# Patient Record
Sex: MALE | Race: WHITE | Employment: OTHER | URBAN - METROPOLITAN AREA
[De-identification: names, ages, dates, MRNs, and addresses within clinical notes are randomized per-mention and may not be internally consistent; named-entity substitution may affect disease eponyms.]

---

## 2017-02-01 ENCOUNTER — HOSPITAL ENCOUNTER (OUTPATIENT)
Dept: ULTRASOUND IMAGING | Age: 82
Discharge: HOME | End: 2017-02-01
Attending: INTERNAL MEDICINE
Payer: MEDICARE

## 2017-02-01 DIAGNOSIS — R10.9 ABDOMINAL PAIN: ICD-10-CM

## 2017-02-01 PROCEDURE — 76700 US EXAM ABDOM COMPLETE: CPT

## 2017-07-26 RX ORDER — LOVASTATIN 40 MG/1
TABLET ORAL
Qty: 180 TAB | Refills: 1 | Status: SHIPPED | OUTPATIENT
Start: 2017-07-26 | End: 2018-02-26 | Stop reason: SDUPTHER

## 2017-07-26 NOTE — TELEPHONE ENCOUNTER
Requested Prescriptions     Pending Prescriptions Disp Refills    lovastatin (MEVACOR) 40 mg tablet [Pharmacy Med Name: LOVASTATIN TABS 40MG] 180 Tab 1     Sig: TAKE 2 TABLETS DAILY       Last Refill: 9-20-16  Last visit:6-1-17

## 2017-08-11 ENCOUNTER — LAB ONLY (OUTPATIENT)
Dept: INTERNAL MEDICINE CLINIC | Age: 82
End: 2017-08-11

## 2017-08-11 DIAGNOSIS — E03.9 HYPOTHYROIDISM, UNSPECIFIED TYPE: ICD-10-CM

## 2017-08-11 DIAGNOSIS — I48.91 ATRIAL FIBRILLATION, UNSPECIFIED TYPE (HCC): Primary | ICD-10-CM

## 2017-08-11 LAB
INR BLD: 1.8
PT POC: 23.7 SECONDS
TSH BLD-ACNC: 5.8 UIU/ML (ref 0.4–4.2)
VALID INTERNAL CONTROL?: YES

## 2017-08-14 ENCOUNTER — TELEPHONE ANTICOAG (OUTPATIENT)
Dept: INTERNAL MEDICINE CLINIC | Age: 82
End: 2017-08-14

## 2017-08-14 RX ORDER — LEVOTHYROXINE SODIUM 137 UG/1
137 TABLET ORAL
Qty: 30 TAB | Refills: 3 | Status: SHIPPED | OUTPATIENT
Start: 2017-08-14 | End: 2018-02-12 | Stop reason: ALTCHOICE

## 2017-08-14 NOTE — PROGRESS NOTES
INR OK - no change in coumadin  Recheck PT 1 month    Thyroid dose needs to be increased to 137 mcg qam - don't have name of local pharmacy  Recheck TSH 1 month

## 2017-08-14 NOTE — PROGRESS NOTES
Mr. Laura Leary is here today for anticoagulation monitoring for the diagnosis of Atrial Fibrillation. His INR goal is 1.8-3.0 and his current Coumadin dose is 5mg qd. Today's findings include an INR of 1.8 . Considering Mr. Mcallister Root past history, todays findings, and per the coumadin policy/protocol, Mr. Michael Mckeon was instructed to take Coumadin as follows,  5 mg qd. He was also instructed to schedule an appointment in 4 weeks prior to leaving for an INR check. A full discussion of the nature of anticoagulants has been carried out. A full discussion of the need for frequent and regular monitoring, precise dosage adjustment and compliance was stressed. Side effects of potential bleeding were discussed and Mr. Michael Mckeon was instructed to call [phone] if there are any signs of abnormal bleeding. Mr. Michael Mckeon was instructed to avoid any OTC items containing aspirin or ibuprofen and prior to starting any new OTC products to consult with his physician or pharmacist to ensure no drug interactions are present. Mr. Michael Mckeon was instructed to avoid any major changes in his general diet and to avoid alcohol consumption. .    Mr. Michael Mckeon was provided a literature booklet, \"Treatment with Warfarin (Coumadin)\", that includes topics on understanding coumadin therapy, drug interaction considerations, vitamin K and coumadin use, interactions with foods and supplements containing vitamin K, and the use of herbal products. Mr. Michael Mckeon verbalized his understanding of all instructions and will call the office with any questions, concerns, or signs of abnormal bleeding or blood clot.

## 2017-08-14 NOTE — TELEPHONE ENCOUNTER
Requested Prescriptions     Pending Prescriptions Disp Refills    levothyroxine (SYNTHROID) 137 mcg tablet 30 Tab 3     Sig: Take 137 mcg by mouth Daily (before breakfast).      Patient was recently advised to increase dose

## 2017-09-11 RX ORDER — FUROSEMIDE 40 MG/1
TABLET ORAL
Qty: 180 TAB | Refills: 3 | Status: SHIPPED | OUTPATIENT
Start: 2017-09-11 | End: 2018-03-13 | Stop reason: SDUPTHER

## 2017-09-11 RX ORDER — ALLOPURINOL 300 MG/1
TABLET ORAL
Qty: 90 TAB | Refills: 3 | Status: SHIPPED | OUTPATIENT
Start: 2017-09-11 | End: 2018-08-16 | Stop reason: SDUPTHER

## 2017-09-11 NOTE — TELEPHONE ENCOUNTER
Requested Prescriptions     Pending Prescriptions Disp Refills    allopurinol (ZYLOPRIM) 300 mg tablet [Pharmacy Med Name: ALLOPURINOL TABS 300MG] 90 Tab 3     Sig: TAKE 1 TABLET DAILY    furosemide (LASIX) 40 mg tablet [Pharmacy Med Name: FUROSEMIDE TABS 40MG] 180 Tab 3     Sig: TAKE 2 TABLETS DAILY       Last Refill: 5-12-17  Last visit:6-1-17

## 2017-09-12 ENCOUNTER — LAB ONLY (OUTPATIENT)
Dept: INTERNAL MEDICINE CLINIC | Age: 82
End: 2017-09-12

## 2017-09-12 DIAGNOSIS — I48.91 ATRIAL FIBRILLATION, UNSPECIFIED TYPE (HCC): Primary | ICD-10-CM

## 2017-09-12 DIAGNOSIS — E03.9 UNSPECIFIED HYPOTHYROIDISM: ICD-10-CM

## 2017-09-12 LAB
INR BLD: 1.5
PT POC: 20.3 SECONDS
TSH BLD-ACNC: 5.27 UIU/ML (ref 0.4–4.2)
VALID INTERNAL CONTROL?: YES

## 2017-09-13 ENCOUNTER — TELEPHONE ANTICOAG (OUTPATIENT)
Dept: INTERNAL MEDICINE CLINIC | Age: 82
End: 2017-09-13

## 2017-09-13 DIAGNOSIS — I48.91 ATRIAL FIBRILLATION, UNSPECIFIED TYPE (HCC): ICD-10-CM

## 2017-09-13 LAB — INR, EXTERNAL: 1.5

## 2017-09-13 RX ORDER — LEVOTHYROXINE SODIUM 150 UG/1
150 TABLET ORAL
Qty: 30 TAB | Refills: 1 | Status: SHIPPED | OUTPATIENT
Start: 2017-09-13 | End: 2017-11-05 | Stop reason: SDUPTHER

## 2017-09-13 NOTE — PROGRESS NOTES
Mr. Manfred Alonzo is here today for anticoagulation monitoring for the diagnosis of Atrial Fibrillation. His INR goal is 2.0-3.0 and his current Coumadin dose is 5mg qd. Today's findings include an INR of 1.5     Considering Mr. Desi Mehta past history, todays findings, and per the coumadin policy/protocol, Mr. Dyana Heimlich was instructed to take Coumadin as follows,  Take extra 5 mg today then resume usual dose. He was also instructed to schedule an appointment in 4 weeks prior to leaving for an INR check. A full discussion of the nature of anticoagulants has been carried out. A full discussion of the need for frequent and regular monitoring, precise dosage adjustment and compliance was stressed. Side effects of potential bleeding were discussed and Mr. Dyana Heimlich was instructed to call [phone] if there are any signs of abnormal bleeding. Mr. Dyana Heimlich was instructed to avoid any OTC items containing aspirin or ibuprofen and prior to starting any new OTC products to consult with his physician or pharmacist to ensure no drug interactions are present. Mr. Dyana Heimlich was instructed to avoid any major changes in his general diet and to avoid alcohol consumption. .    Mr. Dyana Heimlich was provided a literature booklet, \"Treatment with Warfarin (Coumadin)\", that includes topics on understanding coumadin therapy, drug interaction considerations, vitamin K and coumadin use, interactions with foods and supplements containing vitamin K, and the use of herbal products. Mr. Dyana Heimlich verbalized his understanding of all instructions and will call the office with any questions, concerns, or signs of abnormal bleeding or blood clot.

## 2017-09-13 NOTE — PROGRESS NOTES
INR 1.5 - take extra 5 mg coumadin today and then resume usual regimen - recheck PT 1 month  Thyroid level low - increase levothyroxine to 150 mcg qam

## 2017-09-13 NOTE — TELEPHONE ENCOUNTER
----- Message from Steven Alvarado MD sent at 9/13/2017 11:57 AM EDT -----  INR 1.5 - take extra 5 mg coumadin today and then resume usual regimen - recheck PT 1 month  Thyroid level low - increase levothyroxine to 150 mcg qam    patient notified please send in new Rx.

## 2017-10-17 ENCOUNTER — TELEPHONE ANTICOAG (OUTPATIENT)
Dept: INTERNAL MEDICINE CLINIC | Age: 82
End: 2017-10-17

## 2017-10-17 ENCOUNTER — LAB ONLY (OUTPATIENT)
Dept: INTERNAL MEDICINE CLINIC | Age: 82
End: 2017-10-17

## 2017-10-17 DIAGNOSIS — I48.91 ATRIAL FIBRILLATION, UNSPECIFIED TYPE (HCC): Primary | ICD-10-CM

## 2017-10-17 DIAGNOSIS — I48.91 ATRIAL FIBRILLATION, UNSPECIFIED TYPE (HCC): ICD-10-CM

## 2017-10-17 LAB
INR BLD: 2
PT POC: 26.1 SECONDS
VALID INTERNAL CONTROL?: YES

## 2017-10-17 RX ORDER — ISOSORBIDE MONONITRATE 60 MG/1
TABLET, EXTENDED RELEASE ORAL
Qty: 90 TAB | Refills: 3 | Status: SHIPPED | OUTPATIENT
Start: 2017-10-17 | End: 2018-09-17 | Stop reason: SDUPTHER

## 2017-10-17 NOTE — TELEPHONE ENCOUNTER
Requested Prescriptions     Pending Prescriptions Disp Refills    isosorbide mononitrate ER (IMDUR) 60 mg CR tablet [Pharmacy Med Name: ISOSORBIDE MONO ER TABS 60MG] 90 Tab 3     Sig: TAKE 1 TABLET DAILY       Last Refill: 7-26-17  Last visit:6-1-17

## 2017-10-17 NOTE — PROGRESS NOTES
Mr. Cristina Ramos is here today for anticoagulation monitoring for the diagnosis of Atrial Fibrillation. His INR goal is 2.0-3.0 and his current Coumadin dose is 5mg daily. .     Today's findings include an INR of 2.0 (normal INR range 0.8-1.2). Considering Mr. Rae Escalera past history, todays findings, and per the coumadin policy/protocol, Mr. Marybel Nicholas was instructed to take Coumadin as follows,  Continue same coumadin regimen. He was also instructed to schedule an appointment in 4 weeks prior to leaving for an INR check. A full discussion of the nature of anticoagulants has been carried out. A full discussion of the need for frequent and regular monitoring, precise dosage adjustment and compliance was stressed. Side effects of potential bleeding were discussed and Mr. Marybel Nicholas was instructed to call [phone] if there are any signs of abnormal bleeding. Mr. Marybel Nicholas was instructed to avoid any OTC items containing aspirin or ibuprofen and prior to starting any new OTC products to consult with his physician or pharmacist to ensure no drug interactions are present. Mr. Marybel Nicholas was instructed to avoid any major changes in his general diet and to avoid alcohol consumption. .    Mr. Marybel Nicholas was provided a literature booklet, \"Treatment with Warfarin (Coumadin)\", that includes topics on understanding coumadin therapy, drug interaction considerations, vitamin K and coumadin use, interactions with foods and supplements containing vitamin K, and the use of herbal products. Mr. Marybel Nicholas verbalized his understanding of all instructions and will call the office with any questions, concerns, or signs of abnormal bleeding or blood clot.

## 2017-10-24 PROBLEM — Z79.899 LONG-TERM USE OF HIGH-RISK MEDICATION: Status: ACTIVE | Noted: 2017-10-24

## 2017-10-24 PROBLEM — N40.1 BPH WITH OBSTRUCTION/LOWER URINARY TRACT SYMPTOMS: Status: ACTIVE | Noted: 2017-10-24

## 2017-10-24 PROBLEM — Z79.01 LONG TERM CURRENT USE OF ANTICOAGULANT: Status: ACTIVE | Noted: 2017-10-24

## 2017-10-24 PROBLEM — H35.30 MACULAR DEGENERATION: Status: ACTIVE | Noted: 2017-10-24

## 2017-10-24 PROBLEM — I65.21 RIGHT-SIDED EXTRACRANIAL CAROTID ARTERY STENOSIS: Status: ACTIVE | Noted: 2017-10-24

## 2017-10-24 PROBLEM — I49.5 SICK SINUS SYNDROME (HCC): Status: ACTIVE | Noted: 2017-10-24

## 2017-10-24 PROBLEM — M10.9 GOUT: Status: ACTIVE | Noted: 2017-10-24

## 2017-10-24 PROBLEM — Z79.899 LONG TERM CURRENT USE OF AMIODARONE: Status: ACTIVE | Noted: 2017-10-24

## 2017-10-24 PROBLEM — I70.1 RENAL ARTERY STENOSIS (HCC): Status: ACTIVE | Noted: 2017-10-24

## 2017-10-24 PROBLEM — N13.8 BPH WITH OBSTRUCTION/LOWER URINARY TRACT SYMPTOMS: Status: ACTIVE | Noted: 2017-10-24

## 2017-10-24 PROBLEM — E11.9 DIABETES (HCC): Status: ACTIVE | Noted: 2017-10-24

## 2017-10-24 PROBLEM — K57.30 DIVERTICULOSIS OF LARGE INTESTINE WITHOUT HEMORRHAGE: Status: ACTIVE | Noted: 2017-10-24

## 2017-10-24 PROBLEM — I73.9 PERIPHERAL VASCULAR DISEASE (HCC): Status: ACTIVE | Noted: 2017-10-24

## 2017-11-02 ENCOUNTER — HOSPITAL ENCOUNTER (OUTPATIENT)
Dept: GENERAL RADIOLOGY | Age: 82
Discharge: HOME | End: 2017-11-02
Payer: MEDICARE

## 2017-11-02 DIAGNOSIS — I48.91 A-FIB (HCC): ICD-10-CM

## 2017-11-02 PROCEDURE — 71020 XR CHEST PA LAT: CPT

## 2017-11-06 RX ORDER — LEVOTHYROXINE SODIUM 150 MCG
TABLET ORAL
Qty: 30 TAB | Refills: 1 | Status: SHIPPED | OUTPATIENT
Start: 2017-11-06 | End: 2017-12-07 | Stop reason: SDUPTHER

## 2017-11-06 NOTE — TELEPHONE ENCOUNTER
Requested Prescriptions     Pending Prescriptions Disp Refills    SYNTHROID 150 mcg tablet [Pharmacy Med Name: SYNTHROID 150 MCG TABLET] 30 Tab 1     Sig: take 1 tablet by mouth once daily BEFORE BREAKFAST       Last Refill: 10/10/17  Last visit:6/1/17

## 2017-11-28 ENCOUNTER — LAB ONLY (OUTPATIENT)
Dept: INTERNAL MEDICINE CLINIC | Age: 82
End: 2017-11-28

## 2017-11-28 DIAGNOSIS — I48.20 CHRONIC ATRIAL FIBRILLATION (HCC): Primary | ICD-10-CM

## 2017-11-28 LAB
INR BLD: 2.8
INR, EXTERNAL: 2.8
PT POC: 36.3 SECONDS
VALID INTERNAL CONTROL?: YES

## 2017-11-29 ENCOUNTER — TELEPHONE ANTICOAG (OUTPATIENT)
Dept: INTERNAL MEDICINE CLINIC | Age: 82
End: 2017-11-29

## 2017-11-29 DIAGNOSIS — I48.20 CHRONIC ATRIAL FIBRILLATION (HCC): ICD-10-CM

## 2017-11-29 NOTE — PROGRESS NOTES
Anticoagulation Episode Summary     Current INR goal 1.8-3.0   Next INR check 12/29/2017   INR from last check 2.8 (11/28/2017)   Weekly max dose    Target end date    INR check location [facility]   Preferred lab    Send INR reminders to     Indications   Chronic atrial fibrillation [facility]) [I48.2]           Comments          Anticoagulation Care Providers     Provider Role Specialty Phone number    Eddie Coles MD Responsible Internal Medicine [phone]        Patient notified no change in coumadin and re check his level again in 1 month

## 2017-12-06 RX ORDER — METOPROLOL TARTRATE 100 MG/1
TABLET ORAL
Qty: 180 TAB | Refills: 2 | Status: SHIPPED | OUTPATIENT
Start: 2017-12-06 | End: 2018-08-16 | Stop reason: SDUPTHER

## 2017-12-06 RX ORDER — WARFARIN SODIUM 5 MG/1
TABLET ORAL
Qty: 90 TAB | Refills: 2 | Status: SHIPPED | OUTPATIENT
Start: 2017-12-06 | End: 2018-08-16 | Stop reason: SDUPTHER

## 2017-12-07 ENCOUNTER — OFFICE VISIT (OUTPATIENT)
Dept: INTERNAL MEDICINE CLINIC | Age: 82
End: 2017-12-07

## 2017-12-07 VITALS
OXYGEN SATURATION: 94 % | BODY MASS INDEX: 37.08 KG/M2 | HEART RATE: 76 BPM | HEIGHT: 70 IN | SYSTOLIC BLOOD PRESSURE: 110 MMHG | DIASTOLIC BLOOD PRESSURE: 60 MMHG | WEIGHT: 259 LBS

## 2017-12-07 DIAGNOSIS — Z79.4 TYPE 2 DIABETES MELLITUS WITHOUT COMPLICATION, WITH LONG-TERM CURRENT USE OF INSULIN (HCC): Primary | Chronic | ICD-10-CM

## 2017-12-07 DIAGNOSIS — M10.9 GOUT, UNSPECIFIED CAUSE, UNSPECIFIED CHRONICITY, UNSPECIFIED SITE: Chronic | ICD-10-CM

## 2017-12-07 DIAGNOSIS — Z79.899 LONG-TERM USE OF HIGH-RISK MEDICATION: Chronic | ICD-10-CM

## 2017-12-07 DIAGNOSIS — E03.9 ACQUIRED HYPOTHYROIDISM: Chronic | ICD-10-CM

## 2017-12-07 DIAGNOSIS — I48.20 CHRONIC ATRIAL FIBRILLATION (HCC): Chronic | ICD-10-CM

## 2017-12-07 DIAGNOSIS — I10 ESSENTIAL HYPERTENSION: Chronic | ICD-10-CM

## 2017-12-07 DIAGNOSIS — Z79.01 LONG TERM CURRENT USE OF ANTICOAGULANT: Chronic | ICD-10-CM

## 2017-12-07 DIAGNOSIS — E78.2 MIXED HYPERLIPIDEMIA: Chronic | ICD-10-CM

## 2017-12-07 DIAGNOSIS — Z23 ENCOUNTER FOR IMMUNIZATION: ICD-10-CM

## 2017-12-07 DIAGNOSIS — E11.9 TYPE 2 DIABETES MELLITUS WITHOUT COMPLICATION, WITH LONG-TERM CURRENT USE OF INSULIN (HCC): Primary | Chronic | ICD-10-CM

## 2017-12-07 PROBLEM — N13.8 BPH WITH OBSTRUCTION/LOWER URINARY TRACT SYMPTOMS: Chronic | Status: ACTIVE | Noted: 2017-10-24

## 2017-12-07 PROBLEM — N40.1 BPH WITH OBSTRUCTION/LOWER URINARY TRACT SYMPTOMS: Chronic | Status: ACTIVE | Noted: 2017-10-24

## 2017-12-07 LAB
INR BLD: 1.8
PT POC: 24.6 SECONDS
VALID INTERNAL CONTROL?: YES

## 2017-12-07 NOTE — PATIENT INSTRUCTIONS
Learning About Cutting Calories  How do calories affect your weight? Food gives your body energy. Energy from the food you eat is measured in calories. This energy keeps your heart beating, your brain active, and your muscles working. Your body needs a certain number of calories each day. After your body uses the calories it needs, it stores extra calories as fat. To lose weight safely, you have to eat fewer calories while eating in a healthy way. How many calories do you need each day? The more active you are, the more calories you need. When you are less active, you need fewer calories. How many calories you need each day also depends on several things, including your age and whether you are male or female. Here are some general guidelines for adults:  · Less active women and older adults need 1,600 to 2,000 calories each day. · Active women and less active men need 2,000 to 2,400 calories each day. · Active men need 2,400 to 3,000 calories each day. How can you cut calories and eat healthy meals? Whole grains, vegetables and fruits, and dried beans are good lower-calorie foods. They give you lots of nutrients and fiber. And they fill you up. Sweets, energy drinks, and soda pop are high in calories. They give you few nutrients and no fiber. Try to limit soda pop, fruit juice, and energy drinks. Drink water instead. Some fats can be part of a healthy diet. But cutting back on fats from highly processed foods like fast foods and many snack foods is a good way to lower the calories in your diet. Also, use smaller amounts of fats like butter, margarine, salad dressing, and mayonnaise. Add fresh garlic, lemon, or herbs to your meals to add flavor without adding fat. Meats and dairy products can be a big source of hidden fats. Try to choose lean or low-fat versions of these products. Fat-free cookies, candies, chips, and frozen treats can still be high in sugar and calories.  Some fat-free foods have more calories than regular ones. Eat fat-free treats in moderation, as you would other foods. If your favorite foods are high in fat, salt, sugar, or calories, limit how often you eat them. Eat smaller servings, or look for healthy substitutes. Fill up on fruits, vegetables, and whole grains. Eating at home  · Use meat as a side dish instead of as the main part of your meal.  · Try main dishes that use whole wheat pasta, brown rice, dried beans, or vegetables. · Find ways to cook with little or no fat, such as broiling, steaming, or grilling. · Use cooking spray instead of oil. If you use oil, use a monounsaturated oil, such as canola or olive oil. · Trim fat from meats before you cook them. · Drain off fat after you brown the meat or while you roast it. · Chill soups and stews after you cook them. Then skim the fat off the top after it hardens. Eating out  · Order foods that are broiled or poached rather than fried or breaded. · Cut back on the amount of butter or margarine that you use on bread. · Order sauces, gravies, and salad dressings on the side, and use only a little. · When you order pasta, choose tomato sauce rather than cream sauce. · Ask for salsa with your baked potato instead of sour cream, butter, cheese, or burroughs. · Order meals in a small size instead of upgrading to a large. · Share an entree, or take part of your food home to eat as another meal.  · Share appetizers and desserts. Where can you learn more? Go to http://rufina-juni.info/. Enter 99 [id number] in the search box to learn more about \"Learning About Cutting Calories. \"  Current as of: May 12, 2017  Content Version: 11.4  © 3461-6389 [employer], [employer]. Care instructions adapted under license by [employer] (which disclaims liability or warranty for this information).  If you have questions about a medical condition or this instruction, always ask your healthcare professional. [employer] disclaims any warranty or liability for your use of this information. Vaccine Information Statement     Tdap (Tetanus, Diphtheria, Pertussis) Vaccine: What You Need to Know    Many Vaccine Information Statements are available in [place] and other languages. See www.immunize.org/vis. Hojas de Información Sobre Vacunas están disponibles en español y en muchos otros idiomas. Visite [employer].si    1. Why get vaccinated? Tetanus, diphtheria, and pertussis are very serious diseases. Tdap vaccine can protect us from these diseases. And, Tdap vaccine given to pregnant women can protect  babies against pertussis. TETANUS (Lockjaw) is rare in the [facility] today. It causes painful muscle tightening and stiffness, usually all over the body.  It can lead to tightening of muscles in the head and neck so you cant open your mouth, swallow, or sometimes even breathe. Tetanus kills about 1 out of 10 people who are infected even after receiving the best medical care. DIPHTHERIA is also rare in the [facility] today. It can cause a thick coating to form in the back of the throat.  It can lead to breathing problems, heart failure, paralysis, and death. PERTUSSIS (Whooping Cough) causes severe coughing spells, which can cause difficulty breathing, vomiting, and disturbed sleep.  It can also lead to weight loss, incontinence, and rib fractures. Up to 2 in 100 adolescents and 5 in 100 adults with pertussis are hospitalized or have complications, which could include pneumonia or death. These diseases are caused by bacteria. Diphtheria and pertussis are spread from person to person through secretions from coughing or sneezing. Tetanus enters the body through cuts, scratches, or wounds. Before vaccines, as many as 200,000 cases of diphtheria, 200,000 cases of pertussis, and hundreds of cases of tetanus, were reported in the [place] each year.  Since vaccination began, reports of cases for tetanus and diphtheria have dropped by about 99% and for pertussis by about 80%. 2. Tdap vaccine    Tdap vaccine can protect adolescents and adults from tetanus, diphtheria, and pertussis. One dose of Tdap is routinely given at age 6 or 15. People who did not get Tdap at that age should get it as soon as possible. Tdap is especially important for health care professionals and anyone having close contact with a baby younger than 12 months. Pregnant women should get a dose of Tdap during every pregnancy, to protect the  from pertussis. Infants are most at risk for severe, life-threatening complications from pertussis. Another vaccine, called Td, protects against tetanus and diphtheria, but not pertussis. A Td booster should be given every 10 years. Tdap may be given as one of these boosters if you have never gotten Tdap before. Tdap may also be given after a severe cut or burn to prevent tetanus infection. Your doctor or the person giving you the vaccine can give you more information. Tdap may safely be given at the same time as other vaccines. 3. Some people should not get this vaccine     A person who has ever had a life-threatening allergic reaction after a previous dose of any diphtheria, tetanus or pertussis containing vaccine, OR has a severe allergy to any part of this vaccine, should not get Tdap vaccine. Tell the person giving the vaccine about any severe allergies.  Anyone who had coma or long repeated seizures within 7 days after a childhood dose of DTP or DTaP, or a previous dose of Tdap, should not get Tdap, unless a cause other than the vaccine was found. They can still get Td.      Talk to your doctor if you:  - have seizures or another nervous system problem,  - had severe pain or swelling after any vaccine containing diphtheria, tetanus or pertussis,   - ever had a condition called Guillain Barré Syndrome (GBS),  - arent feeling well on the day the shot is scheduled. 4. Risks    With any medicine, including vaccines, there is a chance of side effects. These are usually mild and go away on their own. Serious reactions are also possible but are rare. Most people who get Tdap vaccine do not have any problems with it. Mild Problems following Tdap  (Did not interfere with activities)   Pain where the shot was given (about 3 in 4 adolescents or 2 in 3 adults)   Redness or swelling where the shot was given (about 1 person in 5)   Mild fever of at least 100.4°F (up to about 1 in 25 adolescents or 1 in 100 adults)   Headache (about 3 or 4 people in 10)   Tiredness (about 1 person in 3 or 4)   Nausea, vomiting, diarrhea, stomach ache (up to 1 in 4 adolescents or 1 in 10 adults)   Chills,  sore joints (about 1 person in 10)   Body aches (about 1 person in 3 or 4)    Rash, swollen glands (uncommon)    Moderate Problems following Tdap  (Interfered with activities, but did not require medical attention)   Pain where the shot was given (up to 1 in 5 or 6)    Redness or swelling where the shot was given (up to about 1 in 16 adolescents or 1 in 12 adults)   Fever over 102°F (about 1 in 100 adolescents or 1 in 250 adults)   Headache (about 1 in 7 adolescents or 1 in 10 adults)   Nausea, vomiting, diarrhea, stomach ache (up to 1 or 3 people in 100)   Swelling of the entire arm where the shot was given (up to about 1 in 500). Severe Problems following Tdap  (Unable to perform usual activities; required medical attention)   Swelling, severe pain, bleeding, and redness in the arm where the shot was given (rare). Problems that could happen after any vaccine:     People sometimes faint after a medical procedure, including vaccination. Sitting or lying down for about 15 minutes can help prevent fainting, and injuries caused by a fall.  Tell your doctor if you feel dizzy, or have vision changes or ringing in the ears.     Some people get severe pain in the shoulder and have difficulty moving the arm where a shot was given. This happens very rarely.  Any medication can cause a severe allergic reaction. Such reactions from a vaccine are very rare, estimated at fewer than 1 in a million doses, and would happen within a few minutes to a few hours after the vaccination. As with any medicine, there is a very remote chance of a vaccine causing a serious injury or death. The safety of vaccines is always being monitored. For more information, visit: www.cdc.gov/vaccinesafety/    5. What if there is a serious problem? What should I look for?  Look for anything that concerns you, such as signs of a severe allergic reaction, very high fever, or unusual behavior.  Signs of a severe allergic reaction can include hives, swelling of the face and throat, difficulty breathing, a fast heartbeat, dizziness, and weakness. These would usually start a few minutes to a few hours after the vaccination. What should I do?  If you think it is a severe allergic reaction or other emergency that cant wait, call 9-1-1 or get the person to the nearest hospital. Otherwise, call your doctor.  Afterward, the reaction should be reported to the Vaccine Adverse Event Reporting System (VAERS). Your doctor might file this report, or you can do it yourself through the VAERS web site at www.vaers. hhs.gov, or by calling 4-694.482.5658. VAERS does not give medical advice. 6. The National Vaccine Injury Compensation Program    The [facility] Vaccine Injury Compensation Program (VICP) is a federal program that was created to compensate people who may have been injured by certain vaccines. Persons who believe they may have been injured by a vaccine can learn about the program and about filing a claim by calling 9-597.509.3650 or visiting the [employer] website at www.[facility].gov/vaccinecompensation.  There is a time limit to file a claim for compensation. 7. How can I learn more?  Ask your doctor. He or she can give you the vaccine package insert or suggest other sources of information.  Call your local or state health department.  Contact the Centers for Disease Control and Prevention (CDC):  - Call 6-638.786.9645 (1-800-CDC-INFO) or  - Visit CDCs website at www.cdc.gov/vaccines      Vaccine Information Statement   Tdap Vaccine  (2/24/2015)  42 RAVI [address] [id number]-81    Department of Health and Human Services  Centers for Disease Control and Prevention    Office Use Only

## 2017-12-07 NOTE — MR AVS SNAPSHOT
Visit Information Date & Time Provider Department Dept. Phone Encounter #  
 12/7/2017  8:30 AM Harshil Disla, [address] [facility] [phone] [id number] Follow-up Instructions Return in about 6 months (around 6/7/2018). Upcoming Health Maintenance Date Due  
 FOOT EXAM Q1 5/31/1945 MICROALBUMIN Q1 5/31/1945 DTaP/Tdap/Td series (1 - Tdap) 5/31/1958 ZOSTER VACCINE AGE 60> 3/31/1997 MEDICARE YEARLY EXAM 5/31/2002 HEMOGLOBIN A1C Q6M 8/1/2015 LIPID PANEL Q1 2/1/2016 EYE EXAM RETINAL OR DILATED Q1 7/19/2018 GLAUCOMA SCREENING Q2Y 7/19/2019 Allergies as of 12/7/2017  Review Complete On: 12/7/2017 By: Harshil Disla MD  
  
 Severity Noted Reaction Type Reactions Latex  01/06/2015    Other (comments) Skin raw and severely irritated Aspirin  11/28/2017    Unknown (comments) Hydrocodone  01/02/2016    Other (comments)  
 hallucinations Oxycodone  01/02/2016    Other (comments)  
 hallucinations Sulfa (Sulfonamide Antibiotics)  10/18/2010    Rash Tetracycline  07/14/2011    Rash Current Immunizations  Reviewed on 12/7/2017 Name Date Influenza Vaccine 9/14/2017, 10/13/2016, 10/1/2015 Pneumococcal Conjugate (PCV-13) 10/1/2015 Pneumococcal Polysaccharide (PPSV-23) 10/1/2013, 1/1/2010 Tdap  Incomplete Reviewed by Harshil Disla MD on 12/7/2017 at  8:53 AM  
You Were Diagnosed With   
  
 Codes Comments Type 2 diabetes mellitus without complication, with long-term current use of insulin (HCC)    -  Primary ICD-10-CM: E11.9, Z79.4 ICD-9-CM: 250.00, V58.67 Essential hypertension     ICD-10-CM: I10 
ICD-9-CM: 401.9 Mixed hyperlipidemia     ICD-10-CM: E78.2 ICD-9-CM: 272.2 Long-term use of high-risk medication     ICD-10-CM: Z79.899 ICD-9-CM: V58.69 Acquired hypothyroidism     ICD-10-CM: E03.9 ICD-9-CM: 244.9 Chronic atrial fibrillation (HCC)     ICD-10-CM: R54.0 ICD-9-CM: 427.31 Long term current use of anticoagulant     ICD-10-CM: Z79.01 
ICD-9-CM: V58.61 Gout, unspecified cause, unspecified chronicity, unspecified site     ICD-10-CM: M10.9 ICD-9-CM: 274.9 Encounter for immunization     ICD-10-CM: Z74 ICD-9-CM: V03.89 Vitals BP Pulse Height(growth percentile) Weight(growth percentile) SpO2 BMI  
 110/60 (BP 1 Location: Left arm, BP Patient Position: Sitting) 76 5' 10\" (1.778 m) 259 lb (117.5 kg) 94% 37.16 kg/m2 Smoking Status Former Smoker BMI and BSA Data Body Mass Index Body Surface Area  
 37.16 kg/m 2 2.41 m 2 Preferred Pharmacy Pharmacy Name Phone [address], [facility] [phone] Your Updated Medication List  
  
   
This list is accurate as of: 12/7/17  9:08 AM.  Always use your most recent med list.  
  
  
  
  
 allopurinol 300 mg tablet Commonly known as:  ZYLOPRIM  
TAKE 1 TABLET DAILY  
  
 amiodarone 200 mg tablet Commonly known as:  CORDARONE Take 200 mg by mouth daily. 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  
  
 finasteride 5 mg tablet Commonly known as:  PROSCAR Take 5 mg by mouth every other day. With dinner  Indications: BENIGN PROSTATIC HYPERTROPHY Flaxseed Oil Oil Take 1,000 mg by mouth daily. FOLGARD RX 2.2 PO Take 1 tablet by mouth daily. furosemide 40 mg tablet Commonly known as:  LASIX TAKE 2 TABLETS DAILY  
  
 insulin lispro 100 unit/mL injection Commonly known as:  HUMALOG  
5-10 Units by SubCUTAneous route three (3) times daily as needed. isosorbide mononitrate ER 60 mg CR tablet Commonly known as:  IMDUR  
TAKE 1 TABLET DAILY  
  
 LANTUS SOLOSTAR 100 unit/mL (3 mL) Inpn Generic drug:  insulin glargine 66 Units by SubCUTAneous route Daily (before lunch). levothyroxine 137 mcg tablet Commonly known as:  SYNTHROID Take 137 mcg by mouth Daily (before breakfast). losartan 50 mg tablet Commonly known as:  COZAAR Take 50 mg by mouth daily. Indications: CHRONIC HEART FAILURE, HYPERTENSION  
  
 lovastatin 40 mg tablet Commonly known as:  MEVACOR  
TAKE 2 TABLETS DAILY  
  
 metoprolol tartrate 100 mg IR tablet Commonly known as:  LOPRESSOR  
TAKE 1 TABLET TWICE A DAY  
  
 multivitamin, stress formula tablet Commonly known as:  STRESS TAB Take 1 Tab by mouth daily. OCUVITE PRESERVISION PO Take 1 Tab by mouth two (2) times a day. spironolactone 25 mg tablet Commonly known as:  ALDACTONE Take 25 mg by mouth daily (with lunch). Restart when home BP improves to >120 mmHg. Check BP in a couple of days  Indications: EDEMA, HYPERTENSION  
  
 terazosin 10 mg capsule Commonly known as:  HYTRIN Take 1 capsule by mouth nightly. Indications: BENIGN PROSTATIC HYPERTROPHY, HYPERTENSION  
  
 warfarin 5 mg tablet Commonly known as:  COUMADIN  
TAKE 1 TABLET DAILY We Performed the Following AMB POC CK (CPK) [13421 CPT(R)] AMB POC COMPLETE CBC,AUTOMATED ENTER [id number] CPT(R)] AMB POC COMPREHENSIVE METABOLIC PANEL [13878 CPT(R)] AMB POC HEMOGLOBIN A1C [42997 CPT(R)] AMB POC LIPID PROFILE [75128 CPT(R)] AMB POC PT/INR [31770 CPT(R)] AMB POC T4, FREE [id number] CPT(R)] AMB POC TSH [29156 CPT(R)] AMB POC URIC ACID [00781 CPT(R)] AMB POC URINALYSIS DIP STICK AUTO W/ MICRO  [84948 CPT(R)] AMB POC URINE, MICROALBUMIN, SEMIQUANT (1 RESULT) [33311 CPT(R)] NC COLLECTION VENOUS BLOOD,VENIPUNCTURE [id number] CPT(R)] NC IMMUNIZ ADMIN,1 SINGLE/COMB VAC/TOXOID [id number] CPT(R)] TETANUS, DIPHTHERIA TOXOIDS AND ACELLULAR PERTUSSIS VACCINE (TDAP), IN INDIVIDS. >=7, IM [id number] CPT(R)] Follow-up Instructions Return in about 6 months (around 6/7/2018). Patient Instructions Learning About Cutting Calories How do calories affect your weight? Food gives your body energy. Energy from the food you eat is measured in calories. This energy keeps your heart beating, your brain active, and your muscles working. Your body needs a certain number of calories each day. After your body uses the calories it needs, it stores extra calories as fat. To lose weight safely, you have to eat fewer calories while eating in a healthy way. How many calories do you need each day? The more active you are, the more calories you need. When you are less active, you need fewer calories. How many calories you need each day also depends on several things, including your age and whether you are male or female. Here are some general guidelines for adults: 
· Less active women and older adults need 1,600 to 2,000 calories each day. · Active women and less active men need 2,000 to 2,400 calories each day. · Active men need 2,400 to 3,000 calories each day. How can you cut calories and eat healthy meals? Whole grains, vegetables and fruits, and dried beans are good lower-calorie foods. They give you lots of nutrients and fiber. And they fill you up. Sweets, energy drinks, and soda pop are high in calories. They give you few nutrients and no fiber. Try to limit soda pop, fruit juice, and energy drinks. Drink water instead. Some fats can be part of a healthy diet. But cutting back on fats from highly processed foods like fast foods and many snack foods is a good way to lower the calories in your diet. Also, use smaller amounts of fats like butter, margarine, salad dressing, and mayonnaise. Add fresh garlic, lemon, or herbs to your meals to add flavor without adding fat. Meats and dairy products can be a big source of hidden fats. Try to choose lean or low-fat versions of these products.  
Fat-free cookies, candies, chips, and frozen treats can still be high in sugar and calories. Some fat-free foods have more calories than regular ones. Eat fat-free treats in moderation, as you would other foods. If your favorite foods are high in fat, salt, sugar, or calories, limit how often you eat them. Eat smaller servings, or look for healthy substitutes. Fill up on fruits, vegetables, and whole grains. Eating at home · Use meat as a side dish instead of as the main part of your meal. 
· Try main dishes that use whole wheat pasta, brown rice, dried beans, or vegetables. · Find ways to cook with little or no fat, such as broiling, steaming, or grilling. · Use cooking spray instead of oil. If you use oil, use a monounsaturated oil, such as canola or olive oil. · Trim fat from meats before you cook them. · Drain off fat after you brown the meat or while you roast it. · Chill soups and stews after you cook them. Then skim the fat off the top after it hardens. Eating out · Order foods that are broiled or poached rather than fried or breaded. · Cut back on the amount of butter or margarine that you use on bread. · Order sauces, gravies, and salad dressings on the side, and use only a little. · When you order pasta, choose tomato sauce rather than cream sauce. · Ask for salsa with your baked potato instead of sour cream, butter, cheese, or burroughs. · Order meals in a small size instead of upgrading to a large. · Share an entree, or take part of your food home to eat as another meal. 
· Share appetizers and desserts. Where can you learn more? Go to http://rufina-juni.info/. Enter [id number] in the search box to learn more about \"Learning About Cutting Calories. \" Current as of: May 12, 2017 Content Version: 11.4 © 2495-7518 [employer]wise, [employer]orporated. Care instructions adapted under license by [employer] (which disclaims liability or warranty for this information).  If you have questions about a medical condition or this instruction, always ask your healthcare professional. [address] any warranty or liability for your use of this information. Vaccine Information Statement Tdap (Tetanus, Diphtheria, Pertussis) Vaccine: What You Need to Know Many Vaccine Information Statements are available in [place] and other languages. See www.immunize.org/vis. Hojas de Información Sobre Vacunas están disponibles en español y en muchos otros idiomas. Visite WorthScale.si 1. Why get vaccinated? Tetanus, diphtheria, and pertussis are very serious diseases. Tdap vaccine can protect us from these diseases. And, Tdap vaccine given to pregnant women can protect  babies against pertussis. TETANUS (Lockjaw) is rare in the [facility] today. It causes painful muscle tightening and stiffness, usually all over the body. ? It can lead to tightening of muscles in the head and neck so you cant open your mouth, swallow, or sometimes even breathe. Tetanus kills about 1 out of 10 people who are infected even after receiving the best medical care. DIPHTHERIA is also rare in the [facility] today. It can cause a thick coating to form in the back of the throat. ? It can lead to breathing problems, heart failure, paralysis, and death. PERTUSSIS (Whooping Cough) causes severe coughing spells, which can cause difficulty breathing, vomiting, and disturbed sleep. ? It can also lead to weight loss, incontinence, and rib fractures. Up to 2 in 100 adolescents and 5 in 100 adults with pertussis are hospitalized or have complications, which could include pneumonia or death. These diseases are caused by bacteria. Diphtheria and pertussis are spread from person to person through secretions from coughing or sneezing. Tetanus enters the body through cuts, scratches, or wounds.  
 
Before vaccines, as many as 200,000 cases of diphtheria, 200,000 cases of pertussis, and hundreds of cases of tetanus, were reported in the [place] each year. Since vaccination began, reports of cases for tetanus and diphtheria have dropped by about 99% and for pertussis by about 80%. 2. Tdap vaccine Tdap vaccine can protect adolescents and adults from tetanus, diphtheria, and pertussis. One dose of Tdap is routinely given at age 6 or 15. People who did not get Tdap at that age should get it as soon as possible. Tdap is especially important for health care professionals and anyone having close contact with a baby younger than 12 months. Pregnant women should get a dose of Tdap during every pregnancy, to protect the  from pertussis. Infants are most at risk for severe, life-threatening complications from pertussis. Another vaccine, called Td, protects against tetanus and diphtheria, but not pertussis. A Td booster should be given every 10 years. Tdap may be given as one of these boosters if you have never gotten Tdap before. Tdap may also be given after a severe cut or burn to prevent tetanus infection. Your doctor or the person giving you the vaccine can give you more information. Tdap may safely be given at the same time as other vaccines. 3. Some people should not get this vaccine  A person who has ever had a life-threatening allergic reaction after a previous dose of any diphtheria, tetanus or pertussis containing vaccine, OR has a severe allergy to any part of this vaccine, should not get Tdap vaccine. Tell the person giving the vaccine about any severe allergies.  Anyone who had coma or long repeated seizures within 7 days after a childhood dose of DTP or DTaP, or a previous dose of Tdap, should not get Tdap, unless a cause other than the vaccine was found. They can still get Td.  
 
 Talk to your doctor if you: 
- have seizures or another nervous system problem, 
 - had severe pain or swelling after any vaccine containing diphtheria, tetanus or pertussis,  
- ever had a condition called Guillain Barré Syndrome (GBS), 
- arent feeling well on the day the shot is scheduled. 4. Risks With any medicine, including vaccines, there is a chance of side effects. These are usually mild and go away on their own. Serious reactions are also possible but are rare. Most people who get Tdap vaccine do not have any problems with it. Mild Problems following Tdap 
(Did not interfere with activities)  Pain where the shot was given (about 3 in 4 adolescents or 2 in 3 adults)  Redness or swelling where the shot was given (about 1 person in 5)  Mild fever of at least 100.4°F (up to about 1 in 25 adolescents or 1 in 100 adults)  Headache (about 3 or 4 people in 10)  Tiredness (about 1 person in 3 or 4)  Nausea, vomiting, diarrhea, stomach ache (up to 1 in 4 adolescents or 1 in 10 adults)  Chills,  sore joints (about 1 person in 10)  Body aches (about 1 person in 3 or 4)  Rash, swollen glands (uncommon) Moderate Problems following Tdap (Interfered with activities, but did not require medical attention)  Pain where the shot was given (up to 1 in 5 or 6)  Redness or swelling where the shot was given (up to about 1 in 16 adolescents or 1 in 12 adults)  Fever over 102°F (about 1 in 100 adolescents or 1 in 250 adults)  Headache (about 1 in 7 adolescents or 1 in 10 adults)  Nausea, vomiting, diarrhea, stomach ache (up to 1 or 3 people in 100)  Swelling of the entire arm where the shot was given (up to about 1 in 500). Severe Problems following Tdap 
(Unable to perform usual activities; required medical attention)  Swelling, severe pain, bleeding, and redness in the arm where the shot was given (rare). Problems that could happen after any vaccine:  People sometimes faint after a medical procedure, including vaccination. Sitting or lying down for about 15 minutes can help prevent fainting, and injuries caused by a fall. Tell your doctor if you feel dizzy, or have vision changes or ringing in the ears.  Some people get severe pain in the shoulder and have difficulty moving the arm where a shot was given. This happens very rarely.  Any medication can cause a severe allergic reaction. Such reactions from a vaccine are very rare, estimated at fewer than 1 in a million doses, and would happen within a few minutes to a few hours after the vaccination. As with any medicine, there is a very remote chance of a vaccine causing a serious injury or death. The safety of vaccines is always being monitored. For more information, visit: www.cdc.gov/vaccinesafety/ 
 
 
The [facility] Vaccine Injury Compensation Program (VICP) is a federal program that was created to compensate people who may have been injured by certain vaccines.  
 
Persons who believe they may have been injured by a vaccine can learn about the program and about filing a claim by calling 4-233.725.5276 or visiting the [employer] website at www.[facility].gov/vaccinecompensation. There is a time limit to file a claim for compensation. 7. How can I learn more?  Ask your doctor. He or she can give you the vaccine package insert or suggest other sources of information.  Call your local or state health department.  Contact the Centers for Disease Control and Prevention (CDC): 
- Call 9-126.734.8995 (1-926-YZT-INFO) or 
- Visit CDCs website at www.cdc.gov/vaccines Vaccine Information Statement Tdap Vaccine 
(2/24/2015) 42 RAVI Ortiz [id number] [facility] [place] Centers for Disease Control and Prevention Office Use Only Introducing [facility]! [facility] introduces BiOM patient portal. Now you can access parts of your medical record, email your doctor's office, and request medication refills online. 1. In your internet browser, go to [email or web address] 2. Click on the First Time User? Click Here link in the Sign In box. You will see the New Member Sign Up page. 3. Enter your BiOM Access Code exactly as it appears below. You will not need to use this code after youve completed the sign-up process. If you do not sign up before the expiration date, you must request a new code. · BiOM Access Code: [email or web address] Expires: 2/26/2018  8:21 AM 
 
4. Enter the last four digits of your Social Security Number (xxxx) and Date of Birth (mm/dd/yyyy) as indicated and click Submit. You will be taken to the next sign-up page. 5. Create a Telepot ID. This will be your BiOM login ID and cannot be changed, so think of one that is secure and easy to remember. 6. Create a Telepot password. You can change your password at any time. 7. Enter your Password Reset Question and Answer. This can be used at a later time if you forget your password. 8. Enter your e-mail address. You will receive e-mail notification when new information is available in [address] 9. Click Sign Up. You can now view and download portions of your medical record. 10. Click the Download Summary menu link to download a portable copy of your medical information. If you have questions, please visit the Frequently Asked Questions section of the [employer] website. Remember, [employer] is NOT to be used for urgent needs. For medical emergencies, dial 911. Now available from your iPhone and Android! Please provide this summary of care documentation to your next provider. Your primary care clinician is listed as DEBORA Hamilton 21. If you have any questions after today's visit, please call [phone].

## 2017-12-07 NOTE — PROGRESS NOTES
This note will not be viewable in [address] Justa Islas is a [de-identified] y.o. male and presents with Follow-up (6 mo fu)  . Subjective:  Mr. Shakira Kim returns to our office today in follow-up of multiple medical problems. He is a patient with complex medical issues. The patient has type 2 diabetes mellitus. He currently is on Lantus and supplements his Lantus with Humalog. The patient is checking his blood sugars twice daily. His average fasting blood sugar has been less than 120. The patient has had a diabetic retinal eye examination done within the last year. He denies any paresthesias of his feet. His hypertension is currently managed on Lopressor Lasix and spironolactone and losartan. The patient denies dizziness fatigue or palpitations. He has had no headaches, numbness, tingling or focal neurological problems. Hypercholesterolemia is currently managed on lovastatin therapy. He denies muscle soreness or GI upset. He has had no exertional chest pains or claudication. Patient has hypothyroidism currently on thyroid replacement. He continues to take the medication on an empty stomach first thing in the morning with water. He denies heat or cold intolerance, changes in skin or hair, his weight has been stable. He has a history of gout but is had no problems since he has been on allopurinol with no gouty attacks within the last year. He has chronic atrial fibrillation. He is chronically on amiodarone and a beta-blocker. He is on Coumadin anticoagulation for stroke prevention and is compliant with his monthly protimes. Past Medical History:   Diagnosis Date    Acquired hypothyroidism 9/12/2017    Anemia 2/1/2015    Arrhythmia     a. fib.& a.  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    CAD (coronary artery disease)     Chronic atrial fibrillation (HCC) 2/1/2015    Chronic kidney disease     decreased kidney function    CKD (chronic kidney disease) stage 3, GFR 30-59 ml/min 2/1/2015    Diabetes ([facility]     Diverticulosis of large intestine without hemorrhage 10/24/2017    Gout 10/24/2017    Heart failure ([facility]     Hyperlipidemia 2/1/2015    Hypertension     Ill-defined condition     high cholesterol    Ill-defined condition     gout    Long term current use of amiodarone 10/24/2017    Long term current use of anticoagulant 10/24/2017    Long-term use of high-risk medication 10/24/2017    Macular degeneration 10/24/2017    Peripheral vascular disease ([facility] 10/24/2017    Renal artery stenosis (HCC) 10/24/2017    Right-sided extracranial carotid artery stenosis 10/24/2017    Sick sinus syndrome ([facility] 10/24/2017    Status post pacemaker     Past Surgical History:   Procedure Laterality Date    HX AAA REPAIR      HX APPENDECTOMY      HX CATARACT REMOVAL      / lens implant    HX CHOLECYSTECTOMY      HX ORTHOPAEDIC Right     rt unicondular knee replacement    HX ORTHOPAEDIC  1/26/15    LEFT UNICONDYLAR KNEE ARTHROPLASTY    HX PACEMAKER  7/11    pacemaker    HX TONSILLECTOMY      HX UROLOGICAL      rt renal stentx2     Allergies   Allergen Reactions    Latex Other (comments)     Skin raw and severely irritated    Aspirin Unknown (comments)    Hydrocodone Other (comments)     hallucinations    Oxycodone Other (comments)     hallucinations    Sulfa (Sulfonamide Antibiotics) Rash    Tetracycline Rash     Current Outpatient Prescriptions   Medication Sig Dispense Refill    metoprolol tartrate (LOPRESSOR) 100 mg IR tablet TAKE 1 TABLET TWICE A  Tab 2    warfarin (COUMADIN) 5 mg tablet TAKE 1 TABLET DAILY 90 Tab 2    isosorbide mononitrate ER (IMDUR) 60 mg CR tablet TAKE 1 TABLET DAILY 90 Tab 3    allopurinol (ZYLOPRIM) 300 mg tablet TAKE 1 TABLET DAILY 90 Tab 3    furosemide (LASIX) 40 mg tablet TAKE 2 TABLETS DAILY 180 Tab 3    levothyroxine (SYNTHROID) 137 mcg tablet Take 137 mcg by mouth Daily (before breakfast). 30 Tab 3    lovastatin (MEVACOR) 40 mg tablet TAKE 2 TABLETS DAILY 180 Tab 1    insulin lispro (HUMALOG) 100 unit/mL injection 5-10 Units by SubCUTAneous route three (3) times daily as needed.  Flaxseed Oil oil Take 1,000 mg by mouth daily.  terazosin (HYTRIN) 10 mg capsule Take 1 capsule by mouth nightly. Indications: BENIGN PROSTATIC HYPERTROPHY, HYPERTENSION      spironolactone (ALDACTONE) 25 mg tablet Take 25 mg by mouth daily (with lunch). Restart when home BP improves to >120 mmHg. Check BP in a couple of days  Indications: EDEMA, HYPERTENSION      insulin glargine (LANTUS SOLOSTAR) 100 unit/mL (3 mL) pen 66 Units by SubCUTAneous route Daily (before lunch).  VIT A/VIT C/VIT E/ZINC/COPPER (OCUVITE PRESERVISION PO) Take 1 Tab by mouth two (2) times a day.  losartan (COZAAR) 50 mg tablet Take 50 mg by mouth daily. Indications: CHRONIC HEART FAILURE, HYPERTENSION      amiodarone (CORDARONE) 200 mg tablet Take 200 mg by mouth daily. Indications: VENTRICULAR RATE CONTROL IN ATRIAL FIBRILLATION      multivitamin, stress formula (STRESS TAB) tablet Take 1 Tab by mouth daily.  CYANOCOBALAMIN/FA/PYRIDOXINE (FOLGARD RX 2.2 PO) Take 1 tablet by mouth daily.  finasteride (PROSCAR) 5 mg tablet Take 5 mg by mouth every other day.  With dinner  Indications: BENIGN PROSTATIC HYPERTROPHY       Social History     Social History    Marital status:      Spouse name: N/A    Number of children: N/A    Years of education: N/A     Social History Main Topics    Smoking status: Former Smoker     Quit date: 7/14/1990    Smokeless tobacco: Never Used    Alcohol use 0.0 oz/week      Comment: 3-4 drinks a week    Drug use: No    Sexual activity: Not Asked     Other Topics Concern    None     Social History Narrative     Family History   Problem Relation Age of Onset    Cancer Mother     Diabetes Father        Health Maintenance   Topic Date Due    FOOT EXAM Q1 05/31/1945    MICROALBUMIN Q1  [birth year]/31/1947    DTaP/Tdap/Td series (1 - Tdap) 05/31/1958    ZOSTER VACCINE AGE 60>  03/31/1997    MEDICARE YEARLY EXAM  05/31/2002    HEMOGLOBIN A1C Q6M  08/01/2015    LIPID PANEL Q1  02/01/2016    EYE EXAM RETINAL OR DILATED Q1  07/19/2018    GLAUCOMA SCREENING Q2Y  07/19/2019    Pneumococcal 65+ Low/Medium Risk  Completed    Influenza Age 5 to Adult  Completed        Review of Systems  Constitutional: negative for fevers, chills, anorexia and weight loss  Eyes:   negative for visual disturbance and irritation  ENT:   negative for tinnitus,sore throat,nasal congestion,ear pain,hoarseness  Respiratory:  negative for cough, hemoptysis, dyspnea,wheezing  CV:   negative for chest pain, palpitations, lower extremity edema  GI:   negative for nausea, vomiting, diarrhea, abdominal pain,melena  Endo:               negative for polyuria,polydipsia,polyphagia,heat intolerance  Genitourinary: negative for frequency, dysuria and hematuria  Integumentary: negative for rash and pruritus  Hematologic:  negative for easy bruising and gum/nose bleeding  Musculoskel: negative for myalgias, arthralgias, back pain, muscle weakness, joint pain  Neurological:  negative for headaches, dizziness, vertigo, memory problems and gait   Behavl/Psych: negative for feelings of anxiety, depression, mood changes  ROS otherwise negative      Objective:  Visit Vitals    /60 (BP 1 Location: Left arm, BP Patient Position: Sitting)    Pulse 76    Ht 5' 10\" (1.778 m)    Wt 259 lb (117.5 kg)    SpO2 94%    BMI 37.16 kg/m2     Body mass index is 37.16 kg/(m^2).     Physical Exam:   General appearance - alert, well appearing, and in no distress  Mental status - alert, oriented to person, place, and time  EYE-DIXIE, EOMI,conjunctiva normal bilaterally, lids normal  ENT-ENT exam normal, no neck nodes or sinus tenderness  Nose - normal and patent, no erythema,  Or discharge   Mouth - mucous membranes moist, pharynx normal without lesions  Neck - supple, no significant adenopathy or bruit  Chest - clear to auscultation, no wheezes, rales or rhonchi. Heart -rhythm is irregularly irregular with a controlled ventricular response. Abdomen - soft, nontender, nondistended, no masses or organomegaly  Lymph- no adenopathy palpable  Ext-peripheral pulses normal, no pedal edema, no clubbing or cyanosis  Skin-Warm and dry. no hyperpigmentation, vitiligo, or suspicious lesions  Neuro -alert, oriented, normal speech, no focal findings or movement disorder noted      Assessment/Plan:  Diagnoses and all orders for this visit:    Type 2 diabetes mellitus without complication, with long-term current use of insulin (HCC)  -     AMB POC HEMOGLOBIN A1C  -     RI COLLECTION VENOUS BLOOD,VENIPUNCTURE  -     AMB POC URINE, MICROALBUMIN, SEMIQUANT (1 RESULT)    Essential hypertension  -     AMB POC COMPLETE CBC,AUTOMATED ENTER  -     AMB POC COMPREHENSIVE METABOLIC PANEL  -     AMB POC URINALYSIS DIP STICK AUTO W/ MICRO     Mixed hyperlipidemia  -     AMB POC LIPID PROFILE    Long-term use of high-risk medication  -     AMB POC CK (CPK)    Acquired hypothyroidism  -     AMB POC T4, FREE  -     AMB POC TSH    Chronic atrial fibrillation (HCC)    Long term current use of anticoagulant  -     AMB POC PT/INR    Gout, unspecified cause, unspecified chronicity, unspecified site  -     AMB POC URIC ACID    Encounter for immunization  -     Tetanus, diphtheria toxoids and acellular pertussis (TDAP) vaccine, in individuals >=7 years, IM  -     RI IMMUNIZ ADMIN,1 SINGLE/COMB VAC/TOXOID        Other instructions: The patient's medications are reviewed and reconciled. No change in his current medical regimen is made. Body mass index is 37.16 and dietary counseling and a printed patient education sheet is given. Continue to check blood sugars twice daily. Continue monthly protimes. Tdap shot is given today.     Await results of multiple labs.    Patient is up-to-date on influenza and pneumococcal vaccinations. Follow-up 6 months    Follow-up Disposition:  Return in about 6 months (around 6/7/2018). I have reviewed with the patient details of the assessment and plan and all questions were answered. Relevent patient education was performed. The most recent lab findings were reviewed with the patient. An After Visit Summary was printed and given to the patient.     Rocky Gee MD

## 2017-12-07 NOTE — PROGRESS NOTES
Waldemar Matthew is a [de-identified] y.o. male presenting for Follow-up (6 mo fu)  . 1. Have you been to the ER, urgent care clinic since your last visit? Hospitalized since your last visit? No    2. Have you seen or consulted any other health care providers outside of the [address] [place] [address] since your last visit? Include any pap smears or colon screening. No    Fall Risk Assessment, last 12 mths 12/7/2017   Able to walk? Yes   Fall in past 12 months? No         Abuse Screening Questionnaire 12/7/2017   Do you ever feel afraid of your partner? N   Are you in a relationship with someone who physically or mentally threatens you? N   Is it safe for you to go home?  Y       PHQ over the last two weeks 12/7/2017   Little interest or pleasure in doing things Not at all   Feeling down, depressed or hopeless Not at all   Total Score PHQ 2 0       Medications Discontinued During This Encounter   Medication Reason    SYNTHROID 453 mcg tablet Duplicate Order    LOVASTATIN PO Duplicate Order

## 2017-12-12 ENCOUNTER — TELEPHONE ANTICOAG (OUTPATIENT)
Dept: INTERNAL MEDICINE CLINIC | Age: 82
End: 2017-12-12

## 2017-12-12 DIAGNOSIS — I48.20 CHRONIC ATRIAL FIBRILLATION (HCC): ICD-10-CM

## 2017-12-12 LAB
ALBUMIN SERPL-MCNC: 4 G/DL (ref 3.9–5.4)
ALKALINE PHOS POC: 128 U/L (ref 38–126)
ALT SERPL-CCNC: 28 U/L (ref 9–52)
AST SERPL-CCNC: 26 U/L (ref 14–36)
BACTERIA UA POCT, BACTPOCT: NORMAL
BILIRUB UR QL STRIP: NEGATIVE
BUN BLD-MCNC: 50 MG/DL (ref 9–20)
CALCIUM BLD-MCNC: 9.1 MG/DL (ref 8.4–10.2)
CASTS UA POCT: NORMAL
CHLORIDE BLD-SCNC: 101 MMOL/L (ref 98–107)
CHOLEST SERPL-MCNC: 120 MG/DL (ref 0–200)
CK (CPK) POC: 81 U/L (ref 30–135)
CLUE CELLS, CLUEPOCT: NEGATIVE
CO2 POC: 30 MMOL/L (ref 22–32)
CREAT BLD-MCNC: 1.8 MG/DL (ref 0.8–1.5)
CRYSTALS UA POCT, CRYSPOCT: NEGATIVE
EGFR (POC): 34.8
EPITHELIAL CELLS POCT: NORMAL
GLUCOSE POC: 190 MG/DL (ref 75–110)
GLUCOSE UR-MCNC: NEGATIVE MG/DL
GRAN# POC: 6.7 K/UL (ref 2–7.8)
GRAN% POC: 75.5 % (ref 37–92)
HBA1C MFR BLD HPLC: 7.3 % (ref 4.5–5.7)
HCT VFR BLD CALC: 35.2 % (ref 37–51)
HDLC SERPL-MCNC: 39 MG/DL (ref 35–130)
HGB BLD-MCNC: 11.8 G/DL (ref 12–18)
KETONES P FAST UR STRIP-MCNC: NEGATIVE MG/DL
LDL CHOLESTEROL POC: 61.8 MG/DL (ref 0–130)
LY# POC: 1.4 K/UL (ref 0.6–4.1)
LY% POC: 16.8 % (ref 10–58.5)
MCH RBC QN: 34.4 PG (ref 26–32)
MCHC RBC-ENTMCNC: 33.5 G/DL (ref 30–36)
MCV RBC: 103 FL (ref 80–97)
MICROALBUMIN UR TEST STR-MCNC: 20 MG/L (ref 0–20)
MID #, POC: 0.6 K/UL (ref 0–1.8)
MID% POC: 7.7 % (ref 0.1–24)
MUCUS UA POCT, MUCPOCT: NORMAL
PH UR STRIP: 5 [PH] (ref 5–7)
PLATELET # BLD: 168 K/UL (ref 140–440)
POTASSIUM SERPL-SCNC: 4.8 MMOL/L (ref 3.6–5)
PROT SERPL-MCNC: 7 G/DL (ref 6.3–8.2)
PROT UR QL STRIP: NORMAL
RBC # BLD: 3.43 M/UL (ref 4.2–6.3)
RBC UA POCT, RBCPOCT: NORMAL
SODIUM SERPL-SCNC: 144 MMOL/L (ref 137–145)
SP GR UR STRIP: 1.01 (ref 1.01–1.02)
T4 FREE SERPL-MCNC: 1.06 NG/DL (ref 0.71–1.85)
TCHOL/HDL RATIO (POC): 3.1 (ref 0–4)
TOTAL BILIRUBIN POC: 0.9 MG/DL (ref 0.2–1.3)
TRICH UA POCT, TRICHPOC: NEGATIVE
TRIGL SERPL-MCNC: 96 MG/DL (ref 0–200)
TSH BLD-ACNC: 3.22 UIU/ML (ref 0.4–4.2)
UA UROBILINOGEN AMB POC: NORMAL (ref 0.2–1)
URIC ACID, POC: 5.7 MG/DL (ref 3.5–8.5)
URINALYSIS CLARITY POC: CLEAR
URINALYSIS COLOR POC: NORMAL
URINE BLOOD POC: NEGATIVE
URINE CULT COMMENT, POCT: NORMAL
URINE LEUKOCYTES POC: NEGATIVE
URINE NITRITES POC: NEGATIVE
VLDLC SERPL CALC-MCNC: 19.2 MG/DL
WBC # BLD: 8.7 K/UL (ref 4.1–10.9)
WBC UA POCT, WBCPOCT: 0
YEAST UA POCT, YEASTPOC: NEGATIVE

## 2017-12-12 NOTE — PROGRESS NOTES
Anticoagulation Summary as of 12/12/2017     INR goal 1.8-3.0   Today's INR 1.8 (12/7/2017)   Maintenance plan 5 mg (5 mg x 1) every day   Weekly total 35 mg   No change documented [address] [facility] last modified [facility], LPN (1/48/3467)   Next INR check 1/12/2018   Target end date     Indications   Chronic atrial fibrillation ([facility] [I48.2]         Anticoagulation Episode Summary     INR check location Clinic Lab    Preferred lab     Send INR reminders to     Comments       Anticoagulation Care Providers     Provider Role Specialty Phone number    Johnson Leonard MD [facility] Internal Medicine [phone]

## 2017-12-29 ENCOUNTER — OFFICE VISIT (OUTPATIENT)
Dept: INTERNAL MEDICINE CLINIC | Age: 82
End: 2017-12-29

## 2017-12-29 VITALS
HEIGHT: 70 IN | DIASTOLIC BLOOD PRESSURE: 80 MMHG | WEIGHT: 265.2 LBS | BODY MASS INDEX: 37.97 KG/M2 | TEMPERATURE: 98 F | SYSTOLIC BLOOD PRESSURE: 124 MMHG

## 2017-12-29 DIAGNOSIS — J20.9 ACUTE BRONCHITIS, UNSPECIFIED ORGANISM: Primary | ICD-10-CM

## 2017-12-29 RX ORDER — CEFUROXIME AXETIL 250 MG/1
250 TABLET ORAL 2 TIMES DAILY
Qty: 20 TAB | Refills: 0 | Status: SHIPPED | OUTPATIENT
Start: 2017-12-29 | End: 2018-02-12 | Stop reason: ALTCHOICE

## 2017-12-29 NOTE — PATIENT INSTRUCTIONS
Bronchitis: Care Instructions  Your Care Instructions    Bronchitis is inflammation of the bronchial tubes, which carry air to the lungs. The tubes swell and produce mucus, or phlegm. The mucus and inflamed bronchial tubes make you cough. You may have trouble breathing. Most cases of bronchitis are caused by viruses like those that cause colds. Antibiotics usually do not help and they may be harmful. Bronchitis usually develops rapidly and lasts about 2 to 3 weeks in otherwise healthy people. Follow-up care is a key part of your treatment and safety. Be sure to make and go to all appointments, and call your doctor if you are having problems. It's also a good idea to know your test results and keep a list of the medicines you take. How can you care for yourself at home? · Take all medicines exactly as prescribed. Call your doctor if you think you are having a problem with your medicine. · Get some extra rest.  · Take an over-the-counter pain medicine, such as acetaminophen (Tylenol), ibuprofen (Advil, Motrin), or naproxen (Aleve) to reduce fever and relieve body aches. Read and follow all instructions on the label. · Do not take two or more pain medicines at the same time unless the doctor told you to. Many pain medicines have acetaminophen, which is Tylenol. Too much acetaminophen (Tylenol) can be harmful. · Take an over-the-counter cough medicine that contains dextromethorphan to help quiet a dry, hacking cough so that you can sleep. Avoid cough medicines that have more than one active ingredient. Read and follow all instructions on the label. · Breathe moist air from a humidifier, hot shower, or sink filled with hot water. The heat and moisture will thin mucus so you can cough it out. · Do not smoke. Smoking can make bronchitis worse. If you need help quitting, talk to your doctor about stop-smoking programs and medicines. These can increase your chances of quitting for good.   When should you call for help? Call 911 anytime you think you may need emergency care. For example, call if:  ? · You have severe trouble breathing. ?Call your doctor now or seek immediate medical care if:  ? · You have new or worse trouble breathing. ? · You cough up dark brown or bloody mucus (sputum). ? · You have a new or higher fever. ? · You have a new rash. ? Watch closely for changes in your health, and be sure to contact your doctor if:  ? · You cough more deeply or more often, especially if you notice more mucus or a change in the color of your mucus. ? · You are not getting better as expected. Where can you learn more? Go to http://rufina-juni.info/. Enter H333 in the search box to learn more about \"Bronchitis: Care Instructions. \"  Current as of: May 12, 2017  Content Version: 11.4  © 3199-2903 [employer]. Care instructions adapted under license by [employer] (which disclaims liability or warranty for this information). If you have questions about a medical condition or this instruction, always ask your healthcare professional. [address] any warranty or liability for your use of this information.

## 2017-12-29 NOTE — PROGRESS NOTES
This note will not be viewable in [address] Susana Diaz is a [de-identified] y.o. male and presents with Cough  . Subjective:  Mr. Nida Correa presents to the office today with complaints of an upper respiratory infection ongoing since [place] with the development of sinus congestion drainage and now a deeply congested cough. Cough has been productive of yellowish phlegm. He denies fevers, chills, wheezing, shortness of breath or pleuritic pain. He has had no headache or GI upset. Past Medical History:   Diagnosis Date    Acquired hypothyroidism 9/12/2017    Anemia 2/1/2015    Arrhythmia     a. fib.& a.  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    CAD (coronary artery disease)     Chronic atrial fibrillation (HCC) 2/1/2015    Chronic kidney disease     decreased kidney function    CKD (chronic kidney disease) stage 3, GFR 30-59 ml/min 2/1/2015    Diabetes ([address]     Diverticulosis of large intestine without hemorrhage 10/24/2017    Gout 10/24/2017    Heart failure ([address]     Hyperlipidemia 2/1/2015    Hypertension     Ill-defined condition     high cholesterol    Ill-defined condition     gout    Long term current use of amiodarone 10/24/2017    Long term current use of anticoagulant 10/24/2017    Long-term use of high-risk medication 10/24/2017    Macular degeneration 10/24/2017    Peripheral vascular disease ([address] 10/24/2017    Renal artery stenosis ([address] 10/24/2017    Right-sided extracranial carotid artery stenosis 10/24/2017    Sick sinus syndrome ([address] 10/24/2017    Status post pacemaker     Past Surgical History:   Procedure Laterality Date    HX AAA REPAIR      HX APPENDECTOMY      HX CATARACT REMOVAL      / lens implant    HX CHOLECYSTECTOMY      HX ORTHOPAEDIC Right     rt unicondular knee replacement    HX ORTHOPAEDIC  1/26/15    LEFT UNICONDYLAR KNEE ARTHROPLASTY    HX PACEMAKER  7/11    pacemaker    HX TONSILLECTOMY  HX UROLOGICAL      rt renal stentx2     Allergies   Allergen Reactions    Latex Other (comments)     Skin raw and severely irritated    Aspirin Unknown (comments)    Hydrocodone Other (comments)     hallucinations    Oxycodone Other (comments)     hallucinations    Sulfa (Sulfonamide Antibiotics) Rash    Tetracycline Rash     Current Outpatient Prescriptions   Medication Sig Dispense Refill    cefUROXime (CEFTIN) 250 mg tablet Take 1 Tab by mouth two (2) times a day. 20 Tab 0    metoprolol tartrate (LOPRESSOR) 100 mg IR tablet TAKE 1 TABLET TWICE A  Tab 2    warfarin (COUMADIN) 5 mg tablet TAKE 1 TABLET DAILY 90 Tab 2    isosorbide mononitrate ER (IMDUR) 60 mg CR tablet TAKE 1 TABLET DAILY 90 Tab 3    allopurinol (ZYLOPRIM) 300 mg tablet TAKE 1 TABLET DAILY 90 Tab 3    furosemide (LASIX) 40 mg tablet TAKE 2 TABLETS DAILY 180 Tab 3    levothyroxine (SYNTHROID) 137 mcg tablet Take 137 mcg by mouth Daily (before breakfast). 30 Tab 3    lovastatin (MEVACOR) 40 mg tablet TAKE 2 TABLETS DAILY 180 Tab 1    insulin lispro (HUMALOG) 100 unit/mL injection 5-10 Units by SubCUTAneous route three (3) times daily as needed.  Flaxseed Oil oil Take 1,000 mg by mouth daily.  terazosin (HYTRIN) 10 mg capsule Take 1 capsule by mouth nightly. Indications: BENIGN PROSTATIC HYPERTROPHY, HYPERTENSION      spironolactone (ALDACTONE) 25 mg tablet Take 25 mg by mouth daily (with lunch). Restart when home BP improves to >120 mmHg. Check BP in a couple of days  Indications: EDEMA, HYPERTENSION      insulin glargine (LANTUS SOLOSTAR) 100 unit/mL (3 mL) pen 66 Units by SubCUTAneous route Daily (before lunch).  VIT A/VIT C/VIT E/ZINC/COPPER (OCUVITE PRESERVISION PO) Take 1 Tab by mouth two (2) times a day.  losartan (COZAAR) 50 mg tablet Take 50 mg by mouth daily. Indications: CHRONIC HEART FAILURE, HYPERTENSION      amiodarone (CORDARONE) 200 mg tablet Take 200 mg by mouth daily.  Indications: VENTRICULAR RATE CONTROL IN ATRIAL FIBRILLATION      multivitamin, stress formula (STRESS TAB) tablet Take 1 Tab by mouth daily.  CYANOCOBALAMIN/FA/PYRIDOXINE (FOLGARD RX 2.2 PO) Take 1 tablet by mouth daily.  finasteride (PROSCAR) 5 mg tablet Take 5 mg by mouth every other day. With dinner  Indications: BENIGN PROSTATIC HYPERTROPHY       Social History     Social History    Marital status:      Spouse name: N/A    Number of children: N/A    Years of education: N/A     Social History Main Topics    Smoking status: Former Smoker     Quit date: 7/14/1990    Smokeless tobacco: Never Used    Alcohol use 0.0 oz/week      Comment: 3-4 drinks a week    Drug use: No    Sexual activity: Not Asked     Other Topics Concern    None     Social History Narrative     Family History   Problem Relation Age of Onset    Cancer Mother     Diabetes Father        Review of Systems  Constitutional: negative for fevers, chills, anorexia and weight loss  Eyes:   negative for visual disturbance and irritation  ENT:   Positive for some sinus congestion and post nasal drainage. Respiratory:  Positive for cough and chest congestion without wheezing  CV:   negative for chest pain, palpitations, lower extremity edema  GI:   negative for nausea, vomiting, diarrhea, abdominal pain,melena  Integumentary: negative for rash and pruritus  Neurological:  negative for headaches, dizziness, vertigo, memory problems and gait       Objective:  Visit Vitals    /80 (BP 1 Location: Left arm, BP Patient Position: Sitting)    Temp 98 °F (36.7 °C) (Oral)    Ht 5' 10\" (1.778 m)    Wt 265 lb 3.2 oz (120.3 kg)    BMI 38.05 kg/m2     Body mass index is 38.05 kg/(m^2). Physical Exam:   General appearance - alert, ill appearing, and in no distress  Mental status - alert, oriented to person, place, and time  EYE-DIXIE, EOMI, conjuctiva clear. No lid swelling or purulent drainage  ENT- TM's clear without A/F level.  Pharynx slightly erythematous with drainage noted  Nose - normal and patent, no erythema,  Neck - supple, no significant adenopathy   Chest - Coarse upper airway rhonchi present without wheezing   Heart - normal rate, regular rhythm, normal S1, S2, no murmurs, rubs, clicks or gallops   Skin-No rash appreciated  Neuro -alert, oriented, normal speech, no focal findings. Assessment/Plan:  Diagnoses and all orders for this visit:    Acute bronchitis, unspecified organism  -     cefUROXime (CEFTIN) 250 mg tablet; Take 1 Tab by mouth two (2) times a day., Normal, Disp-20 Tab, R-0        Other Instructions:  Mucinex as directed    Increase po fluids    Follow-up Disposition:  Return if symptoms worsen or fail to improve. I have reviewed with the patient details of the assessment and plan and all questions were answered. Relevent patient education was performed. An After Visit Summary was printed and given to the patient.     Nuvia Lou MD

## 2017-12-29 NOTE — PROGRESS NOTES
Naima Dyson is a [de-identified] y.o. male presenting for Cough  . 1. Have you been to the ER, urgent care clinic since your last visit? Hospitalized since your last visit? No    2. Have you seen or consulted any other health care providers outside of the [address] [place] [address] since your last visit? Include any pap smears or colon screening. No    Fall Risk Assessment, last 12 mths 12/7/2017   Able to walk? Yes   Fall in past 12 months? No         Abuse Screening Questionnaire 12/7/2017   Do you ever feel afraid of your partner? N   Are you in a relationship with someone who physically or mentally threatens you? N   Is it safe for you to go home? Y       PHQ over the last two weeks 12/7/2017   Little interest or pleasure in doing things Not at all   Feeling down, depressed or hopeless Not at all   Total Score PHQ 2 0       There are no discontinued medications.

## 2017-12-29 NOTE — MR AVS SNAPSHOT
Visit Information Date & Time Provider Department Dept. Phone Encounter #  
 12/29/2017  8:40 AM Johnson Leonard, Bam Trinh [id number] Follow-up Instructions Return if symptoms worsen or fail to improve. Your Appointments 6/14/2018  8:30 AM  
FOLLOW UP 10 with MD Paige Richmond 26 ([facility]) Appt Note: 6 mo fu  
 [address] [facility] [address] [address] [facility] [address] Upcoming Health Maintenance Date Due  
 FOOT EXAM Q1 5/31/1945 ZOSTER VACCINE AGE 60> 3/31/1997 MEDICARE YEARLY EXAM 5/31/2002 HEMOGLOBIN A1C Q6M 6/7/2018 EYE EXAM RETINAL OR DILATED Q1 7/19/2018 MICROALBUMIN Q1 12/7/2018 LIPID PANEL Q1 12/7/2018 GLAUCOMA SCREENING Q2Y 7/19/2019 DTaP/Tdap/Td series (2 - Td) 12/7/2027 Allergies as of 12/29/2017  Review Complete On: 12/29/2017 By: Johnson Leonard MD  
  
 Severity Noted Reaction Type Reactions Latex  01/06/2015    Other (comments) Skin raw and severely irritated Aspirin  11/28/2017    Unknown (comments) Hydrocodone  01/02/2016    Other (comments)  
 hallucinations Oxycodone  01/02/2016    Other (comments)  
 hallucinations Sulfa (Sulfonamide Antibiotics)  10/18/2010    Rash Tetracycline  07/14/2011    Rash Current Immunizations  Reviewed on 12/7/2017 Name Date Influenza Vaccine 9/14/2017, 10/13/2016, 10/1/2015 Pneumococcal Conjugate (PCV-13) 10/1/2015 Pneumococcal Polysaccharide (PPSV-23) 10/1/2013, 1/1/2010 Tdap 12/7/2017 Not reviewed this visit You Were Diagnosed With   
  
 Codes Comments Acute bronchitis, unspecified organism    -  Primary ICD-10-CM: J20.9 ICD-9-CM: 466.0 Vitals BP Temp Height(growth percentile) Weight(growth percentile) BMI Smoking Status 124/80 (BP 1 Location: Left arm, BP Patient Position: Sitting) 98 °F (36.7 °C) (Oral) 5' 10\" (1.778 m) 265 lb 3.2 oz (120.3 kg) 38.05 kg/m2 Former Smoker BMI and BSA Data Body Mass Index Body Surface Area 38.05 kg/m 2 2.44 m 2 Preferred Pharmacy Pharmacy Name Phone [facility] [address] [place] [address] [phone] Your Updated Medication List  
  
   
This list is accurate as of: 12/29/17  9:02 AM.  Always use your most recent med list.  
  
  
  
  
 allopurinol 300 mg tablet Commonly known as:  ZYLOPRIM  
TAKE 1 TABLET DAILY  
  
 amiodarone 200 mg tablet Commonly known as:  CORDARONE Take 200 mg by mouth daily. 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  
  
 cefUROXime 250 mg tablet Commonly known as:  CEFTIN Take 1 Tab by mouth two (2) times a day. finasteride 5 mg tablet Commonly known as:  PROSCAR Take 5 mg by mouth every other day. With dinner  Indications: BENIGN PROSTATIC HYPERTROPHY Flaxseed Oil Oil Take 1,000 mg by mouth daily. FOLGARD RX 2.2 PO Take 1 tablet by mouth daily. furosemide 40 mg tablet Commonly known as:  LASIX TAKE 2 TABLETS DAILY  
  
 insulin lispro 100 unit/mL injection Commonly known as:  HUMALOG  
5-10 Units by SubCUTAneous route three (3) times daily as needed. isosorbide mononitrate ER 60 mg CR tablet Commonly known as:  IMDUR  
TAKE 1 TABLET DAILY  
  
 LANTUS SOLOSTAR 100 unit/mL (3 mL) Inpn Generic drug:  insulin glargine 66 Units by SubCUTAneous route Daily (before lunch). levothyroxine 137 mcg tablet Commonly known as:  SYNTHROID Take 137 mcg by mouth Daily (before breakfast). losartan 50 mg tablet Commonly known as:  COZAAR Take 50 mg by mouth daily. Indications: CHRONIC HEART FAILURE, HYPERTENSION  
  
 lovastatin 40 mg tablet Commonly known as:  MEVACOR  
TAKE 2 TABLETS DAILY metoprolol tartrate 100 mg IR tablet Commonly known as:  LOPRESSOR  
TAKE 1 TABLET TWICE A DAY  
  
 multivitamin, stress formula tablet Commonly known as:  STRESS TAB Take 1 Tab by mouth daily. OCUVITE PRESERVISION PO Take 1 Tab by mouth two (2) times a day. spironolactone 25 mg tablet Commonly known as:  ALDACTONE Take 25 mg by mouth daily (with lunch). Restart when home BP improves to >120 mmHg. Check BP in a couple of days  Indications: EDEMA, HYPERTENSION  
  
 terazosin 10 mg capsule Commonly known as:  HYTRIN Take 1 capsule by mouth nightly. Indications: BENIGN PROSTATIC HYPERTROPHY, HYPERTENSION  
  
 warfarin 5 mg tablet Commonly known as:  COUMADIN  
TAKE 1 TABLET DAILY Prescriptions Sent to Pharmacy Refills  
 cefUROXime (CEFTIN) 250 mg tablet 0 Sig: Take 1 Tab by mouth two (2) times a day. Class: Normal  
 Pharmacy: [facility] [place] [address] #: [phone] Route: Oral  
  
Follow-up Instructions Return if symptoms worsen or fail to improve. Introducing [facility] & HEALTH SERVICES! Lauri Williamson introduces [employer] patient portal. Now you can access parts of your medical record, email your doctor's office, and request medication refills online. 1. In your internet browser, go to [email or web address] 2. Click on the First Time User? Click Here link in the Sign In box. You will see the New Member Sign Up page. 3. Enter your [employer] Access Code exactly as it appears below. You will not need to use this code after youve completed the sign-up process. If you do not sign up before the expiration date, you must request a new code. · [employer] Access Code: [email or web address] Expires: 2/26/2018  8:21 AM 
 
4. Enter the last four digits of your Social Security Number (xxxx) and Date of Birth (mm/dd/yyyy) as indicated and click Submit. You will be taken to the next sign-up page. 5. Create a Agency Spotter ID. This will be your Agency Spotter login ID and cannot be changed, so think of one that is secure and easy to remember. 6. Create a Agency Spotter password. You can change your password at any time. 7. Enter your Password Reset Question and Answer. This can be used at a later time if you forget your password. 8. Enter your e-mail address. You will receive e-mail notification when new information is available in [address] 9. Click Sign Up. You can now view and download portions of your medical record. 10. Click the Download Summary menu link to download a portable copy of your medical information. If you have questions, please visit the Frequently Asked Questions section of the Agency Spotter website. Remember, [employer] is NOT to be used for urgent needs. For medical emergencies, dial 911. Now available from your iPhone and Android! Please provide this summary of care documentation to your next provider. Your primary care clinician is listed as DEBORA Lee. If you have any questions after today's visit, please call [phone].

## 2018-01-12 RX ORDER — LEVOTHYROXINE SODIUM 150 MCG
TABLET ORAL
Qty: 30 TAB | Refills: 1 | Status: SHIPPED | OUTPATIENT
Start: 2018-01-12 | End: 2018-02-15 | Stop reason: SDUPTHER

## 2018-02-12 ENCOUNTER — OFFICE VISIT (OUTPATIENT)
Dept: INTERNAL MEDICINE CLINIC | Age: 83
End: 2018-02-12

## 2018-02-12 VITALS
WEIGHT: 266.6 LBS | HEART RATE: 52 BPM | OXYGEN SATURATION: 95 % | HEIGHT: 70 IN | SYSTOLIC BLOOD PRESSURE: 138 MMHG | BODY MASS INDEX: 38.17 KG/M2 | DIASTOLIC BLOOD PRESSURE: 68 MMHG

## 2018-02-12 DIAGNOSIS — I48.20 CHRONIC ATRIAL FIBRILLATION (HCC): Chronic | ICD-10-CM

## 2018-02-12 DIAGNOSIS — E03.9 ACQUIRED HYPOTHYROIDISM: Primary | Chronic | ICD-10-CM

## 2018-02-12 NOTE — MR AVS SNAPSHOT
[facility] 
 
 
 [address] [address] [phone] Patient: Mary Baig MRN: [id number] HSZ:[id number] Visit Information Date & Time Provider Department Dept. Phone Encounter #  
 2/12/2018  3:30 PM Paige Chinchilla [phone] [id number] Follow-up Instructions Return for As previously scheduled. Your Appointments 6/14/2018  8:30 AM  
FOLLOW UP 10 with MD Paige Chinchilla ([facility]) Appt Note: 6 mo fu  
 [address] [address] [address] [facility] [address] Upcoming Health Maintenance Date Due  
 FOOT EXAM Q1 5/31/1945 ZOSTER VACCINE AGE 60> 3/31/1997 MEDICARE YEARLY EXAM 5/31/2002 HEMOGLOBIN A1C Q6M 6/7/2018 EYE EXAM RETINAL OR DILATED Q1 7/19/2018 MICROALBUMIN Q1 12/7/2018 LIPID PANEL Q1 12/7/2018 GLAUCOMA SCREENING Q2Y 7/19/2019 DTaP/Tdap/Td series (2 - Td) 12/7/2027 Allergies as of 2/12/2018  Review Complete On: 2/12/2018 By: Raquel Alex MD  
  
 Severity Noted Reaction Type Reactions Latex  01/06/2015    Other (comments) Skin raw and severely irritated Aspirin  11/28/2017    Unknown (comments) Hydrocodone  01/02/2016    Other (comments)  
 hallucinations Oxycodone  01/02/2016    Other (comments)  
 hallucinations Sulfa (Sulfonamide Antibiotics)  10/18/2010    Rash Tetracycline  07/14/2011    Rash Current Immunizations  Reviewed on 12/7/2017 Name Date Influenza Vaccine 9/14/2017, 10/13/2016, 10/1/2015 Pneumococcal Conjugate (PCV-13) 10/1/2015 Pneumococcal Polysaccharide (PPSV-23) 10/1/2013, 1/1/2010 Tdap 12/7/2017 Not reviewed this visit You Were Diagnosed With   
  
 Codes Comments Acquired hypothyroidism    -  Primary ICD-10-CM: E03.9 ICD-9-CM: 194. 9 Chronic atrial fibrillation (HCC)     ICD-10-CM: Q49.4 ICD-9-CM: 427.31 Vitals BP Pulse Height(growth percentile) Weight(growth percentile) SpO2 BMI  
 138/68 (BP 1 Location: Right arm, BP Patient Position: Sitting) (!) 52 5' 10\" (1.778 m) 266 lb 9.6 oz (120.9 kg) 95% 38.25 kg/m2 Smoking Status Former Smoker BMI and BSA Data Body Mass Index Body Surface Area  
 38.25 kg/m 2 2.44 m 2 Preferred Pharmacy Pharmacy Name Phone [facility] [address] [place] [address] [phone] Your Updated Medication List  
  
   
This list is accurate as of: 2/12/18  4:05 PM.  Always use your most recent med list.  
  
  
  
  
 allopurinol 300 mg tablet Commonly known as:  ZYLOPRIM  
TAKE 1 TABLET DAILY  
  
 finasteride 5 mg tablet Commonly known as:  PROSCAR Take 5 mg by mouth every other day. With dinner  Indications: BENIGN PROSTATIC HYPERTROPHY Flaxseed Oil Oil Take 1,000 mg by mouth daily. FOLGARD RX 2.2 PO Take 1 tablet by mouth daily. furosemide 40 mg tablet Commonly known as:  LASIX TAKE 2 TABLETS DAILY  
  
 insulin lispro 100 unit/mL injection Commonly known as:  HUMALOG  
5-10 Units by SubCUTAneous route three (3) times daily as needed. isosorbide mononitrate ER 60 mg CR tablet Commonly known as:  IMDUR  
TAKE 1 TABLET DAILY  
  
 LANTUS SOLOSTAR 100 unit/mL (3 mL) Inpn Generic drug:  insulin glargine 66 Units by SubCUTAneous route Daily (before lunch). losartan 50 mg tablet Commonly known as:  COZAAR Take 50 mg by mouth daily. Indications: CHRONIC HEART FAILURE, HYPERTENSION  
  
 lovastatin 40 mg tablet Commonly known as:  MEVACOR  
TAKE 2 TABLETS DAILY  
  
 metoprolol tartrate 100 mg IR tablet Commonly known as:  LOPRESSOR  
TAKE 1 TABLET TWICE A DAY  
  
 multivitamin, stress formula tablet Commonly known as:  STRESS TAB  
 Take 1 Tab by mouth daily. OCUVITE PRESERVISION PO Take 1 Tab by mouth two (2) times a day. SYNTHROID 150 mcg tablet Generic drug:  levothyroxine  
take 1 tablet by mouth daily BEFORE BREAKFAST  
  
 terazosin 10 mg capsule Commonly known as:  HYTRIN Take 1 capsule by mouth nightly. Indications: BENIGN PROSTATIC HYPERTROPHY, HYPERTENSION  
  
 warfarin 5 mg tablet Commonly known as:  COUMADIN  
TAKE 1 TABLET DAILY We Performed the Following AMB POC T4, FREE [id number] CPT(R)] AMB POC TSH [26646 CPT(R)] COLLECTION VENOUS BLOOD,VENIPUNCTURE [id number] CPT(R)] Follow-up Instructions Return for As previously scheduled. Patient Instructions Atrial Fibrillation: Care Instructions Your Care Instructions Atrial fibrillation is an irregular and often fast heartbeat. Treating this condition is important for several reasons. It can cause blood clots, which can travel from your heart to your brain and cause a stroke. If you have a fast heartbeat, you may feel lightheaded, dizzy, and weak. An irregular heartbeat can also increase your risk for heart failure. Atrial fibrillation is often the result of another heart condition, such as high blood pressure or coronary artery disease. Making changes to improve your heart condition will help you stay healthy and active. Follow-up care is a key part of your treatment and safety. Be sure to make and go to all appointments, and call your doctor if you are having problems. It's also a good idea to know your test results and keep a list of the medicines you take. How can you care for yourself at home? Medicines ? · Take your medicines exactly as prescribed. Call your doctor if you think you are having a problem with your medicine. You will get more details on the specific medicines your doctor prescribes.   
? · If your doctor has given you a blood thinner to prevent a stroke, be sure you get instructions about how to take your medicine safely. Blood thinners can cause serious bleeding problems. ? · Do not take any vitamins, over-the-counter drugs, or herbal products without talking to your doctor first. ? Lifestyle changes ? · Do not smoke. Smoking can increase your chance of a stroke and heart attack. If you need help quitting, talk to your doctor about stop-smoking programs and medicines. These can increase your chances of quitting for good. ? · Eat a heart-healthy diet. ? · Stay at a healthy weight. Lose weight if you need to.  
? · Limit alcohol to 2 drinks a day for men and 1 drink a day for women. Too much alcohol can cause health problems. ? · Avoid colds and flu. Get a pneumococcal vaccine shot. If you have had one before, ask your doctor whether you need another dose. Get a flu shot every year. If you must be around people with colds or flu, wash your hands often. Activity ? · If your doctor recommends it, get more exercise. Walking is a good choice. Bit by bit, increase the amount you walk every day. Try for at least 30 minutes on most days of the week. You also may want to swim, bike, or do other activities. Your doctor may suggest that you join a cardiac rehabilitation program so that you can have help increasing your physical activity safely. ? · Start light exercise if your doctor says it is okay. Even a small amount will help you get stronger, have more energy, and manage stress. Walking is an easy way to get exercise. Start out by walking a little more than you did in the hospital. Gradually increase the amount you walk. ? · When you exercise, watch for signs that your heart is working too hard. You are pushing too hard if you cannot talk while you are exercising. If you become short of breath or dizzy or have chest pain, sit down and rest immediately. ? · Check your pulse regularly.  Place two fingers on the artery at the palm side of your wrist, in line with your thumb. If your heartbeat seems uneven or fast, talk to your doctor. When should you call for help? Call 911 anytime you think you may need emergency care. For example, call if: 
? · You have symptoms of a heart attack. These may include: ¨ Chest pain or pressure, or a strange feeling in the chest. 
¨ Sweating. ¨ Shortness of breath. ¨ Nausea or vomiting. ¨ Pain, pressure, or a strange feeling in the back, neck, jaw, or upper belly or in one or both shoulders or arms. ¨ Lightheadedness or sudden weakness. ¨ A fast or irregular heartbeat. After you call 911, the  may tell you to chew 1 adult-strength or 2 to 4 low-dose aspirin. Wait for an ambulance. Do not try to drive yourself. ? · You have symptoms of a stroke. These may include: 
¨ Sudden numbness, tingling, weakness, or loss of movement in your face, arm, or leg, especially on only one side of your body. ¨ Sudden vision changes. ¨ Sudden trouble speaking. ¨ Sudden confusion or trouble understanding simple statements. ¨ Sudden problems with walking or balance. ¨ A sudden, severe headache that is different from past headaches. ? · You passed out (lost consciousness). ?Call your doctor now or seek immediate medical care if: 
? · You have new or increased shortness of breath. ? · You feel dizzy or lightheaded, or you feel like you may faint. ? · Your heart rate becomes irregular. ? · You can feel your heart flutter in your chest or skip heartbeats. Tell your doctor if these symptoms are new or worse. ? Watch closely for changes in your health, and be sure to contact your doctor if you have any problems. Where can you learn more? Go to http://rufina-juni.info/. Enter U020 in the search box to learn more about \"Atrial Fibrillation: Care Instructions. \" Current as of: September 21, 2016 Content Version: 11.4 © 6453-7068 [employer]. Care instructions adapted under license by [employer] (which disclaims liability or warranty for this information). If you have questions about a medical condition or this instruction, always ask your healthcare professional. [address] any warranty or liability for your use of this information. Hypothyroidism: Care Instructions Your Care Instructions You have hypothyroidism, which means that your body is not making enough thyroid hormone. This hormone helps your body use energy. If your thyroid level is low, you may feel tired, be constipated, have an increase in your blood pressure, or have dry skin or memory problems. You may also get cold easily, even when it is warm. Women with low thyroid levels may have heavy menstrual periods. A blood test to find your thyroid-stimulating hormone (TSH) level is used to check for hypothyroidism. A high TSH level may mean that you have low thyroid. When your body is not making enough thyroid hormone, TSH levels rise in an effort to make the body produce more. The treatment for hypothyroidism is to take thyroid hormone pills. You should start to feel better in 1 to 2 weeks. But it can take several months to see changes in the TSH level. You will need regular visits with your doctor to make sure you have the right dose of medicine. Most people need treatment for the rest of their lives. You will need to see your doctor regularly to have blood tests and to make sure you are doing well. Follow-up care is a key part of your treatment and safety. Be sure to make and go to all appointments, and call your doctor if you are having problems. It's also a good idea to know your test results and keep a list of the medicines you take. How can you care for yourself at home? · Take your thyroid hormone medicine exactly as prescribed.  Call your doctor if you think you are having a problem with your medicine. Most people do not have side effects if they take the right amount of medicine regularly. ¨ Take the medicine 30 minutes before breakfast, and do not take it with calcium, vitamins, or iron. ¨ Do not take extra doses of your thyroid medicine. It will not help you get better any faster, and it may cause side effects. ¨ If you forget to take a dose, do NOT take a double dose of medicine. Take your usual dose the next day. · Tell your doctor about all prescription, herbal, or over-the-counter products you take. · Take care of yourself. Eat a healthy diet, get enough sleep, and get regular exercise. When should you call for help? Call 911 anytime you think you may need emergency care. For example, call if: 
? · You passed out (lost consciousness). ? · You have severe trouble breathing. ? · You have a very slow heartbeat (less than 60 beats a minute). ? · You have a low body temperature (95°F or below). ?Call your doctor now or seek immediate medical care if: 
? · You feel tired, sluggish, or weak. ? · You have trouble remembering things or concentrating. ? · You do not begin to feel better 2 weeks after starting your medicine. ? Watch closely for changes in your health, and be sure to contact your doctor if you have any problems. Where can you learn more? Go to http://rufina-juni.info/. Enter U477 in the search box to learn more about \"Hypothyroidism: Care Instructions. \" Current as of: May 12, 2017 Content Version: 11.4 © 2191-5459 [employer]. Care instructions adapted under license by [employer] (which disclaims liability or warranty for this information). If you have questions about a medical condition or this instruction, always ask your healthcare professional. [address] any warranty or liability for your use of this information. Introducing [facility] & HEALTH SERVICES! Haylee Moralez introduces [employer] patient portal. Now you can access parts of your medical record, email your doctor's office, and request medication refills online. 1. In your internet browser, go to [email or web address] 2. Click on the First Time User? Click Here link in the Sign In box. You will see the New Member Sign Up page. 3. Enter your [employer] Access Code exactly as it appears below. You will not need to use this code after youve completed the sign-up process. If you do not sign up before the expiration date, you must request a new code. · [employer] Access Code: [email or web address] Expires: 2/26/2018  8:21 AM 
 
4. Enter the last four digits of your Social Security Number (xxxx) and Date of Birth (mm/dd/yyyy) as indicated and click Submit. You will be taken to the next sign-up page. 5. Create a [employer] ID. This will be your [employer] login ID and cannot be changed, so think of one that is secure and easy to remember. 6. Create a [employer] password. You can change your password at any time. 7. Enter your Password Reset Question and Answer. This can be used at a later time if you forget your password. 8. Enter your e-mail address. You will receive e-mail notification when new information is available in [address] 9. Click Sign Up. You can now view and download portions of your medical record. 10. Click the Download Summary menu link to download a portable copy of your medical information. If you have questions, please visit the Frequently Asked Questions section of the [employer] website. Remember, [employer] is NOT to be used for urgent needs. For medical emergencies, dial 911. Now available from your iPhone and Android! Please provide this summary of care documentation to your next provider. Your primary care clinician is listed as DEBORA Lee. If you have any questions after today's visit, please call [phone].

## 2018-02-12 NOTE — PATIENT INSTRUCTIONS
Atrial Fibrillation: Care Instructions  Your Care Instructions    Atrial fibrillation is an irregular and often fast heartbeat. Treating this condition is important for several reasons. It can cause blood clots, which can travel from your heart to your brain and cause a stroke. If you have a fast heartbeat, you may feel lightheaded, dizzy, and weak. An irregular heartbeat can also increase your risk for heart failure. Atrial fibrillation is often the result of another heart condition, such as high blood pressure or coronary artery disease. Making changes to improve your heart condition will help you stay healthy and active. Follow-up care is a key part of your treatment and safety. Be sure to make and go to all appointments, and call your doctor if you are having problems. It's also a good idea to know your test results and keep a list of the medicines you take. How can you care for yourself at home? Medicines  ? · Take your medicines exactly as prescribed. Call your doctor if you think you are having a problem with your medicine. You will get more details on the specific medicines your doctor prescribes. ? · If your doctor has given you a blood thinner to prevent a stroke, be sure you get instructions about how to take your medicine safely. Blood thinners can cause serious bleeding problems. ? · Do not take any vitamins, over-the-counter drugs, or herbal products without talking to your doctor first.   ? Lifestyle changes  ? · Do not smoke. Smoking can increase your chance of a stroke and heart attack. If you need help quitting, talk to your doctor about stop-smoking programs and medicines. These can increase your chances of quitting for good. ? · Eat a heart-healthy diet. ? · Stay at a healthy weight. Lose weight if you need to.   ? · Limit alcohol to 2 drinks a day for men and 1 drink a day for women. Too much alcohol can cause health problems. ? · Avoid colds and flu.  Get a pneumococcal vaccine shot. If you have had one before, ask your doctor whether you need another dose. Get a flu shot every year. If you must be around people with colds or flu, wash your hands often. Activity  ? · If your doctor recommends it, get more exercise. Walking is a good choice. Bit by bit, increase the amount you walk every day. Try for at least 30 minutes on most days of the week. You also may want to swim, bike, or do other activities. Your doctor may suggest that you join a cardiac rehabilitation program so that you can have help increasing your physical activity safely. ? · Start light exercise if your doctor says it is okay. Even a small amount will help you get stronger, have more energy, and manage stress. Walking is an easy way to get exercise. Start out by walking a little more than you did in the hospital. Gradually increase the amount you walk. ? · When you exercise, watch for signs that your heart is working too hard. You are pushing too hard if you cannot talk while you are exercising. If you become short of breath or dizzy or have chest pain, sit down and rest immediately. ? · Check your pulse regularly. Place two fingers on the artery at the palm side of your wrist, in line with your thumb. If your heartbeat seems uneven or fast, talk to your doctor. When should you call for help? Call 911 anytime you think you may need emergency care. For example, call if:  ? · You have symptoms of a heart attack. These may include:  ¨ Chest pain or pressure, or a strange feeling in the chest.  ¨ Sweating. ¨ Shortness of breath. ¨ Nausea or vomiting. ¨ Pain, pressure, or a strange feeling in the back, neck, jaw, or upper belly or in one or both shoulders or arms. ¨ Lightheadedness or sudden weakness. ¨ A fast or irregular heartbeat. After you call 911, the  may tell you to chew 1 adult-strength or 2 to 4 low-dose aspirin. Wait for an ambulance. Do not try to drive yourself.    ? · You have symptoms of a stroke. These may include:  ¨ Sudden numbness, tingling, weakness, or loss of movement in your face, arm, or leg, especially on only one side of your body. ¨ Sudden vision changes. ¨ Sudden trouble speaking. ¨ Sudden confusion or trouble understanding simple statements. ¨ Sudden problems with walking or balance. ¨ A sudden, severe headache that is different from past headaches. ? · You passed out (lost consciousness). ?Call your doctor now or seek immediate medical care if:  ? · You have new or increased shortness of breath. ? · You feel dizzy or lightheaded, or you feel like you may faint. ? · Your heart rate becomes irregular. ? · You can feel your heart flutter in your chest or skip heartbeats. Tell your doctor if these symptoms are new or worse. ? Watch closely for changes in your health, and be sure to contact your doctor if you have any problems. Where can you learn more? Go to http://rufina-juni.info/. Enter U020 in the search box to learn more about \"Atrial Fibrillation: Care Instructions. \"  Current as of: September 21, 2016  Content Version: 11.4  © 8308-9610 "[employer]. Care instructions adapted under license by [employer] (which disclaims liability or warranty for this information). If you have questions about a medical condition or this instruction, always ask your healthcare professional. [address] any warranty or liability for your use of this information. Hypothyroidism: Care Instructions  Your Care Instructions    You have hypothyroidism, which means that your body is not making enough thyroid hormone. This hormone helps your body use energy. If your thyroid level is low, you may feel tired, be constipated, have an increase in your blood pressure, or have dry skin or memory problems. You may also get cold easily, even when it is warm. Women with low thyroid levels may have heavy menstrual periods.   A blood test to find your thyroid-stimulating hormone (TSH) level is used to check for hypothyroidism. A high TSH level may mean that you have low thyroid. When your body is not making enough thyroid hormone, TSH levels rise in an effort to make the body produce more. The treatment for hypothyroidism is to take thyroid hormone pills. You should start to feel better in 1 to 2 weeks. But it can take several months to see changes in the TSH level. You will need regular visits with your doctor to make sure you have the right dose of medicine. Most people need treatment for the rest of their lives. You will need to see your doctor regularly to have blood tests and to make sure you are doing well. Follow-up care is a key part of your treatment and safety. Be sure to make and go to all appointments, and call your doctor if you are having problems. It's also a good idea to know your test results and keep a list of the medicines you take. How can you care for yourself at home? · Take your thyroid hormone medicine exactly as prescribed. Call your doctor if you think you are having a problem with your medicine. Most people do not have side effects if they take the right amount of medicine regularly. ¨ Take the medicine 30 minutes before breakfast, and do not take it with calcium, vitamins, or iron. ¨ Do not take extra doses of your thyroid medicine. It will not help you get better any faster, and it may cause side effects. ¨ If you forget to take a dose, do NOT take a double dose of medicine. Take your usual dose the next day. · Tell your doctor about all prescription, herbal, or over-the-counter products you take. · Take care of yourself. Eat a healthy diet, get enough sleep, and get regular exercise. When should you call for help? Call 911 anytime you think you may need emergency care. For example, call if:  ? · You passed out (lost consciousness). ? · You have severe trouble breathing.    ? · You have a very slow heartbeat (less than 60 beats a minute). ? · You have a low body temperature (95°F or below). ?Call your doctor now or seek immediate medical care if:  ? · You feel tired, sluggish, or weak. ? · You have trouble remembering things or concentrating. ? · You do not begin to feel better 2 weeks after starting your medicine. ? Watch closely for changes in your health, and be sure to contact your doctor if you have any problems. Where can you learn more? Go to http://rufina-juni.info/. Enter E762 in the search box to learn more about \"Hypothyroidism: Care Instructions. \"  Current as of: May 12, 2017  Content Version: 11.4  © 0757-3532 [employer]. Care instructions adapted under license by [employer] (which disclaims liability or warranty for this information). If you have questions about a medical condition or this instruction, always ask your healthcare professional. [address] any warranty or liability for your use of this information.

## 2018-02-12 NOTE — PROGRESS NOTES
Ismael Gill is a [de-identified] y.o. male presenting for Thyroid Problem  . 1. Have you been to the ER, urgent care clinic since your last visit? Hospitalized since your last visit? No    2. Have you seen or consulted any other health care providers outside of the [address] [place] [address] since your last visit? Include any pap smears or colon screening. No    Fall Risk Assessment, last 12 mths 12/7/2017   Able to walk? Yes   Fall in past 12 months? No         Abuse Screening Questionnaire 12/7/2017   Do you ever feel afraid of your partner? N   Are you in a relationship with someone who physically or mentally threatens you? N   Is it safe for you to go home? Y       PHQ over the last two weeks 12/7/2017   Little interest or pleasure in doing things Not at all   Feeling down, depressed or hopeless Not at all   Total Score PHQ 2 0       There are no discontinued medications.

## 2018-02-12 NOTE — PROGRESS NOTES
This note will not be viewable in [address] Subjective:     Mr. Momo Raman presents to the office today in order to discuss his thyroid medication. Patient became hypothyroid after being placed on amiodarone. We have been adjusting his dose but he is been feeling worse recently noting that he has cold intolerance, weight gain and coarser skin. His cardiologist has in the interim also stopped his amiodarone. The patient has not been taking his medication on an empty stomach with water. He currently is on Synthroid 150 mcg daily. Patient does have a history of atrial fibrillation but is had no palpitations or other cardiac symptoms. Past Medical History:   Diagnosis Date    Acquired hypothyroidism 9/12/2017    Anemia 2/1/2015    Arrhythmia     a. fib.& a.  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    CAD (coronary artery disease)     Chronic atrial fibrillation (HCC) 2/1/2015    Chronic kidney disease     decreased kidney function    CKD (chronic kidney disease) stage 3, GFR 30-59 ml/min 2/1/2015    Diabetes ([address]     Diverticulosis of large intestine without hemorrhage 10/24/2017    Gout 10/24/2017    Heart failure ([address]     Hyperlipidemia 2/1/2015    Hypertension     Ill-defined condition     high cholesterol    Ill-defined condition     gout    Long term current use of amiodarone 10/24/2017    Long term current use of anticoagulant 10/24/2017    Long-term use of high-risk medication 10/24/2017    Macular degeneration 10/24/2017    Peripheral vascular disease ([address] 10/24/2017    Renal artery stenosis ([address] 10/24/2017    Right-sided extracranial carotid artery stenosis 10/24/2017    Sick sinus syndrome ([address] 10/24/2017    Status post pacemaker     Past Surgical History:   Procedure Laterality Date    HX AAA REPAIR      HX APPENDECTOMY      HX CATARACT REMOVAL      / lens implant    HX CHOLECYSTECTOMY      HX ORTHOPAEDIC Right     rt unicondular knee replacement    HX ORTHOPAEDIC  1/26/15    LEFT UNICONDYLAR KNEE ARTHROPLASTY    HX PACEMAKER  7/11    pacemaker    HX TONSILLECTOMY      HX UROLOGICAL      rt renal stentx2     Allergies   Allergen Reactions    Latex Other (comments)     Skin raw and severely irritated    Aspirin Unknown (comments)    Hydrocodone Other (comments)     hallucinations    Oxycodone Other (comments)     hallucinations    Sulfa (Sulfonamide Antibiotics) Rash    Tetracycline Rash     Current Outpatient Prescriptions   Medication Sig Dispense Refill    SYNTHROID 150 mcg tablet take 1 tablet by mouth daily BEFORE BREAKFAST 30 Tab 1    metoprolol tartrate (LOPRESSOR) 100 mg IR tablet TAKE 1 TABLET TWICE A  Tab 2    warfarin (COUMADIN) 5 mg tablet TAKE 1 TABLET DAILY 90 Tab 2    isosorbide mononitrate ER (IMDUR) 60 mg CR tablet TAKE 1 TABLET DAILY 90 Tab 3    allopurinol (ZYLOPRIM) 300 mg tablet TAKE 1 TABLET DAILY 90 Tab 3    furosemide (LASIX) 40 mg tablet TAKE 2 TABLETS DAILY 180 Tab 3    lovastatin (MEVACOR) 40 mg tablet TAKE 2 TABLETS DAILY 180 Tab 1    insulin lispro (HUMALOG) 100 unit/mL injection 5-10 Units by SubCUTAneous route three (3) times daily as needed.  Flaxseed Oil oil Take 1,000 mg by mouth daily.  terazosin (HYTRIN) 10 mg capsule Take 1 capsule by mouth nightly. Indications: BENIGN PROSTATIC HYPERTROPHY, HYPERTENSION      insulin glargine (LANTUS SOLOSTAR) 100 unit/mL (3 mL) pen 66 Units by SubCUTAneous route Daily (before lunch).  VIT A/VIT C/VIT E/ZINC/COPPER (OCUVITE PRESERVISION PO) Take 1 Tab by mouth two (2) times a day.  losartan (COZAAR) 50 mg tablet Take 50 mg by mouth daily. Indications: CHRONIC HEART FAILURE, HYPERTENSION      multivitamin, stress formula (STRESS TAB) tablet Take 1 Tab by mouth daily.  CYANOCOBALAMIN/FA/PYRIDOXINE (FOLGARD RX 2.2 PO) Take 1 tablet by mouth daily.       finasteride (PROSCAR) 5 mg tablet Take 5 mg by mouth every other day. With dinner  Indications: BENIGN PROSTATIC HYPERTROPHY       Social History     Social History    Marital status:      Spouse name: N/A    Number of children: N/A    Years of education: N/A     Social History Main Topics    Smoking status: Former Smoker     Quit date: 7/14/1990    Smokeless tobacco: Never Used    Alcohol use 0.0 oz/week      Comment: 3-4 drinks a week    Drug use: No    Sexual activity: Not Asked     Other Topics Concern    None     Social History Narrative     Family History   Problem Relation Age of Onset    Cancer Mother     Diabetes Father        Review of Systems:  GEN: no weight loss,  or night sweats  CV: no PND, orthopnea, or palpitations  Resp: no dyspnea on exertion, no cough  Abd: no nausea, vomiting or diarrhea  EXT: denies edema, claudication  Endocrine: Positive for cold intolerance, weight gain, course or skin  Neurological ROS: no TIA or stroke symptoms  ROS otherwise negative      Objective:     Visit Vitals    /68 (BP 1 Location: Right arm, BP Patient Position: Sitting)    Pulse (!) 52    Ht 5' 10\" (1.778 m)    Wt 266 lb 9.6 oz (120.9 kg)    SpO2 95%    BMI 38.25 kg/m2     Body mass index is 38.25 kg/(m^2). General:   alert, cooperative and no distress   Eyes: conjunctivae/sclerae clear. PERRL, EOM's intact   Mouth:  No oral lesions, no pharyngeal erythema, no exudates   Neck: Trachea midline, no thyromegaly, no bruits   Heart: S1 and S2 normal,no murmurs noted    Lungs: Clear to auscultation bilaterally, no increased work of breathing   Abdomen: Soft, nontender.   Normal bowel sounds   Extremities: No edema or cyanosis   Neuro: ..alert, oriented x3,speech normal in context and clarity, cranial nerves II-XII intact,motor strength: full proximally and distally,gait: normal  reflexes: full and symmetric     Physical exam otherwise negative         Assessment/Plan:     Diagnoses and all orders for this visit:    Acquired hypothyroidism  -     COLLECTION VENOUS BLOOD,VENIPUNCTURE  -     AMB POC TSH  -     AMB POC T4, FREE    Chronic atrial fibrillation (HCC)        Other instructions:   Patient's medications are reviewed and reconciled. We discussed the way the patient should be taken the patient should be taking the thyroid medication first thing in the morning on an empty stomach with water with nothing to eat for an hour afterwards    We will recheck his TSH and free T4 level and readjust his medication. Discussed the importance of continuing thyroid medication if thyroid is low. 15 minute office visit occurred with the majority of it in discussion about thyroid replacement. Follow-up Disposition:  Return for As previously scheduled.     Nel Zarco MD

## 2018-02-14 LAB
T4 FREE SERPL-MCNC: 1.08 NG/DL (ref 0.71–1.85)
TSH BLD-ACNC: 4.17 UIU/ML (ref 0.4–4.2)

## 2018-02-14 NOTE — PROGRESS NOTES
TSH 4.17 and would like to see this number between 0.4 and 2.0  Would increase Synthroid to 175 mcg daily taken on an empty stomach first thing in the morning with water    Recheck TSH in 1 month

## 2018-02-15 ENCOUNTER — TELEPHONE (OUTPATIENT)
Dept: INTERNAL MEDICINE CLINIC | Age: 83
End: 2018-02-15

## 2018-02-15 NOTE — TELEPHONE ENCOUNTER
----- Message from Deisy Rousseau MD sent at 2/14/2018 11:44 AM EST -----  TSH 4.17 and would like to see this number between 0.4 and 2.0  Would increase Synthroid to 175 mcg daily taken on an empty stomach first thing in the morning with water    Recheck TSH in 1 month          Requested Prescriptions     Pending Prescriptions Disp Refills    levothyroxine (SYNTHROID) 175 mcg tablet 30 Tab 3     Sig: Take 1 Tab by mouth Daily (before breakfast).

## 2018-02-16 RX ORDER — LEVOTHYROXINE SODIUM 175 UG/1
175 TABLET ORAL
Qty: 30 TAB | Refills: 3 | Status: SHIPPED | OUTPATIENT
Start: 2018-02-16 | End: 2018-06-11 | Stop reason: SDUPTHER

## 2018-03-13 RX ORDER — FUROSEMIDE 40 MG/1
TABLET ORAL
Qty: 60 TAB | Refills: 0 | Status: SHIPPED | OUTPATIENT
Start: 2018-03-13 | End: 2018-09-17 | Stop reason: SDUPTHER

## 2018-03-15 ENCOUNTER — TELEPHONE ANTICOAG (OUTPATIENT)
Dept: INTERNAL MEDICINE CLINIC | Age: 83
End: 2018-03-15

## 2018-03-15 ENCOUNTER — TELEPHONE (OUTPATIENT)
Dept: INTERNAL MEDICINE CLINIC | Age: 83
End: 2018-03-15

## 2018-03-15 ENCOUNTER — LAB ONLY (OUTPATIENT)
Dept: INTERNAL MEDICINE CLINIC | Age: 83
End: 2018-03-15

## 2018-03-15 DIAGNOSIS — I48.20 CHRONIC ATRIAL FIBRILLATION (HCC): Primary | Chronic | ICD-10-CM

## 2018-03-15 DIAGNOSIS — I48.20 CHRONIC ATRIAL FIBRILLATION (HCC): ICD-10-CM

## 2018-03-15 LAB
INR BLD: 1.8
PT POC: 24.3 SECONDS
VALID INTERNAL CONTROL?: YES

## 2018-03-15 RX ORDER — SPIRONOLACTONE 25 MG/1
TABLET ORAL DAILY
COMMUNITY
End: 2018-04-05 | Stop reason: SDUPTHER

## 2018-03-15 NOTE — PROGRESS NOTES
Anticoagulation Summary as of 3/15/2018     INR goal 1.8-3.0   Today's INR 1.8   Maintenance plan 5 mg (5 mg x 1) every day   Weekly total 35 mg   Plan last modified [facility] RAMON, LPN (2/62/5180)   Next INR check 4/16/2018   Target end date     Indications   Chronic atrial fibrillation ([address] [I48.2]         Anticoagulation Episode Summary     INR check location Clinic Lab    Preferred lab     Send INR reminders to     Comments       Anticoagulation Care Providers     Provider Role Specialty Phone number    Davie Harris MD Responsible Internal Medicine [phone]      Advised patient to take an extra 2.5mg of coumadin today, then resume usual regimen.

## 2018-03-15 NOTE — PROGRESS NOTES
INR is 1.8. Would take an extra 2.5 mg of Coumadin today and then resume usual regimen.   Recheck pro time 1 month

## 2018-03-15 NOTE — TELEPHONE ENCOUNTER
Added Spironolactone back to patient med list- he forget to tell us at last office visit that he was taking this.

## 2018-04-05 RX ORDER — SPIRONOLACTONE 25 MG/1
25 TABLET ORAL DAILY
Qty: 90 TAB | Refills: 3 | Status: SHIPPED | OUTPATIENT
Start: 2018-04-05 | End: 2019-02-26 | Stop reason: SDUPTHER

## 2018-04-05 NOTE — TELEPHONE ENCOUNTER
Last Refill: 3/6/2017  Last visit:2/12/2018    Requested Prescriptions     Pending Prescriptions Disp Refills    spironolactone (ALDACTONE) 25 mg tablet 90 Tab 3     Sig: Take 1 Tab by mouth daily.

## 2018-04-18 ENCOUNTER — LAB ONLY (OUTPATIENT)
Dept: INTERNAL MEDICINE CLINIC | Age: 83
End: 2018-04-18

## 2018-04-18 DIAGNOSIS — I48.20 CHRONIC ATRIAL FIBRILLATION (HCC): Primary | Chronic | ICD-10-CM

## 2018-04-18 DIAGNOSIS — E03.9 ACQUIRED HYPOTHYROIDISM: Chronic | ICD-10-CM

## 2018-04-18 LAB
INR BLD: 1.9
PT POC: 24.8 SECONDS
TSH BLD-ACNC: 0.9 UIU/ML (ref 0.4–4.2)
VALID INTERNAL CONTROL?: YES

## 2018-04-20 ENCOUNTER — TELEPHONE ANTICOAG (OUTPATIENT)
Dept: INTERNAL MEDICINE CLINIC | Age: 83
End: 2018-04-20

## 2018-04-20 DIAGNOSIS — I48.20 CHRONIC ATRIAL FIBRILLATION (HCC): ICD-10-CM

## 2018-04-20 NOTE — PROGRESS NOTES
Anticoagulation Summary as of 4/20/2018     INR goal 1.8-3.0   Today's INR 1.9 (4/18/2018)   Warfarin maintenance plan 5 mg (5 mg x 1) every day   Weekly warfarin total 35 mg   No change documented [facility] last modified [facility], LPN (6/33/1494)   Next INR check 5/21/2018   Target end date     Indications   Chronic atrial fibrillation ([address] [I48.2]         Anticoagulation Episode Summary     INR check location Clinic Lab    Preferred lab     Send INR reminders to     Comments       Anticoagulation Care Providers     Provider Role Specialty Phone number    Yolanda Olivares MD Responsible Internal Medicine [phone]      Patient advised no change in coumadin regimen & to recheck PT in 1 month.

## 2018-05-30 ENCOUNTER — LAB ONLY (OUTPATIENT)
Dept: INTERNAL MEDICINE CLINIC | Age: 83
End: 2018-05-30

## 2018-05-30 ENCOUNTER — TELEPHONE ANTICOAG (OUTPATIENT)
Dept: INTERNAL MEDICINE CLINIC | Age: 83
End: 2018-05-30

## 2018-05-30 DIAGNOSIS — I48.20 CHRONIC ATRIAL FIBRILLATION (HCC): ICD-10-CM

## 2018-05-30 DIAGNOSIS — I48.20 CHRONIC ATRIAL FIBRILLATION (HCC): Primary | Chronic | ICD-10-CM

## 2018-05-30 LAB
INR BLD: 1.6
PT POC: 21.2 SECONDS
VALID INTERNAL CONTROL?: YES

## 2018-05-30 NOTE — PROGRESS NOTES
INR is 1.6. Take extra 5 mg of Coumadin today. Then resume usual regimen.   Recheck pro time 2 weeks

## 2018-05-30 NOTE — PROGRESS NOTES
Anticoagulation Episode Summary     Current INR goal 1.8-3.0   Next INR check 6/13/2018   INR from last check 1. 6!  (5/30/2018)   Weekly max warfarin dose    Target end date    INR check location Clinic Lab   Preferred lab    Send INR reminders to     Indications   Chronic atrial fibrillation [facility]) [I48.2]           Comments          Anticoagulation Care Providers     Provider Role Specialty Phone number    Melanie Moore MD Responsible Internal Medicine [phone]        Patient advised to take an extra 5 mg of coumadin today then resume usual dose and re check protime in 2 weeks

## 2018-06-10 RX ORDER — INSULIN GLARGINE 100 [IU]/ML
INJECTION, SOLUTION SUBCUTANEOUS
Qty: 60 ML | Refills: 3 | Status: SHIPPED | OUTPATIENT
Start: 2018-06-10 | End: 2019-06-09 | Stop reason: SDUPTHER

## 2018-06-11 RX ORDER — LEVOTHYROXINE SODIUM 175 UG/1
TABLET ORAL
Qty: 30 TAB | Refills: 3 | Status: SHIPPED | OUTPATIENT
Start: 2018-06-11 | End: 2018-06-18 | Stop reason: SDUPTHER

## 2018-06-18 ENCOUNTER — OFFICE VISIT (OUTPATIENT)
Dept: INTERNAL MEDICINE CLINIC | Age: 83
End: 2018-06-18

## 2018-06-18 VITALS
TEMPERATURE: 97.9 F | RESPIRATION RATE: 16 BRPM | SYSTOLIC BLOOD PRESSURE: 129 MMHG | WEIGHT: 254 LBS | HEIGHT: 70 IN | DIASTOLIC BLOOD PRESSURE: 60 MMHG | OXYGEN SATURATION: 91 % | BODY MASS INDEX: 36.36 KG/M2 | HEART RATE: 69 BPM

## 2018-06-18 DIAGNOSIS — Z79.01 LONG TERM CURRENT USE OF ANTICOAGULANT: Chronic | ICD-10-CM

## 2018-06-18 DIAGNOSIS — Z79.4 TYPE 2 DIABETES MELLITUS WITHOUT COMPLICATION, WITH LONG-TERM CURRENT USE OF INSULIN (HCC): Primary | Chronic | ICD-10-CM

## 2018-06-18 DIAGNOSIS — I48.20 CHRONIC ATRIAL FIBRILLATION (HCC): Chronic | ICD-10-CM

## 2018-06-18 DIAGNOSIS — E03.9 ACQUIRED HYPOTHYROIDISM: Chronic | ICD-10-CM

## 2018-06-18 DIAGNOSIS — Z79.899 LONG-TERM USE OF HIGH-RISK MEDICATION: Chronic | ICD-10-CM

## 2018-06-18 DIAGNOSIS — I10 ESSENTIAL HYPERTENSION: Chronic | ICD-10-CM

## 2018-06-18 DIAGNOSIS — I44.2 COMPLETE AV BLOCK (HCC): Chronic | ICD-10-CM

## 2018-06-18 DIAGNOSIS — I25.10 CORONARY ARTERY DISEASE INVOLVING NATIVE HEART WITHOUT ANGINA PECTORIS, UNSPECIFIED VESSEL OR LESION TYPE: Chronic | ICD-10-CM

## 2018-06-18 DIAGNOSIS — E78.2 MIXED HYPERLIPIDEMIA: Chronic | ICD-10-CM

## 2018-06-18 DIAGNOSIS — E11.9 TYPE 2 DIABETES MELLITUS WITHOUT COMPLICATION, WITH LONG-TERM CURRENT USE OF INSULIN (HCC): Primary | Chronic | ICD-10-CM

## 2018-06-18 DIAGNOSIS — M10.9 GOUT, UNSPECIFIED CAUSE, UNSPECIFIED CHRONICITY, UNSPECIFIED SITE: Chronic | ICD-10-CM

## 2018-06-18 DIAGNOSIS — N39.0 URINARY TRACT INFECTION WITHOUT HEMATURIA, SITE UNSPECIFIED: ICD-10-CM

## 2018-06-18 LAB
ALBUMIN SERPL-MCNC: 3.9 G/DL (ref 3.9–5.4)
ALKALINE PHOS POC: 118 U/L (ref 38–126)
ALT SERPL-CCNC: 37 U/L (ref 9–52)
AST SERPL-CCNC: 24 U/L (ref 14–36)
BACTERIA UA POCT, BACTPOCT: ABNORMAL
BILIRUB UR QL STRIP: NEGATIVE
BUN BLD-MCNC: 49 MG/DL (ref 9–20)
CALCIUM BLD-MCNC: 9.7 MG/DL (ref 8.4–10.2)
CASTS UA POCT: ABNORMAL
CHLORIDE BLD-SCNC: 98 MMOL/L (ref 98–107)
CHOLEST SERPL-MCNC: 116 MG/DL (ref 0–200)
CK (CPK) POC: 49 U/L (ref 30–135)
CLUE CELLS, CLUEPOCT: NEGATIVE
CO2 POC: 30 MMOL/L (ref 22–32)
CREAT BLD-MCNC: 1.5 MG/DL (ref 0.8–1.5)
CRYSTALS UA POCT, CRYSPOCT: NEGATIVE
EGFR (POC): 43
EPITHELIAL CELLS POCT: ABNORMAL
GLUCOSE POC: 225 MG/DL (ref 75–110)
GLUCOSE UR-MCNC: NEGATIVE MG/DL
GRAN# POC: 5.8 K/UL (ref 2–7.8)
GRAN% POC: 71.1 % (ref 37–92)
HBA1C MFR BLD HPLC: 7.1 % (ref 4.5–5.7)
HCT VFR BLD CALC: 36.8 % (ref 37–51)
HDLC SERPL-MCNC: 35 MG/DL (ref 35–130)
HGB BLD-MCNC: 12.4 G/DL (ref 12–18)
INR BLD: 1.8
KETONES P FAST UR STRIP-MCNC: NEGATIVE MG/DL
LDL CHOLESTEROL POC: 35.4 MG/DL (ref 0–130)
LY# POC: 1.7 K/UL (ref 0.6–4.1)
LY% POC: 22.6 % (ref 10–58.5)
MCH RBC QN: 33.2 PG (ref 26–32)
MCHC RBC-ENTMCNC: 33.7 G/DL (ref 30–36)
MCV RBC: 98 FL (ref 80–97)
MICROALBUMIN UR TEST STR-MCNC: 20 MG/L (ref 0–20)
MID #, POC: 0.4 K/UL (ref 0–1.8)
MID% POC: 6.3 % (ref 0.1–24)
MUCUS UA POCT, MUCPOCT: ABNORMAL
PH UR STRIP: 6 [PH] (ref 5–7)
PLATELET # BLD: 185 K/UL (ref 140–440)
POTASSIUM SERPL-SCNC: 4.6 MMOL/L (ref 3.6–5)
PROT SERPL-MCNC: 7 G/DL (ref 6.3–8.2)
PROT UR QL STRIP: ABNORMAL
PT POC: 24.5 SECONDS
RBC # BLD: 3.74 M/UL (ref 4.2–6.3)
RBC UA POCT, RBCPOCT: ABNORMAL
SODIUM SERPL-SCNC: 140 MMOL/L (ref 137–145)
SP GR UR STRIP: 1.01 (ref 1.01–1.02)
T4 FREE SERPL-MCNC: 1.24 NG/DL (ref 0.71–1.85)
TCHOL/HDL RATIO (POC): 3.3 (ref 0–4)
TOTAL BILIRUBIN POC: 0.8 MG/DL (ref 0.2–1.3)
TRICH UA POCT, TRICHPOC: NEGATIVE
TRIGL SERPL-MCNC: 228 MG/DL (ref 0–200)
TSH BLD-ACNC: 0.4 UIU/ML (ref 0.4–4.2)
UA UROBILINOGEN AMB POC: NORMAL (ref 0.2–1)
URINALYSIS CLARITY POC: ABNORMAL
URINALYSIS COLOR POC: ABNORMAL
URINE BLOOD POC: ABNORMAL
URINE CULT COMMENT, POCT: ABNORMAL
URINE LEUKOCYTES POC: ABNORMAL
URINE NITRITES POC: NEGATIVE
VALID INTERNAL CONTROL?: YES
VLDLC SERPL CALC-MCNC: 45.6 MG/DL
WBC # BLD: 7.9 K/UL (ref 4.1–10.9)
WBC UA POCT, WBCPOCT: ABNORMAL
YEAST UA POCT, YEASTPOC: NEGATIVE

## 2018-06-18 RX ORDER — LEVOTHYROXINE SODIUM 175 UG/1
175 TABLET ORAL
COMMUNITY
Start: 2018-02-16 | End: 2018-12-18 | Stop reason: SDUPTHER

## 2018-06-18 NOTE — PATIENT INSTRUCTIONS
Learning About Cutting Calories  How do calories affect your weight? Food gives your body energy. Energy from the food you eat is measured in calories. This energy keeps your heart beating, your brain active, and your muscles working. Your body needs a certain number of calories each day. After your body uses the calories it needs, it stores extra calories as fat. To lose weight safely, you have to eat fewer calories while eating in a healthy way. How many calories do you need each day? The more active you are, the more calories you need. When you are less active, you need fewer calories. How many calories you need each day also depends on several things, including your age and whether you are male or female. Here are some general guidelines for adults:  · Less active women and older adults need 1,600 to 2,000 calories each day. · Active women and less active men need 2,000 to 2,400 calories each day. · Active men need 2,400 to 3,000 calories each day. How can you cut calories and eat healthy meals? Whole grains, vegetables and fruits, and dried beans are good lower-calorie foods. They give you lots of nutrients and fiber. And they fill you up. Sweets, energy drinks, and soda pop are high in calories. They give you few nutrients and no fiber. Try to limit soda pop, fruit juice, and energy drinks. Drink water instead. Some fats can be part of a healthy diet. But cutting back on fats from highly processed foods like fast foods and many snack foods is a good way to lower the calories in your diet. Also, use smaller amounts of fats like butter, margarine, salad dressing, and mayonnaise. Add fresh garlic, lemon, or herbs to your meals to add flavor without adding fat. Meats and dairy products can be a big source of hidden fats. Try to choose lean or low-fat versions of these products. Fat-free cookies, candies, chips, and frozen treats can still be high in sugar and calories.  Some fat-free foods have more calories than regular ones. Eat fat-free treats in moderation, as you would other foods. If your favorite foods are high in fat, salt, sugar, or calories, limit how often you eat them. Eat smaller servings, or look for healthy substitutes. Fill up on fruits, vegetables, and whole grains. Eating at home  · Use meat as a side dish instead of as the main part of your meal.  · Try main dishes that use whole wheat pasta, brown rice, dried beans, or vegetables. · Find ways to cook with little or no fat, such as broiling, steaming, or grilling. · Use cooking spray instead of oil. If you use oil, use a monounsaturated oil, such as canola or olive oil. · Trim fat from meats before you cook them. · Drain off fat after you brown the meat or while you roast it. · Chill soups and stews after you cook them. Then skim the fat off the top after it hardens. Eating out  · Order foods that are broiled or poached rather than fried or breaded. · Cut back on the amount of butter or margarine that you use on bread. · Order sauces, gravies, and salad dressings on the side, and use only a little. · When you order pasta, choose tomato sauce rather than cream sauce. · Ask for salsa with your baked potato instead of sour cream, butter, cheese, or burroughs. · Order meals in a small size instead of upgrading to a large. · Share an entree, or take part of your food home to eat as another meal.  · Share appetizers and desserts. Where can you learn more? Go to http://rufina-juni.info/. Enter 99 [id number] in the search box to learn more about \"Learning About Cutting Calories. \"  Current as of: May 12, 2017  Content Version: 11.4  © 5774-2154 Healthwise, [employer]. Care instructions adapted under license by [employer] (which disclaims liability or warranty for this information).  If you have questions about a medical condition or this instruction, always ask your healthcare professional. [employer] disclaims any warranty or liability for your use of this information. Advance Directives: Care Instructions  Your Care Instructions  An advance directive is a legal way to state your wishes at the end of your life. It tells your family and your doctor what to do if you can no longer say what you want. There are two main types of advance directives. You can change them any time that your wishes change. · A living will tells your family and your doctor your wishes about life support and other treatment. · A durable power of  for health care lets you name a person to make treatment decisions for you when you can't speak for yourself. This person is called a health care agent. If you do not have an advance directive, decisions about your medical care may be made by a doctor or a  who doesn't know you. It may help to think of an advance directive as a gift to the people who care for you. If you have one, they won't have to make tough decisions by themselves. Follow-up care is a key part of your treatment and safety. Be sure to make and go to all appointments, and call your doctor if you are having problems. It's also a good idea to know your test results and keep a list of the medicines you take. How can you care for yourself at home? · Discuss your wishes with your loved ones and your doctor. This way, there are no surprises. · Many states have a unique form. Or you might use a universal form that has been approved by many states. This kind of form can sometimes be completed and stored online. Your electronic copy will then be available wherever you have a connection to the Internet. In most cases, doctors will respect your wishes even if you have a form from a different state. · You don't need a  to do an advance directive. But you may want to get legal advice.   · Think about these questions when you prepare an advance directive:  ¨ Who do you want to make decisions about your medical care if you are not able to? Many people choose a family member or close friend. ¨ Do you know enough about life support methods that might be used? If not, talk to your doctor so you understand. ¨ What are you most afraid of that might happen? You might be afraid of having pain, losing your independence, or being kept alive by machines. ¨ Where would you prefer to die? Choices include your home, a hospital, or a nursing home. ¨ Would you like to have information about hospice care to support you and your family? ¨ Do you want to donate organs when you die? ¨ Do you want certain Oriental orthodox practices performed before you die? If so, put your wishes in the advance directive. · Read your advance directive every year, and make changes as needed. When should you call for help? Be sure to contact your doctor if you have any questions. Where can you learn more? Go to http://rufina-juni.info/. Enter R264 in the search box to learn more about \"Advance Directives: Care Instructions. \"  Current as of: September 24, 2016  Content Version: 11.4  © 7058-2469 Healthwise, Incorporated. Care instructions adapted under license by [employer] (which disclaims liability or warranty for this information). If you have questions about a medical condition or this instruction, always ask your healthcare professional. [address] any warranty or liability for your use of this information.

## 2018-06-18 NOTE — MR AVS SNAPSHOT
[address] [facility] [address] [id number] [phone] Patient: Rosa Santos MRN: [id number] EEQ:[birth year] Visit Information Date & Time Provider Department Dept. Phone Encounter #  
 6/18/2018  1:30 PM Webster Litten, MD [facility] [phone] [id number] Follow-up Instructions Return in about 6 months (around 12/18/2018). Follow-up and Disposition History Upcoming Health Maintenance Date Due  
 FOOT EXAM Q1 5/31/1945 ZOSTER VACCINE AGE 60> 3/31/1997 MEDICARE YEARLY EXAM 3/14/2018 HEMOGLOBIN A1C Q6M 6/7/2018 EYE EXAM RETINAL OR DILATED Q1 7/19/2018 Influenza Age 5 to Adult 8/1/2018 MICROALBUMIN Q1 12/7/2018 LIPID PANEL Q1 12/7/2018 GLAUCOMA SCREENING Q2Y 7/19/2019 DTaP/Tdap/Td series (2 - Td) 12/7/2027 Allergies as of 6/18/2018  Review Complete On: 6/18/2018 By: Webster Litten, MD  
  
 Severity Noted Reaction Type Reactions Latex  01/06/2015    Other (comments) Skin raw and severely irritated Aspirin  11/28/2017    Unknown (comments) Hydrocodone  01/02/2016    Other (comments)  
 hallucinations Oxycodone  01/02/2016    Other (comments)  
 hallucinations Sulfa (Sulfonamide Antibiotics)  10/18/2010    Rash Tetracycline  07/14/2011    Rash Current Immunizations  Reviewed on 12/7/2017 Name Date Influenza Vaccine 9/14/2017, 10/13/2016, 10/1/2015 Pneumococcal Conjugate (PCV-13) 10/1/2015 Pneumococcal Polysaccharide (PPSV-23) 10/1/2013, 1/1/2010 Tdap 12/7/2017 Not reviewed this visit You Were Diagnosed With   
  
 Codes Comments Type 2 diabetes mellitus without complication, with long-term current use of insulin (HCC)    -  Primary ICD-10-CM: E11.9, Z79.4 ICD-9-CM: 250.00, V58.67 Essential hypertension     ICD-10-CM: I10 
ICD-9-CM: 401.9 Mixed hyperlipidemia     ICD-10-CM: E78.2 ICD-9-CM: 272.2 Long-term use of high-risk medication     ICD-10-CM: Z79.899 ICD-9-CM: V58.69 Complete AV block (HCC)     ICD-10-CM: I44.2 ICD-9-CM: 426.0 Chronic atrial fibrillation (HCC)     ICD-10-CM: X75.5 ICD-9-CM: 427.31 Long term current use of anticoagulant     ICD-10-CM: Z79.01 
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  
 36.45 kg/m 2 2.39 m 2 Preferred Pharmacy Pharmacy Name Phone [phone] [phone] Your Updated Medication List  
  
   
This list is accurate as of 6/18/18  2:13 PM.  Always use your most recent med list.  
  
  
  
  
 allopurinol 300 mg tablet Commonly known as:  ZYLOPRIM  
TAKE 1 TABLET DAILY  
  
 BD ULTRA-FINE SHORT PEN NEEDLE 31 gauge x 5/16\" Ndle Generic drug:  Insulin Needles (Disposable)  
use four times a day or as directed by prescriber  
  
 finasteride 5 mg tablet Commonly known as:  PROSCAR Take 5 mg by mouth every other day. With dinner  Indications: BENIGN PROSTATIC HYPERTROPHY Flaxseed Oil Oil Take 1,000 mg by mouth daily. * FOLGARD RX 2.2 PO Take 1 tablet by mouth daily. * TL SEMAJ RX 2.2-25-1 mg Tab Generic drug:  folic acid-vit E1-RZM Q45  
take 1 tablet by mouth once daily furosemide 40 mg tablet Commonly known as:  LASIX  
take 2 tablets by mouth once daily (NOTE TO MD - PT WAITING ON MAILORDER)  
  
 insulin lispro 100 unit/mL injection Commonly known as:  HUMALOG  
5-10 Units by SubCUTAneous route three (3) times daily as needed. isosorbide mononitrate ER 60 mg CR tablet Commonly known as:  IMDUR  
TAKE 1 TABLET DAILY  
  
 LANTUS SOLOSTAR U-100 INSULIN 100 unit/mL (3 mL) Inpn Generic drug:  insulin glargine INJECT 60 UNITS UNDER THE SKIN DAILY  
  
 levothyroxine 175 mcg tablet Commonly known as:  SYNTHROID Take 175 mcg by mouth.  
  
 losartan 50 mg tablet Commonly known as:  COZAAR Take 50 mg by mouth daily. Indications: CHRONIC HEART FAILURE, HYPERTENSION  
  
 lovastatin 40 mg tablet Commonly known as:  MEVACOR  
TAKE 2 TABLETS DAILY  
  
 metoprolol tartrate 100 mg IR tablet Commonly known as:  LOPRESSOR  
TAKE 1 TABLET TWICE A DAY  
  
 multivitamin, stress formula tablet Commonly known as:  STRESS TAB Take 1 Tab by mouth daily. OCUVITE PRESERVISION PO Take 1 Tab by mouth two (2) times a day. spironolactone 25 mg tablet Commonly known as:  ALDACTONE Take 1 Tab by mouth daily. terazosin 10 mg capsule Commonly known as:  HYTRIN Take 1 capsule by mouth nightly. Indications: BENIGN PROSTATIC HYPERTROPHY, HYPERTENSION  
  
 warfarin 5 mg tablet Commonly known as:  COUMADIN  
TAKE 1 TABLET DAILY * Notice: This list has 2 medication(s) that are the same as other medications prescribed for you. Read the directions carefully, and ask your doctor or other care provider to review them with you. We Performed the Following AMB POC CK (CPK) [56870 CPT(R)] AMB POC COMPLETE CBC,AUTOMATED ENTER S8873859 CPT(R)] AMB POC COMPREHENSIVE METABOLIC PANEL [31446 CPT(R)] AMB POC HEMOGLOBIN A1C [53540 CPT(R)] AMB POC LIPID PROFILE [09995 CPT(R)] AMB POC PT/INR [61796 CPT(R)] AMB POC T4, FREE [id number] CPT(R)] AMB POC TSH [92739 CPT(R)] AMB POC URINALYSIS DIP STICK AUTO W/ MICRO  [26045 CPT(R)] AMB POC URINE, MICROALBUMIN, SEMIQUANT (1 RESULT) [91972 CPT(R)] COLLECTION VENOUS BLOOD,VENIPUNCTURE [id number] CPT(R)] Follow-up Instructions Return in about 6 months (around 12/18/2018). Patient Instructions Learning About Cutting Calories How do calories affect your weight? Food gives your body energy. Energy from the food you eat is measured in calories. This energy keeps your heart beating, your brain active, and your muscles working. Your body needs a certain number of calories each day. After your body uses the calories it needs, it stores extra calories as fat. To lose weight safely, you have to eat fewer calories while eating in a healthy way. How many calories do you need each day? The more active you are, the more calories you need. When you are less active, you need fewer calories. How many calories you need each day also depends on several things, including your age and whether you are male or female. Here are some general guidelines for adults: 
· Less active women and older adults need 1,600 to 2,000 calories each day. · Active women and less active men need 2,000 to 2,400 calories each day. · Active men need 2,400 to 3,000 calories each day. How can you cut calories and eat healthy meals? Whole grains, vegetables and fruits, and dried beans are good lower-calorie foods. They give you lots of nutrients and fiber. And they fill you up. Sweets, energy drinks, and soda pop are high in calories. They give you few nutrients and no fiber. Try to limit soda pop, fruit juice, and energy drinks. Drink water instead. Some fats can be part of a healthy diet. But cutting back on fats from highly processed foods like fast foods and many snack foods is a good way to lower the calories in your diet.  Also, use smaller amounts of fats like butter, margarine, salad dressing, and mayonnaise. Add fresh garlic, lemon, or herbs to your meals to add flavor without adding fat. Meats and dairy products can be a big source of hidden fats. Try to choose lean or low-fat versions of these products. Fat-free cookies, candies, chips, and frozen treats can still be high in sugar and calories. Some fat-free foods have more calories than regular ones. Eat fat-free treats in moderation, as you would other foods. If your favorite foods are high in fat, salt, sugar, or calories, limit how often you eat them. Eat smaller servings, or look for healthy substitutes. Fill up on fruits, vegetables, and whole grains. Eating at home · Use meat as a side dish instead of as the main part of your meal. 
· Try main dishes that use whole wheat pasta, brown rice, dried beans, or vegetables. · Find ways to cook with little or no fat, such as broiling, steaming, or grilling. · Use cooking spray instead of oil. If you use oil, use a monounsaturated oil, such as canola or olive oil. · Trim fat from meats before you cook them. · Drain off fat after you brown the meat or while you roast it. · Chill soups and stews after you cook them. Then skim the fat off the top after it hardens. Eating out · Order foods that are broiled or poached rather than fried or breaded. · Cut back on the amount of butter or margarine that you use on bread. · Order sauces, gravies, and salad dressings on the side, and use only a little. · When you order pasta, choose tomato sauce rather than cream sauce. · Ask for salsa with your baked potato instead of sour cream, butter, cheese, or burroughs. · Order meals in a small size instead of upgrading to a large. · Share an entree, or take part of your food home to eat as another meal. 
· Share appetizers and desserts. Where can you learn more? Go to http://carmenza.info/. Enter 99 [id number] in the search box to learn more about \"Learning About Cutting Calories. \" Current as of: May 12, 2017 Content Version: 11.4 © 1099-4612 [employer]. Care instructions adapted under license by [employer] (which disclaims liability or warranty for this information). If you have questions about a medical condition or this instruction, always ask your healthcare professional. [place] [address] any warranty or liability for your use of this information. Advance Directives: Care Instructions Your Care Instructions An advance directive is a legal way to state your wishes at the end of your life. It tells your family and your doctor what to do if you can no longer say what you want. There are two main types of advance directives. You can change them any time that your wishes change. · A living will tells your family and your doctor your wishes about life support and other treatment. · A durable power of  for health care lets you name a person to make treatment decisions for you when you can't speak for yourself. This person is called a health care agent. If you do not have an advance directive, decisions about your medical care may be made by a doctor or a  who doesn't know you. It may help to think of an advance directive as a gift to the people who care for you. If you have one, they won't have to make tough decisions by themselves. Follow-up care is a key part of your treatment and safety. Be sure to make and go to all appointments, and call your doctor if you are having problems. It's also a good idea to know your test results and keep a list of the medicines you take. How can you care for yourself at home? · Discuss your wishes with your loved ones and your doctor. This way, there are no surprises. · Many states have a unique form.  Or you might use a universal form that has been approved by many states. This kind of form can sometimes be completed and stored online. Your electronic copy will then be available wherever you have a connection to the Internet. In most cases, doctors will respect your wishes even if you have a form from a different state. · You don't need a  to do an advance directive. But you may want to get legal advice. · Think about these questions when you prepare an advance directive: ¨ Who do you want to make decisions about your medical care if you are not able to? Many people choose a family member or close friend. ¨ Do you know enough about life support methods that might be used? If not, talk to your doctor so you understand. ¨ What are you most afraid of that might happen? You might be afraid of having pain, losing your independence, or being kept alive by machines. ¨ Where would you prefer to die? Choices include your home, a hospital, or a nursing home. ¨ Would you like to have information about hospice care to support you and your family? ¨ Do you want to donate organs when you die? ¨ Do you want certain Rastafari practices performed before you die? If so, put your wishes in the advance directive. · Read your advance directive every year, and make changes as needed. When should you call for help? Be sure to contact your doctor if you have any questions. Where can you learn more? Go to http://rufina-juni.info/. Enter R264 in the search box to learn more about \"Advance Directives: Care Instructions. \" Current as of: September 24, 2016 Content Version: 11.4 © 6224-3233 Healthwise, Incorporated. Care instructions adapted under license by [employer] (which disclaims liability or warranty for this information).  If you have questions about a medical condition or this instruction, always ask your healthcare professional. [address] any warranty or liability for your use of this information. Patient Instructions History Introducing [facility]! [facility] introduces PiPsportst patient portal. Now you can access parts of your medical record, email your doctor's office, and request medication refills online. 1. In your internet browser, go to [email or web address] [employer] 2. Click on the First Time User? Click Here link in the Sign In box. You will see the New Member Sign Up page. 3. Enter your cVidya Access Code exactly as it appears below. You will not need to use this code after youve completed the sign-up process. If you do not sign up before the expiration date, you must request a new code. · cVidya Access Code: [email or web address] Expires: 9/16/2018  1:15 PM 
 
4. Enter the last four digits of your Social Security Number (xxxx) and Date of Birth (mm/dd/yyyy) as indicated and click Submit. You will be taken to the next sign-up page. 5. Create a cVidya ID. This will be your cVidya login ID and cannot be changed, so think of one that is secure and easy to remember. 6. Create a cVidya password. You can change your password at any time. 7. Enter your Password Reset Question and Answer. This can be used at a later time if you forget your password. 8. Enter your e-mail address. You will receive e-mail notification when new information is available in [address] 9. Click Sign Up. You can now view and download portions of your medical record. 10. Click the Download Summary menu link to download a portable copy of your medical information. If you have questions, please visit the Frequently Asked Questions section of the cVidya website. Remember, cVidya is NOT to be used for urgent needs. For medical emergencies, dial 911. Now available from your iPhone and Android! Please provide this summary of care documentation to your next provider. Your primary care clinician is listed as DEBORA Hamilton 21.  If you have any questions after today's visit, please call [phone].

## 2018-06-18 NOTE — PROGRESS NOTES
This note will not be viewable in [address] Yolanda Kaye is a 82 y.o. male and presents with Follow-up (6 month f/u)  . Subjective:  Mr. Jaye Sheppard returns to the office today in follow-up of multiple medical problems. The patient has type 2 diabetes mellitus and remains on Lantus and Humalog. He checks his blood sugars once daily with average fasting blood sugars running between 140 and 150. He states that he had a diabetic retinal eye examination done within the last month. He denies any numbness tingling or burning in his feet and is had no hypoglycemia. He is on levothyroxine for low thyroid. He takes this in the morning on an empty stomach with water. He denies heat or cold intolerance, changes in skin or hair, his weight has been stable. He is on a number of medications for his blood pressure including Lasix, metoprolol and losartan. He denies any dizziness, muscle cramping or lower extremity edema. He has had no headaches, numbness, tingling or focal neurological problems. He has hyperlipidemia currently soreness managed on lovastatin. He denies muscle soreness or GI upset. He does have known coronary artery disease and denies exertional chest pains or claudication. He is taking Megan Torito for this as well as well as Spironolactone for her chronic diastolic congestive heart failure. He denies PND, orthopnea or lower extremity edema. He has chronic atrial fibrillation for which she is on Coumadin anticoagulation for stroke prevention. He continues to have his pro times done on a monthly basis and denies any bleeding problems or strokelike symptoms. He has a history of gout and is on allopurinol and has had no gouty attacks within the last 6 months. Past Medical History:   Diagnosis Date    Acquired hypothyroidism 9/12/2017    Anemia 2/1/2015    Arrhythmia     a. fib.& a.  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    CAD (coronary artery disease)     Chronic atrial fibrillation (HCC) 2/1/2015    Chronic kidney disease     decreased kidney function    CKD (chronic kidney disease) stage 3, GFR 30-59 ml/min 2/1/2015    Diabetes ([facility]     Diverticulosis of large intestine without hemorrhage 10/24/2017    Gout 10/24/2017    Heart failure ([facility]     Hyperlipidemia 2/1/2015    Hypertension     Ill-defined condition     high cholesterol    Ill-defined condition     gout    Long term current use of amiodarone 10/24/2017    Long term current use of anticoagulant 10/24/2017    Long-term use of high-risk medication 10/24/2017    Macular degeneration 10/24/2017    Peripheral vascular disease ([facility] 10/24/2017    Renal artery stenosis ([facility] 10/24/2017    Right-sided extracranial carotid artery stenosis 10/24/2017    Sick sinus syndrome ([facility] 10/24/2017    Status post pacemaker     Past Surgical History:   Procedure Laterality Date    HX AAA REPAIR      HX APPENDECTOMY      HX CATARACT REMOVAL      / lens implant    HX CHOLECYSTECTOMY      HX ORTHOPAEDIC Right     rt unicondular knee replacement    HX ORTHOPAEDIC  1/26/15    LEFT UNICONDYLAR KNEE ARTHROPLASTY    HX PACEMAKER  7/11    pacemaker    HX TONSILLECTOMY      HX UROLOGICAL      rt renal stentx2     Allergies   Allergen Reactions    Latex Other (comments)     Skin raw and severely irritated    Aspirin Unknown (comments)    Hydrocodone Other (comments)     hallucinations    Oxycodone Other (comments)     hallucinations    Sulfa (Sulfonamide Antibiotics) Rash    Tetracycline Rash     Current Outpatient Prescriptions   Medication Sig Dispense Refill    levothyroxine (SYNTHROID) 175 mcg tablet Take 175 mcg by mouth.       LANTUS SOLOSTAR U-100 INSULIN 100 unit/mL (3 mL) inpn INJECT 60 UNITS UNDER THE SKIN DAILY 60 mL 3    BD INSULIN PEN NEEDLE UF SHORT 31 gauge x 5/16\" ndle use four times a day or as directed by prescriber 120 Pen Needle 4  spironolactone (ALDACTONE) 25 mg tablet Take 1 Tab by mouth daily. 90 Tab 3    TL SEMAJ RX 2.2-25-1 mg tab take 1 tablet by mouth once daily 90 Tab 2    furosemide (LASIX) 40 mg tablet take 2 tablets by mouth once daily (NOTE TO MD - PT WAITING ON MAILORDER) 60 Tab 0    lovastatin (MEVACOR) 40 mg tablet TAKE 2 TABLETS DAILY 180 Tab 3    metoprolol tartrate (LOPRESSOR) 100 mg IR tablet TAKE 1 TABLET TWICE A  Tab 2    warfarin (COUMADIN) 5 mg tablet TAKE 1 TABLET DAILY 90 Tab 2    isosorbide mononitrate ER (IMDUR) 60 mg CR tablet TAKE 1 TABLET DAILY 90 Tab 3    allopurinol (ZYLOPRIM) 300 mg tablet TAKE 1 TABLET DAILY 90 Tab 3    insulin lispro (HUMALOG) 100 unit/mL injection 5-10 Units by SubCUTAneous route three (3) times daily as needed.  Flaxseed Oil oil Take 1,000 mg by mouth daily.  terazosin (HYTRIN) 10 mg capsule Take 1 capsule by mouth nightly. Indications: BENIGN PROSTATIC HYPERTROPHY, HYPERTENSION      VIT A/VIT C/VIT E/ZINC/COPPER (OCUVITE PRESERVISION PO) Take 1 Tab by mouth two (2) times a day.  losartan (COZAAR) 50 mg tablet Take 50 mg by mouth daily. Indications: CHRONIC HEART FAILURE, HYPERTENSION      multivitamin, stress formula (STRESS TAB) tablet Take 1 Tab by mouth daily.  CYANOCOBALAMIN/FA/PYRIDOXINE (FOLGARD RX 2.2 PO) Take 1 tablet by mouth daily.  finasteride (PROSCAR) 5 mg tablet Take 5 mg by mouth every other day.  With dinner  Indications: BENIGN PROSTATIC HYPERTROPHY       Social History     Social History    Marital status:      Spouse name: N/A    Number of children: N/A    Years of education: N/A     Social History Main Topics    Smoking status: Former Smoker     Quit date: 7/14/1990    Smokeless tobacco: Never Used    Alcohol use 0.0 oz/week      Comment: 3-4 drinks a week    Drug use: No    Sexual activity: Not Asked     Other Topics Concern    None     Social History Narrative     Family History   Problem Relation Age of Onset    Cancer Mother     Diabetes Father        Health Maintenance   Topic Date Due    FOOT EXAM Q1  05/31/1945    ZOSTER VACCINE AGE 60>  03/31/1997    MEDICARE YEARLY EXAM  03/14/2018    HEMOGLOBIN A1C Q6M  06/07/2018    EYE EXAM RETINAL OR DILATED Q1  07/19/2018    Influenza Age 5 to Adult  08/01/2018    MICROALBUMIN Q1  12/07/2018    LIPID PANEL Q1  12/07/2018    GLAUCOMA SCREENING Q2Y  07/19/2019    DTaP/Tdap/Td series (2 - Td) 12/07/2027    Pneumococcal 65+ Low/Medium Risk  Completed        Review of Systems  Constitutional: negative for fevers, chills, anorexia and weight loss  Eyes:   negative for visual disturbance and irritation  ENT:   negative for tinnitus,sore throat,nasal congestion,ear pain,hoarseness  Respiratory:  negative for cough, hemoptysis, dyspnea,wheezing  CV:   negative for chest pain, palpitations, lower extremity edema  GI:   negative for nausea, vomiting, diarrhea, abdominal pain,melena  Endo:               negative for polyuria,polydipsia,polyphagia,heat intolerance  Genitourinary: negative for frequency, dysuria and hematuria  Integumentary: negative for rash and pruritus  Hematologic:  negative for easy bruising and gum/nose bleeding  Musculoskel: negative for myalgias, arthralgias, back pain, muscle weakness, joint pain  Neurological:  negative for headaches, dizziness, vertigo, memory problems and gait   Behavl/Psych: negative for feelings of anxiety, depression, mood changes  ROS otherwise negative      Objective:  Visit Vitals    /60 (BP 1 Location: Right arm, BP Patient Position: Sitting)    Pulse 69    Temp 97.9 °F (36.6 °C) (Oral)    Resp 16    Ht 5' 10\" (1.778 m)    Wt 254 lb (115.2 kg)    SpO2 91%    BMI 36.45 kg/m2     Body mass index is 36.45 kg/(m^2).     Physical Exam:   General appearance - alert, well appearing, and in no distress  Mental status - alert, oriented to person, place, and time  EYE-DIXIE, EOMI,conjunctiva normal bilaterally, lids normal  ENT-ENT exam normal, no neck nodes or sinus tenderness  Nose - normal and patent, no erythema,  Or discharge   Mouth - mucous membranes moist, pharynx normal without lesions  Neck - supple, no significant adenopathy or bruit  Chest - clear to auscultation, no wheezes, rales or rhonchi. Heart - normal rate, regular rhythm, normal S1, S2, no murmurs, rubs, clicks or gallops   Abdomen - soft, nontender, nondistended, no masses or organomegaly  Lymph- no adenopathy palpable  Ext-peripheral pulses normal, no pedal edema, no clubbing or cyanosis  Skin-Warm and dry.  no hyperpigmentation, vitiligo, or suspicious lesions  Neuro -alert, oriented, normal speech, no focal findings or movement disorder noted    Diabetic foot exam:     Left Foot:   Visual Exam: normal    Pulse DP: 1+ (weak)   Filament test: reduced sensation    Vibratory sensation: diminished      Right Foot:   Visual Exam: normal    Pulse DP: 1+ (weak)   Filament test: reduced sensation    Vibratory sensation: diminished      Assessment/Plan:  Diagnoses and all orders for this visit:    Type 2 diabetes mellitus without complication, with long-term current use of insulin (HCC)  -     AMB POC HEMOGLOBIN A1C  -     COLLECTION VENOUS BLOOD,VENIPUNCTURE  -     AMB POC URINE, MICROALBUMIN, SEMIQUANT (1 RESULT)    Essential hypertension  -     AMB POC COMPLETE CBC,AUTOMATED ENTER  -     AMB POC COMPREHENSIVE METABOLIC PANEL  -     AMB POC URINALYSIS DIP STICK AUTO W/ MICRO     Mixed hyperlipidemia  -     AMB POC LIPID PROFILE    Long-term use of high-risk medication  -     AMB POC CK (CPK)    Complete AV block (HCC)    Chronic atrial fibrillation (HCC)  -     AMB POC PT/INR    Long term current use of anticoagulant  -     AMB POC PT/INR    Coronary artery disease involving native heart without angina pectoris, unspecified vessel or lesion type    Acquired hypothyroidism  -     AMB POC T4, FREE  -     AMB POC TSH    Gout, unspecified cause, unspecified chronicity, unspecified site        Other instructions: The patient's medications are reviewed and reconciled. No change in his current medical regimen is made. Body mass index is 36.5 and dietary counseling along with printed patient education is given    Continue to check blood sugars once daily    Advanced care planning discussion occurred today    Await results of multiple labs    Follow-up in 6 months    Follow-up Disposition:  Return in about 6 months (around 12/18/2018). I have reviewed with the patient details of the assessment and plan and all questions were answered. Relevent patient education was performed. The most recent lab findings were reviewed with the patient. An After Visit Summary was printed and given to the patient.     Emelyn Greene MD

## 2018-06-18 NOTE — PROGRESS NOTES
Chief Complaint   Patient presents with    Follow-up     6 month f/u       1. Have you been to the ER, urgent care clinic since your last visit? Hospitalized since your last visit? no    2. Have you seen or consulted any other health care providers outside of the [facility] since your last visit? Yes Dr. Pal Alamo ENT new hearing aids. Dr. Ralph Chowdhury kidney doctor . Podiatrist feet ok. Include any pap smears or colon screening.  no

## 2018-06-20 LAB — BACTERIA UR CULT: ABNORMAL

## 2018-06-22 ENCOUNTER — TELEPHONE ANTICOAG (OUTPATIENT)
Dept: INTERNAL MEDICINE CLINIC | Age: 83
End: 2018-06-22

## 2018-06-22 DIAGNOSIS — I48.20 CHRONIC ATRIAL FIBRILLATION (HCC): ICD-10-CM

## 2018-06-22 NOTE — PROGRESS NOTES
Patient notified of lab results and Rx for Cipro phoned in to [facility] on [address]

## 2018-06-22 NOTE — PROGRESS NOTES
Fasting blood sugar was 225 however A1c acceptable at 7.1  INR was 1.8. Take extra 2.5 mg of Coumadin today and then resume usual regimen with pro time to be obtained in 1 month  Urine was infected. Rx Cipro 500 mg twice daily #20  No change in other medications otherwise.

## 2018-06-22 NOTE — PROGRESS NOTES
Anticoagulation Episode Summary     Current INR goal 1.8-3.0   Next INR check 7/23/2018   INR from last check 1.8 (6/18/2018)   Weekly max warfarin dose    Target end date    INR check location Clinic [facility]   Preferred lab    Send INR reminders to     Indications   Chronic atrial fibrillation [facility]) [I48.2]           Comments          Anticoagulation Care Providers     Provider Role Specialty Phone number    Anastasia Ariza MD Responsible Internal Medicine [phone]        Patient advised to take an extra 2.5 mg of coumadin today then resume usual dose and re check protime in 1 month

## 2018-07-06 ENCOUNTER — TELEPHONE (OUTPATIENT)
Dept: INTERNAL MEDICINE CLINIC | Age: 83
End: 2018-07-06

## 2018-08-08 ENCOUNTER — TELEPHONE ANTICOAG (OUTPATIENT)
Dept: INTERNAL MEDICINE CLINIC | Age: 83
End: 2018-08-08

## 2018-08-08 ENCOUNTER — LAB ONLY (OUTPATIENT)
Dept: INTERNAL MEDICINE CLINIC | Age: 83
End: 2018-08-08

## 2018-08-08 DIAGNOSIS — I48.20 CHRONIC ATRIAL FIBRILLATION (HCC): Primary | Chronic | ICD-10-CM

## 2018-08-08 DIAGNOSIS — I48.20 CHRONIC ATRIAL FIBRILLATION (HCC): ICD-10-CM

## 2018-08-08 LAB
INR BLD: 2.1
PT POC: 27.6 SECONDS
VALID INTERNAL CONTROL?: YES

## 2018-08-08 NOTE — PROGRESS NOTES
Anticoagulation Episode Summary     Current INR goal 1.8-3.0   Next INR check 9/7/2018   INR from last check 2.1 (8/8/2018)   Weekly max warfarin dose    Target end date    INR check location Clinic [facility]   Preferred lab    Send INR reminders to     Indications   Chronic atrial fibrillation [facility]) [I48.2]           Comments          Anticoagulation Care Providers     Provider Role Specialty Phone number    José Miguel Chacon MD Responsible Internal Medicine [phone]        Patient advised no change in coumadin and will need protime re checked in 1 month

## 2018-08-10 ENCOUNTER — HOSPITAL ENCOUNTER (OUTPATIENT)
Dept: CT IMAGING | Age: 83
Discharge: HOME | End: 2018-08-10
Attending: OTOLARYNGOLOGY
Payer: MEDICARE

## 2018-08-10 PROCEDURE — 70480 CT ORBIT/EAR/FOSSA W/O DYE: CPT

## 2018-08-13 ENCOUNTER — TELEPHONE (OUTPATIENT)
Dept: INTERNAL MEDICINE CLINIC | Age: 83
End: 2018-08-13

## 2018-08-13 NOTE — TELEPHONE ENCOUNTER
Patient is calling back, he states that he prefers the warmer [facility] the [facility] and himself and his wife go for the water aerobics classes for their back pain-it really helps relieve the pain when it comes along.      Best call back # for patient: [phone]  *patient is requesting to have the order printed and put up front for him to pickup

## 2018-08-13 NOTE — TELEPHONE ENCOUNTER
Patient is calling, he is requesting to have a prescription for him to go to a therapeutic pool. He states that the place he is currently going to is closing and he would like to join the pool at [facility].     Best call back # for patient: [phone]

## 2018-08-16 RX ORDER — ALLOPURINOL 300 MG/1
TABLET ORAL
Qty: 90 TAB | Refills: 3 | Status: SHIPPED | OUTPATIENT
Start: 2018-08-16 | End: 2019-09-09 | Stop reason: SDUPTHER

## 2018-08-16 RX ORDER — WARFARIN SODIUM 5 MG/1
TABLET ORAL
Qty: 90 TAB | Refills: 2 | Status: SHIPPED | OUTPATIENT
Start: 2018-08-16 | End: 2019-11-20 | Stop reason: SDUPTHER

## 2018-08-16 RX ORDER — METOPROLOL TARTRATE 100 MG/1
TABLET ORAL
Qty: 180 TAB | Refills: 2 | Status: SHIPPED | OUTPATIENT
Start: 2018-08-16 | End: 2019-05-20 | Stop reason: SDUPTHER

## 2018-09-17 RX ORDER — FUROSEMIDE 40 MG/1
TABLET ORAL
Qty: 180 TAB | Refills: 3 | Status: SHIPPED | OUTPATIENT
Start: 2018-09-17 | End: 2019-09-09 | Stop reason: SDUPTHER

## 2018-09-17 RX ORDER — ISOSORBIDE MONONITRATE 60 MG/1
TABLET, EXTENDED RELEASE ORAL
Qty: 90 TAB | Refills: 3 | Status: SHIPPED | OUTPATIENT
Start: 2018-09-17 | End: 2019-12-29

## 2018-10-04 ENCOUNTER — TELEPHONE ANTICOAG (OUTPATIENT)
Dept: INTERNAL MEDICINE CLINIC | Age: 83
End: 2018-10-04

## 2018-10-04 ENCOUNTER — LAB ONLY (OUTPATIENT)
Dept: INTERNAL MEDICINE CLINIC | Age: 83
End: 2018-10-04

## 2018-10-04 DIAGNOSIS — I48.20 CHRONIC ATRIAL FIBRILLATION (HCC): Primary | Chronic | ICD-10-CM

## 2018-10-04 DIAGNOSIS — I48.20 CHRONIC ATRIAL FIBRILLATION (HCC): ICD-10-CM

## 2018-10-04 LAB
INR BLD: 1.5
PT POC: 20 SECONDS
VALID INTERNAL CONTROL?: YES

## 2018-10-04 NOTE — PROGRESS NOTES
Anticoagulation Summary as of 10/4/2018     INR goal 1.8-3.0   Today's INR 1.5!    Warfarin maintenance plan 5 mg (5 mg x 1) every day   Weekly warfarin total 35 mg   Plan last modified [facility], LPN (1/29/1282)   Next INR check 11/5/2018   Target end date     Indications   Chronic atrial fibrillation ([address] [I48.2]         Anticoagulation Episode Summary     INR check location Clinic Lab    Preferred lab     Send INR reminders to     Comments       Anticoagulation Care Providers     Provider Role Specialty Phone number    Derian Oleary MD Responsible Internal Medicine [phone]        INR is 1.5.  Take extra 2.5 mg of Coumadin today and then resume usual regimen.  Recheck pro time 1 month

## 2018-12-03 ENCOUNTER — OFFICE VISIT (OUTPATIENT)
Dept: INTERNAL MEDICINE CLINIC | Age: 83
End: 2018-12-03

## 2018-12-03 VITALS
HEIGHT: 70 IN | SYSTOLIC BLOOD PRESSURE: 122 MMHG | DIASTOLIC BLOOD PRESSURE: 82 MMHG | BODY MASS INDEX: 36.54 KG/M2 | WEIGHT: 255.2 LBS | TEMPERATURE: 98.2 F

## 2018-12-03 DIAGNOSIS — J01.00 ACUTE MAXILLARY SINUSITIS, RECURRENCE NOT SPECIFIED: Primary | ICD-10-CM

## 2018-12-03 DIAGNOSIS — Z79.01 LONG TERM CURRENT USE OF ANTICOAGULANT: Chronic | ICD-10-CM

## 2018-12-03 RX ORDER — CEFUROXIME AXETIL 250 MG/1
250 TABLET ORAL 2 TIMES DAILY
Qty: 20 TAB | Refills: 0 | Status: SHIPPED | OUTPATIENT
Start: 2018-12-03 | End: 2018-12-18 | Stop reason: ALTCHOICE

## 2018-12-03 NOTE — PROGRESS NOTES
This note will not be viewable in [address] Tonny Waldron is a 82 y.o. male and presents with Cough [facility] Subjective: 
Mr. Corinne Giovanni presents the office today with over a week's worth of an upper respiratory infection with the development of sinus congestion maxillary discomfort purulent sinus drainage which is causing a slightly congested cough. He is been taking an over-the-counter cough expectorant with only minimal relief. He had a low-grade fever but no shaking chills and denies any neck stiffness or rash. There is been no wheezing or shortness of breath. Patient Active Problem List  
Diagnosis Code  Pacemaker battery depletion Z45.010  
 Complete AV block (HCC) I44.2  DJD (degenerative joint disease) of knee M17.10  Knee arthropathy M17.10  Degenerative joint disease M19.90  
 Acute on chronic systolic and diastolic heart failure, NYHA class 3 ([facility]) I50.43  Chronic atrial fibrillation ([facility]) I48.2  CAD (coronary artery disease) I25.10  Hypertension I10  
 Hyperlipidemia E78.5  Anemia D64.9  
 S/P placement of cardiac pacemaker Z95.0  CKD (chronic kidney disease) stage 3, GFR 30-59 ml/min ([facility]) N18.3  
 S/P knee replacement Z96.659  Acute systolic congestive heart failure, NYHA class 4 ([facility]) I50.21  Chronic diastolic congestive heart failure (HCC) I50.32  
 Acquired hypothyroidism E03.9  BPH with obstruction/lower urinary tract symptoms N40.1, N13.8  Right-sided extracranial carotid artery stenosis I65.21  
 Diabetes ([facility] E11.9  Diverticulosis of large intestine without hemorrhage K57.30  Gout M10.9  Long term current use of anticoagulant Z79.01  
 Long term current use of amiodarone Z79.899  Long-term use of high-risk medication Z79.899  Macular degeneration H35.30  Peripheral vascular disease (HCC) I73.9  Renal artery stenosis (HCC) I70.1  Sick sinus syndrome (HCC) I49.5 Past Surgical History:  
Procedure Laterality Date  HX AAA REPAIR    
 HX APPENDECTOMY  HX CATARACT REMOVAL    
 / lens implant  HX CHOLECYSTECTOMY  HX ORTHOPAEDIC Right   
 rt unicondular knee replacement  HX ORTHOPAEDIC  1/26/15 LEFT UNICONDYLAR KNEE ARTHROPLASTY  HX PACEMAKER  7/11  
 pacemaker  HX TONSILLECTOMY  HX UROLOGICAL    
 rt renal stentx2 Allergies Allergen Reactions  Latex Other (comments) Skin raw and severely irritated  Aspirin Unknown (comments)  Hydrocodone Other (comments)  
  hallucinations  Oxycodone Other (comments)  
  hallucinations  Sulfa (Sulfonamide Antibiotics) Rash  Tetracycline Rash Current Outpatient Medications Medication Sig Dispense Refill  cefUROXime (CEFTIN) 250 mg tablet Take 1 Tab by mouth two (2) times a day. 20 Tab 0  
 levothyroxine (SYNTHROID) 175 mcg tablet take 1 tablet by mouth once daily BEFORE BREAKFAST 30 Tab 12  
 isosorbide mononitrate ER (IMDUR) 60 mg CR tablet TAKE 1 TABLET DAILY 90 Tab 3  furosemide (LASIX) 40 mg tablet TAKE 2 TABLETS DAILY 180 Tab 3  warfarin (COUMADIN) 5 mg tablet TAKE 1 TABLET DAILY 90 Tab 2  
 allopurinol (ZYLOPRIM) 300 mg tablet TAKE 1 TABLET DAILY 90 Tab 3  
 metoprolol tartrate (LOPRESSOR) 100 mg IR tablet TAKE 1 TABLET TWICE A  Tab 2  
 levothyroxine (SYNTHROID) 175 mcg tablet Take 175 mcg by mouth.  LANTUS SOLOSTAR U-100 INSULIN 100 unit/mL (3 mL) inpn INJECT 60 UNITS UNDER THE SKIN DAILY 60 mL 3  
 BD INSULIN PEN NEEDLE UF SHORT 31 gauge x 5/16\" ndle use four times a day or as directed by prescriber 120 Pen Needle 4  
 spironolactone (ALDACTONE) 25 mg tablet Take 1 Tab by mouth daily. 90 Tab 3  TL SEMAJ RX 2.2-25-1 mg tab take 1 tablet by mouth once daily 90 Tab 2  
 lovastatin (MEVACOR) 40 mg tablet TAKE 2 TABLETS DAILY 180 Tab 3  
 insulin lispro (HUMALOG) 100 unit/mL injection 5-10 Units by SubCUTAneous route three (3) times daily as needed.  Flaxseed Oil oil Take 1,000 mg by mouth daily.  terazosin (HYTRIN) 10 mg capsule Take 1 capsule by mouth nightly. Indications: BENIGN PROSTATIC HYPERTROPHY, HYPERTENSION    
 VIT A/VIT C/VIT E/ZINC/COPPER (OCUVITE PRESERVISION PO) Take 1 Tab by mouth two (2) times a day.  losartan (COZAAR) 50 mg tablet Take 50 mg by mouth daily. Indications: CHRONIC HEART FAILURE, HYPERTENSION  multivitamin, stress formula (STRESS TAB) tablet Take 1 Tab by mouth daily.  CYANOCOBALAMIN/FA/PYRIDOXINE (FOLGARD RX 2.2 PO) Take 1 tablet by mouth daily.  finasteride (PROSCAR) 5 mg tablet Take 5 mg by mouth every other day. With dinner  Indications: BENIGN PROSTATIC HYPERTROPHY Social History Socioeconomic History  Marital status:  Spouse name: Not on file  Number of children: Not on file  Years of education: Not on file  Highest education level: Not on file Tobacco Use  Smoking status: Former Smoker Last attempt to quit: 1990 Years since quittin.4  Smokeless tobacco: Never Used Substance and Sexual Activity  Alcohol use: Yes Alcohol/week: 0.0 oz  
  Comment: 3-4 drinks a week  Drug use: No  
 
Family History Problem Relation Age of Onset  Cancer Mother  Diabetes Father Review of Systems Constitutional: negative for fevers, chills, anorexia and weight loss Eyes:   negative for visual disturbance and irritation ENT:   Positive for sinus congestion, maxillary discomfort, purulent psotnasal draingage and throat irritation Respiratory:  negative for cough, hemoptysis, dyspnea,wheezing CV:   negative for chest pain, palpitations, lower extremity edema GI:   negative for nausea, vomiting, diarrhea, abdominal pain,melena Integumentary: negative for rash and pruritus Neurological:  negative for headaches, dizziness, vertigo, memory problems and gait Objective: 
Visit Vitals /82 (BP 1 Location: Right arm, BP Patient Position: Sitting) Temp 98.2 °F (36.8 °C) (Oral) Ht 5' 10\" (1.778 m) Wt 255 lb 3.2 oz (115.8 kg) BMI 36.62 kg/m² Body mass index is 36.62 kg/m². Physical Exam:  
General appearance - alert, well appearing, and in no distress Mental status - alert, oriented to person, place, and time EYE-DIXIE, EOMI, sclera clear. No lid swelling or purulent drainage ENT- TM's clear without A/F level. Pharynx slightly erythematous with drainage noted Nose - normal and patent, no erythema, Neck - supple, no significant adenopathy Chest - clear to auscultation, no wheezes, rales or rhonchi, symmetric air entry Heart - normal rate, regular rhythm, normal S1, S2, no murmurs, rubs, clicks or gallops Skin-No rash appreciated Neuro -alert, oriented, normal speech, no focal findings. Assessment/Plan: 
Diagnoses and all orders for this visit: 
 
Acute maxillary sinusitis, recurrence not specified 
-     cefUROXime (CEFTIN) 250 mg tablet; Take 1 Tab by mouth two (2) times a day., Normal, Disp-20 Tab, R-0 Long term current use of anticoagulant 
-     PROTHROMBIN TIME + INR 
-     COLLECTION VENOUS BLOOD,VENIPUNCTURE Other Instructions: Mucinex as directed Increase po fluids Follow-up Disposition: 
Return if symptoms worsen or fail to improve. I have reviewed with the patient details of the assessment and plan and all questions were answered. Relevent patient education was performed. An After Visit Summary was printed and given to the patient.  
 
Cait Elias MD

## 2018-12-03 NOTE — PROGRESS NOTES
Sisi Ashby is a 82 y.o. male presenting for Cough [place] 1. Have you been to the ER, urgent care clinic since your last visit? Hospitalized since your last visit? No 
 
2. Have you seen or consulted any other health care providers outside of the [address] [place] [address] since your last visit? Include any pap smears or colon screening. No 
 
Fall Risk Assessment, last 12 mths 6/18/2018 Able to walk? Yes Fall in past 12 months? No  
 
 
 
Abuse Screening Questionnaire 12/7/2017 Do you ever feel afraid of your partner? Eran Benito Are you in a relationship with someone who physically or mentally threatens you? Eran Benito Is it safe for you to go home? Y  
 
 
PHQ over the last two weeks 6/18/2018 Little interest or pleasure in doing things Not at all Feeling down, depressed, irritable, or hopeless Not at all Total Score PHQ 2 0 There are no discontinued medications.

## 2018-12-03 NOTE — PATIENT INSTRUCTIONS
Sinusitis: Care Instructions Your Care Instructions Sinusitis is an infection of the lining of the sinus cavities in your head. Sinusitis often follows a cold. It causes pain and pressure in your head and face. In most cases, sinusitis gets better on its own in 1 to 2 weeks. But some mild symptoms may last for several weeks. Sometimes antibiotics are needed. Follow-up care is a key part of your treatment and safety. Be sure to make and go to all appointments, and call your doctor if you are having problems. It's also a good idea to know your test results and keep a list of the medicines you take. How can you care for yourself at home? · Take an over-the-counter pain medicine, such as acetaminophen (Tylenol), ibuprofen (Advil, Motrin), or naproxen (Aleve). Read and follow all instructions on the label. · If the doctor prescribed antibiotics, take them as directed. Do not stop taking them just because you feel better. You need to take the full course of antibiotics. · Be careful when taking over-the-counter cold or flu medicines and Tylenol at the same time. Many of these medicines have acetaminophen, which is Tylenol. Read the labels to make sure that you are not taking more than the recommended dose. Too much acetaminophen (Tylenol) can be harmful. · Breathe warm, moist air from a steamy shower, a hot bath, or a sink filled with hot water. Avoid cold, dry air. Using a humidifier in your home may help. Follow the directions for cleaning the machine. · Use saline (saltwater) nasal washes to help keep your nasal passages open and wash out mucus and bacteria. You can buy saline nose drops at a grocery store or drugstore. Or you can make your own at home by adding 1 teaspoon of salt and 1 teaspoon of baking soda to 2 cups of distilled water. If you make your own, fill a bulb syringe with the solution, insert the tip into your nostril, and squeeze gently. Jatinder montero. · Put a hot, wet towel or a warm gel pack on your face 3 or 4 times a day for 5 to 10 minutes each time. · Try a decongestant nasal spray like oxymetazoline (Afrin). Do not use it for more than 3 days in a row. Using it for more than 3 days can make your congestion worse. When should you call for help? Call your doctor now or seek immediate medical care if: 
  · You have new or worse swelling or redness in your face or around your eyes.  
  · You have a new or higher fever.  
 Watch closely for changes in your health, and be sure to contact your doctor if: 
  · You have new or worse facial pain.  
  · The mucus from your nose becomes thicker (like pus) or has new blood in it.  
  · You are not getting better as expected. Where can you learn more? Go to http://rufina-juni.info/. Enter S372 in the search box to learn more about \"Sinusitis: Care Instructions. \" Current as of: March 28, 2018 Content Version: 11.8 © 7049-5309 [employer]. Care instructions adapted under license by [employer] (which disclaims liability or warranty for this information). If you have questions about a medical condition or this instruction, always ask your healthcare professional. [address] any warranty or liability for your use of this information.

## 2018-12-04 ENCOUNTER — TELEPHONE ANTICOAG (OUTPATIENT)
Dept: INTERNAL MEDICINE CLINIC | Age: 83
End: 2018-12-04

## 2018-12-04 DIAGNOSIS — I48.20 CHRONIC ATRIAL FIBRILLATION (HCC): ICD-10-CM

## 2018-12-04 LAB
INR PPP: 2.4 (ref 0.8–1.2)
PROTHROMBIN TIME: 24 SEC (ref 9.1–12)

## 2018-12-04 NOTE — PROGRESS NOTES
Anticoagulation Summary  As of 2018    INR goal:   1.8-3.0   TTR:   70.8 % (1.3 y)   INR used for dosin.4 (12/3/2018)   Warfarin maintenance plan:   5 mg (5 mg x 1) every day   Weekly warfarin total:   35 mg   No change documented:   Dori Alanis   Plan last modified:   Mohsen Pascual LPN ()   Next INR check:   1/3/2019   Target end date:       Indications    Chronic atrial fibrillation ([facility] [address] [I48.2]             Anticoagulation Episode Summary     INR check location:   Clinic Lab    Preferred lab:       Send INR reminders to:       Comments:         Anticoagulation Care Providers     Provider Role Specialty Phone number    Rolly Carter MD Responsible Internal Medicine [phone]      INR is 2.4.  No change in Coumadin.  Recheck pro time 1 month

## 2018-12-18 ENCOUNTER — OFFICE VISIT (OUTPATIENT)
Dept: INTERNAL MEDICINE CLINIC | Age: 83
End: 2018-12-18

## 2018-12-18 VITALS
HEART RATE: 60 BPM | DIASTOLIC BLOOD PRESSURE: 70 MMHG | HEIGHT: 70 IN | SYSTOLIC BLOOD PRESSURE: 120 MMHG | BODY MASS INDEX: 36.05 KG/M2 | WEIGHT: 251.8 LBS

## 2018-12-18 DIAGNOSIS — Z00.00 INITIAL MEDICARE ANNUAL WELLNESS VISIT: Primary | ICD-10-CM

## 2018-12-18 DIAGNOSIS — M10.9 GOUT, UNSPECIFIED CAUSE, UNSPECIFIED CHRONICITY, UNSPECIFIED SITE: Chronic | ICD-10-CM

## 2018-12-18 DIAGNOSIS — I10 ESSENTIAL HYPERTENSION: Chronic | ICD-10-CM

## 2018-12-18 DIAGNOSIS — E03.9 ACQUIRED HYPOTHYROIDISM: Chronic | ICD-10-CM

## 2018-12-18 DIAGNOSIS — I48.20 CHRONIC ATRIAL FIBRILLATION (HCC): Chronic | ICD-10-CM

## 2018-12-18 DIAGNOSIS — N40.1 BPH WITH OBSTRUCTION/LOWER URINARY TRACT SYMPTOMS: Chronic | ICD-10-CM

## 2018-12-18 DIAGNOSIS — Z79.899 LONG-TERM USE OF HIGH-RISK MEDICATION: Chronic | ICD-10-CM

## 2018-12-18 DIAGNOSIS — Z79.01 LONG TERM CURRENT USE OF ANTICOAGULANT: Chronic | ICD-10-CM

## 2018-12-18 DIAGNOSIS — E78.2 MIXED HYPERLIPIDEMIA: Chronic | ICD-10-CM

## 2018-12-18 DIAGNOSIS — I25.10 CORONARY ARTERY DISEASE INVOLVING NATIVE HEART WITHOUT ANGINA PECTORIS, UNSPECIFIED VESSEL OR LESION TYPE: Chronic | ICD-10-CM

## 2018-12-18 DIAGNOSIS — E11.9 TYPE 2 DIABETES MELLITUS WITHOUT COMPLICATION, WITH LONG-TERM CURRENT USE OF INSULIN (HCC): Chronic | ICD-10-CM

## 2018-12-18 DIAGNOSIS — I50.32 CHRONIC DIASTOLIC CONGESTIVE HEART FAILURE (HCC): ICD-10-CM

## 2018-12-18 DIAGNOSIS — N13.8 BPH WITH OBSTRUCTION/LOWER URINARY TRACT SYMPTOMS: Chronic | ICD-10-CM

## 2018-12-18 DIAGNOSIS — Z79.4 TYPE 2 DIABETES MELLITUS WITHOUT COMPLICATION, WITH LONG-TERM CURRENT USE OF INSULIN (HCC): Chronic | ICD-10-CM

## 2018-12-18 LAB
BACTERIA UA POCT, BACTPOCT: NORMAL
BILIRUB UR QL STRIP: NEGATIVE
CASTS UA POCT: NORMAL
CLUE CELLS, CLUEPOCT: NEGATIVE
CRYSTALS UA POCT, CRYSPOCT: NEGATIVE
EPITHELIAL CELLS POCT: NORMAL
GLUCOSE UR-MCNC: NEGATIVE MG/DL
GRAN# POC: 5.9 K/UL (ref 2–7.8)
GRAN% POC: 73.1 % (ref 37–92)
HCT VFR BLD CALC: 39.1 % (ref 37–51)
HGB BLD-MCNC: 12.8 G/DL (ref 12–18)
KETONES P FAST UR STRIP-MCNC: NEGATIVE MG/DL
LY# POC: 1.4 K/UL (ref 0.6–4.1)
LY% POC: 19.2 % (ref 10–58.5)
MCH RBC QN: 32.1 PG (ref 26–32)
MCHC RBC-ENTMCNC: 32.7 G/DL (ref 30–36)
MCV RBC: 98 FL (ref 80–97)
MICROALBUMIN UR TEST STR-MCNC: NEGATIVE MG/L (ref 0–20)
MID #, POC: 0.5 K/UL (ref 0–1.8)
MID% POC: 7.7 % (ref 0.1–24)
MUCUS UA POCT, MUCPOCT: NORMAL
PH UR STRIP: 5 [PH] (ref 5–7)
PLATELET # BLD: 165 K/UL (ref 140–440)
PROT UR QL STRIP: NEGATIVE
RBC # BLD: 3.98 M/UL (ref 4.2–6.3)
RBC UA POCT, RBCPOCT: NORMAL
SP GR UR STRIP: 1.02 (ref 1.01–1.02)
TRICH UA POCT, TRICHPOC: NEGATIVE
UA UROBILINOGEN AMB POC: NORMAL (ref 0.2–1)
URINALYSIS CLARITY POC: CLEAR
URINALYSIS COLOR POC: YELLOW
URINE BLOOD POC: NEGATIVE
URINE CULT COMMENT, POCT: NORMAL
URINE LEUKOCYTES POC: NEGATIVE
URINE NITRITES POC: NEGATIVE
WBC # BLD: 7.8 K/UL (ref 4.1–10.9)
WBC UA POCT, WBCPOCT: 0
YEAST UA POCT, YEASTPOC: NEGATIVE

## 2018-12-18 NOTE — PROGRESS NOTES
This note will not be viewable in [address] Payton Jesus is a 82 y.o. male who presents for an Initial Medicare Annual Wellness Exam (AWV) and follow up of chronic medical conditions. The patient has not had a Medicare wellness exam done within the last year. I have reviewed the patient's medical history in detail and updated the computerized patient record. History     Subjective:  Mr. Be Low presents to the office today for Medicare wellness check and follow-up of multiple medical problems. The patient has type 2 diabetes mellitus for which he currently is on Lantus 60-70 units daily. In addition he does have Humalog available but has not had to take any of this in some time. The patient checks his blood sugars twice daily. Average fasting blood sugar is been 140. The patient has frequent eye examinations with Dr. Clive Montero. He denies burning paresthesias of his feet. There is been no hypoglycemia. His blood pressure is currently managed on metoprolol, Lasix, spironolactone. He tolerates this without dizziness, muscle cramping, lower extremity edema, fatigue or palpitations. He has had no headaches, numbness, tingling or focal neurological problems. He has hypercholesterolemia and is currently on statin therapy along with flaxseed oil. He denies muscle soreness or GI upset. He does have a history of coronary artery disease but denies exertional chest pain or claudication. He does remain on M Sahil for his coronary artery disease. The patient also has other vascular problems including peripheral vascular disease, renal artery stenosis for which she is followed by specialist.  The patient has chronic diastolic congestive heart failure, chronic atrial fibrillation for which she is on Coumadin for stroke prevention is also had a pacemaker placed. He has had no strokelike symptoms. Patient has hypothyroidism currently on thyroid replacement.   He continues to take his medication on an empty stomach first thing in the morning with water. He denies heat or cold intolerance, changes in skin or hair, his weight is been stable. He has BPH and is followed by Dr. Judge Moran and remains on Proscar and Hytrin. He denies urinary flow problems. He notes that his PSA levels have been running low. There is a history of gout but he has been on allopurinol for a number of years and has had no gout attacks on this medication and no side effects related to the allopurinol use.     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    DJD (degenerative joint disease) of knee M17.10    Knee arthropathy M17.10    Degenerative joint disease M19.90    Acute on chronic systolic and diastolic heart failure, NYHA class 3 ([facility]) I50.43    Chronic atrial fibrillation ([facility]) I48.2    CAD (coronary artery disease) I25.10    Hypertension I10    Hyperlipidemia E78.5    Anemia D64.9    S/P placement of cardiac pacemaker Z95.0    CKD (chronic kidney disease) stage 3, GFR 30-59 ml/min ([facility]) N18.3    S/P knee replacement A85.830    Acute systolic congestive heart failure, NYHA class 4 ([facility]) I50.21    Chronic diastolic congestive heart failure ([facility]) I50.32    Acquired hypothyroidism E03.9    BPH with obstruction/lower urinary tract symptoms N40.1, N13.8    Right-sided extracranial carotid artery stenosis I65.21    Diabetes ([facility]) E11.9    Diverticulosis of large intestine without hemorrhage K57.30    Gout M10.9    Long term current use of anticoagulant Z79.01    Long term current use of amiodarone Z79.899    Long-term use of high-risk medication Z79.899    Macular degeneration H35.30    Peripheral vascular disease ([facility]) I73.9    Renal artery stenosis ([facility]) I70.1    Sick sinus syndrome ([facility]) I49.5       Patient Care Team:  Ludwig Del Cid MD as PCP - General (Internal Medicine)  Danni Ballesteros MD (Otolaryngology)  Artur Angeli Mensah MD (Optometry)  Rosana Rodriguez MD (Cardiology)  Michelle Marquez MD (Urology)  Kali Lara DPM (Podiatry)  Suzy Garcia MD (Nephrology)    Past Medical History:   Diagnosis Date    Acquired hypothyroidism 9/12/2017    Anemia 2/1/2015    Arrhythmia     a. fib.& a.  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    CAD (coronary artery disease)     Chronic atrial fibrillation (HCC) 2/1/2015    Chronic kidney disease     decreased kidney function    CKD (chronic kidney disease) stage 3, GFR 30-59 ml/min ([address] 2/1/2015    Diabetes ([address]     Diverticulosis of large intestine without hemorrhage 10/24/2017    Gout 10/24/2017    Heart failure ([address]     Hyperlipidemia 2/1/2015    Hypertension     Ill-defined condition     high cholesterol    Ill-defined condition     gout    Long term current use of amiodarone 10/24/2017    Long term current use of anticoagulant 10/24/2017    Long-term use of high-risk medication 10/24/2017    Macular degeneration 10/24/2017    Peripheral vascular disease ([address] 10/24/2017    Renal artery stenosis ([address] 10/24/2017    Right-sided extracranial carotid artery stenosis 10/24/2017    Sick sinus syndrome ([address] 10/24/2017    Status post pacemaker     Past Surgical History:   Procedure Laterality Date    HX AAA REPAIR      HX APPENDECTOMY      HX CATARACT REMOVAL      / lens implant    HX CHOLECYSTECTOMY      HX ORTHOPAEDIC Right     rt unicondular knee replacement    HX ORTHOPAEDIC  1/26/15    LEFT UNICONDYLAR KNEE ARTHROPLASTY    HX PACEMAKER  7/11    pacemaker    HX TONSILLECTOMY      HX UROLOGICAL      rt renal stentx2     Allergies   Allergen Reactions    Latex Other (comments)     Skin raw and severely irritated    Aspirin Unknown (comments)    Hydrocodone Other (comments)     hallucinations    Oxycodone Other (comments)     hallucinations    Sulfa (Sulfonamide Antibiotics) Rash    Tetracycline Rash     Current Outpatient Medications   Medication Sig Dispense Refill    levothyroxine (SYNTHROID) 175 mcg tablet take 1 tablet by mouth once daily BEFORE BREAKFAST 30 Tab 12    isosorbide mononitrate ER (IMDUR) 60 mg CR tablet TAKE 1 TABLET DAILY 90 Tab 3    furosemide (LASIX) 40 mg tablet TAKE 2 TABLETS DAILY 180 Tab 3    warfarin (COUMADIN) 5 mg tablet TAKE 1 TABLET DAILY 90 Tab 2    allopurinol (ZYLOPRIM) 300 mg tablet TAKE 1 TABLET DAILY 90 Tab 3    metoprolol tartrate (LOPRESSOR) 100 mg IR tablet TAKE 1 TABLET TWICE A  Tab 2    LANTUS SOLOSTAR U-100 INSULIN 100 unit/mL (3 mL) inpn INJECT 60 UNITS UNDER THE SKIN DAILY 60 mL 3    BD INSULIN PEN NEEDLE UF SHORT 31 gauge x 5/16\" ndle use four times a day or as directed by prescriber 120 Pen Needle 4    spironolactone (ALDACTONE) 25 mg tablet Take 1 Tab by mouth daily. 90 Tab 3    TL SEMAJ RX 2.2-25-1 mg tab take 1 tablet by mouth once daily 90 Tab 2    lovastatin (MEVACOR) 40 mg tablet TAKE 2 TABLETS DAILY 180 Tab 3    insulin lispro (HUMALOG) 100 unit/mL injection 5-10 Units by SubCUTAneous route three (3) times daily as needed.  Flaxseed Oil oil Take 1,000 mg by mouth daily.  terazosin (HYTRIN) 10 mg capsule Take 1 capsule by mouth nightly. Indications: BENIGN PROSTATIC HYPERTROPHY, HYPERTENSION      VIT A/VIT C/VIT E/ZINC/COPPER (OCUVITE PRESERVISION PO) Take 1 Tab by mouth two (2) times a day.  losartan (COZAAR) 50 mg tablet Take 50 mg by mouth daily. Indications: CHRONIC HEART FAILURE, HYPERTENSION      multivitamin, stress formula (STRESS TAB) tablet Take 1 Tab by mouth daily.  CYANOCOBALAMIN/FA/PYRIDOXINE (FOLGARD RX 2.2 PO) Take 1 tablet by mouth daily.  finasteride (PROSCAR) 5 mg tablet Take 5 mg by mouth every other day.  With dinner  Indications: BENIGN PROSTATIC HYPERTROPHY       Social History     Socioeconomic History    Marital status:      Spouse name: Not on file    Number of children: Not on file    Years of education: Not on file    Highest education level: Not on file   Tobacco Use    Smoking status: Former Smoker     Last attempt to quit: 1990     Years since quittin.4    Smokeless tobacco: Never Used   Substance and Sexual Activity    Alcohol use:  Yes     Alcohol/week: 0.0 oz     Comment: 3-4 drinks a week    Drug use: No     Family History   Problem Relation Age of Onset    Cancer Mother     Diabetes Father      Health Maintenance   Topic Date Due    MEDICARE YEARLY EXAM  2018    Shingrix Vaccine Age 50> (2 of 2) 10/11/2018    HEMOGLOBIN A1C Q6M  2018    FOOT EXAM Q1  2019    MICROALBUMIN Q1  2019    LIPID PANEL Q1  2019    GLAUCOMA SCREENING Q2Y  2020    EYE EXAM RETINAL OR DILATED  2020    DTaP/Tdap/Td series (2 - Td) 2027    Pneumococcal 65+ Low/Medium Risk  Completed    Influenza Age 5 to Adult  Completed          Review of Systems  Constitutional: negative for fevers, chills, anorexia and weight loss  Eyes:   negative for visual disturbance and irritation  ENT:   negative for tinnitus,sore throat,nasal congestion,ear pain,hoarseness  Respiratory:  negative for cough, hemoptysis, dyspnea,wheezing  CV:   negative for chest pain, palpitations, lower extremity edema  GI:   negative for nausea, vomiting, diarrhea, abdominal pain,melena  Endo:               negative for polyuria,polydipsia,polyphagia,heat intolerance  Genitourinary: negative for frequency, dysuria and hematuria  Integumentary: negative for rash and pruritus  Hematologic:  negative for easy bruising and gum/nose bleeding  Musculoskel: negative for myalgias, arthralgias, back pain, muscle weakness, joint pain  Neurological:  negative for headaches, dizziness, vertigo, memory problems and gait   Behavl/Psych: negative for feelings of anxiety, depression, mood changes  ROS otherwise negative      Depression Risk Factor Screening:     PHQ over the last two weeks 6/18/2018   Little interest or pleasure in doing things Not at all   Feeling down, depressed, irritable, or hopeless Not at all   Total Score PHQ 2 0       Alcohol Risk Factor Screening: You average less than 14 drinks a week. Functional Ability and Level of Safety:   Hearing Loss  The patient wears hearing aids. Activities of Daily Living  ADL Assessment 12/18/2018   Feeding yourself No Help Needed   Getting from bed to chair No Help Needed   Getting dressed No Help Needed   Bathing or showering No Help Needed   Walk across the room (includes cane/walker) No Help Needed   Using the telphone No Help Needed   Taking your medications No Help Needed   Preparing meals No Help Needed   Managing money (expenses/bills) No Help Needed   Moderately strenuous housework (laundry) No Help Needed   Shopping for personal items (toiletries/medicines) No Help Needed   Shopping for groceries No Help Needed   Driving No Help Needed   Climbing a flight of stairs No Help Needed   Getting to places beyond walking distances No Help Needed       Fall Risk  Fall Risk Assessment, last 12 mths 6/18/2018   Able to walk? Yes   Fall in past 12 months? No       Abuse Screen  Abuse Screening Questionnaire 12/18/2018   Do you ever feel afraid of your partner? N   Are you in a relationship with someone who physically or mentally threatens you? N   Is it safe for you to go home? Y       Cognitive Screening   Evaluation of Cognitive Function:  Has your family/caregiver stated any concerns about your memory: no    Physical Exam     Visit Vitals  /70   Pulse 60   Ht 5' 10\" (1.778 m)   Wt 251 lb 12.8 oz (114.2 kg)   BMI 36.13 kg/m²     Body mass index is 36.13 kg/m².      General appearance - alert, well appearing, and in no distress  Mental status - alert, oriented to person, place, and time  EYE-DIXIE, EOMI,conjunctiva normal bilaterally, lids normal  ENT-ENT exam normal, no neck nodes or sinus tenderness  Nose - normal and patent, no erythema,  Or discharge   Mouth - mucous membranes moist, pharynx normal without lesions  Neck - supple, no significant adenopathy or bruit  Chest - clear to auscultation, no wheezes, rales or rhonchi. Heart - normal rate, regular rhythm, normal S1, S2, no murmurs, rubs, clicks or gallops   Abdomen - soft, nontender, nondistended, no masses or organomegaly  Lymph- no adenopathy palpable  Ext-peripheral pulses normal, no pedal edema, no clubbing or cyanosis  Skin-Warm and dry. no hyperpigmentation, vitiligo, or suspicious lesions  Neuro -alert, oriented, normal speech, no focal findings or movement disorder noted    Assessment/Plan   [place] education and counseling provided:  Age appropriate evidence-based preventive care recommendations based on today's review and evaluation; including relevant cancer screening guidelines, and vaccination recommendations. An After Visit Summary was printed and given to the patient which information about these guidelines, and a personalized schedule for health maintenance items. Whe appropriate and with patient agreement, orders noted below were placed to complete missing health maintenance items.       Additional Plan for follow up chronic medical conditions includes:  Diagnoses and all orders for this visit:    Initial Medicare annual wellness visit    Type 2 diabetes mellitus without complication, with long-term current use of insulin (HCC)  -     HEMOGLOBIN A1C W/O EAG  -     MICROALBUMIN, UR, RAND W/ MICROALB/CREAT RATIO    Essential hypertension  -     CBC WITH AUTOMATED DIFF  -     METABOLIC PANEL, COMPREHENSIVE  -     URINALYSIS W/ RFLX MICROSCOPIC    Mixed hyperlipidemia  -     LIPID PANEL    Long-term use of high-risk medication  -     CK    Acquired hypothyroidism  -     T4, FREE  -     TSH 3RD GENERATION    Coronary artery disease involving native heart without angina pectoris, unspecified vessel or lesion type    Chronic atrial fibrillation ([address]    Long term current use of anticoagulant    Gout, unspecified cause, unspecified chronicity, unspecified site  -     URIC ACID    BPH with obstruction/lower urinary tract symptoms    Chronic diastolic congestive heart failure ([address]          Other instructions: The patient's medications are reviewed and reconciled. No change in his current medical regimen is made. Body mass index is 36.13 and dietary counseling along with printed patient education is given    Continue to check blood sugars twice daily    Health maintenance issues were reviewed and the patient has had a diabetic retinal eye exam done within the last year and will have a copy of this forwarded to us. But the patient is also in the midst of getting his shingles vaccination and is due for his second shot at his pharmacy as soon as I get a supply N. Await results of multiple labs    Follow-up 6 months    Follow-up Disposition:  Return in about 6 months (around 6/18/2019). I have reviewed with the patient details of the assessment and plan and all questions were answered. Relevent patient education was performed. The most recent lab findings were reviewed with the patient.     Jenaro Hall MD

## 2018-12-18 NOTE — PATIENT INSTRUCTIONS
The best way to stay healthy is to live a healthy lifestyle. A healthy lifestyle includes regular exercise, eating a well-balanced diet, keeping a healthy weight and not smoking. Regular physical exams and screening tests are another important way to take care of yourself. Preventive exams provided by health care providers can find health problems early when treatment works best and can keep you from getting certain diseases or illnesses. Preventive services include exams, lab tests, screenings, shots, monitoring and information to help you take care of your own health. All people over 65 should have a pneumonia shot. Pneumonia shots are usually only needed once in a lifetime unless your doctor decides differently. In addition to your physical exam, some screening tests are recommended:    All people over 65 should have a yearly flu shot. People over 65 are at medium to high risk for Hepatitis B. Three shots are needed for complete protection. Bone mass measurement (dexa scan) is recommended every two years. Diabetes Mellitus screening is recommended every year. Glaucoma is an eye disease caused by high pressure in the eye. An eye exam is recommended every year. Cardiovascular screening tests that check your cholesterol and other blood fat (lipid) levels are recommended every five years. Colorectal Cancer screening tests help to find pre-cancerous polyps (growths in the colon) so they can be removed before they turn into cancer. Tests ordered for screening depend on your personal and family history risk factors. Prostate Cancer Screening (annually up to age 76)    Screening for breast cancer is recommended yearly with a Mammogram.    Screening for cervical and vaginal cancer is recommended with a pelvic and Pap test every two years.  However if you have had an abnormal pap in the past  three years or at high risk for cervical or vaginal cancer Medicare will cover a pap test and a pelvic exam every year. Here is a list of your current Health Maintenance items with a due date:  Health Maintenance Due   Topic Date Due    Annual Well Visit  03/14/2018    Shingles Vaccine (2 of 2) 10/11/2018    Hemoglobin A1C    12/18/2018          Learning About Cutting Calories  How do calories affect your weight? Food gives your body energy. Energy from the food you eat is measured in calories. This energy keeps your heart beating, your brain active, and your muscles working. Your body needs a certain number of calories each day. After your body uses the calories it needs, it stores extra calories as fat. To lose weight safely, you have to eat fewer calories while eating in a healthy way. How many calories do you need each day? The more active you are, the more calories you need. When you are less active, you need fewer calories. How many calories you need each day also depends on several things, including your age and whether you are male or female. Here are some general guidelines for adults:  · Less active women and older adults need 1,600 to 2,000 calories each day. · Active women and less active men need 2,000 to 2,400 calories each day. · Active men need 2,400 to 3,000 calories each day. How can you cut calories and eat healthy meals? Whole grains, vegetables and fruits, and dried beans are good lower-calorie foods. They give you lots of nutrients and fiber. And they fill you up. Sweets, energy drinks, and soda pop are high in calories. They give you few nutrients and no fiber. Try to limit soda pop, fruit juice, and energy drinks. Drink water instead. Some fats can be part of a healthy diet. But cutting back on fats from highly processed foods like fast foods and many snack foods is a good way to lower the calories in your diet. Also, use smaller amounts of fats like butter, margarine, salad dressing, and mayonnaise.  Add fresh garlic, lemon, or herbs to your meals to add flavor without adding fat. Meats and dairy products can be a big source of hidden fats. Try to choose lean or low-fat versions of these products. Fat-free cookies, candies, chips, and frozen treats can still be high in sugar and calories. Some fat-free foods have more calories than regular ones. Eat fat-free treats in moderation, as you would other foods. If your favorite foods are high in fat, salt, sugar, or calories, limit how often you eat them. Eat smaller servings, or look for healthy substitutes. Fill up on fruits, vegetables, and whole grains. Eating at home  · Use meat as a side dish instead of as the main part of your meal.  · Try main dishes that use whole wheat pasta, brown rice, dried beans, or vegetables. · Find ways to cook with little or no fat, such as broiling, steaming, or grilling. · Use cooking spray instead of oil. If you use oil, use a monounsaturated oil, such as canola or olive oil. · Trim fat from meats before you cook them. · Drain off fat after you brown the meat or while you roast it. · Chill soups and stews after you cook them. Then skim the fat off the top after it hardens. Eating out  · Order foods that are broiled or poached rather than fried or breaded. · Cut back on the amount of butter or margarine that you use on bread. · Order sauces, gravies, and salad dressings on the side, and use only a little. · When you order pasta, choose tomato sauce rather than cream sauce. · Ask for salsa with your baked potato instead of sour cream, butter, cheese, or burroughs. · Order meals in a small size instead of upgrading to a large. · Share an entree, or take part of your food home to eat as another meal.  · Share appetizers and desserts. Where can you learn more? Go to http://rufina-juni.info/. Enter 99 [id number] in the search box to learn more about \"Learning About Cutting Calories. \"  Current as of: March 29, 2018  Content Version: 11.8  © 9833-6486 [employer]. Care instructions adapted under license by [employer] (which disclaims liability or warranty for this information). If you have questions about a medical condition or this instruction, always ask your healthcare professional. [address] any warranty or liability for your use of this information.

## 2018-12-18 NOTE — PROGRESS NOTES
Chief Complaint   Patient presents with    Diabetes     6 mo fu    Annual Wellness Visit       Depression Risk Factor Screening:     PHQ over the last two weeks 6/18/2018   Little interest or pleasure in doing things Not at all   Feeling down, depressed, irritable, or hopeless Not at all   Total Score PHQ 2 0       Functional Ability and Level of Safety:     Activities of Daily Living  ADL Assessment 12/18/2018   Feeding yourself No Help Needed   Getting from bed to chair No Help Needed   Getting dressed No Help Needed   Bathing or showering No Help Needed   Walk across the room (includes cane/walker) No Help Needed   Using the telphone No Help Needed   Taking your medications No Help Needed   Preparing meals No Help Needed   Managing money (expenses/bills) No Help Needed   Moderately strenuous housework (laundry) No Help Needed   Shopping for personal items (toiletries/medicines) No Help Needed   Shopping for groceries No Help Needed   Driving No Help Needed   Climbing a flight of stairs No Help Needed   Getting to places beyond walking distances No Help Needed       Fall Risk  Fall Risk Assessment, last 12 mths 6/18/2018   Able to walk? Yes   Fall in past 12 months? No       Abuse Screen  Abuse Screening Questionnaire 12/18/2018   Do you ever feel afraid of your partner? N   Are you in a relationship with someone who physically or mentally threatens you? N   Is it safe for you to go home?  Y         Patient Care Team   Patient Care Team:  Desmond Billingsley MD as PCP - General (Internal Medicine)  Paul Dey MD (Otolaryngology)  Abigail Wiley MD (Optometry)  Magali Solitario MD (Cardiology)  Pia Abdullahi MD (Urology)  Mihaela Shay DPM (Podiatry)  Nel Barreto MD (Nephrology)

## 2018-12-19 ENCOUNTER — TELEPHONE (OUTPATIENT)
Dept: INTERNAL MEDICINE CLINIC | Age: 83
End: 2018-12-19

## 2018-12-19 LAB
ALBUMIN SERPL-MCNC: 3.7 G/DL (ref 3.5–4.7)
ALBUMIN/GLOB SERPL: 1.4 {RATIO} (ref 1.2–2.2)
ALP SERPL-CCNC: 120 IU/L (ref 39–117)
ALT SERPL-CCNC: 25 IU/L (ref 0–44)
AST SERPL-CCNC: 26 IU/L (ref 0–40)
BILIRUB SERPL-MCNC: 0.6 MG/DL (ref 0–1.2)
BUN SERPL-MCNC: 41 MG/DL (ref 8–27)
BUN/CREAT SERPL: 25 (ref 10–24)
CALCIUM SERPL-MCNC: 8.9 MG/DL (ref 8.6–10.2)
CHLORIDE SERPL-SCNC: 99 MMOL/L (ref 96–106)
CHOLEST SERPL-MCNC: 103 MG/DL (ref 100–199)
CK SERPL-CCNC: 84 U/L (ref 24–204)
CO2 SERPL-SCNC: 25 MMOL/L (ref 20–29)
CREAT SERPL-MCNC: 1.61 MG/DL (ref 0.76–1.27)
GLOBULIN SER CALC-MCNC: 2.7 G/DL (ref 1.5–4.5)
GLUCOSE SERPL-MCNC: 160 MG/DL (ref 65–99)
HBA1C MFR BLD: 8.1 % (ref 4.8–5.6)
HDLC SERPL-MCNC: 36 MG/DL
LDLC SERPL CALC-MCNC: 47 MG/DL (ref 0–99)
POTASSIUM SERPL-SCNC: 4.4 MMOL/L (ref 3.5–5.2)
PROT SERPL-MCNC: 6.4 G/DL (ref 6–8.5)
SODIUM SERPL-SCNC: 137 MMOL/L (ref 134–144)
T4 FREE SERPL-MCNC: 1.59 NG/DL (ref 0.82–1.77)
TRIGL SERPL-MCNC: 98 MG/DL (ref 0–149)
TSH SERPL DL<=0.005 MIU/L-ACNC: 0.92 UIU/ML (ref 0.45–4.5)
URATE SERPL-MCNC: 5.6 MG/DL (ref 3.7–8.6)
VLDLC SERPL CALC-MCNC: 20 MG/DL (ref 5–40)

## 2018-12-19 NOTE — PROGRESS NOTES
Labs were stable except blood sugar 180 and A1c has jumped up to 8.1.   May want to increase Lantus to 66 units on a daily basis and recheck fasting blood sugar and A1c in 3 months

## 2019-01-22 ENCOUNTER — LAB ONLY (OUTPATIENT)
Dept: INTERNAL MEDICINE CLINIC | Age: 84
End: 2019-01-22

## 2019-01-22 DIAGNOSIS — I48.20 CHRONIC ATRIAL FIBRILLATION (HCC): Primary | Chronic | ICD-10-CM

## 2019-01-22 RX ORDER — CYANOCOBALAMIN/FOLIC AC/VIT B6 1-2.2-25MG
TABLET ORAL
Qty: 90 TAB | Refills: 0 | Status: SHIPPED | OUTPATIENT
Start: 2019-01-22 | End: 2019-05-08 | Stop reason: SDUPTHER

## 2019-01-23 ENCOUNTER — TELEPHONE ANTICOAG (OUTPATIENT)
Dept: INTERNAL MEDICINE CLINIC | Age: 84
End: 2019-01-23

## 2019-01-23 DIAGNOSIS — I48.20 CHRONIC ATRIAL FIBRILLATION (HCC): ICD-10-CM

## 2019-01-23 LAB
INR PPP: 2 (ref 0.8–1.2)
PROTHROMBIN TIME: 20 SEC (ref 9.1–12)

## 2019-01-23 NOTE — PROGRESS NOTES
Anticoagulation Episode Summary     Current INR goal:   1.8-3.0   TTR:   73.7 % (1.4 y)   Next INR check:   2/22/2019   INR from last check:   2.0 (1/22/2019)   Weekly max warfarin dose:      Target end date:      INR check location:   Clinic Lab   Preferred lab:      Send INR reminders to:        Indications    Chronic [facility] fibrillation ([facility] [I48.2]           Comments:            Anticoagulation Care Providers     Provider Role Specialty Phone number    Homa Mckay MD [facility] Internal Medicine [phone]        Patient notified no change in coumadin and advised to re check his protime in 1 month

## 2019-02-26 RX ORDER — SPIRONOLACTONE 25 MG/1
TABLET ORAL
Qty: 90 TAB | Refills: 3 | Status: SHIPPED | OUTPATIENT
Start: 2019-02-26 | End: 2020-03-10

## 2019-02-26 RX ORDER — LOVASTATIN 40 MG/1
TABLET ORAL
Qty: 180 TAB | Refills: 3 | Status: SHIPPED | OUTPATIENT
Start: 2019-02-26 | End: 2020-04-20

## 2019-03-08 ENCOUNTER — LAB ONLY (OUTPATIENT)
Dept: INTERNAL MEDICINE CLINIC | Age: 84
End: 2019-03-08

## 2019-03-08 ENCOUNTER — TELEPHONE ANTICOAG (OUTPATIENT)
Dept: INTERNAL MEDICINE CLINIC | Age: 84
End: 2019-03-08

## 2019-03-08 DIAGNOSIS — I48.20 CHRONIC ATRIAL FIBRILLATION (HCC): ICD-10-CM

## 2019-03-08 DIAGNOSIS — I48.20 CHRONIC ATRIAL FIBRILLATION (HCC): Primary | Chronic | ICD-10-CM

## 2019-03-08 LAB
INR PPP: 2.8 (ref 0.8–1.2)
PROTHROMBIN TIME: 27.9 SEC (ref 9.1–12)

## 2019-03-08 NOTE — PROGRESS NOTES
Anticoagulation Episode Summary     Current INR goal:   1.8-3.0   TTR:   73.7 % (1.4 y)   Next INR check:   4/8/2019   INR from last check:   No new INR was available at last check   Weekly max warfarin dose:      Target end date:      INR check location:   Clinic Lab   Preferred lab:      Send INR reminders to:        Indications    Ch[facility] ([facility] [I48.2]           Comments:            Anticoagulation Care Providers     Provider Role Specialty Phone number    Berry Mancera MD Responsible Internal Medicine [phone]        Patients INR today ws 2.8 called from lab branden advised patient no change in his coumadin and will need to recheck his protime in 1 month

## 2019-03-20 ENCOUNTER — LAB ONLY (OUTPATIENT)
Dept: INTERNAL MEDICINE CLINIC | Age: 84
End: 2019-03-20

## 2019-03-20 DIAGNOSIS — E11.9 TYPE 2 DIABETES MELLITUS WITHOUT COMPLICATION, WITHOUT LONG-TERM CURRENT USE OF INSULIN (HCC): Primary | Chronic | ICD-10-CM

## 2019-03-20 LAB — GLUCOSE SERPL-MCNC: 244 MG/DL (ref 75–110)

## 2019-03-21 LAB
EST. AVERAGE GLUCOSE BLD GHB EST-MCNC: 174 MG/DL
HBA1C MFR BLD: 7.7 % (ref 4.8–5.6)

## 2019-03-21 NOTE — PROGRESS NOTES
Blood sugar was 244 however A1c has gone from 8.1 to 7.7. Continue current meds.   Follow-up as scheduled

## 2019-03-22 RX ORDER — INSULIN LISPRO 100 [IU]/ML
5-10 INJECTION, SOLUTION INTRAVENOUS; SUBCUTANEOUS
Qty: 1 VIAL | Refills: 6
Start: 2019-03-22 | End: 2021-06-23 | Stop reason: SDUPTHER

## 2019-03-27 RX ORDER — BLOOD SUGAR DIAGNOSTIC
STRIP MISCELLANEOUS
Qty: 300 STRIP | Refills: 0 | Status: SHIPPED | OUTPATIENT
Start: 2019-03-27 | End: 2019-06-24 | Stop reason: SDUPTHER

## 2019-05-07 ENCOUNTER — LAB ONLY (OUTPATIENT)
Dept: INTERNAL MEDICINE CLINIC | Age: 84
End: 2019-05-07

## 2019-05-07 DIAGNOSIS — I48.20 CHRONIC ATRIAL FIBRILLATION (HCC): Primary | Chronic | ICD-10-CM

## 2019-05-08 ENCOUNTER — TELEPHONE ANTICOAG (OUTPATIENT)
Dept: INTERNAL MEDICINE CLINIC | Age: 84
End: 2019-05-08

## 2019-05-08 DIAGNOSIS — I48.20 CHRONIC ATRIAL FIBRILLATION (HCC): ICD-10-CM

## 2019-05-08 LAB
INR PPP: 2.1 (ref 0.8–1.2)
PROTHROMBIN TIME: 20.7 SEC (ref 9.1–12)

## 2019-05-08 RX ORDER — CYANOCOBALAMIN/FOLIC AC/VIT B6 1-2.2-25MG
TABLET ORAL
Qty: 90 TAB | Refills: 0 | Status: SHIPPED | OUTPATIENT
Start: 2019-05-08 | End: 2019-06-25 | Stop reason: ALTCHOICE

## 2019-05-08 NOTE — PROGRESS NOTES
Anticoagulation Episode Summary     Current INR goal:   1.8-3.0   TTR:   78.1 % (1.7 y)   Next INR check:   6/7/2019   INR from last check:   2.1 (5/7/2019)   Weekly max warfarin dose:      Target end date:      INR check location:   Clinic Lab   Preferred lab:      Send INR reminders to:        Indications    Chronic [facility] fibrillation ([facility] [I48.2]           Comments:            Anticoagulation Care Providers     Provider Role Specialty Phone number    Marquez Toussaint MD [facility] Internal Medicine [phone]        patient advised no change in coumadin and advised to re check protime in 1 month

## 2019-05-20 RX ORDER — METOPROLOL TARTRATE 100 MG/1
TABLET ORAL
Qty: 180 TAB | Refills: 2 | Status: SHIPPED | OUTPATIENT
Start: 2019-05-20 | End: 2020-03-10

## 2019-05-30 ENCOUNTER — LAB ONLY (OUTPATIENT)
Dept: INTERNAL MEDICINE CLINIC | Age: 84
End: 2019-05-30

## 2019-05-30 DIAGNOSIS — I48.20 CHRONIC ATRIAL FIBRILLATION (HCC): Primary | Chronic | ICD-10-CM

## 2019-05-31 LAB
INR PPP: 1.8 (ref 0.8–1.2)
PROTHROMBIN TIME: 18.1 SEC (ref 9.1–12)

## 2019-05-31 NOTE — PROGRESS NOTES
INR is 1.8. Take extra 2.5 mg of Coumadin today and then resume usual regimen.   Recheck pro time 1 month

## 2019-06-10 RX ORDER — INSULIN GLARGINE 100 [IU]/ML
INJECTION, SOLUTION SUBCUTANEOUS
Qty: 60 ML | Refills: 3 | Status: SHIPPED | OUTPATIENT
Start: 2019-06-10 | End: 2020-07-18

## 2019-06-25 ENCOUNTER — OFFICE VISIT (OUTPATIENT)
Dept: INTERNAL MEDICINE CLINIC | Age: 84
End: 2019-06-25

## 2019-06-25 VITALS
SYSTOLIC BLOOD PRESSURE: 120 MMHG | RESPIRATION RATE: 18 BRPM | OXYGEN SATURATION: 93 % | HEIGHT: 70 IN | BODY MASS INDEX: 35.82 KG/M2 | WEIGHT: 250.2 LBS | DIASTOLIC BLOOD PRESSURE: 72 MMHG | HEART RATE: 75 BPM | TEMPERATURE: 97.7 F

## 2019-06-25 DIAGNOSIS — Z79.4 TYPE 2 DIABETES MELLITUS WITHOUT COMPLICATION, WITH LONG-TERM CURRENT USE OF INSULIN (HCC): Primary | Chronic | ICD-10-CM

## 2019-06-25 DIAGNOSIS — Z79.01 LONG TERM CURRENT USE OF ANTICOAGULANT: Chronic | ICD-10-CM

## 2019-06-25 DIAGNOSIS — E78.2 MIXED HYPERLIPIDEMIA: Chronic | ICD-10-CM

## 2019-06-25 DIAGNOSIS — I50.32 CHRONIC DIASTOLIC CONGESTIVE HEART FAILURE (HCC): ICD-10-CM

## 2019-06-25 DIAGNOSIS — Z79.899 LONG-TERM USE OF HIGH-RISK MEDICATION: Chronic | ICD-10-CM

## 2019-06-25 DIAGNOSIS — I25.10 CORONARY ARTERY DISEASE INVOLVING NATIVE HEART WITHOUT ANGINA PECTORIS, UNSPECIFIED VESSEL OR LESION TYPE: Chronic | ICD-10-CM

## 2019-06-25 DIAGNOSIS — E11.9 TYPE 2 DIABETES MELLITUS WITHOUT COMPLICATION, WITH LONG-TERM CURRENT USE OF INSULIN (HCC): Primary | Chronic | ICD-10-CM

## 2019-06-25 DIAGNOSIS — E66.01 SEVERE OBESITY (HCC): ICD-10-CM

## 2019-06-25 DIAGNOSIS — I48.20 CHRONIC ATRIAL FIBRILLATION (HCC): Chronic | ICD-10-CM

## 2019-06-25 DIAGNOSIS — I10 ESSENTIAL HYPERTENSION: Chronic | ICD-10-CM

## 2019-06-25 DIAGNOSIS — M10.9 GOUT, UNSPECIFIED CAUSE, UNSPECIFIED CHRONICITY, UNSPECIFIED SITE: Chronic | ICD-10-CM

## 2019-06-25 DIAGNOSIS — E03.9 ACQUIRED HYPOTHYROIDISM: Chronic | ICD-10-CM

## 2019-06-25 LAB
A-G RATIO,AGRAT: 1.3 RATIO
ALBUMIN SERPL-MCNC: 3.9 G/DL (ref 3.9–5.4)
ALP SERPL-CCNC: 141 U/L (ref 38–126)
ALT SERPL-CCNC: 26 U/L (ref 9–52)
ANION GAP SERPL CALC-SCNC: 10 MMOL/L
AST SERPL W P-5'-P-CCNC: 25 U/L (ref 14–36)
BILIRUB SERPL-MCNC: 0.8 MG/DL (ref 0.2–1.3)
BILIRUB UR QL: NEGATIVE
BUN SERPL-MCNC: 41 MG/DL (ref 9–20)
BUN/CREATININE RATIO,BUCR: 29 RATIO
CALCIUM SERPL-MCNC: 8.8 MG/DL (ref 8.4–10.2)
CHLORIDE SERPL-SCNC: 101 MMOL/L (ref 98–107)
CHOL/HDL RATIO,CHHD: 3 RATIO (ref 0–4)
CHOLEST SERPL-MCNC: 116 MG/DL (ref 0–200)
CK SERPL-CCNC: 73 U/L (ref 30–135)
CLARITY: CLEAR
CO2 SERPL-SCNC: 30 MMOL/L (ref 22–32)
COLOR UR: ABNORMAL
CREAT SERPL-MCNC: 1.4 MG/DL (ref 0.8–1.5)
ERYTHROCYTE [DISTWIDTH] IN BLOOD BY AUTOMATED COUNT: 12.9 %
GLOBULIN,GLOB: 2.9
GLUCOSE 24H UR-MRATE: NEGATIVE G/(24.H)
GLUCOSE SERPL-MCNC: 181 MG/DL (ref 75–110)
HCT VFR BLD AUTO: 41.1 % (ref 37–51)
HDLC SERPL-MCNC: 34 MG/DL (ref 35–130)
HGB BLD-MCNC: 13.2 G/DL (ref 12–18)
HGB UR QL STRIP: NEGATIVE
KETONES UR QL STRIP.AUTO: NEGATIVE
LDL/HDL RATIO,LDHD: 2 RATIO
LDLC SERPL CALC-MCNC: 55 MG/DL (ref 0–130)
LEUKOCYTE ESTERASE: NEGATIVE
LYMPHOCYTES ABSOLUTE: 2.2 K/UL (ref 0.6–4.1)
LYMPHOCYTES NFR BLD: 24.1 % (ref 10–58.5)
MCH RBC QN AUTO: 32.7 PG (ref 26–32)
MCHC RBC AUTO-ENTMCNC: 32.1 G/DL (ref 30–36)
MCV RBC AUTO: 101.7 FL (ref 80–97)
MICROALBUMIN, URINE: 50 MG/L (ref 0–20)
MONOCYTES ABS-DIF,2141: 0.9 K/UL (ref 0–1.8)
MONOCYTES NFR BLD: 10.4 % (ref 0.1–24)
NEUTROPHILS # BLD: 65.5 % (ref 37–92)
NEUTROPHILS ABS,2156: 6 K/UL (ref 2–7.8)
NITRITE UR QL STRIP.AUTO: NEGATIVE
PH UR STRIP: 5 [PH] (ref 5–7)
PLATELET # BLD AUTO: 201 K/UL (ref 140–440)
PMV BLD AUTO: 7.7 FL
POTASSIUM SERPL-SCNC: 4.4 MMOL/L (ref 3.6–5)
PROT SERPL-MCNC: 6.8 G/DL (ref 6.3–8.2)
PROT UR STRIP-MCNC: ABNORMAL MG/DL
RBC # BLD AUTO: 4.04 M/UL (ref 4.2–6.3)
RBC #/AREA URNS HPF: 0 #/HPF
SODIUM SERPL-SCNC: 141 MMOL/L (ref 137–145)
SP GR UR REFRACTOMETRY: 1.02 (ref 1–1.03)
T4 FREE SERPL-MCNC: 1.05 NG/DL (ref 0.58–2.3)
TRIGL SERPL-MCNC: 134 MG/DL (ref 0–200)
TSH SERPL DL<=0.05 MIU/L-ACNC: 1.06 UIU/ML (ref 0.34–5.6)
URATE SERPL-MCNC: 5.4 MG/DL (ref 3.5–8.5)
UROBILINOGEN UR QL STRIP.AUTO: NEGATIVE
VLDLC SERPL CALC-MCNC: 27 MG/DL
WBC # BLD AUTO: 9.1 K/UL (ref 4.1–10.9)
WBC URNS QL MICRO: 0 #/HPF

## 2019-06-25 RX ORDER — BLOOD SUGAR DIAGNOSTIC
STRIP MISCELLANEOUS
Qty: 300 STRIP | Refills: 0 | Status: SHIPPED | OUTPATIENT
Start: 2019-06-25 | End: 2019-08-21 | Stop reason: SDUPTHER

## 2019-06-25 NOTE — PROGRESS NOTES
Connor Almonte is a 82 y.o. male presenting for Diabetes (6 mo fu)  . 1. Have you been to the ER, urgent care clinic since your last visit? Hospitalized since your last visit? No    2. Have you seen or consulted any other health care providers outside of the [address] [place] [address] since your last visit? Include any pap smears or colon screening. Dr Sheila Negron May 2019 for Kidney follow up, Dr Rahat Leger 2 weeks ago for Cardiology follow up. Fall Risk Assessment, last 12 mths 6/25/2019   Able to walk? Yes   Fall in past 12 months? No         Abuse Screening Questionnaire 6/25/2019   Do you ever feel afraid of your partner? N   Are you in a relationship with someone who physically or mentally threatens you? N   Is it safe for you to go home? Y       3 most recent PHQ Screens 6/25/2019   Little interest or pleasure in doing things Not at all   Feeling down, depressed, irritable, or hopeless Not at all   Total Score PHQ 2 0       There are no discontinued medications.

## 2019-06-25 NOTE — PROGRESS NOTES
This note will not be viewable in [address] Parisa Gaston is a 82 y.o. male and presents with Diabetes (6 mo fu)  . Subjective:  Mr. Riana Duggan presents to the office today in follow-up of multiple medical problems. The patient has type 2 diabetes mellitus for which she is on Lantus and Humalog insulin. He is tolerating this without any hypoglycemia. He checks his blood sugars up to 3 times daily. Fasting blood sugars have been running around 120-130. He has had a diabetic retinal eye exam done within the last 2 years. He denies any paresthesias of his feet. Blood pressure is currently managed on Lopressor, Spironolactone, Lasix and losartan. He is tolerating this without orthostatic dizziness, fatigue or palpitations, muscle cramping or lower extremity edema. He denies headaches, numbness, tingling or focal neurological problems. He is on lovastatin for hypercholesterolemia. He tolerates this without muscle soreness or GI upset. He does have a history of coronary artery disease and does remain indoor. He has had no exertional chest pains. He has hypothyroidism currently on thyroid replacement. He continues to take his medication on an empty stomach with water first thing in the morning. He denies heat or cold intolerance, changes in skin or hair, his weight is been stable. He has a history of chronic atrial fibrillation and chronic diastolic congestive heart failure. He is followed by Dr. Meridith Koyanagi. He remains on Coumadin anticoagulation. He has had no bleeding problems and denies any strokelike symptoms. He has had no PND, orthopnea or lower extremity edema. He has a history of gout and is on allopurinol for prevention. He has had no attacks within the last 6 months. Past Medical History:   Diagnosis Date    Acquired hypothyroidism 9/12/2017    Anemia 2/1/2015    Arrhythmia     a. fib.& a.  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) 2/1/2015    Diabetes ([address]     Diverticulosis of large intestine without hemorrhage 10/24/2017    Gout 10/24/2017    Heart failure ([address]     Hyperlipidemia 2/1/2015    Hypertension     Ill-defined condition     high cholesterol    Ill-defined condition     gout    Long term current use of amiodarone 10/24/2017    Long term current use of anticoagulant 10/24/2017    Long-term use of high-risk medication 10/24/2017    Macular degeneration 10/24/2017    Peripheral vascular disease ([address] 10/24/2017    Renal artery stenosis ([address] 10/24/2017    Right-sided extracranial carotid artery stenosis 10/24/2017    Sick sinus syndrome ([address] 10/24/2017    Status post pacemaker     Past Surgical History:   Procedure Laterality Date    HX AAA REPAIR      HX APPENDECTOMY      HX CATARACT REMOVAL      / lens implant    HX CHOLECYSTECTOMY      HX ORTHOPAEDIC Right     rt unicondular knee replacement    HX ORTHOPAEDIC  1/26/15    LEFT UNICONDYLAR KNEE ARTHROPLASTY    HX PACEMAKER  7/11    pacemaker    HX TONSILLECTOMY      HX UROLOGICAL      rt renal stentx2     Allergies   Allergen Reactions    Latex Other (comments)     Skin raw and severely irritated    Aspirin Unknown (comments)    Hydrocodone Other (comments)     hallucinations    Oxycodone Other (comments)     hallucinations    Sulfa (Sulfonamide Antibiotics) Rash    Tetracycline Rash     Current Outpatient Medications   Medication Sig Dispense Refill    FREESTYLE TEST strip USE TO TEST THREE TIMES DAILY 300 Strip 0    insulin glargine (LANTUS SOLOSTAR U-100 INSULIN) 100 unit/mL (3 mL) inpn INJECT 66 UNITS UNDER THE SKIN DAILY (Patient taking differently: INJECT 72 UNITS UNDER THE SKIN DAILY) 60 mL 3    metoprolol tartrate (LOPRESSOR) 100 mg IR tablet TAKE 1 TABLET TWICE A  Tab 2    insulin lispro (HUMALOG) 100 unit/mL injection 5-10 Units by SubCUTAneous route three (3) times daily as needed (per sliding scale). 1 Vial 6    lovastatin (MEVACOR) 40 mg tablet TAKE 2 TABLETS DAILY 180 Tab 3    spironolactone (ALDACTONE) 25 mg tablet TAKE 1 TABLET DAILY 90 Tab 3    levothyroxine (SYNTHROID) 175 mcg tablet take 1 tablet by mouth once daily BEFORE BREAKFAST 30 Tab 12    isosorbide mononitrate ER (IMDUR) 60 mg CR tablet TAKE 1 TABLET DAILY 90 Tab 3    furosemide (LASIX) 40 mg tablet TAKE 2 TABLETS DAILY 180 Tab 3    warfarin (COUMADIN) 5 mg tablet TAKE 1 TABLET DAILY 90 Tab 2    allopurinol (ZYLOPRIM) 300 mg tablet TAKE 1 TABLET DAILY 90 Tab 3    BD INSULIN PEN NEEDLE UF SHORT 31 gauge x 16\" ndle use four times a day or as directed by prescriber 120 Pen Needle 4    Flaxseed Oil oil Take 1,000 mg by mouth daily.  terazosin (HYTRIN) 10 mg capsule Take 1 capsule by mouth nightly. Indications: BENIGN PROSTATIC HYPERTROPHY, HYPERTENSION      VIT A/VIT C/VIT E/ZINC/COPPER (OCUVITE PRESERVISION PO) Take 1 Tab by mouth two (2) times a day.  losartan (COZAAR) 50 mg tablet Take 50 mg by mouth daily. Indications: CHRONIC HEART FAILURE, HYPERTENSION      multivitamin, stress formula (STRESS TAB) tablet Take 1 Tab by mouth daily.  CYANOCOBALAMIN/FA/PYRIDOXINE (FOLGARD RX 2.2 PO) Take 1 tablet by mouth daily.  finasteride (PROSCAR) 5 mg tablet Take 5 mg by mouth every other day.  With dinner  Indications: BENIGN PROSTATIC HYPERTROPHY       Social History     Socioeconomic History    Marital status:      Spouse name: Not on file    Number of children: Not on file    Years of education: Not on file    Highest education level: Not on file   Tobacco Use    Smoking status: Former Smoker     Last attempt to quit: 1990     Years since quittin.9    Smokeless tobacco: Never Used   Substance and Sexual Activity    Alcohol use: Yes     Alcohol/week: 0.0 oz     Comment: 3-4 drinks a week    Drug use: No     Types: Prescription, OTC     Family History   Problem Relation Age of Onset    Cancer Mother     Diabetes Father        Health Maintenance   Topic Date Due    FOOT EXAM Q1  06/18/2019    Influenza Age 5 to Adult  08/01/2019    HEMOGLOBIN A1C Q6M  09/20/2019    MICROALBUMIN Q1  12/18/2019    LIPID PANEL Q1  12/18/2019    MEDICARE YEARLY EXAM  12/19/2019    GLAUCOMA SCREENING Q2Y  08/07/2020    EYE EXAM RETINAL OR DILATED  08/07/2020    DTaP/Tdap/Td series (2 - Td) 12/07/2027    Shingrix Vaccine Age 50>  Completed    Pneumococcal 65+ years  Completed        Review of Systems  Constitutional: negative for fevers, chills, anorexia and weight loss  Eyes:   negative for visual disturbance and irritation  ENT:   negative for tinnitus,sore throat,nasal congestion,ear pain,hoarseness  Respiratory:  negative for cough, hemoptysis, dyspnea,wheezing  CV:   negative for chest pain, palpitations, lower extremity edema  GI:   negative for nausea, vomiting, diarrhea, abdominal pain,melena  Endo:               negative for polyuria,polydipsia,polyphagia,heat intolerance  Genitourinary: negative for frequency, dysuria and hematuria  Integumentary: negative for rash and pruritus  Hematologic:  negative for easy bruising and gum/nose bleeding  Musculoskel: negative for myalgias, arthralgias, back pain, muscle weakness, joint pain  Neurological:  negative for headaches, dizziness, vertigo, memory problems and gait   Behavl/Psych: negative for feelings of anxiety, depression, mood changes  ROS otherwise negative      Objective:  Visit Vitals  /72 (BP 1 Location: Right arm, BP Patient Position: Sitting)   Pulse 75   Temp 97.7 °F (36.5 °C) (Oral)   Resp 18   Ht 5' 10\" (1.778 m)   Wt 250 lb 3.2 oz (113.5 kg)   SpO2 93%   BMI 35.90 kg/m²     Body mass index is 35.9 kg/m².     Physical Exam:   General appearance - alert, well appearing, and in no distress  Mental status - alert, oriented to person, place, and time  EYE-DIXIE, EOMI,conjunctiva normal bilaterally, lids normal  ENT-ENT exam normal, no neck nodes or sinus tenderness  Nose - normal and patent, no erythema,  Or discharge   Mouth - mucous membranes moist, pharynx normal without lesions  Neck - supple, no significant adenopathy or bruit  Chest - clear to auscultation, no wheezes, rales or rhonchi. Heart - normal rate, regular rhythm, normal S1, S2, no murmurs, rubs, clicks or gallops   Abdomen - soft, nontender, nondistended, no masses or organomegaly  Lymph- no adenopathy palpable  Ext-peripheral pulses normal, no pedal edema, no clubbing or cyanosis  Skin-Warm and dry.  no hyperpigmentation, vitiligo, or suspicious lesions  Neuro -alert, oriented, normal speech, no focal findings or movement disorder noted  Diabetic foot exam:     Left Foot:   Visual Exam: normal    Pulse DP: 2+ (normal)   Filament test: normal sensation    Vibratory sensation: absent      Right Foot:   Visual Exam: normal    Pulse DP: 2+ (normal)   Filament test: normal sensation    Vibratory sensation: absent        Assessment/Plan:  Diagnoses and all orders for this visit:    Type 2 diabetes mellitus without complication, with long-term current use of insulin ([facility])  -     HEMOGLOBIN A1C W/O EAG  -     URINE, MICROALBUMIN, SEMIQUANTITATIVE    Severe obesity (HCC)    Essential hypertension  -     COLLECTION VENOUS BLOOD,VENIPUNCTURE  -     CBC WITH AUTOMATED DIFF  -     METABOLIC PANEL, COMPREHENSIVE  -     URINALYSIS W/MICROSCOPIC    Mixed hyperlipidemia  -     LIPID PANEL    Long-term use of high-risk medication  -     CK    Acquired hypothyroidism  -     TSH 3RD GENERATION  -     T4, FREE    Coronary artery disease involving native heart without angina pectoris, unspecified vessel or lesion type    Chronic atrial fibrillation (HCC)  -     PROTHROMBIN TIME + INR    Long term current use of anticoagulant  -     PROTHROMBIN TIME + INR    Gout, unspecified cause, unspecified chronicity, unspecified site  -     URIC ACID    Chronic diastolic congestive heart failure ([facility]        Other instructions: The patient's medications were reviewed and reconciled. No change in his current medical regimen is made. Body mass index is 36 and dietary counseling along with printed patient education is given    Continue to check blood sugars 3 times daily. Await results of multiple labs. Follow-up in 6 months    Follow-up and Dispositions    · Return in about 6 months (around 12/25/2019). I have reviewed with the patient details of the assessment and plan and all questions were answered. Relevent patient education was performed. The most recent lab findings were reviewed with the patient. An After Visit Summary was printed and given to the patient.     Feliz Guerra MD

## 2019-06-25 NOTE — PATIENT INSTRUCTIONS

## 2019-06-26 ENCOUNTER — TELEPHONE ANTICOAG (OUTPATIENT)
Dept: INTERNAL MEDICINE CLINIC | Age: 84
End: 2019-06-26

## 2019-06-26 DIAGNOSIS — I48.20 CHRONIC ATRIAL FIBRILLATION (HCC): ICD-10-CM

## 2019-06-26 LAB
HBA1C MFR BLD: 7.7 % (ref 4.8–5.6)
INR PPP: 1.8 (ref 0.8–1.2)
PROTHROMBIN TIME: 18.5 SEC (ref 9.1–12)

## 2019-06-26 NOTE — PROGRESS NOTES
Anticoagulation Episode Summary     Current INR goal:   1.8-3.0   TTR:   79.7 % (1.8 y)   Next INR check:   7/25/2019   INR from last check:   1.8 (6/25/2019)   Weekly max warfarin dose:      Target end date:      INR check location:   Clinic Lab   Preferred lab:      Send INR reminders to:        Indications    [facility] [I48.2]           Comments:            Anticoagulation Care Providers     Provider Role Specialty Phone number    Tommy Hendricks MD [facility] Internal Medicine [phone]        Patient advised no change in coumadin and advised to re check protime in 1 month (appoinement scheduled)

## 2019-06-26 NOTE — PROGRESS NOTES
Labs stable with A1c still slightly elevated at 7.7. Pro time is 1.8. No change in Coumadin.   Recheck pro time 1 month

## 2019-07-25 ENCOUNTER — LAB ONLY (OUTPATIENT)
Dept: INTERNAL MEDICINE CLINIC | Age: 84
End: 2019-07-25

## 2019-07-25 DIAGNOSIS — I48.20 CHRONIC ATRIAL FIBRILLATION (HCC): Primary | Chronic | ICD-10-CM

## 2019-07-26 ENCOUNTER — TELEPHONE ANTICOAG (OUTPATIENT)
Dept: INTERNAL MEDICINE CLINIC | Age: 84
End: 2019-07-26

## 2019-07-26 DIAGNOSIS — I48.20 CHRONIC ATRIAL FIBRILLATION (HCC): ICD-10-CM

## 2019-07-26 LAB
INR PPP: 2.2 (ref 0.8–1.2)
PROTHROMBIN TIME: 21.4 SEC (ref 9.1–12)

## 2019-07-26 NOTE — PROGRESS NOTES
Anticoagulation Episode Summary     Current INR goal:   1.8-3.0   TTR:   80.6 % (1.9 y)   Next INR check:   8/26/2019   INR from last check:   2.2 (7/25/2019)   Weekly max warfarin dose:      Target end date:      INR check location:   Clinic Lab   Preferred lab:      Send INR reminders to:        Indications    Ch[facility] ([facility] [I48.2]           Comments:            Anticoagulation Care Providers     Provider Role Specialty Phone number    Jing Da Silva MD [facility] Internal Medicine [phone]        INR 2.2.  No change in Coumadin.  Recheck pro time 1 month

## 2019-08-02 RX ORDER — CYANOCOBALAMIN/FOLIC AC/VIT B6 1-2.2-25MG
TABLET ORAL
Qty: 90 TAB | Refills: 0 | Status: SHIPPED | OUTPATIENT
Start: 2019-08-02 | End: 2019-10-29 | Stop reason: SDUPTHER

## 2019-08-21 RX ORDER — BLOOD SUGAR DIAGNOSTIC
STRIP MISCELLANEOUS
Qty: 300 STRIP | Refills: 0 | Status: SHIPPED | OUTPATIENT
Start: 2019-08-21 | End: 2020-04-27 | Stop reason: SDUPTHER

## 2019-09-09 RX ORDER — LEVOTHYROXINE SODIUM 175 UG/1
TABLET ORAL
Qty: 30 TAB | Refills: 12 | Status: SHIPPED | OUTPATIENT
Start: 2019-09-09 | End: 2019-09-16 | Stop reason: SDUPTHER

## 2019-09-09 NOTE — TELEPHONE ENCOUNTER
Last Refill: 10/19/18  Last visit:6/25/19    Requested Prescriptions     Pending Prescriptions Disp Refills    levothyroxine (SYNTHROID) 175 mcg tablet 30 Tab 12     Sig: take 1 tablet by mouth once daily BEFORE BREAKFAST

## 2019-09-09 NOTE — TELEPHONE ENCOUNTER
Easton García   Requested Prescriptions     Pending Prescriptions Disp Refills    levothyroxine (SYNTHROID) 175 mcg tablet 30 Tab 12

## 2019-09-16 RX ORDER — LEVOTHYROXINE SODIUM 175 UG/1
TABLET ORAL
Qty: 90 TAB | Refills: 3 | Status: SHIPPED | OUTPATIENT
Start: 2019-09-16 | End: 2020-09-23 | Stop reason: SDUPTHER

## 2019-09-16 NOTE — TELEPHONE ENCOUNTER
Requesting a 90 day supply     Requested Prescriptions     Pending Prescriptions Disp Refills    levothyroxine (SYNTHROID) 175 mcg tablet 90 Tab 3     Sig: take 1 tablet by mouth once daily BEFORE BREAKFAST

## 2019-09-17 ENCOUNTER — LAB ONLY (OUTPATIENT)
Dept: INTERNAL MEDICINE CLINIC | Age: 84
End: 2019-09-17

## 2019-09-17 DIAGNOSIS — I48.20 CHRONIC ATRIAL FIBRILLATION (HCC): Primary | Chronic | ICD-10-CM

## 2019-09-18 ENCOUNTER — TELEPHONE ANTICOAG (OUTPATIENT)
Dept: INTERNAL MEDICINE CLINIC | Age: 84
End: 2019-09-18

## 2019-09-18 DIAGNOSIS — I48.20 CHRONIC ATRIAL FIBRILLATION (HCC): ICD-10-CM

## 2019-09-18 LAB
INR PPP: 2.5 (ref 0.8–1.2)
PROTHROMBIN TIME: 23.8 SEC (ref 9.1–12)

## 2019-09-18 NOTE — PROGRESS NOTES
Anticoagulation Episode Summary     Current INR goal:   1.8-3.0   TTR:   82.0 % (2.1 y)   Next INR check:   10/18/2019   INR from last check:   2.5 (9/17/2019)   Weekly max warfarin dose:      Target end date:      INR check location:   Clinic Lab   Preferred lab:      Send INR reminders to:        Indications    Chronic atrial fibrillation ([facility] [address] [I48.2]           Comments:            Anticoagulation Care Providers     Provider Role Specialty Phone number    Bess Puente MD [facility] Internal Medicine [phone]        INR is 2.5.  No change in Coumadin.  Recheck pro time 1 month

## 2019-10-29 RX ORDER — CYANOCOBALAMIN/FOLIC AC/VIT B6 1-2.2-25MG
TABLET ORAL
Qty: 90 TAB | Refills: 0 | Status: SHIPPED | OUTPATIENT
Start: 2019-10-29 | End: 2020-01-27

## 2019-11-05 ENCOUNTER — OFFICE VISIT (OUTPATIENT)
Dept: INTERNAL MEDICINE CLINIC | Age: 84
End: 2019-11-05

## 2019-11-05 VITALS
OXYGEN SATURATION: 93 % | BODY MASS INDEX: 33.44 KG/M2 | TEMPERATURE: 98.7 F | SYSTOLIC BLOOD PRESSURE: 136 MMHG | HEIGHT: 70 IN | HEART RATE: 70 BPM | RESPIRATION RATE: 18 BRPM | WEIGHT: 233.6 LBS | DIASTOLIC BLOOD PRESSURE: 78 MMHG

## 2019-11-05 DIAGNOSIS — Z79.01 LONG TERM CURRENT USE OF ANTICOAGULANT: Chronic | ICD-10-CM

## 2019-11-05 DIAGNOSIS — J01.00 ACUTE MAXILLARY SINUSITIS, RECURRENCE NOT SPECIFIED: Primary | ICD-10-CM

## 2019-11-05 RX ORDER — CEFUROXIME AXETIL 500 MG/1
500 TABLET ORAL 2 TIMES DAILY
Qty: 20 TAB | Refills: 0 | Status: SHIPPED | OUTPATIENT
Start: 2019-11-05 | End: 2019-11-15

## 2019-11-05 NOTE — PROGRESS NOTES
Clark Gallardo is a 82 y.o. male presenting for Cold Symptoms  . 1. Have you been to the ER, urgent care clinic since your last visit? Hospitalized since your last visit? Clark Gallardo is a 82 y.o. male presenting for Cold Symptoms  . 1. Have you been to the ER, urgent care clinic since your last visit? Hospitalized since your last visit? No    2. Have you seen or consulted any other health care providers outside of the [address] [place] [address] since your last visit? Include any pap smears or colon screening. No    Fall Risk Assessment, last 12 mths 6/25/2019   Able to walk? Yes   Fall in past 12 months? No         Abuse Screening Questionnaire 6/25/2019   Do you ever feel afraid of your partner? N   Are you in a relationship with someone who physically or mentally threatens you? N   Is it safe for you to go home? Y       3 most recent PHQ Screens 6/25/2019   Little interest or pleasure in doing things Not at all   Feeling down, depressed, irritable, or hopeless Not at all   Total Score PHQ 2 0       There are no discontinued medications. 2. Have you seen or consulted any other health care providers outside of the [address] [place] [address] since your last visit? Include any pap smears or colon screening. No    Fall Risk Assessment, last 12 mths 6/25/2019   Able to walk? Yes   Fall in past 12 months? No         Abuse Screening Questionnaire 6/25/2019   Do you ever feel afraid of your partner? N   Are you in a relationship with someone who physically or mentally threatens you? N   Is it safe for you to go home? Y       3 most recent PHQ Screens 6/25/2019   Little interest or pleasure in doing things Not at all   Feeling down, depressed, irritable, or hopeless Not at all   Total Score PHQ 2 0       There are no discontinued medications.

## 2019-11-05 NOTE — PROGRESS NOTES
This note will not be viewable in [address] Julius Hopkins is a 82 y.o. male and presents with Cold Symptoms  . Subjective:  Mr. Donna Osullivan presents to the office today with complaints of an upper respiratory infection with the development of sinus congestion purulent postnasal drainage and cough. He denies any fevers or chills. He has been taking Mucinex DM at home without relief. He denies neck stiffness or rash. The patient is also on Coumadin for stroke prevention due to his chronic atrial fibrillation. He is due for follow-up pro time today.     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    DJD (degenerative joint disease) of knee M17.10    Knee arthropathy M17.10    Degenerative joint disease M19.90    Acute on chronic systolic and diastolic heart failure, NYHA class 3 ([facility]) I50.43    Chronic atrial fibrillation I48.20    CAD (coronary artery disease) I25.10    Hypertension I10    Hyperlipidemia E78.5    Anemia D64.9    S/P placement of cardiac pacemaker Z95.0    CKD (chronic kidney disease) stage 3, GFR 30-59 ml/min ([facility]) N18.3    S/P knee replacement Q80.812    Acute systolic congestive heart failure, NYHA class 4 ([facility]) I50.21    Chronic diastolic congestive heart failure ([facility]) I50.32    Acquired hypothyroidism E03.9    BPH with obstruction/lower urinary tract symptoms N40.1, N13.8    Right-sided extracranial carotid artery stenosis I65.21    Diabetes ([facility]) E11.9    Diverticulosis of large intestine without hemorrhage K57.30    Gout M10.9    Long term current use of anticoagulant Z79.01    Long term current use of amiodarone Z79.899    Long-term use of high-risk medication Z79.899    Macular degeneration H35.30    Peripheral vascular disease (HCC) I73.9    Renal artery stenosis ([facility]) I70.1    Sick sinus syndrome ([facility]) I49.5    Severe obesity ([facility]) E66.01     Past Surgical History:   Procedure Laterality Date    HX AAA REPAIR      HX APPENDECTOMY      HX CATARACT REMOVAL      / lens implant    HX CHOLECYSTECTOMY      HX ORTHOPAEDIC Right     rt unicondular knee replacement    HX ORTHOPAEDIC  1/26/15    LEFT UNICONDYLAR KNEE ARTHROPLASTY    HX PACEMAKER  7/11    pacemaker    HX TONSILLECTOMY      HX UROLOGICAL      rt renal stentx2     Allergies   Allergen Reactions    Latex Other (comments)     Skin raw and severely irritated    Aspirin Unknown (comments)    Hydrocodone Other (comments)     hallucinations    Oxycodone Other (comments)     hallucinations    Sulfa (Sulfonamide Antibiotics) Rash    Tetracycline Rash     Current Outpatient Medications   Medication Sig Dispense Refill    cefUROXime (CEFTIN) 500 mg tablet Take 1 Tab by mouth two (2) times a day for 10 days. 20 Tab 0    VIRT-SEMAJ 2.2-25-1 mg tab TAKE 1 TABLET BY MOUTH ONCE DAILY 90 Tab 0    levothyroxine (SYNTHROID) 175 mcg tablet take 1 tablet by mouth once daily BEFORE BREAKFAST 90 Tab 3    allopurinol (ZYLOPRIM) 300 mg tablet TAKE 1 TABLET DAILY 90 Tab 3    FREESTYLE TEST strip USE TO TEST THREE TIMES DAILY 300 Strip 0    insulin glargine (LANTUS SOLOSTAR U-100 INSULIN) 100 unit/mL (3 mL) inpn INJECT 66 UNITS UNDER THE SKIN DAILY (Patient taking differently: INJECT 60 UNITS UNDER THE SKIN DAILY) 60 mL 3    metoprolol tartrate (LOPRESSOR) 100 mg IR tablet TAKE 1 TABLET TWICE A  Tab 2    insulin lispro (HUMALOG) 100 unit/mL injection 5-10 Units by SubCUTAneous route three (3) times daily as needed (per sliding scale).  1 Vial 6    lovastatin (MEVACOR) 40 mg tablet TAKE 2 TABLETS DAILY 180 Tab 3    spironolactone (ALDACTONE) 25 mg tablet TAKE 1 TABLET DAILY 90 Tab 3    isosorbide mononitrate ER (IMDUR) 60 mg CR tablet TAKE 1 TABLET DAILY 90 Tab 3    warfarin (COUMADIN) 5 mg tablet TAKE 1 TABLET DAILY 90 Tab 2    BD INSULIN PEN NEEDLE UF SHORT 31 gauge x 5/16\" ndle use four times a day or as directed by prescriber 120 Pen Needle 4    Flaxseed Oil oil Take 1,000 mg by mouth daily.  terazosin (HYTRIN) 10 mg capsule Take 1 capsule by mouth nightly. Indications: BENIGN PROSTATIC HYPERTROPHY, HYPERTENSION      VIT A/VIT C/VIT E/ZINC/COPPER (OCUVITE PRESERVISION PO) Take 1 Tab by mouth two (2) times a day.  losartan (COZAAR) 50 mg tablet Take 50 mg by mouth daily. Indications: CHRONIC HEART FAILURE, HYPERTENSION      multivitamin, stress formula (STRESS TAB) tablet Take 1 Tab by mouth daily.  CYANOCOBALAMIN/FA/PYRIDOXINE (FOLGARD RX 2.2 PO) Take 1 tablet by mouth daily.  finasteride (PROSCAR) 5 mg tablet Take 5 mg by mouth every other day. With dinner  Indications: BENIGN PROSTATIC HYPERTROPHY       Social History     Socioeconomic History    Marital status:      Spouse name: Not on file    Number of children: Not on file    Years of education: Not on file    Highest education level: Not on file   Tobacco Use    Smoking status: Former Smoker     Last attempt to quit: 1990     Years since quittin.3    Smokeless tobacco: Never Used   Substance and Sexual Activity    Alcohol use:  Yes     Alcohol/week: 0.0 standard drinks     Comment: 3-4 drinks a week    Drug use: No     Types: Prescription, OTC     Family History   Problem Relation Age of Onset    Cancer Mother     Diabetes Father        Review of Systems  Constitutional: negative for fevers, chills, anorexia and weight loss  Eyes:   negative for visual disturbance and irritation  ENT:   Positive for sinus congestion, maxillary discomfort, purulent psotnasal draingage and throat irritation  Respiratory:  negative for cough, hemoptysis, dyspnea,wheezing  CV:   negative for chest pain, palpitations, lower extremity edema  GI:   negative for nausea, vomiting, diarrhea, abdominal pain,melena  Integumentary: negative for rash and pruritus  Neurological:  negative for headaches, dizziness, vertigo, memory problems and gait Objective:  Visit Vitals  /78 (BP 1 Location: Left arm, BP Patient Position: Sitting)   Pulse 70   Temp 98.7 °F (37.1 °C) (Oral)   Resp 18   Ht 5' 10\" (1.778 m)   Wt 233 lb 9.6 oz (106 kg)   SpO2 93%   BMI 33.52 kg/m²     Body mass index is 33.52 kg/m². Physical Exam:   General appearance - alert, well appearing, and in no distress  Mental status - alert, oriented to person, place, and time  EYE-DIXIE, EOMI, sclera clear. No lid swelling or purulent drainage  ENT- TM's clear without A/F level. Pharynx slightly erythematous with drainage noted  Nose - normal and patent, no erythema,  Neck - supple, no significant adenopathy   Chest - clear to auscultation, no wheezes, rales or rhonchi, symmetric air entry   Heart - normal rate, regular rhythm, normal S1, S2, no murmurs, rubs, clicks or gallops   Skin-No rash appreciated  Neuro -alert, oriented, normal speech, no focal findings. Assessment/Plan:  Diagnoses and all orders for this visit:    Acute maxillary sinusitis, recurrence not specified  -     cefUROXime (CEFTIN) 500 mg tablet; Take 1 Tab by mouth two (2) times a day for 10 days. , Normal, Disp-20 Tab, R-0    Long term current use of anticoagulant  -     COLLECTION VENOUS BLOOD,VENIPUNCTURE  -     PROTHROMBIN TIME + INR        Other Instructions:  Mucinex-DM as directed    Increase po fluids    Follow-up and Dispositions    · Return if symptoms worsen or fail to improve. I have reviewed with the patient details of the assessment and plan and all questions were answered. Relevent patient education was performed. An After Visit Summary was printed and given to the patient.     Jasen Chi MD

## 2019-11-05 NOTE — PATIENT INSTRUCTIONS

## 2019-11-06 ENCOUNTER — TELEPHONE ANTICOAG (OUTPATIENT)
Dept: INTERNAL MEDICINE CLINIC | Age: 84
End: 2019-11-06

## 2019-11-06 DIAGNOSIS — I48.20 CHRONIC ATRIAL FIBRILLATION (HCC): ICD-10-CM

## 2019-11-06 LAB
INR PPP: 2.9 (ref 0.8–1.2)
PROTHROMBIN TIME: 27.6 SEC (ref 9.1–12)

## 2019-11-06 NOTE — PROGRESS NOTES
Anticoagulation Episode Summary     Current INR goal:   1.8-3.0   TTR:   83.1 % (2.2 y)   Next INR check:   12/6/2019   INR from last check:   2.9 (11/5/2019)   Weekly max warfarin dose:      Target end date:      INR check location:   Clinic Lab   Preferred lab:      Send INR reminders to: Indications    Chronic atrial fibrillation [I48.20]           Comments:            Anticoagulation Care Providers     Provider Role Specialty Phone number    Pavel Darling MD [facility] Medicine [phone]      INR 2.9. No change in coumadin.  Recheck PT 1 month

## 2019-12-10 ENCOUNTER — OFFICE VISIT (OUTPATIENT)
Dept: INTERNAL MEDICINE CLINIC | Age: 84
End: 2019-12-10

## 2019-12-10 VITALS
OXYGEN SATURATION: 95 % | WEIGHT: 231 LBS | HEIGHT: 70 IN | DIASTOLIC BLOOD PRESSURE: 74 MMHG | BODY MASS INDEX: 33.07 KG/M2 | HEART RATE: 68 BPM | SYSTOLIC BLOOD PRESSURE: 136 MMHG | RESPIRATION RATE: 18 BRPM | TEMPERATURE: 97.5 F

## 2019-12-10 DIAGNOSIS — I25.10 CORONARY ARTERY DISEASE INVOLVING NATIVE HEART WITHOUT ANGINA PECTORIS, UNSPECIFIED VESSEL OR LESION TYPE: Chronic | ICD-10-CM

## 2019-12-10 DIAGNOSIS — Z79.01 LONG TERM CURRENT USE OF ANTICOAGULANT: Chronic | ICD-10-CM

## 2019-12-10 DIAGNOSIS — Z79.899 LONG-TERM USE OF HIGH-RISK MEDICATION: Chronic | ICD-10-CM

## 2019-12-10 DIAGNOSIS — I48.20 CHRONIC ATRIAL FIBRILLATION (HCC): Chronic | ICD-10-CM

## 2019-12-10 DIAGNOSIS — N40.1 BPH WITH OBSTRUCTION/LOWER URINARY TRACT SYMPTOMS: Chronic | ICD-10-CM

## 2019-12-10 DIAGNOSIS — E78.2 MIXED HYPERLIPIDEMIA: Chronic | ICD-10-CM

## 2019-12-10 DIAGNOSIS — Z79.4 TYPE 2 DIABETES MELLITUS WITHOUT COMPLICATION, WITH LONG-TERM CURRENT USE OF INSULIN (HCC): Chronic | ICD-10-CM

## 2019-12-10 DIAGNOSIS — M10.9 GOUT, UNSPECIFIED CAUSE, UNSPECIFIED CHRONICITY, UNSPECIFIED SITE: Chronic | ICD-10-CM

## 2019-12-10 DIAGNOSIS — I10 ESSENTIAL HYPERTENSION: Chronic | ICD-10-CM

## 2019-12-10 DIAGNOSIS — Z00.00 MEDICARE ANNUAL WELLNESS VISIT, SUBSEQUENT: Primary | ICD-10-CM

## 2019-12-10 DIAGNOSIS — E11.9 TYPE 2 DIABETES MELLITUS WITHOUT COMPLICATION, WITH LONG-TERM CURRENT USE OF INSULIN (HCC): Chronic | ICD-10-CM

## 2019-12-10 DIAGNOSIS — N13.8 BPH WITH OBSTRUCTION/LOWER URINARY TRACT SYMPTOMS: Chronic | ICD-10-CM

## 2019-12-10 DIAGNOSIS — E03.9 ACQUIRED HYPOTHYROIDISM: Chronic | ICD-10-CM

## 2019-12-10 LAB
A-G RATIO,AGRAT: 1.2 RATIO
ALBUMIN SERPL-MCNC: 3.9 G/DL (ref 3.9–5.4)
ALP SERPL-CCNC: 148 U/L (ref 38–126)
ALT SERPL-CCNC: 36 U/L (ref 0–50)
ANION GAP SERPL CALC-SCNC: 15 MMOL/L
AST SERPL W P-5'-P-CCNC: 33 U/L (ref 14–36)
BILIRUB SERPL-MCNC: 1 MG/DL (ref 0.2–1.3)
BILIRUB UR QL: NEGATIVE
BUN SERPL-MCNC: 42 MG/DL (ref 9–20)
BUN/CREATININE RATIO,BUCR: 28 RATIO
CALCIUM SERPL-MCNC: 9.6 MG/DL (ref 8.4–10.2)
CHLORIDE SERPL-SCNC: 99 MMOL/L (ref 98–107)
CHOL/HDL RATIO,CHHD: 4 RATIO (ref 0–4)
CHOLEST SERPL-MCNC: 125 MG/DL (ref 0–200)
CK SERPL-CCNC: 50 U/L (ref 30–135)
CLARITY: CLEAR
CO2 SERPL-SCNC: 28 MMOL/L (ref 22–32)
COLOR UR: ABNORMAL
CREAT SERPL-MCNC: 1.5 MG/DL (ref 0.8–1.5)
GLOBULIN,GLOB: 3.2
GLUCOSE 24H UR-MRATE: NEGATIVE G/(24.H)
GLUCOSE SERPL-MCNC: 172 MG/DL (ref 75–110)
HDLC SERPL-MCNC: 35 MG/DL (ref 35–130)
HGB UR QL STRIP: NEGATIVE
KETONES UR QL STRIP.AUTO: NEGATIVE
LDL/HDL RATIO,LDHD: 2 RATIO
LDLC SERPL CALC-MCNC: 62 MG/DL (ref 0–130)
LEUKOCYTE ESTERASE: NEGATIVE
MICROALBUMIN, URINE: 50 MG/L (ref 0–20)
NITRITE UR QL STRIP.AUTO: NEGATIVE
PH UR STRIP: 5 [PH] (ref 5–7)
POTASSIUM SERPL-SCNC: 4.1 MMOL/L (ref 3.6–5)
PROT SERPL-MCNC: 7.1 G/DL (ref 6.3–8.2)
PROT UR STRIP-MCNC: ABNORMAL MG/DL
RBC #/AREA URNS HPF: 0 #/HPF
SODIUM SERPL-SCNC: 142 MMOL/L (ref 137–145)
SP GR UR REFRACTOMETRY: 1.02 (ref 1–1.03)
T4 FREE SERPL-MCNC: 0.99 NG/DL (ref 0.58–2.3)
TRIGL SERPL-MCNC: 139 MG/DL (ref 0–200)
TSH SERPL DL<=0.05 MIU/L-ACNC: 0.28 UIU/ML (ref 0.34–5.6)
URATE SERPL-MCNC: 6 MG/DL (ref 3.5–8.5)
UROBILINOGEN UR QL STRIP.AUTO: NEGATIVE
VLDLC SERPL CALC-MCNC: 28 MG/DL
WBC URNS QL MICRO: 0 #/HPF

## 2019-12-10 NOTE — PROGRESS NOTES
This note will not be viewable in [address] Jerome Anderson is a 82 y.o. male and presents with Follow Up Chronic Condition (6 mo fu) and Annual Wellness Visit  . Subjective:  Mr. Russell Orlando presents to the office today for Medicare wellness check and follow-up of multiple medical problems. The patient has type 2 diabetes mellitus for which he takes Lantus insulin and occasionally takes Humalog. He checks his blood sugars up to 3 times a day. Fasting blood sugars have averaged around 140. He denies any hypoglycemia. He has had no burning paresthesias of his feet. He has hypertension and currently remains on losartan, spironolactone, metoprolol. He tolerates this regimen without cough, lower extremity edema, muscle cramping, fatigue or palpitations. He has had no headaches, numbness, tingling or focal neurological problems. He takes lovastatin along with flaxseed oil for his hypercholesterolemia. He denies any GI upset and has had no muscle soreness. He does have a history of coronary artery disease for which he takes Imdur. He denies any exertional chest pains and has had no claudication. The patient has hypothyroidism currently on thyroid replacement. He continues to take his medication on an empty stomach first thing in the morning with water. He denies any heat or cold intolerance, changes in skin or hair, his weight is been stable. He is on allopurinol for gout prevention. This continues to work effectively for him and he has had no exacerbation of symptoms over the last 6 months. He has chronic atrial fibrillation and is followed by Dr. Jasen Amos. He remains on Coumadin anticoagulation and has had no strokelike symptoms, palpitations or syncope. BPH is managed with terra Zosyn and Proscar. He denies any urinary flow problems at present. Past Medical History:   Diagnosis Date    Acquired hypothyroidism 9/12/2017    Anemia 2/1/2015    Arrhythmia     a. fib.& a.  flutter  Arthritis     knees and back    BPH with obstruction/lower urinary tract symptoms 10/24/2017    CAD (coronary artery disease)     Chronic atrial fibrillation 2/1/2015    Chronic kidney disease     decreased kidney function    CKD (chronic kidney disease) stage 3, GFR 30-59 ml/min ([facility]) 2/1/2015    Diabetes ([facility]     Diverticulosis of large intestine without hemorrhage 10/24/2017    Gout 10/24/2017    Heart failure ([facility] [address]     Hyperlipidemia 2/1/2015    Hypertension     Ill-defined condition     high cholesterol    Ill-defined condition     gout    Long term current use of amiodarone 10/24/2017    Long term current use of anticoagulant 10/24/2017    Long-term use of high-risk medication 10/24/2017    Macular degeneration 10/24/2017    Peripheral vascular disease ([facility] [address] 10/24/2017    Renal artery stenosis (HCC) 10/24/2017    Right-sided extracranial carotid artery stenosis 10/24/2017    Sick sinus syndrome ([facility] [address] 10/24/2017    Status post pacemaker     Past Surgical History:   Procedure Laterality Date    HX AAA REPAIR      HX APPENDECTOMY      HX CATARACT REMOVAL      / lens implant    HX CHOLECYSTECTOMY      HX ORTHOPAEDIC Right     rt unicondular knee replacement    HX ORTHOPAEDIC  1/26/15    LEFT UNICONDYLAR KNEE ARTHROPLASTY    HX PACEMAKER  7/11    pacemaker    HX TONSILLECTOMY      HX UROLOGICAL      rt renal stentx2     Allergies   Allergen Reactions    Latex Other (comments)     Skin raw and severely irritated    Aspirin Unknown (comments)    Hydrocodone Other (comments)     hallucinations    Oxycodone Other (comments)     hallucinations    Sulfa (Sulfonamide Antibiotics) Rash    Tetracycline Rash     Current Outpatient Medications   Medication Sig Dispense Refill    warfarin (COUMADIN) 5 mg tablet TAKE 1 TABLET DAILY 90 Tab 3    VIRT-SEMAJ 2.2-25-1 mg tab TAKE 1 TABLET BY MOUTH ONCE DAILY 90 Tab 0    levothyroxine (SYNTHROID) 175 mcg tablet take 1 tablet by mouth once daily BEFORE BREAKFAST 90 Tab 3    allopurinol (ZYLOPRIM) 300 mg tablet TAKE 1 TABLET DAILY 90 Tab 3    FREESTYLE TEST strip USE TO TEST THREE TIMES DAILY 300 Strip 0    insulin glargine (LANTUS SOLOSTAR U-100 INSULIN) 100 unit/mL (3 mL) inpn INJECT 66 UNITS UNDER THE SKIN DAILY (Patient taking differently: INJECT 60 UNITS UNDER THE SKIN DAILY) 60 mL 3    metoprolol tartrate (LOPRESSOR) 100 mg IR tablet TAKE 1 TABLET TWICE A  Tab 2    insulin lispro (HUMALOG) 100 unit/mL injection 5-10 Units by SubCUTAneous route three (3) times daily as needed (per sliding scale). 1 Vial 6    lovastatin (MEVACOR) 40 mg tablet TAKE 2 TABLETS DAILY 180 Tab 3    spironolactone (ALDACTONE) 25 mg tablet TAKE 1 TABLET DAILY 90 Tab 3    isosorbide mononitrate ER (IMDUR) 60 mg CR tablet TAKE 1 TABLET DAILY 90 Tab 3    BD INSULIN PEN NEEDLE UF SHORT 31 gauge x 5/16\" ndle use four times a day or as directed by prescriber 120 Pen Needle 4    Flaxseed Oil oil Take 1,000 mg by mouth daily.  terazosin (HYTRIN) 10 mg capsule Take 1 capsule by mouth nightly. Indications: BENIGN PROSTATIC HYPERTROPHY, HYPERTENSION      VIT A/VIT C/VIT E/ZINC/COPPER (OCUVITE PRESERVISION PO) Take 1 Tab by mouth two (2) times a day.  losartan (COZAAR) 50 mg tablet Take 50 mg by mouth daily. Indications: CHRONIC HEART FAILURE, HYPERTENSION      multivitamin, stress formula (STRESS TAB) tablet Take 1 Tab by mouth daily.  CYANOCOBALAMIN/FA/PYRIDOXINE (FOLGARD RX 2.2 PO) Take 1 tablet by mouth daily.  finasteride (PROSCAR) 5 mg tablet Take 5 mg by mouth every other day.  With dinner  Indications: BENIGN PROSTATIC HYPERTROPHY       Social History     Socioeconomic History    Marital status:      Spouse name: Not on file    Number of children: Not on file    Years of education: Not on file    Highest education level: Not on file   Tobacco Use    Smoking status: Former Smoker     Last attempt to quit: 7/14/1990     Years since quittin.4    Smokeless tobacco: Never Used   Substance and Sexual Activity    Alcohol use:  Yes     Alcohol/week: 0.0 standard drinks     Comment: 3-4 drinks a week    Drug use: No     Types: Prescription, OTC     Family History   Problem Relation Age of Onset    Cancer Mother     Diabetes Father        Health Maintenance   Topic Date Due    MEDICARE YEARLY EXAM  2019    HEMOGLOBIN A1C Q6M  2019    FOOT EXAM Q1  2020    MICROALBUMIN Q1  2020    LIPID PANEL Q1  2020    GLAUCOMA SCREENING Q2Y  2021    EYE EXAM RETINAL OR DILATED  2021    DTaP/Tdap/Td series (2 - Td) 2027    Shingrix Vaccine Age 50>  Completed    Influenza Age 5 to Adult  Completed    Pneumococcal 65+ years  Completed        Review of Systems  Constitutional: negative for fevers, chills, anorexia and weight loss  Eyes:   negative for visual disturbance and irritation  ENT:   negative for tinnitus,sore throat,nasal congestion,ear pain,hoarseness  Respiratory:  negative for cough, hemoptysis, dyspnea,wheezing  CV:   negative for chest pain, palpitations, lower extremity edema  GI:   negative for nausea, vomiting, diarrhea, abdominal pain,melena  Endo:               negative for polyuria,polydipsia,polyphagia,heat intolerance  Genitourinary: negative for frequency, dysuria and hematuria  Integumentary: negative for rash and pruritus  Hematologic:  negative for easy bruising and gum/nose bleeding  Musculoskel: negative for myalgias, arthralgias, back pain, muscle weakness, joint pain  Neurological:  negative for headaches, dizziness, vertigo, memory problems and gait   Behavl/Psych: negative for feelings of anxiety, depression, mood changes  ROS otherwise negative      Objective:  Visit Vitals  /74 (BP 1 Location: Left arm, BP Patient Position: Sitting)   Pulse 68   Temp 97.5 °F (36.4 °C) (Oral)   Resp 18   Ht 5' 10\" (1.778 m)   Wt 231 lb (104.8 kg)   SpO2 95%   BMI 33.15 kg/m² Body mass index is 33.15 kg/m². Physical Exam:   General appearance - alert, well appearing, and in no distress  Mental status - alert, oriented to person, place, and time  EYE-DIXIE, EOMI,conjunctiva normal bilaterally, lids normal  ENT-ENT exam normal, no neck nodes or sinus tenderness  Nose - normal and patent, no erythema,  Or discharge   Mouth - mucous membranes moist, pharynx normal without lesions  Neck - supple, no significant adenopathy or bruit  Chest - clear to auscultation, no wheezes, rales or rhonchi. Heart - normal rate, regular rhythm, normal S1, S2, no murmurs, rubs, clicks or gallops   Abdomen - soft, nontender, nondistended, no masses or organomegaly  Lymph- no adenopathy palpable  Ext-peripheral pulses normal, no pedal edema, no clubbing or cyanosis  Skin-Warm and dry. no hyperpigmentation, vitiligo, or suspicious lesions  Neuro -alert, oriented, normal speech, no focal findings or movement disorder noted    In addition this patient is seen for AWV  as detailed below: This is a Subsequent Medicare Annual Wellness Exam (AWV) (Performed 12 months after IPPE or effective date of Medicare Part B enrollment)    I have reviewed the patient's medical history in detail and updated the computerized patient record. Problem list reviewed with patient and risk factors discussed. PSH, SH, FH, Medications and HM issues also reviewed and discussed. Depression screen, fall risk assessment, functional abilities and ACP also reviewed and discussed as above and below. Depression Risk Factor Screening:     3 most recent PHQ Screens 6/25/2019   Little interest or pleasure in doing things Not at all   Feeling down, depressed, irritable, or hopeless Not at all   Total Score PHQ 2 0     Alcohol Risk Factor Screening: You do not drink alcohol or very rarely. Functional Ability and Level of Safety:   Hearing Loss  The patient wears hearing aids.     Activities of Daily Living  The home contains: handrails and grab bars  Patient does total self care    Fall Risk  Fall Risk Assessment, last 12 mths 6/25/2019   Able to walk? Yes   Fall in past 12 months? No       Abuse Screen  Patient is not abused    Cognitive Screening   Evaluation of Cognitive Function:  Has your family/caregiver stated any concerns about your memory: no  Normal    Patient Care Team   Patient Care Team:  Jean Pierre Parra MD as PCP - General (Internal Medicine)  Jean Pierre Parra [facility]CP [facility]  Manju Cross MD (Otolaryngology)  Ruth Go MD (Optometry)  Inder Byrnes MD (Cardiology)  Adelina Todd MD (Urology)  Mike Perez DPM (Podiatry)  Vasiliy Schultz MD (Nephrology)    Assessment/Plan   Education and counseling provided:  Are appropriate based on today's review and evaluation    Assessment/Plan:   Impressions:      ICD-10-CM ICD-9-CM    1. Medicare annual wellness visit, subsequent Z00.00 V70.0    2. Type 2 diabetes mellitus without complication, with long-term current use of insulin (HCC) E11.9 250.00 HEMOGLOBIN A1C W/O EAG    Z79.4 V58.67 URINE, MICROALBUMIN, SEMIQUANTITATIVE   3. Essential hypertension I10 401.9 CBC WITH AUTOMATED DIFF      COLLECTION VENOUS BLOOD,VENIPUNCTURE      METABOLIC PANEL, COMPREHENSIVE      URINALYSIS W/MICROSCOPIC   4. Mixed hyperlipidemia E78.2 272.2 LIPID PANEL   5. Long-term use of high-risk medication Z79.899 V58.69 CK   6. Coronary artery disease involving native heart without angina pectoris, unspecified vessel or lesion type I25.10 414.01    7. Chronic atrial fibrillation I48.20 427.31    8. Long term current use of anticoagulant Z79.01 V58.61 PROTHROMBIN TIME + INR   9. Gout, unspecified cause, unspecified chronicity, unspecified site M10.9 274.9 URIC ACID   10. Acquired hypothyroidism E03.9 244.9 TSH 3RD GENERATION      T4, FREE   11.  BPH with obstruction/lower urinary tract symptoms N40.1 600.01     N13.8 599.69 Plan:  1. Continue present meds  2. Lifestyle modifications including Na restriction, low carb/fat diet, weight reduction and exercise (at least a walking program). Follow-up and Dispositions    · Return in about 6 months (around 6/10/2020).            Orders Placed This Encounter    CBC WITH AUTOMATED DIFF    HEMOGLOBIN A1C W/O EAG ([facility]    LIPID PANEL ([facility])    METABOLIC PANEL, COMPREHENSIVE ([facility])    CK ([facility])    TSH 3RD GENERATION ([facility])    T4, FREE ([facility])    URINALYSIS W/MICROSCOPIC ([facility])    URINE, MICROALBUMIN, SEMIQUANTITATIVE ([facility])    PROTHROMBIN TIME + INR ([facility])    URIC ACID ([facility])    COLLECTION VENOUS BLOOD,VENIPUNCTURE       Santy Lewis MD   Assessment/Plan:  Diagnoses and all orders for this visit:    Medicare annual wellness visit, subsequent    Type 2 diabetes mellitus without complication, with long-term current use of insulin (HCC)  -     HEMOGLOBIN A1C W/O EAG  -     URINE, MICROALBUMIN, SEMIQUANTITATIVE    Essential hypertension  -     CBC WITH AUTOMATED DIFF  -     COLLECTION VENOUS BLOOD,VENIPUNCTURE  -     METABOLIC PANEL, COMPREHENSIVE  -     URINALYSIS W/MICROSCOPIC    Mixed hyperlipidemia  -     LIPID PANEL    Long-term use of high-risk medication  -     CK    Coronary artery disease involving native heart without angina pectoris, unspecified vessel or lesion type    Chronic atrial fibrillation    Long term current use of anticoagulant  -     PROTHROMBIN TIME + INR    Gout, unspecified cause, unspecified chronicity, unspecified site  -     URIC ACID    Acquired hypothyroidism  -     TSH 3RD GENERATION  -     T4, FREE    BPH with obstruction/lower urinary tract symptoms        Health Maintenance Due   Topic Date Due    MEDICARE YEARLY EXAM  12/19/2019    HEMOGLOBIN A1C Q6M  12/25/2019       Other instructions:   Patient's medications were reviewed and reconciled. No change in his current medical regimen is made. Body mass index is 33.15 and dietary counseling along with printed patient education is given    Continue to check blood sugars 3 times daily. Continue monthly protimes    Health maintenance issues were reviewed and he is up-to-date on all items. Await results of multiple labs    Follow-up 6 months    Follow-up and Dispositions    · Return in about 6 months (around 6/10/2020). I have reviewed with the patient details of the assessment and plan and all questions were answered. Relevent patient education was performed. The most recent lab findings were reviewed with the patient. An After Visit Summary was printed and given to the patient.     Charisse Lynch MD

## 2019-12-10 NOTE — PATIENT INSTRUCTIONS
The best way to stay healthy is to live a healthy lifestyle. A healthy lifestyle includes regular exercise, eating a well-balanced diet, keeping a healthy weight and not smoking. Regular physical exams and screening tests are another important way to take care of yourself. Preventive exams provided by health care providers can find health problems early when treatment works best and can keep you from getting certain diseases or illnesses. Preventive services include exams, lab tests, screenings, shots, monitoring and information to help you take care of your own health. All people over 65 should have a pneumonia shot. Pneumonia shots are usually only needed once in a lifetime unless your doctor decides differently. In addition to your physical exam, some screening tests are recommended: 
 
All people over 65 should have a yearly flu shot. People over 65 are at medium to high risk for Hepatitis B. Three shots are needed for complete protection. Bone mass measurement (dexa scan) is recommended every two years. Diabetes Mellitus screening is recommended every year. Glaucoma is an eye disease caused by high pressure in the eye. An eye exam is recommended every year. Cardiovascular screening tests that check your cholesterol and other blood fat (lipid) levels are recommended every five years. Colorectal Cancer screening tests help to find pre-cancerous polyps (growths in the colon) so they can be removed before they turn into cancer. Tests ordered for screening depend on your personal and family history risk factors. Prostate Cancer Screening (annually up to age 76) Screening for breast cancer is recommended yearly with a Mammogram. 
 
Screening for cervical and vaginal cancer is recommended with a pelvic and Pap test every two years.  However if you have had an abnormal pap in the past  three years or at high risk for cervical or vaginal cancer Medicare will cover a pap test and a pelvic exam every year. Here is a list of your current Health Maintenance items with a due date: 
Health Maintenance Due Topic Date Due  
 Annual Well Visit  12/19/2019  Hemoglobin A1C    12/25/2019 Learning About Cutting Calories How do calories affect your weight? Food gives your body energy. Energy from the food you eat is measured in calories. This energy keeps your heart beating, your brain active, and your muscles working. Your body needs a certain number of calories each day. After your body uses the calories it needs, it stores extra calories as fat. To lose weight safely, you have to eat fewer calories while eating in a healthy way. How many calories do you need each day? The more active you are, the more calories you need. When you are less active, you need fewer calories. How many calories you need each day also depends on several things, including your age and whether you are male or female. Here are some general guidelines for adults: 
· Less active women and older adults need 1,600 to 2,000 calories each day. · Active women and less active men need 2,000 to 2,400 calories each day. · Active men need 2,400 to 3,000 calories each day. How can you cut calories and eat healthy meals? Whole grains, vegetables and fruits, and dried beans are good lower-calorie foods. They give you lots of nutrients and fiber. And they fill you up. Sweets, energy drinks, and soda pop are high in calories. They give you few nutrients and no fiber. Try to limit soda pop, fruit juice, and energy drinks. Drink water instead. Some fats can be part of a healthy diet. But cutting back on fats from highly processed foods like fast foods and many snack foods is a good way to lower the calories in your diet. Also, use smaller amounts of fats like butter, margarine, salad dressing, and mayonnaise.  Add fresh garlic, lemon, or herbs to your meals to add flavor without adding fat. Meats and dairy products can be a big source of hidden fats. Try to choose lean or low-fat versions of these products. Fat-free cookies, candies, chips, and frozen treats can still be high in sugar and calories. Some fat-free foods have more calories than regular ones. Eat fat-free treats in moderation, as you would other foods. If your favorite foods are high in fat, salt, sugar, or calories, limit how often you eat them. Eat smaller servings, or look for healthy substitutes. Fill up on fruits, vegetables, and whole grains. Eating at home · Use meat as a side dish instead of as the main part of your meal. 
· Try main dishes that use whole wheat pasta, brown rice, dried beans, or vegetables. · Find ways to cook with little or no fat, such as broiling, steaming, or grilling. · Use cooking spray instead of oil. If you use oil, use a monounsaturated oil, such as canola or olive oil. · Trim fat from meats before you cook them. · Drain off fat after you brown the meat or while you roast it. · Chill soups and stews after you cook them. Then skim the fat off the top after it hardens. Eating out · Order foods that are broiled or poached rather than fried or breaded. · Cut back on the amount of butter or margarine that you use on bread. · Order sauces, gravies, and salad dressings on the side, and use only a little. · When you order pasta, choose tomato sauce rather than cream sauce. · Ask for salsa with your baked potato instead of sour cream, butter, cheese, or burroughs. · Order meals in a small size instead of upgrading to a large. · Share an entree, or take part of your food home to eat as another meal. 
· Share appetizers and desserts. Where can you learn more? Go to http://carmenza.info/. Enter 99 [id number] in the search box to learn more about \"Learning About Cutting Calories. \" Current as of: November 7, 2018 Content Version: 12.2 © 6894-8503 [employer]. Care instructions adapted under license by [employer] (which disclaims liability or warranty for this information). If you have questions about a medical condition or this instruction, always ask your healthcare professional. [address] any warranty or liability for your use of this information.

## 2019-12-10 NOTE — PROGRESS NOTES
Chief Complaint   Patient presents with    Follow Up Chronic Condition     6 mo fu    Annual Wellness Visit       Depression Risk Factor Screening:     3 most recent PHQ Screens 6/25/2019   Little interest or pleasure in doing things Not at all   Feeling down, depressed, irritable, or hopeless Not at all   Total Score PHQ 2 0       Functional Ability and Level of Safety:     Activities of Daily Living  ADL Assessment 6/25/2019   Feeding yourself No Help Needed   Getting from bed to chair No Help Needed   Getting dressed No Help Needed   Bathing or showering No Help Needed   Walk across the room (includes cane/walker) No Help Needed   Using the telphone No Help Needed   Taking your medications No Help Needed   Preparing meals No Help Needed   Managing money (expenses/bills) No Help Needed   Moderately strenuous housework (laundry) No Help Needed   Shopping for personal items (toiletries/medicines) No Help Needed   Shopping for groceries No Help Needed   Driving No Help Needed   Climbing a flight of stairs No Help Needed   Getting to places beyond walking distances No Help Needed       Fall Risk  Fall Risk Assessment, last 12 mths 6/25/2019   Able to walk? Yes   Fall in past 12 months? No       Abuse Screen  Abuse Screening Questionnaire 6/25/2019   Do you ever feel afraid of your partner? N   Are you in a relationship with someone who physically or mentally threatens you? N   Is it safe for you to go home?  Y         Patient Care Team   Patient Care Team:  David Christianson MD as PCP - General (Internal Medicine)  David Christianson MD as PCP - [facility]  Sorin Peterson MD (Otolaryngology)  Meryle Curd, MD (Optometry)  North Wilder MD (Cardiology)  Mariana Parson MD (Urology)  Marsha Falk DPM (Podiatry)  Dolphus Alpers, MD (Nephrology)

## 2019-12-11 LAB
BASOPHILS # BLD AUTO: 0.1 X10E3/UL (ref 0–0.2)
BASOPHILS NFR BLD AUTO: 1 %
EOSINOPHIL # BLD AUTO: 0.3 X10E3/UL (ref 0–0.4)
EOSINOPHIL NFR BLD AUTO: 3 %
ERYTHROCYTE [DISTWIDTH] IN BLOOD BY AUTOMATED COUNT: 13.3 % (ref 12.3–15.4)
HBA1C MFR BLD HPLC: 7.1 % (ref 4–5.7)
HCT VFR BLD AUTO: 43.5 % (ref 37.5–51)
HGB BLD-MCNC: 14.5 G/DL (ref 13–17.7)
IMM GRANULOCYTES # BLD AUTO: 0.1 X10E3/UL (ref 0–0.1)
IMM GRANULOCYTES NFR BLD AUTO: 1 %
INR PPP: 2.7 (ref 0.8–1.2)
LYMPHOCYTES # BLD AUTO: 2.2 X10E3/UL (ref 0.7–3.1)
LYMPHOCYTES NFR BLD AUTO: 21 %
MCH RBC QN AUTO: 31.5 PG (ref 26.6–33)
MCHC RBC AUTO-ENTMCNC: 33.3 G/DL (ref 31.5–35.7)
MCV RBC AUTO: 95 FL (ref 79–97)
MONOCYTES # BLD AUTO: 1.1 X10E3/UL (ref 0.1–0.9)
MONOCYTES NFR BLD AUTO: 11 %
NEUTROPHILS # BLD AUTO: 6.5 X10E3/UL (ref 1.4–7)
NEUTROPHILS NFR BLD AUTO: 63 %
PLATELET # BLD AUTO: 185 X10E3/UL (ref 150–450)
PROTHROMBIN TIME: 26.1 SEC (ref 9.1–12)
RBC # BLD AUTO: 4.6 X10E6/UL (ref 4.14–5.8)
WBC # BLD AUTO: 10.2 X10E3/UL (ref 3.4–10.8)

## 2019-12-11 NOTE — PROGRESS NOTES
Labs stable. A1c 7.1. No change in current medicines  INR 2.7. No change in Coumadin.   Recheck pro time 1 month

## 2019-12-12 ENCOUNTER — TELEPHONE ANTICOAG (OUTPATIENT)
Dept: INTERNAL MEDICINE CLINIC | Age: 84
End: 2019-12-12

## 2019-12-12 DIAGNOSIS — I48.20 CHRONIC ATRIAL FIBRILLATION (HCC): ICD-10-CM

## 2019-12-12 NOTE — PROGRESS NOTES
Anticoagulation Episode Summary     Current INR goal:   1.8-3.0   TTR:   83.8 % (2.3 y)   Next INR check:   1/8/2020   INR from last check:   2.7 (12/10/2019)   Weekly max warfarin dose:      Target end date:      INR check location:   Clinic Lab   Preferred lab:      Send INR reminders to:        Indications    Chronic atrial fibrillation [I48.20]           Comments:            Anticoagulation Care Providers     Provider Role Specialty Phone number    Efrain Morrison MD [facility] [phone]        Labs stable.  A1c 7.1.  No change in current medicines   INR 2.7.  No change in Coumadin.  Recheck pro time 1 month

## 2020-01-27 RX ORDER — CYANOCOBALAMIN/FOLIC AC/VIT B6 1-2.2-25MG
TABLET ORAL
Qty: 90 TAB | Refills: 0 | Status: SHIPPED | OUTPATIENT
Start: 2020-01-27 | End: 2020-04-27

## 2020-03-10 RX ORDER — SPIRONOLACTONE 25 MG/1
TABLET ORAL
Qty: 90 TAB | Refills: 3 | Status: SHIPPED | OUTPATIENT
Start: 2020-03-10 | End: 2021-04-05

## 2020-03-10 RX ORDER — METOPROLOL TARTRATE 100 MG/1
TABLET ORAL
Qty: 180 TAB | Refills: 3 | Status: SHIPPED | OUTPATIENT
Start: 2020-03-10 | End: 2021-05-19

## 2020-03-18 ENCOUNTER — OFFICE VISIT (OUTPATIENT)
Dept: INTERNAL MEDICINE CLINIC | Age: 85
End: 2020-03-18

## 2020-03-18 VITALS
BODY MASS INDEX: 33.61 KG/M2 | OXYGEN SATURATION: 94 % | HEIGHT: 70 IN | WEIGHT: 234.8 LBS | SYSTOLIC BLOOD PRESSURE: 118 MMHG | TEMPERATURE: 97.7 F | RESPIRATION RATE: 18 BRPM | HEART RATE: 62 BPM | DIASTOLIC BLOOD PRESSURE: 66 MMHG

## 2020-03-18 DIAGNOSIS — Z79.01 LONG TERM CURRENT USE OF ANTICOAGULANT: Chronic | ICD-10-CM

## 2020-03-18 DIAGNOSIS — H61.21 IMPACTED CERUMEN OF RIGHT EAR: ICD-10-CM

## 2020-03-18 DIAGNOSIS — I48.20 CHRONIC ATRIAL FIBRILLATION (HCC): Primary | Chronic | ICD-10-CM

## 2020-03-18 NOTE — PROGRESS NOTES
Joseph Dumont is a 82 y.o. male presenting for Ear Fullness (R)  . 1. Have you been to the ER, urgent care clinic since your last visit? Hospitalized since your last visit? No    2. Have you seen or consulted any other health care providers outside of the [address] [place] [address] since your last visit? Include any pap smears or colon screening. No    Fall Risk Assessment, last 12 mths 6/25/2019   Able to walk? Yes   Fall in past 12 months? No         Abuse Screening Questionnaire 6/25/2019   Do you ever feel afraid of your partner? N   Are you in a relationship with someone who physically or mentally threatens you? N   Is it safe for you to go home? Y       3 most recent PHQ Screens 6/25/2019   Little interest or pleasure in doing things Not at all   Feeling down, depressed, irritable, or hopeless Not at all   Total Score PHQ 2 0       There are no discontinued medications.

## 2020-03-18 NOTE — PROGRESS NOTES
Subjective:     Susana Diaz is a 82 y.o. male who presents for evaluation of a plugged ear. He has noticed right symptoms 2 days ago. His hearing aid kept getting plugged up with cerumen and he was seen at an audiologist office and was told that he had an impaction. There is a prior history of cerumen impaction. Patient denies ear pain. Patient has hearing loss. Patient also request prothrombin time today due to chronic Coumadin anticoagulation. Objective:     Visit Vitals  /66 (BP 1 Location: Left arm, BP Patient Position: Sitting)   Pulse 62   Temp 97.7 °F (36.5 °C) (Oral)   Resp 18   Ht 5' 10\" (1.778 m)   Wt 234 lb 12.8 oz (106.5 kg)   SpO2 94%   BMI 33.69 kg/m²       General: alert, cooperative, no distress   Right Ear: ceruminosis noted. Left Ear:  left ear normal.    After removal: bilateral TM's and external ear canals normal, cerumen removed. Oropharynx:   normal.   Neck:  supple, symmetrical, trachea midline and no adenopathy. Assessment/Plan:     Cerumen Impaction, without otitis externa. 1. Cerumen removed by flushing. 2. Care instructions given. 3. Home treatment: none  4. Followup as needed. ICD-10-CM ICD-9-CM    1. Chronic atrial fibrillation I48.20 427.31 PROTHROMBIN TIME + INR   .

## 2020-03-19 ENCOUNTER — TELEPHONE ANTICOAG (OUTPATIENT)
Dept: INTERNAL MEDICINE CLINIC | Age: 85
End: 2020-03-19

## 2020-03-19 DIAGNOSIS — I48.20 CHRONIC ATRIAL FIBRILLATION (HCC): ICD-10-CM

## 2020-03-19 LAB
INR PPP: 1.7 (ref 0.8–1.2)
PROTHROMBIN TIME: 17.1 SEC (ref 9.1–12)

## 2020-03-19 NOTE — PROGRESS NOTES
INR is 1.7. Take extra 2.5 mg of Coumadin today and then resume usual regimen.   Recheck pro time 1 month

## 2020-04-20 RX ORDER — LOVASTATIN 40 MG/1
TABLET ORAL
Qty: 180 TAB | Refills: 3 | Status: SHIPPED | OUTPATIENT
Start: 2020-04-20 | End: 2021-07-12

## 2020-04-27 DIAGNOSIS — Z79.4 TYPE 2 DIABETES MELLITUS WITHOUT COMPLICATION, WITH LONG-TERM CURRENT USE OF INSULIN (HCC): Primary | Chronic | ICD-10-CM

## 2020-04-27 DIAGNOSIS — E11.9 TYPE 2 DIABETES MELLITUS WITHOUT COMPLICATION, WITH LONG-TERM CURRENT USE OF INSULIN (HCC): Primary | Chronic | ICD-10-CM

## 2020-04-27 RX ORDER — CYANOCOBALAMIN/FOLIC AC/VIT B6 1-2.2-25MG
TABLET ORAL
Qty: 90 TAB | Refills: 0 | Status: SHIPPED | OUTPATIENT
Start: 2020-04-27 | End: 2020-06-11 | Stop reason: ALTCHOICE

## 2020-04-27 NOTE — TELEPHONE ENCOUNTER
Requested Prescriptions     Pending Prescriptions Disp Refills    glucose blood VI test strips (FreeStyle Test) strip 300 Strip 3     Sig: USE TO TEST THREE TIMES DAILY       Last Refill: 8-21-19  Last visit:3-18-20

## 2020-06-11 ENCOUNTER — OFFICE VISIT (OUTPATIENT)
Dept: INTERNAL MEDICINE CLINIC | Age: 85
End: 2020-06-11

## 2020-06-11 VITALS
HEIGHT: 70 IN | HEART RATE: 68 BPM | TEMPERATURE: 98.1 F | BODY MASS INDEX: 33.58 KG/M2 | DIASTOLIC BLOOD PRESSURE: 72 MMHG | RESPIRATION RATE: 16 BRPM | OXYGEN SATURATION: 99 % | WEIGHT: 234.6 LBS | SYSTOLIC BLOOD PRESSURE: 130 MMHG

## 2020-06-11 DIAGNOSIS — I48.20 CHRONIC ATRIAL FIBRILLATION (HCC): Chronic | ICD-10-CM

## 2020-06-11 DIAGNOSIS — I25.10 CORONARY ARTERY DISEASE INVOLVING NATIVE HEART WITHOUT ANGINA PECTORIS, UNSPECIFIED VESSEL OR LESION TYPE: Chronic | ICD-10-CM

## 2020-06-11 DIAGNOSIS — Z79.01 LONG TERM CURRENT USE OF ANTICOAGULANT: Chronic | ICD-10-CM

## 2020-06-11 DIAGNOSIS — I10 ESSENTIAL HYPERTENSION: Chronic | ICD-10-CM

## 2020-06-11 DIAGNOSIS — N39.0 URINARY TRACT INFECTION WITHOUT HEMATURIA, SITE UNSPECIFIED: ICD-10-CM

## 2020-06-11 DIAGNOSIS — Z79.899 LONG-TERM USE OF HIGH-RISK MEDICATION: Chronic | ICD-10-CM

## 2020-06-11 DIAGNOSIS — Z95.0 S/P PLACEMENT OF CARDIAC PACEMAKER: ICD-10-CM

## 2020-06-11 DIAGNOSIS — N18.30 CKD (CHRONIC KIDNEY DISEASE) STAGE 3, GFR 30-59 ML/MIN (HCC): ICD-10-CM

## 2020-06-11 DIAGNOSIS — E03.9 ACQUIRED HYPOTHYROIDISM: Chronic | ICD-10-CM

## 2020-06-11 DIAGNOSIS — E78.2 MIXED HYPERLIPIDEMIA: Chronic | ICD-10-CM

## 2020-06-11 DIAGNOSIS — I49.5 SICK SINUS SYNDROME (HCC): ICD-10-CM

## 2020-06-11 DIAGNOSIS — E11.9 TYPE 2 DIABETES MELLITUS WITHOUT COMPLICATION, WITH LONG-TERM CURRENT USE OF INSULIN (HCC): Primary | Chronic | ICD-10-CM

## 2020-06-11 DIAGNOSIS — Z79.4 TYPE 2 DIABETES MELLITUS WITHOUT COMPLICATION, WITH LONG-TERM CURRENT USE OF INSULIN (HCC): Primary | Chronic | ICD-10-CM

## 2020-06-11 LAB
A-G RATIO,AGRAT: 1.2 RATIO
ALBUMIN SERPL-MCNC: 4.1 G/DL (ref 3.9–5.4)
ALP SERPL-CCNC: 197 U/L (ref 38–126)
ALT SERPL-CCNC: 37 U/L (ref 0–50)
ANION GAP SERPL CALC-SCNC: 11 MMOL/L
AST SERPL W P-5'-P-CCNC: 35 U/L (ref 14–36)
BILIRUB SERPL-MCNC: 1 MG/DL (ref 0.2–1.3)
BILIRUB UR QL: NEGATIVE
BUN SERPL-MCNC: 39 MG/DL (ref 9–20)
BUN/CREATININE RATIO,BUCR: 28 RATIO
CALCIUM SERPL-MCNC: 9.4 MG/DL (ref 8.4–10.2)
CHLORIDE SERPL-SCNC: 100 MMOL/L (ref 98–107)
CHOL/HDL RATIO,CHHD: 3 RATIO (ref 0–4)
CHOLEST SERPL-MCNC: 122 MG/DL (ref 0–200)
CK SERPL-CCNC: 69 U/L (ref 30–135)
CLARITY: CLEAR
CO2 SERPL-SCNC: 28 MMOL/L (ref 22–32)
COLOR UR: ABNORMAL
CREAT SERPL-MCNC: 1.4 MG/DL (ref 0.8–1.5)
ERYTHROCYTE [DISTWIDTH] IN BLOOD BY AUTOMATED COUNT: 13.2 %
GLOBULIN,GLOB: 3.5
GLUCOSE 24H UR-MRATE: ABNORMAL G/(24.H)
GLUCOSE SERPL-MCNC: 230 MG/DL (ref 75–110)
HBA1C MFR BLD HPLC: 8.1 % (ref 4–5.7)
HCT VFR BLD AUTO: 47.9 % (ref 37–51)
HDLC SERPL-MCNC: 37 MG/DL (ref 35–130)
HGB BLD-MCNC: 15.6 G/DL (ref 12–18)
HGB UR QL STRIP: ABNORMAL
KETONES UR QL STRIP.AUTO: NEGATIVE
LDL/HDL RATIO,LDHD: 1 RATIO
LDLC SERPL CALC-MCNC: 49 MG/DL (ref 0–130)
LEUKOCYTE ESTERASE: NEGATIVE
LYMPHOCYTES ABSOLUTE: 2.6 K/UL (ref 0.6–4.1)
LYMPHOCYTES NFR BLD: 28.7 % (ref 10–58.5)
MCH RBC QN AUTO: 33 PG (ref 26–32)
MCHC RBC AUTO-ENTMCNC: 32.6 G/DL (ref 30–36)
MCV RBC AUTO: 101.2 FL (ref 80–97)
MICROALBUMIN, URINE: 100 MG/L (ref 0–20)
MONOCYTES ABS-DIF,2141: 1 K/UL (ref 0–1.8)
MONOCYTES NFR BLD: 10.7 % (ref 0.1–24)
NEUTROPHILS # BLD: 60.6 % (ref 37–92)
NEUTROPHILS ABS,2156: 5.6 K/UL (ref 2–7.8)
NITRITE UR QL STRIP.AUTO: NEGATIVE
PH UR STRIP: 5 [PH] (ref 5–7)
PLATELET # BLD AUTO: 176 K/UL (ref 140–440)
POTASSIUM SERPL-SCNC: 4.6 MMOL/L (ref 3.6–5)
PROT SERPL-MCNC: 7.6 G/DL (ref 6.3–8.2)
PROT UR STRIP-MCNC: ABNORMAL MG/DL
RBC # BLD AUTO: 4.73 M/UL (ref 4.2–6.3)
RBC #/AREA URNS HPF: ABNORMAL #/HPF
SODIUM SERPL-SCNC: 139 MMOL/L (ref 137–145)
SP GR UR REFRACTOMETRY: 1.01 (ref 1–1.03)
TRIGL SERPL-MCNC: 182 MG/DL (ref 0–200)
UROBILINOGEN UR QL STRIP.AUTO: NEGATIVE
VLDLC SERPL CALC-MCNC: 36 MG/DL
WBC # BLD AUTO: 9.2 K/UL (ref 4.1–10.9)
WBC URNS QL MICRO: ABNORMAL #/HPF

## 2020-06-11 RX ORDER — FUROSEMIDE 40 MG/1
40 TABLET ORAL 2 TIMES DAILY
COMMUNITY
End: 2020-09-01

## 2020-06-11 NOTE — PROGRESS NOTES
Brianne Enciso is a 82 y.o. male presenting for Follow Up Chronic Condition (6 mo fu)  . 1. Have you been to the ER, urgent care clinic since your last visit? Hospitalized since your last visit? No    2. Have you seen or consulted any other health care providers outside of the [address] [place] [address] since your last visit? Include any pap smears or colon screening. Dr Julius Rodriguez last week for follow up    Fall Risk Assessment, last 12 mths 6/11/2020   Able to walk? Yes   Fall in past 12 months? No         Abuse Screening Questionnaire 6/11/2020   Do you ever feel afraid of your partner? N   Are you in a relationship with someone who physically or mentally threatens you? N   Is it safe for you to go home? Y       3 most recent PHQ Screens 6/11/2020   Little interest or pleasure in doing things Not at all   Feeling down, depressed, irritable, or hopeless Not at all   Total Score PHQ 2 0       There are no discontinued medications.

## 2020-06-11 NOTE — PROGRESS NOTES
This note will not be viewable in [address] Erika Lynne is a 82 y.o. male and presents with Follow Up Chronic Condition (6 mo fu)  . Subjective:  Mr. Katerine Camacho returns to the office today in follow-up of multiple medical problems. The patient's type 2 diabetes mellitus is currently being managed on Lantus insulin along with as needed Humalog insulin. He checks his blood sugars up to 3 times daily with average fasting blood sugars below 140. Patient is up-to-date on diabetic retinal eye exam and he denies hypoglycemia or burning paresthesias of his feet. Patient has hypertension currently on losartan, metoprolol and Lasix. He tolerates this regimen without cough, rash, lower extremity edema, fatigue, palpitations, orthostatic dizziness. He has had no headaches, numbness, tingling logical problems. He is on lovastatin and flaxseed oil for his hyperlipidemia. He tolerates this without muscle soreness or GI upset. The patient does have a history of vascular disease with coronary artery disease carotid artery stenosis peripheral vascular disease and renal artery stenosis. He denies any exertional chest pains or claudication. There is a history of chronic atrial fibrillation and he is currently been well controlled although did have sick sinus syndrome and the pacemaker was placed. He is on Coumadin for chronic anticoagulation for stroke prevention. The patient denies any bleeding problems or strokelike symptoms. There is been no palpitations or syncope. He does have his pacemaker checked on a regular basis. Patient has hypothyroidism currently on thyroid replacement. He continues to take his medication on an empty stomach with water and waits 30 to 60 minutes prior to eating a meal.  He denies heat or cold intolerance, changes in skin or hair, weight is been stable. He has chronic kidney disease stage III which has been stable. He is due to have this checked today.   There is been no lower extremity edema. Past Medical History:   Diagnosis Date    Acquired hypothyroidism 9/12/2017    Anemia 2/1/2015    Arrhythmia     a. fib.& a.  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    CAD (coronary artery disease)     Chronic atrial fibrillation (HCC) 2/1/2015    Chronic kidney disease     decreased kidney function    CKD (chronic kidney disease) stage 3, GFR 30-59 ml/min ([facility]) 2/1/2015    Diabetes ([address]     Diverticulosis of large intestine without hemorrhage 10/24/2017    Gout 10/24/2017    Heart failure ([address]     Hyperlipidemia 2/1/2015    Hypertension     Ill-defined condition     high cholesterol    Ill-defined condition     gout    Long term current use of amiodarone 10/24/2017    Long term current use of anticoagulant 10/24/2017    Long-term use of high-risk medication 10/24/2017    Macular degeneration 10/24/2017    Peripheral vascular disease ([address] 10/24/2017    Renal artery stenosis ([facility] 10/24/2017    Right-sided extracranial carotid artery stenosis 10/24/2017    Sick sinus syndrome ([address] 10/24/2017    Status post pacemaker     Past Surgical History:   Procedure Laterality Date    HX AAA REPAIR      HX APPENDECTOMY      HX CATARACT REMOVAL      / lens implant    HX CHOLECYSTECTOMY      HX ORTHOPAEDIC Right     rt unicondular knee replacement    HX ORTHOPAEDIC  1/26/15    LEFT UNICONDYLAR KNEE ARTHROPLASTY    HX PACEMAKER  7/11    pacemaker    HX TONSILLECTOMY      HX UROLOGICAL      rt renal stentx2     Allergies   Allergen Reactions    Latex Other (comments)     Skin raw and severely irritated    Aspirin Unknown (comments)    Hydrocodone Other (comments)     hallucinations    Oxycodone Other (comments)     hallucinations    Sulfa (Sulfonamide Antibiotics) Rash    Tetracycline Rash     Current Outpatient Medications   Medication Sig Dispense Refill    furosemide (LASIX) 40 mg tablet Take 40 mg by mouth two (2) times a day.  glucose blood VI test strips (FreeStyle Test) strip USE TO TEST THREE TIMES DAILY 300 Strip 3    lovastatin (MEVACOR) 40 mg tablet TAKE 2 TABLETS DAILY 180 Tab 3    metoprolol tartrate (LOPRESSOR) 100 mg IR tablet TAKE 1 TABLET TWICE A  Tab 3    spironolactone (ALDACTONE) 25 mg tablet TAKE 1 TABLET DAILY 90 Tab 3    isosorbide mononitrate ER (IMDUR) 60 mg CR tablet TAKE 1 TABLET DAILY 90 Tab 3    warfarin (COUMADIN) 5 mg tablet TAKE 1 TABLET DAILY 90 Tab 3    levothyroxine (SYNTHROID) 175 mcg tablet take 1 tablet by mouth once daily BEFORE BREAKFAST 90 Tab 3    allopurinol (ZYLOPRIM) 300 mg tablet TAKE 1 TABLET DAILY 90 Tab 3    insulin glargine (LANTUS SOLOSTAR U-100 INSULIN) 100 unit/mL (3 mL) inpn INJECT 66 UNITS UNDER THE SKIN DAILY (Patient taking differently: INJECT 60 UNITS UNDER THE SKIN DAILY) 60 mL 3    insulin lispro (HUMALOG) 100 unit/mL injection 5-10 Units by SubCUTAneous route three (3) times daily as needed (per sliding scale). 1 Vial 6    BD INSULIN PEN NEEDLE UF SHORT 31 gauge x 5/16\" ndle use four times a day or as directed by prescriber 120 Pen Needle 4    Flaxseed Oil oil Take 1,000 mg by mouth daily.  terazosin (HYTRIN) 10 mg capsule Take 1 capsule by mouth nightly. Indications: BENIGN PROSTATIC HYPERTROPHY, HYPERTENSION      VIT A/VIT C/VIT E/ZINC/COPPER (OCUVITE PRESERVISION PO) Take 1 Tab by mouth two (2) times a day.  losartan (COZAAR) 50 mg tablet Take 50 mg by mouth daily. Indications: CHRONIC HEART FAILURE, HYPERTENSION      multivitamin, stress formula (STRESS TAB) tablet Take 1 Tab by mouth daily.  CYANOCOBALAMIN/FA/PYRIDOXINE (FOLGARD RX 2.2 PO) Take 1 tablet by mouth daily.  finasteride (PROSCAR) 5 mg tablet Take 5 mg by mouth every other day.  With dinner  Indications: BENIGN PROSTATIC HYPERTROPHY       Social History     Socioeconomic History    Marital status:      Spouse name: Not on file    Number of children: Not on file    Years of education: Not on file    Highest education level: Not on file   Tobacco Use    Smoking status: Former Smoker     Last attempt to quit: 1990     Years since quittin.9    Smokeless tobacco: Never Used   Substance and Sexual Activity    Alcohol use:  Yes     Alcohol/week: 0.0 standard drinks     Comment: 3-4 drinks a week    Drug use: No     Types: Prescription, OTC     Family History   Problem Relation Age of Onset    Cancer Mother     Diabetes Father        Health Maintenance   Topic Date Due    Foot Exam Q1  2020    Influenza Age 5 to Adult  2020    Medicare Yearly Exam  12/10/2020    MICROALBUMIN Q1  12/10/2020    Lipid Screen  12/10/2020    GLAUCOMA SCREENING Q2Y  2021    Eye Exam Retinal or Dilated  2021    DTaP/Tdap/Td series (2 - Td) 2027    Shingrix Vaccine Age 50>  Completed    Pneumococcal 65+ years  Completed        Review of Systems  Constitutional: negative for fevers, chills, anorexia and weight loss  Eyes:   negative for visual disturbance and irritation  ENT:   negative for tinnitus,sore throat,nasal congestion,ear pain,hoarseness  Respiratory:  negative for cough, hemoptysis, dyspnea,wheezing  CV:   negative for chest pain, palpitations, lower extremity edema  GI:   negative for nausea, vomiting, diarrhea, abdominal pain,melena  Endo:               negative for polyuria,polydipsia,polyphagia,heat intolerance  Genitourinary: negative for frequency, dysuria and hematuria  Integumentary: negative for rash and pruritus  Hematologic:  negative for easy bruising and gum/nose bleeding  Musculoskel: negative for myalgias, arthralgias, back pain, muscle weakness, joint pain  Neurological:  negative for headaches, dizziness, vertigo, memory problems and gait   Behavl/Psych: negative for feelings of anxiety, depression, mood changes  ROS otherwise negative      Objective:  Visit Vitals  /72 (BP 1 Location: Left arm, BP Patient Position: Sitting)   Pulse 68   Temp 98.1 °F (36.7 °C) (Oral)   Resp 16   Ht 5' 10\" (1.778 m)   Wt 234 lb 9.6 oz (106.4 kg)   SpO2 99%   BMI 33.66 kg/m²     Body mass index is 33.66 kg/m². Physical Exam:   General appearance - alert, well appearing, and in no distress  Mental status - alert, oriented to person, place, and time  EYE-DIXIE, EOMI,conjunctiva normal bilaterally, lids normal  ENT-ENT exam normal, no neck nodes or sinus tenderness  Nose - normal and patent, no erythema,  Or discharge   Mouth - mucous membranes moist, pharynx normal without lesions  Neck - supple, no significant adenopathy or bruit  Chest - clear to auscultation, no wheezes, rales or rhonchi. Heart - normal rate, regular rhythm, normal S1, S2, no murmurs, rubs, clicks or gallops   Abdomen - soft, nontender, nondistended, no masses or organomegaly  Lymph- no adenopathy palpable  Ext-peripheral pulses normal, no pedal edema, no clubbing or cyanosis  Skin-Warm and dry.  no hyperpigmentation, vitiligo, or suspicious lesions  Neuro -alert, oriented, normal speech, no focal findings or movement disorder noted      Assessment/Plan:  Diagnoses and all orders for this visit:    Type 2 diabetes mellitus without complication, with long-term current use of insulin ([facility])  -     COLLECTION VENOUS BLOOD,VENIPUNCTURE  -     HEMOGLOBIN A1C W/O EAG  -     URINE, MICROALBUMIN, SEMIQUANTITATIVE    Essential hypertension  -     CBC WITH AUTOMATED DIFF  -     METABOLIC PANEL, COMPREHENSIVE  -     URINALYSIS W/MICROSCOPIC    Mixed hyperlipidemia  -     LIPID PANEL    Long-term use of high-risk medication  -     CK    Coronary artery disease involving native heart without angina pectoris, unspecified vessel or lesion type    Chronic atrial fibrillation (HCC)    Long term current use of anticoagulant  -     PROTHROMBIN TIME + INR    Acquired hypothyroidism  -     TSH 3RD GENERATION  -     T4, FREE    Sick sinus syndrome (HCC)    S/P placement of cardiac pacemaker    CKD (chronic kidney disease) stage 3, GFR 30-59 ml/min ([facility])        Other instructions: The patient's medications were reviewed and reconciled. No change in his current medical regimen will be made. A no added salt, prudent diet is encouraged    Continue to check blood sugars up to 3 times daily. Monthly protimes encouraged    Await results of multiple labs    Follow-up in 6 months    Follow-up and Dispositions    · Return in about 6 months (around 12/11/2020). I have reviewed with the patient details of the assessment and plan and all questions were answered. Relevent patient education was performed. The most recent lab findings were reviewed with the patient. An After Visit Summary was printed and given to the patient. Marquis Suzy MD    Please note that this dictation was completed with [employer]cdexperts, the computer voice recognition software. Quite often unanticipated grammatical, syntax, homophones, and other interpretive errors are inadvertently transcribed by the computer software. Please disregard these errors. Please excuse any errors that have escaped final proofreading.

## 2020-06-11 NOTE — PATIENT INSTRUCTIONS
Learning About Coronary Artery Disease (CAD) What is coronary artery disease? Coronary artery disease (CAD) occurs when plaque builds up in the arteries that bring oxygen-rich blood to your heart. Plaque is a fatty substance made of cholesterol, calcium, and other substances in the blood. This process is called hardening of the arteries, or atherosclerosis. What happens when you have coronary artery disease? · Plaque may narrow the coronary arteries. Narrowed arteries cause poor blood flow. This can lead to angina symptoms such as chest pain or discomfort. If blood flow is completely blocked, you could have a heart attack. · You can slow CAD and reduce the risk of future problems by making changes in your lifestyle. These include quitting smoking and eating heart-healthy foods. · Treatments for CAD, along with changes in your lifestyle, can help you live a longer and healthier life. How can you prevent coronary artery disease? · Do not smoke. It may be the best thing you can do to prevent heart disease. If you need help quitting, talk to your doctor about stop-smoking programs and medicines. These can increase your chances of quitting for good. · Be active. Get at least 30 minutes of exercise on most days of the week. Walking is a good choice. You also may want to do other activities, such as running, swimming, cycling, or playing tennis or team sports. · Eat heart-healthy foods. Eat more fruits and vegetables and less foods that contain saturated and trans fats. Limit alcohol, sodium, and sweets. · Stay at a healthy weight. Lose weight if you need to. · Manage other health problems such as diabetes, high blood pressure, and high cholesterol. How is coronary artery disease treated? · Your doctor will suggest that you make lifestyle changes. For example, your doctor may ask you to eat healthy foods, quit smoking, lose extra weight, and be more active. · You will have to take medicines. · Your doctor may suggest a procedure to open narrowed or blocked arteries. This is called angioplasty. Or your doctor may suggest using healthy blood vessels to create detours around narrowed or blocked arteries. This is called bypass surgery. Follow-up care is a key part of your treatment and safety. Be sure to make and go to all appointments, and call your doctor if you are having problems. It's also a good idea to know your test results and keep a list of the medicines you take. Where can you learn more? Go to http://rufina-juni.info/ Enter ([phone] [phone] in the search box to learn more about \"Learning About Coronary Artery Disease (CAD). \" Current as of: December 16, 2019               Content Version: 12.5 © 8220-3562 [employer]. Care instructions adapted under license by [employer] (which disclaims liability or warranty for this information). If you have questions about a medical condition or this instruction, always ask your healthcare professional. [place] [address] any warranty or liability for your use of this information.

## 2020-06-12 LAB
INR PPP: 2.4 (ref 0.8–1.2)
PROTHROMBIN TIME: 24.5 SEC (ref 9.1–12)
T4 FREE SERPL-MCNC: 0.73 NG/DL (ref 0.58–2.3)
TSH SERPL DL<=0.05 MIU/L-ACNC: 2.28 UIU/ML (ref 0.34–5.6)

## 2020-06-13 LAB — BACTERIA UR CULT: NORMAL

## 2020-06-13 NOTE — PROGRESS NOTES
with A1c up to 8.1. Recommend start of weekly trulicity or ozempic - have him check with pharmacist to see if either is covered.

## 2020-06-15 ENCOUNTER — TELEPHONE ANTICOAG (OUTPATIENT)
Dept: INTERNAL MEDICINE CLINIC | Age: 85
End: 2020-06-15

## 2020-06-15 DIAGNOSIS — I48.20 CHRONIC ATRIAL FIBRILLATION (HCC): ICD-10-CM

## 2020-06-15 NOTE — PROGRESS NOTES
Anticoagulation Summary  As of 6/15/2020    INR goal:   1.8-3.0   TTR:   84.5 % (2.8 y)   INR used for dosin.4 (2020)   Warfarin maintenance plan:   5 mg (5 mg x 1) every day   Weekly warfarin total:   35 mg   No change documented:   Vipul Garcia   Plan last modified:   hSaq Castillo LPN (3/52/3761)   Next INR check:   7/15/2020   Target end date:       Indications    Chronic [facility] [I48.20]             Anticoagulation Episode Summary     INR check location:   Clinic Lab    Preferred lab:       Send INR reminders to:       Comments:         Anticoagulation Care Providers     Provider Role Specialty Phone number    Tariq Ayala MD [facility] Internal Medicine [phone]

## 2020-06-16 ENCOUNTER — TELEPHONE (OUTPATIENT)
Dept: INTERNAL MEDICINE CLINIC | Age: 85
End: 2020-06-16

## 2020-06-16 NOTE — TELEPHONE ENCOUNTER
Patient would like to try stricter dieting for 1 month and recheck his FBS and A1C before considering Ozempic or Trulicity.

## 2020-06-27 RX ORDER — PEN NEEDLE, DIABETIC 31 GX5/16"
NEEDLE, DISPOSABLE MISCELLANEOUS
Qty: 100 PEN NEEDLE | Refills: 4 | Status: SHIPPED | OUTPATIENT
Start: 2020-06-27 | End: 2022-06-06 | Stop reason: SDUPTHER

## 2020-07-14 ENCOUNTER — TELEPHONE (OUTPATIENT)
Dept: INTERNAL MEDICINE CLINIC | Age: 85
End: 2020-07-14

## 2020-07-16 ENCOUNTER — LAB ONLY (OUTPATIENT)
Dept: INTERNAL MEDICINE CLINIC | Age: 85
End: 2020-07-16

## 2020-07-16 DIAGNOSIS — Z79.4 TYPE 2 DIABETES MELLITUS WITHOUT COMPLICATION, WITH LONG-TERM CURRENT USE OF INSULIN (HCC): Primary | Chronic | ICD-10-CM

## 2020-07-16 DIAGNOSIS — E11.9 TYPE 2 DIABETES MELLITUS WITHOUT COMPLICATION, WITH LONG-TERM CURRENT USE OF INSULIN (HCC): Primary | Chronic | ICD-10-CM

## 2020-07-16 LAB
GLUCOSE SERPL-MCNC: 100 MG/DL (ref 75–110)
HBA1C MFR BLD HPLC: 7.8 % (ref 4–5.7)

## 2020-07-16 NOTE — PROGRESS NOTES
Fasting blood sugar was down to 100. A1c slightly better at 7.8.   Will need to continue current diet is he is doing and recheck fasting blood sugar and A1c in 3 months

## 2020-07-17 RX ORDER — CYANOCOBALAMIN/FOLIC AC/VIT B6 1-2.2-25MG
TABLET ORAL
Qty: 90 TAB | Refills: 3 | Status: SHIPPED | OUTPATIENT
Start: 2020-07-17 | End: 2021-07-14

## 2020-07-18 RX ORDER — INSULIN GLARGINE 100 [IU]/ML
INJECTION, SOLUTION SUBCUTANEOUS
Qty: 60 ML | Refills: 3 | Status: SHIPPED | OUTPATIENT
Start: 2020-07-18 | End: 2021-08-30

## 2020-09-01 RX ORDER — FUROSEMIDE 40 MG/1
TABLET ORAL
Qty: 180 TAB | Refills: 3 | Status: SHIPPED | OUTPATIENT
Start: 2020-09-01 | End: 2021-03-22 | Stop reason: SDUPTHER

## 2020-09-08 ENCOUNTER — LAB ONLY (OUTPATIENT)
Dept: INTERNAL MEDICINE CLINIC | Age: 85
End: 2020-09-08

## 2020-09-08 DIAGNOSIS — I48.20 CHRONIC ATRIAL FIBRILLATION (HCC): Primary | ICD-10-CM

## 2020-09-09 ENCOUNTER — TELEPHONE ANTICOAG (OUTPATIENT)
Dept: INTERNAL MEDICINE CLINIC | Age: 85
End: 2020-09-09

## 2020-09-09 DIAGNOSIS — I48.20 CHRONIC ATRIAL FIBRILLATION (HCC): ICD-10-CM

## 2020-09-09 LAB
INR PPP: 1.8 (ref 0.8–1.2)
PROTHROMBIN TIME: 18.8 SEC (ref 9.1–12)

## 2020-09-09 NOTE — PROGRESS NOTES
Anticoagulation Summary  As of 2020    INR goal:   1.8-3.0   TTR:   85.8 % (3 y)   INR used for dosin.8 (2020)   Warfarin maintenance plan:   5 mg (5 mg x 1) every day   Weekly warfarin total:   35 mg   No change documented:   Kylee High   Plan last modified:   Michael Murillo LPN ()   Next INR check:   10/9/2020   Target end date:       Indications    Chronic [facility] [I48.20]             Anticoagulation Episode Summary     INR check location:   Clinic Lab    Preferred lab:       Send INR reminders to:       Comments:         Anticoagulation Care Providers     Provider Role Specialty Phone number    Amanda Amaya MD Responsible Internal Medicine [phone]        INR is 1.8.  No change in Coumadin.  Recheck PT/INR in 1 month

## 2020-09-23 RX ORDER — LEVOTHYROXINE SODIUM 175 UG/1
TABLET ORAL
Qty: 90 TAB | Refills: 3 | Status: SHIPPED | OUTPATIENT
Start: 2020-09-23 | End: 2021-09-08

## 2020-09-23 NOTE — TELEPHONE ENCOUNTER
Requested Prescriptions     Pending Prescriptions Disp Refills    levothyroxine (SYNTHROID) 175 mcg tablet 90 Tab 3     Sig: take 1 tablet by mouth once daily BEFORE BREAKFAST       Last Refill: 9/16/19  Next Appointment:12/16/20

## 2020-10-13 RX ORDER — ALLOPURINOL 300 MG/1
TABLET ORAL
Qty: 90 TAB | Refills: 3 | Status: SHIPPED | OUTPATIENT
Start: 2020-10-13 | End: 2021-09-20

## 2020-11-24 ENCOUNTER — LAB ONLY (OUTPATIENT)
Dept: INTERNAL MEDICINE CLINIC | Age: 85
End: 2020-11-24

## 2020-11-24 DIAGNOSIS — I48.20 CHRONIC ATRIAL FIBRILLATION (HCC): Primary | ICD-10-CM

## 2020-11-25 LAB
INR PPP: 3.2 (ref 0.9–1.2)
PROTHROMBIN TIME: 32.9 SEC (ref 9.1–12)

## 2020-11-25 NOTE — PROGRESS NOTES
Spoke to patient's wife gave her the results and instruction she has scheduled a lab only appointment for Dec 28 at 1:20 pm

## 2020-12-28 ENCOUNTER — LAB ONLY (OUTPATIENT)
Dept: INTERNAL MEDICINE CLINIC | Age: 85
End: 2020-12-28

## 2020-12-28 DIAGNOSIS — I48.20 CHRONIC ATRIAL FIBRILLATION (HCC): Primary | ICD-10-CM

## 2020-12-29 ENCOUNTER — TELEPHONE ANTICOAG (OUTPATIENT)
Dept: INTERNAL MEDICINE CLINIC | Age: 85
End: 2020-12-29

## 2020-12-29 DIAGNOSIS — I48.20 CHRONIC ATRIAL FIBRILLATION (HCC): ICD-10-CM

## 2020-12-29 LAB
INR PPP: 1.9 (ref 0.9–1.2)
PROTHROMBIN TIME: 20.3 SEC (ref 9.1–12)

## 2020-12-29 NOTE — PROGRESS NOTES
Anticoagulation Summary  As of 2020    INR goal:  1.8-3.0   TTR:  85.7 % (3.3 y)   INR used for dosin.9 (2020)   Warfarin maintenance plan:  5 mg (5 mg x 1) every day   Weekly warfarin total:  35 mg   No change documented:  Venkata Wing   Plan last modified:  Adeola Monroy LPN ()   Next INR check:  2021   Target end date:      Indications    Chronic [facility] [I48.20]             Anticoagulation Episode Summary     INR check location:  Clinic Lab    Preferred lab:      Send INR reminders to:      Comments:        Anticoagulation Care Providers     Provider Role Specialty Phone number    Max Hodgkin, MD [facility] Internal Medicine [phone]        INR 1.9. No change in coumadin.  Recheck PT/INR 1 month

## 2021-03-22 RX ORDER — FUROSEMIDE 40 MG/1
TABLET ORAL
Qty: 180 TAB | Refills: 2 | Status: SHIPPED | OUTPATIENT
Start: 2021-03-22 | End: 2021-09-14

## 2021-03-22 NOTE — TELEPHONE ENCOUNTER
Last Refill: 9-1-20  Last Visit: Visit date not found   Next Visit: Visit date not found     Requested Prescriptions     Pending Prescriptions Disp Refills    furosemide (LASIX) 40 mg tablet 180 Tab 2     Sig: TAKE 2 TABLETS DAILY

## 2021-04-16 ENCOUNTER — LAB ONLY (OUTPATIENT)
Dept: INTERNAL MEDICINE CLINIC | Age: 86
End: 2021-04-16

## 2021-04-16 DIAGNOSIS — I48.20 CHRONIC ATRIAL FIBRILLATION (HCC): Primary | ICD-10-CM

## 2021-04-16 LAB
INR PPP: 1.6 (ref 0.9–1.1)
PROTHROMBIN TIME: 16.7 SEC (ref 9–11.1)

## 2021-04-19 ENCOUNTER — TELEPHONE ANTICOAG (OUTPATIENT)
Dept: INTERNAL MEDICINE CLINIC | Age: 86
End: 2021-04-19

## 2021-04-19 ENCOUNTER — TELEPHONE (OUTPATIENT)
Dept: INTERNAL MEDICINE CLINIC | Age: 86
End: 2021-04-19

## 2021-04-19 DIAGNOSIS — I48.20 CHRONIC ATRIAL FIBRILLATION (HCC): ICD-10-CM

## 2021-04-19 NOTE — PROGRESS NOTES
Anticoagulation Summary  As of 2021    INR goal:  1.8-3.0   TTR:  81.5 % (3.6 y)   INR used for dosin.6 (2021)   Warfarin maintenance plan:  5 mg (5 mg x 1) every day   Weekly warfarin total:  35 mg   Plan last modified:  Lory Degroot LPN ()   Next INR check:  2021   Target end date:      Indications    Ch[facility] [I48.20]             Anticoagulation Episode Summary     INR check location:  Clinic Lab    Preferred lab:      Send INR reminders to:      Comments:        Anticoagulation Care Providers     Provider Role Specialty Phone number    Hunter Wright MD Responsible Internal Medicine [phone]      Patient is having 2 teeth pulled Wednesday. Advised to hold coumadin until then, then dentist will advise when to restart. Check PT in 1 month.

## 2021-04-19 NOTE — TELEPHONE ENCOUNTER
----- Message from Tramaine Quinones MD sent at 4/17/2021  8:34 AM EDT -----  INR 1.6 - take extra 2.5 mg daily for 2 days then resume usual regimen  Recheck PT/INR in 1 month

## 2021-05-11 ENCOUNTER — OFFICE VISIT (OUTPATIENT)
Dept: INTERNAL MEDICINE CLINIC | Age: 86
End: 2021-05-11
Payer: MEDICARE

## 2021-05-11 VITALS
OXYGEN SATURATION: 99 % | WEIGHT: 238.6 LBS | HEIGHT: 70 IN | BODY MASS INDEX: 34.16 KG/M2 | TEMPERATURE: 97.2 F | DIASTOLIC BLOOD PRESSURE: 82 MMHG | HEART RATE: 71 BPM | SYSTOLIC BLOOD PRESSURE: 120 MMHG | RESPIRATION RATE: 14 BRPM

## 2021-05-11 DIAGNOSIS — I48.20 CHRONIC ATRIAL FIBRILLATION (HCC): Chronic | ICD-10-CM

## 2021-05-11 DIAGNOSIS — E03.9 ACQUIRED HYPOTHYROIDISM: Chronic | ICD-10-CM

## 2021-05-11 DIAGNOSIS — E11.9 TYPE 2 DIABETES MELLITUS WITHOUT COMPLICATION, WITH LONG-TERM CURRENT USE OF INSULIN (HCC): Chronic | ICD-10-CM

## 2021-05-11 DIAGNOSIS — Z00.00 MEDICARE ANNUAL WELLNESS VISIT, SUBSEQUENT: Primary | ICD-10-CM

## 2021-05-11 DIAGNOSIS — Z79.4 TYPE 2 DIABETES MELLITUS WITHOUT COMPLICATION, WITH LONG-TERM CURRENT USE OF INSULIN (HCC): Chronic | ICD-10-CM

## 2021-05-11 DIAGNOSIS — I10 ESSENTIAL HYPERTENSION: Chronic | ICD-10-CM

## 2021-05-11 DIAGNOSIS — E78.2 MIXED HYPERLIPIDEMIA: Chronic | ICD-10-CM

## 2021-05-11 DIAGNOSIS — E66.01 SEVERE OBESITY (HCC): ICD-10-CM

## 2021-05-11 DIAGNOSIS — I50.32 CHRONIC DIASTOLIC CONGESTIVE HEART FAILURE (HCC): ICD-10-CM

## 2021-05-11 DIAGNOSIS — I25.10 CORONARY ARTERY DISEASE INVOLVING NATIVE HEART WITHOUT ANGINA PECTORIS, UNSPECIFIED VESSEL OR LESION TYPE: Chronic | ICD-10-CM

## 2021-05-11 DIAGNOSIS — Z79.899 LONG-TERM USE OF HIGH-RISK MEDICATION: Chronic | ICD-10-CM

## 2021-05-11 DIAGNOSIS — Z79.01 LONG TERM CURRENT USE OF ANTICOAGULANT: Chronic | ICD-10-CM

## 2021-05-11 PROBLEM — E11.51 TYPE 2 DIABETES MELLITUS WITH PERIPHERAL VASCULAR DISEASE (HCC): Status: ACTIVE | Noted: 2021-05-11

## 2021-05-11 LAB
COMMENT, HOLDF: NORMAL
SAMPLES BEING HELD,HOLD: NORMAL

## 2021-05-11 PROCEDURE — 1101F PT FALLS ASSESS-DOCD LE1/YR: CPT | Performed by: INTERNAL MEDICINE

## 2021-05-11 PROCEDURE — G8417 CALC BMI ABV UP PARAM F/U: HCPCS | Performed by: INTERNAL MEDICINE

## 2021-05-11 PROCEDURE — G8536 NO DOC ELDER MAL SCRN: HCPCS | Performed by: INTERNAL MEDICINE

## 2021-05-11 PROCEDURE — G8427 DOCREV CUR MEDS BY ELIG CLIN: HCPCS | Performed by: INTERNAL MEDICINE

## 2021-05-11 PROCEDURE — G8510 SCR DEP NEG, NO PLAN REQD: HCPCS | Performed by: INTERNAL MEDICINE

## 2021-05-11 PROCEDURE — G0439 PPPS, SUBSEQ VISIT: HCPCS | Performed by: INTERNAL MEDICINE

## 2021-05-11 PROCEDURE — G8754 DIAS BP LESS 90: HCPCS | Performed by: INTERNAL MEDICINE

## 2021-05-11 PROCEDURE — 99214 OFFICE O/P EST MOD 30 MIN: CPT | Performed by: INTERNAL MEDICINE

## 2021-05-11 PROCEDURE — G8752 SYS BP LESS 140: HCPCS | Performed by: INTERNAL MEDICINE

## 2021-05-11 NOTE — PATIENT INSTRUCTIONS
Learning About Carbohydrate (Carb) Counting and Eating Out When You Have Diabetes Why plan your meals? Meal planning can be a key part of managing diabetes. Planning meals and snacks with the right balance of carbohydrate, protein, and fat can help you keep your blood sugar at the target level you set with your doctor. You don't have to eat special foods. You can eat what your family eats, including sweets once in a while. But you do have to pay attention to how often you eat and how much you eat of certain foods. You may want to work with a dietitian or a certified diabetes educator. He or she can give you tips and meal ideas and can answer your questions about meal planning. This health professional can also help you reach a healthy weight if that is one of your goals. What should you know about eating carbs? Managing the amount of carbohydrate (carbs) you eat is an important part of healthy meals when you have diabetes. Carbohydrate is found in many foods. · Learn which foods have carbs. And learn the amounts of carbs in different foods. ? Bread, cereal, pasta, and rice have about 15 grams of carbs in a serving. A serving is 1 slice of bread (1 ounce), ½ cup of cooked cereal, or 1/3 cup of cooked pasta or rice. ? Fruits have 15 grams of carbs in a serving. A serving is 1 small fresh fruit, such as an apple or orange; ½ of a banana; ½ cup of cooked or canned fruit; ½ cup of fruit juice; 1 cup of melon or raspberries; or 2 tablespoons of dried fruit. ? Milk and no-sugar-added yogurt have 15 grams of carbs in a serving. A serving is 1 cup of milk or 2/3 cup of no-sugar-added yogurt. ? Starchy vegetables have 15 grams of carbs in a serving. A serving is ½ cup of mashed potatoes or sweet potato; 1 cup winter squash; ½ of a small baked potato; ½ cup of cooked beans; or ½ cup cooked corn or green peas.  
· Learn how much carbs to eat each day and at each meal. A dietitian or CDE can teach you how to keep track of the amount of carbs you eat. This is called carbohydrate counting. · If you are not sure how to count carbohydrate grams, use the Plate Method to plan meals. It is a good, quick way to make sure that you have a balanced meal. It also helps you spread carbs throughout the day. ? Divide your plate by types of foods. Put non-starchy vegetables on half the plate, meat or other protein food on one-quarter of the plate, and a grain or starchy vegetable in the final quarter of the plate. To this you can add a small piece of fruit and 1 cup of milk or yogurt, depending on how many carbs you are supposed to eat at a meal. 
· Try to eat about the same amount of carbs at each meal. Do not \"save up\" your daily allowance of carbs to eat at one meal. 
· Proteins have very little or no carbs per serving. Examples of proteins are beef, chicken, turkey, fish, eggs, tofu, cheese, cottage cheese, and peanut butter. A serving size of meat is 3 ounces, which is about the size of a deck of cards. Examples of meat substitute serving sizes (equal to 1 ounce of meat) are 1/4 cup of cottage cheese, 1 egg, 1 tablespoon of peanut butter, and ½ cup of tofu. How can you eat out and still eat healthy? · Learn to estimate the serving sizes of foods that have carbohydrate. If you measure food at home, it will be easier to estimate the amount in a serving of restaurant food. · If the meal you order has too much carbohydrate (such as potatoes, corn, or baked beans), ask to have a low-carbohydrate food instead. Ask for a salad or green vegetables. · If you use insulin, check your blood sugar before and after eating out to help you plan how much to eat in the future. · If you eat more carbohydrate at a meal than you had planned, take a walk or do other exercise. This will help lower your blood sugar. What are some tips for eating healthy? · Limit saturated fat, such as the fat from meat and dairy products.  This is a healthy choice because people who have diabetes are at higher risk of heart disease. So choose lean cuts of meat and nonfat or low-fat dairy products. Use olive or canola oil instead of butter or shortening when cooking. · Don't skip meals. Your blood sugar may drop too low if you skip meals and take insulin or certain medicines for diabetes. · Check with your doctor before you drink alcohol. Alcohol can cause your blood sugar to drop too low. Alcohol can also cause a bad reaction if you take certain diabetes medicines. Follow-up care is a key part of your treatment and safety. Be sure to make and go to all appointments, and call your doctor if you are having problems. It's also a good idea to know your test results and keep a list of the medicines you take. Where can you learn more? Go to http://www.gray.com/ Enter X864 in the search box to learn more about \"Learning About Carbohydrate (Carb) Counting and Eating Out When You Have Diabetes. \" Current as of: August 31, 2020               Content Version: 12.8 © 2006-2021 [employer]. Care instructions adapted under license by [employer] (which disclaims liability or warranty for this information). If you have questions about a medical condition or this instruction, always ask your healthcare professional. [address] any warranty or liability for your use of this information. The best way to stay healthy is to live a healthy lifestyle. A healthy lifestyle includes regular exercise, eating a well-balanced diet, keeping a healthy weight and not smoking. Regular physical exams and screening tests are another important way to take care of yourself. Preventive exams provided by health care providers can find health problems early when treatment works best and can keep you from getting certain diseases or illnesses.  Preventive services include exams, lab tests, screenings, shots, monitoring and information to help you take care of your own health. All people over 65 should have a pneumonia shot. Pneumonia shots are usually only needed once in a lifetime unless your doctor decides differently. In addition to your physical exam, some screening tests are recommended: 
 
All people over 65 should have a yearly flu shot. People over 65 are at medium to high risk for Hepatitis B. Three shots are needed for complete protection. Bone mass measurement (dexa scan) is recommended every two years. Diabetes Mellitus screening is recommended every year. Glaucoma is an eye disease caused by high pressure in the eye. An eye exam is recommended every year. Cardiovascular screening tests that check your cholesterol and other blood fat (lipid) levels are recommended every five years. Colorectal Cancer screening tests help to find pre-cancerous polyps (growths in the colon) so they can be removed before they turn into cancer. Tests ordered for screening depend on your personal and family history risk factors. Prostate Cancer Screening (annually up to age 76) Screening for breast cancer is recommended yearly with a Mammogram. 
 
Screening for cervical and vaginal cancer is recommended with a pelvic and Pap test every two years. However if you have had an abnormal pap in the past  three years or at high risk for cervical or vaginal cancer Medicare will cover a pap test and a pelvic exam every year. Here is a list of your current Health Maintenance items with a due date: 
Health Maintenance Due Topic Date Due  
 COVID-19 Vaccine (1) Never done [facility]  06/25/2020  Albumin Urine Test  06/11/2021

## 2021-05-11 NOTE — PROGRESS NOTES
Chief Complaint   Patient presents with    Follow Up Chronic Condition     6 mo fu    Annual Wellness Visit       Depression Risk Factor Screening:     3 most recent PHQ Screens 2021   Little interest or pleasure in doing things Not at all   Feeling down, depressed, irritable, or hopeless Not at all   Total Score PHQ 2 0       Functional Ability and Level of Safety:     Activities of Daily Living  ADL Assessment 2021   Feeding yourself No Help Needed   Getting from bed to chair No Help Needed   Getting dressed No Help Needed   Bathing or showering No Help Needed   Walk across the room (includes cane/walker) No Help Needed   Using the telphone No Help Needed   Taking your medications No Help Needed   Preparing meals No Help Needed   Managing money (expenses/bills) No Help Needed   Moderately strenuous housework (laundry) No Help Needed   Shopping for personal items (toiletries/medicines) No Help Needed   Shopping for groceries No Help Needed   Driving No Help Needed   Climbing a flight of stairs No Help Needed   Getting to places beyond walking distances No Help Needed       Fall Risk  Fall Risk Assessment, last 12 mths 2021   Able to walk? Yes   Fall in past 12 months? 0   Do you feel unsteady? 0   Are you worried about falling 0       Abuse Screen  Abuse Screening Questionnaire 2021   Do you ever feel afraid of your partner? N   Are you in a relationship with someone who physically or mentally threatens you? N   Is it safe for you to go home?  Y         Patient Care Team   Patient Care Team:  Brenda Velázquez MD as PCP - General (Internal Medicine)  Brenda Velázquez MD as PCP - [facility] Empaneled Provider  Mahnaz Gonzalez MD (Otolaryngology)  Aaron Carrillo MD (Optometry)  Colt Ayala MD (Cardiology)  Ashley Hwang MD (Urology)  Roland Magallanes DPM (Podiatry)  Yvetta Apgar, MD (Nephrology)

## 2021-05-11 NOTE — PROGRESS NOTES
Aakash Thomas is a 82 y.o. male and presents with Follow Up Chronic Condition (6 mo fu) and Annual Wellness Visit  . Subjective:  Mr. Beth Tapia presents to the office today for Medicare wellness check and follow-up of multiple medical problems. The patient has type 2 diabetes mellitus currently on long-term insulin therapy with Lantus insulin and Humalog. The patient checks his blood sugars up to 3 times daily daily with average fasting blood sugars around 130. He is up-to-date on diabetic retinal eye exam.  He sees his podiatrist every 6 months and has had no burning paresthesias of his feet. He denies hypoglycemia. Blood pressure is currently managed on Lasix, lovastatin, Lopressor and losartan. He denies any orthostatic dizziness or lower extremity edema. There is been no muscle cramping. He denies headaches, numbness, tingling or focal neurological problems. He has hypercholesterolemia currently on statin therapy. Denies muscle soreness or GI upset. Has diffuse vascular disease with coronary artery disease, peripheral vascular disease, renal artery stenosis. Denies any exertional chest pains, claudication or strokelike symptoms. Has chronic atrial fibrillation which is been controlled. He remains on Coumadin for stroke prevention and has monthly protimes done. Patient has acquired hypothyroidism currently on thyroid replacement. Continues to take his medication on an empty stomach first thing in the morning with water. Denies heat or cold intolerance, changes in skin or hair, weight has been stable. He has a history of chronic diastolic congestive heart failure. He denies lower extremity edema, PND or orthopnea. Past Medical History:   Diagnosis Date    Acquired hypothyroidism 9/12/2017    Anemia 2/1/2015    Arrhythmia     a. fib.& a.  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    CAD (coronary artery disease)  Chronic atrial fibrillation (HCC) 2/1/2015    Chronic kidney disease     decreased kidney function    CKD (chronic kidney disease) stage 3, GFR 30-59 ml/min ([facility]) 2/1/2015    Diabetes ([facility]     Diverticulosis of large intestine without hemorrhage 10/24/2017    Gout 10/24/2017    Heart failure ([facility]     Hyperlipidemia 2/1/2015    Hypertension     Ill-defined condition     high cholesterol    Ill-defined condition     gout    Long term current use of amiodarone 10/24/2017    Long term current use of anticoagulant 10/24/2017    Long-term use of high-risk medication 10/24/2017    Macular degeneration 10/24/2017    Peripheral vascular disease ([facility] 10/24/2017    Renal artery stenosis (HCC) 10/24/2017    Right-sided extracranial carotid artery stenosis 10/24/2017    Sick sinus syndrome ([facility] 10/24/2017    Status post pacemaker     Past Surgical History:   Procedure Laterality Date    HX AAA REPAIR      HX APPENDECTOMY      HX CATARACT REMOVAL      / lens implant    HX CHOLECYSTECTOMY      HX ORTHOPAEDIC Right     rt unicondular knee replacement    HX ORTHOPAEDIC  1/26/15    LEFT UNICONDYLAR KNEE ARTHROPLASTY    HX PACEMAKER  7/11    pacemaker    HX TONSILLECTOMY      HX UROLOGICAL      rt renal stentx2     Allergies   Allergen Reactions    Latex Other (comments)     Skin raw and severely irritated    Aspirin Unknown (comments)    Hydrocodone Other (comments)     hallucinations    Oxycodone Other (comments)     hallucinations    Sulfa (Sulfonamide Antibiotics) Rash    Tetracycline Rash     Current Outpatient Medications   Medication Sig Dispense Refill    spironolactone (ALDACTONE) 25 mg tablet TAKE 1 TABLET DAILY 90 Tab 2    furosemide (LASIX) 40 mg tablet TAKE 2 TABLETS DAILY 180 Tab 2    warfarin (COUMADIN) 5 mg tablet TAKE 1 TABLET DAILY 90 Tab 2    isosorbide mononitrate ER (IMDUR) 60 mg CR tablet TAKE 1 TABLET DAILY 90 Tab 2    allopurinoL (ZYLOPRIM) 300 mg tablet TAKE 1 TABLET DAILY 90 Tab 3    levothyroxine (SYNTHROID) 175 mcg tablet take 1 tablet by mouth once daily BEFORE BREAKFAST 90 Tab 3    insulin glargine (Lantus Solostar U-100 Insulin) 100 unit/mL (3 mL) inpn INJECT 60 UNITS UNDER THE SKIN DAILY 60 mL 3    folic acid-vit V9-PDH H05 (Virt-Sun) 2.2-25-1 mg tab TAKE ONE TABLET BY MOUTH DAILY 90 Tab 3    BD Ultra-Fine Short Pen Needle 31 gauge x 5/16\" ndle USE TO INJECT INSULIN SUBCUTANEOUSLY FOUR TIMES DAILY AS DIRECTED 100 Pen Needle 4    glucose blood VI test strips (FreeStyle Test) strip USE TO TEST THREE TIMES DAILY 300 Strip 3    lovastatin (MEVACOR) 40 mg tablet TAKE 2 TABLETS DAILY 180 Tab 3    metoprolol tartrate (LOPRESSOR) 100 mg IR tablet TAKE 1 TABLET TWICE A  Tab 3    insulin lispro (HUMALOG) 100 unit/mL injection 5-10 Units by SubCUTAneous route three (3) times daily as needed (per sliding scale). 1 Vial 6    Flaxseed Oil oil Take 1,000 mg by mouth daily.  terazosin (HYTRIN) 10 mg capsule Take 1 capsule by mouth nightly. Indications: BENIGN PROSTATIC HYPERTROPHY, HYPERTENSION      VIT A/VIT C/VIT E/ZINC/COPPER (OCUVITE PRESERVISION PO) Take 1 Tab by mouth two (2) times a day.  losartan (COZAAR) 50 mg tablet Take 50 mg by mouth daily. Indications: CHRONIC HEART FAILURE, HYPERTENSION      multivitamin, stress formula (STRESS TAB) tablet Take 1 Tab by mouth daily.  CYANOCOBALAMIN/FA/PYRIDOXINE (FOLGARD RX 2.2 PO) Take 1 tablet by mouth daily.  finasteride (PROSCAR) 5 mg tablet Take 5 mg by mouth every other day.  With dinner  Indications: BENIGN PROSTATIC HYPERTROPHY       Social History     Socioeconomic History    Marital status:      Spouse name: Not on file    Number of children: Not on file    Years of education: Not on file    Highest education level: Not on file   Tobacco Use    Smoking status: Former Smoker     Quit date: 1990     Years since quittin.8    Smokeless tobacco: Never Used   Substance and Sexual Activity    Alcohol use: Yes     Alcohol/week: 0.0 standard drinks     Comment: 3-4 drinks a week    Drug use: No     Types: Prescription, OTC     Family History   Problem Relation Age of Onset    Cancer Mother     Diabetes Father        Health Maintenance   Topic Date Due    COVID-19 Vaccine (1) Never done    Foot Exam Q1  06/25/2020    MICROALBUMIN Q1  06/11/2021    Lipid Screen  06/11/2021    Flu Vaccine (Season Ended) 09/01/2021    Medicare Yearly Exam  05/12/2022    Eye Exam Retinal or Dilated  07/03/2022    DTaP/Tdap/Td series (2 - Td) 12/07/2027    Shingrix Vaccine Age 50>  Completed    Pneumococcal 65+ years  Completed        Review of Systems  Constitutional: negative for fevers, chills, anorexia and weight loss  Eyes:   negative for visual disturbance and irritation  ENT:   negative for tinnitus,sore throat,nasal congestion,ear pain,hoarseness  Respiratory:  negative for cough, hemoptysis, dyspnea,wheezing  CV:   negative for chest pain, palpitations, lower extremity edema  GI:   negative for nausea, vomiting, diarrhea, abdominal pain,melena  Endo:               negative for polyuria,polydipsia,polyphagia,heat intolerance  Genitourinary: negative for frequency, dysuria and hematuria  Integumentary: negative for rash and pruritus  Hematologic:  negative for easy bruising and gum/nose bleeding  Musculoskel: negative for myalgias, arthralgias, back pain, muscle weakness, joint pain  Neurological:  negative for headaches, dizziness, vertigo, memory problems and gait   Behavl/Psych: negative for feelings of anxiety, depression, mood changes  ROS otherwise negative      Objective:  Visit Vitals  /82 (BP 1 Location: Right upper arm, BP Patient Position: Sitting, BP Cuff Size: Adult)   Pulse 71   Temp 97.2 °F (36.2 °C) (Temporal)   Resp 14   Ht 5' 10\" (1.778 m)   Wt 238 lb 9.6 oz (108.2 kg)   SpO2 99%   BMI 34.24 kg/m²     Body mass index is 34.24 kg/m².     Physical Exam:   General appearance - alert, well appearing, and in no distress  Mental status - alert, oriented to person, place, and time  EYE-DIXIE, EOMI,conjunctiva normal bilaterally, lids normal  ENT-ENT exam normal, no neck nodes or sinus tenderness  Nose - normal and patent, no erythema,  Or discharge   Mouth - mucous membranes moist, pharynx normal without lesions  Neck - supple, no significant adenopathy or bruit  Chest - clear to auscultation, no wheezes, rales or rhonchi. Heart - normal rate, regular rhythm, normal S1, S2, no murmurs, rubs, clicks or gallops   Abdomen - soft, nontender, nondistended, no masses or organomegaly  Lymph- no adenopathy palpable  Ext-peripheral pulses normal, no pedal edema, no clubbing or cyanosis  Skin-Warm and dry. no hyperpigmentation, vitiligo, or suspicious lesions  Neuro -alert, oriented, normal speech, no focal findings or movement disorder noted    In addition this patient is seen for AWV  as detailed below: This is a Subsequent Medicare Annual Wellness Exam (AWV) (Performed 12 months after IPPE or effective date of Medicare Part B enrollment)    I have reviewed the patient's medical history in detail and updated the computerized patient record. Problem list reviewed with patient and risk factors discussed. PSH, SH, FH, Medications and HM issues also reviewed and discussed. Depression screen, fall risk assessment, functional abilities and ACP also reviewed and discussed as above and below. Depression Risk Factor Screening:     3 most recent PHQ Screens 5/11/2021   Little interest or pleasure in doing things Not at all   Feeling down, depressed, irritable, or hopeless Not at all   Total Score PHQ 2 0     Alcohol Risk Factor Screening: You do not drink alcohol or very rarely. Functional Ability and Level of Safety:   Hearing Loss  The patient wears hearing aids.     Activities of Daily Living  The home contains: handrails and grab bars  Patient does total self care    Fall Risk  Fall Risk Assessment, last 12 mths 5/11/2021   Able to walk? Yes   Fall in past 12 months? 0   Do you feel unsteady? 0   Are you worried about falling 0       Abuse Screen  Patient is not abused    Cognitive Screening   Evaluation of Cognitive Function:  Has your family/caregiver stated any concerns about your memory: no  Normal    Patient Care Team   Patient Care Team:  López Webber MD as PCP - General (Internal Medicine)  López Webber MD as PCP - [facility]  Ho Francois MD (Otolaryngology)  Augustina Closs, MD (Optometry)  Zenobia Rojo MD (Cardiology)  Dwayne Su MD (Urology)  Mariel Gardiner DPM (Podiatry)  Eli Rodriguez MD (Nephrology)    Assessment/Plan   Education and counseling provided:  Are appropriate based on today's review and evaluation    Assessment/Plan:   Impressions:      ICD-10-CM ICD-9-CM    1. Medicare annual wellness visit, subsequent  Z00.00 V70.0    2. Type 2 diabetes mellitus without complication, with long-term current use of insulin (HCC)  E11.9 250.00 HEMOGLOBIN A1C WITH EAG    Z79.4 V58.67 MICROALBUMIN, UR, RAND W/ MICROALB/CREAT RATIO   3. Essential hypertension  I10 401.9 COLLECTION VENOUS BLOOD,VENIPUNCTURE      CBC WITH AUTOMATED DIFF      METABOLIC PANEL, COMPREHENSIVE      URINALYSIS W/ REFLEX CULTURE      URIC ACID   4. Mixed hyperlipidemia  E78.2 272.2 LIPID PANEL   5. Long-term use of high-risk medication  Z79.899 V58.69 CK   6. Coronary artery disease involving native heart without angina pectoris, unspecified vessel or lesion type  I25.10 414.01    7. Chronic atrial fibrillation (HCC)  I48.20 427.31    8. Long term current use of anticoagulant  Z79.01 V58.61 PROTHROMBIN TIME + INR   9. Acquired hypothyroidism  E03.9 244.9 TSH 3RD GENERATION      T4, FREE   10. Chronic diastolic congestive heart failure (HCC)  I50.32 428.32      428.0    11.  Severe obesity ([facility])  E66.01 278.01 Plan:  1. Continue present meds  2. Lifestyle modifications including Na restriction, low carb/fat diet, weight reduction and exercise (at least a walking program). Follow-up and Dispositions    · Return in about 6 months (around 11/11/2021). Orders Placed This Encounter    CK     Standing Status:   Future     Standing Expiration Date:   5/11/2022    CBC WITH AUTOMATED DIFF     Standing Status:   Future     Standing Expiration Date:   5/11/2022    HEMOGLOBIN A1C WITH EAG     Standing Status:   Future     Standing Expiration Date:   5/11/2022    LIPID PANEL     Standing Status:   Future     Standing Expiration Date:   6/19/8341    METABOLIC PANEL, COMPREHENSIVE     Standing Status:   Future     Standing Expiration Date:   5/11/2022    MICROALBUMIN, UR, RAND W/ MICROALB/CREAT RATIO     Standing Status:   Future     Standing Expiration Date:   5/11/2022    PROTHROMBIN TIME + INR     Standing Status:   Future     Standing Expiration Date:   5/11/2022    TSH 3RD GENERATION     Standing Status:   Future     Standing Expiration Date:   5/11/2022    URINALYSIS W/ REFLEX CULTURE     Standing Status:   Future     Standing Expiration Date:   5/11/2022    URIC ACID     Standing Status:   Future     Standing Expiration Date:   5/11/2022    T4, FREE     Standing Status:   Future     Standing Expiration Date:   5/11/2022    COLLECTION VENOUS BLOOD,VENIPUNCTURE       Marisa Sultana MD   Assessment/Plan:  Diagnoses and all orders for this visit:    Medicare annual wellness visit, subsequent    Type 2 diabetes mellitus without complication, with long-term current use of insulin ([facility]  -     HEMOGLOBIN A1C WITH EAG; Future  -     MICROALBUMIN, UR, RAND W/ MICROALB/CREAT RATIO; Future    Essential hypertension  -     COLLECTION VENOUS BLOOD,VENIPUNCTURE  -     CBC WITH AUTOMATED DIFF; Future  -     METABOLIC PANEL, COMPREHENSIVE;  Future  -     URINALYSIS W/ REFLEX CULTURE; Future  -     URIC ACID; Future    Mixed hyperlipidemia  -     LIPID PANEL; Future    Long-term use of high-risk medication  -     CK; Future    Coronary artery disease involving native heart without angina pectoris, unspecified vessel or lesion type    Chronic atrial fibrillation ([address]    Long term current use of anticoagulant  -     PROTHROMBIN TIME + INR; Future    Acquired hypothyroidism  -     TSH 3RD GENERATION; Future  -     T4, FREE; Future    Chronic diastolic congestive heart failure ([address]    Severe obesity ([address]        Health Maintenance Due   Topic Date Due    COVID-19 Vaccine (1) Never done    Foot Exam Q1  06/25/2020    MICROALBUMIN Q1  06/11/2021       Other instructions: The patient's medications were reviewed and reconciled. No change in his current medical regimen will be made. A no added salt, prudent diet is encouraged    Weight loss recommended with body mass index of 33.7    Continue to check blood sugars up to 3 times daily    Health maintenance issues were reviewed and are up-to-date    Age-appropriate vaccinations were reviewed and Covid19 vaccination is up-to-date. Await results of multiple labs    Monthly protimes as he is doing    Follow-up 6 months    Follow-up and Dispositions    · Return in about 6 months (around 11/11/2021). I have reviewed with the patient details of the assessment and plan and all questions were answered. Relevent patient education was performed. The most recent lab findings were reviewed with the patient. An After Visit Summary was printed and given to the patient. Jean Nunes MD    Please note that this dictation was completed with [employer], the computer voice recognition software. Quite often unanticipated grammatical, syntax, homophones, and other interpretive errors are inadvertently transcribed by the computer software. Please disregard these errors. Please excuse any errors that have escaped final proofreading.

## 2021-05-12 ENCOUNTER — TELEPHONE ANTICOAG (OUTPATIENT)
Dept: INTERNAL MEDICINE CLINIC | Age: 86
End: 2021-05-12

## 2021-05-12 DIAGNOSIS — I48.20 CHRONIC ATRIAL FIBRILLATION (HCC): ICD-10-CM

## 2021-05-12 LAB
ALBUMIN SERPL-MCNC: 3.8 G/DL (ref 3.5–5)
ALBUMIN/GLOB SERPL: 1.1 {RATIO} (ref 1.1–2.2)
ALP SERPL-CCNC: 164 U/L (ref 45–117)
ALT SERPL-CCNC: 38 U/L (ref 12–78)
ANION GAP SERPL CALC-SCNC: 6 MMOL/L (ref 5–15)
APPEARANCE UR: CLEAR
AST SERPL-CCNC: 29 U/L (ref 15–37)
BACTERIA URNS QL MICRO: NEGATIVE /HPF
BASOPHILS # BLD: 0 K/UL (ref 0–0.1)
BASOPHILS NFR BLD: 0 % (ref 0–1)
BILIRUB SERPL-MCNC: 0.7 MG/DL (ref 0.2–1)
BILIRUB UR QL: NEGATIVE
BUN SERPL-MCNC: 44 MG/DL (ref 6–20)
BUN/CREAT SERPL: 29 (ref 12–20)
CALCIUM SERPL-MCNC: 9 MG/DL (ref 8.5–10.1)
CHLORIDE SERPL-SCNC: 103 MMOL/L (ref 97–108)
CHOLEST SERPL-MCNC: 123 MG/DL
CK SERPL-CCNC: 72 U/L (ref 39–308)
CO2 SERPL-SCNC: 30 MMOL/L (ref 21–32)
COLOR UR: ABNORMAL
CREAT SERPL-MCNC: 1.51 MG/DL (ref 0.7–1.3)
CREAT UR-MCNC: 107 MG/DL
DIFFERENTIAL METHOD BLD: ABNORMAL
EOSINOPHIL # BLD: 0.3 K/UL (ref 0–0.4)
EOSINOPHIL NFR BLD: 3 % (ref 0–7)
EPITH CASTS URNS QL MICRO: ABNORMAL /LPF
ERYTHROCYTE [DISTWIDTH] IN BLOOD BY AUTOMATED COUNT: 13.6 % (ref 11.5–14.5)
EST. AVERAGE GLUCOSE BLD GHB EST-MCNC: 157 MG/DL
GLOBULIN SER CALC-MCNC: 3.4 G/DL (ref 2–4)
GLUCOSE SERPL-MCNC: 235 MG/DL (ref 65–100)
GLUCOSE UR STRIP.AUTO-MCNC: NEGATIVE MG/DL
HBA1C MFR BLD: 7.1 % (ref 4–5.6)
HCT VFR BLD AUTO: 46.1 % (ref 36.6–50.3)
HDLC SERPL-MCNC: 43 MG/DL
HDLC SERPL: 2.9 {RATIO} (ref 0–5)
HGB BLD-MCNC: 14.7 G/DL (ref 12.1–17)
HGB UR QL STRIP: NEGATIVE
HYALINE CASTS URNS QL MICRO: ABNORMAL /LPF (ref 0–5)
IMM GRANULOCYTES # BLD AUTO: 0.1 K/UL (ref 0–0.04)
IMM GRANULOCYTES NFR BLD AUTO: 1 % (ref 0–0.5)
INR PPP: 2.6 (ref 0.9–1.1)
KETONES UR QL STRIP.AUTO: NEGATIVE MG/DL
LDLC SERPL CALC-MCNC: 51 MG/DL (ref 0–100)
LEUKOCYTE ESTERASE UR QL STRIP.AUTO: NEGATIVE
LIPID PROFILE,FLP: NORMAL
LYMPHOCYTES # BLD: 2.3 K/UL (ref 0.8–3.5)
LYMPHOCYTES NFR BLD: 24 % (ref 12–49)
MCH RBC QN AUTO: 32.7 PG (ref 26–34)
MCHC RBC AUTO-ENTMCNC: 31.9 G/DL (ref 30–36.5)
MCV RBC AUTO: 102.7 FL (ref 80–99)
MICROALBUMIN UR-MCNC: 11.9 MG/DL
MICROALBUMIN/CREAT UR-RTO: 111 MG/G (ref 0–30)
MONOCYTES # BLD: 1 K/UL (ref 0–1)
MONOCYTES NFR BLD: 11 % (ref 5–13)
NEUTS SEG # BLD: 6 K/UL (ref 1.8–8)
NEUTS SEG NFR BLD: 61 % (ref 32–75)
NITRITE UR QL STRIP.AUTO: NEGATIVE
NRBC # BLD: 0 K/UL (ref 0–0.01)
NRBC BLD-RTO: 0 PER 100 WBC
PH UR STRIP: 5 [PH] (ref 5–8)
PLATELET # BLD AUTO: 151 K/UL (ref 150–400)
PMV BLD AUTO: 11.5 FL (ref 8.9–12.9)
POTASSIUM SERPL-SCNC: 4.7 MMOL/L (ref 3.5–5.1)
PROT SERPL-MCNC: 7.2 G/DL (ref 6.4–8.2)
PROT UR STRIP-MCNC: ABNORMAL MG/DL
PROTHROMBIN TIME: 25.7 SEC (ref 9–11.1)
RBC # BLD AUTO: 4.49 M/UL (ref 4.1–5.7)
RBC #/AREA URNS HPF: ABNORMAL /HPF (ref 0–5)
SODIUM SERPL-SCNC: 139 MMOL/L (ref 136–145)
SP GR UR REFRACTOMETRY: 1.02 (ref 1–1.03)
T4 FREE SERPL-MCNC: 1 NG/DL (ref 0.8–1.5)
TRIGL SERPL-MCNC: 145 MG/DL (ref ?–150)
TSH SERPL DL<=0.05 MIU/L-ACNC: 0.64 UIU/ML (ref 0.36–3.74)
UA: UC IF INDICATED,UAUC: ABNORMAL
URATE SERPL-MCNC: 5 MG/DL (ref 3.5–7.2)
UROBILINOGEN UR QL STRIP.AUTO: 0.2 EU/DL (ref 0.2–1)
VLDLC SERPL CALC-MCNC: 29 MG/DL
WBC # BLD AUTO: 9.6 K/UL (ref 4.1–11.1)
WBC URNS QL MICRO: ABNORMAL /HPF (ref 0–4)

## 2021-05-12 NOTE — PROGRESS NOTES
Anticoagulation Summary  As of 2021    INR goal:  1.8-3.0   TTR:  81.4 % (3.7 y)   INR used for dosin.6 (2021)   Warfarin maintenance plan:  5 mg (5 mg x 1) every day   Weekly warfarin total:  35 mg   No change documented:  Marlana Castleman   Plan last modified:  Dennis Miguel LPN (3/07/4061)   Next INR check:  2021   Target end date:      Indications    Chronic atrial fibrillation ([facility] [I48.20]             Anticoagulation Episode Summary     INR check location:  Clinic Lab    Preferred lab:      Send INR reminders to:      Comments:        Anticoagulation Care Providers     Provider Role Specialty Phone number    Jose Luis Benitez MD [facility] Internal Medicine [phone]        INR 2.6 - no change in coumadin.  Recheck PT/INR 1 month

## 2021-05-12 NOTE — PROGRESS NOTES
Please notify patient. Labs stable. A1c better at 7.1. INR 2.6 - no change in coumadin.  Recheck PT/INR 1 month  No change in current management/meds

## 2021-06-10 DIAGNOSIS — Z79.4 TYPE 2 DIABETES MELLITUS WITHOUT COMPLICATION, WITH LONG-TERM CURRENT USE OF INSULIN (HCC): Chronic | ICD-10-CM

## 2021-06-10 DIAGNOSIS — E11.9 TYPE 2 DIABETES MELLITUS WITHOUT COMPLICATION, WITH LONG-TERM CURRENT USE OF INSULIN (HCC): Chronic | ICD-10-CM

## 2021-06-10 RX ORDER — BLOOD SUGAR DIAGNOSTIC
STRIP MISCELLANEOUS
Qty: 300 STRIP | Refills: 3 | Status: SHIPPED | OUTPATIENT
Start: 2021-06-10 | End: 2022-10-24 | Stop reason: SDUPTHER

## 2021-06-14 ENCOUNTER — LAB ONLY (OUTPATIENT)
Dept: INTERNAL MEDICINE CLINIC | Age: 86
End: 2021-06-14

## 2021-06-14 ENCOUNTER — TELEPHONE ANTICOAG (OUTPATIENT)
Dept: INTERNAL MEDICINE CLINIC | Age: 86
End: 2021-06-14

## 2021-06-14 DIAGNOSIS — I48.20 CHRONIC ATRIAL FIBRILLATION (HCC): Primary | ICD-10-CM

## 2021-06-14 LAB
INR PPP: 1.4 (ref 0.9–1.1)
PROTHROMBIN TIME: 14.8 SEC (ref 9–11.1)

## 2021-06-14 NOTE — PROGRESS NOTES
Anticoagulation Summary  As of 2021    INR goal:  1.8-3.0   TTR:  81.1 % (3.8 y)   INR used for dosin.4 (2021)   Warfarin maintenance plan:  5 mg (5 mg x 1) every day   Weekly warfarin total:  35 mg   Plan last modified:  Jc Villarreal LPN ()   Next INR check:  2021   Target end date:      Indications    Chronic atrial fibrillation ([address] [I48.20]             Anticoagulation Episode Summary     INR check location:  Clinic Lab    Preferred lab:      Send INR reminders to:      Comments:        Anticoagulation Care Providers     Provider Role Specialty Phone number    Ed Posadas MD Responsible Internal Medicine [phone]      INR down to 1.4.  Take extra 2.5 mg of Coumadin today and then resume usual regimen.  Recheck pro time 2 weeks

## 2021-06-14 NOTE — PROGRESS NOTES
INR down to 1.4. Take extra 2.5 mg of Coumadin today and then resume usual regimen.   Recheck pro time 2 weeks

## 2021-06-18 ENCOUNTER — HOSPITAL ENCOUNTER (EMERGENCY)
Age: 86
Discharge: HOME | End: 2021-06-18
Attending: EMERGENCY MEDICINE
Payer: MEDICARE

## 2021-06-18 ENCOUNTER — APPOINTMENT (OUTPATIENT)
Dept: GENERAL RADIOLOGY | Age: 86
End: 2021-06-18
Attending: EMERGENCY MEDICINE
Payer: MEDICARE

## 2021-06-18 VITALS
RESPIRATION RATE: 16 BRPM | TEMPERATURE: 97.4 F | HEIGHT: 70 IN | BODY MASS INDEX: 34.24 KG/M2 | DIASTOLIC BLOOD PRESSURE: 90 MMHG | WEIGHT: 239.2 LBS | SYSTOLIC BLOOD PRESSURE: 150 MMHG | HEART RATE: 70 BPM | OXYGEN SATURATION: 98 %

## 2021-06-18 DIAGNOSIS — R42 EPISODE OF DIZZINESS: Primary | ICD-10-CM

## 2021-06-18 DIAGNOSIS — R73.9 HYPERGLYCEMIA: ICD-10-CM

## 2021-06-18 DIAGNOSIS — R42 EPISODIC LIGHTHEADEDNESS: ICD-10-CM

## 2021-06-18 LAB
ALBUMIN SERPL-MCNC: 3.7 G/DL (ref 3.5–5)
ALBUMIN/GLOB SERPL: 0.9 {RATIO} (ref 1.1–2.2)
ALP SERPL-CCNC: 145 U/L (ref 45–117)
ALT SERPL-CCNC: 42 U/L (ref 12–78)
ANION GAP SERPL CALC-SCNC: 3 MMOL/L (ref 5–15)
APPEARANCE UR: CLEAR
AST SERPL-CCNC: 35 U/L (ref 15–37)
ATRIAL RATE: 65 BPM
BACTERIA URNS QL MICRO: NEGATIVE /HPF
BASOPHILS # BLD: 0.1 K/UL (ref 0–0.1)
BASOPHILS NFR BLD: 1 % (ref 0–1)
BILIRUB SERPL-MCNC: 0.7 MG/DL (ref 0.2–1)
BILIRUB UR QL: NEGATIVE
BUN SERPL-MCNC: 53 MG/DL (ref 6–20)
BUN/CREAT SERPL: 34 (ref 12–20)
CALCIUM SERPL-MCNC: 9 MG/DL (ref 8.5–10.1)
CALCULATED R AXIS, ECG10: -111 DEGREES
CALCULATED T AXIS, ECG11: 109 DEGREES
CHLORIDE SERPL-SCNC: 102 MMOL/L (ref 97–108)
CO2 SERPL-SCNC: 32 MMOL/L (ref 21–32)
COLOR UR: NORMAL
CREAT SERPL-MCNC: 1.54 MG/DL (ref 0.7–1.3)
DIAGNOSIS, 93000: NORMAL
DIFFERENTIAL METHOD BLD: ABNORMAL
EOSINOPHIL # BLD: 0.2 K/UL (ref 0–0.4)
EOSINOPHIL NFR BLD: 2 % (ref 0–7)
EPITH CASTS URNS QL MICRO: NORMAL /LPF
ERYTHROCYTE [DISTWIDTH] IN BLOOD BY AUTOMATED COUNT: 14 % (ref 11.5–14.5)
GLOBULIN SER CALC-MCNC: 4.2 G/DL (ref 2–4)
GLUCOSE SERPL-MCNC: 263 MG/DL (ref 65–100)
GLUCOSE UR STRIP.AUTO-MCNC: NEGATIVE MG/DL
HCT VFR BLD AUTO: 48 % (ref 36.6–50.3)
HGB BLD-MCNC: 15.7 G/DL (ref 12.1–17)
HGB UR QL STRIP: NEGATIVE
HYALINE CASTS URNS QL MICRO: NORMAL /LPF (ref 0–5)
IMM GRANULOCYTES # BLD AUTO: 0 K/UL (ref 0–0.04)
IMM GRANULOCYTES NFR BLD AUTO: 0 % (ref 0–0.5)
INR BLD: 1.2 (ref 0.9–1.2)
KETONES UR QL STRIP.AUTO: NEGATIVE MG/DL
LEUKOCYTE ESTERASE UR QL STRIP.AUTO: NEGATIVE
LYMPHOCYTES # BLD: 1.4 K/UL (ref 0.8–3.5)
LYMPHOCYTES NFR BLD: 13 % (ref 12–49)
MCH RBC QN AUTO: 32.8 PG (ref 26–34)
MCHC RBC AUTO-ENTMCNC: 32.7 G/DL (ref 30–36.5)
MCV RBC AUTO: 100.4 FL (ref 80–99)
MONOCYTES # BLD: 1 K/UL (ref 0–1)
MONOCYTES NFR BLD: 9 % (ref 5–13)
NEUTS SEG # BLD: 7.9 K/UL (ref 1.8–8)
NEUTS SEG NFR BLD: 75 % (ref 32–75)
NITRITE UR QL STRIP.AUTO: NEGATIVE
NRBC # BLD: 0 K/UL (ref 0–0.01)
NRBC BLD-RTO: 0 PER 100 WBC
PH UR STRIP: 5.5 [PH] (ref 5–8)
PLATELET # BLD AUTO: 183 K/UL (ref 150–400)
PMV BLD AUTO: 10.5 FL (ref 8.9–12.9)
POTASSIUM SERPL-SCNC: 4.7 MMOL/L (ref 3.5–5.1)
PROT SERPL-MCNC: 7.9 G/DL (ref 6.4–8.2)
PROT UR STRIP-MCNC: NEGATIVE MG/DL
Q-T INTERVAL, ECG07: 466 MS
QRS DURATION, ECG06: 140 MS
QTC CALCULATION (BEZET), ECG08: 503 MS
RBC # BLD AUTO: 4.78 M/UL (ref 4.1–5.7)
RBC #/AREA URNS HPF: NORMAL /HPF (ref 0–5)
SODIUM SERPL-SCNC: 137 MMOL/L (ref 136–145)
SP GR UR REFRACTOMETRY: 1.01 (ref 1–1.03)
TROPONIN I SERPL-MCNC: <0.05 NG/ML
UA: UC IF INDICATED,UAUC: NORMAL
UROBILINOGEN UR QL STRIP.AUTO: 0.2 EU/DL (ref 0.2–1)
VENTRICULAR RATE, ECG03: 70 BPM
WBC # BLD AUTO: 10.6 K/UL (ref 4.1–11.1)
WBC URNS QL MICRO: NORMAL /HPF (ref 0–4)

## 2021-06-18 PROCEDURE — 84484 ASSAY OF TROPONIN QUANT: CPT

## 2021-06-18 PROCEDURE — 85610 PROTHROMBIN TIME: CPT

## 2021-06-18 PROCEDURE — 80053 COMPREHEN METABOLIC PANEL: CPT

## 2021-06-18 PROCEDURE — 93005 ELECTROCARDIOGRAM TRACING: CPT

## 2021-06-18 PROCEDURE — 71045 X-RAY EXAM CHEST 1 VIEW: CPT

## 2021-06-18 PROCEDURE — 85025 COMPLETE CBC W/AUTO DIFF WBC: CPT

## 2021-06-18 PROCEDURE — 81001 URINALYSIS AUTO W/SCOPE: CPT

## 2021-06-18 PROCEDURE — 99285 EMERGENCY DEPT VISIT HI MDM: CPT

## 2021-06-18 PROCEDURE — 36415 COLL VENOUS BLD VENIPUNCTURE: CPT

## 2021-06-18 NOTE — ED PROVIDER NOTES
EMERGENCY DEPARTMENT HISTORY AND PHYSICAL EXAM      Date: 6/18/2021  Patient Name: Meggan Hanna    History of Presenting Illness     Chief Complaint   Patient presents with    Dizziness     pt reports that his BP was 190 this morning at rehab. Pt reports also feeling dizzy. denies CP or SOB       History Provided By: Patient    HPI: Meggan Hanna, 82 y.o. male with history of coronary artery disease, hypertension, [place] pacemaker defibrillator, CHF with decreased EF presents to the ED with cc of dizziness, lightheadedness, and concern about blood pressure. Patient was at [facility] doing rehabilitation for cervical spine arthritis, he was moving his head back and forth doing exercises when he developed dizziness described as disequilibrium. He also endorses associated lightheadedness. Denies any chest pain, shortness of breath, palpitations. He did not feel his AICD fire. he states that his symptoms felt much better after a few minutes. He became concerned that his blood pressure was elevated during this time 758 systolic, he went home and was feeling much better but when he rechecked his blood pressure and it was still elevated he wanted to get checked out. He no longer has any dizziness, lightheadedness, chest pain, shortness of breath. Denies any other associated symptoms and feels well and at his baseline currently in the ED. Denies fevers, chills, recent illness. States he does have a history of vertigo in the past.    There are no other complaints, changes, or physical findings at this time. PCP: Kapil Trevino MD    No current facility-administered medications on file prior to encounter.      Current Outpatient Medications on File Prior to Encounter   Medication Sig Dispense Refill    glucose blood VI test strips (FreeStyle Test) strip TEST THREE TIMES DAILY 300 Strip 3    metoprolol tartrate (LOPRESSOR) 100 mg IR tablet TAKE 1 TABLET TWICE A  Tablet 2    spironolactone (ALDACTONE) 25 mg tablet TAKE 1 TABLET DAILY 90 Tab 2    furosemide (LASIX) 40 mg tablet TAKE 2 TABLETS DAILY 180 Tab 2    warfarin (COUMADIN) 5 mg tablet TAKE 1 TABLET DAILY 90 Tab 2    isosorbide mononitrate ER (IMDUR) 60 mg CR tablet TAKE 1 TABLET DAILY 90 Tab 2    allopurinoL (ZYLOPRIM) 300 mg tablet TAKE 1 TABLET DAILY 90 Tab 3    levothyroxine (SYNTHROID) 175 mcg tablet take 1 tablet by mouth once daily BEFORE BREAKFAST 90 Tab 3    insulin glargine (Lantus Solostar U-100 Insulin) 100 unit/mL (3 mL) inpn INJECT 60 UNITS UNDER THE SKIN DAILY 60 mL 3    folic acid-vit X7-JFA F64 (Virt-Sun) 2.2-25-1 mg tab TAKE ONE TABLET BY MOUTH DAILY 90 Tab 3    BD Ultra-Fine Short Pen Needle 31 gauge x 5/16\" ndle USE TO INJECT INSULIN SUBCUTANEOUSLY FOUR TIMES DAILY AS DIRECTED 100 Pen Needle 4    lovastatin (MEVACOR) 40 mg tablet TAKE 2 TABLETS DAILY 180 Tab 3    insulin lispro (HUMALOG) 100 unit/mL injection 5-10 Units by SubCUTAneous route three (3) times daily as needed (per sliding scale). 1 Vial 6    Flaxseed Oil oil Take 1,000 mg by mouth daily.  terazosin (HYTRIN) 10 mg capsule Take 1 capsule by mouth nightly. Indications: BENIGN PROSTATIC HYPERTROPHY, HYPERTENSION      VIT A/VIT C/VIT E/ZINC/COPPER (OCUVITE PRESERVISION PO) Take 1 Tab by mouth two (2) times a day.  losartan (COZAAR) 50 mg tablet Take 50 mg by mouth daily. Indications: CHRONIC HEART FAILURE, HYPERTENSION      multivitamin, stress formula (STRESS TAB) tablet Take 1 Tab by mouth daily.  CYANOCOBALAMIN/FA/PYRIDOXINE (FOLGARD RX 2.2 PO) Take 1 tablet by mouth daily.  finasteride (PROSCAR) 5 mg tablet Take 5 mg by mouth every other day. With dinner  Indications: BENIGN PROSTATIC HYPERTROPHY         Past History     Past Medical History:  Past Medical History:   Diagnosis Date    Acquired hypothyroidism 9/12/2017    Anemia 2/1/2015    Arrhythmia     a. fib.& a.  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    CAD (coronary artery disease)     Chronic atrial fibrillation ([address] 2015    Chronic kidney disease     decreased kidney function    CKD (chronic kidney disease) stage 3, GFR 30-59 ml/min ([address] 2015    Diabetes [facility]     Diverticulosis of large intestine without hemorrhage 10/24/2017    Gout 10/24/2017    Heart failure ([facility]     Hyperlipidemia 2015    Hypertension     Ill-defined condition     high cholesterol    Ill-defined condition     gout    Long term current use of amiodarone 10/24/2017    Long term current use of anticoagulant 10/24/2017    Long-term use of high-risk medication 10/24/2017    Macular degeneration 10/24/2017    Peripheral vascular disease ([address] 10/24/2017    Renal artery stenosis ([facility] 10/24/2017    Right-sided extracranial carotid artery stenosis 10/24/2017    Sick sinus syndrome ([facility] 10/24/2017    Status post pacemaker       Past Surgical History:  Past Surgical History:   Procedure Laterality Date    HX AAA REPAIR      HX APPENDECTOMY      HX CATARACT REMOVAL      / lens implant    HX CHOLECYSTECTOMY      HX ORTHOPAEDIC Right     rt unicondular knee replacement    HX ORTHOPAEDIC  1/26/15    LEFT UNICONDYLAR KNEE ARTHROPLASTY    HX PACEMAKER      pacemaker    HX TONSILLECTOMY      HX UROLOGICAL      rt renal stentx2       Family History:  Family History   Problem Relation Age of Onset    Cancer Mother     Diabetes Father        Social History:  Social History     Tobacco Use    Smoking status: Former Smoker     Quit date: 1990     Years since quittin.9    Smokeless tobacco: Never Used   Vaping Use    Vaping Use: Never used   Substance Use Topics    Alcohol use: Yes     Alcohol/week: 0.0 standard drinks     Comment: 3-4 drinks a week    Drug use: No     Types: Prescription, OTC       Allergies:   Allergies   Allergen Reactions    Latex Other (comments)     Skin raw and severely irritated    Aspirin Unknown (comments)    Hydrocodone Other (comments)     hallucinations    Oxycodone Other (comments)     hallucinations    Sulfa (Sulfonamide Antibiotics) Rash    Tetracycline Rash         Review of Systems   Review of Systems   Constitutional: Negative for chills and fever. Eyes: Negative for visual disturbance. Respiratory: Negative for cough and shortness of breath. Cardiovascular: Negative for chest pain and leg swelling. Gastrointestinal: Negative for abdominal pain, nausea and vomiting. Genitourinary: Negative. Musculoskeletal: Negative for back pain and gait problem. Skin: Negative for color change and rash. Neurological: Positive for dizziness and light-headedness. Negative for weakness, numbness and headaches. All other systems reviewed and are negative. Physical Exam   Physical Exam  Vitals and nursing note reviewed. Constitutional:       General: He is not in acute distress. Appearance: Normal appearance. He is not ill-appearing or toxic-appearing. HENT:      Head: Normocephalic and atraumatic. Nose: Nose normal.      Mouth/Throat:      Mouth: Mucous membranes are moist.   Eyes:      Extraocular Movements: Extraocular movements intact. Pupils: Pupils are equal, round, and reactive to light. Cardiovascular:      Rate and Rhythm: Normal rate and regular rhythm. Heart sounds: No murmur heard. Pulmonary:      Effort: Pulmonary effort is normal. No respiratory distress. Breath sounds: Normal breath sounds. No wheezing. Comments: Pacemaker noted left upper chest wall  Abdominal:      General: There is no distension. Palpations: Abdomen is soft. Tenderness: There is no abdominal tenderness. There is no guarding or rebound. Musculoskeletal:         General: No swelling or tenderness. Normal range of motion. Cervical back: Normal range of motion and neck supple. Right lower leg: No edema.       Left lower leg: No edema. Skin:     General: Skin is warm and dry. Coloration: Skin is not pale. Findings: No erythema. Neurological:      General: No focal deficit present. Mental Status: He is alert and oriented to person, place, and time. Comments: Cranial nerves intact, motor 5/5 throughout, sensation intact. No cerebellar deficits. Diagnostic Study Results     Labs -     Recent Results (from the past 12 hour(s))   CBC WITH AUTOMATED DIFF    Collection Time: 06/18/21 11:45 AM   Result Value Ref Range    WBC 10.6 4.1 - 11.1 K/uL    RBC 4.78 4.10 - 5.70 M/uL    HGB 15.7 12.1 - 17.0 g/dL    HCT 48.0 36.6 - 50.3 %    .4 (H) 80.0 - 99.0 FL    MCH 32.8 26.0 - 34.0 PG    MCHC 32.7 30.0 - 36.5 g/dL    RDW 14.0 11.5 - 14.5 %    PLATELET 353 450 - 400 K/uL    MPV 10.5 8.9 - 12.9 FL    NRBC 0.0 0  WBC    ABSOLUTE NRBC 0.00 0.00 - 0.01 K/uL    NEUTROPHILS 75 32 - 75 %    LYMPHOCYTES 13 12 - 49 %    MONOCYTES 9 5 - 13 %    EOSINOPHILS 2 0 - 7 %    BASOPHILS 1 0 - 1 %    IMMATURE GRANULOCYTES 0 0.0 - 0.5 %    ABS. NEUTROPHILS 7.9 1.8 - 8.0 K/UL    ABS. LYMPHOCYTES 1.4 0.8 - 3.5 K/UL    ABS. MONOCYTES 1.0 0.0 - 1.0 K/UL    ABS. EOSINOPHILS 0.2 0.0 - 0.4 K/UL    ABS. BASOPHILS 0.1 0.0 - 0.1 K/UL    ABS. IMM. GRANS. 0.0 0.00 - 0.04 K/UL    DF AUTOMATED     METABOLIC PANEL, COMPREHENSIVE    Collection Time: 06/18/21 11:45 AM   Result Value Ref Range    Sodium 137 136 - 145 mmol/L    Potassium 4.7 3.5 - 5.1 mmol/L    Chloride 102 97 - 108 mmol/L    CO2 32 21 - 32 mmol/L    Anion gap 3 (L) 5 - 15 mmol/L    Glucose 263 (H) 65 - 100 mg/dL    BUN 53 (H) 6 - 20 MG/DL    Creatinine 1.54 (H) 0.70 - 1.30 MG/DL    BUN/Creatinine ratio 34 (H) 12 - 20      GFR est AA 52 (L) >60 ml/min/1.73m2    GFR est non-AA 43 (L) >60 ml/min/1.73m2    Calcium 9.0 8.5 - 10.1 MG/DL    Bilirubin, total 0.7 0.2 - 1.0 MG/DL    ALT (SGPT) 42 12 - 78 U/L    AST (SGOT) 35 15 - 37 U/L    Alk.  phosphatase 145 (H) 45 - 117 U/L Protein, total 7.9 6.4 - 8.2 g/dL    Albumin 3.7 3.5 - 5.0 g/dL    Globulin 4.2 (H) 2.0 - 4.0 g/dL    A-G Ratio 0.9 (L) 1.1 - 2.2     TROPONIN I    Collection Time: 06/18/21 11:45 AM   Result Value Ref Range    Troponin-I, Qt. <0.05 <0.05 ng/mL   EKG, 12 LEAD, INITIAL    Collection Time: 06/18/21 11:48 AM   Result Value Ref Range    Ventricular Rate 70 BPM    Atrial Rate 65 BPM    QRS Duration 140 ms    Q-T Interval 466 ms    QTC Calculation (Bezet) 503 ms    Calculated R Axis -111 degrees    Calculated T Axis 109 degrees    Diagnosis       Ventricular-paced rhythm  Biventricular pacemaker detected  When compared with ECG of 03-FEB-2016 10:50,  Vent. rate has increased BY  10 BPM  Confirmed by Mitzi Screen ([id number]) on 6/18/2021 2:04:40 PM     URINALYSIS W/ REFLEX CULTURE    Collection Time: 06/18/21  1:26 PM    Specimen: Urine   Result Value Ref Range    Color YELLOW/STRAW      Appearance CLEAR CLEAR      Specific gravity 1.010 1.003 - 1.030      pH (UA) 5.5 5.0 - 8.0      Protein Negative NEG mg/dL    Glucose Negative NEG mg/dL    Ketone Negative NEG mg/dL    Bilirubin Negative NEG      Blood Negative NEG      Urobilinogen 0.2 0.2 - 1.0 EU/dL    Nitrites Negative NEG      Leukocyte Esterase Negative NEG      WBC 0-4 0 - 4 /hpf    RBC 0-5 0 - 5 /hpf    Epithelial cells FEW FEW /lpf    Bacteria Negative NEG /hpf    UA:UC IF INDICATED CULTURE NOT INDICATED BY UA RESULT CNI      Hyaline cast 0-2 0 - 5 /lpf   POC INR    Collection Time: 06/18/21  1:57 PM   Result Value Ref Range    INR (POC) 1.2 (H) <1.2         Radiologic Studies -   XR CHEST PORT   Final Result   No Acute Disease. CT Results  (Last 48 hours)    None        CXR Results  (Last 48 hours)               06/18/21 1236  XR CHEST PORT Final result    Impression:  No Acute Disease. Narrative:  EXAM: Portable CXR. 1218 hours. INDICATION: dizziness       FINDINGS:   The lungs appear clear. Heart is normal in size.  There is no pulmonary edema. There is no evident pneumothorax or pleural effusion. Pacemaker and ICD are   present. No significant change since 2017. Medical Decision Making   I am the first provider for this patient. I reviewed the vital signs, available nursing notes, past medical history, past surgical history, family history and social history. Vital Signs-Reviewed the patient's vital signs. Patient Vitals for the past 12 hrs:   Temp Pulse Resp BP SpO2   06/18/21 1600  70 16 (!) 150/90 98 %   06/18/21 1545  70 12 102/65 96 %   06/18/21 1430  70 14 (!) 160/84 95 %   06/18/21 1400  70 18 (!) 165/81 97 %   06/18/21 1207     98 %   06/18/21 1138 97.4 °F (36.3 °C) 70 14 (!) 153/74 100 %       EKG interpretation:   EKG per my interpretation ventricular paced rhythm, rate 70 bpm, leftward axis, no acute ischemic changes, no interval changes. Records Reviewed: Nursing Notes and Old Medical Records      Provider Notes (Medical Decision Making):   24-year-old male here with episodic dizziness, lightheadedness while at [facility] while he was doing cervical exercises by rotating his head left to right. He is concerned that his blood pressure is elevated. He is afebrile and vital signs are stable. He has no focal neurologic deficits. Differential diagnosis includes BPPV, CVA appears less likely, electrolyte abnormality, ACS appears less likely, arrhythmia with pacemaker firing also appears less likely. Will evaluate with cardiac monitoring, chest x-ray, EKG, blood work, urinalysis, and interrogate pacemaker, and reassess. Troponin negative, chest x-ray unremarkable, blood work and urinalysis largely unremarkable. EKG nonischemic. Interrogated patient's pacemaker and there is no new arrhythmia, has been in atrial fibrillation for the past 400+ days. No AICD fired. Patient remains asymptomatic throughout ED stay without any focal deficits. No further dizziness or lightheadedness.   I have very low suspicion for posterior CVA or ACS. Patient feels comfortable plan for discharge home and close PCP follow-up, given strict return ED precautions. All questions answered he agrees with plan as above. ED Course:   Initial assessment performed. The patients presenting problems have been discussed, and they are in agreement with the care plan formulated and outlined with them. I have encouraged them to ask questions as they arise throughout their visit. Cardiac Monitoring: The cardiac monitor revealed the following rhythm as interpreted by me: Paced rate 70 bpm  The cardiac monitor was ordered secondary to the patient's reported complaint of dizziness lightheadedness and to monitor the patient for dysrhythmia. Yaquelin Lozano MD      Discharge Note:  The patient has been re-evaluated and is ready for discharge. Reviewed available results with patient. Counseled patient on diagnosis and care plan. Patient has expressed understanding, and all questions have been answered. Patient agrees with plan and agrees to follow up as recommended, or to return to the ED if their symptoms worsen. Discharge instructions have been provided and explained to the patient, along with reasons to return to the ED. Disposition:  Discharge      DISCHARGE PLAN:  1. Discharge Medication List as of 6/18/2021  4:28 PM        2. Follow-up Information     Follow up With Specialties Details Why Contact Info    Krista Hodge MD Internal Medicine Schedule an appointment as soon as possible for a visit   [facility]  [phone]      [facility] EMERGENCY DEPT Emergency Medicine Go to  As needed, If symptoms worsen [address]  [address]  [phone]        3. Return to ED if worse     Diagnosis     Clinical Impression:   1. Episode of dizziness    2. Episodic lightheadedness    3.  Hyperglycemia        Attestations:  I am the first and primary provider of record for this patient's ED encounter. I personally performed the services described above in this documentation. Jakob Montaño MD    Please note that this dictation was completed with [employer], the computer voice recognition software. Quite often unanticipated grammatical, syntax, homophones, and other interpretive errors are inadvertently transcribed by the computer software. Please disregard these errors. Please excuse any errors that have escaped final proofreading. Thank you.

## 2021-06-18 NOTE — ED NOTES
1250:  at bedside    1635: I have reviewed discharge instructions with the patient. The patient verbalized understanding. Pt is aware that he needs to make follow up visit with

## 2021-06-18 NOTE — DISCHARGE INSTRUCTIONS
You were evaluated in the emergency department for dizziness and lightheadedness and concern about your blood pressure. Your examination was reassuring as was your work-up including blood work, EKG, chest xray, and urinalysis. It will be important for you to follow-up with your primary care physician in 2-3 days. If you develop worsening symptoms such as recurrent symptoms, chest pain, or shortness of breath, please return to the emergency department immediately. It was a pleasure taking care of you in our Emergency Department today. We know that when you come to [facility], you are entrusting us with your health, comfort, and safety. Our physicians and nurses honor that trust, and truly appreciate the opportunity to care for you and your loved ones. We also value your feedback. If you receive a survey about your Emergency Department experience today, please fill it out. We care about our patients' feedback, and we listen to what you have to say. Thank you!

## 2021-06-21 ENCOUNTER — TELEPHONE (OUTPATIENT)
Dept: INTERNAL MEDICINE CLINIC | Age: 86
End: 2021-06-21

## 2021-06-21 NOTE — TELEPHONE ENCOUNTER
Patient states he was seen in the ER on Friday for vertigo. He needs a fu appt within the couple of days.      [phone] Please leave a message

## 2021-06-23 ENCOUNTER — OFFICE VISIT (OUTPATIENT)
Dept: INTERNAL MEDICINE CLINIC | Age: 86
End: 2021-06-23
Payer: MEDICARE

## 2021-06-23 VITALS
HEIGHT: 70 IN | HEART RATE: 77 BPM | DIASTOLIC BLOOD PRESSURE: 76 MMHG | OXYGEN SATURATION: 99 % | TEMPERATURE: 97.5 F | WEIGHT: 238.2 LBS | RESPIRATION RATE: 20 BRPM | BODY MASS INDEX: 34.1 KG/M2 | SYSTOLIC BLOOD PRESSURE: 132 MMHG

## 2021-06-23 DIAGNOSIS — I10 ESSENTIAL HYPERTENSION: Chronic | ICD-10-CM

## 2021-06-23 DIAGNOSIS — H81.13 BENIGN PAROXYSMAL POSITIONAL VERTIGO DUE TO BILATERAL VESTIBULAR DISORDER: Primary | ICD-10-CM

## 2021-06-23 PROCEDURE — G8536 NO DOC ELDER MAL SCRN: HCPCS | Performed by: INTERNAL MEDICINE

## 2021-06-23 PROCEDURE — 1101F PT FALLS ASSESS-DOCD LE1/YR: CPT | Performed by: INTERNAL MEDICINE

## 2021-06-23 PROCEDURE — G8427 DOCREV CUR MEDS BY ELIG CLIN: HCPCS | Performed by: INTERNAL MEDICINE

## 2021-06-23 PROCEDURE — G8417 CALC BMI ABV UP PARAM F/U: HCPCS | Performed by: INTERNAL MEDICINE

## 2021-06-23 PROCEDURE — 99213 OFFICE O/P EST LOW 20 MIN: CPT | Performed by: INTERNAL MEDICINE

## 2021-06-23 PROCEDURE — G8752 SYS BP LESS 140: HCPCS | Performed by: INTERNAL MEDICINE

## 2021-06-23 PROCEDURE — G8432 DEP SCR NOT DOC, RNG: HCPCS | Performed by: INTERNAL MEDICINE

## 2021-06-23 PROCEDURE — G8754 DIAS BP LESS 90: HCPCS | Performed by: INTERNAL MEDICINE

## 2021-06-23 RX ORDER — INSULIN LISPRO 100 [IU]/ML
5-10 INJECTION, SOLUTION INTRAVENOUS; SUBCUTANEOUS
Qty: 1 VIAL | Refills: 6
Start: 2021-06-23 | End: 2021-06-25 | Stop reason: SDUPTHER

## 2021-06-23 NOTE — PATIENT INSTRUCTIONS
Benign Paroxysmal Positional Vertigo (BPPV): Care Instructions  Your Care Instructions     Benign paroxysmal positional vertigo, also called BPPV, is an inner ear problem. It causes a spinning or whirling sensation when you move your head. This sensation is called vertigo. The vertigo usually lasts for less than a minute. People often have vertigo spells for a few days or weeks. Then the vertigo goes away. But it may come back again. The vertigo may be mild, or it may be bad enough to cause unsteadiness, nausea, and vomiting. When you move, your inner ear sends messages to the brain. This helps you keep your balance. Vertigo can happen when debris builds up in the inner ear. The buildup can cause the inner ear to send the wrong message to the brain. Your doctor may move you in different positions to help your vertigo get better faster. This is called the Epley maneuver. Your doctor may also prescribe medicines or exercises to help with your symptoms. Follow-up care is a key part of your treatment and safety. Be sure to make and go to all appointments, and call your doctor if you are having problems. It's also a good idea to know your test results and keep a list of the medicines you take. How can you care for yourself at home? · If your doctor suggests that you do De Anda-Daroff exercises:  ? Sit on the edge of a bed or sofa. Quickly lie down on the side that causes the worst vertigo. Lie on your side with your ear down. ? Stay in this position for at least 30 seconds or until the vertigo goes away. ? Sit up. If this causes vertigo, wait for it to stop. ? Repeat the procedure on the other side. ? Repeat this 10 times. Do these exercises 2 times a day until the vertigo is gone. When should you call for help? Call 911 anytime you think you may need emergency care. For example, call if:    · You have symptoms of a stroke.  These may include:  ? Sudden numbness, tingling, weakness, or loss of movement in your face, arm, or leg, especially on only one side of your body. ? Sudden vision changes. ? Sudden trouble speaking. ? Sudden confusion or trouble understanding simple statements. ? Sudden problems with walking or balance. ? A sudden, severe headache that is different from past headaches. Call your doctor now or seek immediate medical care if:    · You have new or worse nausea and vomiting.     · You have new symptoms such as hearing loss or roaring in your ears. Watch closely for changes in your health, and be sure to contact your doctor if:    · You are not getting better as expected.     · Your vertigo gets worse. Where can you learn more? Go to http://www.gray.com/  Enter P372 in the search box to learn more about \"Benign Paroxysmal Positional Vertigo (BPPV): Care Instructions. \"  Current as of: December 2, 2020               Content Version: 12.8  © 6954-0876 [employer]. Care instructions adapted under license by [employer] (which disclaims liability or warranty for this information). If you have questions about a medical condition or this instruction, always ask your healthcare professional. [address] any warranty or liability for your use of this information.

## 2021-06-23 NOTE — PROGRESS NOTES
Marcy Jacobs is a 82 y.o. male presenting for Dizziness  . 1. Have you been to the ER, urgent care clinic since your last visit? Hospitalized since your last visit? 6-18-21 ER for vertigo    2. Have you seen or consulted any other health care providers outside of the [address] [place] [address] since your last visit? Include any pap smears or colon screening. No    Fall Risk Assessment, last 12 mths 5/11/2021   Able to walk? Yes   Fall in past 12 months? 0   Do you feel unsteady? 0   Are you worried about falling 0         Abuse Screening Questionnaire 5/11/2021   Do you ever feel afraid of your partner? N   Are you in a relationship with someone who physically or mentally threatens you? N   Is it safe for you to go home? Y       3 most recent PHQ Screens 5/11/2021   Little interest or pleasure in doing things Not at all   Feeling down, depressed, irritable, or hopeless Not at all   Total Score PHQ 2 0       There are no discontinued medications.

## 2021-06-23 NOTE — PROGRESS NOTES
Subjective:     Mr. Anni Akins returns to the office today and transition of care subsequent to an emergency room visit of 6/18 at [facility]. The patient was at physical therapy that day and was receiving physical therapy for neck issues. He developed severe vertigo while he was there and his blood pressure was high. He subsequently went to the emergency room and was evaluated. Laboratory studies, EKG, cardiac enzymes and chest x-ray were all negative. Patient's vertigo was subsequently decreased over time but during the course of his ER visit his blood pressure was elevated. The patient does have known hypertension and has been compliant with his Lasix and losartan. He had no focal neurological problems but did feel slightly nauseated. Blood pressure slowly came down during his ER visit and no medication adjustments were made. He notes only minimal amounts of vertigo since that time most of which occurred while he was in bed and would occur if he rolled over to 1 side versus the other. He denies any chest pains or palpitations and otherwise feels back to normal.    Past Medical History:   Diagnosis Date    Acquired hypothyroidism 9/12/2017    Anemia 2/1/2015    Arrhythmia     a. fib.& a.  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    CAD (coronary artery disease)     Chronic atrial fibrillation (HCC) 2/1/2015    Chronic kidney disease     decreased kidney function    CKD (chronic kidney disease) stage 3, GFR 30-59 ml/min ([address] 2/1/2015    Diabetes ([facility]     Diverticulosis of large intestine without hemorrhage 10/24/2017    Gout 10/24/2017    Heart failure ([address]     Hyperlipidemia 2/1/2015    Hypertension     Ill-defined condition     high cholesterol    Ill-defined condition     gout    Long term current use of amiodarone 10/24/2017    Long term current use of anticoagulant 10/24/2017    Long-term use of high-risk medication 10/24/2017    Macular degeneration 10/24/2017    Peripheral vascular disease ([facility] 10/24/2017    Renal artery stenosis ([facility] 10/24/2017    Right-sided extracranial carotid artery stenosis 10/24/2017    Sick sinus syndrome ([facility] 10/24/2017    Status post pacemaker     Past Surgical History:   Procedure Laterality Date    HX AAA REPAIR      HX APPENDECTOMY      HX CATARACT REMOVAL      / lens implant    HX CHOLECYSTECTOMY      HX ORTHOPAEDIC Right     rt unicondular knee replacement    HX ORTHOPAEDIC  1/26/15    LEFT UNICONDYLAR KNEE ARTHROPLASTY    HX PACEMAKER  7/11    pacemaker    HX TONSILLECTOMY      HX UROLOGICAL      rt renal stentx2     Allergies   Allergen Reactions    Latex Other (comments)     Skin raw and severely irritated    Aspirin Unknown (comments)    Hydrocodone Other (comments)     hallucinations    Oxycodone Other (comments)     hallucinations    Sulfa (Sulfonamide Antibiotics) Rash    Tetracycline Rash     Current Outpatient Medications   Medication Sig Dispense Refill    insulin lispro (HUMALOG) 100 unit/mL injection 5-10 Units by SubCUTAneous route three (3) times daily as needed (per sliding scale).  1 Vial 6    glucose blood VI test strips (FreeStyle Test) strip TEST THREE TIMES DAILY 300 Strip 3    metoprolol tartrate (LOPRESSOR) 100 mg IR tablet TAKE 1 TABLET TWICE A  Tablet 2    spironolactone (ALDACTONE) 25 mg tablet TAKE 1 TABLET DAILY 90 Tab 2    furosemide (LASIX) 40 mg tablet TAKE 2 TABLETS DAILY 180 Tab 2    warfarin (COUMADIN) 5 mg tablet TAKE 1 TABLET DAILY 90 Tab 2    isosorbide mononitrate ER (IMDUR) 60 mg CR tablet TAKE 1 TABLET DAILY 90 Tab 2    allopurinoL (ZYLOPRIM) 300 mg tablet TAKE 1 TABLET DAILY 90 Tab 3    levothyroxine (SYNTHROID) 175 mcg tablet take 1 tablet by mouth once daily BEFORE BREAKFAST 90 Tab 3    insulin glargine (Lantus Solostar U-100 Insulin) 100 unit/mL (3 mL) inpn INJECT 60 UNITS UNDER THE SKIN DAILY 60 mL 3    folic acid-vit O8-LZW M02 (Virt-Sun) 2.2-25-1 mg tab TAKE ONE TABLET BY MOUTH DAILY 90 Tab 3    BD Ultra-Fine Short Pen Needle 31 gauge x \" ndle USE TO INJECT INSULIN SUBCUTANEOUSLY FOUR TIMES DAILY AS DIRECTED 100 Pen Needle 4    lovastatin (MEVACOR) 40 mg tablet TAKE 2 TABLETS DAILY 180 Tab 3    Flaxseed Oil oil Take 1,000 mg by mouth daily.  terazosin (HYTRIN) 10 mg capsule Take 1 capsule by mouth nightly. Indications: BENIGN PROSTATIC HYPERTROPHY, HYPERTENSION      VIT A/VIT C/VIT E/ZINC/COPPER (OCUVITE PRESERVISION PO) Take 1 Tab by mouth two (2) times a day.  losartan (COZAAR) 50 mg tablet Take 50 mg by mouth daily. Indications: CHRONIC HEART FAILURE, HYPERTENSION      multivitamin, stress formula (STRESS TAB) tablet Take 1 Tab by mouth daily.  CYANOCOBALAMIN/FA/PYRIDOXINE (FOLGARD RX 2.2 PO) Take 1 tablet by mouth daily.  finasteride (PROSCAR) 5 mg tablet Take 5 mg by mouth every other day. With dinner  Indications: BENIGN PROSTATIC HYPERTROPHY       Social History     Socioeconomic History    Marital status:      Spouse name: Not on file    Number of children: Not on file    Years of education: Not on file    Highest education level: Not on file   Tobacco Use    Smoking status: Former Smoker     Quit date: 1990     Years since quittin.9    Smokeless tobacco: Never Used   Vaping Use    Vaping Use: Never used   Substance and Sexual Activity    Alcohol use: Yes     Alcohol/week: 0.0 standard drinks     Comment: 3-4 drinks a week    Drug use: No     Types: Prescription, OTC     Social Determinants of Health     Financial Resource Strain:     Difficulty of Paying Living Expenses:    Food Insecurity:     Worried About Running Out of Food in the Last Year:     [address] in the Last Year:    Transportation Needs:     Lack of Transportation (Medical):      Lack of Transportation (Non-Medical):    Physical Activity:     Days of Exercise per Week:     Minutes of Exercise per Session:    Stress:     Feeling of Stress :    Social Connections:     Frequency of Communication with Friends and Family:     Frequency of Social Gatherings with Friends and Family:     Attends Jain Services:     Active Member of Clubs or Organizations:     Attends Club or Organization Meetings:     Marital Status:      Family History   Problem Relation Age of Onset    Cancer Mother     Diabetes Father        Review of Systems:  GEN: no weight loss, weight gain, fatigue or night sweats  CV: no PND, orthopnea, or palpitations  Resp: no dyspnea on exertion, no cough  Abd: no nausea, vomiting or diarrhea  EXT: denies edema, claudication  Endocrine: no hair loss, excessive thirst or polyuria  Neurological ROS: no TIA or stroke symptoms  ROS otherwise negative      Objective:     Visit Vitals  /76   Pulse 77   Temp 97.5 °F (36.4 °C) (Oral)   Resp 20   Ht 5' 10\" (1.778 m)   Wt 238 lb 3.2 oz (108 kg)   SpO2 99%   BMI 34.18 kg/m²     Body mass index is 34.18 kg/m². General:   alert, cooperative and no distress   Eyes: conjunctivae/sclerae clear. PERRL, EOM's intact   Mouth:  No oral lesions, no pharyngeal erythema, no exudates   Neck: Trachea midline, no thyromegaly, no bruits   Heart: S1 and S2 normal,no murmurs noted    Lungs: Clear to auscultation bilaterally, no increased work of breathing   Abdomen: Soft, nontender.   Normal bowel sounds   Extremities: No edema or cyanosis   Neuro: ..alert, oriented x3,speech normal in context and clarity, cranial nerves II-XII intact,motor strength: full proximally and distally,gait: normal  reflexes: full and symmetric     Physical exam otherwise negative         Assessment/Plan:     Diagnoses and all orders for this visit:    Benign paroxysmal positional vertigo due to bilateral vestibular disorder    Essential hypertension    Other orders  -     insulin lispro (HUMALOG) 100 unit/mL injection; 5-10 Units by SubCUTAneous route three (3) times daily as needed (per sliding scale). , No Print, Disp-1 Vial, R-6        Other instructions: The patient's medications were reviewed and reconciled. No change in his current medical regimen will be made. Vertigo is slowly abating on its own. Blood pressure is adequately controlled on his current regimen. ER visit results of 6/18 were reviewed during the course of this office visit. Follow-up as previously appointed    Follow-up and Dispositions    · Return for As previously scheduled. Philippe Ruiz MD    Please note that this dictation was completed with Upstream, the [employer] voice recognition software. Quite often unanticipated grammatical, syntax, homophones, and other interpretive errors are inadvertently transcribed by the computer software. Please disregard these errors. Please excuse any errors that have escaped final proofreading.

## 2021-06-25 RX ORDER — INSULIN LISPRO 100 [IU]/ML
5-10 INJECTION, SOLUTION INTRAVENOUS; SUBCUTANEOUS
Qty: 1 VIAL | Refills: 6 | Status: SHIPPED | OUTPATIENT
Start: 2021-06-25 | End: 2022-01-12 | Stop reason: ALTCHOICE

## 2021-06-25 NOTE — TELEPHONE ENCOUNTER
Last Refill: 2021 (Last order did not go through to the pharmacy)  Last Visit: 2021   Next Visit: 2021    Requested Prescriptions     Pending Prescriptions Disp Refills    insulin lispro (HUMALOG) 100 unit/mL injection 1 Vial 6     Si-10 Units by SubCUTAneous route three (3) times daily as needed (per sliding scale).

## 2021-06-28 ENCOUNTER — TELEPHONE (OUTPATIENT)
Dept: INTERNAL MEDICINE CLINIC | Age: 86
End: 2021-06-28

## 2021-06-28 ENCOUNTER — LAB ONLY (OUTPATIENT)
Dept: INTERNAL MEDICINE CLINIC | Age: 86
End: 2021-06-28

## 2021-06-28 DIAGNOSIS — I48.20 CHRONIC ATRIAL FIBRILLATION (HCC): Primary | ICD-10-CM

## 2021-06-28 LAB
INR PPP: 2 (ref 0.9–1.1)
PROTHROMBIN TIME: 20.7 SEC (ref 9–11.1)

## 2021-06-28 RX ORDER — INSULIN LISPRO 100 [IU]/ML
INJECTION, SOLUTION INTRAVENOUS; SUBCUTANEOUS
Qty: 1 PEN | Refills: 6 | Status: SHIPPED | OUTPATIENT
Start: 2021-06-28

## 2021-06-28 NOTE — TELEPHONE ENCOUNTER
Patient prefers insulin Pen.  Please send pended Rx to pharmacy:  Requested Prescriptions     Pending Prescriptions Disp Refills    insulin lispro (HUMALOG) 100 unit/mL kwikpen 1 Pen 6     Si-10 Units by SubCUTAneous route three (3) times daily as needed (per sliding scale)

## 2021-06-29 ENCOUNTER — TELEPHONE ANTICOAG (OUTPATIENT)
Dept: INTERNAL MEDICINE CLINIC | Age: 86
End: 2021-06-29

## 2021-06-29 DIAGNOSIS — I48.20 CHRONIC ATRIAL FIBRILLATION (HCC): Primary | ICD-10-CM

## 2021-06-29 NOTE — PROGRESS NOTES
Anticoagulation Summary  As of 2021    INR goal:  1.8-3.0   TTR:  80.4 % (3.8 y)   INR used for dosin.0 (2021)   Warfarin maintenance plan:  5 mg (5 mg x 1) every day   Weekly warfarin total:  35 mg   No change documented:  Rodger Ordaz   Plan last modified:  Rahat Vilchis LPN ()   Next INR check:  2021   Target end date:      Indications    Chronic [facility] fibrillation ([facility] [I48.20]             Anticoagulation Episode Summary     INR check location:  Clinic Lab    Preferred lab:      Send INR reminders to:      Comments:        Anticoagulation Care Providers     Provider Role Specialty Phone number    Aj Flores MD [facility] Internal Medicine [phone]        INR 2.0 - no change in coumadin.  Recheck PT/INR 1 month

## 2021-07-29 ENCOUNTER — LAB ONLY (OUTPATIENT)
Dept: INTERNAL MEDICINE CLINIC | Age: 86
End: 2021-07-29

## 2021-07-29 DIAGNOSIS — I48.20 CHRONIC ATRIAL FIBRILLATION (HCC): Primary | ICD-10-CM

## 2021-07-29 LAB
INR PPP: 2.2 (ref 0.9–1.1)
PROTHROMBIN TIME: 22.4 SEC (ref 9–11.1)

## 2021-07-30 ENCOUNTER — TELEPHONE ANTICOAG (OUTPATIENT)
Dept: INTERNAL MEDICINE CLINIC | Age: 86
End: 2021-07-30

## 2021-07-30 DIAGNOSIS — I48.20 CHRONIC ATRIAL FIBRILLATION (HCC): Primary | ICD-10-CM

## 2021-08-30 ENCOUNTER — TELEPHONE (OUTPATIENT)
Dept: INTERNAL MEDICINE CLINIC | Age: 86
End: 2021-08-30

## 2021-08-30 ENCOUNTER — LAB ONLY (OUTPATIENT)
Dept: INTERNAL MEDICINE CLINIC | Age: 86
End: 2021-08-30

## 2021-08-30 DIAGNOSIS — I48.20 CHRONIC ATRIAL FIBRILLATION (HCC): Primary | ICD-10-CM

## 2021-08-30 LAB
INR PPP: 2.4 (ref 0.9–1.1)
PROTHROMBIN TIME: 24.4 SEC (ref 9–11.1)

## 2021-08-30 RX ORDER — INSULIN GLARGINE 100 [IU]/ML
INJECTION, SOLUTION SUBCUTANEOUS
Qty: 60 ML | Refills: 2 | Status: SHIPPED | OUTPATIENT
Start: 2021-08-30 | End: 2022-02-22 | Stop reason: SDUPTHER

## 2021-08-30 NOTE — TELEPHONE ENCOUNTER
Patient would like your opinion. He is planning to drive to [place] alone and stay for 2 weeks then drive back. Do you think it will be safe due to covid, his age and health conditions?

## 2021-08-31 ENCOUNTER — TELEPHONE ANTICOAG (OUTPATIENT)
Dept: INTERNAL MEDICINE CLINIC | Age: 86
End: 2021-08-31

## 2021-08-31 DIAGNOSIS — I48.20 CHRONIC ATRIAL FIBRILLATION (HCC): Primary | ICD-10-CM

## 2021-08-31 NOTE — PROGRESS NOTES
Anticoagulation Summary  As of 2021    INR goal:  1.8-3.0   TTR:  81.3 % (4 y)   INR used for dosin.4 (2021)   Warfarin maintenance plan:  5 mg (5 mg x 1) every day   Weekly warfarin total:  35 mg   No change documented:  Vika Dill   Plan last modified:  Mariana Riggs LPN ()   Next INR check:  2021   Target end date:      Indications    Chronic [facility] fibrillation ([facility] [I48.20]             Anticoagulation Episode Summary     INR check location:  Clinic Lab    Preferred lab:      Send INR reminders to:      Comments:        Anticoagulation Care Providers     Provider Role Specialty Phone number    Vance Robertson MD [facility] Internal Medicine [phone]      INR 2.4 - no change in coumadin.  Recheck PT/INR 1 month

## 2021-09-30 ENCOUNTER — LAB ONLY (OUTPATIENT)
Dept: INTERNAL MEDICINE CLINIC | Age: 86
End: 2021-09-30

## 2021-09-30 DIAGNOSIS — I48.20 CHRONIC ATRIAL FIBRILLATION (HCC): Primary | ICD-10-CM

## 2021-09-30 LAB
INR PPP: 3.4 (ref 0.9–1.1)
PROTHROMBIN TIME: 33.9 SEC (ref 9–11.1)

## 2021-10-01 ENCOUNTER — TELEPHONE ANTICOAG (OUTPATIENT)
Dept: INTERNAL MEDICINE CLINIC | Age: 86
End: 2021-10-01

## 2021-10-01 DIAGNOSIS — I48.20 CHRONIC ATRIAL FIBRILLATION (HCC): Primary | ICD-10-CM

## 2021-10-01 NOTE — PROGRESS NOTES
Anticoagulation Summary  As of 10/1/2021    INR goal:  1.8-3.0   TTR:  80.8 % (4.1 y)   INR used for dosing:  3.4 (9/30/2021)   Warfarin maintenance plan:  5 mg (5 mg x 1) every day   Weekly warfarin total:  35 mg   Plan last modified:  Jonny Hu LPN (6/52/0163)   Next INR check:  11/1/2021   Target end date:      Indications    Chronic [facility] fibrillation ([facility] [I48.20]             Anticoagulation Episode Summary     INR check location:  Clinic Lab    Preferred lab:      Send INR reminders to:      Comments:        Anticoagulation Care Providers     Provider Role Specialty Phone number    Cornell Anguiano MD Responsible Internal Medicine [phone]      INR slightly high at 3.4   Hold coumadin for 2 days and then restart usual regimen   Recheck PT/INR 1 month

## 2021-10-01 NOTE — PROGRESS NOTES
INR slightly high at 3.4  Hold coumadin for 2 days and then restart usual regimen  Recheck PT/INR 1 month

## 2021-11-01 ENCOUNTER — LAB ONLY (OUTPATIENT)
Dept: INTERNAL MEDICINE CLINIC | Age: 86
End: 2021-11-01

## 2021-11-01 DIAGNOSIS — I48.20 CHRONIC ATRIAL FIBRILLATION (HCC): Primary | ICD-10-CM

## 2021-11-01 LAB
INR PPP: 1.6 (ref 0.9–1.1)
PROTHROMBIN TIME: 15.9 SEC (ref 9–11.1)

## 2021-11-03 ENCOUNTER — TELEPHONE ANTICOAG (OUTPATIENT)
Dept: INTERNAL MEDICINE CLINIC | Age: 86
End: 2021-11-03

## 2021-11-03 DIAGNOSIS — I48.20 CHRONIC ATRIAL FIBRILLATION (HCC): Primary | ICD-10-CM

## 2021-11-03 NOTE — PROGRESS NOTES
Anticoagulation Summary  As of 11/3/2021    INR goal:  1.8-3.0   TTR:  80.5 % (4.2 y)   INR used for dosin.6 (2021)   Warfarin maintenance plan:  5 mg (5 mg x 1) every day   Weekly warfarin total:  35 mg   Plan last modified:  Adela Monsivais LPN ()   Next INR check:  2021   Target end date:      Indications    Ch[facility] ([facility] [I48.20]             Anticoagulation Episode Summary     INR check location:  Clinic Lab    Preferred lab:      Send INR reminders to:      Comments:        Anticoagulation Care Providers     Provider Role Specialty Phone number    Margarito Burrell MD Responsible Internal Medicine [phone]      INR 1.6 - take extra 5 mg coumadin today and then resume usual regimen   Recheck PT/INR 1 month

## 2021-12-07 RX ORDER — CYANOCOBALAMIN/FOLIC AC/VIT B6 1-2.2-25MG
TABLET ORAL
Qty: 90 TABLET | Refills: 1 | Status: SHIPPED | OUTPATIENT
Start: 2021-12-07 | End: 2022-02-15 | Stop reason: SDUPTHER

## 2021-12-07 RX ORDER — FUROSEMIDE 40 MG/1
TABLET ORAL
Qty: 180 TABLET | Refills: 1 | Status: SHIPPED | OUTPATIENT
Start: 2021-12-07 | End: 2022-05-16 | Stop reason: SDUPTHER

## 2022-01-12 ENCOUNTER — OFFICE VISIT (OUTPATIENT)
Dept: INTERNAL MEDICINE CLINIC | Age: 87
End: 2022-01-12
Payer: MEDICARE

## 2022-01-12 VITALS
RESPIRATION RATE: 14 BRPM | BODY MASS INDEX: 35.28 KG/M2 | TEMPERATURE: 97.6 F | SYSTOLIC BLOOD PRESSURE: 126 MMHG | HEIGHT: 70 IN | DIASTOLIC BLOOD PRESSURE: 70 MMHG | WEIGHT: 246.4 LBS

## 2022-01-12 DIAGNOSIS — I48.20 CHRONIC ATRIAL FIBRILLATION (HCC): Chronic | ICD-10-CM

## 2022-01-12 DIAGNOSIS — E03.9 ACQUIRED HYPOTHYROIDISM: Chronic | ICD-10-CM

## 2022-01-12 DIAGNOSIS — I10 PRIMARY HYPERTENSION: Chronic | ICD-10-CM

## 2022-01-12 DIAGNOSIS — Z79.4 TYPE 2 DIABETES MELLITUS WITHOUT COMPLICATION, WITH LONG-TERM CURRENT USE OF INSULIN (HCC): Primary | Chronic | ICD-10-CM

## 2022-01-12 DIAGNOSIS — Z79.01 LONG TERM CURRENT USE OF ANTICOAGULANT: Chronic | ICD-10-CM

## 2022-01-12 DIAGNOSIS — I49.5 SICK SINUS SYNDROME (HCC): Chronic | ICD-10-CM

## 2022-01-12 DIAGNOSIS — E78.2 MIXED HYPERLIPIDEMIA: Chronic | ICD-10-CM

## 2022-01-12 DIAGNOSIS — I25.10 CORONARY ARTERY DISEASE INVOLVING NATIVE HEART WITHOUT ANGINA PECTORIS, UNSPECIFIED VESSEL OR LESION TYPE: Chronic | ICD-10-CM

## 2022-01-12 DIAGNOSIS — Z79.899 LONG-TERM USE OF HIGH-RISK MEDICATION: Chronic | ICD-10-CM

## 2022-01-12 DIAGNOSIS — E11.9 TYPE 2 DIABETES MELLITUS WITHOUT COMPLICATION, WITH LONG-TERM CURRENT USE OF INSULIN (HCC): Primary | Chronic | ICD-10-CM

## 2022-01-12 LAB
ALBUMIN SERPL-MCNC: 3.6 G/DL (ref 3.5–5)
ALBUMIN/GLOB SERPL: 1.1 {RATIO} (ref 1.1–2.2)
ALP SERPL-CCNC: 133 U/L (ref 45–117)
ALT SERPL-CCNC: 38 U/L (ref 12–78)
ANION GAP SERPL CALC-SCNC: 4 MMOL/L (ref 5–15)
APPEARANCE UR: CLEAR
AST SERPL-CCNC: 27 U/L (ref 15–37)
BACTERIA URNS QL MICRO: NEGATIVE /HPF
BASOPHILS # BLD: 0.1 K/UL (ref 0–0.1)
BASOPHILS NFR BLD: 1 % (ref 0–1)
BILIRUB SERPL-MCNC: 0.7 MG/DL (ref 0.2–1)
BILIRUB UR QL: NEGATIVE
BUN SERPL-MCNC: 50 MG/DL (ref 6–20)
BUN/CREAT SERPL: 27 (ref 12–20)
CALCIUM SERPL-MCNC: 8.9 MG/DL (ref 8.5–10.1)
CHLORIDE SERPL-SCNC: 101 MMOL/L (ref 97–108)
CHOLEST SERPL-MCNC: 127 MG/DL
CK SERPL-CCNC: 69 U/L (ref 39–308)
CO2 SERPL-SCNC: 32 MMOL/L (ref 21–32)
COLOR UR: ABNORMAL
CREAT SERPL-MCNC: 1.82 MG/DL (ref 0.7–1.3)
CREAT UR-MCNC: 110 MG/DL
DIFFERENTIAL METHOD BLD: ABNORMAL
EOSINOPHIL # BLD: 0.2 K/UL (ref 0–0.4)
EOSINOPHIL NFR BLD: 3 % (ref 0–7)
EPITH CASTS URNS QL MICRO: ABNORMAL /LPF
ERYTHROCYTE [DISTWIDTH] IN BLOOD BY AUTOMATED COUNT: 13.8 % (ref 11.5–14.5)
EST. AVERAGE GLUCOSE BLD GHB EST-MCNC: 157 MG/DL
GLOBULIN SER CALC-MCNC: 3.4 G/DL (ref 2–4)
GLUCOSE SERPL-MCNC: 220 MG/DL (ref 65–100)
GLUCOSE UR STRIP.AUTO-MCNC: NEGATIVE MG/DL
HBA1C MFR BLD: 7.1 % (ref 4–5.6)
HCT VFR BLD AUTO: 47.3 % (ref 36.6–50.3)
HDLC SERPL-MCNC: 42 MG/DL
HDLC SERPL: 3 {RATIO} (ref 0–5)
HGB BLD-MCNC: 14.9 G/DL (ref 12.1–17)
HGB UR QL STRIP: NEGATIVE
HYALINE CASTS URNS QL MICRO: ABNORMAL /LPF (ref 0–5)
IMM GRANULOCYTES # BLD AUTO: 0.1 K/UL (ref 0–0.04)
IMM GRANULOCYTES NFR BLD AUTO: 1 % (ref 0–0.5)
INR PPP: 2.8 (ref 0.9–1.1)
KETONES UR QL STRIP.AUTO: NEGATIVE MG/DL
LDLC SERPL CALC-MCNC: 59.8 MG/DL (ref 0–100)
LEUKOCYTE ESTERASE UR QL STRIP.AUTO: ABNORMAL
LYMPHOCYTES # BLD: 1.9 K/UL (ref 0.8–3.5)
LYMPHOCYTES NFR BLD: 20 % (ref 12–49)
MCH RBC QN AUTO: 32.6 PG (ref 26–34)
MCHC RBC AUTO-ENTMCNC: 31.5 G/DL (ref 30–36.5)
MCV RBC AUTO: 103.5 FL (ref 80–99)
MICROALBUMIN UR-MCNC: 11 MG/DL
MICROALBUMIN/CREAT UR-RTO: 100 MG/G (ref 0–30)
MONOCYTES # BLD: 1.1 K/UL (ref 0–1)
MONOCYTES NFR BLD: 12 % (ref 5–13)
NEUTS SEG # BLD: 6.1 K/UL (ref 1.8–8)
NEUTS SEG NFR BLD: 63 % (ref 32–75)
NITRITE UR QL STRIP.AUTO: NEGATIVE
NRBC # BLD: 0 K/UL (ref 0–0.01)
NRBC BLD-RTO: 0 PER 100 WBC
PH UR STRIP: 5.5 [PH] (ref 5–8)
PLATELET # BLD AUTO: 164 K/UL (ref 150–400)
PMV BLD AUTO: 11.1 FL (ref 8.9–12.9)
POTASSIUM SERPL-SCNC: 4.6 MMOL/L (ref 3.5–5.1)
PROT SERPL-MCNC: 7 G/DL (ref 6.4–8.2)
PROT UR STRIP-MCNC: ABNORMAL MG/DL
PROTHROMBIN TIME: 28 SEC (ref 9–11.1)
RBC # BLD AUTO: 4.57 M/UL (ref 4.1–5.7)
RBC #/AREA URNS HPF: ABNORMAL /HPF (ref 0–5)
SODIUM SERPL-SCNC: 137 MMOL/L (ref 136–145)
SP GR UR REFRACTOMETRY: 1.02 (ref 1–1.03)
T4 FREE SERPL-MCNC: 1 NG/DL (ref 0.8–1.5)
TRIGL SERPL-MCNC: 126 MG/DL (ref ?–150)
TSH SERPL DL<=0.05 MIU/L-ACNC: 3.24 UIU/ML (ref 0.36–3.74)
UA: UC IF INDICATED,UAUC: ABNORMAL
UROBILINOGEN UR QL STRIP.AUTO: 0.2 EU/DL (ref 0.2–1)
VLDLC SERPL CALC-MCNC: 25.2 MG/DL
WBC # BLD AUTO: 9.5 K/UL (ref 4.1–11.1)
WBC URNS QL MICRO: ABNORMAL /HPF (ref 0–4)

## 2022-01-12 PROCEDURE — 1101F PT FALLS ASSESS-DOCD LE1/YR: CPT | Performed by: INTERNAL MEDICINE

## 2022-01-12 PROCEDURE — G8752 SYS BP LESS 140: HCPCS | Performed by: INTERNAL MEDICINE

## 2022-01-12 PROCEDURE — G8427 DOCREV CUR MEDS BY ELIG CLIN: HCPCS | Performed by: INTERNAL MEDICINE

## 2022-01-12 PROCEDURE — G8754 DIAS BP LESS 90: HCPCS | Performed by: INTERNAL MEDICINE

## 2022-01-12 PROCEDURE — G8417 CALC BMI ABV UP PARAM F/U: HCPCS | Performed by: INTERNAL MEDICINE

## 2022-01-12 PROCEDURE — G8536 NO DOC ELDER MAL SCRN: HCPCS | Performed by: INTERNAL MEDICINE

## 2022-01-12 PROCEDURE — 99214 OFFICE O/P EST MOD 30 MIN: CPT | Performed by: INTERNAL MEDICINE

## 2022-01-12 PROCEDURE — G8510 SCR DEP NEG, NO PLAN REQD: HCPCS | Performed by: INTERNAL MEDICINE

## 2022-01-12 RX ORDER — BISMUTH SUBSALICYLATE 262 MG
1 TABLET,CHEWABLE ORAL DAILY
COMMUNITY
End: 2022-01-12 | Stop reason: ALTCHOICE

## 2022-01-12 NOTE — PATIENT INSTRUCTIONS
Learning About Carbohydrate (Carb) Counting and Eating Out When You Have Diabetes  Why plan your meals? Meal planning can be a key part of managing diabetes. Planning meals and snacks with the right balance of carbohydrate, protein, and fat can help you keep your blood sugar at the target level you set with your doctor. You don't have to eat special foods. You can eat what your family eats, including sweets once in a while. But you do have to pay attention to how often you eat and how much you eat of certain foods. You may want to work with a dietitian or a certified diabetes educator. He or she can give you tips and meal ideas and can answer your questions about meal planning. This health professional can also help you reach a healthy weight if that is one of your goals. What should you know about eating carbs? Managing the amount of carbohydrate (carbs) you eat is an important part of healthy meals when you have diabetes. Carbohydrate is found in many foods. · Learn which foods have carbs. And learn the amounts of carbs in different foods. ? Bread, cereal, pasta, and rice have about 15 grams of carbs in a serving. A serving is 1 slice of bread (1 ounce), ½ cup of cooked cereal, or 1/3 cup of cooked pasta or rice. ? Fruits have 15 grams of carbs in a serving. A serving is 1 small fresh fruit, such as an apple or orange; ½ of a banana; ½ cup of cooked or canned fruit; ½ cup of fruit juice; 1 cup of melon or raspberries; or 2 tablespoons of dried fruit. ? Milk and no-sugar-added yogurt have 15 grams of carbs in a serving. A serving is 1 cup of milk or 2/3 cup of no-sugar-added yogurt. ? Starchy vegetables have 15 grams of carbs in a serving. A serving is ½ cup of mashed potatoes or sweet potato; 1 cup winter squash; ½ of a small baked potato; ½ cup of cooked beans; or ½ cup cooked corn or green peas.   · Learn how much carbs to eat each day and at each meal. A dietitian or CDE can teach you how to keep track of the amount of carbs you eat. This is called carbohydrate counting. · If you are not sure how to count carbohydrate grams, use the Plate Method to plan meals. It is a good, quick way to make sure that you have a balanced meal. It also helps you spread carbs throughout the day. ? Divide your plate by types of foods. Put non-starchy vegetables on half the plate, meat or other protein food on one-quarter of the plate, and a grain or starchy vegetable in the final quarter of the plate. To this you can add a small piece of fruit and 1 cup of milk or yogurt, depending on how many carbs you are supposed to eat at a meal.  · Try to eat about the same amount of carbs at each meal. Do not \"save up\" your daily allowance of carbs to eat at one meal.  · Proteins have very little or no carbs per serving. Examples of proteins are beef, chicken, turkey, fish, eggs, tofu, cheese, cottage cheese, and peanut butter. A serving size of meat is 3 ounces, which is about the size of a deck of cards. Examples of meat substitute serving sizes (equal to 1 ounce of meat) are 1/4 cup of cottage cheese, 1 egg, 1 tablespoon of peanut butter, and ½ cup of tofu. How can you eat out and still eat healthy? · Learn to estimate the serving sizes of foods that have carbohydrate. If you measure food at home, it will be easier to estimate the amount in a serving of restaurant food. · If the meal you order has too much carbohydrate (such as potatoes, corn, or baked beans), ask to have a low-carbohydrate food instead. Ask for a salad or green vegetables. · If you use insulin, check your blood sugar before and after eating out to help you plan how much to eat in the future. · If you eat more carbohydrate at a meal than you had planned, take a walk or do other exercise. This will help lower your blood sugar. What are some tips for eating healthy? · Limit saturated fat, such as the fat from meat and dairy products.  This is a healthy choice because people who have diabetes are at higher risk of heart disease. So choose lean cuts of meat and nonfat or low-fat dairy products. Use olive or canola oil instead of butter or shortening when cooking. · Don't skip meals. Your blood sugar may drop too low if you skip meals and take insulin or certain medicines for diabetes. · Check with your doctor before you drink alcohol. Alcohol can cause your blood sugar to drop too low. Alcohol can also cause a bad reaction if you take certain diabetes medicines. Follow-up care is a key part of your treatment and safety. Be sure to make and go to all appointments, and call your doctor if you are having problems. It's also a good idea to know your test results and keep a list of the medicines you take. Where can you learn more? Go to http://www.parham.com/  Enter I147 in the search box to learn more about \"Learning About Carbohydrate (Carb) Counting and Eating Out When You Have Diabetes. \"  Current as of: December 17, 2020               Content Version: 13.0  © 2006-2021 Healthwise, Incorporated. Care instructions adapted under license by [employer] (which disclaims liability or warranty for this information). If you have questions about a medical condition or this instruction, always ask your healthcare professional. [address] any warranty or liability for your use of this information.

## 2022-01-12 NOTE — PROGRESS NOTES
Marzette Rubinstein is a 82 y.o. male presenting for Follow Up Chronic Condition (6 mo fu)  . 1. Have you been to the ER, urgent care clinic since your last visit? Hospitalized since your last visit? No    2. Have you seen or consulted any other health care providers outside of the [address] [place] [address] since your last visit? Include any pap smears or colon screening. No    Fall Risk Assessment, last 12 mths 1/12/2022   Able to walk? Yes   Fall in past 12 months? 0   Do you feel unsteady? 0   Are you worried about falling 0         Abuse Screening Questionnaire 5/11/2021   Do you ever feel afraid of your partner? N   Are you in a relationship with someone who physically or mentally threatens you? N   Is it safe for you to go home? Y       3 most recent PHQ Screens 1/12/2022   Little interest or pleasure in doing things Not at all   Feeling down, depressed, irritable, or hopeless Not at all   Total Score PHQ 2 0       There are no discontinued medications.

## 2022-01-12 NOTE — PROGRESS NOTES
Subjective:     Mr. Philippa Litten returns to the office today in follow-up of multiple medical problems. The patient has type 2 diabetes mellitus for which he is on Lantus insulin and supplements this with Humalog as needed. He checks his blood sugars up to 4 times daily with average fasting blood sugars around 115. He denies hypoglycemia. He is up-to-date on diabetic retinal eye exam.  He denies hypoglycemia. He has had no burning paresthesias of his feet. Blood pressure currently managed on Lasix, Lopressor and losartan. He denies orthostatic dizziness, muscle cramping, fatigue or palpitations. He has had no headaches, numbness, tingling or focal neurological problems. Hypercholesterolemia currently on lovastatin therapy. He denies muscle soreness or GI upset. He does have a history of coronary artery disease but denies any exertional chest pains or claudication. In addition he has a history of chronic atrial fibrillation with sick sinus syndrome for which she has had a defibrillator/pacemaker placed. He remains on Coumadin anticoagulation and denies any bleeding problems or strokelike symptoms. He has hypothyroidism currently on thyroid replacement. He continues to take his medication on an empty stomach first thing in the morning with water. He denies heat or cold intolerance, changes in skin or hair, weight has been stable. Past Medical History:   Diagnosis Date    Acquired hypothyroidism 9/12/2017    Anemia 2/1/2015    Arrhythmia     a. fib.& a.  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    CAD (coronary artery disease)     Chronic atrial fibrillation (HCC) 2/1/2015    Chronic kidney disease     decreased kidney function    CKD (chronic kidney disease) stage 3, GFR 30-59 ml/min ([address] 2/1/2015    Diabetes ([facility]     Diverticulosis of large intestine without hemorrhage 10/24/2017    Gout 10/24/2017    [facility] failure [facility]     Hyperlipidemia 2/1/2015    Hypertension     Ill-defined condition     high cholesterol    Ill-defined condition     gout    Long term current use of amiodarone 10/24/2017    Long term current use of anticoagulant 10/24/2017    Long-term use of high-risk medication 10/24/2017    Macular degeneration 10/24/2017    Peripheral vascular disease ([facility] 10/24/2017    Renal artery stenosis (HCC) 10/24/2017    Right-sided extracranial carotid artery stenosis 10/24/2017    Sick sinus syndrome ([facility] 10/24/2017    Status post pacemaker     Past Surgical History:   Procedure Laterality Date    HX AAA REPAIR      HX APPENDECTOMY      HX CATARACT REMOVAL      / lens implant    HX CHOLECYSTECTOMY      HX ORTHOPAEDIC Right     rt unicondular knee replacement    HX ORTHOPAEDIC  1/26/15    LEFT UNICONDYLAR KNEE ARTHROPLASTY    HX PACEMAKER  7/11    pacemaker    HX TONSILLECTOMY      HX UROLOGICAL      rt renal stentx2     Allergies   Allergen Reactions    Latex Other (comments)     Skin raw and severely irritated    Aspirin Unknown (comments)    Hydrocodone Other (comments)     hallucinations    Oxycodone Other (comments)     hallucinations    Sulfa (Sulfonamide Antibiotics) Rash    Tetracycline Rash     Current Outpatient Medications   Medication Sig Dispense Refill    lovastatin (MEVACOR) 40 mg tablet TAKE 2 TABLETS DAILY 180 Tablet 1    furosemide (LASIX) 40 mg tablet TAKE 2 TABLETS BY MOUTH DAILY 478 Tablet 1    folic acid-vit N3-HWP W33 (FaBB) 2.2-25-1 mg tab TAKE 1 TABLET BY MOUTH DAILY 90 Tablet 1    spironolactone (ALDACTONE) 25 mg tablet TAKE 1 TABLET DAILY 90 Tablet 1    isosorbide mononitrate ER (IMDUR) 60 mg CR tablet TAKE 1 TABLET DAILY 90 Tablet 1    warfarin (COUMADIN) 5 mg tablet TAKE 1 TABLET DAILY 90 Tablet 1    allopurinoL (ZYLOPRIM) 300 mg tablet TAKE 1 TABLET DAILY 90 Tablet 1    levothyroxine (SYNTHROID) 175 mcg tablet TAKE 1 TABLET BY MOUTH EVERY DAY BEFORE BREAKFAST 90 Tablet 1    Lantus Solostar U-100 Insulin 100 unit/mL (3 mL) inpn INJECT 60 UNITS UNDER THE SKIN DAILY 60 mL 2    insulin lispro (HUMALOG) 100 unit/mL kwikpen 5-10 Units by SubCUTAneous route three (3) times daily as needed (per sliding scale) 1 Pen 6    glucose blood VI test strips (FreeStyle Test) strip TEST THREE TIMES DAILY 300 Strip 3    metoprolol tartrate (LOPRESSOR) 100 mg IR tablet TAKE 1 TABLET TWICE A  Tablet 2    BD Ultra-Fine Short Pen Needle 31 gauge x 5/16\" ndle USE TO INJECT INSULIN SUBCUTANEOUSLY FOUR TIMES DAILY AS DIRECTED 100 Pen Needle 4    Flaxseed Oil oil Take 1,000 mg by mouth daily.  terazosin (HYTRIN) 10 mg capsule Take 1 capsule by mouth nightly. Indications: BENIGN PROSTATIC HYPERTROPHY, HYPERTENSION      VIT A/VIT C/VIT E/ZINC/COPPER (OCUVITE PRESERVISION PO) Take 1 Tab by mouth two (2) times a day.  losartan (COZAAR) 50 mg tablet Take 50 mg by mouth daily. Indications: CHRONIC HEART FAILURE, HYPERTENSION      multivitamin, stress formula (STRESS TAB) tablet Take 1 Tab by mouth daily.  CYANOCOBALAMIN/FA/PYRIDOXINE (FOLGARD RX 2.2 PO) Take 1 tablet by mouth daily.  finasteride (PROSCAR) 5 mg tablet Take 5 mg by mouth every other day. With dinner  Indications: BENIGN PROSTATIC HYPERTROPHY       Social History     Socioeconomic History    Marital status:    Tobacco Use    Smoking status: Former Smoker     Quit date: 1990     Years since quittin.5    Smokeless tobacco: Never Used   Vaping Use    Vaping Use: Never used   Substance and Sexual Activity    Alcohol use:  Yes     Alcohol/week: 0.0 standard drinks     Comment: 3-4 drinks a week    Drug use: No     Types: Prescription, OTC     Family History   Problem Relation Age of Onset    Cancer Mother     Diabetes Father        Review of Systems:  GEN: no weight loss, weight gain, fatigue or night sweats  CV: no PND, orthopnea, or palpitations  Resp: no dyspnea on exertion, no cough  Abd: no nausea, vomiting or diarrhea  EXT: denies edema, claudication  Endocrine: no hair loss, excessive thirst or polyuria  Neurological ROS: no TIA or stroke symptoms  ROS otherwise negative      Objective:     Visit Vitals  /70 (BP 1 Location: Left upper arm, BP Patient Position: Sitting, BP Cuff Size: Adult)   Temp 97.6 °F (36.4 °C) (Oral)   Resp 14   Ht 5' 10\" (1.778 m)   Wt 246 lb 6.4 oz (111.8 kg)   BMI 35.35 kg/m²     Body mass index is 35.35 kg/m². General:   alert, cooperative and no distress   Eyes: conjunctivae/sclerae clear. PERRL, EOM's intact   Mouth:  No oral lesions, no pharyngeal erythema, no exudates   Neck: Trachea midline, no thyromegaly, no bruits   Heart: S1 and S2 normal,no murmurs noted    Lungs: Clear to auscultation bilaterally, no increased work of breathing   Abdomen: Soft, nontender. Normal bowel sounds. Has abdominal wall mass in the right lower quadrant due to previous hematoma that was evaluated by ultrasound in 2017. No change in its size is known. Extremities: No edema or cyanosis   Neuro: ..alert, oriented x3,speech normal in context and clarity, cranial nerves II-XII intact,motor strength: full proximally and distally,gait: normal  reflexes: full and symmetric     Physical exam otherwise negative         Assessment/Plan:     Diagnoses and all orders for this visit:    Type 2 diabetes mellitus without complication, with long-term current use of insulin ([facility])  -     COLLECTION VENOUS BLOOD,VENIPUNCTURE  -     HEMOGLOBIN A1C WITH EAG; Future  -     MICROALBUMIN, UR, RAND W/ MICROALB/CREAT RATIO; Future    Primary hypertension  -     CBC WITH AUTOMATED DIFF; Future  -     METABOLIC PANEL, COMPREHENSIVE; Future  -     URINALYSIS W/ REFLEX CULTURE; Future    Mixed hyperlipidemia  -     LIPID PANEL; Future    Long-term use of high-risk medication  -     CK; Future    Acquired hypothyroidism  -     T4, FREE; Future  -     TSH 3RD GENERATION;  Future    Coronary artery disease involving native heart without angina pectoris, unspecified vessel or lesion type    Chronic atrial fibrillation ([address]    Long term current use of anticoagulant  -     PROTHROMBIN TIME + INR; Future    Sick sinus syndrome ([address]        Other instructions: The patient's medications were reviewed and reconciled. No change in his current medical regimen will be made. A no added salt, prudent diet is encouraged    Weight loss recommended with body mass index of 35.4    Continue to check versus 4 times daily    Continue monthly protimes    Await results of multiple labs    Follow-up 6 months    Follow-up and Dispositions    · Return in about 6 months (around 7/12/2022). Mala Escalona MD    Please note that this dictation was completed with [email or web address], the computer voice recognition software. Quite often unanticipated grammatical, syntax, homophones, and other interpretive errors are inadvertently transcribed by the computer software. Please disregard these errors. Please excuse any errors that have escaped final proofreading.

## 2022-01-13 NOTE — PROGRESS NOTES
A1c stable at 7.1  Check to see if he still sees Dr Douglas Dontrell - fax labs to him if he is seeing him.  BUN and creatinine slightly higher than previous labs

## 2022-02-15 RX ORDER — CYANOCOBALAMIN/FOLIC AC/VIT B6 1-2.2-25MG
TABLET ORAL
Qty: 90 TABLET | Refills: 0 | Status: SHIPPED | OUTPATIENT
Start: 2022-02-15

## 2022-02-15 NOTE — TELEPHONE ENCOUNTER
Requested Prescriptions     Pending Prescriptions Disp Refills    folic acid-vit W3-LILY N44 (FaBB) 2.2-25-1 mg tab 90 Tablet 1     Sig: TAKE 1 TABLET BY MOUTH DAILY       Last Refill: 12/7/21  Next Appointment:8/2/22

## 2022-02-22 NOTE — TELEPHONE ENCOUNTER
Requested Prescriptions     Pending Prescriptions Disp Refills    insulin glargine (Lantus Solostar U-100 Insulin) 100 unit/mL (3 mL) inpn 60 mL 2       Last Refill: 8/30/21  Next Appointment:8/2/22

## 2022-02-23 RX ORDER — INSULIN GLARGINE 100 [IU]/ML
INJECTION, SOLUTION SUBCUTANEOUS
Qty: 60 ML | Refills: 1 | Status: SHIPPED | OUTPATIENT
Start: 2022-02-23 | End: 2022-09-09 | Stop reason: SDUPTHER

## 2022-03-09 ENCOUNTER — LAB ONLY (OUTPATIENT)
Dept: INTERNAL MEDICINE CLINIC | Age: 87
End: 2022-03-09

## 2022-03-09 DIAGNOSIS — I48.20 CHRONIC ATRIAL FIBRILLATION (HCC): Primary | ICD-10-CM

## 2022-03-09 LAB
INR PPP: 2.7 (ref 0.9–1.1)
PROTHROMBIN TIME: 27.3 SEC (ref 9–11.1)

## 2022-03-18 PROBLEM — E11.9 DIABETES (HCC): Status: ACTIVE | Noted: 2017-10-24

## 2022-03-18 PROBLEM — H81.13 BENIGN PAROXYSMAL POSITIONAL VERTIGO DUE TO BILATERAL VESTIBULAR DISORDER: Status: ACTIVE | Noted: 2021-06-23

## 2022-03-19 PROBLEM — M10.9 GOUT: Status: ACTIVE | Noted: 2017-10-24

## 2022-03-19 PROBLEM — I73.9 PERIPHERAL VASCULAR DISEASE (HCC): Status: ACTIVE | Noted: 2017-10-24

## 2022-03-19 PROBLEM — Z79.01 LONG TERM CURRENT USE OF ANTICOAGULANT: Status: ACTIVE | Noted: 2017-10-24

## 2022-03-19 PROBLEM — Z79.899 LONG-TERM USE OF HIGH-RISK MEDICATION: Status: ACTIVE | Noted: 2017-10-24

## 2022-03-19 PROBLEM — E03.9 ACQUIRED HYPOTHYROIDISM: Status: ACTIVE | Noted: 2017-09-12

## 2022-03-19 PROBLEM — E66.01 SEVERE OBESITY (HCC): Status: ACTIVE | Noted: 2019-06-25

## 2022-03-19 PROBLEM — I70.1 RENAL ARTERY STENOSIS (HCC): Status: ACTIVE | Noted: 2017-10-24

## 2022-03-19 PROBLEM — I49.5 SICK SINUS SYNDROME (HCC): Status: ACTIVE | Noted: 2017-10-24

## 2022-03-19 PROBLEM — I65.21 RIGHT-SIDED EXTRACRANIAL CAROTID ARTERY STENOSIS: Status: ACTIVE | Noted: 2017-10-24

## 2022-03-19 PROBLEM — Z79.899 LONG TERM CURRENT USE OF AMIODARONE: Status: ACTIVE | Noted: 2017-10-24

## 2022-03-19 PROBLEM — H35.30 MACULAR DEGENERATION: Status: ACTIVE | Noted: 2017-10-24

## 2022-03-19 PROBLEM — E11.51 TYPE 2 DIABETES MELLITUS WITH PERIPHERAL VASCULAR DISEASE (HCC): Status: ACTIVE | Noted: 2021-05-11

## 2022-03-19 PROBLEM — K57.30 DIVERTICULOSIS OF LARGE INTESTINE WITHOUT HEMORRHAGE: Status: ACTIVE | Noted: 2017-10-24

## 2022-03-20 PROBLEM — N40.1 BPH WITH OBSTRUCTION/LOWER URINARY TRACT SYMPTOMS: Status: ACTIVE | Noted: 2017-10-24

## 2022-03-20 PROBLEM — N13.8 BPH WITH OBSTRUCTION/LOWER URINARY TRACT SYMPTOMS: Status: ACTIVE | Noted: 2017-10-24

## 2022-03-28 ENCOUNTER — TELEPHONE (OUTPATIENT)
Dept: INTERNAL MEDICINE CLINIC | Age: 87
End: 2022-03-28

## 2022-03-28 ENCOUNTER — TELEPHONE ANTICOAG (OUTPATIENT)
Dept: INTERNAL MEDICINE CLINIC | Age: 87
End: 2022-03-28

## 2022-03-28 NOTE — PROGRESS NOTES
Anticoagulation Summary  As of 3/28/2022    INR goal:  1.8-3.0   TTR:  81.3 % (4.5 y)   INR used for dosin.7 (3/9/2022)   Warfarin maintenance plan:  5 mg (5 mg x 1) every day   Weekly warfarin total:  35 mg   No change documented:  Amy Hatfield   Plan last modified:  Pablo Arguello LPN ()   Next INR check:  2022   Target end date:      Indications    Chronic atrial fibrillation ([address] [I48.20]             Anticoagulation Episode Summary     INR check location:  Clinic Lab    Preferred lab:      Send INR reminders to:      Comments:        Anticoagulation Care Providers     Provider Role Specialty Phone number    Jenny Castro MD Responsible Internal Medicine [phone]      INR 2.7. No change in Coumadin.   Recheck pro time 1 month

## 2022-04-11 ENCOUNTER — LAB ONLY (OUTPATIENT)
Dept: INTERNAL MEDICINE CLINIC | Age: 87
End: 2022-04-11

## 2022-04-11 DIAGNOSIS — I48.20 CHRONIC ATRIAL FIBRILLATION (HCC): Primary | ICD-10-CM

## 2022-04-12 LAB
INR PPP: 2 (ref 0.9–1.1)
PROTHROMBIN TIME: 19.8 SEC (ref 9–11.1)

## 2022-04-12 NOTE — PROGRESS NOTES
Former patient of Dr. Kelle Gupta. Please let him know that his INR is currently at 2.0 which is in acceptable range. Continue current dose of warfarin. Recheck in 1 month.

## 2022-04-20 ENCOUNTER — TELEPHONE (OUTPATIENT)
Dept: INTERNAL MEDICINE CLINIC | Age: 87
End: 2022-04-20

## 2022-04-20 NOTE — TELEPHONE ENCOUNTER
I attempted to contact this patient myself by phone but was unable to reach him. I left a message for him regarding his call advising an ASAP evaluation. If possible will try to walk in patient in tomorrow. Please assist by scheduling appt for ASAP appt tomorrow around 1200. He should go to the ER if his symptoms worsen or if other symptoms develop including chest pain, shortness of breath, or dizziness.

## 2022-04-20 NOTE — TELEPHONE ENCOUNTER
----- Message from Lynne Caruso sent at 4/20/2022  9:01 AM EDT -----  Subject: Appointment Request    Reason for Call: Routine Existing Condition Follow Up    QUESTIONS  Type of Appointment? Established Patient  Reason for appointment request? No appointments available during search  Additional Information for Provider? Pt asked to speak with Aminata Kumar. SE   tried to get through to practice to have patient scheduled with Dr. Tad Chapa   (former pt of Dr. Susan Pearce). Pt experiencing black stool. Was trying to   get an appt for next week.  ---------------------------------------------------------------------------  --------------  CALL BACK INFO  What is the best way for the office to contact you? Do not leave any   message, patient will call back for answer  Preferred Call Back Phone Number? [phone]  ---------------------------------------------------------------------------  --------------  SCRIPT ANSWERS  Relationship to Patient? Self  Is this follow up request related to routine Diabetes Management? No  Have you been diagnosed with, awaiting test results for, or told that you   are suspected of having COVID-19 (Coronavirus)? (If patient has tested   negative or was tested as a requirement for work, school, or travel and   not based on symptoms, answer no)? No  Within the past 10 days have you developed any of the following symptoms   (answer no if symptoms have been present longer than 10 days or began   more than 10 days ago)? Fever or Chills, Cough, Shortness of breath or   difficulty breathing, Loss of taste or smell, Sore throat, Nasal   congestion, Sneezing or runny nose, Fatigue or generalized body aches   (answer no if pain is specific to a body part e.g. back pain), Diarrhea,   Headache? No  Have you had close contact with someone with COVID-19 in the last 7 days? No  (Service Expert  click yes below to proceed with [facility] As Usual   Scheduling)?  Yes

## 2022-04-21 RX ORDER — METOPROLOL TARTRATE 100 MG/1
100 TABLET ORAL 2 TIMES DAILY
Qty: 180 TABLET | Refills: 0 | Status: SHIPPED | OUTPATIENT
Start: 2022-04-21 | End: 2022-07-20

## 2022-04-21 NOTE — TELEPHONE ENCOUNTER
Patient was contacted by Dr. Shekhar Eaton nurse, patient given the instructions per Dr. Tommy Jesus stated he does not believe that this is urgent and wanted to make an appointment for next week. Spoke to patient and reiterated that Dr. Tommy Jesus would like the patient to be evaluated. Patient stated that he did not believe it was urgent and wanted to come in next week. Highly advised patient if anything were to change to go to the emergency room. Patient understood.

## 2022-04-21 NOTE — TELEPHONE ENCOUNTER
PCP: Theodore Roblero MD    Last appt: 2022  Future Appointments   Date Time Provider Gail De La Fuente   2022 11:00 AM Pal Gonzales MD [facility] BS AMB       Requested Prescriptions     Pending Prescriptions Disp Refills    metoprolol tartrate (LOPRESSOR) 100 mg IR tablet 180 Tablet 0     Si Tablet two (2) times a day.        Prior labs and Blood pressures:  BP Readings from Last 3 Encounters:   22 126/70   21 132/76   21 (!) 150/90     Lab Results   Component Value Date/Time    Sodium 137 2022 11:33 AM    Potassium 4.6 2022 11:33 AM    Chloride 101 2022 11:33 AM    CO2 32 2022 11:33 AM    Anion gap 4 (L) 2022 11:33 AM    Glucose 220 (H) 2022 11:33 AM    BUN 50 (H) 2022 11:33 AM    Creatinine 1.82 (H) 2022 11:33 AM    BUN/Creatinine ratio 27 (H) 2022 11:33 AM    GFR est AA 43 (L) 2022 11:33 AM    GFR est non-AA 36 (L) 2022 11:33 AM    Calcium 8.9 2022 11:33 AM     Lab Results   Component Value Date/Time    Hemoglobin A1c 7.1 (H) 2022 11:33 AM    Hemoglobin A1c (POC) 7.1 (A) 2018 01:56 PM     Lab Results   Component Value Date/Time    Cholesterol, total 127 2022 11:33 AM    Cholesterol (POC) 116 2018 01:56 PM    HDL Cholesterol 42 2022 11:33 AM    HDL Cholesterol (POC) 35 2018 01:56 PM    LDL Cholesterol (POC) 35.4 2018 01:56 PM    LDL, calculated 59.8 2022 11:33 AM    VLDL, calculated 25.2 2022 11:33 AM    Triglyceride 126 2022 11:33 AM    Triglycerides (POC) 228 (A) 2018 01:56 PM    CHOL/HDL Ratio 3.0 2022 11:33 AM     No results found for: THONY Ahn    Lab Results   Component Value Date/Time    TSH 3.24 2022 11:33 AM    TSH, 3rd generation 2.28 2020 08:39 AM

## 2022-04-26 ENCOUNTER — OFFICE VISIT (OUTPATIENT)
Dept: INTERNAL MEDICINE CLINIC | Age: 87
End: 2022-04-26
Payer: MEDICARE

## 2022-04-26 VITALS
BODY MASS INDEX: 35.3 KG/M2 | OXYGEN SATURATION: 95 % | HEIGHT: 70 IN | SYSTOLIC BLOOD PRESSURE: 136 MMHG | DIASTOLIC BLOOD PRESSURE: 86 MMHG | HEART RATE: 71 BPM | WEIGHT: 246.6 LBS | TEMPERATURE: 98.3 F

## 2022-04-26 DIAGNOSIS — N18.32 STAGE 3B CHRONIC KIDNEY DISEASE (HCC): ICD-10-CM

## 2022-04-26 DIAGNOSIS — E11.51 TYPE 2 DIABETES MELLITUS WITH PERIPHERAL VASCULAR DISEASE (HCC): ICD-10-CM

## 2022-04-26 DIAGNOSIS — E66.01 SEVERE OBESITY (BMI 35.0-39.9) WITH COMORBIDITY (HCC): ICD-10-CM

## 2022-04-26 DIAGNOSIS — R19.5 DARK STOOLS: Primary | ICD-10-CM

## 2022-04-26 DIAGNOSIS — I50.32 CHRONIC DIASTOLIC CONGESTIVE HEART FAILURE (HCC): ICD-10-CM

## 2022-04-26 PROCEDURE — 3051F HG A1C>EQUAL 7.0%<8.0%: CPT | Performed by: INTERNAL MEDICINE

## 2022-04-26 PROCEDURE — 99214 OFFICE O/P EST MOD 30 MIN: CPT | Performed by: INTERNAL MEDICINE

## 2022-04-26 NOTE — PROGRESS NOTES
Chief Complaint   Patient presents with    Melena     STOOL DISCOLORATION       Visit Vitals  /86 (BP 1 Location: Left upper arm, BP Patient Position: Sitting, BP Cuff Size: Large adult)   Pulse 71   Temp 98.3 °F (36.8 °C) (Oral)   Ht 5' 10\" (1.778 m)   Wt 246 lb 9.6 oz (111.9 kg)   SpO2 95%   BMI 35.38 kg/m²         1. \"Have you been to the ER, urgent care clinic since your last visit? Hospitalized since your last visit? \" No    2. \"Have you seen or consulted any other health care providers outside of the [address] [place] [address] since your last visit? \" No     3. For patients aged 39-70: Has the patient had a colonoscopy / FIT/ Cologuard? No      If the patient is female:    4. For patients aged 41-77: Has the patient had a mammogram within the past 2 years? No      5. For patients aged 21-65: Has the patient had a pap smear?  No

## 2022-04-26 NOTE — PROGRESS NOTES
Subjective:     Chief Complaint   Patient presents with   Delores JUAN Marlon Beckman is a 82 y.o. M. His previous primary care provider was Dr. Kelle Gupta, who he last saw in January of this year for routine follow-up. The patient was noted to have a history of type 2 diabetes mellitus for which she was using Lantus insulin and Humalog. His blood sugar readings at the time were noted to be well controlled at 10 mg/dL. His blood pressure was being managed with Lasix, Lopressor, and losartan, without significant problems. His hypercholesterolemia was being managed with lovastatin without any muscle soreness or GI upset, and he was noted to be euthyroid while euthyroid replacement. His physical examination at the time was notable for obesity with a BMI of 35.35 kg/m², but otherwise was unremarkable. He was advised to obtain a routine laboratory studies and continue with his usual medication regimen. Today, the patient comes in for an acute complaint of dark stool. He says that he generally feels fine, and has not noted any somatic symptoms since his last visit. However, about 1 week ago, he started having dark, blackish stool. He says that he was concerned for the possibility of a gastrointestinal bleed, so he had scheduled this appointment, but he says today that upon further reflection he thinks that this may have been because he had also been eating a large quantity of blueberries for the entire week during this time. He also relates that he typically has heartburn whenever he eats acidic foods or drinks things like orange juice, which is often, and so we will often use Pepto-Bismol in order to control this. He denies any worsening heartburn, abdominal pain, or other changes. He also denies any dysphagia or early satiety. He does not use other medications currently for heartburn, and said that this is typically not a problem for him with the use of the Pepto-Bismol.   He denies any personal history of upper GI bleeding or other GI bleeds in the past.  He says that his INR is frequently checked for his history of atrial fibrillation and that it was most recently normal a couple weeks ago. He notes that over the past couple days, this problem has completely resolved, after he discontinued the ingestion of blueberries, has not been using Pepto-Bismol. Other than this, he feels well. He reports that his blood pressure today in clinic is actually higher than where it is typically, pressure readings at home typically in the 120/80 mmHg range. He also notes that his blood sugar typically in the 110 to 120 mg/dL range on his current dosing of Lantus he denies chest pain, shortness of breath, or any further cardiopulmonary symptoms. His heart rate typically here in the 70 to 90 bpm range on his current dosing of metoprolol. His review of systems is otherwise negative. Past Medical History:  Past Medical History:   Diagnosis Date    Acquired hypothyroidism 9/12/2017    Anemia 2/1/2015    Arrhythmia     a. fib.& a.  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    CAD (coronary artery disease)     Chronic atrial fibrillation (HCC) 2/1/2015    Chronic kidney disease     decreased kidney function    CKD (chronic kidney disease) stage 3, GFR 30-59 ml/min ([address] 2/1/2015    Diabetes ([address]     Diverticulosis of large intestine without hemorrhage 10/24/2017    Gout 10/24/2017    Heart failure ([address]     Hyperlipidemia 2/1/2015    Hypertension     Ill-defined condition     high cholesterol    Ill-defined condition     gout    Long term current use of amiodarone 10/24/2017    Long term current use of anticoagulant 10/24/2017    Long-term use of high-risk medication 10/24/2017    Macular degeneration 10/24/2017    Peripheral vascular disease ([address] 10/24/2017    Renal artery stenosis ([address] 10/24/2017    Right-sided extracranial carotid artery stenosis 10/24/2017    Sick sinus syndrome ([address] 10/24/2017    Status post pacemaker       Past Surgical Histor:  Past Surgical History:   Procedure Laterality Date    HX AAA REPAIR      HX APPENDECTOMY      HX CATARACT REMOVAL      / lens implant    HX CHOLECYSTECTOMY      HX ORTHOPAEDIC Right     rt unicondular knee replacement    HX ORTHOPAEDIC  1/26/15    LEFT UNICONDYLAR KNEE ARTHROPLASTY    HX PACEMAKER  7/11    pacemaker    HX TONSILLECTOMY      HX UROLOGICAL      rt renal stentx2       Allergies: Allergies   Allergen Reactions    Latex Other (comments)     Skin raw and severely irritated    Aspirin Unknown (comments)    Hydrocodone Other (comments)     hallucinations    Oxycodone Other (comments)     hallucinations    Sulfa (Sulfonamide Antibiotics) Rash    Tetracycline Rash       Medications:  Current Outpatient Medications   Medication Sig Dispense Refill    metoprolol tartrate (LOPRESSOR) 100 mg IR tablet Take 1 Tablet by mouth two (2) times a day.  180 Tablet 0    warfarin (COUMADIN) 5 mg tablet TAKE 1 TABLET DAILY 90 Tablet 1    isosorbide mononitrate ER (IMDUR) 60 mg CR tablet TAKE 1 TABLET DAILY 90 Tablet 1    allopurinoL (ZYLOPRIM) 300 mg tablet TAKE 1 TABLET DAILY 90 Tablet 1    insulin glargine (Lantus Solostar U-100 Insulin) 100 unit/mL (3 mL) inpn INJECT 60 UNITS UNDER THE SKIN DAILY 60 mL 1    folic acid-vit E5-NSO W89 (FaBB) 2.2-25-1 mg tab TAKE 1 TABLET BY MOUTH DAILY 90 Tablet 0    lovastatin (MEVACOR) 40 mg tablet TAKE 2 TABLETS DAILY 180 Tablet 1    furosemide (LASIX) 40 mg tablet TAKE 2 TABLETS BY MOUTH DAILY 180 Tablet 1    spironolactone (ALDACTONE) 25 mg tablet TAKE 1 TABLET DAILY 90 Tablet 1    levothyroxine (SYNTHROID) 175 mcg tablet TAKE 1 TABLET BY MOUTH EVERY DAY BEFORE BREAKFAST 90 Tablet 1    insulin lispro (HUMALOG) 100 unit/mL kwikpen 5-10 Units by SubCUTAneous route three (3) times daily as needed (per sliding scale) 1 Pen 6    glucose blood VI test strips (FreeStyle Test) strip TEST THREE TIMES DAILY 300 Strip 3    BD Ultra-Fine Short Pen Needle 31 gauge x 5/16\" ndle USE TO INJECT INSULIN SUBCUTANEOUSLY FOUR TIMES DAILY AS DIRECTED 100 Pen Needle 4    Flaxseed Oil oil Take 1,000 mg by mouth daily.  terazosin (HYTRIN) 10 mg capsule Take 1 capsule by mouth nightly. Indications: BENIGN PROSTATIC HYPERTROPHY, HYPERTENSION      VIT A/VIT C/VIT E/ZINC/COPPER (OCUVITE PRESERVISION PO) Take 1 Tab by mouth two (2) times a day.  losartan (COZAAR) 50 mg tablet Take 50 mg by mouth daily. Indications: CHRONIC HEART FAILURE, HYPERTENSION      multivitamin, stress formula (STRESS TAB) tablet Take 1 Tab by mouth daily.  CYANOCOBALAMIN/FA/PYRIDOXINE (FOLGARD RX 2.2 PO) Take 1 tablet by mouth daily.  finasteride (PROSCAR) 5 mg tablet Take 5 mg by mouth every other day. With dinner  Indications: BENIGN PROSTATIC HYPERTROPHY         Social History:  Social History     Socioeconomic History    Marital status:    Tobacco Use    Smoking status: Former Smoker     Quit date: 1990     Years since quittin.8    Smokeless tobacco: Never Used   Vaping Use    Vaping Use: Never used   Substance and Sexual Activity    Alcohol use:  Yes     Alcohol/week: 0.0 standard drinks     Comment: 3-4 drinks a week    Drug use: No     Types: Prescription, OTC       Family History:  Family History   Problem Relation Age of Onset    Cancer Mother     Diabetes Father        Immunizations:  Immunization History   Administered Date(s) Administered    COVID-19, Pfizer The Screen, DO NOT Dilute, Eugene-Sucrose, 12+ yrs, PF, 30mcg/0.3 mL dose 2022    COVID-19, Pfizer Purple top, DILUTE for use, 12+ yrs, 30mcg/0.3mL dose 2021, 2021, 10/15/2021    Influenza High Dose Vaccine PF 10/01/2014, 10/10/2017, 2018, 10/29/2018, 10/21/2019, 2021    Influenza Vaccine 10/01/2015, 10/13/2016, 2017    Pneumococcal Conjugate (PCV-13) 10/01/2015    Pneumococcal Polysaccharide (PPSV-23) 01/01/2010, 10/01/2013    Tdap 12/07/2017    Zoster Recombinant 08/16/2018, 01/28/2019        Healthcare Maintenance:  Health Maintenance   Topic Date Due    Foot Exam Q1  06/25/2020    Medicare Yearly Exam  05/12/2022    Eye Exam Retinal or Dilated  07/03/2022    Depression Screen  01/12/2023    MICROALBUMIN Q1  01/12/2023    Lipid Screen  01/12/2023    DTaP/Tdap/Td series (2 - Td or Tdap) 12/07/2027    Shingrix Vaccine Age 50>  Completed    Flu Vaccine  Completed    COVID-19 Vaccine  Completed    Pneumococcal 65+ years  Completed        Review of Systems:  ROS:  Review of Systems   Constitutional: Negative. HENT: Negative. Eyes: Negative. Respiratory: Negative. Negative for cough, hemoptysis, sputum production, shortness of breath and wheezing. Cardiovascular: Negative. Negative for chest pain, palpitations, orthopnea, claudication, leg swelling and PND. Gastrointestinal: Positive for melena. Negative for abdominal pain, blood in stool, constipation, diarrhea, heartburn, nausea and vomiting. Genitourinary: Negative. Musculoskeletal: Negative. Skin: Negative. Neurological: Negative. Endo/Heme/Allergies: Negative. Psychiatric/Behavioral: Negative. ROS otherwise negative      Objective:     Vital Signs:  Visit Vitals  /86 (BP 1 Location: Left upper arm, BP Patient Position: Sitting, BP Cuff Size: Large adult)   Pulse 71   Temp 98.3 °F (36.8 °C) (Oral)   Ht 5' 10\" (1.778 m)   Wt 246 lb 9.6 oz (111.9 kg)   SpO2 95%   BMI 35.38 kg/m²       BMI:  Body mass index is 35.38 kg/m². Physical Examination:  Physical Exam  Vitals reviewed. Constitutional:       Appearance: Normal appearance. He is obese. HENT:      Head: Normocephalic and atraumatic.       Nose: Nose normal.      Mouth/Throat:      Mouth: Mucous membranes are moist.   Eyes:      Extraocular Movements: Extraocular movements intact. Conjunctiva/sclera: Conjunctivae normal.      Pupils: Pupils are equal, round, and reactive to light. Cardiovascular:      Rate and Rhythm: Normal rate and regular rhythm. Pulses: Normal pulses. Heart sounds: Normal heart sounds. No murmur heard. No friction rub. No gallop. Pulmonary:      Effort: Pulmonary effort is normal. No respiratory distress. Breath sounds: Normal breath sounds. No wheezing, rhonchi or rales. Abdominal:      General: Bowel sounds are normal. There is no distension. Palpations: Abdomen is soft. There is no mass. Tenderness: There is no abdominal tenderness. There is no guarding or rebound. Musculoskeletal:         General: No tenderness, deformity or signs of injury. Normal range of motion. Cervical back: Normal range of motion and neck supple. Right lower leg: No edema. Left lower leg: No edema. Skin:     General: Skin is warm and dry. Findings: No bruising, lesion or rash. Neurological:      General: No focal deficit present. Mental Status: He is alert and oriented to person, place, and time. Mental status is at baseline. Cranial Nerves: No cranial nerve deficit. Sensory: No sensory deficit. Motor: No weakness. Coordination: Coordination normal.      Gait: Gait normal.      Deep Tendon Reflexes: Reflexes normal.   Psychiatric:         Mood and Affect: Mood normal.         Behavior: Behavior normal.          Physical exam otherwise negative    Diagnostic Testing:    Laboratory Studies:  Lab Only on 04/11/2022   Component Date Value Ref Range Status    INR 04/11/2022 2.0* 0.9 - 1.1   Final    Comment: A single therapeutic range for Vit K antagonists may not be optimal for all  indications - see June, 2008 issue of Chest, American College of Chest  Physicians Evidence-Based Clinical Practice Guidelines, 8th Edition.       Prothrombin time 04/11/2022 19.8* 9.0 - 11.1 sec Final   Lab Only on 03/09/2022   Component Date Value Ref Range Status    INR 03/09/2022 2.7* 0.9 - 1.1   Final    Comment: A single therapeutic range for Vit K antagonists may not be optimal for all  indications - see June, 2008 issue of Chest, American College of Chest  Physicians Evidence-Based Clinical Practice Guidelines, 8th Edition.  Prothrombin time 03/09/2022 27.3* 9.0 - 11.1 sec Final   Office Visit on 01/12/2022   Component Date Value Ref Range Status    Color 01/12/2022 YELLOW/STRAW    Final    Color Reference Range: Straw, Yellow or Dark Yellow    Appearance 01/12/2022 CLEAR  CLEAR   Final    Specific gravity 01/12/2022 1.016  1.003 - 1.030   Final    pH (UA) 01/12/2022 5.5  5.0 - 8.0   Final    Protein 01/12/2022 TRACE* Negative mg/dL Final    Glucose 01/12/2022 Negative  Negative mg/dL Final    Ketone 01/12/2022 Negative  Negative mg/dL Final    Bilirubin 01/12/2022 Negative  Negative   Final    Blood 01/12/2022 Negative  Negative   Final    Urobilinogen 01/12/2022 0.2  0.2 - 1.0 EU/dL Final    Nitrites 01/12/2022 Negative  Negative   Final    Leukocyte Esterase 01/12/2022 TRACE* Negative   Final    UA:UC IF INDICATED 01/12/2022 CULTURE NOT INDICATED BY UA RESULT  CULTURE NOT INDICATED BY UA RESULT   Final    WBC 01/12/2022 0-4  0 - 4 /hpf Final    RBC 01/12/2022 0-5  0 - 5 /hpf Final    Epithelial cells 01/12/2022 FEW  FEW /lpf Final    Comment: Epithelial cell category consists of squamous cells and /or transitional  urothelial cells. Renal tubular cells, if present, are separately identified as  such.       Bacteria 01/12/2022 Negative  Negative /hpf Final    Hyaline cast 01/12/2022 0-2  0 - 5 /lpf Final    WBC 01/12/2022 9.5  4.1 - 11.1 K/uL Final    RBC 01/12/2022 4.57  4.10 - 5.70 M/uL Final    HGB 01/12/2022 14.9  12.1 - 17.0 g/dL Final    HCT 01/12/2022 47.3  36.6 - 50.3 % Final    MCV 01/12/2022 103.5* 80.0 - 99.0 FL Final    MCH 01/12/2022 32.6  26.0 - 34.0 PG Final    MCHC 01/12/2022 31.5  30.0 - 36.5 g/dL Final    RDW 01/12/2022 13.8  11.5 - 14.5 % Final    PLATELET 66/52/4831 973  150 - 400 K/uL Final    MPV 01/12/2022 11.1  8.9 - 12.9 FL Final    NRBC 01/12/2022 0.0  0  WBC Final    ABSOLUTE NRBC 01/12/2022 0.00  0.00 - 0.01 K/uL Final    NEUTROPHILS 01/12/2022 63  32 - 75 % Final    LYMPHOCYTES 01/12/2022 20  12 - 49 % Final    MONOCYTES 01/12/2022 12  5 - 13 % Final    EOSINOPHILS 01/12/2022 3  0 - 7 % Final    BASOPHILS 01/12/2022 1  0 - 1 % Final    IMMATURE GRANULOCYTES 01/12/2022 1* 0.0 - 0.5 % Final    ABS. NEUTROPHILS 01/12/2022 6.1  1.8 - 8.0 K/UL Final    ABS. LYMPHOCYTES 01/12/2022 1.9  0.8 - 3.5 K/UL Final    ABS. MONOCYTES 01/12/2022 1.1* 0.0 - 1.0 K/UL Final    ABS. EOSINOPHILS 01/12/2022 0.2  0.0 - 0.4 K/UL Final    ABS. BASOPHILS 01/12/2022 0.1  0.0 - 0.1 K/UL Final    ABS. IMM. GRANS. 01/12/2022 0.1* 0.00 - 0.04 K/UL Final    DF 01/12/2022 AUTOMATED    Final    CK 01/12/2022 69  39 - 308 U/L Final    Hemoglobin A1c 01/12/2022 7.1* 4.0 - 5.6 % Final    Comment: NEW METHOD PLEASE NOTE NEW REFERENCE RANGE  (NOTE)  HbA1C Interpretive Ranges  <5.7              Normal  5.7 - 6.4         Consider Prediabetes  >6.5              Consider Diabetes      Est. average glucose 01/12/2022 157  mg/dL Final    Cholesterol, total 01/12/2022 127  <200 MG/DL Final    Triglyceride 01/12/2022 126  <150 MG/DL Final    Comment: Based on NCEP-ATP III:  Triglycerides <150 mg/dL  is considered normal, 150-199  mg/dL  borderline high,  200-499 mg/dL high and  greater than or equal to 500  mg/dL very high.  HDL Cholesterol 01/12/2022 42  MG/DL Final    Comment: Based on NCEP ATP III, HDL Cholesterol <40 mg/dL is considered low and >60  mg/dL is elevated.       LDL, calculated 01/12/2022 59.8  0 - 100 MG/DL Final    Comment: Based on the NCEP-ATP: LDL-C concentrations are considered  optimal <100 mg/dL,  near optimal/above Normal 100-129 mg/dL Borderline High: 130-159, High: 160-189  mg/dL Very High: Greater than or equal to 190 mg/dL      VLDL, calculated 01/12/2022 25.2  MG/DL Final    CHOL/HDL Ratio 01/12/2022 3.0  0.0 - 5.0   Final    Sodium 01/12/2022 137  136 - 145 mmol/L Final    Potassium 01/12/2022 4.6  3.5 - 5.1 mmol/L Final    Chloride 01/12/2022 101  97 - 108 mmol/L Final    CO2 01/12/2022 32  21 - 32 mmol/L Final    Anion gap 01/12/2022 4* 5 - 15 mmol/L Final    Glucose 01/12/2022 220* 65 - 100 mg/dL Final    BUN 01/12/2022 50* 6 - 20 MG/DL Final    Creatinine 01/12/2022 1.82* 0.70 - 1.30 MG/DL Final    BUN/Creatinine ratio 01/12/2022 27* 12 - 20   Final    GFR est AA 01/12/2022 43* >60 ml/min/1.73m2 Final    GFR est non-AA 01/12/2022 36* >60 ml/min/1.73m2 Final    Comment: Estimated GFR is calculated using the IDMS-traceable Modification of Diet in  Renal Disease (MDRD) Study equation, reported for both  Americans  (GFRAA) and non- Americans (GFRNA), and normalized to 1.73m2 body  surface area. The physician must decide which value applies to the patient.  Calcium 01/12/2022 8.9  8.5 - 10.1 MG/DL Final    Bilirubin, total 01/12/2022 0.7  0.2 - 1.0 MG/DL Final    ALT (SGPT) 01/12/2022 38  12 - 78 U/L Final    AST (SGOT) 01/12/2022 27  15 - 37 U/L Final    Alk. phosphatase 01/12/2022 133* 45 - 117 U/L Final    Protein, total 01/12/2022 7.0  6.4 - 8.2 g/dL Final    Albumin 01/12/2022 3.6  3.5 - 5.0 g/dL Final    Globulin 01/12/2022 3.4  2.0 - 4.0 g/dL Final    A-G Ratio 01/12/2022 1.1  1.1 - 2.2   Final    Microalbumin,urine random 01/12/2022 11.00  MG/DL Final    No reference range has been established.  Creatinine, urine 01/12/2022 110.00  mg/dL Final    No reference range has been established.     Microalbumin/Creat ratio (mg/g cre* 01/12/2022 100* 0 - 30 mg/g Final    INR 01/12/2022 2.8* 0.9 - 1.1   Final    Comment: A single therapeutic range for Vit K antagonists may not be optimal for all  indications - see June, 2008 issue of Chest, American College of Chest  Physicians Evidence-Based Clinical Practice Guidelines, 8th Edition.  Prothrombin time 01/12/2022 28.0* 9.0 - 11.1 sec Final    TSH 01/12/2022 3.24  0.36 - 3.74 uIU/mL Final    Comment:   Due to TSH heterogeneity, both structurally and degree of glycosylation,  monoclonal antibodies used in the TSH assay may not accurately quantitate TSH. Therefore, this result should be correlated with clinical findings as well as  with other assessments of thyroid function, e.g., free T4, free T3.      T4, Free 01/12/2022 1.0  0.8 - 1.5 NG/DL Final   Lab Only on 11/01/2021   Component Date Value Ref Range Status    INR 11/01/2021 1.6* 0.9 - 1.1   Final    Comment: A single therapeutic range for Vit K antagonists may not be optimal for all  indications - see June, 2008 issue of Chest, American College of Chest  Physicians Evidence-Based Clinical Practice Guidelines, 8th Edition.  Prothrombin time 11/01/2021 15.9* 9.0 - 11.1 sec Final   Lab Only on 09/30/2021   Component Date Value Ref Range Status    INR 09/30/2021 3.4* 0.9 - 1.1   Final    Comment: A single therapeutic range for Vit K antagonists may not be optimal for all  indications - see June, 2008 issue of Chest, American College of Chest  Physicians Evidence-Based Clinical Practice Guidelines, 8th Edition.  Prothrombin time 09/30/2021 33.9* 9.0 - 11.1 sec Final   Lab Only on 08/30/2021   Component Date Value Ref Range Status    INR 08/30/2021 2.4* 0.9 - 1.1   Final    Comment: A single therapeutic range for Vit K antagonists may not be optimal for all  indications - see June, 2008 issue of Chest, American College of Chest  Physicians Evidence-Based Clinical Practice Guidelines, 8th Edition.       Prothrombin time 08/30/2021 24.4* 9.0 - 11.1 sec Final   Lab Only on 07/29/2021   Component Date Value Ref Range Status    INR 07/29/2021 2.2* 0.9 - 1.1   Final    Comment: A single therapeutic range for Vit K antagonists may not be optimal for all  indications - see June, 2008 issue of Chest, American College of Chest  Physicians Evidence-Based Clinical Practice Guidelines, 8th Edition.  Prothrombin time 07/29/2021 22.4* 9.0 - 11.1 sec Final   Lab Only on 06/28/2021   Component Date Value Ref Range Status    INR 06/28/2021 2.0* 0.9 - 1.1   Final    Comment: A single therapeutic range for Vit K antagonists may not be optimal for all  indications - see June, 2008 issue of Chest, American College of Chest  Physicians Evidence-Based Clinical Practice Guidelines, 8th Edition.  Prothrombin time 06/28/2021 20.7* 9.0 - 11.1 sec Final   Admission on 06/18/2021, Discharged on 06/18/2021   Component Date Value Ref Range Status    WBC 06/18/2021 10.6  4.1 - 11.1 K/uL Final    RBC 06/18/2021 4.78  4.10 - 5.70 M/uL Final    HGB 06/18/2021 15.7  12.1 - 17.0 g/dL Final    HCT 06/18/2021 48.0  36.6 - 50.3 % Final    MCV 06/18/2021 100.4* 80.0 - 99.0 FL Final    MCH 06/18/2021 32.8  26.0 - 34.0 PG Final    MCHC 06/18/2021 32.7  30.0 - 36.5 g/dL Final    RDW 06/18/2021 14.0  11.5 - 14.5 % Final    PLATELET 96/33/3195 713  150 - 400 K/uL Final    MPV 06/18/2021 10.5  8.9 - 12.9 FL Final    NRBC 06/18/2021 0.0  0  WBC Final    ABSOLUTE NRBC 06/18/2021 0.00  0.00 - 0.01 K/uL Final    NEUTROPHILS 06/18/2021 75  32 - 75 % Final    LYMPHOCYTES 06/18/2021 13  12 - 49 % Final    MONOCYTES 06/18/2021 9  5 - 13 % Final    EOSINOPHILS 06/18/2021 2  0 - 7 % Final    BASOPHILS 06/18/2021 1  0 - 1 % Final    IMMATURE GRANULOCYTES 06/18/2021 0  0.0 - 0.5 % Final    ABS. NEUTROPHILS 06/18/2021 7.9  1.8 - 8.0 K/UL Final    ABS. LYMPHOCYTES 06/18/2021 1.4  0.8 - 3.5 K/UL Final    ABS. MONOCYTES 06/18/2021 1.0  0.0 - 1.0 K/UL Final    ABS. EOSINOPHILS 06/18/2021 0.2  0.0 - 0.4 K/UL Final    ABS. BASOPHILS 06/18/2021 0.1  0.0 - 0.1 K/UL Final    ABS. IMM.  GRANS. 06/18/2021 0.0  0.00 - 0.04 K/UL Final    DF 06/18/2021 AUTOMATED    Final    Sodium 06/18/2021 137  136 - 145 mmol/L Final    Potassium 06/18/2021 4.7  3.5 - 5.1 mmol/L Final    Chloride 06/18/2021 102  97 - 108 mmol/L Final    CO2 06/18/2021 32  21 - 32 mmol/L Final    Anion gap 06/18/2021 3* 5 - 15 mmol/L Final    Glucose 06/18/2021 263* 65 - 100 mg/dL Final    BUN 06/18/2021 53* 6 - 20 MG/DL Final    Creatinine 06/18/2021 1.54* 0.70 - 1.30 MG/DL Final    BUN/Creatinine ratio 06/18/2021 34* 12 - 20   Final    GFR est AA 06/18/2021 52* >60 ml/min/1.73m2 Final    GFR est non-AA 06/18/2021 43* >60 ml/min/1.73m2 Final    Estimated GFR is calculated using the IDMS-traceable Modification of Diet in Renal Disease (MDRD) Study equation, reported for both  Americans (GFRAA) and non- Americans (GFRNA), and normalized to 1.73m2 body surface area. The physician must decide which value applies to the patient.  Calcium 06/18/2021 9.0  8.5 - 10.1 MG/DL Final    Bilirubin, total 06/18/2021 0.7  0.2 - 1.0 MG/DL Final    ALT (SGPT) 06/18/2021 42  12 - 78 U/L Final    AST (SGOT) 06/18/2021 35  15 - 37 U/L Final    Alk. phosphatase 06/18/2021 145* 45 - 117 U/L Final    Protein, total 06/18/2021 7.9  6.4 - 8.2 g/dL Final    Albumin 06/18/2021 3.7  3.5 - 5.0 g/dL Final    Globulin 06/18/2021 4.2* 2.0 - 4.0 g/dL Final    A-G Ratio 06/18/2021 0.9* 1.1 - 2.2   Final    Troponin-I, Qt. 06/18/2021 <0.05  <0.05 ng/mL Final    Comment: The presence of detectable troponin above the reference range indicates myocardial injury which may be due to ischemia, myocarditis, trauma, etc.  Clinical correlation is necessary to establish the significance of this finding. Sequential testing is recommended to determine if the typical rise and fall of cTnI is demonstrated. Note:  Cardiac troponin I has a relatively long half life and may be present well after the CK MB has returned to baseline.   The reference range is based on the 99th percentile of the referent population.  Ventricular Rate 06/18/2021 70  BPM Final    Atrial Rate 06/18/2021 65  BPM Final    QRS Duration 06/18/2021 140  ms Final    Q-T Interval 06/18/2021 466  ms Final    QTC Calculation (Bezet) 06/18/2021 503  ms Final    Calculated R Axis 06/18/2021 -111  degrees Final    Calculated T Axis 06/18/2021 109  degrees Final    Diagnosis 06/18/2021    Final                    Value:Ventricular-paced rhythm  Biventricular pacemaker detected  When compared with ECG of 03-FEB-2016 10:50,  Vent. rate has increased BY  10 BPM  Confirmed by Jeffrey Badillo ([id number]) on 6/18/2021 2:04:40 PM      Color 06/18/2021 YELLOW/STRAW    Final    Color Reference Range: Straw, Yellow or Dark Yellow    Appearance 06/18/2021 CLEAR  CLEAR   Final    Specific gravity 06/18/2021 1.010  1.003 - 1.030   Final    pH (UA) 06/18/2021 5.5  5.0 - 8.0   Final    Protein 06/18/2021 Negative  NEG mg/dL Final    Glucose 06/18/2021 Negative  NEG mg/dL Final    Ketone 06/18/2021 Negative  NEG mg/dL Final    Bilirubin 06/18/2021 Negative  NEG   Final    Blood 06/18/2021 Negative  NEG   Final    Urobilinogen 06/18/2021 0.2  0.2 - 1.0 EU/dL Final    Nitrites 06/18/2021 Negative  NEG   Final    Leukocyte Esterase 06/18/2021 Negative  NEG   Final    WBC 06/18/2021 0-4  0 - 4 /hpf Final    RBC 06/18/2021 0-5  0 - 5 /hpf Final    Epithelial cells 06/18/2021 FEW  FEW /lpf Final    Epithelial cell category consists of squamous cells and /or transitional urothelial cells. Renal tubular cells, if present, are separately identified as such.  Bacteria 06/18/2021 Negative  NEG /hpf Final    UA:UC IF INDICATED 06/18/2021 CULTURE NOT INDICATED BY UA RESULT  CNI   Final    Hyaline cast 06/18/2021 0-2  0 - 5 /lpf Final    INR (POC) 06/18/2021 1.2* <1.2   Final    Comment: Notified RN or MD immediately by   (NOTE)  The i-STAT PT/INR test is for monitoring Coumadin/warfarin therapy   only.  This test method has not been validated for establishing   prognosis or treatment decisions for any other conditions and cannot   be used in these circumstances. Lab Only on 06/14/2021   Component Date Value Ref Range Status    INR 06/14/2021 1.4* 0.9 - 1.1   Final    Comment: A single therapeutic range for Vit K antagonists may not be optimal for all  indications - see June, 2008 issue of Chest, [employer]  [employer] Evidence-Based Clinical Practice Guidelines, 8th Edition.  Prothrombin time 06/14/2021 14.8* 9.0 - 11.1 sec Final   There may be more visits with results that are not included. Radiographic Studies:  XR Results (most recent):  Results from Hospital Encounter encounter on 06/18/21    XR CHEST PORT    Narrative  EXAM: Portable CXR. 1218 hours. INDICATION: dizziness    FINDINGS:  The lungs appear clear. Heart is normal in size. There is no pulmonary edema. There is no evident pneumothorax or pleural effusion. Pacemaker and ICD are  present. No significant change since 2017. Impression  No Acute Disease. MAE Results (most recent):  No results found for this or any previous visit. CT Results (most recent):  Results from Hospital Encounter encounter on 08/10/18    CT TEMP BONES WO CONT    Narrative  EXAM:  CT TEMP BONES WO CONT    CLINICAL INFORMATION:  Left asymmetrical SNHL    COMPARISON: None. TECHNIQUE:  Multislice helical CT of the temporal bone was performed in the axial plane  without intravenous contrast administration. Coronal and axial reformatted  images were generated. Oblique images were also generated along the plane of the  superior semicircular canals. CT dose reduction was achieved through the use of  a standardized protocol tailored for this examination and automatic exposure  control for dose modulation. Sagittal reformatted images were also obtained the  plane of the superior semicircular canals    CONTRAST INJECTED: None    FINDINGS:    RIGHT TEMPORAL BONE  Mastoid air cells: Clear. External auditory canal: Normal.  Middle ear:  Tympanic cavity clear. Ossicles intact. Cochlea and vestibule:  Normal.  Vestibular aqueduct:  Normal.  Facial nerve canal:  Normal.  Semicircular canals:  Normal. No evidence of superior semicircular canal  dehiscence. Internal auditory canal:  Normal.  Carotid canal/Jugular foramen:  Normal.  Temporomandibular joint:  Normal.    LEFT TEMPORAL BONE  Mastoid air cells: Clear. External auditory canal:  Normal.  Middle ear:  Tympanic cavity clear. Ossicles intact. Cochlea and vestibule:  Normal.  Vestibular and aqueduct:  Normal.  Facial nerve canal:  Normal.  Semicircular canals:  Normal. No evidence of superior semicircular canal  dehiscence. Internal auditory canal:  Normal.  Carotid canal/Jugular foramen:  Normal.  Temporomandibular joint:  Normal.    VISUALIZED PARANASAL SINUSES: Mild mucosal thickening in the maxillary sinuses  bilaterally, incompletely visualized. Remainder of visualized paranasal sinuses  clear. VISUALIZED BRAIN: Normal  NASOPHARYNX: Normal    Impression  IMPRESSION:  Negative temporal bone CT. DEXA Results (most recent):  No results found for this or any previous visit. MRI Results (most recent):  No results found for this or any previous visit. Assessment/Plan:       ICD-10-CM ICD-9-CM    1. Dark stools  X11.7 320.2 METABOLIC PANEL, COMPREHENSIVE      OCCULT BLOOD IMMUNOASSAY,DIAGNOSTIC      CBC WITH AUTOMATED DIFF   2. Severe obesity (BMI 35.0-39. 9) with comorbidity ([facility] [place] [address]  E66.01 278.01    3. Chronic diastolic congestive heart failure (HCC)  I50.32 428.32      428.0    4. Type 2 diabetes mellitus with peripheral vascular disease (HCC)  E11.51 250.70      443.81    5. Stage 3b chronic kidney disease ([facility]  N18.32 585. 3           This pleasant 82-year-old male with a complex medical history to include atrial fibrillation currently on anticoagulation for the same comes in today with an acute complaint of dark stools. At this point, melena actually seems less likely, as the patient notes the concomitant use of Pepto-Bismol during this time, in addition to the ingestion of large quantities of blueberries. It is possible that this explains the dark coloration of his stool, particularly in light of the interval resolution in association with discontinuation of the potential culprit substances. However, need to further evaluate given his use of regular anticoagulation. Dark stools:   Checking CBC to evaluate for new anemia, and checking CMP to evaluate for elevated BUN/creatinine or other electrolyte abnormalities. In addition, checking fecal occult blood to evaluate for potential blood in stool and confirmation of melena. If this is positive, or if there are discrepancies on his laboratory studies, anticipate potential referral to gastroenterology for consideration of upper endoscopy but holding off for now. Obesity:   As observed on physical examination, patient aware of need to engage in therapeutic lifestyle changes and lose weight. Heart failure:   Clinically euvolemic today, without any recent heart failure symptoms including orthopnea, or changes in exercise tolerance. Followed by cardiology for same. Continuing with current care. Diabetes:   He reports good control of his blood sugars with his current insulin regimen. Continuing with current care. Kidney disease:   Stage IIIb by GFR. Reassessing laboratory studies as noted above. Target blood pressure for this patient less than 130-140/80-90 mmHg. Mobilize secondary to age, noted history of peripheral vascular disease. Continuing with current care for now. Follow-up with me in 3 months. Roxana Mott MD    Please note that this dictation was completed with [employer], the computer voice recognition software.   Quite often unanticipated grammatical, syntax, homophones, and other interpretive errors are inadvertently transcribed by the computer software. Please disregard these errors. Please excuse any errors that have escaped final proofreading.

## 2022-04-26 NOTE — PATIENT INSTRUCTIONS
Please continue monitoring your blood pressure and blood glucose at home using your home blood pressure and blood glucose monitor. Please record your readings and bring these with you to your next visit. Bismuth Subsalicylate (By mouth)   Bismuth Subsalicylate (BIZ-trish sub-sa-LIS-i-late)  Treats diarrhea, heartburn, nausea, and upset stomach. This medicine contains a salicylate and is related to aspirin. Brand Name(s): Anti-Diarrheal, Bismatrol, Diotame, Diotame InstyDose, Diotame Stomach Relief, [facility], [facility] Bismuth, [facility] Stomach Relief, Good Sense Stomach Relief, [facility] Stomach Relief, Javier-Tin, Kaopectate, Peptic Relief, Pepto Bismol Max, Pepto-Bismol   There may be other brand names for this medicine. When This Medicine Should Not Be Used: This medicine is not right for everyone. Do not use it if you had an allergic reaction to bismuth or to salicylates, or if you have a stomach ulcer or any kind of bleeding problem. How to Use This Medicine:   Tablet, Chewable Tablet, Liquid  · Follow the instructions on the medicine label if you are using this medicine without a prescription. · This medicine is not for long-term use. · Chewable tablet: Chew the tablet thoroughly before you swallow it. You may also let the tablet melt slowly in your mouth. · Tablet: Swallow the tablet whole with a full glass of water. Do not chew, crush, or break it. · Liquid: Shake well before use. Measure the oral liquid medicine with a marked measuring spoon, oral syringe, or medicine cup. · Store the medicine in a closed container at room temperature, away from heat, moisture, and direct light. · The liquid medicine may be kept in the refrigerator. Do not freeze. Drugs and Foods to Avoid:   Ask your doctor or pharmacist before using any other medicine, including over-the-counter medicines, vitamins, and herbal products.   · Do not use this medicine if you are also using another salicylate medicine, such as aspirin. · Some medicines can affect how bismuth works. Tell your doctor if you are taking any of the following:   ¨ Arthritis medicine  ¨ Blood thinner medicine, such as warfarin  ¨ Diabetes medicine  ¨ Gout medicine  Warnings While Using This Medicine:   · Tell your doctor if you are pregnant or breastfeeding, or if you have diabetes, gout, arthritis, or bloody or black stools. · Do not give this medicine to a child or teenager who has chicken pox or symptoms of a virus or the flu, unless your doctor has told you to. This medicine may cause a rare but serious illness called Reye syndrome. · This medicine can cause diarrhea. Call your doctor if the diarrhea becomes severe, does not stop, or is bloody. Do not take any medicine to stop diarrhea until you have talked to your doctor. Diarrhea can occur 2 months or more after you stop taking this medicine. · Call your doctor if your symptoms do not improve or if they get worse. If you still have diarrhea after you have been using this medicine for 2 days, stop using it and call your doctor. · Your tongue or stools may become dark when you use this medicine. This is only temporary and will not hurt you. Ask your doctor about this if you have any concerns. · Keep all medicine out of the reach of children. Never share your medicine with anyone. Possible Side Effects While Using This Medicine:   Call your doctor right away if you notice any of these side effects:  · Allergic reaction: Itching or hives, swelling in your face or hands, swelling or tingling in your mouth or throat, chest tightness, trouble breathing  · Behavior changes along with nausea and vomiting (in children or teenagers)  · Rectal bleeding, blood or mucus in your stools  · Ringing in your ears, or changes in your hearing  If you notice other side effects that you think are caused by this medicine, tell your doctor.    Call your doctor for medical advice about side effects. You may report side effects to FDA at 5-558-ALU-4376  © 2017 [facility] Information is for End User's use only and may not be sold, redistributed or otherwise used for commercial purposes. The above information is an  only. It is not intended as medical advice for individual conditions or treatments. Talk to your doctor, nurse or pharmacist before following any medical regimen to see if it is safe and effective for you.

## 2022-04-27 LAB
ALBUMIN SERPL-MCNC: 3.5 G/DL (ref 3.5–5)
ALBUMIN/GLOB SERPL: 0.9 {RATIO} (ref 1.1–2.2)
ALP SERPL-CCNC: 125 U/L (ref 45–117)
ALT SERPL-CCNC: 38 U/L (ref 12–78)
ANION GAP SERPL CALC-SCNC: 7 MMOL/L (ref 5–15)
AST SERPL-CCNC: 27 U/L (ref 15–37)
BASOPHILS # BLD: 0.1 K/UL (ref 0–0.1)
BASOPHILS NFR BLD: 1 % (ref 0–1)
BILIRUB SERPL-MCNC: 0.6 MG/DL (ref 0.2–1)
BUN SERPL-MCNC: 62 MG/DL (ref 6–20)
BUN/CREAT SERPL: 33 (ref 12–20)
CALCIUM SERPL-MCNC: 9.2 MG/DL (ref 8.5–10.1)
CHLORIDE SERPL-SCNC: 101 MMOL/L (ref 97–108)
CO2 SERPL-SCNC: 28 MMOL/L (ref 21–32)
CREAT SERPL-MCNC: 1.87 MG/DL (ref 0.7–1.3)
DIFFERENTIAL METHOD BLD: ABNORMAL
EOSINOPHIL # BLD: 0.3 K/UL (ref 0–0.4)
EOSINOPHIL NFR BLD: 3 % (ref 0–7)
ERYTHROCYTE [DISTWIDTH] IN BLOOD BY AUTOMATED COUNT: 13.8 % (ref 11.5–14.5)
GLOBULIN SER CALC-MCNC: 3.9 G/DL (ref 2–4)
GLUCOSE SERPL-MCNC: 207 MG/DL (ref 65–100)
HCT VFR BLD AUTO: 43.7 % (ref 36.6–50.3)
HGB BLD-MCNC: 14 G/DL (ref 12.1–17)
IMM GRANULOCYTES # BLD AUTO: 0.1 K/UL (ref 0–0.04)
IMM GRANULOCYTES NFR BLD AUTO: 1 % (ref 0–0.5)
LYMPHOCYTES # BLD: 1.8 K/UL (ref 0.8–3.5)
LYMPHOCYTES NFR BLD: 21 % (ref 12–49)
MCH RBC QN AUTO: 33 PG (ref 26–34)
MCHC RBC AUTO-ENTMCNC: 32 G/DL (ref 30–36.5)
MCV RBC AUTO: 103.1 FL (ref 80–99)
MONOCYTES # BLD: 1 K/UL (ref 0–1)
MONOCYTES NFR BLD: 12 % (ref 5–13)
NEUTS SEG # BLD: 5.6 K/UL (ref 1.8–8)
NEUTS SEG NFR BLD: 62 % (ref 32–75)
NRBC # BLD: 0 K/UL (ref 0–0.01)
NRBC BLD-RTO: 0 PER 100 WBC
PLATELET # BLD AUTO: 155 K/UL (ref 150–400)
PMV BLD AUTO: 12.1 FL (ref 8.9–12.9)
POTASSIUM SERPL-SCNC: 4.7 MMOL/L (ref 3.5–5.1)
PROT SERPL-MCNC: 7.4 G/DL (ref 6.4–8.2)
RBC # BLD AUTO: 4.24 M/UL (ref 4.1–5.7)
SODIUM SERPL-SCNC: 136 MMOL/L (ref 136–145)
WBC # BLD AUTO: 8.8 K/UL (ref 4.1–11.1)

## 2022-04-27 NOTE — PROGRESS NOTES
Please call the patient regarding his diagnostic evaluation. His labs showed no evidence of worsening anemia that might be suggestive of GI blood loss. His BUN/Creatinine ratio was generally stable. He has severe hyperglycemia and we need to work on his diabetes control. At this point, given his advanced age and comorbidities, I do not think a referral to gastroenterology is absolutely necessary though could be considered should his symptoms return even after he is no longer using Pepto Bismol regularly or eating large amounts of blueberries as he had been doing.

## 2022-04-28 LAB — HEMOCCULT STL QL IA: NEGATIVE

## 2022-05-03 NOTE — PROGRESS NOTES
INR 1.6 - take extra 5 mg coumadin today and then resume usual regimen  Recheck PT/INR 1 month Other

## 2022-05-16 RX ORDER — FUROSEMIDE 40 MG/1
TABLET ORAL
Qty: 180 TABLET | Refills: 1 | Status: SHIPPED | OUTPATIENT
Start: 2022-05-16

## 2022-06-01 RX ORDER — SPIRONOLACTONE 25 MG/1
TABLET ORAL
Qty: 90 TABLET | Refills: 3 | Status: SHIPPED | OUTPATIENT
Start: 2022-06-01

## 2022-06-01 NOTE — TELEPHONE ENCOUNTER
PCP: Noy Miranda MD    Last appt: 2022  Future Appointments   Date Time Provider Gail De La Fuente   2022  9:30 AM Noy Miranda MD [facility] AMB       Requested Prescriptions     Pending Prescriptions Disp Refills    spironolactone (ALDACTONE) 25 mg tablet 90 Tablet 1     Si Tablet daily.        Prior labs and Blood pressures:  BP Readings from Last 3 Encounters:   22 136/86   22 126/70   21 132/76     Lab Results   Component Value Date/Time    Sodium 136 2022 04:48 PM    Potassium 4.7 2022 04:48 PM    Chloride 101 2022 04:48 PM    CO2 28 2022 04:48 PM    Anion gap 7 2022 04:48 PM    Glucose 207 (H) 2022 04:48 PM    BUN 62 (H) 2022 04:48 PM    Creatinine 1.87 (H) 2022 04:48 PM    BUN/Creatinine ratio 33 (H) 2022 04:48 PM    GFR est AA 42 (L) 2022 04:48 PM    GFR est non-AA 35 (L) 2022 04:48 PM    Calcium 9.2 2022 04:48 PM     Lab Results   Component Value Date/Time    Hemoglobin A1c 7.1 (H) 2022 11:33 AM    Hemoglobin A1c (POC) 7.1 (A) 2018 01:56 PM     Lab Results   Component Value Date/Time    Cholesterol, total 127 2022 11:33 AM    Cholesterol (POC) 116 2018 01:56 PM    HDL Cholesterol 42 2022 11:33 AM    HDL Cholesterol (POC) 35 2018 01:56 PM    LDL Cholesterol (POC) 35.4 2018 01:56 PM    LDL, calculated 59.8 2022 11:33 AM    VLDL, calculated 25.2 2022 11:33 AM    Triglyceride 126 2022 11:33 AM    Triglycerides (POC) 228 (A) 2018 01:56 PM    CHOL/HDL Ratio 3.0 2022 11:33 AM     No results found for: THONY Higginbotham    Lab Results   Component Value Date/Time    TSH 3.24 2022 11:33 AM    TSH, 3rd generation 2.28 2020 08:39 AM

## 2022-06-06 RX ORDER — PEN NEEDLE, DIABETIC 30 GX3/16"
NEEDLE, DISPOSABLE MISCELLANEOUS
Qty: 100 PEN NEEDLE | Refills: 4 | Status: SHIPPED | OUTPATIENT
Start: 2022-06-06

## 2022-06-06 NOTE — TELEPHONE ENCOUNTER
PCP: Yanet Davison MD    Last appt: 4/26/2022  Future Appointments   Date Time Provider Gail De La Fuente   7/29/2022  9:30 AM Yanet Davison MD [facility] AMB       Requested Prescriptions     Pending Prescriptions Disp Refills    Insulin Needles, Disposable, (BD Ultra-Fine Short Pen Needle) 31 gauge x 5/16\" ndle 100 Pen Needle 4       Prior labs and Blood pressures:  BP Readings from Last 3 Encounters:   04/26/22 136/86   01/12/22 126/70   06/23/21 132/76     Lab Results   Component Value Date/Time    Sodium 136 04/26/2022 04:48 PM    Potassium 4.7 04/26/2022 04:48 PM    Chloride 101 04/26/2022 04:48 PM    CO2 28 04/26/2022 04:48 PM    Anion gap 7 04/26/2022 04:48 PM    Glucose 207 (H) 04/26/2022 04:48 PM    BUN 62 (H) 04/26/2022 04:48 PM    Creatinine 1.87 (H) 04/26/2022 04:48 PM    BUN/Creatinine ratio 33 (H) 04/26/2022 04:48 PM    GFR est AA 42 (L) 04/26/2022 04:48 PM    GFR est non-AA 35 (L) 04/26/2022 04:48 PM    Calcium 9.2 04/26/2022 04:48 PM     Lab Results   Component Value Date/Time    Hemoglobin A1c 7.1 (H) 01/12/2022 11:33 AM    Hemoglobin A1c (POC) 7.1 (A) 06/18/2018 01:56 PM     Lab Results   Component Value Date/Time    Cholesterol, total 127 01/12/2022 11:33 AM    Cholesterol (POC) 116 06/18/2018 01:56 PM    HDL Cholesterol 42 01/12/2022 11:33 AM    HDL Cholesterol (POC) 35 06/18/2018 01:56 PM    LDL Cholesterol (POC) 35.4 06/18/2018 01:56 PM    LDL, calculated 59.8 01/12/2022 11:33 AM    VLDL, calculated 25.2 01/12/2022 11:33 AM    Triglyceride 126 01/12/2022 11:33 AM    Triglycerides (POC) 228 (A) 06/18/2018 01:56 PM    CHOL/HDL Ratio 3.0 01/12/2022 11:33 AM     No results found for: THONY Edmond    Lab Results   Component Value Date/Time    TSH 3.24 01/12/2022 11:33 AM    TSH, 3rd generation 2.28 06/11/2020 08:39 AM

## 2022-06-14 RX ORDER — LOVASTATIN 40 MG/1
40 TABLET ORAL DAILY
Qty: 90 TABLET | Refills: 3 | Status: SHIPPED | OUTPATIENT
Start: 2022-06-14 | End: 2022-06-27 | Stop reason: SDUPTHER

## 2022-06-14 NOTE — TELEPHONE ENCOUNTER
PCP: Francisco Burnham MD    Last appt: 4/26/2022  Future Appointments   Date Time Provider Gail De La Fuente   7/29/2022  9:30 AM Francisco Burnham MD [facility] AMB       Requested Prescriptions     Pending Prescriptions Disp Refills    lovastatin (MEVACOR) 40 mg tablet 180 Tablet 3     Sig: Take 1 Tablet by mouth daily for 90 days.          Other Comments:

## 2022-06-27 RX ORDER — LOVASTATIN 40 MG/1
40 TABLET ORAL DAILY
Qty: 90 TABLET | Refills: 3 | Status: SHIPPED | OUTPATIENT
Start: 2022-06-27 | End: 2023-06-22

## 2022-06-27 NOTE — TELEPHONE ENCOUNTER
PCP: Tori Carlisle MD    Last appt: 4/26/2022  Future Appointments   Date Time Provider Gail De La Fuente   7/29/2022  9:30 AM Tori Carlisle MD [facility] AMB       Requested Prescriptions     Pending Prescriptions Disp Refills    lovastatin (MEVACOR) 40 mg tablet 90 Tablet 3     Sig: Take 1 Tablet by mouth daily for 360 days.        Prior labs and Blood pressures:  BP Readings from Last 3 Encounters:   04/26/22 136/86   01/12/22 126/70   06/23/21 132/76     Lab Results   Component Value Date/Time    Sodium 136 04/26/2022 04:48 PM    Potassium 4.7 04/26/2022 04:48 PM    Chloride 101 04/26/2022 04:48 PM    CO2 28 04/26/2022 04:48 PM    Anion gap 7 04/26/2022 04:48 PM    Glucose 207 (H) 04/26/2022 04:48 PM    BUN 62 (H) 04/26/2022 04:48 PM    Creatinine 1.87 (H) 04/26/2022 04:48 PM    BUN/Creatinine ratio 33 (H) 04/26/2022 04:48 PM    GFR est AA 42 (L) 04/26/2022 04:48 PM    GFR est non-AA 35 (L) 04/26/2022 04:48 PM    Calcium 9.2 04/26/2022 04:48 PM     Lab Results   Component Value Date/Time    Hemoglobin A1c 7.1 (H) 01/12/2022 11:33 AM    Hemoglobin A1c (POC) 7.1 (A) 06/18/2018 01:56 PM     Lab Results   Component Value Date/Time    Cholesterol, total 127 01/12/2022 11:33 AM    Cholesterol (POC) 116 06/18/2018 01:56 PM    HDL Cholesterol 42 01/12/2022 11:33 AM    HDL Cholesterol (POC) 35 06/18/2018 01:56 PM    LDL Cholesterol (POC) 35.4 06/18/2018 01:56 PM    LDL, calculated 59.8 01/12/2022 11:33 AM    VLDL, calculated 25.2 01/12/2022 11:33 AM    Triglyceride 126 01/12/2022 11:33 AM    Triglycerides (POC) 228 (A) 06/18/2018 01:56 PM    CHOL/HDL Ratio 3.0 01/12/2022 11:33 AM     No results found for: THONY Kulkarni    Lab Results   Component Value Date/Time    TSH 3.24 01/12/2022 11:33 AM    TSH, 3rd generation 2.28 06/11/2020 08:39 AM

## 2022-07-20 RX ORDER — METOPROLOL TARTRATE 100 MG/1
TABLET ORAL
Qty: 180 TABLET | Refills: 3 | Status: SHIPPED | OUTPATIENT
Start: 2022-07-20

## 2022-07-27 PROBLEM — I49.5 SICK SINUS SYNDROME (HCC): Status: RESOLVED | Noted: 2017-10-24 | Resolved: 2022-07-27

## 2022-07-27 PROBLEM — I49.5 SICK SINUS SYNDROME (HCC): Chronic | Status: RESOLVED | Noted: 2017-10-24 | Resolved: 2022-07-27

## 2022-07-27 NOTE — PROGRESS NOTES
Subjective:     Chief Complaint   Patient presents with    Grey [address] is a 82 y.o. M. He has a past medical history of of hyperlipidemia, diabetes mellitus, and atrial fibrillation currently on Coumadin therapy. I reviewed the medical record. I last saw this patient in late April. He had complained of having dark blackish stool but was also noted to be using Pepto-Bismol at the time. He was otherwise feeling generally well. Physical examination was notable for obesity with a regular heart rate and rhythm. He was felt likely to have dark stools from his ongoing Pepto-Bismol use, and he was referred for additional laboratory studies. these showed no evidence of worsening anemia and a stable BUN/creatinine ratio. Today, the patient comes in for routine preventive care, a Medicare wellness visit, and follow-up on his chronic medical concerns. He reports feeling generally well overall today and has no significant acute somatic complaints. He continues on warfarin, which he currently takes 5 mg daily for a total weekly dose of 35 mg. He has not had his PT/INR checked in some time ever since his PCP had retired. He denies any excess bleeding or bruising. He takes this for a history of chronic atrial fibrillation status post previous pacemaker placement for sick sinus syndrome. He is not sure if he was ever advised to use other medications including Eliquis or Pradaxa. He denies any chest pain, shortness of breath, or palpitations. His exercise tolerance is reportedly unremarkable. He has a history of congestive heart failure. He continues to be followed by his cardiologist.  He had undergone echocardiogram a few years ago, which had demonstrated an LVEF of 40 to 45%. His exercise tolerance as noted above has been relatively stable. He denies any orthopnea or paroxysmal nocturnal dyspnea. His weight has been stable over the past several months.   He tries to avoid sodium whenever possible. He continues to be followed by his nephrologist for his history of chronic kidney disease. I reviewed his recent laboratory studies with him. His serum creatinine has been stable over the past several months, with a GFR in the low 30 range. He avoids NSAIDs. He continues on insulin for his history of diabetes. He does not take other agents. He uses 60 units of insulin but also uses NovoLog from time to time as well. When he does, he will often take 5 units of NovoLog when he is about to eat a meal with a high glycemic index. He reports average blood glucose readings typically in the 130 mg/dL range in the mornings. He has had occasional low blood sugar readings to the 70 mg/dL range at night on occasion. He otherwise denies any polyuria or polydipsia. He is due for eye examination and foot examination screening examinations. He continues to take levothyroxine 175 mcg daily for his history of hypothyroidism. He denies any temperature intolerance or any weight changes. His energy level overall has been stable. He reports tolerating lovastatin 40 mg daily without any myalgias or GI issues. His blood pressure readings at home are similar to where they are in clinic today with the use of Lopressor, spironolactone, Imdur, and losartan. He denies any lightheadedness or dizziness. His review of systems is otherwise negative. Routine Healthcare Maintenance issues are reviewed and discussed with the patient as noted below. Orders to update gaps in healthcare maintenance were placed as noted below in the Assessment and Plan, where applicable. Medicare Wellness Questions: Patient lives at home and is completely independent with regard to activities and instrumental activities of daily living. Patient does not drink alcohol to excess. Patient denies recent problems with memory, vision, hearing, balance or gait. Ambulates without difficulty. No abuse concerns.   Patient denies recent depression or anxiety concerns. Patient is not using safety equipment in the home. 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) 02/01/2015    Diabetes ([address]     Diverticulosis of large intestine without hemorrhage 10/24/2017    Former smoker     Gout 10/24/2017    History of echocardiogram 02/2018    LVEF 40-45%, global HK, mild to mod LAE, no AS, mild MR, RVSP 26 mmHg    Hyperlipidemia 02/01/2015    Hypertension     Long term current use of anticoagulant 10/24/2017    Long-term use of high-risk medication 10/24/2017    Macular degeneration 10/24/2017    Microalbuminuria     Peripheral vascular disease ([address] 10/24/2017    Primary osteoarthritis involving multiple joints     knees and back    Renal artery stenosis ([address] 10/24/2017    Right-sided extracranial carotid artery stenosis 10/24/2017    Sick sinus syndrome ([address] 10/24/2017    Status post pacemaker       Past Surgical Histor:  Past Surgical History:   Procedure Laterality Date    HX AAA REPAIR      HX APPENDECTOMY      HX CATARACT REMOVAL      / lens implant    HX CHOLECYSTECTOMY      HX ORTHOPAEDIC Right     rt unicondular knee replacement    HX ORTHOPAEDIC  1/26/15    LEFT UNICONDYLAR KNEE ARTHROPLASTY    HX PACEMAKER  7/11    pacemaker    HX TONSILLECTOMY      HX UROLOGICAL      rt renal stentx2       Allergies:   Allergies   Allergen Reactions    Latex Other (comments)     Skin raw and severely irritated    Aspirin Unknown (comments)    Hydrocodone Other (comments)     hallucinations    Oxycodone Other (comments)     hallucinations    Sulfa (Sulfonamide Antibiotics) Rash    Tetracycline Rash       Medications:  Current Outpatient Medications   Medication Sig Dispense Refill    metoprolol tartrate (LOPRESSOR) 100 mg IR tablet TAKE 1 TABLET TWICE A  Tablet 3    lovastatin (MEVACOR) 40 mg tablet Take 1 Tablet by mouth daily for 360 days. 90 Tablet 3    Insulin Needles, Disposable, (BD Ultra-Fine Short Pen Needle) 31 gauge x 5/16\" ndle USE TO INJECT INSULIN SUBCUTANEOUSLY FOUR TIMES DAILY AS DIRECTED 100 Pen Needle 4    spironolactone (ALDACTONE) 25 mg tablet TAKE 1 TABLET DAILY 90 Tablet 3    furosemide (LASIX) 40 mg tablet TAKE 2 TABLETS BY MOUTH DAILY 180 Tablet 1    warfarin (COUMADIN) 5 mg tablet TAKE 1 TABLET DAILY 90 Tablet 1    isosorbide mononitrate ER (IMDUR) 60 mg CR tablet TAKE 1 TABLET DAILY 90 Tablet 1    allopurinoL (ZYLOPRIM) 300 mg tablet TAKE 1 TABLET DAILY 90 Tablet 1    insulin glargine (Lantus Solostar U-100 Insulin) 100 unit/mL (3 mL) inpn INJECT 60 UNITS UNDER THE SKIN DAILY 60 mL 1    folic acid-vit H9-MTH B91 (FaBB) 2.2-25-1 mg tab TAKE 1 TABLET BY MOUTH DAILY 90 Tablet 0    levothyroxine (SYNTHROID) 175 mcg tablet TAKE 1 TABLET BY MOUTH EVERY DAY BEFORE BREAKFAST 90 Tablet 1    insulin lispro (HUMALOG) 100 unit/mL kwikpen 5-10 Units by SubCUTAneous route three (3) times daily as needed (per sliding scale) 1 Pen 6    glucose blood VI test strips (FreeStyle Test) strip TEST THREE TIMES DAILY 300 Strip 3    Flaxseed Oil oil Take 1,000 mg by mouth daily. terazosin (HYTRIN) 10 mg capsule Take 1 capsule by mouth nightly. Indications: BENIGN PROSTATIC HYPERTROPHY, HYPERTENSION      VIT A/VIT C/VIT E/ZINC/COPPER (OCUVITE PRESERVISION PO) Take 1 Tab by mouth two (2) times a day. losartan (COZAAR) 50 mg tablet Take 50 mg by mouth daily. Indications: CHRONIC HEART FAILURE, HYPERTENSION      multivitamin, stress formula (STRESS TAB) tablet Take 1 Tab by mouth daily. CYANOCOBALAMIN/FA/PYRIDOXINE (FOLGARD RX 2.2 PO) Take 1 tablet by mouth daily. finasteride (PROSCAR) 5 mg tablet Take 5 mg by mouth every other day. With dinner  Indications: BENIGN PROSTATIC HYPERTROPHY         Social History:  Social History     Socioeconomic History    Marital status:    Tobacco Use    Smoking status: Former     Types: Cigarettes     Quit date: 1990     Years since quittin.0    Smokeless tobacco: Never   Vaping Use    Vaping Use: Never used   Substance and Sexual Activity    Alcohol use: Yes     Alcohol/week: 0.0 standard drinks     Comment: 3-4 drinks a week    Drug use: No     Types: Prescription, OTC       Family History:  Family History   Problem Relation Age of Onset    Cancer Mother     Diabetes Father        Immunizations:  Immunization History   Administered Date(s) Administered    COVID-19, PFIZER GRAY top, DO NOT Dilute, (age 15 y+), IM, 30 mcg/0.3 mL 2022    COVID-19, PFIZER PURPLE top, DILUTE for use, (age 15 y+), IM, 30mcg/0.3mL 2021, 2021, 10/15/2021    Influenza High Dose Vaccine PF 10/01/2014, 10/10/2017, 2018, 10/29/2018, 10/21/2019, 2021    Influenza Vaccine 10/01/2015, 10/13/2016, 2017    Pneumococcal Conjugate (PCV-13) 10/01/2015    Pneumococcal Polysaccharide (PPSV-23) 2010, 10/01/2013    Tdap 2017    Zoster Recombinant 2018, 2019        Healthcare Maintenance:  Health Maintenance   Topic Date Due    Foot Exam Q1  2020    Eye Exam Retinal or Dilated  2022    Flu Vaccine (1) 2022    Depression Screen  2023    MICROALBUMIN Q1  2023    Lipid Screen  2023    Medicare Yearly Exam  2023    DTaP/Tdap/Td series (2 - Td or Tdap) 2027    Shingrix Vaccine Age 50>  Completed    COVID-19 Vaccine  Completed    Pneumococcal 65+ years  Completed        Review of Systems:  ROS:  Review of Systems   Constitutional: Negative. HENT: Negative. Eyes: Negative. Respiratory: Negative. Cardiovascular: Negative.     Gastrointestinal: Negative. Genitourinary: Negative. Musculoskeletal: Negative. Skin: Negative. Neurological: Negative. Endo/Heme/Allergies: Negative. Psychiatric/Behavioral: Negative. ROS otherwise negative      Objective:     Vital Signs:  Visit Vitals  /74 (BP 1 Location: Right arm, BP Patient Position: Sitting, BP Cuff Size: Large adult)   Pulse 74   Temp 97.9 °F (36.6 °C) (Oral)   Ht 5' 10\" (1.778 m)   Wt 245 lb 1.6 oz (111.2 kg)   SpO2 95%   BMI 35.17 kg/m²       BMI:  Body mass index is 35.17 kg/m². Physical Examination:  Physical Exam  Vitals reviewed. Constitutional:       Appearance: Normal appearance. He is obese. HENT:      Head: Normocephalic and atraumatic. Nose: Nose normal.      Mouth/Throat:      Mouth: Mucous membranes are moist.   Eyes:      Extraocular Movements: Extraocular movements intact. Conjunctiva/sclera: Conjunctivae normal.      Pupils: Pupils are equal, round, and reactive to light. Cardiovascular:      Rate and Rhythm: Normal rate. Rhythm irregular. Pulses: Normal pulses. Heart sounds: Normal heart sounds. No murmur heard. No friction rub. No gallop. Pulmonary:      Effort: Pulmonary effort is normal. No respiratory distress. Breath sounds: Normal breath sounds. No wheezing, rhonchi or rales. Abdominal:      General: Bowel sounds are normal. There is no distension. Palpations: Abdomen is soft. There is no mass. Tenderness: There is no abdominal tenderness. There is no guarding or rebound. Musculoskeletal:         General: No tenderness, deformity or signs of injury. Normal range of motion. Cervical back: Normal range of motion and neck supple. Right lower leg: No edema. Left lower leg: No edema. Skin:     General: Skin is warm and dry. Findings: No bruising, lesion or rash. Neurological:      General: No focal deficit present. Mental Status: He is alert and oriented to person, place, and time. Mental status is at baseline. Cranial Nerves: No cranial nerve deficit. Sensory: No sensory deficit. Motor: No weakness. Coordination: Coordination normal.      Gait: Gait normal.      Deep Tendon Reflexes: Reflexes normal.   Psychiatric:         Mood and Affect: Mood normal.         Behavior: Behavior normal.        Physical exam otherwise negative    Diagnostic Testing:    Laboratory Studies:  Office Visit on 04/26/2022   Component Date Value Ref Range Status    WBC 04/26/2022 8.8  4.1 - 11.1 K/uL Final    RBC 04/26/2022 4.24  4.10 - 5.70 M/uL Final    HGB 04/26/2022 14.0  12.1 - 17.0 g/dL Final    HCT 04/26/2022 43.7  36.6 - 50.3 % Final    MCV 04/26/2022 103.1 (A) 80.0 - 99.0 FL Final    MCH 04/26/2022 33.0  26.0 - 34.0 PG Final    MCHC 04/26/2022 32.0  30.0 - 36.5 g/dL Final    RDW 04/26/2022 13.8  11.5 - 14.5 % Final    PLATELET 75/43/3404 655  150 - 400 K/uL Final    MPV 04/26/2022 12.1  8.9 - 12.9 FL Final    NRBC 04/26/2022 0.0  0  WBC Final    ABSOLUTE NRBC 04/26/2022 0.00  0.00 - 0.01 K/uL Final    NEUTROPHILS 04/26/2022 62  32 - 75 % Final    LYMPHOCYTES 04/26/2022 21  12 - 49 % Final    MONOCYTES 04/26/2022 12  5 - 13 % Final    EOSINOPHILS 04/26/2022 3  0 - 7 % Final    BASOPHILS 04/26/2022 1  0 - 1 % Final    IMMATURE GRANULOCYTES 04/26/2022 1 (A) 0.0 - 0.5 % Final    ABS. NEUTROPHILS 04/26/2022 5.6  1.8 - 8.0 K/UL Final    ABS. LYMPHOCYTES 04/26/2022 1.8  0.8 - 3.5 K/UL Final    ABS. MONOCYTES 04/26/2022 1.0  0.0 - 1.0 K/UL Final    ABS. EOSINOPHILS 04/26/2022 0.3  0.0 - 0.4 K/UL Final    ABS. BASOPHILS 04/26/2022 0.1  0.0 - 0.1 K/UL Final    ABS. IMM.  GRANS. 04/26/2022 0.1 (A) 0.00 - 0.04 K/UL Final    DF 04/26/2022 AUTOMATED    Final    Sodium 04/26/2022 136  136 - 145 mmol/L Final    Potassium 04/26/2022 4.7  3.5 - 5.1 mmol/L Final    Chloride 04/26/2022 101  97 - 108 mmol/L Final    CO2 04/26/2022 28  21 - 32 mmol/L Final    Anion gap 04/26/2022 7  5 - 15 mmol/L Final Glucose 04/26/2022 207 (A) 65 - 100 mg/dL Final    BUN 04/26/2022 62 (A) 6 - 20 MG/DL Final    Creatinine 04/26/2022 1.87 (A) 0.70 - 1.30 MG/DL Final    BUN/Creatinine ratio 04/26/2022 33 (A) 12 - 20   Final    GFR est AA 04/26/2022 42 (A) >60 ml/min/1.73m2 Final    GFR est non-AA 04/26/2022 35 (A) >60 ml/min/1.73m2 Final    Comment: Estimated GFR is calculated using the IDMS-traceable Modification of Diet in  Renal Disease (MDRD) Study equation, reported for both  Americans  (GFRAA) and non- Americans (GFRNA), and normalized to 1.73m2 body  surface area. The physician must decide which value applies to the patient. Calcium 04/26/2022 9.2  8.5 - 10.1 MG/DL Final    Bilirubin, total 04/26/2022 0.6  0.2 - 1.0 MG/DL Final    ALT (SGPT) 04/26/2022 38  12 - 78 U/L Final    AST (SGOT) 04/26/2022 27  15 - 37 U/L Final    Alk. phosphatase 04/26/2022 125 (A) 45 - 117 U/L Final    Protein, total 04/26/2022 7.4  6.4 - 8.2 g/dL Final    Albumin 04/26/2022 3.5  3.5 - 5.0 g/dL Final    Globulin 04/26/2022 3.9  2.0 - 4.0 g/dL Final    A-G Ratio 04/26/2022 0.9 (A) 1.1 - 2.2   Final    Occult blood fecal, by IA 04/27/2022 Negative  Negative Final   Lab Only on 04/11/2022   Component Date Value Ref Range Status    INR 04/11/2022 2.0 (A) 0.9 - 1.1   Final    Comment: A single therapeutic range for Vit K antagonists may not be optimal for all  indications - see June, 2008 issue of Chest, American College of Chest  Physicians Evidence-Based Clinical Practice Guidelines, 8th Edition. Prothrombin time 04/11/2022 19.8 (A) 9.0 - 11.1 sec Final   Lab Only on 03/09/2022   Component Date Value Ref Range Status    INR 03/09/2022 2.7 (A) 0.9 - 1.1   Final    Comment: A single therapeutic range for Vit K antagonists may not be optimal for all  indications - see June, 2008 issue of Chest, American College of Chest  Physicians Evidence-Based Clinical Practice Guidelines, 8th Edition.       Prothrombin time 03/09/2022 27.3 (A) 9.0 - 11.1 sec Final   Office Visit on 01/12/2022   Component Date Value Ref Range Status    Color 01/12/2022 YELLOW/STRAW    Final    Color Reference Range: Straw, Yellow or Dark Yellow    Appearance 01/12/2022 CLEAR  CLEAR   Final    Specific gravity 01/12/2022 1.016  1.003 - 1.030   Final    pH (UA) 01/12/2022 5.5  5.0 - 8.0   Final    Protein 01/12/2022 TRACE (A) Negative mg/dL Final    Glucose 01/12/2022 Negative  Negative mg/dL Final    Ketone 01/12/2022 Negative  Negative mg/dL Final    Bilirubin 01/12/2022 Negative  Negative   Final    Blood 01/12/2022 Negative  Negative   Final    Urobilinogen 01/12/2022 0.2  0.2 - 1.0 EU/dL Final    Nitrites 01/12/2022 Negative  Negative   Final    Leukocyte Esterase 01/12/2022 TRACE (A) Negative   Final    UA:UC IF INDICATED 01/12/2022 CULTURE NOT INDICATED BY UA RESULT  CULTURE NOT INDICATED BY UA RESULT   Final    WBC 01/12/2022 0-4  0 - 4 /hpf Final    RBC 01/12/2022 0-5  0 - 5 /hpf Final    Epithelial cells 01/12/2022 FEW  FEW /lpf Final    Comment: Epithelial cell category consists of squamous cells and /or transitional  urothelial cells. Renal tubular cells, if present, are separately identified as  such.       Bacteria 01/12/2022 Negative  Negative /hpf Final    Hyaline cast 01/12/2022 0-2  0 - 5 /lpf Final    WBC 01/12/2022 9.5  4.1 - 11.1 K/uL Final    RBC 01/12/2022 4.57  4.10 - 5.70 M/uL Final    HGB 01/12/2022 14.9  12.1 - 17.0 g/dL Final    HCT 01/12/2022 47.3  36.6 - 50.3 % Final    MCV 01/12/2022 103.5 (A) 80.0 - 99.0 FL Final    MCH 01/12/2022 32.6  26.0 - 34.0 PG Final    MCHC 01/12/2022 31.5  30.0 - 36.5 g/dL Final    RDW 01/12/2022 13.8  11.5 - 14.5 % Final    PLATELET 10/67/2726 919  150 - 400 K/uL Final    MPV 01/12/2022 11.1  8.9 - 12.9 FL Final    NRBC 01/12/2022 0.0  0  WBC Final    ABSOLUTE NRBC 01/12/2022 0.00  0.00 - 0.01 K/uL Final    NEUTROPHILS 01/12/2022 63  32 - 75 % Final    LYMPHOCYTES 01/12/2022 20  12 - 49 % Final    MONOCYTES 01/12/2022 12  5 - 13 % Final    EOSINOPHILS 01/12/2022 3  0 - 7 % Final    BASOPHILS 01/12/2022 1  0 - 1 % Final    IMMATURE GRANULOCYTES 01/12/2022 1 (A) 0.0 - 0.5 % Final    ABS. NEUTROPHILS 01/12/2022 6.1  1.8 - 8.0 K/UL Final    ABS. LYMPHOCYTES 01/12/2022 1.9  0.8 - 3.5 K/UL Final    ABS. MONOCYTES 01/12/2022 1.1 (A) 0.0 - 1.0 K/UL Final    ABS. EOSINOPHILS 01/12/2022 0.2  0.0 - 0.4 K/UL Final    ABS. BASOPHILS 01/12/2022 0.1  0.0 - 0.1 K/UL Final    ABS. IMM. GRANS. 01/12/2022 0.1 (A) 0.00 - 0.04 K/UL Final    DF 01/12/2022 AUTOMATED    Final    CK 01/12/2022 69  39 - 308 U/L Final    Hemoglobin A1c 01/12/2022 7.1 (A) 4.0 - 5.6 % Final    Comment: NEW METHOD PLEASE NOTE NEW REFERENCE RANGE  (NOTE)  HbA1C Interpretive Ranges  <5.7              Normal  5.7 - 6.4         Consider Prediabetes  >6.5              Consider Diabetes      Est. average glucose 01/12/2022 157  mg/dL Final    Cholesterol, total 01/12/2022 127  <200 MG/DL Final    Triglyceride 01/12/2022 126  <150 MG/DL Final    Comment: Based on NCEP-ATP III:  Triglycerides <150 mg/dL  is considered normal, 150-199  mg/dL  borderline high,  200-499 mg/dL high and  greater than or equal to 500  mg/dL very high. HDL Cholesterol 01/12/2022 42  MG/DL Final    Comment: Based on NCEP ATP III, HDL Cholesterol <40 mg/dL is considered low and >60  mg/dL is elevated.       LDL, calculated 01/12/2022 59.8  0 - 100 MG/DL Final    Comment: Based on the NCEP-ATP: LDL-C concentrations are considered  optimal <100 mg/dL,  near optimal/above Normal 100-129 mg/dL Borderline High: 130-159, High: 160-189  mg/dL Very High: Greater than or equal to 190 mg/dL      VLDL, calculated 01/12/2022 25.2  MG/DL Final    CHOL/HDL Ratio 01/12/2022 3.0  0.0 - 5.0   Final    Sodium 01/12/2022 137  136 - 145 mmol/L Final    Potassium 01/12/2022 4.6  3.5 - 5.1 mmol/L Final    Chloride 01/12/2022 101  97 - 108 mmol/L Final    CO2 01/12/2022 32  21 - 32 mmol/L Final    Anion gap 01/12/2022 4 (A) 5 - 15 mmol/L Final    Glucose 01/12/2022 220 (A) 65 - 100 mg/dL Final    BUN 01/12/2022 50 (A) 6 - 20 MG/DL Final    Creatinine 01/12/2022 1.82 (A) 0.70 - 1.30 MG/DL Final    BUN/Creatinine ratio 01/12/2022 27 (A) 12 - 20   Final    GFR est AA 01/12/2022 43 (A) >60 ml/min/1.73m2 Final    GFR est non-AA 01/12/2022 36 (A) >60 ml/min/1.73m2 Final    Comment: Estimated GFR is calculated using the IDMS-traceable Modification of Diet in  Renal Disease (MDRD) Study equation, reported for both  Americans  (GFRAA) and non- Americans (GFRNA), and normalized to 1.73m2 body  surface area. The physician must decide which value applies to the patient. Calcium 01/12/2022 8.9  8.5 - 10.1 MG/DL Final    Bilirubin, total 01/12/2022 0.7  0.2 - 1.0 MG/DL Final    ALT (SGPT) 01/12/2022 38  12 - 78 U/L Final    AST (SGOT) 01/12/2022 27  15 - 37 U/L Final    Alk. phosphatase 01/12/2022 133 (A) 45 - 117 U/L Final    Protein, total 01/12/2022 7.0  6.4 - 8.2 g/dL Final    Albumin 01/12/2022 3.6  3.5 - 5.0 g/dL Final    Globulin 01/12/2022 3.4  2.0 - 4.0 g/dL Final    A-G Ratio 01/12/2022 1.1  1.1 - 2.2   Final    Microalbumin,urine random 01/12/2022 11.00  MG/DL Final    No reference range has been established. Creatinine, urine 01/12/2022 110.00  mg/dL Final    No reference range has been established. Microalbumin/Creat ratio (mg/g cre* 01/12/2022 100 (A) 0 - 30 mg/g Final    INR 01/12/2022 2.8 (A) 0.9 - 1.1   Final    Comment: A single therapeutic range for Vit K antagonists may not be optimal for all  indications - see June, 2008 issue of Chest, American College of Chest  Physicians Evidence-Based Clinical Practice Guidelines, 8th Edition.       Prothrombin time 01/12/2022 28.0 (A) 9.0 - 11.1 sec Final    TSH 01/12/2022 3.24  0.36 - 3.74 uIU/mL Final    Comment:   Due to TSH heterogeneity, both structurally and degree of glycosylation,  monoclonal antibodies used in the TSH assay may not accurately quantitate TSH. Therefore, this result should be correlated with clinical findings as well as  with other assessments of thyroid function, e.g., free T4, free T3. T4, Free 01/12/2022 1.0  0.8 - 1.5 NG/DL Final   Lab Only on 11/01/2021   Component Date Value Ref Range Status    INR 11/01/2021 1.6 (A) 0.9 - 1.1   Final    Comment: A single therapeutic range for Vit K antagonists may not be optimal for all  indications - see June, 2008 issue of Chest, American College of Chest  Physicians Evidence-Based Clinical Practice Guidelines, 8th Edition. Prothrombin time 11/01/2021 15.9 (A) 9.0 - 11.1 sec Final   Lab Only on 09/30/2021   Component Date Value Ref Range Status    INR 09/30/2021 3.4 (A) 0.9 - 1.1   Final    Comment: A single therapeutic range for Vit K antagonists may not be optimal for all  indications - see June, 2008 issue of Chest, American College of Chest  Physicians Evidence-Based Clinical Practice Guidelines, 8th Edition. Prothrombin time 09/30/2021 33.9 (A) 9.0 - 11.1 sec Final   Lab Only on 08/30/2021   Component Date Value Ref Range Status    INR 08/30/2021 2.4 (A) 0.9 - 1.1   Final    Comment: A single therapeutic range for Vit K antagonists may not be optimal for all  indications - see June, 2008 issue of Chest, American College of Chest  Physicians Evidence-Based Clinical Practice Guidelines, 8th Edition. Prothrombin time 08/30/2021 24.4 (A) 9.0 - 11.1 sec Final         Radiographic Studies:  XR Results (most recent):  Results from Hospital Encounter encounter on 06/18/21    XR CHEST PORT    Narrative  EXAM: Portable CXR. 1218 hours. INDICATION: dizziness    FINDINGS:  The lungs appear clear. Heart is normal in size. There is no pulmonary edema. There is no evident pneumothorax or pleural effusion. Pacemaker and ICD are  present. No significant change since 2017. Impression  No Acute Disease.      MAE Results (most recent):  No results found for this or any previous visit. CT Results (most recent):  Results from Hospital Encounter encounter on 08/10/18    CT TEMP BONES WO CONT    Narrative  EXAM:  CT TEMP BONES WO CONT    CLINICAL INFORMATION:  Left asymmetrical SNHL    COMPARISON: None. TECHNIQUE:  Multislice helical CT of the temporal bone was performed in the axial plane  without intravenous contrast administration. Coronal and axial reformatted  images were generated. Oblique images were also generated along the plane of the  superior semicircular canals. CT dose reduction was achieved through the use of  a standardized protocol tailored for this examination and automatic exposure  control for dose modulation. Sagittal reformatted images were also obtained the  plane of the superior semicircular canals    CONTRAST INJECTED: None    FINDINGS:    RIGHT TEMPORAL BONE  Mastoid air cells: Clear. External auditory canal: Normal.  Middle ear:  Tympanic cavity clear. Ossicles intact. Cochlea and vestibule:  Normal.  Vestibular aqueduct:  Normal.  Facial nerve canal:  Normal.  Semicircular canals:  Normal. No evidence of superior semicircular canal  dehiscence. Internal auditory canal:  Normal.  Carotid canal/Jugular foramen:  Normal.  Temporomandibular joint:  Normal.    LEFT TEMPORAL BONE  Mastoid air cells: Clear. External auditory canal:  Normal.  Middle ear:  Tympanic cavity clear. Ossicles intact. Cochlea and vestibule:  Normal.  Vestibular and aqueduct:  Normal.  Facial nerve canal:  Normal.  Semicircular canals:  Normal. No evidence of superior semicircular canal  dehiscence. Internal auditory canal:  Normal.  Carotid canal/Jugular foramen:  Normal.  Temporomandibular joint:  Normal.    VISUALIZED PARANASAL SINUSES: Mild mucosal thickening in the maxillary sinuses  bilaterally, incompletely visualized. Remainder of visualized paranasal sinuses  clear. VISUALIZED BRAIN: Normal  NASOPHARYNX: Normal    Impression  IMPRESSION:  Negative temporal bone CT. DEXA Results (most recent):  No results found for this or any previous visit. MRI Results (most recent):  No results found for this or any previous visit. Assessment/Plan:       ICD-10-CM ICD-9-CM    1. Routine adult health maintenance  Z00.00 V70.0       2. Type 2 diabetes mellitus with peripheral vascular disease (HCC)  N21.96 144.12 METABOLIC PANEL, COMPREHENSIVE     443.81 HEMOGLOBIN A1C WITH EAG      REFERRAL TO PODIATRY      REFERRAL TO OPHTHALMOLOGY      3. Stage 3b chronic kidney disease (HCC)  N18.32 585.3       4. Chronic atrial fibrillation (HCC)  I48.20 427.31 PROTHROMBIN TIME + INR      5. Acquired hypothyroidism  E03.9 244.9 TSH 3RD GENERATION      6. Coronary artery disease involving native heart without angina pectoris, unspecified vessel or lesion type  I25.10 414.01       7. Essential hypertension  I10 401.9       8. Welcome to Medicare preventive visit  Z00.00 V70.0                  Healthcare Maintenance:  - Preventive measures are reviewed as per above  - Up to date on routine interventions except as noted above  - Orders placed to update gaps as noted  - Notes: Referral to podiatry and ophthalmology as per above for routine screening    Atrial fibrillation   - Heart Rate Target: 60-90 bpm at rest   - Current Heart Rate Control: at target   - Current Symptoms: none   - Current heart rate control: B-blocker   - Current anticoagulation:   Key Anti-Platelet Anticoagulant Meds               warfarin (COUMADIN) 5 mg tablet (Taking) TAKE 1 TABLET DAILY          . Tolerating well without bleeding/bruising sequelae.     - Medication plan: continue    Coronary Artery Disease:   - Current Symptoms: No Symptoms, no angina   - History and Contributing Factors: elevated cholesterol, hypertension, and known history of coronary artery disease  Key CAD CHF Meds               metoprolol tartrate (LOPRESSOR) 100 mg IR tablet (Taking) TAKE 1 TABLET TWICE A DAY    lovastatin (MEVACOR) 40 mg tablet (Taking) Take 1 Tablet by mouth daily for 360 days. spironolactone (ALDACTONE) 25 mg tablet (Taking) TAKE 1 TABLET DAILY    furosemide (LASIX) 40 mg tablet (Taking) TAKE 2 TABLETS BY MOUTH DAILY    warfarin (COUMADIN) 5 mg tablet (Taking) TAKE 1 TABLET DAILY    isosorbide mononitrate ER (IMDUR) 60 mg CR tablet (Taking) TAKE 1 TABLET DAILY    terazosin (HYTRIN) 10 mg capsule (Taking) Take 1 capsule by mouth nightly. Indications: BENIGN PROSTATIC HYPERTROPHY, HYPERTENSION    losartan (COZAAR) 50 mg tablet (Taking) Take 50 mg by mouth daily. Indications: CHRONIC HEART FAILURE, HYPERTENSION            - Target BP: < 130/80 mmHg; see Blood Pressure section   - Statin? YES   - Antiplatelet and/or Anticoagulant? YES   - ACEI/ARB? Spironolactone   - Beta Blocker? YES   - Notes: [facility]    Diabetes Mellitus:   - Type: Type 2 Diabetes   - Current A1C:   Lab Results   Component Value Date/Time    Hemoglobin A1c 7.1 (H) 01/12/2022 11:33 AM    Hemoglobin A1c (POC) 7.1 (A) 06/18/2018 01:56 PM       - Target N9P: <5%   - Complications: none   - Relevant Diabetes Medications:  Key Antihyperglycemic Medications               insulin glargine (Lantus Solostar U-100 Insulin) 100 unit/mL (3 mL) inpn (Taking) INJECT 60 UNITS UNDER THE SKIN DAILY    insulin lispro (HUMALOG) 100 unit/mL kwikpen (Taking) 5-10 Units by SubCUTAneous route three (3) times daily as needed (per sliding scale)              - General Recommendations discussed today: diabetic diet discussed in detail, written exchange diet given, low cholesterol diet, weight control and daily exercise discussed, all medications, side effects and compliance discussed carefully, foot care discussed and Podiatry visits discussed, annual eye examinations at Ophthalmology discussed, glycohemoglobin and other lab monitoring discussed, and long term diabetic complications discussed   - Notes: Self-titration with Lantus scale as per below; discussed with patient; handout provided    1. Your new dose of Lantus is 60 units subcutaneous injection at night. A. Measure your blood sugar prior to each meal (breakfast, lunch, dinner, and again at bedtime). B. Titrate your Lantus dose according to your fasting, pre-breakfast blood sugar reading.      i. Your goal fasting blood sugar reading is between 100 to 140 mg/dL. ii. If your fasting blood sugar is GREATER than 140 mg/dL for 3 CONSECUTIVE DAYS, increase your evening dose of lantus by 2 units per dose.            iii. For example, if your current dose is 60 units subcutaneous injection at night, increase your dosing to 62 units at night. iv. Continue increasing your dose of Lantus in this fashion every 3 days until you have a morning blood sugar reading of less than 140 mg/dL. v. If your fasting blood sugar reading is less than 100 mg/dL for 3 consecutive days, decrease your evening dose by 2 units per dose. vi. If you have a low blood sugar reading of < 80 mg/dL at any point, do not increase your Lantus dosing any further, but decrease the dose as per above, and notify your treatment team.                            If for 3 consecutive days your blood sugar is:    < 100 mg/dL between 100-140 mg/dL > 140 mg/dL          Decrease the dose by 2 units Keep the same Lantus dose Increase the dose by 2 units                            Essential Hypertension/Blood Pressure Management:   - Home BP Readings: not doing   - Current Control: optimal   - Target BP: 130/80 mmHg (CAD)   - Relevant BP Meds:  Key CAD CHF Meds               metoprolol tartrate (LOPRESSOR) 100 mg IR tablet (Taking) TAKE 1 TABLET TWICE A DAY    lovastatin (MEVACOR) 40 mg tablet (Taking) Take 1 Tablet by mouth daily for 360 days.     spironolactone (ALDACTONE) 25 mg tablet (Taking) TAKE 1 TABLET DAILY    furosemide (LASIX) 40 mg tablet (Taking) TAKE 2 TABLETS BY MOUTH DAILY    warfarin (COUMADIN) 5 mg tablet (Taking) TAKE 1 TABLET DAILY    isosorbide mononitrate ER (IMDUR) 60 mg CR tablet (Taking) TAKE 1 TABLET DAILY    terazosin (HYTRIN) 10 mg capsule (Taking) Take 1 capsule by mouth nightly. Indications: BENIGN PROSTATIC HYPERTROPHY, HYPERTENSION    losartan (COZAAR) 50 mg tablet (Taking) Take 50 mg by mouth daily. Indications: CHRONIC HEART FAILURE, HYPERTENSION               - Plan: continue current treatment regimen, continue current meds, dietary sodium restriction, regular aerobic exercise, weight loss   - Notes: Checking labs as noted above    Hyperlipidemia/Dyslipidemia:   - Summary of Cardiovascular Risks and Goals:     LDL goal is under 100  diabetic  existing CAD  hypertension  hyperlipidemia   -   Lab Results   Component Value Date/Time    LDL, calculated 59.8 01/12/2022 11:33 AM    HDL Cholesterol 42 01/12/2022 11:33 AM       - Relevant Cholesterol Meds:  Key Antihyperlipidemia Meds               lovastatin (MEVACOR) 40 mg tablet (Taking) Take 1 Tablet by mouth daily for 360 days. - Cholesterol at target: yes   - Does patient meet USPSTF and ACC/AHA indications for pharmacotherapy (e.g., statin): yes   - GI symptoms with meds: NO   - Muscle aches with meds: NO   - Other Adverse effects with meds: NO   - Medication Plan: continue   - Notes: CCM; labs pending    Acquired Hypothyroidism:   - Current Symptoms: denies fatigue, weight changes, heat/cold intolerance, bowel/skin changes or CVS symptoms   - TSH Target: Biochemically euthyroid.    Lab Results   Component Value Date/Time    TSH 3.24 01/12/2022 11:33 AM    TSH 0.64 05/11/2021 04:21 PM    TSH 0.916 12/18/2018 09:43 AM    TSH 2.63 02/01/2015 02:30 AM    TSH, 3rd generation 2.28 06/11/2020 08:39 AM    TSH, 3rd generation 0.28 (L) 12/10/2019 10:10 AM    TSH, 3rd generation 1.06 06/25/2019 09:23 AM        - Last TSH: reviewed as above in Labs   - Plan: current treatment plan effective, no change in therapy   - Notes: checking TSH        I have reviewed the patient's medical history in detail and updated the computerized patient record. We had a prolonged discussion about these complex clinical issues and went over the various important aspects to consider. All questions were answered. Advised the patient to call back or return to office if symptoms do not improve, change in nature, or persist.     The patient was given an after visit summary or informed of [employer] which includes patient instructions, diagnoses, current medications, & vitals. he expressed understanding with the diagnosis and plan. Nikia Llamas MD    Please note that this dictation was completed with [employer], the computer voice recognition software. Quite often unanticipated grammatical, syntax, homophones, and other interpretive errors are inadvertently transcribed by the computer software. Please disregard these errors. Please excuse any errors that have escaped final proofreading. This is the Subsequent Medicare Annual Wellness Exam, performed 12 months or more after the Initial AWV or the last Subsequent AWV    I have reviewed the patient's medical history in detail and updated the computerized patient record. Assessment/Plan   Education and counseling provided:  Are appropriate based on today's review and evaluation    1. Routine adult health maintenance  2. Type 2 diabetes mellitus with peripheral vascular disease (HCC)  -     METABOLIC PANEL, COMPREHENSIVE; Future  -     HEMOGLOBIN A1C WITH EAG; Future  -     REFERRAL TO PODIATRY  -     REFERRAL TO OPHTHALMOLOGY  3. Stage 3b chronic kidney disease ([address]  4. Chronic atrial fibrillation (HCC)  -     PROTHROMBIN TIME + INR; Standing  5. Acquired hypothyroidism  -     TSH 3RD GENERATION; Future  6. Coronary artery disease involving native heart without angina pectoris, unspecified vessel or lesion type  7. Essential hypertension  8.  Welcome to Medicare preventive visit       Depression Risk Factor Screening     3 most recent PHQ Screens 7/29/2022   Little interest or pleasure in doing things Not at all   Feeling down, depressed, irritable, or hopeless Not at all   Total Score PHQ 2 0       Alcohol & Drug Abuse Risk Screen    Do you average more than 1 drink per night or more than 7 drinks a week: No    In the past three months have you have had more than 4 drinks containing alcohol on one occasion: No          Functional Ability and Level of Safety    Hearing: Hearing is good. Activities of Daily Living: The home contains: no safety equipment. Patient does total self care      Ambulation: with no difficulty     Fall Risk:  Fall Risk Assessment, last 12 mths 1/12/2022   Able to walk? Yes   Fall in past 12 months? 0   Do you feel unsteady?  0   Are you worried about falling 0      Abuse Screen:  Patient is not abused       Cognitive Screening    Has your family/caregiver stated any concerns about your memory: no     Cognitive Screening: Normal - Verbal Fluency Test    Health Maintenance Due     Health Maintenance Due   Topic Date Due    Foot Exam Q1  06/25/2020    Eye Exam Retinal or Dilated  07/03/2022       Patient Care Team   Patient Care Team:  Saman Landa MD as PCP - General (Internal Medicine Physician)  Saman Landa MD as PCP - [facility] Empaneled Provider  Eleuterio Gilliam MD (Inactive) (Otolaryngology)  Chele Pena MD (Optometry)  Sunitha Tong MD (Cardiovascular Disease Physician)  Aurora Ko MD (Urology)  [facility], [facility] (Podiatry)  Samy Denson MD (Nephrology)    History     Patient Active Problem List   Diagnosis Code    Pacemaker battery depletion Z45.010    DJD (degenerative joint disease) of knee M17.10    Knee arthropathy M17.10    Degenerative joint disease M19.90    CAD (coronary artery disease) I25.10    Hypertension I10    Hyperlipidemia E78.5    Anemia D64.9    S/P placement of cardiac pacemaker Z95.0    CKD (chronic kidney disease) stage 3, GFR 30-59 ml/min ([address] N18.30    S/P knee replacement Z96.659    Chronic diastolic congestive heart failure (HCC) I50.32    Acquired hypothyroidism E03.9    BPH with obstruction/lower urinary tract symptoms N40.1, N13.8    Right-sided extracranial carotid artery stenosis I65.21    Diabetes ([facility]) E11.9    Diverticulosis of large intestine without hemorrhage K57.30    Gout M10.9    Long term current use of anticoagulant Z79.01    Long term current use of amiodarone Z79.899    Long-term use of high-risk medication Z79.899    Macular degeneration H35.30    Peripheral vascular disease ([facility]) I73.9    Renal artery stenosis ([facility]) I70.1    Severe obesity ([facility]) E66.01    Type 2 diabetes mellitus with peripheral vascular disease ([facility]) E11.51    Benign paroxysmal positional vertigo due to bilateral vestibular disorder H81.13     Past Medical History:   Diagnosis Date    Acquired hypothyroidism 09/12/2017    Anemia 02/01/2015    BPH with obstruction/lower urinary tract symptoms 10/24/2017    CAD (coronary artery disease)     Chronic atrial fibrillation ([address] 02/01/2015    Chronic kidney disease     decreased kidney function    Chronic systolic heart failure ([facility])     CKD (chronic kidney disease) stage 3, GFR 30-59 ml/min ([facility]) 02/01/2015    Diabetes ([address]     Diverticulosis of large intestine without hemorrhage 10/24/2017    Former smoker     Gout 10/24/2017    History of echocardiogram 02/2018    LVEF 40-45%, global HK, mild to mod LAE, no AS, mild MR, RVSP 26 mmHg    Hyperlipidemia 02/01/2015    Hypertension     Long term current use of anticoagulant 10/24/2017    Long-term use of high-risk medication 10/24/2017    Macular degeneration 10/24/2017    Microalbuminuria     Peripheral vascular disease ([address] 10/24/2017    Primary osteoarthritis involving multiple joints     knees and back    Renal artery stenosis ([address] 10/24/2017    Right-sided extracranial carotid artery stenosis 10/24/2017    Sick sinus syndrome ([address] 10/24/2017    Status post pacemaker      Past Surgical History:   Procedure Laterality Date    HX AAA REPAIR      HX APPENDECTOMY      HX CATARACT REMOVAL      / lens implant    HX CHOLECYSTECTOMY      HX ORTHOPAEDIC Right     rt unicondular knee replacement    HX ORTHOPAEDIC  1/26/15    LEFT UNICONDYLAR KNEE ARTHROPLASTY    HX PACEMAKER  7/11    pacemaker    HX TONSILLECTOMY      HX UROLOGICAL      rt renal stentx2     Current Outpatient Medications   Medication Sig Dispense Refill    metoprolol tartrate (LOPRESSOR) 100 mg IR tablet TAKE 1 TABLET TWICE A  Tablet 3    lovastatin (MEVACOR) 40 mg tablet Take 1 Tablet by mouth daily for 360 days. 90 Tablet 3    Insulin Needles, Disposable, (BD Ultra-Fine Short Pen Needle) 31 gauge x 5/16\" ndle USE TO INJECT INSULIN SUBCUTANEOUSLY FOUR TIMES DAILY AS DIRECTED 100 Pen Needle 4    spironolactone (ALDACTONE) 25 mg tablet TAKE 1 TABLET DAILY 90 Tablet 3    furosemide (LASIX) 40 mg tablet TAKE 2 TABLETS BY MOUTH DAILY 180 Tablet 1    warfarin (COUMADIN) 5 mg tablet TAKE 1 TABLET DAILY 90 Tablet 1    isosorbide mononitrate ER (IMDUR) 60 mg CR tablet TAKE 1 TABLET DAILY 90 Tablet 1    allopurinoL (ZYLOPRIM) 300 mg tablet TAKE 1 TABLET DAILY 90 Tablet 1    insulin glargine (Lantus Solostar U-100 Insulin) 100 unit/mL (3 mL) inpn INJECT 60 UNITS UNDER THE SKIN DAILY 60 mL 1    folic acid-vit L9-TSP V68 (FaBB) 2.2-25-1 mg tab TAKE 1 TABLET BY MOUTH DAILY 90 Tablet 0    levothyroxine (SYNTHROID) 175 mcg tablet TAKE 1 TABLET BY MOUTH EVERY DAY BEFORE BREAKFAST 90 Tablet 1    insulin lispro (HUMALOG) 100 unit/mL kwikpen 5-10 Units by SubCUTAneous route three (3) times daily as needed (per sliding scale) 1 Pen 6    glucose blood VI test strips (FreeStyle Test) strip TEST THREE TIMES DAILY 300 Strip 3    Flaxseed Oil oil Take 1,000 mg by mouth daily. terazosin (HYTRIN) 10 mg capsule Take 1 capsule by mouth nightly.  Indications: BENIGN PROSTATIC HYPERTROPHY, HYPERTENSION      VIT A/VIT C/VIT E/ZINC/COPPER (OCUVITE PRESERVISION PO) Take 1 Tab by mouth two (2) times a day. losartan (COZAAR) 50 mg tablet Take 50 mg by mouth daily. Indications: CHRONIC HEART FAILURE, HYPERTENSION      multivitamin, stress formula (STRESS TAB) tablet Take 1 Tab by mouth daily. CYANOCOBALAMIN/FA/PYRIDOXINE (FOLGARD RX 2.2 PO) Take 1 tablet by mouth daily. finasteride (PROSCAR) 5 mg tablet Take 5 mg by mouth every other day. With dinner  Indications: BENIGN PROSTATIC HYPERTROPHY       Allergies   Allergen Reactions    Latex Other (comments)     Skin raw and severely irritated    Aspirin Unknown (comments)    Hydrocodone Other (comments)     hallucinations    Oxycodone Other (comments)     hallucinations    Sulfa (Sulfonamide Antibiotics) Rash    Tetracycline Rash       Family History   Problem Relation Age of Onset    Cancer Mother     Diabetes Father      Social History     Tobacco Use    Smoking status: Former     Types: Cigarettes     Quit date: 1990     Years since quittin.0    Smokeless tobacco: Never   Substance Use Topics    Alcohol use: Yes     Alcohol/week: 0.0 standard drinks     Comment: 3-4 drinks a week         Sebas Peraza MD     This was a complex patient and total appointment time was at least 40 minutes, to include:  - review of medical record  - history gathering, physical examination, and review of systems  - medication reconciliation  - medical decision-making  - counseling on the plan of care      Follow-up and Dispositions    Return in about 6 months (around 2023), or if symptoms worsen or fail to improve.

## 2022-07-29 ENCOUNTER — OFFICE VISIT (OUTPATIENT)
Dept: INTERNAL MEDICINE CLINIC | Age: 87
End: 2022-07-29
Payer: MEDICARE

## 2022-07-29 VITALS
HEART RATE: 74 BPM | DIASTOLIC BLOOD PRESSURE: 74 MMHG | WEIGHT: 245.1 LBS | OXYGEN SATURATION: 95 % | BODY MASS INDEX: 35.09 KG/M2 | SYSTOLIC BLOOD PRESSURE: 116 MMHG | HEIGHT: 70 IN | TEMPERATURE: 97.9 F

## 2022-07-29 DIAGNOSIS — I10 ESSENTIAL HYPERTENSION: ICD-10-CM

## 2022-07-29 DIAGNOSIS — E11.51 TYPE 2 DIABETES MELLITUS WITH PERIPHERAL VASCULAR DISEASE (HCC): ICD-10-CM

## 2022-07-29 DIAGNOSIS — Z00.00 ROUTINE ADULT HEALTH MAINTENANCE: Primary | ICD-10-CM

## 2022-07-29 DIAGNOSIS — I25.10 CORONARY ARTERY DISEASE INVOLVING NATIVE HEART WITHOUT ANGINA PECTORIS, UNSPECIFIED VESSEL OR LESION TYPE: ICD-10-CM

## 2022-07-29 DIAGNOSIS — E03.9 ACQUIRED HYPOTHYROIDISM: ICD-10-CM

## 2022-07-29 DIAGNOSIS — I48.20 CHRONIC ATRIAL FIBRILLATION (HCC): ICD-10-CM

## 2022-07-29 DIAGNOSIS — N18.32 STAGE 3B CHRONIC KIDNEY DISEASE (HCC): ICD-10-CM

## 2022-07-29 DIAGNOSIS — Z00.00 ENCOUNTER FOR ANNUAL WELLNESS EXAM IN MEDICARE PATIENT: ICD-10-CM

## 2022-07-29 LAB
ALBUMIN SERPL-MCNC: 3.5 G/DL (ref 3.5–5)
ALBUMIN/GLOB SERPL: 1.1 {RATIO} (ref 1.1–2.2)
ALP SERPL-CCNC: 128 U/L (ref 45–117)
ALT SERPL-CCNC: 43 U/L (ref 12–78)
ANION GAP SERPL CALC-SCNC: 5 MMOL/L (ref 5–15)
AST SERPL-CCNC: 33 U/L (ref 15–37)
BILIRUB SERPL-MCNC: 0.7 MG/DL (ref 0.2–1)
BUN SERPL-MCNC: 54 MG/DL (ref 6–20)
BUN/CREAT SERPL: 30 (ref 12–20)
CALCIUM SERPL-MCNC: 9.4 MG/DL (ref 8.5–10.1)
CHLORIDE SERPL-SCNC: 102 MMOL/L (ref 97–108)
CO2 SERPL-SCNC: 31 MMOL/L (ref 21–32)
CREAT SERPL-MCNC: 1.79 MG/DL (ref 0.7–1.3)
EST. AVERAGE GLUCOSE BLD GHB EST-MCNC: 151 MG/DL
GLOBULIN SER CALC-MCNC: 3.2 G/DL (ref 2–4)
GLUCOSE SERPL-MCNC: 120 MG/DL (ref 65–100)
HBA1C MFR BLD: 6.9 % (ref 4–5.6)
INR PPP: 1.7 (ref 0.9–1.1)
POTASSIUM SERPL-SCNC: 5.2 MMOL/L (ref 3.5–5.1)
PROT SERPL-MCNC: 6.7 G/DL (ref 6.4–8.2)
PROTHROMBIN TIME: 17.3 SEC (ref 9–11.1)
SODIUM SERPL-SCNC: 138 MMOL/L (ref 136–145)
TSH SERPL DL<=0.05 MIU/L-ACNC: 1.25 UIU/ML (ref 0.36–3.74)

## 2022-07-29 PROCEDURE — 99215 OFFICE O/P EST HI 40 MIN: CPT | Performed by: INTERNAL MEDICINE

## 2022-07-29 PROCEDURE — 3051F HG A1C>EQUAL 7.0%<8.0%: CPT | Performed by: INTERNAL MEDICINE

## 2022-07-29 PROCEDURE — G0439 PPPS, SUBSEQ VISIT: HCPCS | Performed by: INTERNAL MEDICINE

## 2022-07-29 PROCEDURE — 1123F ACP DISCUSS/DSCN MKR DOCD: CPT | Performed by: INTERNAL MEDICINE

## 2022-07-29 NOTE — PROGRESS NOTES
Chief Complaint   Patient presents with    Establish Care       1. \"Have you been to the ER, urgent care clinic since your last visit? Hospitalized since your last visit? \" No    2. \"Have you seen or consulted any other health care providers outside of the [address] [place] [address] since your last visit? \" No     3. For patients aged 39-70: Has the patient had a colonoscopy / FIT/ Cologuard? No      If the patient is female:    4. For patients aged 41-77: Has the patient had a mammogram within the past 2 years? No      5. For patients aged 21-65: Has the patient had a pap smear?  No

## 2022-07-29 NOTE — PATIENT INSTRUCTIONS
1. Your new dose of Lantus is 60 units subcutaneous injection at night. A. Measure your blood sugar prior to each meal (breakfast, lunch, dinner, and again at bedtime). B. Titrate your Lantus dose according to your fasting, pre-breakfast blood sugar reading.      i. Your goal fasting blood sugar reading is between 100 to 140 mg/dL. ii. If your fasting blood sugar is GREATER than 140 mg/dL for 3 CONSECUTIVE DAYS, increase your evening dose of lantus by 2 units per dose.            iii. For example, if your current dose is 60 units subcutaneous injection at night, increase your dosing to 62 units at night. iv. Continue increasing your dose of Lantus in this fashion every 3 days until you have a morning blood sugar reading of less than 140 mg/dL. v. If your fasting blood sugar reading is less than 100 mg/dL for 3 consecutive days, decrease your evening dose by 2 units per dose. vi. If you have a low blood sugar reading of < 80 mg/dL at any point, do not increase your Lantus dosing any further, but decrease the dose as per above, and notify your treatment team.                            If for 3 consecutive days your blood sugar is:    < 100 mg/dL between 100-140 mg/dL > 140 mg/dL          Decrease the dose by 2 units Keep the same Lantus dose Increase the dose by 2 units                                 Diabetes Foot Health: Care Instructions  Your Care Instructions     When you have diabetes, your feet need extra care and attention. Diabetes can damage the nerve endings and blood vessels in your feet, making you less likely to notice when your feet are injured. Diabetes also limits your body's ability to fight infection and get blood to areas that need it. If you get a minor foot injury, it could become an ulcer or a serious infection. With good foot care, you can prevent most of these problems. Caring for your feet can be quick and easy.  Most of the care can be done when you are bathing or getting ready for bed. Follow-up care is a key part of your treatment and safety. Be sure to make and go to all appointments, and call your doctor if you are having problems. It's also a good idea to know your test results and keep a list of the medicines you take. How can you care for yourself at home? Keep your blood sugar close to normal by watching what and how much you eat, monitoring blood sugar, taking medicines if prescribed, and getting regular exercise. Do not smoke. Smoking affects blood flow and can make foot problems worse. If you need help quitting, talk to your doctor about stop-smoking programs and medicines. These can increase your chances of quitting for good. Eat a diet that is low in fats. High fat intake can cause fat to build up in your blood vessels and decrease blood flow. Inspect your feet daily for blisters, cuts, cracks, or sores. If you cannot see well, use a mirror or have someone help you. Take care of your feet:  Wash your feet every day. Use warm (not hot) water. Check the water temperature with your wrists or other part of your body, not your feet. Dry your feet well. Pat them dry. Do not rub the skin on your feet too hard. Dry well between your toes. If the skin on your feet stays moist, bacteria or a fungus can grow, which can lead to infection. Keep your skin soft. Use moisturizing skin cream to keep the skin on your feet soft and prevent calluses and cracks. But do not put the cream between your toes, and stop using any cream that causes a rash. Clean underneath your toenails carefully. Do not use a sharp object to clean underneath your toenails. Use the blunt end of a nail file or other rounded tool. Trim and file your toenails straight across to prevent ingrown toenails. Use a nail clipper, not scissors. Use an emery board to smooth the edges. Change socks daily. Socks without seams are best, because seams often rub the feet.  You can find socks for people with diabetes from specialty catalogs. Look inside your shoes every day for things like gravel or torn linings, which could cause blisters or sores. Buy shoes that fit well:  Look for shoes that have plenty of space around the toes. This helps prevent bunions and blisters. Try on shoes while wearing the kind of socks you will usually wear with the shoes. Avoid plastic shoes. They may rub your feet and cause blisters. Good shoes should be made of materials that are flexible and breathable, such as leather or cloth. Break in new shoes slowly by wearing them for no more than an hour a day for several days. Take extra time to check your feet for red areas, blisters, or other problems after you wear new shoes. Do not go barefoot. Do not wear sandals, and do not wear shoes with very thin soles. Thin soles are easy to puncture. They also do not protect your feet from hot pavement or cold weather. Have your doctor check your feet during each visit. If you have a foot problem, see your doctor. Do not try to treat an early foot problem at home. Home remedies or treatments that you can buy without a prescription (such as corn removers) can be harmful. Always get early treatment for foot problems. A minor irritation can lead to a major problem if not properly cared for early. When should you call for help? Call your doctor now or seek immediate medical care if:    You have a foot sore, an ulcer or break in the skin that is not healing after 4 days, bleeding corns or calluses, or an ingrown toenail. You have blue or black areas, which can mean bruising or blood flow problems. You have peeling skin or tiny blisters between your toes or cracking or oozing of the skin. You have a fever for more than 24 hours and a foot sore. You have new numbness or tingling in your feet that does not go away after you move your feet or change positions.      You have unexplained or unusual swelling of the foot or ankle. Watch closely for changes in your health, and be sure to contact your doctor if:    You cannot do proper foot care. Where can you learn more? Go to http://www.gray.com/  Enter A739 in the search box to learn more about \"Diabetes Foot Health: Care Instructions. \"  Current as of: July 28, 2021               Content Version: 13.2  © 2006-2022 [employer]. Care instructions adapted under license by [employer] (which disclaims liability or warranty for this information). If you have questions about a medical condition or this instruction, always ask your healthcare professional. [place] [address] any warranty or liability for your use of this information. Diabetic Retinopathy: Care Instructions  Overview     Diabetes can damage the small blood vessels in part of your eye. This part of the eye is called the retina. It detects light that enters the eye. Then it sends signals to your brain about what the eye sees. When this type of eye damage happens, it's called diabetic retinopathy. It can lead to poor vision and even blindness. But if you keep your blood sugar and blood pressure levels in your target range, you can help avoid or slow the damage. Follow-up care is a key part of your treatment and safety. Be sure to make and go to all appointments, and call your doctor if you are having problems. It's also a good idea to know your test results and keep a list of the medicines you take. How can you care for yourself at home? Have regular eye exams. Tell your doctor about any changes in your vision. Keep blood sugar in your target range. Eat a variety of healthy foods, and follow your meal plan so you know how much carbohydrate you need for meals and snacks. It's important to stay as active as you can. Walking is a good choice. Bit by bit, increase the amount you walk every day.  Try for at least 30 minutes on most days of the week. Be safe with medicines. Take your medicine exactly as prescribed. Call your doctor if you think you are having a problem with your medicine. Check your blood sugar as often as your doctor recommends. Eat a low-salt diet. If you have high blood pressure, this may help lower it. You may also need to take medicines to reach your goals. Do not smoke. If you need help quitting, talk to your doctor about stop-smoking programs and medicines. These can increase your chances of quitting for good. Avoid risky activities. These include things like weight lifting and some contact sports. They may trigger bleeding in the eye through impact or increased pressure. Talk to your doctor if you are pregnant or planning to get pregnant. Retinopathy can get much worse during pregnancy. Planning ahead with your doctor and following the doctor's instructions can decrease this risk. When should you call for help? Call your doctor now or seek immediate medical care if:    You have sudden vision changes. Watch closely for changes in your health, and be sure to contact your doctor if:    You have increasing trouble doing everyday tasks like driving or reading because of your eyesight. Where can you learn more? Go to http://www.gray.com/  Enter R396 in the search box to learn more about \"Diabetic Retinopathy: Care Instructions. \"  Current as of: July 28, 2021               Content Version: 13.2  © 2006-2022 [employer]. Care instructions adapted under license by [email or web address] (which disclaims liability or warranty for this information). If you have questions about a medical condition or this instruction, always ask your healthcare professional. [place] [address] any warranty or liability for your use of this information.       Medicare Wellness Visit, Male    The best way to live healthy is to have a lifestyle where you eat a well-balanced diet, exercise regularly, limit alcohol use, and quit all forms of tobacco/nicotine, if applicable. Regular preventive services are another way to keep healthy. Preventive services (vaccines, screening tests, monitoring & exams) can help personalize your care plan, which helps you manage your own care. Screening tests can find health problems at the earliest stages, when they are easiest to treat. [facility] follows the current, evidence-based guidelines published by the [facility] Shannon ([facility]) when recommending preventive services for our patients. Because we follow these guidelines, sometimes recommendations change over time as research supports it. (For example, a prostate screening blood test is no longer routinely recommended for men with no symptoms). Of course, you and your doctor may decide to screen more often for some diseases, based on your risk and co-morbidities (chronic disease you are already diagnosed with). Preventive services for you include:  - Medicare offers their members a free annual wellness visit, which is time for you and your primary care provider to discuss and plan for your preventive service needs. Take advantage of this benefit every year!  -All adults over age 72 should receive the recommended pneumonia vaccines. Current USPSTF guidelines recommend a series of two vaccines for the best pneumonia protection.   -All adults should have a flu vaccine yearly and tetanus vaccine every 10 years.  -All adults age 48 and older should receive the shingles vaccines (series of two vaccines).        -All adults age 38-68 who are overweight should have a diabetes screening test once every three years.   -Other screening tests & preventive services for persons with diabetes include: an eye exam to screen for diabetic retinopathy, a kidney function test, a foot exam, and stricter control over your cholesterol.   -Cardiovascular screening for adults with routine risk involves an electrocardiogram (ECG) at intervals determined by the provider.   -Colorectal cancer screening should be done for adults age 54-65 with no increased risk factors for colorectal cancer. There are a number of acceptable methods of screening for this type of cancer. Each test has its own benefits and drawbacks. Discuss with your provider what is most appropriate for you during your annual wellness visit. The different tests include: colonoscopy (considered the best screening method), a fecal occult blood test, a fecal DNA test, and sigmoidoscopy.  -All adults born between [facility] should be screened once for Hepatitis C.  -An Abdominal Aortic Aneurysm (AAA) Screening is recommended for men age 73-68 who has ever smoked in their lifetime.      Here is a list of your current Health Maintenance items (your personalized list of preventive services) with a due date:  Health Maintenance Due   Topic Date Due    Diabetic Foot Care  06/25/2020    Eye Exam  07/03/2022

## 2022-08-01 NOTE — PROGRESS NOTES
Notify patient. INR slightly below target between 2 to 3. He is currently listed as taking warfarin 5 mg daily. Please confirm dosing with patient. Once confirmed, a 10% increase in his weekly total dosing would be warranted, approximately 38 to 39 mg weekly.

## 2022-08-01 NOTE — PROGRESS NOTES
Other findings include stable chronic kidney disease stage three, well-controlled diabetes mellitus at target, as well as faint high potassium, likely laboratory error.

## 2022-09-09 RX ORDER — INSULIN GLARGINE 100 [IU]/ML
INJECTION, SOLUTION SUBCUTANEOUS
Qty: 60 ML | Refills: 1 | Status: SHIPPED | OUTPATIENT
Start: 2022-09-09

## 2022-09-09 NOTE — TELEPHONE ENCOUNTER
PCP: Nancy Farfan MD    Last appt: 7/29/2022  Future Appointments   Date Time Provider Gail De La Fuente   1/30/2023  8:45 AM Nancy Farfan MD [facility] AM[facility]       Requested Prescriptions     Pending Prescriptions Disp Refills    insulin glargine (Lantus Solostar U-100 Insulin) 100 unit/mL (3 mL) inpn 60 mL 1     Sig: INJECT 60 UNITS UNDER THE SKIN DAILY       Prior labs and Blood pressures:  BP Readings from Last 3 Encounters:   07/29/22 116/74   04/26/22 136/86   01/12/22 126/70     Lab Results   Component Value Date/Time    Sodium 138 07/29/2022 10:24 AM    Potassium 5.2 (H) 07/29/2022 10:24 AM    Chloride 102 07/29/2022 10:24 AM    CO2 31 07/29/2022 10:24 AM    Anion gap 5 07/29/2022 10:24 AM    Glucose 120 (H) 07/29/2022 10:24 AM    BUN 54 (H) 07/29/2022 10:24 AM    Creatinine 1.79 (H) 07/29/2022 10:24 AM    BUN/Creatinine ratio 30 (H) 07/29/2022 10:24 AM    GFR est AA 44 (L) 07/29/2022 10:24 AM    GFR est non-AA 36 (L) 07/29/2022 10:24 AM    Calcium 9.4 07/29/2022 10:24 AM     Lab Results   Component Value Date/Time    Hemoglobin A1c 6.9 (H) 07/29/2022 10:24 AM    Hemoglobin A1c (POC) 7.1 (A) 06/18/2018 01:56 PM     Lab Results   Component Value Date/Time    Cholesterol, total 127 01/12/2022 11:33 AM    Cholesterol (POC) 116 06/18/2018 01:56 PM    HDL Cholesterol 42 01/12/2022 11:33 AM    HDL Cholesterol (POC) 35 06/18/2018 01:56 PM    LDL Cholesterol (POC) 35.4 06/18/2018 01:56 PM    LDL, calculated 59.8 01/12/2022 11:33 AM    VLDL, calculated 25.2 01/12/2022 11:33 AM    Triglyceride 126 01/12/2022 11:33 AM    Triglycerides (POC) 228 (A) 06/18/2018 01:56 PM    CHOL/HDL Ratio 3.0 01/12/2022 11:33 AM     No results found for: THONY Catherine    Lab Results   Component Value Date/Time    TSH 1.25 07/29/2022 10:24 AM    TSH, 3rd generation 2.28 06/11/2020 08:39 AM

## 2022-09-16 RX ORDER — ALLOPURINOL 300 MG/1
300 TABLET ORAL DAILY
Qty: 90 TABLET | Refills: 1 | Status: SHIPPED | OUTPATIENT
Start: 2022-09-16

## 2022-09-16 RX ORDER — WARFARIN SODIUM 5 MG/1
5 TABLET ORAL DAILY
Qty: 90 TABLET | Refills: 1 | Status: SHIPPED | OUTPATIENT
Start: 2022-09-16

## 2022-09-16 RX ORDER — ISOSORBIDE MONONITRATE 60 MG/1
60 TABLET, EXTENDED RELEASE ORAL DAILY
Qty: 90 TABLET | Refills: 1 | Status: SHIPPED | OUTPATIENT
Start: 2022-09-16

## 2022-09-16 NOTE — TELEPHONE ENCOUNTER
PCP: Michelle White MD    Last appt: 2022  Future Appointments   Date Time Provider Gail De La Fuente   2023  8:45 AM Michelle White MD [facility]       Requested Prescriptions     Pending Prescriptions Disp Refills    allopurinoL (ZYLOPRIM) 300 mg tablet 90 Tablet 1     Si Tablet daily. isosorbide mononitrate ER (IMDUR) 60 mg CR tablet 90 Tablet 1     Si Tablet daily. warfarin (COUMADIN) 5 mg tablet 90 Tablet 1     Si Tablet daily.          Other Comments:

## 2022-09-16 NOTE — TELEPHONE ENCOUNTER
PCP: Saman Landa MD    Last appt: 2022  Future Appointments   Date Time Provider Gail De La Fuente   2023  8:45 AM Saman Landa MD [facility]       Requested Prescriptions     Pending Prescriptions Disp Refills    allopurinoL (ZYLOPRIM) 300 mg tablet 90 Tablet 1     Si Tablet daily. isosorbide mononitrate ER (IMDUR) 60 mg CR tablet 90 Tablet 1     Si Tablet daily. warfarin (COUMADIN) 5 mg tablet 90 Tablet 1     Si Tablet daily.        Prior labs and Blood pressures:  BP Readings from Last 3 Encounters:   22 116/74   22 136/86   22 126/70     Lab Results   Component Value Date/Time    Sodium 138 2022 10:24 AM    Potassium 5.2 (H) 2022 10:24 AM    Chloride 102 2022 10:24 AM    CO2 31 2022 10:24 AM    Anion gap 5 2022 10:24 AM    Glucose 120 (H) 2022 10:24 AM    BUN 54 (H) 2022 10:24 AM    Creatinine 1.79 (H) 2022 10:24 AM    BUN/Creatinine ratio 30 (H) 2022 10:24 AM    GFR est AA 44 (L) 2022 10:24 AM    GFR est non-AA 36 (L) 2022 10:24 AM    Calcium 9.4 2022 10:24 AM     Lab Results   Component Value Date/Time    Hemoglobin A1c 6.9 (H) 2022 10:24 AM    Hemoglobin A1c (POC) 7.1 (A) 2018 01:56 PM     Lab Results   Component Value Date/Time    Cholesterol, total 127 2022 11:33 AM    Cholesterol (POC) 116 2018 01:56 PM    HDL Cholesterol 42 2022 11:33 AM    HDL Cholesterol (POC) 35 2018 01:56 PM    LDL Cholesterol (POC) 35.4 2018 01:56 PM    LDL, calculated 59.8 2022 11:33 AM    VLDL, calculated 25.2 2022 11:33 AM    Triglyceride 126 2022 11:33 AM    Triglycerides (POC) 228 (A) 2018 01:56 PM    CHOL/HDL Ratio 3.0 2022 11:33 AM     No results found for: THONY Bedoya    Lab Results   Component Value Date/Time    TSH 1.25 2022 10:24 AM    TSH, 3rd generation 2.28 2020 08:39 AM

## 2022-10-10 DIAGNOSIS — I48.20 CHRONIC ATRIAL FIBRILLATION (HCC): Primary | ICD-10-CM

## 2022-10-10 DIAGNOSIS — E11.51 TYPE 2 DIABETES MELLITUS WITH PERIPHERAL VASCULAR DISEASE (HCC): ICD-10-CM

## 2022-10-14 ENCOUNTER — APPOINTMENT (OUTPATIENT)
Dept: INTERNAL MEDICINE CLINIC | Age: 87
End: 2022-10-14

## 2022-10-14 DIAGNOSIS — E11.51 TYPE 2 DIABETES MELLITUS WITH PERIPHERAL VASCULAR DISEASE (HCC): ICD-10-CM

## 2022-10-15 LAB
EST. AVERAGE GLUCOSE BLD GHB EST-MCNC: 166 MG/DL
HBA1C MFR BLD: 7.4 % (ref 4–5.6)

## 2022-10-16 NOTE — PROGRESS NOTES
Please call the patient regarding his diagnostic evaluation. Mild progression in diabetes. Continue with dietary modifications, current meds. A1C target < 8%.

## 2022-10-24 ENCOUNTER — APPOINTMENT (OUTPATIENT)
Dept: INTERNAL MEDICINE CLINIC | Age: 87
End: 2022-10-24

## 2022-10-24 DIAGNOSIS — Z79.4 TYPE 2 DIABETES MELLITUS WITHOUT COMPLICATION, WITH LONG-TERM CURRENT USE OF INSULIN (HCC): Chronic | ICD-10-CM

## 2022-10-24 DIAGNOSIS — E11.9 TYPE 2 DIABETES MELLITUS WITHOUT COMPLICATION, WITH LONG-TERM CURRENT USE OF INSULIN (HCC): Chronic | ICD-10-CM

## 2022-10-24 DIAGNOSIS — I48.20 CHRONIC ATRIAL FIBRILLATION (HCC): ICD-10-CM

## 2022-10-24 RX ORDER — BLOOD SUGAR DIAGNOSTIC
STRIP MISCELLANEOUS
Qty: 300 STRIP | Refills: 3 | Status: SHIPPED | OUTPATIENT
Start: 2022-10-24 | End: 2022-11-21 | Stop reason: SDUPTHER

## 2022-10-24 NOTE — TELEPHONE ENCOUNTER
PCP: Morelia Maza MD    Last appt: 10/24/2022  Future Appointments   Date Time Provider Gail De La Fuente   1/30/2023  8:45 AM Morelia Maza MD [facility]       Requested Prescriptions     Pending Prescriptions Disp Refills    glucose blood VI test strips (FreeStyle Test) strip 300 Strip 3     Sig: TEST THREE TIMES DAILY       Prior labs and Blood pressures:  BP Readings from Last 3 Encounters:   07/29/22 116/74   04/26/22 136/86   01/12/22 126/70     Lab Results   Component Value Date/Time    Sodium 138 07/29/2022 10:24 AM    Potassium 5.2 (H) 07/29/2022 10:24 AM    Chloride 102 07/29/2022 10:24 AM    CO2 31 07/29/2022 10:24 AM    Anion gap 5 07/29/2022 10:24 AM    Glucose 120 (H) 07/29/2022 10:24 AM    BUN 54 (H) 07/29/2022 10:24 AM    Creatinine 1.79 (H) 07/29/2022 10:24 AM    BUN/Creatinine ratio 30 (H) 07/29/2022 10:24 AM    GFR est AA 44 (L) 07/29/2022 10:24 AM    GFR est non-AA 36 (L) 07/29/2022 10:24 AM    Calcium 9.4 07/29/2022 10:24 AM     Lab Results   Component Value Date/Time    Hemoglobin A1c 7.4 (H) 10/14/2022 10:21 AM    Hemoglobin A1c (POC) 7.1 (A) 06/18/2018 01:56 PM     Lab Results   Component Value Date/Time    Cholesterol, total 127 01/12/2022 11:33 AM    Cholesterol (POC) 116 06/18/2018 01:56 PM    HDL Cholesterol 42 01/12/2022 11:33 AM    HDL Cholesterol (POC) 35 06/18/2018 01:56 PM    LDL Cholesterol (POC) 35.4 06/18/2018 01:56 PM    LDL, calculated 59.8 01/12/2022 11:33 AM    VLDL, calculated 25.2 01/12/2022 11:33 AM    Triglyceride 126 01/12/2022 11:33 AM    Triglycerides (POC) 228 (A) 06/18/2018 01:56 PM    CHOL/HDL Ratio 3.0 01/12/2022 11:33 AM     No results found for: Blaire Bores, [id number]    Lab Results   Component Value Date/Time    TSH 1.25 07/29/2022 10:24 AM    TSH, 3rd generation 2.28 06/11/2020 08:39 AM

## 2022-10-25 LAB
INR PPP: 2 (ref 0.9–1.1)
PROTHROMBIN TIME: 19.9 SEC (ref 9–11.1)

## 2022-11-14 RX ORDER — FUROSEMIDE 40 MG/1
TABLET ORAL
Qty: 180 TABLET | Refills: 3 | Status: SHIPPED | OUTPATIENT
Start: 2022-11-14

## 2022-11-21 DIAGNOSIS — Z79.4 TYPE 2 DIABETES MELLITUS WITHOUT COMPLICATION, WITH LONG-TERM CURRENT USE OF INSULIN (HCC): Chronic | ICD-10-CM

## 2022-11-21 DIAGNOSIS — E11.9 TYPE 2 DIABETES MELLITUS WITHOUT COMPLICATION, WITH LONG-TERM CURRENT USE OF INSULIN (HCC): Chronic | ICD-10-CM

## 2022-11-21 RX ORDER — BLOOD SUGAR DIAGNOSTIC
STRIP MISCELLANEOUS
Qty: 300 STRIP | Refills: 3 | Status: SHIPPED | OUTPATIENT
Start: 2022-11-21

## 2022-11-21 NOTE — TELEPHONE ENCOUNTER
PCP: Bree Rosado MD    Last appt: 10/24/2022  Future Appointments   Date Time Provider Gail De La Fuente   1/30/2023  8:45 AM Bree Rosado MD [facility]       Requested Prescriptions     Pending Prescriptions Disp Refills    glucose blood VI test strips (FreeStyle Test) strip 300 Strip 3     Sig: TEST THREE TIMES DAILY       Prior labs and Blood pressures:  BP Readings from Last 3 Encounters:   07/29/22 116/74   04/26/22 136/86   01/12/22 126/70     Lab Results   Component Value Date/Time    Sodium 138 07/29/2022 10:24 AM    Potassium 5.2 (H) 07/29/2022 10:24 AM    Chloride 102 07/29/2022 10:24 AM    CO2 31 07/29/2022 10:24 AM    Anion gap 5 07/29/2022 10:24 AM    Glucose 120 (H) 07/29/2022 10:24 AM    BUN 54 (H) 07/29/2022 10:24 AM    Creatinine 1.79 (H) 07/29/2022 10:24 AM    BUN/Creatinine ratio 30 (H) 07/29/2022 10:24 AM    GFR est AA 44 (L) 07/29/2022 10:24 AM    GFR est non-AA 36 (L) 07/29/2022 10:24 AM    Calcium 9.4 07/29/2022 10:24 AM     Lab Results   Component Value Date/Time    Hemoglobin A1c 7.4 (H) 10/14/2022 10:21 AM    Hemoglobin A1c (POC) 7.1 (A) 06/18/2018 01:56 PM     Lab Results   Component Value Date/Time    Cholesterol, total 127 01/12/2022 11:33 AM    Cholesterol (POC) 116 06/18/2018 01:56 PM    HDL Cholesterol 42 01/12/2022 11:33 AM    HDL Cholesterol (POC) 35 06/18/2018 01:56 PM    LDL Cholesterol (POC) 35.4 06/18/2018 01:56 PM    LDL, calculated 59.8 01/12/2022 11:33 AM    VLDL, calculated 25.2 01/12/2022 11:33 AM    Triglyceride 126 01/12/2022 11:33 AM    Triglycerides (POC) 228 (A) 06/18/2018 01:56 PM    CHOL/HDL Ratio 3.0 01/12/2022 11:33 AM     No results found for: THONY Bella    Lab Results   Component Value Date/Time    TSH 1.25 07/29/2022 10:24 AM    TSH, 3rd generation 2.28 06/11/2020 08:39 AM

## 2022-11-21 NOTE — TELEPHONE ENCOUNTER
PCP: Lenny Stephens MD    Last appt: 10/24/2022  Future Appointments   Date Time Provider Gail De La Fuente   1/30/2023  8:45 AM Lenny Stephens MD [facility] AM[facility]       Requested Prescriptions     Pending Prescriptions Disp Refills    glucose blood VI test strips (FreeStyle Test) strip 300 Strip 3     Sig: TEST THREE TIMES DAILY         Other Comments:

## 2022-12-06 RX ORDER — CYANOCOBALAMIN/FOLIC AC/VIT B6 1-2.2-25MG
TABLET ORAL
Qty: 90 TABLET | Refills: 0 | Status: SHIPPED | OUTPATIENT
Start: 2022-12-06

## 2022-12-06 NOTE — TELEPHONE ENCOUNTER
PCP: Skyla Hernández MD    Last appt: 10/24/2022  Future Appointments   Date Time Provider Gail De La Fuente   1/30/2023  8:45 AM Skyla Hernández MD [facility]       Requested Prescriptions     Pending Prescriptions Disp Refills    folic acid-vit B9-AJU F59 (FaBB) 2.2-25-1 mg tab 90 Tablet 0     Sig: TAKE 1 TABLET BY MOUTH DAILY         Other Comments:

## 2022-12-06 NOTE — TELEPHONE ENCOUNTER
PCP: Anthony Eric MD    Last appt: 10/24/2022  Future Appointments   Date Time Provider Gail De La Fuente   1/30/2023  8:45 AM Anthony Eric MD [facility] AMB       Requested Prescriptions     Pending Prescriptions Disp Refills    folic acid-vit I1-GWL V60 (FaBB) 2.2-25-1 mg tab 90 Tablet 0     Sig: TAKE 1 TABLET BY MOUTH DAILY       Prior labs and Blood pressures:  BP Readings from Last 3 Encounters:   07/29/22 116/74   04/26/22 136/86   01/12/22 126/70     Lab Results   Component Value Date/Time    Sodium 138 07/29/2022 10:24 AM    Potassium 5.2 (H) 07/29/2022 10:24 AM    Chloride 102 07/29/2022 10:24 AM    CO2 31 07/29/2022 10:24 AM    Anion gap 5 07/29/2022 10:24 AM    Glucose 120 (H) 07/29/2022 10:24 AM    BUN 54 (H) 07/29/2022 10:24 AM    Creatinine 1.79 (H) 07/29/2022 10:24 AM    BUN/Creatinine ratio 30 (H) 07/29/2022 10:24 AM    GFR est AA 44 (L) 07/29/2022 10:24 AM    GFR est non-AA 36 (L) 07/29/2022 10:24 AM    Calcium 9.4 07/29/2022 10:24 AM     Lab Results   Component Value Date/Time    Hemoglobin A1c 7.4 (H) 10/14/2022 10:21 AM    Hemoglobin A1c (POC) 7.1 (A) 06/18/2018 01:56 PM     Lab Results   Component Value Date/Time    Cholesterol, total 127 01/12/2022 11:33 AM    Cholesterol (POC) 116 06/18/2018 01:56 PM    HDL Cholesterol 42 01/12/2022 11:33 AM    HDL Cholesterol (POC) 35 06/18/2018 01:56 PM    LDL Cholesterol (POC) 35.4 06/18/2018 01:56 PM    LDL, calculated 59.8 01/12/2022 11:33 AM    VLDL, calculated 25.2 01/12/2022 11:33 AM    Triglyceride 126 01/12/2022 11:33 AM    Triglycerides (POC) 228 (A) 06/18/2018 01:56 PM    CHOL/HDL Ratio 3.0 01/12/2022 11:33 AM     No results found for: Geraldine Hodge [id number]    Lab Results   Component Value Date/Time    TSH 1.25 07/29/2022 10:24 AM    TSH, 3rd generation 2.28 06/11/2020 08:39 AM

## 2023-01-25 NOTE — PROGRESS NOTES
ICD-10-CM ICD-9-CM    1. Routine adult health maintenance  Z00.00 V70.0       2. Chronic diastolic congestive heart failure (HCC)  I50.32 428.32      428.0       3. Type 2 diabetes mellitus with peripheral vascular disease (HCC)  E11.51 250.70 HEMOGLOBIN A1C WITH EAG     443.81       4. Stage 3b chronic kidney disease (HCC)  N18.32 585.3 CBC WITH AUTOMATED DIFF      METABOLIC PANEL, COMPREHENSIVE      MICROALBUMIN, UR, RAND W/ MICROALB/CREAT RATIO      5. Chronic atrial fibrillation (HCC)  I48.20 427.31 PROTHROMBIN TIME + INR      6. Acquired hypothyroidism  E03.9 244.9 TSH 3RD GENERATION      7. Coronary artery disease involving native heart without angina pectoris, unspecified vessel or lesion type  I25.10 414.01       8. Essential hypertension  I10 401.9       9. Hypercholesterolemia  E78.00 272.0 LIPID PANEL               Subjective:     Chief Complaint   Patient presents with    Follow-up       Janee Reno is a 82 y.o. M.  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/2017), CAD (coronary artery disease), Chronic atrial fibrillation ([facility] [address] (99/[id number]), Chronic systolic heart failure ([address] CKD (chronic kidney disease) stage 3, GFR 30-59 ml/min ([address] (02/01/2015), Diabetes ([address] Diverticulosis of large intestine without hemorrhage (10/24/2017), Former smoker, Gout (10/24/2017), History of echocardiogram (02/2018), Hyperlipidemia (02/01/2015), Hypertension, Long term current use of anticoagulant (10/24/2017), Long-term use of high-risk medication (10/24/2017), Macular degeneration (10/24/2017), Microalbuminuria, Peripheral vascular disease ([address] (10/24/2017), Primary osteoarthritis involving multiple joints, Renal artery stenosis ([address] (10/24/2017), Right-sided extracranial carotid artery stenosis (10/24/2017), and Sick sinus syndrome ([address] (10/24/2017).      his last appointment in this clinic was 10/24/2022 . I reviewed and updated the medical record. This patient was seen most recently in July for routine follow-up on his chronic medical concerns. He was noted to have history of heart failure and was following regularly with his cardiologist.  He was taking Coumadin at the time for his history of chronic atrial fibrillation, and was following with a nephrologist for history of chronic kidney disease. I had referred him for routine laboratory studies, and had made adjustments to his Coumadin dosing as his INR was slightly below target. He was otherwise continued on his usual medication regimen and advised to follow-up closely with his numerous subspecialists. Today, the patient comes in for routine follow-up on his chronic medical concerns he reports feeling fine overall today and has no significant new somatic complaints. He has a history of chronic kidney disease, stage III, continues to be followed by his nephrologist.  He denies having had any recent lower extremity edema, foamy urine, or hematuria. No recent changes to his medication regimen otherwise. He continues to take Lantus 60 units nightly for his diabetes. He reports blood glucose readings typically less than 120 mg/dL overall. No recent hypoglycemic episodes, although he has noted occasional readings as low as 80 mg/dL or so. No polyuria or polydipsia. He continues on warfarin for his history of chronic atrial fibrillation. His last INR was several weeks ago, and he admits that he probably should get this checked more often. He denies any recent bleeding or bruising sequelae. No recent palpitations. Does have a pacemaker in place, and continues to follow with his cardiologist.  He thinks that the pacemaker is due for a generator change. He intends to discuss this further with his cardiologist in the coming weeks.     He continues on lovastatin for his history of hypercholesterolemia and reports tolerating this well without myalgias or GI side effects. No recent chest pain, shortness breath, or any further cardiopulmonary symptoms. His review of systems is otherwise negative. Routine Healthcare Maintenance issues are reviewed and discussed with the patient as noted below. Orders to update gaps in healthcare maintenance were placed as noted below in the Assessment and Plan, where applicable. Past Medical History:  Past Medical History:   Diagnosis Date    Acquired hypothyroidism 09/12/2017    Anemia 02/01/2015    BPH with obstruction/lower urinary tract symptoms 10/24/2017    CAD (coronary artery disease)     Chronic atrial fibrillation (HCC) 02/01/2015    Chronic systolic heart failure (HCC)     CKD (chronic kidney disease) stage 3, GFR 30-59 ml/min ([facility]) 02/01/2015    Diabetes ([address]     Diverticulosis of large intestine without hemorrhage 10/24/2017    Former smoker     Gout 10/24/2017    History of echocardiogram 02/2018    LVEF 40-45%, global HK, mild to mod LAE, no AS, mild MR, RVSP 26 mmHg    Hyperlipidemia 02/01/2015    Hypertension     Long term current use of anticoagulant 10/24/2017    Long-term use of high-risk medication 10/24/2017    Macular degeneration 10/24/2017    Microalbuminuria     Peripheral vascular disease ([address] 10/24/2017    Primary osteoarthritis involving multiple joints     knees and back    Renal artery stenosis ([address] 10/24/2017    Right-sided extracranial carotid artery stenosis 10/24/2017    Sick sinus syndrome ([address] 10/24/2017    Status post pacemaker       Past Surgical Histor:  Past Surgical History:   Procedure Laterality Date    HX AAA REPAIR      HX APPENDECTOMY      HX CATARACT REMOVAL      / lens implant    HX CHOLECYSTECTOMY      HX ORTHOPAEDIC Right     rt unicondular knee replacement    HX ORTHOPAEDIC  1/26/15    LEFT UNICONDYLAR KNEE ARTHROPLASTY    HX PACEMAKER  7/11    pacemaker    HX TONSILLECTOMY      HX UROLOGICAL      rt renal stentx2       Allergies:   Allergies Allergen Reactions    Latex Other (comments)     Skin raw and severely irritated    Aspirin Unknown (comments)    Hydrocodone Other (comments)     hallucinations    Oxycodone Other (comments)     hallucinations    Sulfa (Sulfonamide Antibiotics) Rash    Tetracycline Rash       Medications:  Current Outpatient Medications   Medication Sig Dispense Refill    folic acid-vit X2-ZRW K28 (FaBB) 2.2-25-1 mg tab TAKE 1 TABLET BY MOUTH DAILY 90 Tablet 0    glucose blood VI test strips (FreeStyle Test) strip TEST THREE TIMES DAILY 300 Strip 3    furosemide (LASIX) 40 mg tablet TAKE 2 TABLETS DAILY 180 Tablet 3    allopurinoL (ZYLOPRIM) 300 mg tablet Take 1 Tablet by mouth daily. 90 Tablet 1    isosorbide mononitrate ER (IMDUR) 60 mg CR tablet Take 1 Tablet by mouth daily. 90 Tablet 1    warfarin (COUMADIN) 5 mg tablet Take 1 Tablet by mouth daily. 90 Tablet 1    insulin glargine (Lantus Solostar U-100 Insulin) 100 unit/mL (3 mL) inpn INJECT 60 UNITS UNDER THE SKIN DAILY 60 mL 1    metoprolol tartrate (LOPRESSOR) 100 mg IR tablet TAKE 1 TABLET TWICE A  Tablet 3    lovastatin (MEVACOR) 40 mg tablet Take 1 Tablet by mouth daily for 360 days. 90 Tablet 3    Insulin Needles, Disposable, (BD Ultra-Fine Short Pen Needle) 31 gauge x 5/16\" ndle USE TO INJECT INSULIN SUBCUTANEOUSLY FOUR TIMES DAILY AS DIRECTED 100 Pen Needle 4    spironolactone (ALDACTONE) 25 mg tablet TAKE 1 TABLET DAILY 90 Tablet 3    levothyroxine (SYNTHROID) 175 mcg tablet TAKE 1 TABLET BY MOUTH EVERY DAY BEFORE BREAKFAST 90 Tablet 1    insulin lispro (HUMALOG) 100 unit/mL kwikpen 5-10 Units by SubCUTAneous route three (3) times daily as needed (per sliding scale) 1 Pen 6    Flaxseed Oil oil Take 1,000 mg by mouth daily. terazosin (HYTRIN) 10 mg capsule Take 1 capsule by mouth nightly. Indications: BENIGN PROSTATIC HYPERTROPHY, HYPERTENSION      VIT A/VIT C/VIT E/ZINC/COPPER (OCUVITE PRESERVISION PO) Take 1 Tab by mouth two (2) times a day. losartan (COZAAR) 50 mg tablet Take 50 mg by mouth daily. Indications: CHRONIC HEART FAILURE, HYPERTENSION      multivitamin, stress formula (STRESS TAB) tablet Take 1 Tab by mouth daily. CYANOCOBALAMIN/FA/PYRIDOXINE (FOLGARD RX 2.2 PO) Take 1 tablet by mouth daily. finasteride (PROSCAR) 5 mg tablet Take 5 mg by mouth every other day. With dinner  Indications: BENIGN PROSTATIC HYPERTROPHY         Social History:  Social History     Socioeconomic History    Marital status:    Tobacco Use    Smoking status: Former     Types: Cigarettes     Quit date: 1990     Years since quittin.5    Smokeless tobacco: Never   Vaping Use    Vaping Use: Never used   Substance and Sexual Activity    Alcohol use:  Yes     Alcohol/week: 0.0 standard drinks     Comment: 3-4 drinks a week    Drug use: No     Types: Prescription, OTC       Family History:  Family History   Problem Relation Age of Onset    Cancer Mother     Diabetes Father        Immunizations:  Immunization History   Administered Date(s) Administered    COVID-19, PFIZER GRAY top, DO NOT Dilute, (age 15 y+), IM, 30 mcg/0.3 mL 2022    COVID-19, PFIZER PURPLE top, DILUTE for use, (age 15 y+), IM, 30mcg/0.3mL 2021, 2021, 10/15/2021    Influenza High Dose Vaccine PF 10/01/2014, 10/10/2017, 2018, 10/29/2018, 10/21/2019, 2021, 10/15/2022    Influenza Vaccine 10/01/2015, 10/13/2016, 2017    Pneumococcal Conjugate (PCV-13) 10/01/2015    Pneumococcal Polysaccharide (PPSV-23) 2010, 10/01/2013    Tdap 2017    Zoster Recombinant 2018, 2019        Healthcare Maintenance:  Health Maintenance   Topic Date Due    COVID-19 Vaccine (5 - Booster for Crooks Peter series) 2022    Lipid Screen  2023    Depression Screen  2023    Medicare Yearly Exam  2023    DTaP/Tdap/Td series (2 - Td or Tdap) 2027    Shingles Vaccine  Completed    Flu Vaccine  Completed    Pneumococcal 65+ years Completed        Review of Systems:  ROS:  Review of Systems   Constitutional: Negative. HENT: Negative. Eyes: Negative. Respiratory: Negative. Cardiovascular: Negative. Gastrointestinal: Negative. Genitourinary: Negative. Musculoskeletal: Negative. Skin: Negative. Neurological: Negative. Endo/Heme/Allergies: Negative. Psychiatric/Behavioral: Negative. ROS otherwise negative      Objective:     Vital Signs:  Visit Vitals  /70 (BP 1 Location: Right arm, BP Patient Position: Sitting, BP Cuff Size: Large adult)   Pulse 89   Temp 97.9 °F (36.6 °C) (Oral)   Resp 18   Ht 5' 10\" (1.778 m)   Wt 233 lb 12.8 oz (106.1 kg)   SpO2 100%   BMI 33.55 kg/m²       BMI:  Body mass index is 33.55 kg/m². Physical Examination:  Physical Exam  Vitals reviewed. Constitutional:       Appearance: Normal appearance. He is obese. HENT:      Head: Normocephalic and atraumatic. Nose: Nose normal.      Mouth/Throat:      Mouth: Mucous membranes are moist.   Eyes:      Extraocular Movements: Extraocular movements intact. Conjunctiva/sclera: Conjunctivae normal.      Pupils: Pupils are equal, round, and reactive to light. Cardiovascular:      Rate and Rhythm: Normal rate and regular rhythm. Pulses: Normal pulses. Heart sounds: Normal heart sounds. No murmur heard. No friction rub. No gallop. Pulmonary:      Effort: Pulmonary effort is normal. No respiratory distress. Breath sounds: Normal breath sounds. No wheezing, rhonchi or rales. Abdominal:      General: Bowel sounds are normal. There is no distension. Palpations: Abdomen is soft. There is no mass. Tenderness: There is no abdominal tenderness. There is no guarding or rebound. Musculoskeletal:         General: No tenderness, deformity or signs of injury. Normal range of motion. Cervical back: Normal range of motion and neck supple. Right lower leg: No edema.       Left lower leg: No edema.   Skin:     General: Skin is warm and dry. Findings: No bruising, lesion or rash. Neurological:      General: No focal deficit present. Mental Status: He is alert and oriented to person, place, and time. Mental status is at baseline. Cranial Nerves: No cranial nerve deficit. Sensory: No sensory deficit. Motor: No weakness. Coordination: Coordination normal.      Gait: Gait abnormal (walks with 4 point cane). Deep Tendon Reflexes: Reflexes normal.   Psychiatric:         Mood and Affect: Mood normal.         Behavior: Behavior normal.        Physical exam otherwise negative    Diagnostic Testing:    Laboratory Studies:  Appointment on 10/24/2022   Component Date Value Ref Range Status    INR 10/24/2022 2.0 (A)  0.9 - 1.1   Final    A single therapeutic range for Vit K antagonists may not be optimal for all indications - see June, 2008 issue of Chest, American College of Chest Physicians Evidence-Based Clinical Practice Guidelines, 8th Edition. Prothrombin time 10/24/2022 19.9 (A)  9.0 - 11.1 sec Final   Appointment on 10/14/2022   Component Date Value Ref Range Status    Hemoglobin A1c 10/14/2022 7.4 (A)  4.0 - 5.6 % Final    Comment: NEW METHOD  PLEASE NOTE NEW REFERENCE RANGE  (NOTE)  HbA1C Interpretive Ranges  <5.7              Normal  5.7 - 6.4         Consider Prediabetes  >6.5              Consider Diabetes      Est. average glucose 10/14/2022 166  mg/dL Final         Radiographic Studies:  XR Results (most recent):  Results from Hospital Encounter encounter on 06/18/21    XR CHEST PORT    Narrative  EXAM: Portable CXR. 1218 hours. INDICATION: dizziness    FINDINGS:  The lungs appear clear. Heart is normal in size. There is no pulmonary edema. There is no evident pneumothorax or pleural effusion. Pacemaker and ICD are  present. No significant change since 2017. Impression  No Acute Disease.      MAE Results (most recent):  No results found for this or any previous visit. CT Results (most recent):  Results from Hospital Encounter encounter on 08/10/18    CT TEMP BONES WO CONT    Narrative  EXAM:  CT TEMP BONES WO CONT    CLINICAL INFORMATION:  Left asymmetrical SNHL    COMPARISON: None. TECHNIQUE:  Multislice helical CT of the temporal bone was performed in the axial plane  without intravenous contrast administration. Coronal and axial reformatted  images were generated. Oblique images were also generated along the plane of the  superior semicircular canals. CT dose reduction was achieved through the use of  a standardized protocol tailored for this examination and automatic exposure  control for dose modulation. Sagittal reformatted images were also obtained the  plane of the superior semicircular canals    CONTRAST INJECTED: None    FINDINGS:    RIGHT TEMPORAL BONE  Mastoid air cells: Clear. External auditory canal: Normal.  Middle ear:  Tympanic cavity clear. Ossicles intact. Cochlea and vestibule:  Normal.  Vestibular aqueduct:  Normal.  Facial nerve canal:  Normal.  Semicircular canals:  Normal. No evidence of superior semicircular canal  dehiscence. Internal auditory canal:  Normal.  Carotid canal/Jugular foramen:  Normal.  Temporomandibular joint:  Normal.    LEFT TEMPORAL BONE  Mastoid air cells: Clear. External auditory canal:  Normal.  Middle ear:  Tympanic cavity clear. Ossicles intact. Cochlea and vestibule:  Normal.  Vestibular and aqueduct:  Normal.  Facial nerve canal:  Normal.  Semicircular canals:  Normal. No evidence of superior semicircular canal  dehiscence. Internal auditory canal:  Normal.  Carotid canal/Jugular foramen:  Normal.  Temporomandibular joint:  Normal.    VISUALIZED PARANASAL SINUSES: Mild mucosal thickening in the maxillary sinuses  bilaterally, incompletely visualized. Remainder of visualized paranasal sinuses  clear. VISUALIZED BRAIN: Normal  NASOPHARYNX: Normal    Impression  IMPRESSION:  Negative temporal bone CT. DEXA Results (most recent):  No results found for this or any previous visit. MRI Results (most recent):  No results found for this or any previous visit. Assessment/Plan:       ICD-10-CM ICD-9-CM    1. Routine adult health maintenance  Z00.00 V70.0       2. Chronic diastolic congestive heart failure (HCC)  I50.32 428.32      428.0       3. Type 2 diabetes mellitus with peripheral vascular disease (HCC)  E11.51 250.70 HEMOGLOBIN A1C WITH EAG     443.81       4. Stage 3b chronic kidney disease (HCC)  N18.32 585.3 CBC WITH AUTOMATED DIFF      METABOLIC PANEL, COMPREHENSIVE      MICROALBUMIN, UR, RAND W/ MICROALB/CREAT RATIO      5. Chronic atrial fibrillation (HCC)  I48.20 427.31 PROTHROMBIN TIME + INR      6. Acquired hypothyroidism  E03.9 244.9 TSH 3RD GENERATION      7. Coronary artery disease involving native heart without angina pectoris, unspecified vessel or lesion type  I25.10 414.01       8. Essential hypertension  I10 401.9       9. Hypercholesterolemia  E78.00 272.0 LIPID PANEL                 Follow-up and Dispositions    Return in about 6 months (around 7/30/2023).      Healthcare Maintenance:  - Preventive measures are reviewed as per above  - Up to date on routine interventions except as noted above  - Orders placed to update gaps as noted  - Notes: Fasting laboratory studies ordered    Diabetes Mellitus:   - Type: Type 2 Diabetes   - Current A1C:   Lab Results   Component Value Date/Time    Hemoglobin A1c 7.4 (H) 10/14/2022 10:21 AM    Hemoglobin A1c (POC) 7.1 (A) 06/18/2018 01:56 PM       - Target E4I: <8%   - Complications: nephropathy   - Relevant Diabetes Medications:  Key Antihyperglycemic Medications               insulin glargine (Lantus Solostar U-100 Insulin) 100 unit/mL (3 mL) inpn (Taking) INJECT 60 UNITS UNDER THE SKIN DAILY    insulin lispro (HUMALOG) 100 unit/mL kwikpen (Taking) 5-10 Units by SubCUTAneous route three (3) times daily as needed (per sliding scale)              - General Recommendations discussed today: ---   - Notes: using Lantus 60 units HS. CCM, repeat A1C. Essential Hypertension/Blood Pressure Management:   - Home BP Readings: not doing   - Current Control: optimal   - Target BP: <130/80 mmHg   - Relevant BP Meds:  Key CAD CHF Meds               furosemide (LASIX) 40 mg tablet (Taking) TAKE 2 TABLETS DAILY    isosorbide mononitrate ER (IMDUR) 60 mg CR tablet (Taking) Take 1 Tablet by mouth daily. warfarin (COUMADIN) 5 mg tablet (Taking) Take 1 Tablet by mouth daily. metoprolol tartrate (LOPRESSOR) 100 mg IR tablet (Taking) TAKE 1 TABLET TWICE A DAY    lovastatin (MEVACOR) 40 mg tablet (Taking) Take 1 Tablet by mouth daily for 360 days. spironolactone (ALDACTONE) 25 mg tablet (Taking) TAKE 1 TABLET DAILY    terazosin (HYTRIN) 10 mg capsule (Taking) Take 1 capsule by mouth nightly. Indications: BENIGN PROSTATIC HYPERTROPHY, HYPERTENSION    losartan (COZAAR) 50 mg tablet (Taking) Take 50 mg by mouth daily. Indications: CHRONIC HEART FAILURE, HYPERTENSION               - Plan: continue current treatment regimen, continue current meds, dietary sodium restriction, regular aerobic exercise, weight loss   - Notes: [facility]      Hyperlipidemia/Dyslipidemia:   - Summary of Cardiovascular Risks and Goals:     LDL goal is under 80  diabetic  existing CAD  hypertension  hyperlipidemia  obese   -   Lab Results   Component Value Date/Time    LDL, calculated 59.8 01/12/2022 11:33 AM    HDL Cholesterol 42 01/12/2022 11:33 AM       - Relevant Cholesterol Meds:  Key Antihyperlipidemia Meds               lovastatin (MEVACOR) 40 mg tablet (Taking) Take 1 Tablet by mouth daily for 360 days.               - Cholesterol at target: yes   - Does patient meet USPSTF and ACC/AHA indications for pharmacotherapy (e.g., statin): yes   - GI symptoms with meds: NO   - Muscle aches with meds: NO   - Other Adverse effects with meds: NO   - Medication Plan: continue   - Notes: [facility]      Paroxysmal atrial fibrillation   - Heart Rate Target: 60-90 bpm at rest   - Current Heart Rate Control: at target   - Current Symptoms: none   - Current heart rate control: none   - Current anticoagulation:   Key Anti-Platelet Anticoagulant Meds               warfarin (COUMADIN) 5 mg tablet (Taking) Take 1 Tablet by mouth daily. Katy Singletonley well without bleeding/bruising sequelae. - Medication plan: continue; repeat INR pending    Chronic Kidney Disease:   - Last Serum Creatinine:   Lab Results   Component Value Date/Time    Creatinine (POC) 1.5 06/18/2018 01:56 PM    Creatinine (POC) 1.7 (H) 01/26/2015 06:54 AM    Creatinine 1.79 (H) 07/29/2022 10:24 AM       - Last GFR:   Lab Results   Component Value Date/Time    GFR est AA 44 (L) 07/29/2022 10:24 AM    GFR est non-AA 36 (L) 07/29/2022 10:24 AM      - Current Stage by eGFR: G3B   - Proteinuria: A1, checking MALB today   -   Key CKD Meds               furosemide (LASIX) 40 mg tablet (Taking) TAKE 2 TABLETS DAILY    spironolactone (ALDACTONE) 25 mg tablet (Taking) TAKE 1 TABLET DAILY    losartan (COZAAR) 50 mg tablet (Taking) Take 50 mg by mouth daily. Indications: CHRONIC HEART FAILURE, HYPERTENSION    finasteride (PROSCAR) 5 mg tablet (Taking) Take 5 mg by mouth every other day. With dinner  Indications: BENIGN PROSTATIC HYPERTROPHY           - Blood Pressure Target: See Hypertension/Blood Pressure Management Section   - Advised avoidance of NSAIDs and other nephrotoxins wherever possible   - Advised renal dosing of medications where necessary   - Acid/Base Management: ---   - Phosphorus Management: ---   - Nephrology Consultation: ongoing   - Notes: repeat labs pending        I have reviewed the patient's medical history in detail and updated the computerized patient record. We had a prolonged discussion about these complex clinical issues and went over the various important aspects to consider. All questions were answered.       Advised the patient to call back or return to office if symptoms do not improve, change in nature, or persist.     The patient was given an after visit summary or informed of [employer] Access which includes patient instructions, diagnoses, current medications, & vitals. he expressed understanding with the diagnosis and plan. Lauri Gallagher MD    Please note that this dictation was completed with Team[employer], the computer voice recognition software. Quite often unanticipated grammatical, syntax, homophones, and other interpretive errors are inadvertently transcribed by the computer software. Please disregard these errors. Please excuse any errors that have escaped final proofreading.

## 2023-01-30 ENCOUNTER — OFFICE VISIT (OUTPATIENT)
Dept: INTERNAL MEDICINE CLINIC | Age: 88
End: 2023-01-30
Payer: MEDICARE

## 2023-01-30 VITALS
HEART RATE: 89 BPM | BODY MASS INDEX: 33.47 KG/M2 | HEIGHT: 70 IN | RESPIRATION RATE: 18 BRPM | TEMPERATURE: 97.9 F | OXYGEN SATURATION: 100 % | WEIGHT: 233.8 LBS | DIASTOLIC BLOOD PRESSURE: 70 MMHG | SYSTOLIC BLOOD PRESSURE: 130 MMHG

## 2023-01-30 DIAGNOSIS — Z00.00 ROUTINE ADULT HEALTH MAINTENANCE: Primary | ICD-10-CM

## 2023-01-30 DIAGNOSIS — I50.32 CHRONIC DIASTOLIC CONGESTIVE HEART FAILURE (HCC): ICD-10-CM

## 2023-01-30 DIAGNOSIS — E11.51 TYPE 2 DIABETES MELLITUS WITH PERIPHERAL VASCULAR DISEASE (HCC): ICD-10-CM

## 2023-01-30 DIAGNOSIS — E78.00 HYPERCHOLESTEROLEMIA: ICD-10-CM

## 2023-01-30 DIAGNOSIS — E03.9 ACQUIRED HYPOTHYROIDISM: ICD-10-CM

## 2023-01-30 DIAGNOSIS — N18.32 STAGE 3B CHRONIC KIDNEY DISEASE (HCC): ICD-10-CM

## 2023-01-30 DIAGNOSIS — I25.10 CORONARY ARTERY DISEASE INVOLVING NATIVE HEART WITHOUT ANGINA PECTORIS, UNSPECIFIED VESSEL OR LESION TYPE: ICD-10-CM

## 2023-01-30 DIAGNOSIS — I10 ESSENTIAL HYPERTENSION: ICD-10-CM

## 2023-01-30 DIAGNOSIS — I48.20 CHRONIC ATRIAL FIBRILLATION (HCC): ICD-10-CM

## 2023-01-30 PROCEDURE — G8427 DOCREV CUR MEDS BY ELIG CLIN: HCPCS | Performed by: INTERNAL MEDICINE

## 2023-01-30 PROCEDURE — G8417 CALC BMI ABV UP PARAM F/U: HCPCS | Performed by: INTERNAL MEDICINE

## 2023-01-30 PROCEDURE — 1123F ACP DISCUSS/DSCN MKR DOCD: CPT | Performed by: INTERNAL MEDICINE

## 2023-01-30 PROCEDURE — 3075F SYST BP GE 130 - 139MM HG: CPT | Performed by: INTERNAL MEDICINE

## 2023-01-30 PROCEDURE — 99214 OFFICE O/P EST MOD 30 MIN: CPT | Performed by: INTERNAL MEDICINE

## 2023-01-30 PROCEDURE — 1101F PT FALLS ASSESS-DOCD LE1/YR: CPT | Performed by: INTERNAL MEDICINE

## 2023-01-30 PROCEDURE — G8536 NO DOC ELDER MAL SCRN: HCPCS | Performed by: INTERNAL MEDICINE

## 2023-01-30 PROCEDURE — 3078F DIAST BP <80 MM HG: CPT | Performed by: INTERNAL MEDICINE

## 2023-01-30 PROCEDURE — G8510 SCR DEP NEG, NO PLAN REQD: HCPCS | Performed by: INTERNAL MEDICINE

## 2023-01-30 NOTE — PROGRESS NOTES
Chief Complaint   Patient presents with    Follow-up       1. \"Have you been to the ER, urgent care clinic since your last visit? Hospitalized since your last visit? \" No    2. \"Have you seen or consulted any other health care providers outside of the [address] [place] [address] since your last visit? \" [facility] for a fall on 12/26/22    3. For patients aged 39-70: Has the patient had a colonoscopy / FIT/ Cologuard? No      If the patient is female:    4. For patients aged 41-77: Has the patient had a mammogram within the past 2 years? No      5. For patients aged 21-65: Has the patient had a pap smear?  No

## 2023-02-06 ENCOUNTER — APPOINTMENT (OUTPATIENT)
Dept: INTERNAL MEDICINE CLINIC | Age: 88
End: 2023-02-06

## 2023-02-06 DIAGNOSIS — I48.20 CHRONIC ATRIAL FIBRILLATION (HCC): ICD-10-CM

## 2023-02-06 DIAGNOSIS — E03.9 ACQUIRED HYPOTHYROIDISM: ICD-10-CM

## 2023-02-06 DIAGNOSIS — E78.00 HYPERCHOLESTEROLEMIA: ICD-10-CM

## 2023-02-06 DIAGNOSIS — E11.51 TYPE 2 DIABETES MELLITUS WITH PERIPHERAL VASCULAR DISEASE (HCC): ICD-10-CM

## 2023-02-06 DIAGNOSIS — N18.32 STAGE 3B CHRONIC KIDNEY DISEASE (HCC): ICD-10-CM

## 2023-02-06 LAB
INR PPP: 4.2 (ref 0.9–1.1)
PROTHROMBIN TIME: 39.9 SEC (ref 9–11.1)

## 2023-02-07 LAB
ALBUMIN SERPL-MCNC: 3.8 G/DL (ref 3.5–5)
ALBUMIN/GLOB SERPL: 1.2 (ref 1.1–2.2)
ALP SERPL-CCNC: 138 U/L (ref 45–117)
ALT SERPL-CCNC: 45 U/L (ref 12–78)
ANION GAP SERPL CALC-SCNC: 5 MMOL/L (ref 5–15)
AST SERPL-CCNC: 40 U/L (ref 15–37)
BASOPHILS # BLD: 0 K/UL (ref 0–0.1)
BASOPHILS NFR BLD: 0 % (ref 0–1)
BILIRUB SERPL-MCNC: 0.8 MG/DL (ref 0.2–1)
BUN SERPL-MCNC: 60 MG/DL (ref 6–20)
BUN/CREAT SERPL: 37 (ref 12–20)
CALCIUM SERPL-MCNC: 9.6 MG/DL (ref 8.5–10.1)
CHLORIDE SERPL-SCNC: 100 MMOL/L (ref 97–108)
CHOLEST SERPL-MCNC: 111 MG/DL
CO2 SERPL-SCNC: 33 MMOL/L (ref 21–32)
CREAT SERPL-MCNC: 1.64 MG/DL (ref 0.7–1.3)
CREAT UR-MCNC: 46 MG/DL
DIFFERENTIAL METHOD BLD: ABNORMAL
EOSINOPHIL # BLD: 0.2 K/UL (ref 0–0.4)
EOSINOPHIL NFR BLD: 1 % (ref 0–7)
ERYTHROCYTE [DISTWIDTH] IN BLOOD BY AUTOMATED COUNT: 14.5 % (ref 11.5–14.5)
EST. AVERAGE GLUCOSE BLD GHB EST-MCNC: 143 MG/DL
GLOBULIN SER CALC-MCNC: 3.1 G/DL (ref 2–4)
GLUCOSE SERPL-MCNC: 54 MG/DL (ref 65–100)
HBA1C MFR BLD: 6.6 % (ref 4–5.6)
HCT VFR BLD AUTO: 46.1 % (ref 36.6–50.3)
HDLC SERPL-MCNC: 38 MG/DL
HDLC SERPL: 2.9 (ref 0–5)
HGB BLD-MCNC: 15 G/DL (ref 12.1–17)
IMM GRANULOCYTES # BLD AUTO: 0.1 K/UL (ref 0–0.04)
IMM GRANULOCYTES NFR BLD AUTO: 1 % (ref 0–0.5)
LDLC SERPL CALC-MCNC: 48.8 MG/DL (ref 0–100)
LYMPHOCYTES # BLD: 3 K/UL (ref 0.8–3.5)
LYMPHOCYTES NFR BLD: 21 % (ref 12–49)
MCH RBC QN AUTO: 33.6 PG (ref 26–34)
MCHC RBC AUTO-ENTMCNC: 32.5 G/DL (ref 30–36.5)
MCV RBC AUTO: 103.4 FL (ref 80–99)
MICROALBUMIN UR-MCNC: 10.2 MG/DL
MICROALBUMIN/CREAT UR-RTO: 222 MG/G (ref 0–30)
MONOCYTES # BLD: 1.5 K/UL (ref 0–1)
MONOCYTES NFR BLD: 11 % (ref 5–13)
NEUTS SEG # BLD: 9.4 K/UL (ref 1.8–8)
NEUTS SEG NFR BLD: 66 % (ref 32–75)
NRBC # BLD: 0 K/UL (ref 0–0.01)
NRBC BLD-RTO: 0 PER 100 WBC
PLATELET # BLD AUTO: 185 K/UL (ref 150–400)
PMV BLD AUTO: 11.3 FL (ref 8.9–12.9)
POTASSIUM SERPL-SCNC: 4 MMOL/L (ref 3.5–5.1)
PROT SERPL-MCNC: 6.9 G/DL (ref 6.4–8.2)
RBC # BLD AUTO: 4.46 M/UL (ref 4.1–5.7)
SODIUM SERPL-SCNC: 138 MMOL/L (ref 136–145)
TRIGL SERPL-MCNC: 121 MG/DL (ref ?–150)
TSH SERPL DL<=0.05 MIU/L-ACNC: 0.85 UIU/ML (ref 0.36–3.74)
VLDLC SERPL CALC-MCNC: 24.2 MG/DL
WBC # BLD AUTO: 14.2 K/UL (ref 4.1–11.1)

## 2023-02-09 NOTE — PROGRESS NOTES
Please call the patient regarding his diagnostic evaluation. - stable microalbuminuria  - stable renal function, EGFR 30's, CKD G3B/A2  - hypoglycemia present  - A1C very well controlled, may be excessively controlled given hypoglycemia  - INR high, well above target 2-3 (AFIB)  - currently using Lantus 60 units nightly. Would be reasonable to decrease this to 55 units nightly to reduce incidence of hypoglycemia. Additional titration may be needed. - Please confirm his current dosing of warfarin. A 10% reduction in his weekly dose is warranted. Repeat INR in 2 weeks after this change is made.

## 2023-02-14 ENCOUNTER — TELEPHONE ANTICOAG (OUTPATIENT)
Dept: INTERNAL MEDICINE CLINIC | Age: 88
End: 2023-02-14

## 2023-02-14 DIAGNOSIS — I48.20 CHRONIC ATRIAL FIBRILLATION (HCC): Primary | ICD-10-CM

## 2023-02-14 NOTE — PROGRESS NOTES
Anticoagulation Summary  As of 2/14/2023      INR goal:  1.8-3.0   TTR:  78.3 % (5.5 y)   INR used for dosing:     Warfarin maintenance plan:  2.5 mg (5 mg x 0.5) every Sun, Wed; 5 mg (5 mg x 1) all other days; Starting 2/14/2023   Weekly warfarin total:  30 mg   Plan last modified:  Charisma Cheatham (2/14/2023)   Next INR check:  2/24/2023   Target end date:      Indications    Chronic atrial fibrillation ([facility] [address] [I48.20]                 Anticoagulation Episode Summary       INR check location:  Clinic Lab    Preferred lab:      Send INR reminders to:      Comments:            Anticoagulation Care Providers       Provider Role Specialty Phone number    Sunita Barboza MD Responsible Internal Medicine Physician [phone]

## 2023-02-21 ENCOUNTER — HOSPITAL ENCOUNTER (EMERGENCY)
Age: 88
Discharge: HOME | End: 2023-02-21
Attending: EMERGENCY MEDICINE
Payer: MEDICARE

## 2023-02-21 VITALS
WEIGHT: 230.82 LBS | RESPIRATION RATE: 16 BRPM | DIASTOLIC BLOOD PRESSURE: 71 MMHG | HEIGHT: 70 IN | BODY MASS INDEX: 33.05 KG/M2 | TEMPERATURE: 97.3 F | SYSTOLIC BLOOD PRESSURE: 145 MMHG | HEART RATE: 77 BPM | OXYGEN SATURATION: 98 %

## 2023-02-21 DIAGNOSIS — R04.0 RIGHT-SIDED EPISTAXIS: Primary | ICD-10-CM

## 2023-02-21 DIAGNOSIS — S00.83XA FACIAL CONTUSION, INITIAL ENCOUNTER: ICD-10-CM

## 2023-02-21 LAB
INR PPP: 1.9 (ref 0.9–1.1)
PROTHROMBIN TIME: 19 SEC (ref 9–11.1)

## 2023-02-21 PROCEDURE — 85610 PROTHROMBIN TIME: CPT

## 2023-02-21 PROCEDURE — 74011250637 HC RX REV CODE- 250/637: Performed by: EMERGENCY MEDICINE

## 2023-02-21 PROCEDURE — 99283 EMERGENCY DEPT VISIT LOW MDM: CPT

## 2023-02-21 PROCEDURE — 36415 COLL VENOUS BLD VENIPUNCTURE: CPT

## 2023-02-21 RX ORDER — OXYMETAZOLINE HCL 0.05 %
2 SPRAY, NON-AEROSOL (ML) NASAL ONCE
Status: COMPLETED | OUTPATIENT
Start: 2023-02-21 | End: 2023-02-21

## 2023-02-21 RX ADMIN — OXYMETAZOLINE HCL 2 SPRAY: 0.05 SPRAY NASAL at 11:35

## 2023-02-21 NOTE — ED PROVIDER NOTES
EMERGENCY DEPARTMENT HISTORY AND PHYSICAL EXAM    Date: 2023  Patient Name: Rosalinda Martinez  Patient Age and Sex: 82 y.o. male  MRN:  [id number]  CSN:  [id number]    History of Present Illness     Chief Complaint   Patient presents with    Epistaxis     Patient reports 2-3 nose bleeds a day that last about 5 minutes - ongoing for the last week. He is on coumadin, states his last INR was elevated 2 weeks ago and meds were cut back. He is note actively bleeding at time of triage       History Provided By: Patient    Ability to gather history was limited by:  HPI Limitations : None    HPI: Rosalinda Martinez, 82 y.o. male who is on Coumadin for atrial fibrillation, complains of multiple daily nosebleeds which has been going on for about a week or so. No pain. Bleeding is mild and typically lasts a few minutes before stopping. He reports having a trip and fall and striking his face against pavement a little over a week ago. He is not having any significant headaches or facial pain. Bleeding is only coming from the right naris. He saw an ENT physician yesterday and a small amount of cautery was performed in the office which did not improve his symptoms. At the time of my H&P he is not having any bleeding. Tobacco Use      Smoking status: Former        Types: Cigarettes        Quit date: 1990        Years since quittin.6      Smokeless tobacco: Never     Past History   The patient's medical, surgical, and social history were reviewed by me today. Current Medications:  No current facility-administered medications on file prior to encounter.      Current Outpatient Medications on File Prior to Encounter   Medication Sig Dispense Refill    folic acid-vit W1-WFB W44 (FaBB) 2.2-25-1 mg tab TAKE 1 TABLET BY MOUTH DAILY 90 Tablet 0    glucose blood VI test strips (FreeStyle Test) strip TEST THREE TIMES DAILY 300 Strip 3    furosemide (LASIX) 40 mg tablet TAKE 2 TABLETS DAILY 180 Tablet 3    allopurinoL (ZYLOPRIM) 300 mg tablet Take 1 Tablet by mouth daily. 90 Tablet 1    isosorbide mononitrate ER (IMDUR) 60 mg CR tablet Take 1 Tablet by mouth daily. 90 Tablet 1    warfarin (COUMADIN) 5 mg tablet Take 1 Tablet by mouth daily. 90 Tablet 1    insulin glargine (Lantus Solostar U-100 Insulin) 100 unit/mL (3 mL) inpn INJECT 60 UNITS UNDER THE SKIN DAILY 60 mL 1    metoprolol tartrate (LOPRESSOR) 100 mg IR tablet TAKE 1 TABLET TWICE A  Tablet 3    lovastatin (MEVACOR) 40 mg tablet Take 1 Tablet by mouth daily for 360 days. 90 Tablet 3    Insulin Needles, Disposable, (BD Ultra-Fine Short Pen Needle) 31 gauge x 5/16\" ndle USE TO INJECT INSULIN SUBCUTANEOUSLY FOUR TIMES DAILY AS DIRECTED 100 Pen Needle 4    spironolactone (ALDACTONE) 25 mg tablet TAKE 1 TABLET DAILY 90 Tablet 3    levothyroxine (SYNTHROID) 175 mcg tablet TAKE 1 TABLET BY MOUTH EVERY DAY BEFORE BREAKFAST 90 Tablet 1    insulin lispro (HUMALOG) 100 unit/mL kwikpen 5-10 Units by SubCUTAneous route three (3) times daily as needed (per sliding scale) 1 Pen 6    Flaxseed Oil oil Take 1,000 mg by mouth daily. terazosin (HYTRIN) 10 mg capsule Take 1 capsule by mouth nightly. Indications: BENIGN PROSTATIC HYPERTROPHY, HYPERTENSION      VIT A/VIT C/VIT E/ZINC/COPPER (OCUVITE PRESERVISION PO) Take 1 Tab by mouth two (2) times a day. losartan (COZAAR) 50 mg tablet Take 50 mg by mouth daily. Indications: CHRONIC HEART FAILURE, HYPERTENSION      multivitamin, stress formula (STRESS TAB) tablet Take 1 Tab by mouth daily. CYANOCOBALAMIN/FA/PYRIDOXINE (FOLGARD RX 2.2 PO) Take 1 tablet by mouth daily. finasteride (PROSCAR) 5 mg tablet Take 5 mg by mouth every other day.  With dinner  Indications: BENIGN PROSTATIC HYPERTROPHY         Past Medical History:   Diagnosis Date    Acquired hypothyroidism 09/12/2017    Anemia 02/01/2015    BPH with obstruction/lower urinary tract symptoms 10/24/2017    CAD (coronary artery disease)     Chronic atrial fibrillation ([facility] [address] 2015    Chronic systolic heart failure (HCC)     CKD (chronic kidney disease) stage 3, GFR 30-59 ml/min ([facility]) 2015    Diabetes ([facility] [address]     Diverticulosis of large intestine without hemorrhage 10/24/2017    Former smoker     Gout 10/24/2017    History of echocardiogram 2018    LVEF 40-45%, global HK, mild to mod LAE, no AS, mild MR, RVSP 26 mmHg    Hyperlipidemia 2015    Hypertension     Long term current use of anticoagulant 10/24/2017    Long-term use of high-risk medication 10/24/2017    Macular degeneration 10/24/2017    Microalbuminuria     Peripheral vascular disease ([facility] [address] 10/24/2017    Primary osteoarthritis involving multiple joints     knees and back    Renal artery stenosis ([facility] [address] 10/24/2017    Right-sided extracranial carotid artery stenosis 10/24/2017    Sick sinus syndrome ([facility] [address] 10/24/2017    Status post pacemaker       Past Surgical History:   Procedure Laterality Date    HX AAA REPAIR      HX APPENDECTOMY      HX CATARACT REMOVAL      / lens implant    HX CHOLECYSTECTOMY      HX ORTHOPAEDIC Right     rt unicondular knee replacement    HX ORTHOPAEDIC  1/26/15    LEFT UNICONDYLAR KNEE ARTHROPLASTY    HX PACEMAKER      pacemaker    HX TONSILLECTOMY      HX UROLOGICAL      rt renal stentx2       Social History     Tobacco Use    Smoking status: Former     Types: Cigarettes     Quit date: 1990     Years since quittin.6    Smokeless tobacco: Never   Vaping Use    Vaping Use: Never used   Substance Use Topics    Alcohol use: Yes     Alcohol/week: 0.0 standard drinks     Comment: 3-4 drinks a week    Drug use: No     Types: Prescription, OTC       Allergies:   Allergies   Allergen Reactions    Latex Other (comments)     Skin raw and severely irritated    Aspirin Unknown (comments)    Hydrocodone Other (comments)     hallucinations    Oxycodone Other (comments)     hallucinations    Sulfa (Sulfonamide Antibiotics) Rash    Tetracycline Rash     Review of Systems A Review of Systems was reviewed by me today during this encounter. Pertinent positive and negative elements are noted in the HPI and MDM sections. Review of Systems   HENT:  Positive for nosebleeds. Cardiovascular:  Negative for chest pain. Neurological:  Negative for syncope and headaches. All other systems reviewed and are negative. Physical Exam   Vital Signs  Patient Vitals for the past 8 hrs:   Temp Pulse Resp BP SpO2   02/21/23 0925 97.3 °F (36.3 °C) 77 16 (!) 145/71 98 %          Physical Exam  Vitals reviewed. Constitutional:       General: He is not in acute distress. Appearance: He is not ill-appearing. HENT:      Head: Normocephalic and atraumatic. Comments: Moderate bruising over the bilateral orbits, cheeks, and nose from recent fall     Nose: Nose normal. No nasal deformity, signs of injury or nasal tenderness. Right Nostril: No epistaxis. Comments: Scant dried blood in the right naris, no active bleeding  Skin:     Findings: Bruising present. Comments: Moderate bruising over the bilateral orbits, cheeks, and nose from recent fall       Diagnostic Study Results   Labs  Recent Results (from the past 24 hour(s))   PROTHROMBIN TIME + INR    Collection Time: 02/21/23 10:31 AM   Result Value Ref Range    INR 1.9 (H) 0.9 - 1.1      Prothrombin time 19.0 (H) 9.0 - 11.1 sec       ==============================================================    Radiologic Studies  CT Results  (Last 48 hours)      None          CXR Results  (Last 48 hours)      None            Critical Care and Billable Procedures   EKG reviewed by ED Physician Shweta aWng in the absence of a cardiologist: Yes  EKG below was independently interpreted by me Dedra Hahn MD)    Procedures    Medical Decision Making   I reviewed the patient's most recent Emergency Dept notes and diagnostic tests in formulating my MDM on today's visit.     Medications administered during ED course:  Medications   oxymetazoline (AFRIN) 0.05 % nasal spray 2 Spray (2 Sprays Right Nostril Given 2/21/23 9855)     Medical Decision Making // ED Course // Reassessment:  Claudetta Largo, 82 y.o. male who is on Coumadin for atrial fibrillation, complains of multiple daily nosebleeds which has been going on for about a week or so. No pain. Bleeding is mild and typically lasts a few minutes before stopping. He reports having a trip and fall and striking his face against pavement a little over a week ago. He is not having any significant headaches or facial pain. Bleeding is only coming from the right naris. He saw an ENT physician yesterday and a small amount of cautery was performed in the office which did not improve his symptoms. At the time of my H&P he is not having any bleeding. On examination he has a tiny amount of scant dried blood in the right naris, no active bleeding. Afrin spray was placed in the patient's nose and he is advised to use Aquaphor twice a day to keep moist mucous membranes. He will hold his Coumadin for the next 48 hours. INR is 1.9. I considered head CT and facial bone CT. However patient is not having any pain or bleeding and I do not think that further imaging is warranted despite the fact that he is on Coumadin. Clinical concern for ICH or significant facial bone injury is very low. Radiology results independently interpreted by me:     External Records reviewed:     Consults:  None    Tests considered but not performed: CT head, CT maxillofacial bones    Differential Diagnoses ruled out: Intracranial hemorrhage    Final Diagnosis:   1. Right-sided epistaxis    2.  Facial contusion, initial encounter        Additional documentation if relevant for this encounter       Shared decision making: Discussed CT imaging with the patient, as documented above, and the patient and I agree that imaging would not be necessary    Diagnosis and Disposition     Disposition: Discharged    Final Diagnosis:   1. Right-sided epistaxis    2. Facial contusion, initial encounter        Discharge Medication List as of 2/21/2023 11:15 AM           Follow up: Follow-up Information       Follow up With Specialties Details Why Contact Info    Janice Lopez MD Internal Medicine Physician   [address]  [address]  [phone]      [facility] EMERGENCY DEPT Emergency Medicine   [address] [facility]   [address] [address]  [phone]          Disclaimers   I was the first provider for this patient on this visit. To the best of my ability I reviewed relevant prior medical records, electrocardiograms, laboratories, and radiologic studies. The patient's presenting problems were discussed, and the patient was in agreement with the care plan formulated and outlined with them. Please note that this dictation was completed with Dragon voice recognition software. Quite often unanticipated grammatical, syntax, homophones, and other interpretive errors are inadvertently transcribed by the computer software. Please disregard these errors and excuse any errors that have escaped final proofreading. Rebecca Ricardo MD    I personally performed the services described in this documentation on this date 2/21/2023 for patient Angela Cohen.       Rebecca Ricardo MD  3:55 PM

## 2023-02-21 NOTE — ED NOTES
I have reviewed discharge instructions with the patient. The patient verbalized understanding.  Ambulated to waiting room

## 2023-02-24 ENCOUNTER — APPOINTMENT (OUTPATIENT)
Dept: INTERNAL MEDICINE CLINIC | Age: 88
End: 2023-02-24

## 2023-02-24 DIAGNOSIS — I48.20 CHRONIC ATRIAL FIBRILLATION (HCC): ICD-10-CM

## 2023-02-25 LAB
INR PPP: 1.4 (ref 0.9–1.1)
PROTHROMBIN TIME: 14.3 SEC (ref 9–11.1)

## 2023-02-25 NOTE — PROGRESS NOTES
INR not at target. Downtrend from before. Please confirm current dosing with patient and we will increase current weekly total by 10%.

## 2023-03-02 ENCOUNTER — TELEPHONE ANTICOAG (OUTPATIENT)
Dept: INTERNAL MEDICINE CLINIC | Age: 88
End: 2023-03-02

## 2023-03-02 DIAGNOSIS — I48.20 CHRONIC ATRIAL FIBRILLATION (HCC): Primary | ICD-10-CM

## 2023-03-02 NOTE — PROGRESS NOTES
Anticoagulation Summary  As of 3/2/2023      INR goal:  1.8-3.0   TTR:  78.0 % (5.5 y)   INR used for dosing:     Warfarin maintenance plan:  2.5 mg (2.5 mg x 1) every Sun; 5 mg (5 mg x 1) all other days; Starting 3/2/2023   Weekly warfarin total:  32.5 mg   Plan last modified:  Pranay Mccarthy (3/2/2023)   Next INR check:  3/16/2023   Target end date:      Indications    Chronic atrial fibrillation ([facility] [address] [I48.20]                 Anticoagulation Episode Summary       INR check location:  Clinic Lab    Preferred lab:      Send INR reminders to:      Comments:            Anticoagulation Care Providers       Provider Role Specialty Phone number    Jolene Deshpande MD Responsible Internal Medicine Physician [phone]

## 2023-03-15 RX ORDER — WARFARIN SODIUM 5 MG/1
TABLET ORAL
Qty: 90 TABLET | Refills: 3 | Status: SHIPPED | OUTPATIENT
Start: 2023-03-15

## 2023-03-15 RX ORDER — ISOSORBIDE MONONITRATE 60 MG/1
TABLET, EXTENDED RELEASE ORAL
Qty: 90 TABLET | Refills: 3 | Status: SHIPPED | OUTPATIENT
Start: 2023-03-15

## 2023-03-15 RX ORDER — ALLOPURINOL 300 MG/1
TABLET ORAL
Qty: 90 TABLET | Refills: 3 | Status: SHIPPED | OUTPATIENT
Start: 2023-03-15

## 2023-03-16 ENCOUNTER — APPOINTMENT (OUTPATIENT)
Dept: INTERNAL MEDICINE CLINIC | Age: 88
End: 2023-03-16

## 2023-03-16 DIAGNOSIS — I48.20 CHRONIC ATRIAL FIBRILLATION (HCC): ICD-10-CM

## 2023-03-16 LAB
INR PPP: 1.4 (ref 0.9–1.1)
PROTHROMBIN TIME: 14.8 SEC (ref 9–11.1)

## 2023-03-24 ENCOUNTER — TELEPHONE ANTICOAG (OUTPATIENT)
Dept: INTERNAL MEDICINE CLINIC | Age: 88
End: 2023-03-24

## 2023-03-24 DIAGNOSIS — I48.20 CHRONIC ATRIAL FIBRILLATION (HCC): Primary | ICD-10-CM

## 2023-03-24 RX ORDER — CYANOCOBALAMIN/FOLIC AC/VIT B6 1-2.2-25MG
TABLET ORAL
Qty: 90 TABLET | Refills: 0 | Status: SHIPPED | OUTPATIENT
Start: 2023-03-24

## 2023-03-24 NOTE — PROGRESS NOTES
Anticoagulation Summary  As of 3/24/2023      INR goal:  1.8-3.0   TTR:  77.2 % (5.6 y)   INR used for dosing:     Warfarin maintenance plan:  5 mg (5 mg x 1) every day; Starting 3/24/2023   Weekly warfarin total:  35 mg   Plan last modified:  Payton Jack (3/24/2023)   Next INR check:  4/6/2023   Target end date:      Indications    Chronic atrial fibrillation ([facility] [address] [I48.20]                 Anticoagulation Episode Summary       INR check location:  Clinic Lab    Preferred lab:      Send INR reminders to:      Comments:            Anticoagulation Care Providers       Provider Role Specialty Phone number    Kassidy Rubi MD Responsible Internal Medicine Physician [phone]

## 2023-03-28 RX ORDER — INSULIN GLARGINE 100 [IU]/ML
INJECTION, SOLUTION SUBCUTANEOUS
Qty: 60 ML | Refills: 2 | Status: SHIPPED | OUTPATIENT
Start: 2023-03-28

## 2023-04-06 ENCOUNTER — APPOINTMENT (OUTPATIENT)
Dept: INTERNAL MEDICINE CLINIC | Age: 88
End: 2023-04-06

## 2023-04-28 ENCOUNTER — TELEPHONE ANTICOAG (OUTPATIENT)
Dept: INTERNAL MEDICINE CLINIC | Age: 88
End: 2023-04-28

## 2023-04-28 DIAGNOSIS — I48.20 CHRONIC ATRIAL FIBRILLATION (HCC): Primary | Chronic | ICD-10-CM

## 2023-04-28 NOTE — PROGRESS NOTES
Anticoagulation Summary  As of 4/28/2023      INR goal:  1.8-3.0   TTR:  77.2 % (5.6 y)   INR used for dosing:     Warfarin maintenance plan:  5 mg (5 mg x 1) every day   Weekly warfarin total:  35 mg   Plan last modified:  Priscilla Hernández (4/28/2023)   Next INR check:  5/10/2023   Target end date:      Indications    [facility] [I48.20]                 Anticoagulation Episode Summary       INR check location:  Clinic Lab    Preferred lab:      Send INR reminders to:      Comments:            Anticoagulation Care Providers       Provider Role Specialty Phone number    Taylor Palma MD Responsible Internal Medicine Physician [phone]

## 2023-05-10 ENCOUNTER — NURSE ONLY (OUTPATIENT)
Facility: CLINIC | Age: 88
End: 2023-05-10

## 2023-05-10 DIAGNOSIS — Z79.01 LONG TERM CURRENT USE OF ANTICOAGULANT: Primary | ICD-10-CM

## 2023-05-10 LAB
INR PPP: 3.5 (ref 0.9–1.1)
PROTHROMBIN TIME: 33.6 SEC (ref 9–11.1)

## 2023-05-11 NOTE — RESULT ENCOUNTER NOTE
I reviewed these results. Please notify the patient. INR has increased to 3.5, above target 2-3. A reduction in his dosing of anticoagulation is indicated. Please confirm current total weekly dosing of 35 mg weekly (5 mg daily). If this is the case, then I will reduce his dosing to 32.5 mg weekly and recheck INR in 2 weeks.

## 2023-05-18 ENCOUNTER — ANTI-COAG VISIT (OUTPATIENT)
Facility: CLINIC | Age: 88
End: 2023-05-18

## 2023-05-18 NOTE — PROGRESS NOTES
Anticoagulation Summary  As of 5/18/2023      INR goal:     TTR:  --   INR used for dosing:     Warfarin maintenance plan:  2.5 mg (2.5 mg x 1) every Sun; 5 mg (5 mg x 1) all other days   Weekly warfarin total:  32.5 mg   Plan last modified:  Luis F Billy MA (5/18/2023)   Next INR check:  6/15/2023   Target end date:               Anticoagulation Episode Summary       INR check location:      Preferred lab:      Send INR reminders to:      Comments:

## 2023-05-31 RX ORDER — SPIRONOLACTONE 25 MG/1
TABLET ORAL
Qty: 90 TABLET | Refills: 3 | Status: SHIPPED | OUTPATIENT
Start: 2023-05-31

## 2023-05-31 NOTE — TELEPHONE ENCOUNTER
PCP: Kelly Newman MD    Last appt: 1/30/23  Future Appointments   Date Time Provider Garry Guevara   6/15/2023  9:00 AM LAB ONLY [facility] AM[facility]   8/14/2023 11:00 AM ANIVAL Estrella - NP [facility]       Last refilled:6/1/22    Requested Prescriptions     Pending Prescriptions Disp Refills    spironolactone (ALDACTONE) 25 MG tablet [Pharmacy Med Name: SPIRONOLACTONE TABS 25MG] 90 tablet 3     Sig: TAKE 1 TABLET DAILY

## 2023-06-28 RX ORDER — CYANOCOBALAMIN/FOLIC AC/VIT B6 1-2.2-25MG
TABLET ORAL
Qty: 90 TABLET | Refills: 0 | Status: SHIPPED | OUTPATIENT
Start: 2023-06-28

## 2023-06-28 RX ORDER — LEVOTHYROXINE SODIUM 175 UG/1
TABLET ORAL
Qty: 90 TABLET | Refills: 0 | Status: SHIPPED | OUTPATIENT
Start: 2023-06-28

## 2023-07-05 ENCOUNTER — NURSE ONLY (OUTPATIENT)
Facility: CLINIC | Age: 88
End: 2023-07-05

## 2023-07-05 DIAGNOSIS — I48.20 CHRONIC ATRIAL FIBRILLATION (HCC): ICD-10-CM

## 2023-07-05 DIAGNOSIS — I48.20 CHRONIC ATRIAL FIBRILLATION (HCC): Primary | ICD-10-CM

## 2023-07-05 LAB
INR PPP: 1.8 (ref 0.9–1.1)
PROTHROMBIN TIME: 18.1 SEC (ref 9–11.1)

## 2023-07-17 NOTE — TELEPHONE ENCOUNTER
Last Refill: 7-20-22  Last Visit: 1/30/2023 Billy Jernigan  Next Visit: 8/14/2023 Cj Rodgers    Requested Prescriptions     Pending Prescriptions Disp Refills    metoprolol (LOPRESSOR) 100 MG tablet 180 tablet 0     Sig: Take 1 tablet by mouth 2 times daily

## 2023-07-18 RX ORDER — METOPROLOL TARTRATE 100 MG/1
100 TABLET ORAL 2 TIMES DAILY
Qty: 180 TABLET | Refills: 0 | Status: SHIPPED | OUTPATIENT
Start: 2023-07-18

## 2023-08-14 ENCOUNTER — OFFICE VISIT (OUTPATIENT)
Facility: CLINIC | Age: 88
End: 2023-08-14
Payer: MEDICARE

## 2023-08-14 VITALS
HEIGHT: 70 IN | WEIGHT: 226.2 LBS | BODY MASS INDEX: 32.38 KG/M2 | DIASTOLIC BLOOD PRESSURE: 80 MMHG | TEMPERATURE: 98.4 F | OXYGEN SATURATION: 97 % | SYSTOLIC BLOOD PRESSURE: 128 MMHG | HEART RATE: 56 BPM | RESPIRATION RATE: 16 BRPM

## 2023-08-14 DIAGNOSIS — E03.9 ACQUIRED HYPOTHYROIDISM: ICD-10-CM

## 2023-08-14 DIAGNOSIS — I70.1 RENAL ARTERY STENOSIS (HCC): ICD-10-CM

## 2023-08-14 DIAGNOSIS — Z79.01 LONG TERM CURRENT USE OF ANTICOAGULANT: ICD-10-CM

## 2023-08-14 DIAGNOSIS — H81.13 BENIGN PAROXYSMAL POSITIONAL VERTIGO DUE TO BILATERAL VESTIBULAR DISORDER: ICD-10-CM

## 2023-08-14 DIAGNOSIS — E78.2 MIXED HYPERLIPIDEMIA: ICD-10-CM

## 2023-08-14 DIAGNOSIS — L72.9 CYST OF SKIN: ICD-10-CM

## 2023-08-14 DIAGNOSIS — Z00.00 MEDICARE ANNUAL WELLNESS VISIT, SUBSEQUENT: Primary | ICD-10-CM

## 2023-08-14 DIAGNOSIS — E11.51 TYPE 2 DIABETES MELLITUS WITH PERIPHERAL VASCULAR DISEASE (HCC): ICD-10-CM

## 2023-08-14 DIAGNOSIS — I73.9 PERIPHERAL VASCULAR DISEASE (HCC): ICD-10-CM

## 2023-08-14 DIAGNOSIS — D64.9 ANEMIA, UNSPECIFIED TYPE: ICD-10-CM

## 2023-08-14 DIAGNOSIS — I10 PRIMARY HYPERTENSION: ICD-10-CM

## 2023-08-14 DIAGNOSIS — M10.019 ACUTE IDIOPATHIC GOUT OF SHOULDER, UNSPECIFIED LATERALITY: ICD-10-CM

## 2023-08-14 DIAGNOSIS — I48.20 CHRONIC ATRIAL FIBRILLATION (HCC): ICD-10-CM

## 2023-08-14 DIAGNOSIS — I25.10 CORONARY ARTERY DISEASE INVOLVING NATIVE CORONARY ARTERY OF NATIVE HEART WITHOUT ANGINA PECTORIS: ICD-10-CM

## 2023-08-14 PROBLEM — N40.1 BPH WITH OBSTRUCTION/LOWER URINARY TRACT SYMPTOMS: Status: RESOLVED | Noted: 2017-10-24 | Resolved: 2023-08-14

## 2023-08-14 PROBLEM — N13.8 BPH WITH OBSTRUCTION/LOWER URINARY TRACT SYMPTOMS: Status: RESOLVED | Noted: 2017-10-24 | Resolved: 2023-08-14

## 2023-08-14 PROCEDURE — 3044F HG A1C LEVEL LT 7.0%: CPT | Performed by: NURSE PRACTITIONER

## 2023-08-14 PROCEDURE — 1123F ACP DISCUSS/DSCN MKR DOCD: CPT | Performed by: NURSE PRACTITIONER

## 2023-08-14 PROCEDURE — G0439 PPPS, SUBSEQ VISIT: HCPCS | Performed by: NURSE PRACTITIONER

## 2023-08-14 ASSESSMENT — ENCOUNTER SYMPTOMS
COUGH: 0
FACIAL SWELLING: 0
RECTAL PAIN: 0
BLOOD IN STOOL: 0
SINUS PRESSURE: 0
WHEEZING: 0
COLOR CHANGE: 0
EYE PAIN: 0
ABDOMINAL PAIN: 0
SINUS PAIN: 0
EYE DISCHARGE: 0
VOMITING: 0
PHOTOPHOBIA: 0
ANAL BLEEDING: 0
BACK PAIN: 0
SORE THROAT: 0
TROUBLE SWALLOWING: 0
APNEA: 0
STRIDOR: 0
NAUSEA: 0
EYE ITCHING: 0
DIARRHEA: 0
CONSTIPATION: 0
CHOKING: 0

## 2023-08-14 ASSESSMENT — PATIENT HEALTH QUESTIONNAIRE - PHQ9
SUM OF ALL RESPONSES TO PHQ9 QUESTIONS 1 & 2: 0
SUM OF ALL RESPONSES TO PHQ QUESTIONS 1-9: 0
SUM OF ALL RESPONSES TO PHQ QUESTIONS 1-9: 0
2. FEELING DOWN, DEPRESSED OR HOPELESS: 0
SUM OF ALL RESPONSES TO PHQ QUESTIONS 1-9: 0
1. LITTLE INTEREST OR PLEASURE IN DOING THINGS: 0
SUM OF ALL RESPONSES TO PHQ QUESTIONS 1-9: 0

## 2023-08-14 ASSESSMENT — LIFESTYLE VARIABLES
HOW MANY STANDARD DRINKS CONTAINING ALCOHOL DO YOU HAVE ON A TYPICAL DAY: 1 OR 2
HOW OFTEN DO YOU HAVE A DRINK CONTAINING ALCOHOL: 2-3 TIMES A WEEK

## 2023-08-15 LAB
ALBUMIN SERPL-MCNC: 3.4 G/DL (ref 3.5–5)
ALBUMIN/GLOB SERPL: 1.1 (ref 1.1–2.2)
ALP SERPL-CCNC: 169 U/L (ref 45–117)
ALT SERPL-CCNC: 73 U/L (ref 12–78)
ANION GAP SERPL CALC-SCNC: 6 MMOL/L (ref 5–15)
APPEARANCE UR: CLEAR
AST SERPL-CCNC: 48 U/L (ref 15–37)
BACTERIA URNS QL MICRO: NEGATIVE /HPF
BASOPHILS # BLD: 0.1 K/UL (ref 0–0.1)
BASOPHILS NFR BLD: 1 % (ref 0–1)
BILIRUB SERPL-MCNC: 0.8 MG/DL (ref 0.2–1)
BILIRUB UR QL: NEGATIVE
BUN SERPL-MCNC: 42 MG/DL (ref 6–20)
BUN/CREAT SERPL: 26 (ref 12–20)
CALCIUM SERPL-MCNC: 9.4 MG/DL (ref 8.5–10.1)
CHLORIDE SERPL-SCNC: 101 MMOL/L (ref 97–108)
CHOLEST SERPL-MCNC: 164 MG/DL
CO2 SERPL-SCNC: 32 MMOL/L (ref 21–32)
COLOR UR: ABNORMAL
CREAT SERPL-MCNC: 1.61 MG/DL (ref 0.7–1.3)
CREAT UR-MCNC: 92.9 MG/DL
DIFFERENTIAL METHOD BLD: ABNORMAL
EOSINOPHIL # BLD: 0.4 K/UL (ref 0–0.4)
EOSINOPHIL NFR BLD: 4 % (ref 0–7)
EPITH CASTS URNS QL MICRO: ABNORMAL /LPF
ERYTHROCYTE [DISTWIDTH] IN BLOOD BY AUTOMATED COUNT: 14.3 % (ref 11.5–14.5)
EST. AVERAGE GLUCOSE BLD GHB EST-MCNC: 134 MG/DL
GLOBULIN SER CALC-MCNC: 3.2 G/DL (ref 2–4)
GLUCOSE SERPL-MCNC: 157 MG/DL (ref 65–100)
GLUCOSE UR STRIP.AUTO-MCNC: NEGATIVE MG/DL
HBA1C MFR BLD: 6.3 % (ref 4–5.6)
HCT VFR BLD AUTO: 50.7 % (ref 36.6–50.3)
HDLC SERPL-MCNC: 38 MG/DL
HDLC SERPL: 4.3 (ref 0–5)
HGB BLD-MCNC: 15.7 G/DL (ref 12.1–17)
HGB UR QL STRIP: NEGATIVE
HYALINE CASTS URNS QL MICRO: ABNORMAL /LPF (ref 0–5)
IMM GRANULOCYTES # BLD AUTO: 0.1 K/UL (ref 0–0.04)
IMM GRANULOCYTES NFR BLD AUTO: 1 % (ref 0–0.5)
INR PPP: 3 (ref 0.9–1.1)
KETONES UR QL STRIP.AUTO: NEGATIVE MG/DL
LDLC SERPL CALC-MCNC: 93.4 MG/DL (ref 0–100)
LEUKOCYTE ESTERASE UR QL STRIP.AUTO: NEGATIVE
LYMPHOCYTES # BLD: 2.3 K/UL (ref 0.8–3.5)
LYMPHOCYTES NFR BLD: 22 % (ref 12–49)
MCH RBC QN AUTO: 32.4 PG (ref 26–34)
MCHC RBC AUTO-ENTMCNC: 31 G/DL (ref 30–36.5)
MCV RBC AUTO: 104.8 FL (ref 80–99)
MICROALBUMIN UR-MCNC: 27.1 MG/DL
MICROALBUMIN/CREAT UR-RTO: 292 MG/G (ref 0–30)
MONOCYTES # BLD: 1.4 K/UL (ref 0–1)
MONOCYTES NFR BLD: 14 % (ref 5–13)
NEUTS SEG # BLD: 6.1 K/UL (ref 1.8–8)
NEUTS SEG NFR BLD: 58 % (ref 32–75)
NITRITE UR QL STRIP.AUTO: NEGATIVE
NRBC # BLD: 0 K/UL (ref 0–0.01)
NRBC BLD-RTO: 0 PER 100 WBC
PH UR STRIP: 6 (ref 5–8)
PLATELET # BLD AUTO: 172 K/UL (ref 150–400)
PMV BLD AUTO: 11.2 FL (ref 8.9–12.9)
POTASSIUM SERPL-SCNC: 5.5 MMOL/L (ref 3.5–5.1)
PROT SERPL-MCNC: 6.6 G/DL (ref 6.4–8.2)
PROT UR STRIP-MCNC: 30 MG/DL
PROTHROMBIN TIME: 29.3 SEC (ref 9–11.1)
RBC # BLD AUTO: 4.84 M/UL (ref 4.1–5.7)
RBC #/AREA URNS HPF: ABNORMAL /HPF (ref 0–5)
SODIUM SERPL-SCNC: 139 MMOL/L (ref 136–145)
SP GR UR REFRACTOMETRY: 1.01 (ref 1–1.03)
TRIGL SERPL-MCNC: 163 MG/DL
TSH SERPL DL<=0.05 MIU/L-ACNC: 0.1 UIU/ML (ref 0.36–3.74)
URATE SERPL-MCNC: 4.4 MG/DL (ref 3.5–7.2)
URINE CULTURE IF INDICATED: ABNORMAL
UROBILINOGEN UR QL STRIP.AUTO: 0.2 EU/DL (ref 0.2–1)
VLDLC SERPL CALC-MCNC: 32.6 MG/DL
WBC # BLD AUTO: 10.4 K/UL (ref 4.1–11.1)
WBC URNS QL MICRO: ABNORMAL /HPF (ref 0–4)

## 2023-08-21 ENCOUNTER — HOSPITAL ENCOUNTER (OUTPATIENT)
Facility: HOSPITAL | Age: 88
Discharge: HOME | End: 2023-08-24
Payer: MEDICARE

## 2023-08-21 DIAGNOSIS — L72.9 CYST OF SKIN: ICD-10-CM

## 2023-08-21 PROCEDURE — 76705 ECHO EXAM OF ABDOMEN: CPT

## 2023-08-22 NOTE — TELEPHONE ENCOUNTER
PCP: ANIVAL Barkley NP    Last appt: 8/14/23  Future Appointments   Date Time Provider [address]   9/14/2023  9:00 AM LAB ONLY [facility]   2/14/2024  8:40 AM LAB ONLY [facility] AM[facility]   2/19/2024 10:00 AM ANIVAL Barkley NP [facility]       Last refilled:11/21/22    Requested Prescriptions     Pending Prescriptions Disp Refills    blood glucose test strips (FREESTYLE TEST STRIPS) strip 300 strip 3     Sig: Use to test blood sugar three times daily DX:E11.51

## 2023-08-23 RX ORDER — BLOOD SUGAR DIAGNOSTIC
STRIP MISCELLANEOUS
Qty: 300 STRIP | Refills: 3 | Status: SHIPPED | OUTPATIENT
Start: 2023-08-23

## 2023-08-23 NOTE — TELEPHONE ENCOUNTER
lovastatin (MEVACOR) 40 mg tablet 90 Tablet 3 6/27/2022 6/22/2023    Sig - Route:  Take 1 Tablet by mouth daily for 360 days. - Oral      Last visit: 8/14/23   Future visit: no future appt    Pharmacy:  [facility]

## 2023-08-23 NOTE — TELEPHONE ENCOUNTER
PCP: ANIVAL Lundberg NP    Last appt: 8/14/23  Future Appointments   Date Time Provider [address]   9/14/2023  9:00 AM LAB ONLY [facility]   2/14/2024  8:40 AM LAB ONLY [facility]   2/19/2024 10:00 AM ANIVAL Lundberg NP [facility]       Last refilled:6/27/22    Requested Prescriptions     Pending Prescriptions Disp Refills    lovastatin (MEVACOR) 40 MG tablet 90 tablet 1     Sig: Take 1 tablet by mouth daily

## 2023-08-25 RX ORDER — LOVASTATIN 40 MG/1
40 TABLET ORAL DAILY
Qty: 90 TABLET | Refills: 1 | Status: SHIPPED | OUTPATIENT
Start: 2023-08-25

## 2023-09-04 DIAGNOSIS — E03.9 ACQUIRED HYPOTHYROIDISM: Primary | ICD-10-CM

## 2023-09-13 PROBLEM — Z00.00 MEDICARE ANNUAL WELLNESS VISIT, SUBSEQUENT: Status: RESOLVED | Noted: 2023-08-14 | Resolved: 2023-09-13

## 2023-09-14 ENCOUNTER — NURSE ONLY (OUTPATIENT)
Facility: CLINIC | Age: 88
End: 2023-09-14

## 2023-09-14 ENCOUNTER — TELEPHONE (OUTPATIENT)
Facility: CLINIC | Age: 88
End: 2023-09-14

## 2023-09-14 DIAGNOSIS — Z79.01 LONG TERM CURRENT USE OF ANTICOAGULANT: ICD-10-CM

## 2023-09-14 DIAGNOSIS — I48.20 CHRONIC ATRIAL FIBRILLATION (HCC): ICD-10-CM

## 2023-09-14 LAB
INR PPP: 1.7 (ref 0.9–1.1)
PROTHROMBIN TIME: 17.2 SEC (ref 9–11.1)

## 2023-09-14 NOTE — TELEPHONE ENCOUNTER
Patient would like to discuss Echo done 8/21/23 with Sadiq Good NP and go over lab results as well.      [phone]

## 2023-09-25 RX ORDER — LEVOTHYROXINE SODIUM 175 UG/1
TABLET ORAL
Qty: 90 TABLET | Refills: 0 | Status: SHIPPED | OUTPATIENT
Start: 2023-09-25

## 2023-09-25 RX ORDER — CYANOCOBALAMIN/FOLIC AC/VIT B6 1-2.2-25MG
TABLET ORAL
Qty: 90 TABLET | Refills: 0 | Status: SHIPPED | OUTPATIENT
Start: 2023-09-25

## 2023-09-25 NOTE — TELEPHONE ENCOUNTER
PCP: ANIVAL Ruiz NP    Last appt: 8/14/2023  Future Appointments   Date Time Provider [address]   9/29/2023  9:00 AM LAB ONLY [facility]   11/1/2023  8:40 AM LAB ONLY [facility] AM[facility]   2/14/2024  8:40 AM LAB ONLY [facility] AM[facility]   2/19/2024 10:00 AM ANIVAL Ruiz NP [facility]       Requested Prescriptions     Pending Prescriptions Disp Refills    FABB 2.2-25-1 MG TABS tablet [Pharmacy Med Name: Kalin Person 90 tablet 0     Sig: TAKE 1 TABLET BY MOUTH DAILY    levothyroxine (SYNTHROID) 175 MCG tablet [Pharmacy Med Name: LEVOTHYROXINE 0.175MG (175MCG) TABS] 90 tablet 0     Sig: TAKE 1 TABLET BY MOUTH EVERY DAY       Prior labs and Blood pressures:  BP Readings from Last 3 Encounters:   08/14/23 128/80   01/30/23 130/70   07/29/22 116/74     Lab Results   Component Value Date/Time     08/14/2023 11:42 AM    K 5.5 08/14/2023 11:42 AM     08/14/2023 11:42 AM    CO2 32 08/14/2023 11:42 AM    BUN 42 08/14/2023 11:42 AM    GFRAA 44 07/29/2022 10:24 AM     No results found for: \"HBA1C\", \"[id number]\"  Lab Results   Component Value Date/Time    CHOL 164 08/14/2023 11:42 AM    HDL 38 08/14/2023 11:42 AM    VLDL 36 06/11/2020 08:40 AM     No results found for: \"VITD3\", \"VD3RIA\"        Lab Results   Component Value Date/Time    TSH 0.85 02/06/2023 08:11 AM

## 2023-09-29 ENCOUNTER — NURSE ONLY (OUTPATIENT)
Facility: CLINIC | Age: 88
End: 2023-09-29

## 2023-09-29 DIAGNOSIS — Z79.01 LONG TERM CURRENT USE OF ANTICOAGULANT: ICD-10-CM

## 2023-09-29 DIAGNOSIS — I48.20 CHRONIC ATRIAL FIBRILLATION (HCC): ICD-10-CM

## 2023-09-29 LAB
INR PPP: 5.3 (ref 0.9–1.1)
PROTHROMBIN TIME: 49.9 SEC (ref 9–11.1)

## 2023-10-03 ENCOUNTER — NURSE ONLY (OUTPATIENT)
Facility: CLINIC | Age: 88
End: 2023-10-03

## 2023-10-03 DIAGNOSIS — I48.20 CHRONIC ATRIAL FIBRILLATION (HCC): ICD-10-CM

## 2023-10-03 DIAGNOSIS — Z79.01 LONG TERM CURRENT USE OF ANTICOAGULANT: ICD-10-CM

## 2023-10-03 LAB
INR PPP: 2.4 (ref 0.9–1.1)
PROTHROMBIN TIME: 24 SEC (ref 9–11.1)

## 2023-10-04 ENCOUNTER — ANTI-COAG VISIT (OUTPATIENT)
Facility: CLINIC | Age: 88
End: 2023-10-04

## 2023-10-04 ENCOUNTER — CLINICAL DOCUMENTATION (OUTPATIENT)
Facility: CLINIC | Age: 88
End: 2023-10-04

## 2023-10-04 DIAGNOSIS — Z79.01 LONG TERM CURRENT USE OF ANTICOAGULANT: ICD-10-CM

## 2023-10-04 DIAGNOSIS — I48.20 CHRONIC ATRIAL FIBRILLATION (HCC): Primary | ICD-10-CM

## 2023-10-04 RX ORDER — PEN NEEDLE, DIABETIC 31 GX5/16"
NEEDLE, DISPOSABLE MISCELLANEOUS
Qty: 100 EACH | Refills: 1 | Status: SHIPPED | OUTPATIENT
Start: 2023-10-04

## 2023-10-04 NOTE — PROGRESS NOTES
I was contacted on-call on 1 October by the lab with a critical lab value. Mr. Cynthia Montana INR was elevated at 5.3. After contacting him by phone he was instructed to hold his Coumadin over the weekend and call the office Monday for a follow-up PT/INR. He has experienced no bleeding diatheses or excessive bruising.

## 2023-10-04 NOTE — TELEPHONE ENCOUNTER
PCP: ANIVAL Rosen NP    Last appt: 8/14/2023    Future Appointments   Date Time Provider [address]   11/1/2023  8:40 AM LAB ONLY [facility]   2/14/2024  8:40 AM LAB ONLY [facility]   2/19/2024 10:00 AM ANIVAL Rosen NP [facility]       Requested Prescriptions     Pending Prescriptions Disp Refills    B-D ULTRAFINE III SHORT PEN 31G X 8 MM MISC [Pharmacy Med Name: B-D PEN NDL SHRT 27XT9RP(5/16) SALIMA] 100 each 1     Sig: USE TO INJECT INSULIN UNDER THE SKIN FOUR TIMES DAILY AS DIRECTED

## 2023-10-04 NOTE — PROGRESS NOTES
Anticoagulation Episode Summary       Current INR goal:     TTR:  --   Next INR check:  10/18/2023   INR from last check:  2.4 (10/3/2023)   Weekly max warfarin dose:     Target end date:     INR check location:     Preferred lab:     Send INR reminders to:         Comments:              INR 2.4. Resume Coumadin 5 mg daily.   Repeat INR in 2 weeks

## 2023-10-13 RX ORDER — METOPROLOL TARTRATE 100 MG/1
100 TABLET ORAL 2 TIMES DAILY
Qty: 180 TABLET | Refills: 0 | Status: SHIPPED | OUTPATIENT
Start: 2023-10-13

## 2023-10-13 NOTE — TELEPHONE ENCOUNTER
Last Refill: 7-18-23  Last Visit: 8-14-23 Victoria Shown  Next Visit: 10/18/2023     Requested Prescriptions     Pending Prescriptions Disp Refills    metoprolol (LOPRESSOR) 100 MG tablet [Pharmacy Med Name: METOPROLOL TARTRATE TABS 100MG] 180 tablet 3     Sig: TAKE 1 TABLET TWICE A DAY

## 2023-10-16 DIAGNOSIS — L72.9 CYST OF SKIN: Primary | ICD-10-CM

## 2023-10-16 DIAGNOSIS — R94.8 ABNORMAL RESULTS OF FUNCTION STUDIES OF OTHER ORGANS AND SYSTEMS: ICD-10-CM

## 2023-10-18 ENCOUNTER — NURSE ONLY (OUTPATIENT)
Facility: CLINIC | Age: 88
End: 2023-10-18

## 2023-10-18 DIAGNOSIS — I48.20 CHRONIC ATRIAL FIBRILLATION (HCC): ICD-10-CM

## 2023-10-18 DIAGNOSIS — Z79.01 LONG TERM CURRENT USE OF ANTICOAGULANT: ICD-10-CM

## 2023-10-18 LAB
INR PPP: 3.1 (ref 0.9–1.1)
PROTHROMBIN TIME: 30.6 SEC (ref 9–11.1)

## 2023-10-20 ENCOUNTER — ANTI-COAG VISIT (OUTPATIENT)
Facility: CLINIC | Age: 88
End: 2023-10-20

## 2023-10-20 NOTE — PROGRESS NOTES
Anticoagulation Episode Summary       Current INR goal:     TTR:  --   Next INR check:  11/1/2023   INR from last check:  3.1 (10/18/2023)   Weekly max warfarin dose:     Target end date:     INR check location:     Preferred lab:     Send INR reminders to:         Comments:              Confirmed the patient is taking 5 mg of Coumadin daily. His follow-up INR is just above target range at 3.1. Recommend to hold Coumadin on Saturday only and continue 5 mg of Coumadin all other days.   Follow-up PT/INR in 2 weeks

## 2023-10-25 ENCOUNTER — HOSPITAL ENCOUNTER (OUTPATIENT)
Facility: HOSPITAL | Age: 88
Discharge: HOME | End: 2023-10-28
Payer: MEDICARE

## 2023-10-25 DIAGNOSIS — L72.9 CYST OF SKIN: ICD-10-CM

## 2023-10-25 DIAGNOSIS — R94.8 ABNORMAL RESULTS OF FUNCTION STUDIES OF OTHER ORGANS AND SYSTEMS: ICD-10-CM

## 2023-10-25 PROCEDURE — 74177 CT ABD & PELVIS W/CONTRAST: CPT

## 2023-10-25 PROCEDURE — 6360000004 HC RX CONTRAST MEDICATION: Performed by: NURSE PRACTITIONER

## 2023-10-25 RX ADMIN — IOPAMIDOL 100 ML: 755 INJECTION, SOLUTION INTRAVENOUS at 06:52

## 2023-10-30 DIAGNOSIS — L72.9 CYST OF SKIN: Primary | ICD-10-CM

## 2023-11-01 ENCOUNTER — NURSE ONLY (OUTPATIENT)
Facility: CLINIC | Age: 88
End: 2023-11-01

## 2023-11-01 DIAGNOSIS — Z79.01 LONG TERM CURRENT USE OF ANTICOAGULANT: ICD-10-CM

## 2023-11-01 DIAGNOSIS — I48.20 CHRONIC ATRIAL FIBRILLATION (HCC): ICD-10-CM

## 2023-11-01 DIAGNOSIS — E03.9 ACQUIRED HYPOTHYROIDISM: ICD-10-CM

## 2023-11-01 LAB
INR PPP: 1.4 (ref 0.9–1.1)
PROTHROMBIN TIME: 13.9 SEC (ref 9–11.1)

## 2023-11-02 DIAGNOSIS — I48.20 CHRONIC ATRIAL FIBRILLATION (HCC): Primary | ICD-10-CM

## 2023-11-02 LAB
ALBUMIN SERPL-MCNC: 3.9 G/DL (ref 3.5–5)
ALBUMIN/GLOB SERPL: 1.1 (ref 1.1–2.2)
ALP SERPL-CCNC: 155 U/L (ref 45–117)
ALT SERPL-CCNC: 33 U/L (ref 12–78)
ANION GAP SERPL CALC-SCNC: 5 MMOL/L (ref 5–15)
AST SERPL-CCNC: 33 U/L (ref 15–37)
BILIRUB SERPL-MCNC: 0.7 MG/DL (ref 0.2–1)
BUN SERPL-MCNC: 53 MG/DL (ref 6–20)
BUN/CREAT SERPL: 27 (ref 12–20)
CALCIUM SERPL-MCNC: 9 MG/DL (ref 8.5–10.1)
CHLORIDE SERPL-SCNC: 101 MMOL/L (ref 97–108)
CO2 SERPL-SCNC: 32 MMOL/L (ref 21–32)
CREAT SERPL-MCNC: 1.99 MG/DL (ref 0.7–1.3)
GLOBULIN SER CALC-MCNC: 3.4 G/DL (ref 2–4)
GLUCOSE SERPL-MCNC: 194 MG/DL (ref 65–100)
POTASSIUM SERPL-SCNC: 4.8 MMOL/L (ref 3.5–5.1)
PROT SERPL-MCNC: 7.3 G/DL (ref 6.4–8.2)
SODIUM SERPL-SCNC: 138 MMOL/L (ref 136–145)
T3FREE SERPL-MCNC: 1.9 PG/ML (ref 2.2–4)
T4 FREE SERPL-MCNC: 0.9 NG/DL (ref 0.8–1.5)
TSH SERPL DL<=0.05 MIU/L-ACNC: 5.18 UIU/ML (ref 0.36–3.74)

## 2023-11-07 NOTE — TELEPHONE ENCOUNTER
PCP: ANIVAL Diggs NP    Last appt: 8/14/2023  Future Appointments   Date Time Provider [address]   11/8/2023  9:00 AM LAB ONLY [facility]   11/9/2023 10:00 AM Michell Zapata  [address] BS AMB   2/14/2024  8:40 AM LAB ONLY [facility]   2/19/2024 10:00 AM ANIVAL Diggs NP [facility] AM[facility]       Requested Prescriptions     Pending Prescriptions Disp Refills    furosemide (LASIX) 40 MG tablet 180 tablet 1     Sig: Take 2 tablets by mouth daily       Prior labs and Blood pressures:  BP Readings from Last 3 Encounters:   08/14/23 128/80   01/30/23 130/70   07/29/22 116/74     Lab Results   Component Value Date/Time     11/01/2023 08:38 AM    K 4.8 11/01/2023 08:38 AM     11/01/2023 08:38 AM    CO2 32 11/01/2023 08:38 AM    BUN 53 11/01/2023 08:38 AM    GFRAA 44 07/29/2022 10:24 AM     No results found for: \"HBA1C\", \"WVD3LOTD\"  Lab Results   Component Value Date/Time    CHOL 164 08/14/2023 11:42 AM    HDL 38 08/14/2023 11:42 AM    VLDL 36 06/11/2020 08:40 AM     No results found for: \"VITD3\", \"VD3RIA\"        Lab Results   Component Value Date/Time    TSH 0.85 02/06/2023 08:11 AM

## 2023-11-07 NOTE — TELEPHONE ENCOUNTER
Pharmacy: [facility]      Disp Refills Start End   furosemide (LASIX) 40 mg tablet 180 Tablet 3 11/14/2022    Sig: TAKE 2 TABLETS DAILY   Sent to pharmacy as: furosemide 40 mg tablet (LASIX)

## 2023-11-08 ENCOUNTER — NURSE ONLY (OUTPATIENT)
Facility: CLINIC | Age: 88
End: 2023-11-08

## 2023-11-08 DIAGNOSIS — I48.20 CHRONIC ATRIAL FIBRILLATION (HCC): ICD-10-CM

## 2023-11-08 LAB
INR PPP: 2.1 (ref 0.9–1.1)
PROTHROMBIN TIME: 21.1 SEC (ref 9–11.1)

## 2023-11-08 RX ORDER — FUROSEMIDE 40 MG/1
80 TABLET ORAL DAILY
Qty: 180 TABLET | Refills: 1 | Status: SHIPPED | OUTPATIENT
Start: 2023-11-08

## 2023-11-09 ENCOUNTER — TELEPHONE (OUTPATIENT)
Age: 88
End: 2023-11-09

## 2023-11-09 ENCOUNTER — OFFICE VISIT (OUTPATIENT)
Age: 88
End: 2023-11-09

## 2023-11-09 VITALS
HEART RATE: 70 BPM | TEMPERATURE: 97.5 F | BODY MASS INDEX: 33.77 KG/M2 | OXYGEN SATURATION: 94 % | DIASTOLIC BLOOD PRESSURE: 79 MMHG | SYSTOLIC BLOOD PRESSURE: 135 MMHG | WEIGHT: 222.8 LBS | HEIGHT: 68 IN | RESPIRATION RATE: 17 BRPM

## 2023-11-09 DIAGNOSIS — K43.2 RECURRENT VENTRAL INCISIONAL HERNIA: ICD-10-CM

## 2023-11-09 DIAGNOSIS — S30.1XXA ABDOMINAL WALL SEROMA, INITIAL ENCOUNTER: ICD-10-CM

## 2023-11-09 ASSESSMENT — ENCOUNTER SYMPTOMS
ABDOMINAL PAIN: 0
NAUSEA: 0
VOMITING: 0
BLOOD IN STOOL: 0
DIARRHEA: 0
ABDOMINAL DISTENTION: 0
CONSTIPATION: 0

## 2023-11-09 ASSESSMENT — PATIENT HEALTH QUESTIONNAIRE - PHQ9
1. LITTLE INTEREST OR PLEASURE IN DOING THINGS: 0
SUM OF ALL RESPONSES TO PHQ QUESTIONS 1-9: 0
SUM OF ALL RESPONSES TO PHQ QUESTIONS 1-9: 0
SUM OF ALL RESPONSES TO PHQ9 QUESTIONS 1 & 2: 0
2. FEELING DOWN, DEPRESSED OR HOPELESS: 0
SUM OF ALL RESPONSES TO PHQ QUESTIONS 1-9: 0
SUM OF ALL RESPONSES TO PHQ QUESTIONS 1-9: 0

## 2023-11-09 NOTE — ASSESSMENT & PLAN NOTE
Complex cystic seroma. Appears to be associated with prior abdominal mesh from a hernia repair. Will get IR to sample the fluid and place a drain as necessary.

## 2023-11-09 NOTE — PROGRESS NOTES
[facility]    Subjective    Patient ID: Michele Wilson is a 82 y.o. male. Chief Complaint   Patient presents with    New Patient     Cyst on abdomen      HPI Comments: Michele Wilson presents today for mass in right lower abdomen. This has been present for several years. It has gotten larger over the past few years. He does have a history of right lower abdominal ventral hernia repair. This cystic structure is over that area. No pain or tenderness, is just getting larger and larger as time goes by. No erythema or induration overlying.       Allergies   Allergen Reactions    Latex Other (See Comments)     Skin raw and severely irritated    Aspirin      Other reaction(s): Unknown (comments)    Hydrocodone Other (See Comments)     hallucinations    Oxycodone Other (See Comments)     hallucinations    Sulfa Antibiotics Rash    Tetracycline Rash       Current Outpatient Medications:     furosemide (LASIX) 40 MG tablet, Take 2 tablets by mouth daily, Disp: 180 tablet, Rfl: 1    metoprolol (LOPRESSOR) 100 MG tablet, TAKE 1 TABLET TWICE A DAY, Disp: 180 tablet, Rfl: 0    B-D ULTRAFINE III SHORT PEN 31G X 8 MM MISC, USE TO INJECT INSULIN UNDER THE SKIN FOUR TIMES DAILY AS DIRECTED, Disp: 100 each, Rfl: 1    FABB 2.2-25-1 MG TABS tablet, TAKE 1 TABLET BY MOUTH DAILY, Disp: 90 tablet, Rfl: 0    levothyroxine (SYNTHROID) 175 MCG tablet, TAKE 1 TABLET BY MOUTH EVERY DAY, Disp: 90 tablet, Rfl: 0    lovastatin (MEVACOR) 40 MG tablet, Take 1 tablet by mouth daily, Disp: 90 tablet, Rfl: 1    blood glucose test strips (FREESTYLE TEST STRIPS) strip, Use to test blood sugar three times daily DX:E11.51, Disp: 300 strip, Rfl: 3    spironolactone (ALDACTONE) 25 MG tablet, TAKE 1 TABLET DAILY, Disp: 90 tablet, Rfl: 3    Flaxseed Oil OIL, Take 1,000 mg by mouth daily, Disp: , Rfl:     allopurinol (ZYLOPRIM) 300 MG tablet, TAKE 1 TABLET DAILY, Disp: , Rfl:     finasteride (PROSCAR) 5 MG tablet, Take

## 2023-11-09 NOTE — ASSESSMENT & PLAN NOTE
Looking at ct there does appear to be a recurrent hernia. However this is not really symptomatic and given age and cardiac co-morbidities would not recommend repair at this point.

## 2023-11-09 NOTE — TELEPHONE ENCOUNTER
Called central scheduling and spoke with Gera Lima,  two patient identifiers, called to confirm to see if they scheduled abscess drainage, Gera Lima stated yes, I asked if they can call the patient to get it scheduled Gera Lima verbalized understanding and stated yes.

## 2023-11-16 ENCOUNTER — HOSPITAL ENCOUNTER (OUTPATIENT)
Facility: HOSPITAL | Age: 88
Discharge: HOME | End: 2023-11-16
Attending: SURGERY
Payer: MEDICARE

## 2023-11-16 VITALS
SYSTOLIC BLOOD PRESSURE: 150 MMHG | WEIGHT: 222 LBS | HEART RATE: 70 BPM | BODY MASS INDEX: 33.65 KG/M2 | OXYGEN SATURATION: 94 % | RESPIRATION RATE: 16 BRPM | DIASTOLIC BLOOD PRESSURE: 82 MMHG | HEIGHT: 68 IN

## 2023-11-16 DIAGNOSIS — S30.1XXA ABDOMINAL WALL SEROMA, INITIAL ENCOUNTER: ICD-10-CM

## 2023-11-16 LAB
COMMENT:: NORMAL
SPECIMEN HOLD: NORMAL
SPECIMEN SOURCE FLD: NORMAL
TRIGL FLD-MCNC: 173 MG/DL

## 2023-11-16 PROCEDURE — 2500000003 HC RX 250 WO HCPCS: Performed by: STUDENT IN AN ORGANIZED HEALTH CARE EDUCATION/TRAINING PROGRAM

## 2023-11-16 PROCEDURE — 84478 ASSAY OF TRIGLYCERIDES: CPT

## 2023-11-16 PROCEDURE — 87205 SMEAR GRAM STAIN: CPT

## 2023-11-16 PROCEDURE — 87070 CULTURE OTHR SPECIMN AEROBIC: CPT

## 2023-11-16 PROCEDURE — C1769 GUIDE WIRE: HCPCS

## 2023-11-16 PROCEDURE — 87075 CULTR BACTERIA EXCEPT BLOOD: CPT

## 2023-11-16 RX ORDER — HEPARIN SODIUM 200 [USP'U]/100ML
200 INJECTION, SOLUTION INTRAVENOUS ONCE
Status: DISCONTINUED | OUTPATIENT
Start: 2023-11-16 | End: 2023-11-20 | Stop reason: HOSPADM

## 2023-11-16 RX ORDER — LIDOCAINE HYDROCHLORIDE 20 MG/ML
20 INJECTION, SOLUTION INFILTRATION; PERINEURAL ONCE
Status: COMPLETED | OUTPATIENT
Start: 2023-11-16 | End: 2023-11-16

## 2023-11-16 RX ADMIN — LIDOCAINE HYDROCHLORIDE 8 ML: 20 INJECTION, SOLUTION INFILTRATION; PERINEURAL at 10:12

## 2023-11-16 NOTE — PROCEDURES
Brief Postoperative Note    Cami James  YOB: 1935  [id number]    Pre-operative Diagnosis: Seroma    Post-operative Diagnosis: Hematoma    Procedure: Right lower quadrant drain placement     Anesthesia: Local    Surgeons/Assistants: Ratna Kerr MD    Estimated Blood Loss: less than 50     Complications: None    Specimens: Was Obtained: Culture, triglycerides     Findings:   Ultrasound demonstrated a complex cystic and solid area in the right lower quadrant abdominal wall. No vascularity was seen on color doppler. A 14 Latvian biliary-type drain was placed across two of the largest loculations, with removal of old appearing blood products. Plan:  - The patient will follow up in 1 week.    - Flush the drain with 10 mL sterile saline daily     Electronically signed by Princess Sheryl MD on 11/16/2023 at 10:07 AM

## 2023-11-16 NOTE — PROGRESS NOTES
Patient arrived to  ambulatory AxO4. VSS and in no apparent distress at this time. Informed consent obtained by Amaya Virgen. Patient given the opportunity to ask questions and all concerns were addressed at this time. Patient states there is no history of MRSA,C. Diff,VRE, and TB     Patient would like to do procedure with local only. 1000- Name of Procedure: Drain placement     Procedure and sedation times are the same.       Sedation Start: 0940  Sedation End: 1000     Vital Signs:  Stable     Fluids Removed: Yes and sent to the lab     Samples sent to lab: Yes     Any complications related to procedure: none identified at this time

## 2023-11-16 NOTE — H&P
Radiology History and Physical    Patient: Raisa Gomez 82 y.o. male       Chief Complaint: RLQ fluid collection       History of Present Illness: Mr. Scott Connors is an 82 y.o. man with remote history of right hernia repair and subsequent right lower extremity fluid collection formation. The collection has been present for years, but it has recently increased in size and is now bothering him. He presents for definitive management.      History:    Past Medical History:   Diagnosis Date    Acquired hypothyroidism 09/12/2017    Anemia 02/01/2015    BPH with obstruction/lower urinary tract symptoms 10/24/2017    CAD (coronary artery disease)     Chronic atrial fibrillation (HCC) 02/01/2015    Chronic systolic heart failure (HCC)     CKD (chronic kidney disease) stage 3, GFR 30-59 ml/min (formerly [facility]) 02/01/2015    Diabetes ([address])     Diverticulosis of large intestine without hemorrhage 10/24/2017    Former smoker     Gout 10/24/2017    History of echocardiogram 02/2018    LVEF 40-45%, global HK, mild to mod LAE, no AS, mild MR, RVSP 26 mmHg    Hyperlipidemia 02/01/2015    Hypertension     Long term current use of anticoagulant 10/24/2017    Long-term use of high-risk medication 10/24/2017    Macular degeneration 10/24/2017    Microalbuminuria     Peripheral vascular disease ([address]) 10/24/2017    Primary osteoarthritis involving multiple joints     knees and back    Renal artery stenosis ([address]) 10/24/2017    Right-sided extracranial carotid artery stenosis 10/24/2017    Sick sinus syndrome ([address]) 10/24/2017    Status post pacemaker     Family History   Problem Relation Age of Onset    Diabetes Father     Cancer Mother      Social History     Socioeconomic History    Marital status:      Spouse name: Not on file    Number of children: Not on file    Years of education: Not on file    Highest education level: Not on file   Occupational History    Not on file   Tobacco Use    Smoking status: Former     Types: Cigarettes     Quit date: 1990     Years since quittin.3    Smokeless tobacco: Never   Substance and Sexual Activity    Alcohol use: Yes     Alcohol/week: 0.0 standard drinks of alcohol    Drug use: No     Types: OTC, Prescription    Sexual activity: Not on file   Other Topics Concern    Not on file   Social History Narrative    Not on file     Social Determinants of Health     Financial Resource Strain: Not on file   Food Insecurity: Not on file   Transportation Needs: Not on file   Physical Activity: Insufficiently Active (2023)    Exercise Vital Sign     Days of Exercise per Week: 7 days     Minutes of Exercise per Session: 20 min   Stress: Not on file   Social Connections: Not on file   Intimate Partner Violence: Not on file   Housing Stability: Not on file       Allergies:    Allergies   Allergen Reactions    Latex Other (See Comments)     Skin raw and severely irritated    Aspirin      Other reaction(s): Unknown (comments)    Hydrocodone Other (See Comments)     hallucinations    Oxycodone Other (See Comments)     hallucinations    Sulfa Antibiotics Rash    Tetracycline Rash       Current Medications:  Current Outpatient Medications   Medication Sig    furosemide (LASIX) 40 MG tablet Take 2 tablets by mouth daily    metoprolol (LOPRESSOR) 100 MG tablet TAKE 1 TABLET TWICE A DAY    B-D ULTRAFINE III SHORT PEN 31G X 8 MM MISC USE TO INJECT INSULIN UNDER THE SKIN FOUR TIMES DAILY AS DIRECTED    FABB 2.2-25-1 MG TABS tablet TAKE 1 TABLET BY MOUTH DAILY    levothyroxine (SYNTHROID) 175 MCG tablet TAKE 1 TABLET BY MOUTH EVERY DAY    lovastatin (MEVACOR) 40 MG tablet Take 1 tablet by mouth daily    blood glucose test strips (FREESTYLE TEST STRIPS) strip Use to test blood sugar three times daily DX:E11.51    spironolactone (ALDACTONE) 25 MG tablet TAKE 1 TABLET DAILY    Flaxseed Oil OIL Take 1,000 mg by mouth daily    allopurinol (ZYLOPRIM) 300 MG tablet TAKE 1 TABLET DAILY    finasteride (PROSCAR) 5 MG tablet Take 1 tablet by

## 2023-11-16 NOTE — DISCHARGE INSTRUCTIONS
[facility]  Special Procedures/Radiology Department      RADIOLOGIST: James Rojas       DATE:  November 16, 2023      Drainage Discharge Instructions      Keep the dressing clean and dry. Do not remove the dressing for 72 hours. Watch for signs of infection:   Redness   Fever/Chills   Increased pain or tenderness at the site   Drainage    If this occurs, call your physician:   Rest today    Resume previous diet and follow medication reconciliation form. You may have some discomfort at the insertion site, you can take Tylenol, avoid aspirin products, as they promote bleeding. Any questions, please call [phone] and ask to speak to the nurse on call.     Come back to follow up on November 24, 2023

## 2023-11-17 LAB
BACTERIA SPEC CULT: NORMAL
SERVICE CMNT-IMP: NORMAL

## 2023-11-19 LAB
BACTERIA SPEC CULT: ABNORMAL
BACTERIA SPEC CULT: ABNORMAL
GRAM STN SPEC: ABNORMAL
GRAM STN SPEC: ABNORMAL
SERVICE CMNT-IMP: ABNORMAL

## 2023-11-20 LAB
BACTERIA SPEC CULT: NORMAL
SERVICE CMNT-IMP: NORMAL

## 2023-11-24 ENCOUNTER — HOSPITAL ENCOUNTER (OUTPATIENT)
Facility: HOSPITAL | Age: 88
Discharge: HOME | End: 2023-11-24
Payer: COMMERCIAL

## 2023-11-24 VITALS
RESPIRATION RATE: 16 BRPM | TEMPERATURE: 97.5 F | SYSTOLIC BLOOD PRESSURE: 120 MMHG | OXYGEN SATURATION: 97 % | DIASTOLIC BLOOD PRESSURE: 65 MMHG | HEART RATE: 77 BPM

## 2023-11-24 DIAGNOSIS — L02.211 ABDOMINAL WALL ABSCESS: ICD-10-CM

## 2023-11-24 PROCEDURE — 99212 OFFICE O/P EST SF 10 MIN: CPT

## 2023-11-24 NOTE — PROGRESS NOTES
Patient reports about 1500 ml of drainage this week. MD Alexx Toure removed patient's drain. Patient tolerated the procedure well.

## 2023-11-27 ENCOUNTER — TELEPHONE (OUTPATIENT)
Age: 88
End: 2023-11-27

## 2023-11-27 RX ORDER — BLOOD SUGAR DIAGNOSTIC
STRIP MISCELLANEOUS
Qty: 300 STRIP | Refills: 3 | Status: SHIPPED | OUTPATIENT
Start: 2023-11-27

## 2023-11-27 NOTE — TELEPHONE ENCOUNTER
PCP: ANIVAL Cross NP    Last appt: 8/14/2023  Future Appointments   Date Time Provider [address]   12/12/2023  9:20 AM LAB ONLY [facility] BS AMB   2/14/2024  8:40 AM LAB ONLY [facility] AMB   2/19/2024 10:00 AM ANIVAL Cross NP [facility] AMB       Requested Prescriptions     Pending Prescriptions Disp Refills    blood glucose test strips (FREESTYLE TEST STRIPS) strip 300 strip 3     Sig: Use to test blood sugar three times daily DX:E11.51       Prior labs and Blood pressures:  BP Readings from Last 3 Encounters:   11/24/23 120/65   11/16/23 (!) 150/82   11/09/23 135/79     Lab Results   Component Value Date/Time     11/01/2023 08:38 AM    K 4.8 11/01/2023 08:38 AM     11/01/2023 08:38 AM    CO2 32 11/01/2023 08:38 AM    BUN 53 11/01/2023 08:38 AM    GFRAA 44 07/29/2022 10:24 AM     No results found for: \"HBA1C\", \"[id number]\"  Lab Results   Component Value Date/Time    CHOL 164 08/14/2023 11:42 AM    HDL 38 08/14/2023 11:42 AM    VLDL 36 06/11/2020 08:40 AM     No results found for: \"VITD3\", \"VD3RIA\"        Lab Results   Component Value Date/Time    TSH 0.85 02/06/2023 08:11 AM

## 2023-11-27 NOTE — TELEPHONE ENCOUNTER
Pharmacy: [facility]    blood glucose test strips (FREESTYLE TEST STRIPS) strip [[id number]]     Order Details  Dose, Route, Frequency: As Directed   Dispense Quantity: 300 strip Refills: 3          Sig: Use to test blood sugar three times daily DX:E11.51

## 2023-11-27 NOTE — TELEPHONE ENCOUNTER
Patient identified with 2 patient identifier. Per Dr. Juan Hodgson cultures were unremarkable Patient updated no current complaints follow up appointment schedule for 11/30/23.

## 2023-11-27 NOTE — TELEPHONE ENCOUNTER
Patient called to make his follow up and he just wanted to confirm that the results from his biopsy have come in.

## 2023-11-30 ENCOUNTER — OFFICE VISIT (OUTPATIENT)
Age: 88
End: 2023-11-30
Payer: COMMERCIAL

## 2023-11-30 VITALS
HEART RATE: 69 BPM | DIASTOLIC BLOOD PRESSURE: 79 MMHG | RESPIRATION RATE: 17 BRPM | OXYGEN SATURATION: 94 % | TEMPERATURE: 98.8 F | BODY MASS INDEX: 33.68 KG/M2 | SYSTOLIC BLOOD PRESSURE: 170 MMHG | WEIGHT: 222.2 LBS | HEIGHT: 68 IN

## 2023-11-30 DIAGNOSIS — S30.1XXD ABDOMINAL WALL SEROMA, SUBSEQUENT ENCOUNTER: ICD-10-CM

## 2023-11-30 DIAGNOSIS — K43.2 RECURRENT VENTRAL INCISIONAL HERNIA: ICD-10-CM

## 2023-11-30 PROCEDURE — 1123F ACP DISCUSS/DSCN MKR DOCD: CPT | Performed by: SURGERY

## 2023-11-30 PROCEDURE — 99214 OFFICE O/P EST MOD 30 MIN: CPT | Performed by: SURGERY

## 2023-11-30 ASSESSMENT — ENCOUNTER SYMPTOMS
DIARRHEA: 0
NAUSEA: 0
ABDOMINAL PAIN: 1
ABDOMINAL DISTENTION: 0
VOMITING: 0
CONSTIPATION: 0
BLOOD IN STOOL: 0

## 2023-11-30 ASSESSMENT — PATIENT HEALTH QUESTIONNAIRE - PHQ9
SUM OF ALL RESPONSES TO PHQ QUESTIONS 1-9: 0
SUM OF ALL RESPONSES TO PHQ9 QUESTIONS 1 & 2: 0
SUM OF ALL RESPONSES TO PHQ QUESTIONS 1-9: 0
SUM OF ALL RESPONSES TO PHQ QUESTIONS 1-9: 0
2. FEELING DOWN, DEPRESSED OR HOPELESS: 0
SUM OF ALL RESPONSES TO PHQ QUESTIONS 1-9: 0
1. LITTLE INTEREST OR PLEASURE IN DOING THINGS: 0

## 2023-11-30 NOTE — PROGRESS NOTES
[facility]    Subjective    Patient ID: Ailyn Pickering is a 82 y.o. male. Chief Complaint   Patient presents with    Follow-up     HPI Comments: Ailyn Pickering presents today for follow up of abdominal hernia and hematoma.       Allergies   Allergen Reactions    Latex Other (See Comments)     Skin raw and severely irritated    Aspirin      Other reaction(s): Unknown (comments)    Hydrocodone Other (See Comments)     hallucinations    Oxycodone Other (See Comments)     hallucinations    Sulfa Antibiotics Rash    Tetracycline Rash       Current Outpatient Medications:     blood glucose test strips (FREESTYLE TEST STRIPS) strip, Use to test blood sugar three times daily DX:E11.51, Disp: 300 strip, Rfl: 3    furosemide (LASIX) 40 MG tablet, Take 2 tablets by mouth daily, Disp: 180 tablet, Rfl: 1    metoprolol (LOPRESSOR) 100 MG tablet, TAKE 1 TABLET TWICE A DAY, Disp: 180 tablet, Rfl: 0    B-D ULTRAFINE III SHORT PEN 31G X 8 MM MISC, USE TO INJECT INSULIN UNDER THE SKIN FOUR TIMES DAILY AS DIRECTED, Disp: 100 each, Rfl: 1    FABB 2.2-25-1 MG TABS tablet, TAKE 1 TABLET BY MOUTH DAILY, Disp: 90 tablet, Rfl: 0    levothyroxine (SYNTHROID) 175 MCG tablet, TAKE 1 TABLET BY MOUTH EVERY DAY, Disp: 90 tablet, Rfl: 0    lovastatin (MEVACOR) 40 MG tablet, Take 1 tablet by mouth daily, Disp: 90 tablet, Rfl: 1    spironolactone (ALDACTONE) 25 MG tablet, TAKE 1 TABLET DAILY, Disp: 90 tablet, Rfl: 3    Flaxseed Oil OIL, Take 1,000 mg by mouth daily, Disp: , Rfl:     allopurinol (ZYLOPRIM) 300 MG tablet, TAKE 1 TABLET DAILY, Disp: , Rfl:     finasteride (PROSCAR) 5 MG tablet, Take 1 tablet by mouth every other day, Disp: , Rfl:     insulin glargine (LANTUS SOLOSTAR) 100 UNIT/ML injection pen, INJECT 60 UNITS UNDER THE SKIN DAILY, Disp: , Rfl:     isosorbide mononitrate (IMDUR) 60 MG extended release tablet, TAKE 1 TABLET DAILY, Disp: , Rfl:     losartan (COZAAR) 50 MG tablet, Take 1 tablet by mouth

## 2023-11-30 NOTE — ASSESSMENT & PLAN NOTE
This may need to be addressed, but I would do it after evacuation of the cystic masses as there is currently no signs of incarceration or strangulation.

## 2023-11-30 NOTE — ASSESSMENT & PLAN NOTE
CT guided drainage reviewed along with prior CT. This appears to be a hematoma based on the aspiration. Given this, and looking at the scans again. I am not sure this is a recurrent hernia but may just be unresorbed hematoma. Given that, I feel the best approach would be to make an incision overlying the mass and shell out the cavities with wound vac placement to prevent re-accumulation. He is on board with this plan but would like to defer it until next year which is reasonable. Should his symptoms worsen or the mass expand he will follow up sooner.

## 2023-12-04 ENCOUNTER — APPOINTMENT (OUTPATIENT)
Facility: HOSPITAL | Age: 88
DRG: 908 | End: 2023-12-04
Payer: MEDICARE

## 2023-12-04 ENCOUNTER — HOSPITAL ENCOUNTER (INPATIENT)
Facility: HOSPITAL | Age: 88
LOS: 7 days | Discharge: NURSING, REHAB OR LONG-TERM CARE | DRG: 908 | End: 2023-12-12
Attending: STUDENT IN AN ORGANIZED HEALTH CARE EDUCATION/TRAINING PROGRAM | Admitting: FAMILY MEDICINE
Payer: MEDICARE

## 2023-12-04 DIAGNOSIS — L03.311 ABDOMINAL WALL CELLULITIS: Primary | ICD-10-CM

## 2023-12-04 DIAGNOSIS — L02.211 ABDOMINAL WALL ABSCESS: ICD-10-CM

## 2023-12-04 LAB
ALBUMIN SERPL-MCNC: 2.4 G/DL (ref 3.5–5)
ALBUMIN/GLOB SERPL: 0.5 (ref 1.1–2.2)
ALP SERPL-CCNC: 340 U/L (ref 45–117)
ALT SERPL-CCNC: 74 U/L (ref 12–78)
ANION GAP SERPL CALC-SCNC: 5 MMOL/L (ref 5–15)
AST SERPL-CCNC: 71 U/L (ref 15–37)
BASOPHILS # BLD: 0.1 K/UL (ref 0–0.1)
BASOPHILS NFR BLD: 0 % (ref 0–1)
BILIRUB SERPL-MCNC: 1 MG/DL (ref 0.2–1)
BUN SERPL-MCNC: 74 MG/DL (ref 6–20)
BUN/CREAT SERPL: 34 (ref 12–20)
CALCIUM SERPL-MCNC: 8.9 MG/DL (ref 8.5–10.1)
CHLORIDE SERPL-SCNC: 99 MMOL/L (ref 97–108)
CO2 SERPL-SCNC: 29 MMOL/L (ref 21–32)
COMMENT:: NORMAL
CREAT SERPL-MCNC: 2.19 MG/DL (ref 0.7–1.3)
DIFFERENTIAL METHOD BLD: ABNORMAL
EKG ATRIAL RATE: 78 BPM
EKG DIAGNOSIS: NORMAL
EKG Q-T INTERVAL: 432 MS
EKG QRS DURATION: 126 MS
EKG QTC CALCULATION (BAZETT): 475 MS
EKG R AXIS: 246 DEGREES
EKG T AXIS: 109 DEGREES
EKG VENTRICULAR RATE: 73 BPM
EOSINOPHIL # BLD: 0.1 K/UL (ref 0–0.4)
EOSINOPHIL NFR BLD: 1 % (ref 0–7)
ERYTHROCYTE [DISTWIDTH] IN BLOOD BY AUTOMATED COUNT: 14.1 % (ref 11.5–14.5)
GLOBULIN SER CALC-MCNC: 5.1 G/DL (ref 2–4)
GLUCOSE SERPL-MCNC: 168 MG/DL (ref 65–100)
HCT VFR BLD AUTO: 38.8 % (ref 36.6–50.3)
HGB BLD-MCNC: 12.6 G/DL (ref 12.1–17)
IMM GRANULOCYTES # BLD AUTO: 0.2 K/UL (ref 0–0.04)
IMM GRANULOCYTES NFR BLD AUTO: 1 % (ref 0–0.5)
LACTATE SERPL-SCNC: 1.7 MMOL/L (ref 0.4–2)
LYMPHOCYTES # BLD: 1.3 K/UL (ref 0.8–3.5)
LYMPHOCYTES NFR BLD: 6 % (ref 12–49)
MCH RBC QN AUTO: 31.9 PG (ref 26–34)
MCHC RBC AUTO-ENTMCNC: 32.5 G/DL (ref 30–36.5)
MCV RBC AUTO: 98.2 FL (ref 80–99)
MONOCYTES # BLD: 1.8 K/UL (ref 0–1)
MONOCYTES NFR BLD: 9 % (ref 5–13)
NEUTS SEG # BLD: 17.3 K/UL (ref 1.8–8)
NEUTS SEG NFR BLD: 83 % (ref 32–75)
NRBC # BLD: 0 K/UL (ref 0–0.01)
NRBC BLD-RTO: 0 PER 100 WBC
PLATELET # BLD AUTO: 302 K/UL (ref 150–400)
PMV BLD AUTO: 10 FL (ref 8.9–12.9)
POTASSIUM SERPL-SCNC: 4.6 MMOL/L (ref 3.5–5.1)
PROT SERPL-MCNC: 7.5 G/DL (ref 6.4–8.2)
RBC # BLD AUTO: 3.95 M/UL (ref 4.1–5.7)
SODIUM SERPL-SCNC: 133 MMOL/L (ref 136–145)
SPECIMEN HOLD: NORMAL
WBC # BLD AUTO: 20.7 K/UL (ref 4.1–11.1)

## 2023-12-04 PROCEDURE — 87040 BLOOD CULTURE FOR BACTERIA: CPT

## 2023-12-04 PROCEDURE — 96375 TX/PRO/DX INJ NEW DRUG ADDON: CPT

## 2023-12-04 PROCEDURE — 74176 CT ABD & PELVIS W/O CONTRAST: CPT

## 2023-12-04 PROCEDURE — 99222 1ST HOSP IP/OBS MODERATE 55: CPT | Performed by: STUDENT IN AN ORGANIZED HEALTH CARE EDUCATION/TRAINING PROGRAM

## 2023-12-04 PROCEDURE — 99285 EMERGENCY DEPT VISIT HI MDM: CPT

## 2023-12-04 PROCEDURE — 80053 COMPREHEN METABOLIC PANEL: CPT

## 2023-12-04 PROCEDURE — 93010 ELECTROCARDIOGRAM REPORT: CPT | Performed by: SPECIALIST

## 2023-12-04 PROCEDURE — 85025 COMPLETE CBC W/AUTO DIFF WBC: CPT

## 2023-12-04 PROCEDURE — 36415 COLL VENOUS BLD VENIPUNCTURE: CPT

## 2023-12-04 PROCEDURE — 96365 THER/PROPH/DIAG IV INF INIT: CPT

## 2023-12-04 PROCEDURE — 6360000002 HC RX W HCPCS: Performed by: STUDENT IN AN ORGANIZED HEALTH CARE EDUCATION/TRAINING PROGRAM

## 2023-12-04 PROCEDURE — 93005 ELECTROCARDIOGRAM TRACING: CPT | Performed by: STUDENT IN AN ORGANIZED HEALTH CARE EDUCATION/TRAINING PROGRAM

## 2023-12-04 PROCEDURE — 2580000003 HC RX 258: Performed by: STUDENT IN AN ORGANIZED HEALTH CARE EDUCATION/TRAINING PROGRAM

## 2023-12-04 PROCEDURE — 83605 ASSAY OF LACTIC ACID: CPT

## 2023-12-04 PROCEDURE — 96366 THER/PROPH/DIAG IV INF ADDON: CPT

## 2023-12-04 RX ADMIN — WATER 2000 MG: 1 INJECTION INTRAMUSCULAR; INTRAVENOUS; SUBCUTANEOUS at 20:06

## 2023-12-04 RX ADMIN — Medication 2500 MG: at 20:09

## 2023-12-04 ASSESSMENT — ENCOUNTER SYMPTOMS: ABDOMINAL PAIN: 1

## 2023-12-04 NOTE — ED TRIAGE NOTES
Pt from home, had cyst removal surgery with drain removed x1 week ago. This morning, blood was oozing out of the RLQ abdomen. Skin is swollen, irritated around the area. 97% RA, 72, elevated Bps but patient did not take BP meds. A&ox4, ambulatory on scene for EMS with walker.

## 2023-12-05 ENCOUNTER — ANESTHESIA EVENT (OUTPATIENT)
Facility: HOSPITAL | Age: 88
End: 2023-12-05
Payer: MEDICARE

## 2023-12-05 ENCOUNTER — ANESTHESIA (OUTPATIENT)
Facility: HOSPITAL | Age: 88
End: 2023-12-05
Payer: MEDICARE

## 2023-12-05 PROBLEM — L03.311 ABDOMINAL WALL CELLULITIS: Status: ACTIVE | Noted: 2023-12-05

## 2023-12-05 LAB
ABO + RH BLD: NORMAL
ANION GAP SERPL CALC-SCNC: 6 MMOL/L (ref 5–15)
BASOPHILS # BLD: 0 K/UL (ref 0–0.1)
BASOPHILS NFR BLD: 0 % (ref 0–1)
BLOOD GROUP ANTIBODIES SERPL: NORMAL
BUN SERPL-MCNC: 67 MG/DL (ref 6–20)
BUN/CREAT SERPL: 37 (ref 12–20)
CALCIUM SERPL-MCNC: 8.9 MG/DL (ref 8.5–10.1)
CHLORIDE SERPL-SCNC: 103 MMOL/L (ref 97–108)
CO2 SERPL-SCNC: 27 MMOL/L (ref 21–32)
CREAT SERPL-MCNC: 1.83 MG/DL (ref 0.7–1.3)
DIFFERENTIAL METHOD BLD: ABNORMAL
EOSINOPHIL # BLD: 0.2 K/UL (ref 0–0.4)
EOSINOPHIL NFR BLD: 1 % (ref 0–7)
ERYTHROCYTE [DISTWIDTH] IN BLOOD BY AUTOMATED COUNT: 14 % (ref 11.5–14.5)
GLUCOSE BLD STRIP.AUTO-MCNC: 269 MG/DL (ref 65–117)
GLUCOSE BLD STRIP.AUTO-MCNC: 277 MG/DL (ref 65–117)
GLUCOSE BLD STRIP.AUTO-MCNC: 285 MG/DL (ref 65–117)
GLUCOSE SERPL-MCNC: 242 MG/DL (ref 65–100)
HCT VFR BLD AUTO: 38.5 % (ref 36.6–50.3)
HGB BLD-MCNC: 12.8 G/DL (ref 12.1–17)
IMM GRANULOCYTES # BLD AUTO: 0.1 K/UL (ref 0–0.04)
IMM GRANULOCYTES NFR BLD AUTO: 1 % (ref 0–0.5)
INR PPP: 10.4 (ref 0.9–1.1)
INR PPP: 2.8 (ref 0.9–1.1)
LYMPHOCYTES # BLD: 0.9 K/UL (ref 0.8–3.5)
LYMPHOCYTES NFR BLD: 5 % (ref 12–49)
MCH RBC QN AUTO: 32.4 PG (ref 26–34)
MCHC RBC AUTO-ENTMCNC: 33.2 G/DL (ref 30–36.5)
MCV RBC AUTO: 97.5 FL (ref 80–99)
MONOCYTES # BLD: 1.7 K/UL (ref 0–1)
MONOCYTES NFR BLD: 10 % (ref 5–13)
NEUTS SEG # BLD: 14.3 K/UL (ref 1.8–8)
NEUTS SEG NFR BLD: 83 % (ref 32–75)
NRBC # BLD: 0 K/UL (ref 0–0.01)
NRBC BLD-RTO: 0 PER 100 WBC
PLATELET # BLD AUTO: 282 K/UL (ref 150–400)
PMV BLD AUTO: 10 FL (ref 8.9–12.9)
POTASSIUM SERPL-SCNC: 4.7 MMOL/L (ref 3.5–5.1)
POTASSIUM SERPL-SCNC: 5.3 MMOL/L (ref 3.5–5.1)
PROTHROMBIN TIME: 27.5 SEC (ref 9–11.1)
PROTHROMBIN TIME: 93.8 SEC (ref 9–11.1)
RBC # BLD AUTO: 3.95 M/UL (ref 4.1–5.7)
SERVICE CMNT-IMP: ABNORMAL
SODIUM SERPL-SCNC: 136 MMOL/L (ref 136–145)
SPECIMEN EXP DATE BLD: NORMAL
WBC # BLD AUTO: 17.3 K/UL (ref 4.1–11.1)

## 2023-12-05 PROCEDURE — 2580000003 HC RX 258

## 2023-12-05 PROCEDURE — 85610 PROTHROMBIN TIME: CPT

## 2023-12-05 PROCEDURE — 80048 BASIC METABOLIC PNL TOTAL CA: CPT

## 2023-12-05 PROCEDURE — 6360000002 HC RX W HCPCS

## 2023-12-05 PROCEDURE — 36430 TRANSFUSION BLD/BLD COMPNT: CPT

## 2023-12-05 PROCEDURE — 36415 COLL VENOUS BLD VENIPUNCTURE: CPT

## 2023-12-05 PROCEDURE — P9059 PLASMA, FRZ BETWEEN 8-24HOUR: HCPCS

## 2023-12-05 PROCEDURE — 30233K1 TRANSFUSION OF NONAUTOLOGOUS FROZEN PLASMA INTO PERIPHERAL VEIN, PERCUTANEOUS APPROACH: ICD-10-PCS | Performed by: FAMILY MEDICINE

## 2023-12-05 PROCEDURE — 6360000002 HC RX W HCPCS: Performed by: FAMILY MEDICINE

## 2023-12-05 PROCEDURE — 86900 BLOOD TYPING SEROLOGIC ABO: CPT

## 2023-12-05 PROCEDURE — 82962 GLUCOSE BLOOD TEST: CPT

## 2023-12-05 PROCEDURE — 85025 COMPLETE CBC W/AUTO DIFF WBC: CPT

## 2023-12-05 PROCEDURE — 30233L1 TRANSFUSION OF NONAUTOLOGOUS FRESH PLASMA INTO PERIPHERAL VEIN, PERCUTANEOUS APPROACH: ICD-10-PCS | Performed by: FAMILY MEDICINE

## 2023-12-05 PROCEDURE — 1100000000 HC RM PRIVATE

## 2023-12-05 PROCEDURE — 6370000000 HC RX 637 (ALT 250 FOR IP): Performed by: FAMILY MEDICINE

## 2023-12-05 PROCEDURE — 86901 BLOOD TYPING SEROLOGIC RH(D): CPT

## 2023-12-05 PROCEDURE — 84132 ASSAY OF SERUM POTASSIUM: CPT

## 2023-12-05 PROCEDURE — 6370000000 HC RX 637 (ALT 250 FOR IP)

## 2023-12-05 PROCEDURE — 2720000010 HC SURG SUPPLY STERILE: Performed by: SURGERY

## 2023-12-05 PROCEDURE — 2580000003 HC RX 258: Performed by: FAMILY MEDICINE

## 2023-12-05 PROCEDURE — 2709999900 HC NON-CHARGEABLE SUPPLY: Performed by: SURGERY

## 2023-12-05 PROCEDURE — 86850 RBC ANTIBODY SCREEN: CPT

## 2023-12-05 RX ORDER — ALLOPURINOL 100 MG/1
300 TABLET ORAL DAILY
Status: DISCONTINUED | OUTPATIENT
Start: 2023-12-05 | End: 2023-12-12 | Stop reason: HOSPADM

## 2023-12-05 RX ORDER — FUROSEMIDE 40 MG/1
80 TABLET ORAL DAILY
Status: DISCONTINUED | OUTPATIENT
Start: 2023-12-05 | End: 2023-12-12 | Stop reason: HOSPADM

## 2023-12-05 RX ORDER — SODIUM CHLORIDE 0.9 % (FLUSH) 0.9 %
5-40 SYRINGE (ML) INJECTION EVERY 12 HOURS SCHEDULED
Status: DISCONTINUED | OUTPATIENT
Start: 2023-12-05 | End: 2023-12-12 | Stop reason: HOSPADM

## 2023-12-05 RX ORDER — LOSARTAN POTASSIUM 50 MG/1
50 TABLET ORAL DAILY
Status: DISCONTINUED | OUTPATIENT
Start: 2023-12-05 | End: 2023-12-12 | Stop reason: HOSPADM

## 2023-12-05 RX ORDER — SODIUM CHLORIDE 9 MG/ML
INJECTION, SOLUTION INTRAVENOUS PRN
Status: DISCONTINUED | OUTPATIENT
Start: 2023-12-05 | End: 2023-12-12 | Stop reason: HOSPADM

## 2023-12-05 RX ORDER — POLYETHYLENE GLYCOL 3350 17 G/17G
17 POWDER, FOR SOLUTION ORAL DAILY PRN
Status: DISCONTINUED | OUTPATIENT
Start: 2023-12-05 | End: 2023-12-12 | Stop reason: HOSPADM

## 2023-12-05 RX ORDER — FINASTERIDE 5 MG/1
5 TABLET, FILM COATED ORAL EVERY OTHER DAY
Status: DISCONTINUED | OUTPATIENT
Start: 2023-12-05 | End: 2023-12-12 | Stop reason: HOSPADM

## 2023-12-05 RX ORDER — ATORVASTATIN CALCIUM 10 MG/1
10 TABLET, FILM COATED ORAL DAILY
Status: DISCONTINUED | OUTPATIENT
Start: 2023-12-05 | End: 2023-12-12 | Stop reason: HOSPADM

## 2023-12-05 RX ORDER — METOPROLOL TARTRATE 50 MG/1
100 TABLET, FILM COATED ORAL 2 TIMES DAILY
Status: DISCONTINUED | OUTPATIENT
Start: 2023-12-05 | End: 2023-12-12 | Stop reason: HOSPADM

## 2023-12-05 RX ORDER — ONDANSETRON 2 MG/ML
4 INJECTION INTRAMUSCULAR; INTRAVENOUS EVERY 6 HOURS PRN
Status: DISCONTINUED | OUTPATIENT
Start: 2023-12-05 | End: 2023-12-12 | Stop reason: HOSPADM

## 2023-12-05 RX ORDER — INSULIN LISPRO 100 [IU]/ML
0-8 INJECTION, SOLUTION INTRAVENOUS; SUBCUTANEOUS EVERY 6 HOURS
Status: DISCONTINUED | OUTPATIENT
Start: 2023-12-05 | End: 2023-12-12 | Stop reason: HOSPADM

## 2023-12-05 RX ORDER — ACETAMINOPHEN 325 MG/1
650 TABLET ORAL EVERY 6 HOURS PRN
Status: DISCONTINUED | OUTPATIENT
Start: 2023-12-05 | End: 2023-12-12 | Stop reason: HOSPADM

## 2023-12-05 RX ORDER — SODIUM CHLORIDE 0.9 % (FLUSH) 0.9 %
5-40 SYRINGE (ML) INJECTION PRN
Status: DISCONTINUED | OUTPATIENT
Start: 2023-12-05 | End: 2023-12-12 | Stop reason: HOSPADM

## 2023-12-05 RX ORDER — SODIUM CHLORIDE 9 MG/ML
INJECTION, SOLUTION INTRAVENOUS PRN
Status: DISCONTINUED | OUTPATIENT
Start: 2023-12-05 | End: 2023-12-06

## 2023-12-05 RX ORDER — ISOSORBIDE MONONITRATE 60 MG/1
60 TABLET, EXTENDED RELEASE ORAL DAILY
Status: DISCONTINUED | OUTPATIENT
Start: 2023-12-05 | End: 2023-12-12 | Stop reason: HOSPADM

## 2023-12-05 RX ORDER — INSULIN LISPRO 100 [IU]/ML
0-8 INJECTION, SOLUTION INTRAVENOUS; SUBCUTANEOUS EVERY 6 HOURS
Status: DISCONTINUED | OUTPATIENT
Start: 2023-12-05 | End: 2023-12-05

## 2023-12-05 RX ORDER — ACETAMINOPHEN 650 MG/1
650 SUPPOSITORY RECTAL EVERY 6 HOURS PRN
Status: DISCONTINUED | OUTPATIENT
Start: 2023-12-05 | End: 2023-12-12 | Stop reason: HOSPADM

## 2023-12-05 RX ORDER — NEPHROCAP 1 MG
1 CAP ORAL DAILY
Status: DISCONTINUED | OUTPATIENT
Start: 2023-12-05 | End: 2023-12-09 | Stop reason: CLARIF

## 2023-12-05 RX ORDER — ONDANSETRON 4 MG/1
4 TABLET, ORALLY DISINTEGRATING ORAL EVERY 8 HOURS PRN
Status: DISCONTINUED | OUTPATIENT
Start: 2023-12-05 | End: 2023-12-12 | Stop reason: HOSPADM

## 2023-12-05 RX ORDER — INSULIN GLARGINE 100 [IU]/ML
20 INJECTION, SOLUTION SUBCUTANEOUS ONCE
Status: COMPLETED | OUTPATIENT
Start: 2023-12-05 | End: 2023-12-05

## 2023-12-05 RX ORDER — DEXTROSE MONOHYDRATE 100 MG/ML
INJECTION, SOLUTION INTRAVENOUS CONTINUOUS PRN
Status: DISCONTINUED | OUTPATIENT
Start: 2023-12-05 | End: 2023-12-12 | Stop reason: HOSPADM

## 2023-12-05 RX ORDER — SPIRONOLACTONE 25 MG/1
25 TABLET ORAL DAILY
Status: DISCONTINUED | OUTPATIENT
Start: 2023-12-05 | End: 2023-12-12 | Stop reason: HOSPADM

## 2023-12-05 RX ADMIN — PHYTONADIONE 5 MG: 10 INJECTION, EMULSION INTRAMUSCULAR; INTRAVENOUS; SUBCUTANEOUS at 12:49

## 2023-12-05 RX ADMIN — LEVOTHYROXINE SODIUM 175 MCG: 0.03 TABLET ORAL at 10:43

## 2023-12-05 RX ADMIN — SODIUM CHLORIDE, PRESERVATIVE FREE 10 ML: 5 INJECTION INTRAVENOUS at 20:25

## 2023-12-05 RX ADMIN — VANCOMYCIN HYDROCHLORIDE 500 MG: 500 INJECTION, POWDER, LYOPHILIZED, FOR SOLUTION INTRAVENOUS at 14:06

## 2023-12-05 RX ADMIN — METOPROLOL TARTRATE 100 MG: 50 TABLET, FILM COATED ORAL at 20:25

## 2023-12-05 RX ADMIN — Medication 4 UNITS: at 17:33

## 2023-12-05 RX ADMIN — CEFEPIME 2000 MG: 2 INJECTION, POWDER, FOR SOLUTION INTRAVENOUS at 19:34

## 2023-12-05 RX ADMIN — INSULIN GLARGINE 20 UNITS: 100 INJECTION, SOLUTION SUBCUTANEOUS at 14:05

## 2023-12-05 ASSESSMENT — PAIN - FUNCTIONAL ASSESSMENT: PAIN_FUNCTIONAL_ASSESSMENT: 0-10

## 2023-12-05 ASSESSMENT — PAIN SCALES - GENERAL
PAINLEVEL_OUTOF10: 0
PAINLEVEL_OUTOF10: 0

## 2023-12-05 NOTE — ED PROVIDER NOTES
[facility] EMERGENCY DEP  EMERGENCY DEPARTMENT ENCOUNTER      Pt Name: Nancy Willis  MRN: [id number]  [address] [facility] [facility] [place] 1935  Date of evaluation: 12/4/2023  Provider: Kg Mcclendon       Chief Complaint   Patient presents with    Post-op Problem         HISTORY OF PRESENT ILLNESS   (Location/Symptom, Timing/Onset, Context/Setting, Quality, Duration, Modifying Factors, Severity)  Note limiting factors. 82-year-old male extensive past medical history as below presenting today with concern for wound infection. Last month patient had a seroma to his right lower abdomen which was biopsied and drained with interventional radiology. General surgery has been following him for this and awaiting definitive management with excision at some point. Patient reports that over the past several days he has noticed increasing redness, swelling and pain with bloody purulent drainage today. He has not had fevers. He does report pain. Review of External Medical Records:     Nursing Notes were reviewed. REVIEW OF SYSTEMS    (2-9 systems for level 4, 10 or more for level 5)     Review of Systems   Constitutional:  Negative for fever. Gastrointestinal:  Positive for abdominal pain. Skin:  Positive for wound. Except as noted above the remainder of the review of systems was reviewed and negative.        PAST MEDICAL HISTORY     Past Medical History:   Diagnosis Date    Acquired hypothyroidism 09/12/2017    Anemia 02/01/2015    BPH with obstruction/lower urinary tract symptoms 10/24/2017    CAD (coronary artery disease)     Chronic atrial fibrillation ([address]) 02/01/2015    Chronic systolic heart failure (HCC)     CKD (chronic kidney disease) stage 3, GFR 30-59 ml/min ([address]) 02/01/2015    Diabetes ([address])     Diverticulosis of large intestine without hemorrhage 10/24/2017    Former smoker     Gout 10/24/2017    History of echocardiogram 02/2018    LVEF 40-45%, global HK, mild to mod LAE, no

## 2023-12-05 NOTE — CONSENT
Informed Consent for Blood Component Transfusion Note    I have discussed with the patient the rationale for blood component transfusion; its benefits in treating or preventing fatigue, organ damage, or death; and its risk which includes mild transfusion reactions, rare risk of blood borne infection, or more serious but rare reactions. I have discussed the alternatives to transfusion, including the risk and consequences of not receiving transfusion. The patient had an opportunity to ask questions and had agreed to proceed with transfusion of blood components.     Electronically signed by ANIVAL Mantilla NP on 12/5/23 at 2:51 PM EST

## 2023-12-05 NOTE — ANESTHESIA PRE PROCEDURE
Department of Anesthesiology  Preprocedure Note       Name:  Ailyn Pickering   Age:  82 y.o.  :  1935                                          MRN:  [id number]         Date:  2023      Surgeon: Mary Beth Short):  De Ramirez MD    Procedure: Procedure(s):  EXPLORATION ABDOMINAL WALL HEMATOMA/     Medications prior to admission:   Prior to Admission medications    Medication Sig Start Date End Date Taking?  Authorizing Provider   blood glucose test strips (FREESTYLE TEST STRIPS) strip Use to test blood sugar three times daily DX:E11.51 23   ANIVAL Santamaria NP   furosemide (LASIX) 40 MG tablet Take 2 tablets by mouth daily 23   ANIVAL Santamaria NP   metoprolol (LOPRESSOR) 100 MG tablet TAKE 1 TABLET TWICE A DAY 10/13/23   ANIVAL Lynn NP   B-D ULTRAFINE III SHORT PEN 31G X 8 MM MISC USE TO INJECT INSULIN UNDER THE SKIN FOUR TIMES DAILY AS DIRECTED 10/4/23   ANIVAL Lynn NP   FABB 2.2-25-1 MG TABS tablet TAKE 1 TABLET BY MOUTH DAILY 23   ANIVAL Santamaria NP   levothyroxine (SYNTHROID) 175 MCG tablet TAKE 1 TABLET BY MOUTH EVERY DAY 23   ANIVAL Santamaria NP   lovastatin (MEVACOR) 40 MG tablet Take 1 tablet by mouth daily 23   ANIVAL Santamaria NP   spironolactone (ALDACTONE) 25 MG tablet TAKE 1 TABLET DAILY 23   Tori Pantoja MD   Flaxseed Oil OIL Take 1,000 mg by mouth daily    Automatic Reconciliation, Ar   allopurinol (ZYLOPRIM) 300 MG tablet TAKE 1 TABLET DAILY 3/15/23   Automatic Reconciliation, Ar   finasteride (PROSCAR) 5 MG tablet Take 1 tablet by mouth every other day    Automatic Reconciliation, Ar   insulin glargine (LANTUS SOLOSTAR) 100 UNIT/ML injection pen INJECT 60 UNITS UNDER THE SKIN DAILY 22   Automatic Reconciliation, Ar   isosorbide mononitrate (IMDUR) 60 MG extended release tablet TAKE 1 TABLET DAILY 3/15/23   Automatic Reconciliation, Ar   losartan (COZAAR) 50 MG tablet Take 1 tablet by

## 2023-12-05 NOTE — H&P
History and Physical    Date of Service:  12/4/2023  Primary Care Provider: ANIVAL Nicole NP  Source of information: patient, electronic medical record    Chief Complaint: Post-op Problem      History of Presenting Illness:   Raquel Klein is a 82 y.o. male with a pmhx CAD, a-fib, DM II, CKD III, gout, HFreF EF 40-45%, hypothyroidism, BPH, gout, HTN, dyslipidemia, OA, renal artery stenosis, and macular degeneration who presents s/p IR drainage of abdominal wall cyst, for continued serosanguinous drainage from insertion site with surrounding erythema. In the ED, HR was elevated to 196/79. Labs showed BuN 74, creatinine 2.19, albumin 2.4, AST 71, alk phos 340,  and WBC 20.7. CT abdomen/pelvis showed slight increase in right lower quadrant abdominal wall fluid collections with increase in surrounding edema in the soft tissues. In the ED, he received cefepime, and vancomycin          REVIEW OF SYSTEMS:  A comprehensive review of systems was negative except for that written in the History of Present Illness.      Past Medical History:   Diagnosis Date    Acquired hypothyroidism 09/12/2017    Anemia 02/01/2015    BPH with obstruction/lower urinary tract symptoms 10/24/2017    CAD (coronary artery disease)     Chronic atrial fibrillation (HCC) 02/01/2015    Chronic systolic heart failure (HCC)     CKD (chronic kidney disease) stage 3, GFR 30-59 ml/min ([facility]) 02/01/2015    Diabetes ([address])     Diverticulosis of large intestine without hemorrhage 10/24/2017    Former smoker     Gout 10/24/2017    History of echocardiogram 02/2018    LVEF 40-45%, global HK, mild to mod LAE, no AS, mild MR, RVSP 26 mmHg    Hyperlipidemia 02/01/2015    Hypertension     Long term current use of anticoagulant 10/24/2017    Long-term use of high-risk medication 10/24/2017    Macular degeneration 10/24/2017    Microalbuminuria     Peripheral vascular disease ([address]) 10/24/2017    Primary osteoarthritis involving multiple joints
negative (unclear if real or a contaminant). Would recommend admission to the hospital medicine team and IV abx. Repeat labs and INR in the AM. Dr. Emmett Rosales to evaluate in the morning and provide further recommendations on management. Recommendations:     1. Repeat labs including INR in AM   2. [facility] for diet. NPO after MN   3. IV antibiotics to cover skin bri.        Ras Liang MD

## 2023-12-05 NOTE — CONSENT
Informed Consent for Blood Component Transfusion Note    I have discussed with the patient the rationale for blood component transfusion; its benefits in treating or preventing fatigue, organ damage, or death; and its risk which includes mild transfusion reactions, rare risk of blood borne infection, or more serious but rare reactions. I have discussed the alternatives to transfusion, including the risk and consequences of not receiving transfusion. The patient had an opportunity to ask questions and had agreed to proceed with transfusion of blood components.     Electronically signed by Esther Garza MD on 12/5/23 at 11:44 AM EST

## 2023-12-05 NOTE — PERIOP NOTE
INR 10.4 and blood glucose 277 given to Dr. Carito Arroyo. Surgery cancelled. Report given to [facility].  Returned to unit at approx 1200pm.

## 2023-12-06 ENCOUNTER — ANESTHESIA EVENT (OUTPATIENT)
Facility: HOSPITAL | Age: 88
End: 2023-12-06
Payer: MEDICARE

## 2023-12-06 ENCOUNTER — ANESTHESIA (OUTPATIENT)
Facility: HOSPITAL | Age: 88
End: 2023-12-06
Payer: MEDICARE

## 2023-12-06 LAB
ANION GAP SERPL CALC-SCNC: 8 MMOL/L (ref 5–15)
BASOPHILS # BLD: 0.1 K/UL (ref 0–0.1)
BASOPHILS NFR BLD: 0 % (ref 0–1)
BLD PROD TYP BPU: NORMAL
BLOOD BANK DISPENSE STATUS: NORMAL
BPU ID: NORMAL
BUN SERPL-MCNC: 64 MG/DL (ref 6–20)
BUN/CREAT SERPL: 36 (ref 12–20)
CALCIUM SERPL-MCNC: 8.5 MG/DL (ref 8.5–10.1)
CHLORIDE SERPL-SCNC: 102 MMOL/L (ref 97–108)
CO2 SERPL-SCNC: 26 MMOL/L (ref 21–32)
CREAT SERPL-MCNC: 1.78 MG/DL (ref 0.7–1.3)
DIFFERENTIAL METHOD BLD: ABNORMAL
EOSINOPHIL # BLD: 0.2 K/UL (ref 0–0.4)
EOSINOPHIL NFR BLD: 2 % (ref 0–7)
ERYTHROCYTE [DISTWIDTH] IN BLOOD BY AUTOMATED COUNT: 14 % (ref 11.5–14.5)
GLUCOSE BLD STRIP.AUTO-MCNC: 185 MG/DL (ref 65–117)
GLUCOSE BLD STRIP.AUTO-MCNC: 212 MG/DL (ref 65–117)
GLUCOSE BLD STRIP.AUTO-MCNC: 267 MG/DL (ref 65–117)
GLUCOSE BLD STRIP.AUTO-MCNC: 276 MG/DL (ref 65–117)
GLUCOSE BLD STRIP.AUTO-MCNC: 296 MG/DL (ref 65–117)
GLUCOSE SERPL-MCNC: 273 MG/DL (ref 65–100)
HCT VFR BLD AUTO: 37.8 % (ref 36.6–50.3)
HGB BLD-MCNC: 12.2 G/DL (ref 12.1–17)
IMM GRANULOCYTES # BLD AUTO: 0.1 K/UL (ref 0–0.04)
IMM GRANULOCYTES NFR BLD AUTO: 1 % (ref 0–0.5)
INR PPP: 2 (ref 0.9–1.1)
LYMPHOCYTES # BLD: 1.2 K/UL (ref 0.8–3.5)
LYMPHOCYTES NFR BLD: 8 % (ref 12–49)
MCH RBC QN AUTO: 31.8 PG (ref 26–34)
MCHC RBC AUTO-ENTMCNC: 32.3 G/DL (ref 30–36.5)
MCV RBC AUTO: 98.4 FL (ref 80–99)
MONOCYTES # BLD: 1.5 K/UL (ref 0–1)
MONOCYTES NFR BLD: 10 % (ref 5–13)
NEUTS SEG # BLD: 11.9 K/UL (ref 1.8–8)
NEUTS SEG NFR BLD: 79 % (ref 32–75)
NRBC # BLD: 0 K/UL (ref 0–0.01)
NRBC BLD-RTO: 0 PER 100 WBC
PLATELET # BLD AUTO: 276 K/UL (ref 150–400)
PMV BLD AUTO: 10 FL (ref 8.9–12.9)
POTASSIUM SERPL-SCNC: 4.6 MMOL/L (ref 3.5–5.1)
PROTHROMBIN TIME: 19.8 SEC (ref 9–11.1)
RBC # BLD AUTO: 3.84 M/UL (ref 4.1–5.7)
SERVICE CMNT-IMP: ABNORMAL
SODIUM SERPL-SCNC: 136 MMOL/L (ref 136–145)
UNIT DIVISION: 0
VANCOMYCIN SERPL-MCNC: 18.8 UG/ML
WBC # BLD AUTO: 15 K/UL (ref 4.1–11.1)

## 2023-12-06 PROCEDURE — 3600000004 HC SURGERY LEVEL 4 BASE: Performed by: SURGERY

## 2023-12-06 PROCEDURE — 85025 COMPLETE CBC W/AUTO DIFF WBC: CPT

## 2023-12-06 PROCEDURE — 3700000001 HC ADD 15 MINUTES (ANESTHESIA): Performed by: SURGERY

## 2023-12-06 PROCEDURE — 2500000003 HC RX 250 WO HCPCS: Performed by: NURSE ANESTHETIST, CERTIFIED REGISTERED

## 2023-12-06 PROCEDURE — 80048 BASIC METABOLIC PNL TOTAL CA: CPT

## 2023-12-06 PROCEDURE — 87205 SMEAR GRAM STAIN: CPT

## 2023-12-06 PROCEDURE — 1100000000 HC RM PRIVATE

## 2023-12-06 PROCEDURE — 11005 DBRDMT SKIN ABDOMINAL WALL: CPT | Performed by: SURGERY

## 2023-12-06 PROCEDURE — 6370000000 HC RX 637 (ALT 250 FOR IP): Performed by: FAMILY MEDICINE

## 2023-12-06 PROCEDURE — 0WPF0JZ REMOVAL OF SYNTHETIC SUBSTITUTE FROM ABDOMINAL WALL, OPEN APPROACH: ICD-10-PCS | Performed by: SURGERY

## 2023-12-06 PROCEDURE — 6370000000 HC RX 637 (ALT 250 FOR IP)

## 2023-12-06 PROCEDURE — P9059 PLASMA, FRZ BETWEEN 8-24HOUR: HCPCS

## 2023-12-06 PROCEDURE — 7100000001 HC PACU RECOVERY - ADDTL 15 MIN: Performed by: SURGERY

## 2023-12-06 PROCEDURE — 82962 GLUCOSE BLOOD TEST: CPT

## 2023-12-06 PROCEDURE — 3700000000 HC ANESTHESIA ATTENDED CARE: Performed by: SURGERY

## 2023-12-06 PROCEDURE — 6360000002 HC RX W HCPCS: Performed by: NURSE ANESTHETIST, CERTIFIED REGISTERED

## 2023-12-06 PROCEDURE — 88304 TISSUE EXAM BY PATHOLOGIST: CPT

## 2023-12-06 PROCEDURE — P9045 ALBUMIN (HUMAN), 5%, 250 ML: HCPCS | Performed by: NURSE ANESTHETIST, CERTIFIED REGISTERED

## 2023-12-06 PROCEDURE — 2580000003 HC RX 258: Performed by: FAMILY MEDICINE

## 2023-12-06 PROCEDURE — 87070 CULTURE OTHR SPECIMN AEROBIC: CPT

## 2023-12-06 PROCEDURE — 87102 FUNGUS ISOLATION CULTURE: CPT

## 2023-12-06 PROCEDURE — 7100000000 HC PACU RECOVERY - FIRST 15 MIN: Performed by: SURGERY

## 2023-12-06 PROCEDURE — 36415 COLL VENOUS BLD VENIPUNCTURE: CPT

## 2023-12-06 PROCEDURE — 6360000002 HC RX W HCPCS: Performed by: ANESTHESIOLOGY

## 2023-12-06 PROCEDURE — 2709999900 HC NON-CHARGEABLE SUPPLY: Performed by: SURGERY

## 2023-12-06 PROCEDURE — 2720000010 HC SURG SUPPLY STERILE: Performed by: SURGERY

## 2023-12-06 PROCEDURE — 6360000002 HC RX W HCPCS: Performed by: FAMILY MEDICINE

## 2023-12-06 PROCEDURE — 0W9F0ZZ DRAINAGE OF ABDOMINAL WALL, OPEN APPROACH: ICD-10-PCS | Performed by: SURGERY

## 2023-12-06 PROCEDURE — 2580000003 HC RX 258: Performed by: NURSE ANESTHETIST, CERTIFIED REGISTERED

## 2023-12-06 PROCEDURE — 85610 PROTHROMBIN TIME: CPT

## 2023-12-06 PROCEDURE — 3600000014 HC SURGERY LEVEL 4 ADDTL 15MIN: Performed by: SURGERY

## 2023-12-06 PROCEDURE — 80202 ASSAY OF VANCOMYCIN: CPT

## 2023-12-06 RX ORDER — FENTANYL CITRATE 50 UG/ML
25 INJECTION, SOLUTION INTRAMUSCULAR; INTRAVENOUS EVERY 5 MIN PRN
Status: DISCONTINUED | OUTPATIENT
Start: 2023-12-06 | End: 2023-12-06 | Stop reason: HOSPADM

## 2023-12-06 RX ORDER — SODIUM CHLORIDE 0.9 % (FLUSH) 0.9 %
5-40 SYRINGE (ML) INJECTION EVERY 12 HOURS SCHEDULED
Status: CANCELLED | OUTPATIENT
Start: 2023-12-06

## 2023-12-06 RX ORDER — ALBUMIN, HUMAN INJ 5% 5 %
SOLUTION INTRAVENOUS PRN
Status: DISCONTINUED | OUTPATIENT
Start: 2023-12-06 | End: 2023-12-06 | Stop reason: SDUPTHER

## 2023-12-06 RX ORDER — HYDROMORPHONE HYDROCHLORIDE 1 MG/ML
0.5 INJECTION, SOLUTION INTRAMUSCULAR; INTRAVENOUS; SUBCUTANEOUS EVERY 5 MIN PRN
Status: COMPLETED | OUTPATIENT
Start: 2023-12-06 | End: 2023-12-06

## 2023-12-06 RX ORDER — MIDAZOLAM HYDROCHLORIDE 2 MG/2ML
2 INJECTION, SOLUTION INTRAMUSCULAR; INTRAVENOUS
Status: CANCELLED | OUTPATIENT
Start: 2023-12-06 | End: 2023-12-07

## 2023-12-06 RX ORDER — SODIUM CHLORIDE 0.9 % (FLUSH) 0.9 %
5-40 SYRINGE (ML) INJECTION PRN
Status: DISCONTINUED | OUTPATIENT
Start: 2023-12-06 | End: 2023-12-06 | Stop reason: HOSPADM

## 2023-12-06 RX ORDER — CEFAZOLIN SODIUM 1 G/3ML
INJECTION, POWDER, FOR SOLUTION INTRAMUSCULAR; INTRAVENOUS PRN
Status: DISCONTINUED | OUTPATIENT
Start: 2023-12-06 | End: 2023-12-06 | Stop reason: SDUPTHER

## 2023-12-06 RX ORDER — SODIUM CHLORIDE, SODIUM LACTATE, POTASSIUM CHLORIDE, CALCIUM CHLORIDE 600; 310; 30; 20 MG/100ML; MG/100ML; MG/100ML; MG/100ML
INJECTION, SOLUTION INTRAVENOUS CONTINUOUS
Status: CANCELLED | OUTPATIENT
Start: 2023-12-06

## 2023-12-06 RX ORDER — SODIUM CHLORIDE 0.9 % (FLUSH) 0.9 %
5-40 SYRINGE (ML) INJECTION EVERY 12 HOURS SCHEDULED
Status: DISCONTINUED | OUTPATIENT
Start: 2023-12-06 | End: 2023-12-06 | Stop reason: HOSPADM

## 2023-12-06 RX ORDER — SODIUM CHLORIDE, SODIUM LACTATE, POTASSIUM CHLORIDE, CALCIUM CHLORIDE 600; 310; 30; 20 MG/100ML; MG/100ML; MG/100ML; MG/100ML
INJECTION, SOLUTION INTRAVENOUS CONTINUOUS PRN
Status: DISCONTINUED | OUTPATIENT
Start: 2023-12-06 | End: 2023-12-06 | Stop reason: SDUPTHER

## 2023-12-06 RX ORDER — LABETALOL 20 MG/4 ML (5 MG/ML) INTRAVENOUS SYRINGE
PRN
Status: DISCONTINUED | OUTPATIENT
Start: 2023-12-06 | End: 2023-12-06 | Stop reason: SDUPTHER

## 2023-12-06 RX ORDER — PHENYLEPHRINE HCL IN 0.9% NACL 0.4MG/10ML
SYRINGE (ML) INTRAVENOUS PRN
Status: DISCONTINUED | OUTPATIENT
Start: 2023-12-06 | End: 2023-12-06 | Stop reason: SDUPTHER

## 2023-12-06 RX ORDER — ROCURONIUM BROMIDE 10 MG/ML
INJECTION, SOLUTION INTRAVENOUS PRN
Status: DISCONTINUED | OUTPATIENT
Start: 2023-12-06 | End: 2023-12-06 | Stop reason: SDUPTHER

## 2023-12-06 RX ORDER — SODIUM CHLORIDE 9 MG/ML
INJECTION, SOLUTION INTRAVENOUS PRN
Status: DISCONTINUED | OUTPATIENT
Start: 2023-12-06 | End: 2023-12-07

## 2023-12-06 RX ORDER — FENTANYL CITRATE 50 UG/ML
INJECTION, SOLUTION INTRAMUSCULAR; INTRAVENOUS PRN
Status: DISCONTINUED | OUTPATIENT
Start: 2023-12-06 | End: 2023-12-06 | Stop reason: SDUPTHER

## 2023-12-06 RX ORDER — OXYCODONE HYDROCHLORIDE 5 MG/1
5 TABLET ORAL
Status: DISCONTINUED | OUTPATIENT
Start: 2023-12-06 | End: 2023-12-06 | Stop reason: HOSPADM

## 2023-12-06 RX ORDER — SODIUM CHLORIDE 9 MG/ML
INJECTION, SOLUTION INTRAVENOUS PRN
Status: DISCONTINUED | OUTPATIENT
Start: 2023-12-06 | End: 2023-12-12 | Stop reason: HOSPADM

## 2023-12-06 RX ORDER — ACETAMINOPHEN 500 MG
1000 TABLET ORAL ONCE
Status: CANCELLED | OUTPATIENT
Start: 2023-12-06 | End: 2023-12-06

## 2023-12-06 RX ORDER — HYDRALAZINE HYDROCHLORIDE 20 MG/ML
10 INJECTION INTRAMUSCULAR; INTRAVENOUS
Status: DISCONTINUED | OUTPATIENT
Start: 2023-12-06 | End: 2023-12-06 | Stop reason: HOSPADM

## 2023-12-06 RX ORDER — SODIUM CHLORIDE 9 MG/ML
INJECTION, SOLUTION INTRAVENOUS PRN
Status: DISCONTINUED | OUTPATIENT
Start: 2023-12-06 | End: 2023-12-06 | Stop reason: HOSPADM

## 2023-12-06 RX ORDER — LIDOCAINE HYDROCHLORIDE 20 MG/ML
INJECTION, SOLUTION EPIDURAL; INFILTRATION; INTRACAUDAL; PERINEURAL PRN
Status: DISCONTINUED | OUTPATIENT
Start: 2023-12-06 | End: 2023-12-06 | Stop reason: SDUPTHER

## 2023-12-06 RX ORDER — ONDANSETRON 2 MG/ML
INJECTION INTRAMUSCULAR; INTRAVENOUS PRN
Status: DISCONTINUED | OUTPATIENT
Start: 2023-12-06 | End: 2023-12-06 | Stop reason: SDUPTHER

## 2023-12-06 RX ORDER — ONDANSETRON 2 MG/ML
4 INJECTION INTRAMUSCULAR; INTRAVENOUS
Status: DISCONTINUED | OUTPATIENT
Start: 2023-12-06 | End: 2023-12-06 | Stop reason: HOSPADM

## 2023-12-06 RX ORDER — PROCHLORPERAZINE EDISYLATE 5 MG/ML
5 INJECTION INTRAMUSCULAR; INTRAVENOUS
Status: DISCONTINUED | OUTPATIENT
Start: 2023-12-06 | End: 2023-12-06 | Stop reason: HOSPADM

## 2023-12-06 RX ORDER — SODIUM CHLORIDE 0.9 % (FLUSH) 0.9 %
5-40 SYRINGE (ML) INJECTION PRN
Status: CANCELLED | OUTPATIENT
Start: 2023-12-06

## 2023-12-06 RX ORDER — FENTANYL CITRATE 50 UG/ML
100 INJECTION, SOLUTION INTRAMUSCULAR; INTRAVENOUS
Status: CANCELLED | OUTPATIENT
Start: 2023-12-06 | End: 2023-12-07

## 2023-12-06 RX ORDER — MORPHINE SULFATE 2 MG/ML
2 INJECTION, SOLUTION INTRAMUSCULAR; INTRAVENOUS EVERY 4 HOURS PRN
Status: DISCONTINUED | OUTPATIENT
Start: 2023-12-06 | End: 2023-12-12 | Stop reason: HOSPADM

## 2023-12-06 RX ORDER — INSULIN GLARGINE 100 [IU]/ML
40 INJECTION, SOLUTION SUBCUTANEOUS NIGHTLY
Status: DISCONTINUED | OUTPATIENT
Start: 2023-12-06 | End: 2023-12-07

## 2023-12-06 RX ORDER — LIDOCAINE HYDROCHLORIDE 10 MG/ML
1 INJECTION, SOLUTION EPIDURAL; INFILTRATION; INTRACAUDAL; PERINEURAL
Status: CANCELLED | OUTPATIENT
Start: 2023-12-06 | End: 2023-12-07

## 2023-12-06 RX ORDER — SODIUM CHLORIDE 9 MG/ML
INJECTION, SOLUTION INTRAVENOUS PRN
Status: CANCELLED | OUTPATIENT
Start: 2023-12-06

## 2023-12-06 RX ADMIN — CEFAZOLIN 2 G: 330 INJECTION, POWDER, FOR SOLUTION INTRAMUSCULAR; INTRAVENOUS at 14:25

## 2023-12-06 RX ADMIN — Medication 4 UNITS: at 23:49

## 2023-12-06 RX ADMIN — FENTANYL CITRATE 25 MCG: 50 INJECTION, SOLUTION INTRAMUSCULAR; INTRAVENOUS at 16:03

## 2023-12-06 RX ADMIN — Medication 80 MCG: at 15:17

## 2023-12-06 RX ADMIN — Medication 4 UNITS: at 00:46

## 2023-12-06 RX ADMIN — Medication 80 MCG: at 15:05

## 2023-12-06 RX ADMIN — FENTANYL CITRATE 25 MCG: 50 INJECTION, SOLUTION INTRAMUSCULAR; INTRAVENOUS at 14:34

## 2023-12-06 RX ADMIN — ONDANSETRON 4 MG: 2 INJECTION INTRAMUSCULAR; INTRAVENOUS at 15:42

## 2023-12-06 RX ADMIN — ROCURONIUM BROMIDE 10 MG: 10 INJECTION, SOLUTION INTRAVENOUS at 15:00

## 2023-12-06 RX ADMIN — Medication 40 MCG: at 14:44

## 2023-12-06 RX ADMIN — Medication 2 UNITS: at 17:36

## 2023-12-06 RX ADMIN — HYDROMORPHONE HYDROCHLORIDE 0.5 MG: 1 INJECTION, SOLUTION INTRAMUSCULAR; INTRAVENOUS; SUBCUTANEOUS at 16:12

## 2023-12-06 RX ADMIN — FENTANYL CITRATE 25 MCG: 50 INJECTION, SOLUTION INTRAMUSCULAR; INTRAVENOUS at 15:59

## 2023-12-06 RX ADMIN — SODIUM CHLORIDE, POTASSIUM CHLORIDE, SODIUM LACTATE AND CALCIUM CHLORIDE: 600; 310; 30; 20 INJECTION, SOLUTION INTRAVENOUS at 14:14

## 2023-12-06 RX ADMIN — INSULIN GLARGINE 40 UNITS: 100 INJECTION, SOLUTION SUBCUTANEOUS at 20:43

## 2023-12-06 RX ADMIN — ISOSORBIDE MONONITRATE 60 MG: 60 TABLET, EXTENDED RELEASE ORAL at 09:06

## 2023-12-06 RX ADMIN — LOSARTAN POTASSIUM 50 MG: 50 TABLET, FILM COATED ORAL at 09:07

## 2023-12-06 RX ADMIN — SUGAMMADEX 200 MG: 100 INJECTION, SOLUTION INTRAVENOUS at 15:33

## 2023-12-06 RX ADMIN — LEVOTHYROXINE SODIUM 175 MCG: 0.03 TABLET ORAL at 06:23

## 2023-12-06 RX ADMIN — ROCURONIUM BROMIDE 30 MG: 10 INJECTION, SOLUTION INTRAVENOUS at 14:14

## 2023-12-06 RX ADMIN — CEFEPIME 2000 MG: 2 INJECTION, POWDER, FOR SOLUTION INTRAVENOUS at 19:31

## 2023-12-06 RX ADMIN — VANCOMYCIN HYDROCHLORIDE 500 MG: 500 INJECTION, POWDER, LYOPHILIZED, FOR SOLUTION INTRAVENOUS at 06:23

## 2023-12-06 RX ADMIN — Medication 80 MCG: at 14:22

## 2023-12-06 RX ADMIN — ALBUMIN (HUMAN) 12.5 G: 12.5 INJECTION, SOLUTION INTRAVENOUS at 15:14

## 2023-12-06 RX ADMIN — PROPOFOL 100 MG: 10 INJECTION, EMULSION INTRAVENOUS at 14:14

## 2023-12-06 RX ADMIN — Medication 80 MCG: at 14:30

## 2023-12-06 RX ADMIN — ROCURONIUM BROMIDE 10 MG: 10 INJECTION, SOLUTION INTRAVENOUS at 14:40

## 2023-12-06 RX ADMIN — ACETAMINOPHEN 650 MG: 325 TABLET ORAL at 19:31

## 2023-12-06 RX ADMIN — FENTANYL CITRATE 25 MCG: 50 INJECTION, SOLUTION INTRAMUSCULAR; INTRAVENOUS at 14:14

## 2023-12-06 RX ADMIN — HYDROMORPHONE HYDROCHLORIDE 0.5 MG: 1 INJECTION, SOLUTION INTRAMUSCULAR; INTRAVENOUS; SUBCUTANEOUS at 16:24

## 2023-12-06 RX ADMIN — METOPROLOL TARTRATE 100 MG: 50 TABLET, FILM COATED ORAL at 09:06

## 2023-12-06 RX ADMIN — VANCOMYCIN HYDROCHLORIDE 500 MG: 500 INJECTION, POWDER, LYOPHILIZED, FOR SOLUTION INTRAVENOUS at 23:49

## 2023-12-06 RX ADMIN — Medication 40 MCG: at 14:48

## 2023-12-06 RX ADMIN — LIDOCAINE HYDROCHLORIDE 100 MG: 20 INJECTION, SOLUTION EPIDURAL; INFILTRATION; INTRACAUDAL; PERINEURAL at 14:14

## 2023-12-06 RX ADMIN — ATORVASTATIN CALCIUM 10 MG: 10 TABLET, FILM COATED ORAL at 09:06

## 2023-12-06 RX ADMIN — ALLOPURINOL 300 MG: 100 TABLET ORAL at 09:06

## 2023-12-06 RX ADMIN — Medication 5 MG: at 15:42

## 2023-12-06 RX ADMIN — Medication 80 MCG: at 15:14

## 2023-12-06 ASSESSMENT — PAIN SCALES - GENERAL
PAINLEVEL_OUTOF10: 0
PAINLEVEL_OUTOF10: 5
PAINLEVEL_OUTOF10: 0

## 2023-12-06 ASSESSMENT — PAIN DESCRIPTION - DESCRIPTORS: DESCRIPTORS: ACHING

## 2023-12-06 ASSESSMENT — PAIN DESCRIPTION - LOCATION: LOCATION: ABDOMEN

## 2023-12-06 NOTE — OP NOTE
[facility] at [facility]  Operative Note      Patient: Don Arriaza  YOB: 1935  MRN: [id number]    Date of Procedure: 12/6/2023    Indications: Presented with infected hematoma. I discussed the intended procedure as well as alternative treatments including doing nothing. I discussed the risks of surgery at length including but not limited to bleeding, infection, damage to bowel, bladder, vessels or solid organs, scarring, need for repeat or larger surgery as well as general risks of surgery including pneumonia, clots in the lungs or legs, heart attack, and death. I answered all questions related to the surgery. Understanding was verbalized and we will proceed as planned.     Pre-Op Diagnosis: Abdominal wall hematoma, initial encounter [S30.1XXA]    Post-Op Diagnosis:  Infected abdominal wall hematoma, infected mesh, abscess       Procedure(s):  EXPLORATION AND EVACUATION OF INFECTED ABDOMINAL WALL HEMATOMA AND EXPLANTATION OF INFECTED MESH, [address] OUT    Surgeon(s):  Cristóbal Peguero MD    Assistant:   Surgical Assistant: Ale Peters    Anesthesia: General    Estimated Blood Loss (mL): Minimal    Complications: None    Specimens:   ID Type Source Tests Collected by Time Destination   1 : EXPLANTED MESH Tissue Abdomen SURGICAL PATHOLOGY, CULTURE, FUNGUS, CULTURE, TISSUE Michell Zapata MD 12/6/2023 1453    A : ABDOMINAL WALL ABSCESS #1 AROEBIC/ANEROBIC/FUNGAL Body Fluid Abdomen CULTURE, FUNGUS, CULTURE, BODY FLUID Michell Zapata MD 12/6/2023 1437    B : ABDOMINAL WALL ABSCESS #2 AEROBIC/ANEROBIC/FUNGAL Body Fluid Abdomen CULTURE, FUNGUS, CULTURE, BODY FLUID Michell Zapata., MD 12/6/2023 1439    C : INFECTED ABDOMINAL HEMOTOMA Tissue Tissue CULTURE, FUNGUS, CULTURE, TISSUE Michell Zapata MD 12/6/2023 1444        Implants:  * No implants in log *      Drains: * No LDAs found *    Findings: there was a large 27 x 20 x 10 cm

## 2023-12-06 NOTE — FLOWSHEET NOTE
12/06/23 1457   Family Communication    Relationship to Patient Son    Phone Number LUIS EDUARDO [phone]   Family/Significant Other Update Called   Delivery Origin Nurse   Message Disposition Family present - message delivered   Update Given Yes   Family Communication   Family Update Message Procedure started

## 2023-12-06 NOTE — ANESTHESIA PRE PROCEDURE
Department of Anesthesiology  Preprocedure Note       Name:  Zach Men   Age:  82 y.o.  :  1935                                          MRN:  [id number]         Date:  2023      Surgeon: Claudia Bland):  Ras Vera MD    Procedure: Procedure(s):  EXPLORATION AND EVACUATION OF ABDOMINAL WALL HEMATOMA, PLACEMENT WOUND VAC (EIP TO FOLLOW)    Medications prior to admission:   Prior to Admission medications    Medication Sig Start Date End Date Taking?  Authorizing Provider   blood glucose test strips (FREESTYLE TEST STRIPS) strip Use to test blood sugar three times daily DX:E11.51 23   ANIVAL Yi NP   furosemide (LASIX) 40 MG tablet Take 2 tablets by mouth daily 23   ANIVAL Yi NP   metoprolol (LOPRESSOR) 100 MG tablet TAKE 1 TABLET TWICE A DAY 10/13/23   ANIVAL Byrd NP   B-D ULTRAFINE III SHORT PEN 31G X 8 MM MISC USE TO INJECT INSULIN UNDER THE SKIN FOUR TIMES DAILY AS DIRECTED 10/4/23   ANIVAL Byrd NP   FABB 2.2-25-1 MG TABS tablet TAKE 1 TABLET BY MOUTH DAILY 23   ANIVAL Yi NP   levothyroxine (SYNTHROID) 175 MCG tablet TAKE 1 TABLET BY MOUTH EVERY DAY 23   ANIVAL Yi NP   lovastatin (MEVACOR) 40 MG tablet Take 1 tablet by mouth daily 23   ANIVAL Yi NP   spironolactone (ALDACTONE) 25 MG tablet TAKE 1 TABLET DAILY 23   Suman Schaffer MD   Flaxseed Oil OIL Take 1,000 mg by mouth daily    Automatic Reconciliation, Ar   allopurinol (ZYLOPRIM) 300 MG tablet TAKE 1 TABLET DAILY 3/15/23   Automatic Reconciliation, Ar   finasteride (PROSCAR) 5 MG tablet Take 1 tablet by mouth every other day    Automatic Reconciliation, Ar   insulin glargine (LANTUS SOLOSTAR) 100 UNIT/ML injection pen INJECT 60 UNITS UNDER THE SKIN DAILY 22   Automatic Reconciliation, Ar   isosorbide mononitrate (IMDUR) 60 MG extended release tablet TAKE 1 TABLET DAILY 3/15/23   Automatic Reconciliation, Ar

## 2023-12-07 LAB
ANION GAP SERPL CALC-SCNC: 4 MMOL/L (ref 5–15)
BASOPHILS # BLD: 0.1 K/UL (ref 0–0.1)
BASOPHILS NFR BLD: 0 % (ref 0–1)
BLD PROD TYP BPU: NORMAL
BLOOD BANK DISPENSE STATUS: NORMAL
BPU ID: NORMAL
BUN SERPL-MCNC: 63 MG/DL (ref 6–20)
BUN/CREAT SERPL: 34 (ref 12–20)
CALCIUM SERPL-MCNC: 8 MG/DL (ref 8.5–10.1)
CHLORIDE SERPL-SCNC: 105 MMOL/L (ref 97–108)
CO2 SERPL-SCNC: 25 MMOL/L (ref 21–32)
CREAT SERPL-MCNC: 1.87 MG/DL (ref 0.7–1.3)
DIFFERENTIAL METHOD BLD: ABNORMAL
EOSINOPHIL # BLD: 0.3 K/UL (ref 0–0.4)
EOSINOPHIL NFR BLD: 2 % (ref 0–7)
ERYTHROCYTE [DISTWIDTH] IN BLOOD BY AUTOMATED COUNT: 14 % (ref 11.5–14.5)
EST. AVERAGE GLUCOSE BLD GHB EST-MCNC: 140 MG/DL
GLUCOSE BLD STRIP.AUTO-MCNC: 155 MG/DL (ref 65–117)
GLUCOSE BLD STRIP.AUTO-MCNC: 246 MG/DL (ref 65–117)
GLUCOSE BLD STRIP.AUTO-MCNC: 252 MG/DL (ref 65–117)
GLUCOSE BLD STRIP.AUTO-MCNC: 264 MG/DL (ref 65–117)
GLUCOSE BLD STRIP.AUTO-MCNC: 310 MG/DL (ref 65–117)
GLUCOSE SERPL-MCNC: 224 MG/DL (ref 65–100)
HBA1C MFR BLD: 6.5 % (ref 4–5.6)
HCT VFR BLD AUTO: 28.8 % (ref 36.6–50.3)
HCT VFR BLD AUTO: 30.4 % (ref 36.6–50.3)
HGB BLD-MCNC: 9.5 G/DL (ref 12.1–17)
HGB BLD-MCNC: 9.7 G/DL (ref 12.1–17)
IMM GRANULOCYTES # BLD AUTO: 0.1 K/UL (ref 0–0.04)
IMM GRANULOCYTES NFR BLD AUTO: 1 % (ref 0–0.5)
INR PPP: 1.7 (ref 0.9–1.1)
LYMPHOCYTES # BLD: 1.3 K/UL (ref 0.8–3.5)
LYMPHOCYTES NFR BLD: 10 % (ref 12–49)
MCH RBC QN AUTO: 31.7 PG (ref 26–34)
MCHC RBC AUTO-ENTMCNC: 31.9 G/DL (ref 30–36.5)
MCV RBC AUTO: 99.3 FL (ref 80–99)
MONOCYTES # BLD: 1.5 K/UL (ref 0–1)
MONOCYTES NFR BLD: 12 % (ref 5–13)
NEUTS SEG # BLD: 9.5 K/UL (ref 1.8–8)
NEUTS SEG NFR BLD: 75 % (ref 32–75)
NRBC # BLD: 0 K/UL (ref 0–0.01)
NRBC BLD-RTO: 0 PER 100 WBC
PLATELET # BLD AUTO: 228 K/UL (ref 150–400)
PMV BLD AUTO: 10 FL (ref 8.9–12.9)
POTASSIUM SERPL-SCNC: 4.4 MMOL/L (ref 3.5–5.1)
PROTHROMBIN TIME: 17.1 SEC (ref 9–11.1)
RBC # BLD AUTO: 3.06 M/UL (ref 4.1–5.7)
SERVICE CMNT-IMP: ABNORMAL
SODIUM SERPL-SCNC: 134 MMOL/L (ref 136–145)
UNIT DIVISION: 0
WBC # BLD AUTO: 12.8 K/UL (ref 4.1–11.1)

## 2023-12-07 PROCEDURE — 85018 HEMOGLOBIN: CPT

## 2023-12-07 PROCEDURE — 6370000000 HC RX 637 (ALT 250 FOR IP): Performed by: FAMILY MEDICINE

## 2023-12-07 PROCEDURE — 97116 GAIT TRAINING THERAPY: CPT

## 2023-12-07 PROCEDURE — 1100000000 HC RM PRIVATE

## 2023-12-07 PROCEDURE — 2580000003 HC RX 258: Performed by: FAMILY MEDICINE

## 2023-12-07 PROCEDURE — 6360000002 HC RX W HCPCS: Performed by: FAMILY MEDICINE

## 2023-12-07 PROCEDURE — 85610 PROTHROMBIN TIME: CPT

## 2023-12-07 PROCEDURE — 83036 HEMOGLOBIN GLYCOSYLATED A1C: CPT

## 2023-12-07 PROCEDURE — 85025 COMPLETE CBC W/AUTO DIFF WBC: CPT

## 2023-12-07 PROCEDURE — 82962 GLUCOSE BLOOD TEST: CPT

## 2023-12-07 PROCEDURE — 97161 PT EVAL LOW COMPLEX 20 MIN: CPT

## 2023-12-07 PROCEDURE — 85014 HEMATOCRIT: CPT

## 2023-12-07 PROCEDURE — 36415 COLL VENOUS BLD VENIPUNCTURE: CPT

## 2023-12-07 PROCEDURE — 80048 BASIC METABOLIC PNL TOTAL CA: CPT

## 2023-12-07 PROCEDURE — 6370000000 HC RX 637 (ALT 250 FOR IP)

## 2023-12-07 PROCEDURE — 97530 THERAPEUTIC ACTIVITIES: CPT

## 2023-12-07 PROCEDURE — 97606 NEG PRS WND THER DME>50 SQCM: CPT

## 2023-12-07 PROCEDURE — 99024 POSTOP FOLLOW-UP VISIT: CPT

## 2023-12-07 RX ORDER — WARFARIN SODIUM 4 MG/1
4 TABLET ORAL
Status: COMPLETED | OUTPATIENT
Start: 2023-12-07 | End: 2023-12-07

## 2023-12-07 RX ORDER — INSULIN GLARGINE 100 [IU]/ML
60 INJECTION, SOLUTION SUBCUTANEOUS NIGHTLY
Status: DISCONTINUED | OUTPATIENT
Start: 2023-12-07 | End: 2023-12-12

## 2023-12-07 RX ADMIN — VANCOMYCIN HYDROCHLORIDE 500 MG: 500 INJECTION, POWDER, LYOPHILIZED, FOR SOLUTION INTRAVENOUS at 17:26

## 2023-12-07 RX ADMIN — FINASTERIDE 5 MG: 5 TABLET, FILM COATED ORAL at 08:50

## 2023-12-07 RX ADMIN — Medication 2 UNITS: at 17:19

## 2023-12-07 RX ADMIN — SODIUM CHLORIDE, PRESERVATIVE FREE 10 ML: 5 INJECTION INTRAVENOUS at 20:48

## 2023-12-07 RX ADMIN — WARFARIN SODIUM 4 MG: 4 TABLET ORAL at 21:30

## 2023-12-07 RX ADMIN — LOSARTAN POTASSIUM 50 MG: 50 TABLET, FILM COATED ORAL at 08:45

## 2023-12-07 RX ADMIN — INSULIN GLARGINE 60 UNITS: 100 INJECTION, SOLUTION SUBCUTANEOUS at 21:26

## 2023-12-07 RX ADMIN — ALLOPURINOL 300 MG: 100 TABLET ORAL at 08:44

## 2023-12-07 RX ADMIN — SODIUM CHLORIDE, PRESERVATIVE FREE 10 ML: 5 INJECTION INTRAVENOUS at 08:45

## 2023-12-07 RX ADMIN — FUROSEMIDE 80 MG: 40 TABLET ORAL at 08:45

## 2023-12-07 RX ADMIN — ATORVASTATIN CALCIUM 10 MG: 10 TABLET, FILM COATED ORAL at 08:45

## 2023-12-07 RX ADMIN — LEVOTHYROXINE SODIUM 175 MCG: 0.03 TABLET ORAL at 05:27

## 2023-12-07 RX ADMIN — SPIRONOLACTONE 25 MG: 25 TABLET ORAL at 08:45

## 2023-12-07 RX ADMIN — METOPROLOL TARTRATE 100 MG: 50 TABLET, FILM COATED ORAL at 08:45

## 2023-12-07 RX ADMIN — ISOSORBIDE MONONITRATE 60 MG: 60 TABLET, EXTENDED RELEASE ORAL at 08:45

## 2023-12-07 RX ADMIN — Medication 4 UNITS: at 05:26

## 2023-12-07 RX ADMIN — CEFEPIME 2000 MG: 2 INJECTION, POWDER, FOR SOLUTION INTRAVENOUS at 20:30

## 2023-12-07 RX ADMIN — Medication 4 UNITS: at 23:30

## 2023-12-07 ASSESSMENT — PAIN DESCRIPTION - ORIENTATION
ORIENTATION: RIGHT;LOWER
ORIENTATION: RIGHT;LOWER
ORIENTATION_2: LOWER;POSTERIOR

## 2023-12-07 ASSESSMENT — PAIN DESCRIPTION - DESCRIPTORS
DESCRIPTORS_2: SORE
DESCRIPTORS: SORE

## 2023-12-07 ASSESSMENT — PAIN DESCRIPTION - PAIN TYPE: TYPE: SURGICAL PAIN

## 2023-12-07 ASSESSMENT — PAIN DESCRIPTION - LOCATION
LOCATION: ABDOMEN;INCISION
LOCATION: ABDOMEN
LOCATION_2: BUTTOCKS

## 2023-12-07 ASSESSMENT — PAIN SCALES - GENERAL
PAINLEVEL_OUTOF10: 0
PAINLEVEL_OUTOF10: 1
PAINLEVEL_OUTOF10: 1

## 2023-12-07 ASSESSMENT — PAIN DESCRIPTION - INTENSITY: RATING_2: 5

## 2023-12-07 ASSESSMENT — PAIN - FUNCTIONAL ASSESSMENT
PAIN_FUNCTIONAL_ASSESSMENT_SITE2: ACTIVITIES ARE NOT PREVENTED
PAIN_FUNCTIONAL_ASSESSMENT: PREVENTS OR INTERFERES SOME ACTIVE ACTIVITIES AND ADLS

## 2023-12-07 NOTE — WOUND CARE
WOCN Note:     New consult for initiation of VAC therapy to RLQ post hematoma evacuation. Seen in 535    Chart shows:  Admitted on 12/4/2023. Admitted for infected abdominal hematoma with evacuation and mesh explantation. History of gout, diverticulitis, CHF, DM.  WBC = 12.8; on maxipime  Wound Culture obtained in OR & pending    Assessment:   Appropriately conversational with son at bedside. Tolerates wound care well without pain medication. Mobile and continent. Diet: regular 4 carb choices    POA surgical wound in RLQ  4 x 16 x 5 cm  Undermining on lower margin = 2cm  2 pockets medially and each = ~10 cm deep on lateral place  Scant bleeding; no purulence  Some induration and erythema towards navel - Dr. Santoro Agent explored manually  Tx: irrigated with Vashe, 125 mmHg suction reached using 2 white sponges and 2 black sponges    Wound Recommendations:    RLQ: maintain VAC @ 125 mmHg suction with twice weekly dressing changes. PI Prevention:  Turn/reposition approximately every 2 hours  Offload heels with heels hanging off end of pillow at all times while in bed. Sacral Foam dressing: lift to assess regularly; change as needed. Discontinue if incontinence is frequently soiling dressing. Discussed with Dr. Santoro Agent and NP, Azra Nelson. Transition of Care: Plan to follow weekly and as needed while admitted to hospital.  [facility] paperwork filled out and given to .     Patricia Han, CHARLEYN, RN, [facility]  Certified Wound, Ostomy, Continence Nurse  office [phone]  Available via [employer]

## 2023-12-08 PROBLEM — L03.311 ABDOMINAL WALL CELLULITIS: Status: ACTIVE | Noted: 2023-12-08

## 2023-12-08 PROBLEM — E66.811 CLASS 1 OBESITY WITH BODY MASS INDEX (BMI) OF 31.0 TO 31.9 IN ADULT: Status: ACTIVE | Noted: 2023-12-08

## 2023-12-08 PROBLEM — D72.829 LEUKOCYTOSIS: Status: ACTIVE | Noted: 2023-12-08

## 2023-12-08 PROBLEM — N17.9 AKI (ACUTE KIDNEY INJURY) (HCC): Status: ACTIVE | Noted: 2023-12-08

## 2023-12-08 PROBLEM — L02.211 ABDOMINAL WALL ABSCESS: Status: ACTIVE | Noted: 2023-12-05

## 2023-12-08 PROBLEM — E66.9 CLASS 1 OBESITY WITH BODY MASS INDEX (BMI) OF 31.0 TO 31.9 IN ADULT: Status: ACTIVE | Noted: 2023-12-08

## 2023-12-08 LAB
ANION GAP SERPL CALC-SCNC: 3 MMOL/L (ref 5–15)
BACTERIA SPEC CULT: NORMAL
BASOPHILS # BLD: 0.1 K/UL (ref 0–0.1)
BASOPHILS NFR BLD: 1 % (ref 0–1)
BUN SERPL-MCNC: 62 MG/DL (ref 6–20)
BUN/CREAT SERPL: 38 (ref 12–20)
CALCIUM SERPL-MCNC: 8.1 MG/DL (ref 8.5–10.1)
CHLORIDE SERPL-SCNC: 106 MMOL/L (ref 97–108)
CO2 SERPL-SCNC: 28 MMOL/L (ref 21–32)
CREAT SERPL-MCNC: 1.65 MG/DL (ref 0.7–1.3)
DIFFERENTIAL METHOD BLD: ABNORMAL
EOSINOPHIL # BLD: 0.3 K/UL (ref 0–0.4)
EOSINOPHIL NFR BLD: 3 % (ref 0–7)
ERYTHROCYTE [DISTWIDTH] IN BLOOD BY AUTOMATED COUNT: 14 % (ref 11.5–14.5)
GLUCOSE BLD STRIP.AUTO-MCNC: 133 MG/DL (ref 65–117)
GLUCOSE BLD STRIP.AUTO-MCNC: 170 MG/DL (ref 65–117)
GLUCOSE BLD STRIP.AUTO-MCNC: 94 MG/DL (ref 65–117)
GLUCOSE SERPL-MCNC: 75 MG/DL (ref 65–100)
GRAM STN SPEC: NORMAL
HCT VFR BLD AUTO: 32.6 % (ref 36.6–50.3)
HGB BLD-MCNC: 10.3 G/DL (ref 12.1–17)
IMM GRANULOCYTES # BLD AUTO: 0.1 K/UL (ref 0–0.04)
IMM GRANULOCYTES NFR BLD AUTO: 1 % (ref 0–0.5)
INR PPP: 1.8 (ref 0.9–1.1)
LYMPHOCYTES # BLD: 1.7 K/UL (ref 0.8–3.5)
LYMPHOCYTES NFR BLD: 13 % (ref 12–49)
MCH RBC QN AUTO: 31.2 PG (ref 26–34)
MCHC RBC AUTO-ENTMCNC: 31.6 G/DL (ref 30–36.5)
MCV RBC AUTO: 98.8 FL (ref 80–99)
MONOCYTES # BLD: 1.8 K/UL (ref 0–1)
MONOCYTES NFR BLD: 13 % (ref 5–13)
NEUTS SEG # BLD: 9.3 K/UL (ref 1.8–8)
NEUTS SEG NFR BLD: 69 % (ref 32–75)
NRBC # BLD: 0 K/UL (ref 0–0.01)
NRBC BLD-RTO: 0 PER 100 WBC
PLATELET # BLD AUTO: 253 K/UL (ref 150–400)
PMV BLD AUTO: 10.3 FL (ref 8.9–12.9)
POTASSIUM SERPL-SCNC: 4.5 MMOL/L (ref 3.5–5.1)
PROTHROMBIN TIME: 17.9 SEC (ref 9–11.1)
RBC # BLD AUTO: 3.3 M/UL (ref 4.1–5.7)
SERVICE CMNT-IMP: ABNORMAL
SERVICE CMNT-IMP: ABNORMAL
SERVICE CMNT-IMP: NORMAL
SODIUM SERPL-SCNC: 137 MMOL/L (ref 136–145)
WBC # BLD AUTO: 13.3 K/UL (ref 4.1–11.1)

## 2023-12-08 PROCEDURE — 97116 GAIT TRAINING THERAPY: CPT

## 2023-12-08 PROCEDURE — 99232 SBSQ HOSP IP/OBS MODERATE 35: CPT | Performed by: SURGERY

## 2023-12-08 PROCEDURE — 97165 OT EVAL LOW COMPLEX 30 MIN: CPT

## 2023-12-08 PROCEDURE — 6360000002 HC RX W HCPCS: Performed by: FAMILY MEDICINE

## 2023-12-08 PROCEDURE — 2580000003 HC RX 258: Performed by: FAMILY MEDICINE

## 2023-12-08 PROCEDURE — 99222 1ST HOSP IP/OBS MODERATE 55: CPT | Performed by: INTERNAL MEDICINE

## 2023-12-08 PROCEDURE — 36415 COLL VENOUS BLD VENIPUNCTURE: CPT

## 2023-12-08 PROCEDURE — 85610 PROTHROMBIN TIME: CPT

## 2023-12-08 PROCEDURE — 82962 GLUCOSE BLOOD TEST: CPT

## 2023-12-08 PROCEDURE — 80048 BASIC METABOLIC PNL TOTAL CA: CPT

## 2023-12-08 PROCEDURE — 6370000000 HC RX 637 (ALT 250 FOR IP): Performed by: FAMILY MEDICINE

## 2023-12-08 PROCEDURE — 85025 COMPLETE CBC W/AUTO DIFF WBC: CPT

## 2023-12-08 PROCEDURE — 1100000000 HC RM PRIVATE

## 2023-12-08 PROCEDURE — 6370000000 HC RX 637 (ALT 250 FOR IP)

## 2023-12-08 PROCEDURE — 2580000003 HC RX 258: Performed by: NURSE PRACTITIONER

## 2023-12-08 RX ORDER — WARFARIN SODIUM 4 MG/1
4 TABLET ORAL
Status: DISPENSED | OUTPATIENT
Start: 2023-12-08 | End: 2023-12-09

## 2023-12-08 RX ORDER — SODIUM CHLORIDE 9 MG/ML
INJECTION, SOLUTION INTRAVENOUS CONTINUOUS
Status: DISCONTINUED | OUTPATIENT
Start: 2023-12-08 | End: 2023-12-09

## 2023-12-08 RX ADMIN — SODIUM CHLORIDE, PRESERVATIVE FREE 10 ML: 5 INJECTION INTRAVENOUS at 22:00

## 2023-12-08 RX ADMIN — LEVOTHYROXINE SODIUM 175 MCG: 0.03 TABLET ORAL at 06:06

## 2023-12-08 RX ADMIN — SPIRONOLACTONE 25 MG: 25 TABLET ORAL at 09:49

## 2023-12-08 RX ADMIN — VANCOMYCIN HYDROCHLORIDE 500 MG: 500 INJECTION, POWDER, LYOPHILIZED, FOR SOLUTION INTRAVENOUS at 12:08

## 2023-12-08 RX ADMIN — SODIUM CHLORIDE, PRESERVATIVE FREE 10 ML: 5 INJECTION INTRAVENOUS at 09:51

## 2023-12-08 RX ADMIN — SODIUM CHLORIDE: 9 INJECTION, SOLUTION INTRAVENOUS at 15:56

## 2023-12-08 RX ADMIN — ALLOPURINOL 300 MG: 100 TABLET ORAL at 09:49

## 2023-12-08 RX ADMIN — LOSARTAN POTASSIUM 50 MG: 50 TABLET, FILM COATED ORAL at 09:50

## 2023-12-08 RX ADMIN — ATORVASTATIN CALCIUM 10 MG: 10 TABLET, FILM COATED ORAL at 09:49

## 2023-12-08 RX ADMIN — METOPROLOL TARTRATE 100 MG: 50 TABLET, FILM COATED ORAL at 09:50

## 2023-12-08 RX ADMIN — INSULIN GLARGINE 60 UNITS: 100 INJECTION, SOLUTION SUBCUTANEOUS at 21:47

## 2023-12-08 RX ADMIN — METOPROLOL TARTRATE 100 MG: 50 TABLET, FILM COATED ORAL at 21:43

## 2023-12-08 RX ADMIN — FUROSEMIDE 80 MG: 40 TABLET ORAL at 09:49

## 2023-12-08 RX ADMIN — CEFEPIME 2000 MG: 2 INJECTION, POWDER, FOR SOLUTION INTRAVENOUS at 22:00

## 2023-12-08 RX ADMIN — ISOSORBIDE MONONITRATE 60 MG: 60 TABLET, EXTENDED RELEASE ORAL at 09:50

## 2023-12-08 NOTE — ASSESSMENT & PLAN NOTE
Doing well after evacuation and washout with mesh explantation. Wound vac in place. Goal for D/C to [facility]. OOB ambulate, work with PT / OT awaiting Cx results to tailor abx.

## 2023-12-08 NOTE — CONSULTS
sec   POCT Glucose    Collection Time: 12/08/23  6:04 AM   Result Value Ref Range    POC Glucose 94 65 - 117 mg/dL    Performed by: Mirela DORMAN         Microbiology:      Studies:    CT Result (most recent):  CT ABDOMEN PELVIS WO IV CONTRAST 12/04/2023    Narrative  EXAM: CT ABDOMEN PELVIS WO CONTRAST    INDICATION: eval for abd wall abscess. recent I+D abscess with drain, much worse  now    COMPARISON: October 25    IV CONTRAST: None. ORAL CONTRAST: None    TECHNIQUE:  Thin axial images were obtained through the abdomen and pelvis. Coronal and  sagittal reformats were generated. CT dose reduction was achieved through use of  a standardized protocol tailored for this examination and automatic exposure  control for dose modulation. The absence of intravenous contrast material reduces the sensitivity for  evaluation of the vasculature and solid organs. FINDINGS:  LOWER THORAX: No significant abnormality in the incidentally imaged lower chest.  LIVER: No mass. BILIARY TREE: Cholecystectomy. CBD is not dilated. SPLEEN: within normal limits. PANCREAS: No focal abnormality. ADRENALS: Unremarkable. KIDNEYS/URETERS: No calculus or hydronephrosis. No change  STOMACH: Unremarkable. SMALL BOWEL: No dilatation or wall thickening. COLON: No dilatation or wall thickening. APPENDIX: Appendectomy  PERITONEUM: No ascites or pneumoperitoneum. RETROPERITONEUM: No lymphadenopathy or prior AAA repair  REPRODUCTIVE ORGANS:  URINARY BLADDER: No mass or calculus. BONES: No destructive bone lesion. ABDOMINAL WALL: Persistent large right lower quadrant abdominal wall fluid  collection with increased surrounding soft tissue edema. Slight increase in  size. ADDITIONAL COMMENTS: N/A    Impression  Slight increase in size of a right lower quadrant abdominal wall fluid  collection. Significant increase in surrounding edema in the soft tissues. This SmartLink has not been configured with any valid records.        Signed

## 2023-12-09 LAB
ANION GAP SERPL CALC-SCNC: 6 MMOL/L (ref 5–15)
BACTERIA SPEC CULT: NORMAL
BUN SERPL-MCNC: 75 MG/DL (ref 6–20)
BUN/CREAT SERPL: 44 (ref 12–20)
CALCIUM SERPL-MCNC: 8.3 MG/DL (ref 8.5–10.1)
CHLORIDE SERPL-SCNC: 104 MMOL/L (ref 97–108)
CO2 SERPL-SCNC: 27 MMOL/L (ref 21–32)
CREAT SERPL-MCNC: 1.72 MG/DL (ref 0.7–1.3)
GLUCOSE BLD STRIP.AUTO-MCNC: 174 MG/DL (ref 65–117)
GLUCOSE BLD STRIP.AUTO-MCNC: 176 MG/DL (ref 65–117)
GLUCOSE BLD STRIP.AUTO-MCNC: 260 MG/DL (ref 65–117)
GLUCOSE BLD STRIP.AUTO-MCNC: 264 MG/DL (ref 65–117)
GLUCOSE BLD STRIP.AUTO-MCNC: 64 MG/DL (ref 65–117)
GLUCOSE BLD STRIP.AUTO-MCNC: 94 MG/DL (ref 65–117)
GLUCOSE SERPL-MCNC: 251 MG/DL (ref 65–100)
INR PPP: 1.9 (ref 0.9–1.1)
POTASSIUM SERPL-SCNC: 4.7 MMOL/L (ref 3.5–5.1)
PROTHROMBIN TIME: 18.7 SEC (ref 9–11.1)
SERVICE CMNT-IMP: ABNORMAL
SERVICE CMNT-IMP: NORMAL
SODIUM SERPL-SCNC: 137 MMOL/L (ref 136–145)

## 2023-12-09 PROCEDURE — 36415 COLL VENOUS BLD VENIPUNCTURE: CPT

## 2023-12-09 PROCEDURE — 6370000000 HC RX 637 (ALT 250 FOR IP)

## 2023-12-09 PROCEDURE — 85610 PROTHROMBIN TIME: CPT

## 2023-12-09 PROCEDURE — 80048 BASIC METABOLIC PNL TOTAL CA: CPT

## 2023-12-09 PROCEDURE — 82962 GLUCOSE BLOOD TEST: CPT

## 2023-12-09 PROCEDURE — 1100000000 HC RM PRIVATE

## 2023-12-09 PROCEDURE — 6370000000 HC RX 637 (ALT 250 FOR IP): Performed by: FAMILY MEDICINE

## 2023-12-09 PROCEDURE — 2580000003 HC RX 258: Performed by: FAMILY MEDICINE

## 2023-12-09 PROCEDURE — 6360000002 HC RX W HCPCS: Performed by: FAMILY MEDICINE

## 2023-12-09 RX ORDER — NEPHROCAP 1 MG
1 CAP ORAL DAILY
Status: DISCONTINUED | OUTPATIENT
Start: 2023-12-10 | End: 2023-12-12 | Stop reason: HOSPADM

## 2023-12-09 RX ORDER — WARFARIN SODIUM 4 MG/1
4 TABLET ORAL
Status: COMPLETED | OUTPATIENT
Start: 2023-12-09 | End: 2023-12-09

## 2023-12-09 RX ADMIN — VANCOMYCIN HYDROCHLORIDE 500 MG: 500 INJECTION, POWDER, LYOPHILIZED, FOR SOLUTION INTRAVENOUS at 06:29

## 2023-12-09 RX ADMIN — WARFARIN SODIUM 4 MG: 4 TABLET ORAL at 18:03

## 2023-12-09 RX ADMIN — ATORVASTATIN CALCIUM 10 MG: 10 TABLET, FILM COATED ORAL at 09:49

## 2023-12-09 RX ADMIN — CEFEPIME 2000 MG: 2 INJECTION, POWDER, FOR SOLUTION INTRAVENOUS at 19:29

## 2023-12-09 RX ADMIN — FINASTERIDE 5 MG: 5 TABLET, FILM COATED ORAL at 10:18

## 2023-12-09 RX ADMIN — ALLOPURINOL 300 MG: 100 TABLET ORAL at 09:49

## 2023-12-09 RX ADMIN — LEVOTHYROXINE SODIUM 175 MCG: 0.03 TABLET ORAL at 06:30

## 2023-12-09 RX ADMIN — Medication 4 UNITS: at 23:28

## 2023-12-09 RX ADMIN — LOSARTAN POTASSIUM 50 MG: 50 TABLET, FILM COATED ORAL at 09:48

## 2023-12-09 RX ADMIN — METOPROLOL TARTRATE 100 MG: 50 TABLET, FILM COATED ORAL at 21:21

## 2023-12-09 RX ADMIN — FUROSEMIDE 80 MG: 40 TABLET ORAL at 09:49

## 2023-12-09 RX ADMIN — METOPROLOL TARTRATE 100 MG: 50 TABLET, FILM COATED ORAL at 09:49

## 2023-12-09 RX ADMIN — INSULIN GLARGINE 60 UNITS: 100 INJECTION, SOLUTION SUBCUTANEOUS at 21:20

## 2023-12-09 RX ADMIN — SPIRONOLACTONE 25 MG: 25 TABLET ORAL at 09:49

## 2023-12-09 RX ADMIN — Medication 4 UNITS: at 00:28

## 2023-12-09 RX ADMIN — ISOSORBIDE MONONITRATE 60 MG: 60 TABLET, EXTENDED RELEASE ORAL at 09:49

## 2023-12-10 LAB
ALBUMIN SERPL-MCNC: 2 G/DL (ref 3.5–5)
ALBUMIN/GLOB SERPL: 0.5 (ref 1.1–2.2)
ALP SERPL-CCNC: 216 U/L (ref 45–117)
ALT SERPL-CCNC: 30 U/L (ref 12–78)
ANION GAP SERPL CALC-SCNC: 5 MMOL/L (ref 5–15)
AST SERPL-CCNC: 26 U/L (ref 15–37)
BASOPHILS # BLD: 0.1 K/UL (ref 0–0.1)
BASOPHILS NFR BLD: 1 % (ref 0–1)
BILIRUB SERPL-MCNC: 0.4 MG/DL (ref 0.2–1)
BUN SERPL-MCNC: 73 MG/DL (ref 6–20)
BUN/CREAT SERPL: 43 (ref 12–20)
CALCIUM SERPL-MCNC: 8.6 MG/DL (ref 8.5–10.1)
CHLORIDE SERPL-SCNC: 108 MMOL/L (ref 97–108)
CO2 SERPL-SCNC: 25 MMOL/L (ref 21–32)
CREAT SERPL-MCNC: 1.68 MG/DL (ref 0.7–1.3)
DIFFERENTIAL METHOD BLD: ABNORMAL
EOSINOPHIL # BLD: 0.4 K/UL (ref 0–0.4)
EOSINOPHIL NFR BLD: 3 % (ref 0–7)
ERYTHROCYTE [DISTWIDTH] IN BLOOD BY AUTOMATED COUNT: 14.2 % (ref 11.5–14.5)
GLOBULIN SER CALC-MCNC: 3.9 G/DL (ref 2–4)
GLUCOSE BLD STRIP.AUTO-MCNC: 130 MG/DL (ref 65–117)
GLUCOSE BLD STRIP.AUTO-MCNC: 184 MG/DL (ref 65–117)
GLUCOSE BLD STRIP.AUTO-MCNC: 197 MG/DL (ref 65–117)
GLUCOSE SERPL-MCNC: 165 MG/DL (ref 65–100)
HCT VFR BLD AUTO: 30.7 % (ref 36.6–50.3)
HGB BLD-MCNC: 9.8 G/DL (ref 12.1–17)
IMM GRANULOCYTES # BLD AUTO: 0.1 K/UL (ref 0–0.04)
IMM GRANULOCYTES NFR BLD AUTO: 1 % (ref 0–0.5)
INR PPP: 1.7 (ref 0.9–1.1)
LYMPHOCYTES # BLD: 1.6 K/UL (ref 0.8–3.5)
LYMPHOCYTES NFR BLD: 14 % (ref 12–49)
MCH RBC QN AUTO: 31.6 PG (ref 26–34)
MCHC RBC AUTO-ENTMCNC: 31.9 G/DL (ref 30–36.5)
MCV RBC AUTO: 99 FL (ref 80–99)
MONOCYTES # BLD: 1.4 K/UL (ref 0–1)
MONOCYTES NFR BLD: 13 % (ref 5–13)
NEUTS SEG # BLD: 7.3 K/UL (ref 1.8–8)
NEUTS SEG NFR BLD: 68 % (ref 32–75)
NRBC # BLD: 0 K/UL (ref 0–0.01)
NRBC BLD-RTO: 0 PER 100 WBC
PLATELET # BLD AUTO: 228 K/UL (ref 150–400)
PMV BLD AUTO: 9.8 FL (ref 8.9–12.9)
POTASSIUM SERPL-SCNC: 4.1 MMOL/L (ref 3.5–5.1)
PROT SERPL-MCNC: 5.9 G/DL (ref 6.4–8.2)
PROTHROMBIN TIME: 17.6 SEC (ref 9–11.1)
RBC # BLD AUTO: 3.1 M/UL (ref 4.1–5.7)
SERVICE CMNT-IMP: ABNORMAL
SODIUM SERPL-SCNC: 138 MMOL/L (ref 136–145)
WBC # BLD AUTO: 10.9 K/UL (ref 4.1–11.1)

## 2023-12-10 PROCEDURE — 85025 COMPLETE CBC W/AUTO DIFF WBC: CPT

## 2023-12-10 PROCEDURE — 36415 COLL VENOUS BLD VENIPUNCTURE: CPT

## 2023-12-10 PROCEDURE — 6360000002 HC RX W HCPCS: Performed by: FAMILY MEDICINE

## 2023-12-10 PROCEDURE — 6370000000 HC RX 637 (ALT 250 FOR IP): Performed by: FAMILY MEDICINE

## 2023-12-10 PROCEDURE — 85610 PROTHROMBIN TIME: CPT

## 2023-12-10 PROCEDURE — 82962 GLUCOSE BLOOD TEST: CPT

## 2023-12-10 PROCEDURE — 80053 COMPREHEN METABOLIC PANEL: CPT

## 2023-12-10 PROCEDURE — 2580000003 HC RX 258: Performed by: FAMILY MEDICINE

## 2023-12-10 PROCEDURE — 6370000000 HC RX 637 (ALT 250 FOR IP)

## 2023-12-10 PROCEDURE — 1100000000 HC RM PRIVATE

## 2023-12-10 RX ADMIN — VANCOMYCIN HYDROCHLORIDE 750 MG: 750 INJECTION, POWDER, LYOPHILIZED, FOR SOLUTION INTRAVENOUS at 06:58

## 2023-12-10 RX ADMIN — METOPROLOL TARTRATE 100 MG: 50 TABLET, FILM COATED ORAL at 09:00

## 2023-12-10 RX ADMIN — SODIUM CHLORIDE, PRESERVATIVE FREE 10 ML: 5 INJECTION INTRAVENOUS at 14:03

## 2023-12-10 RX ADMIN — ALLOPURINOL 300 MG: 100 TABLET ORAL at 09:00

## 2023-12-10 RX ADMIN — SPIRONOLACTONE 25 MG: 25 TABLET ORAL at 09:01

## 2023-12-10 RX ADMIN — ISOSORBIDE MONONITRATE 60 MG: 60 TABLET, EXTENDED RELEASE ORAL at 09:01

## 2023-12-10 RX ADMIN — ATORVASTATIN CALCIUM 10 MG: 10 TABLET, FILM COATED ORAL at 09:01

## 2023-12-10 RX ADMIN — INSULIN GLARGINE 60 UNITS: 100 INJECTION, SOLUTION SUBCUTANEOUS at 20:39

## 2023-12-10 RX ADMIN — LOSARTAN POTASSIUM 50 MG: 50 TABLET, FILM COATED ORAL at 09:01

## 2023-12-10 RX ADMIN — LEVOTHYROXINE SODIUM 175 MCG: 0.03 TABLET ORAL at 06:58

## 2023-12-10 RX ADMIN — FUROSEMIDE 80 MG: 40 TABLET ORAL at 09:00

## 2023-12-10 RX ADMIN — WARFARIN SODIUM 6 MG: 5 TABLET ORAL at 17:41

## 2023-12-10 RX ADMIN — METOPROLOL TARTRATE 100 MG: 50 TABLET, FILM COATED ORAL at 20:39

## 2023-12-11 LAB
BACTERIA SPEC CULT: NORMAL
COMMENT:: NORMAL
GLUCOSE BLD STRIP.AUTO-MCNC: 103 MG/DL (ref 65–117)
GLUCOSE BLD STRIP.AUTO-MCNC: 145 MG/DL (ref 65–117)
GLUCOSE BLD STRIP.AUTO-MCNC: 171 MG/DL (ref 65–117)
GLUCOSE BLD STRIP.AUTO-MCNC: 206 MG/DL (ref 65–117)
GLUCOSE BLD STRIP.AUTO-MCNC: 272 MG/DL (ref 65–117)
GLUCOSE BLD STRIP.AUTO-MCNC: 60 MG/DL (ref 65–117)
GLUCOSE BLD STRIP.AUTO-MCNC: 64 MG/DL (ref 65–117)
GLUCOSE BLD STRIP.AUTO-MCNC: 83 MG/DL (ref 65–117)
GLUCOSE BLD STRIP.AUTO-MCNC: 97 MG/DL (ref 65–117)
INR PPP: 1.7 (ref 0.9–1.1)
PROTHROMBIN TIME: 17.2 SEC (ref 9–11.1)
SERVICE CMNT-IMP: ABNORMAL
SERVICE CMNT-IMP: NORMAL
SPECIMEN HOLD: NORMAL
VANCOMYCIN SERPL-MCNC: 18.1 UG/ML

## 2023-12-11 PROCEDURE — 85610 PROTHROMBIN TIME: CPT

## 2023-12-11 PROCEDURE — 2580000003 HC RX 258: Performed by: FAMILY MEDICINE

## 2023-12-11 PROCEDURE — 6370000000 HC RX 637 (ALT 250 FOR IP)

## 2023-12-11 PROCEDURE — 99024 POSTOP FOLLOW-UP VISIT: CPT | Performed by: NURSE PRACTITIONER

## 2023-12-11 PROCEDURE — 36415 COLL VENOUS BLD VENIPUNCTURE: CPT

## 2023-12-11 PROCEDURE — 2580000003 HC RX 258

## 2023-12-11 PROCEDURE — 97535 SELF CARE MNGMENT TRAINING: CPT

## 2023-12-11 PROCEDURE — 97606 NEG PRS WND THER DME>50 SQCM: CPT

## 2023-12-11 PROCEDURE — 6360000002 HC RX W HCPCS: Performed by: FAMILY MEDICINE

## 2023-12-11 PROCEDURE — 6370000000 HC RX 637 (ALT 250 FOR IP): Performed by: FAMILY MEDICINE

## 2023-12-11 PROCEDURE — 6360000002 HC RX W HCPCS

## 2023-12-11 PROCEDURE — 80202 ASSAY OF VANCOMYCIN: CPT

## 2023-12-11 PROCEDURE — 1100000000 HC RM PRIVATE

## 2023-12-11 PROCEDURE — 82962 GLUCOSE BLOOD TEST: CPT

## 2023-12-11 RX ORDER — METRONIDAZOLE 250 MG/1
500 TABLET ORAL EVERY 8 HOURS SCHEDULED
Status: DISCONTINUED | OUTPATIENT
Start: 2023-12-11 | End: 2023-12-12 | Stop reason: HOSPADM

## 2023-12-11 RX ADMIN — INSULIN GLARGINE 60 UNITS: 100 INJECTION, SOLUTION SUBCUTANEOUS at 20:42

## 2023-12-11 RX ADMIN — ALLOPURINOL 300 MG: 100 TABLET ORAL at 09:42

## 2023-12-11 RX ADMIN — SODIUM CHLORIDE, PRESERVATIVE FREE 10 ML: 5 INJECTION INTRAVENOUS at 20:45

## 2023-12-11 RX ADMIN — SPIRONOLACTONE 25 MG: 25 TABLET ORAL at 09:42

## 2023-12-11 RX ADMIN — WARFARIN SODIUM 6 MG: 5 TABLET ORAL at 18:13

## 2023-12-11 RX ADMIN — ATORVASTATIN CALCIUM 10 MG: 10 TABLET, FILM COATED ORAL at 09:42

## 2023-12-11 RX ADMIN — METRONIDAZOLE 500 MG: 250 TABLET ORAL at 23:56

## 2023-12-11 RX ADMIN — LOSARTAN POTASSIUM 50 MG: 50 TABLET, FILM COATED ORAL at 09:42

## 2023-12-11 RX ADMIN — Medication 4 UNITS: at 00:37

## 2023-12-11 RX ADMIN — FINASTERIDE 5 MG: 5 TABLET, FILM COATED ORAL at 09:51

## 2023-12-11 RX ADMIN — METOPROLOL TARTRATE 100 MG: 50 TABLET, FILM COATED ORAL at 20:42

## 2023-12-11 RX ADMIN — LEVOTHYROXINE SODIUM 175 MCG: 0.03 TABLET ORAL at 06:55

## 2023-12-11 RX ADMIN — ISOSORBIDE MONONITRATE 60 MG: 60 TABLET, EXTENDED RELEASE ORAL at 09:42

## 2023-12-11 RX ADMIN — METRONIDAZOLE 500 MG: 250 TABLET ORAL at 18:12

## 2023-12-11 RX ADMIN — WATER 2000 MG: 1 INJECTION INTRAMUSCULAR; INTRAVENOUS; SUBCUTANEOUS at 18:12

## 2023-12-11 RX ADMIN — METOPROLOL TARTRATE 100 MG: 50 TABLET, FILM COATED ORAL at 09:42

## 2023-12-11 RX ADMIN — FUROSEMIDE 80 MG: 40 TABLET ORAL at 09:42

## 2023-12-11 RX ADMIN — VANCOMYCIN HYDROCHLORIDE 750 MG: 750 INJECTION, POWDER, LYOPHILIZED, FOR SOLUTION INTRAVENOUS at 06:55

## 2023-12-11 NOTE — WOUND CARE
WOCN Note:     Follow up visit for RLQ hematoma evacuation site. Seen in 5 with Eri Bob NP. Chart shows:  Admitted on 12/4/2023. Admitted for infected abdominal hematoma with evacuation and mesh explantation. History of gout, diverticulitis, CHF, DM.  WBC = 10.9; on rocephin, flagyl, & vancomycin  Wound Culture obtained in OR    Assessment:   Appropriately conversational with son at bedside. Tolerates wound care well without pain medication. Mobile and continent. Diet: regular 4 carb choices    POA surgical wound in RLQ  Removed 2 white sponges and 2 black sponges. 4.3 x 15 x 4.2 cm  Undermining on lower margin = 2cm  2 pockets medially and each = ~8 cm deep on lateral place  Scant bleeding; no purulence  Some induration and erythema towards navel unchanged since seen with Dr. Siddhartha Francis  Tx: irrigated with saline and packed with saline-damp gauze. Covered with dry dressing. 125 mmHg suction reached using 2 white sponges and 2 black sponges      Wound Recommendations:    RLQ: restart NPWT after discharge with setting of 125 mmHg continuous suction with twice weekly dressing changes. PI Prevention:  Turn/reposition approximately every 2 hours  Offload heels with heels hanging off end of pillow at all times while in bed. Sacral Foam dressing: lift to assess regularly; change as needed. Discontinue if incontinence is frequently soiling dressing. Transition of Care: Plan to follow weekly and as needed while admitted to hospital with discharge planning for rehab. RUTH Emery, RN, [facility] [place]  Certified Wound, Ostomy, Continence Nurse  office [phone]  Available via [employer]: notified of delay in discharge until PICC line can be placed likely tomorrow in IR. Applied 1 piece of black sponge over current gauze dressing and set suction to 125 mmHg. Will plan to see tomorrow for dissharge dressing change.    Glynn Peña

## 2023-12-12 VITALS
OXYGEN SATURATION: 99 % | HEART RATE: 73 BPM | TEMPERATURE: 98.1 F | HEIGHT: 70 IN | DIASTOLIC BLOOD PRESSURE: 57 MMHG | RESPIRATION RATE: 16 BRPM | WEIGHT: 222 LBS | SYSTOLIC BLOOD PRESSURE: 134 MMHG | BODY MASS INDEX: 31.78 KG/M2

## 2023-12-12 LAB
ANION GAP SERPL CALC-SCNC: 5 MMOL/L (ref 5–15)
BUN SERPL-MCNC: 67 MG/DL (ref 6–20)
BUN/CREAT SERPL: 43 (ref 12–20)
CALCIUM SERPL-MCNC: 8.7 MG/DL (ref 8.5–10.1)
CHLORIDE SERPL-SCNC: 109 MMOL/L (ref 97–108)
CO2 SERPL-SCNC: 26 MMOL/L (ref 21–32)
CREAT SERPL-MCNC: 1.56 MG/DL (ref 0.7–1.3)
GLUCOSE BLD STRIP.AUTO-MCNC: 114 MG/DL (ref 65–117)
GLUCOSE BLD STRIP.AUTO-MCNC: 114 MG/DL (ref 65–117)
GLUCOSE BLD STRIP.AUTO-MCNC: 52 MG/DL (ref 65–117)
GLUCOSE BLD STRIP.AUTO-MCNC: 55 MG/DL (ref 65–117)
GLUCOSE BLD STRIP.AUTO-MCNC: 64 MG/DL (ref 65–117)
GLUCOSE BLD STRIP.AUTO-MCNC: 83 MG/DL (ref 65–117)
GLUCOSE SERPL-MCNC: 153 MG/DL (ref 65–100)
INR PPP: 1.9 (ref 0.9–1.1)
POTASSIUM SERPL-SCNC: 4.2 MMOL/L (ref 3.5–5.1)
PROTHROMBIN TIME: 19.4 SEC (ref 9–11.1)
SERVICE CMNT-IMP: ABNORMAL
SERVICE CMNT-IMP: NORMAL
SODIUM SERPL-SCNC: 140 MMOL/L (ref 136–145)

## 2023-12-12 PROCEDURE — 6360000002 HC RX W HCPCS: Performed by: FAMILY MEDICINE

## 2023-12-12 PROCEDURE — 2580000003 HC RX 258: Performed by: FAMILY MEDICINE

## 2023-12-12 PROCEDURE — 6370000000 HC RX 637 (ALT 250 FOR IP)

## 2023-12-12 PROCEDURE — C1751 CATH, INF, PER/CENT/MIDLINE: HCPCS

## 2023-12-12 PROCEDURE — 80048 BASIC METABOLIC PNL TOTAL CA: CPT

## 2023-12-12 PROCEDURE — 05HF33Z INSERTION OF INFUSION DEVICE INTO LEFT CEPHALIC VEIN, PERCUTANEOUS APPROACH: ICD-10-PCS | Performed by: FAMILY MEDICINE

## 2023-12-12 PROCEDURE — 85610 PROTHROMBIN TIME: CPT

## 2023-12-12 PROCEDURE — 76937 US GUIDE VASCULAR ACCESS: CPT

## 2023-12-12 PROCEDURE — 6370000000 HC RX 637 (ALT 250 FOR IP): Performed by: FAMILY MEDICINE

## 2023-12-12 PROCEDURE — 82962 GLUCOSE BLOOD TEST: CPT

## 2023-12-12 PROCEDURE — 36415 COLL VENOUS BLD VENIPUNCTURE: CPT

## 2023-12-12 PROCEDURE — 2709999900 HC NON-CHARGEABLE SUPPLY

## 2023-12-12 RX ORDER — INSULIN GLARGINE 100 [IU]/ML
45 INJECTION, SOLUTION SUBCUTANEOUS NIGHTLY
Qty: 10 ML | Refills: 0 | Status: SHIPPED
Start: 2023-12-12

## 2023-12-12 RX ORDER — METRONIDAZOLE 500 MG/1
500 TABLET ORAL EVERY 8 HOURS SCHEDULED
Qty: 36 TABLET | Refills: 0 | Status: SHIPPED
Start: 2023-12-12 | End: 2023-12-24

## 2023-12-12 RX ORDER — INSULIN GLARGINE 100 [IU]/ML
45 INJECTION, SOLUTION SUBCUTANEOUS NIGHTLY
Status: DISCONTINUED | OUTPATIENT
Start: 2023-12-12 | End: 2023-12-12 | Stop reason: HOSPADM

## 2023-12-12 RX ORDER — WARFARIN SODIUM 5 MG/1
TABLET ORAL
Qty: 1 TABLET | Refills: 0 | Status: SHIPPED
Start: 2023-12-12

## 2023-12-12 RX ORDER — WARFARIN SODIUM 5 MG/1
5 TABLET ORAL
Status: DISCONTINUED | OUTPATIENT
Start: 2023-12-12 | End: 2023-12-12 | Stop reason: HOSPADM

## 2023-12-12 RX ADMIN — ALLOPURINOL 300 MG: 100 TABLET ORAL at 08:42

## 2023-12-12 RX ADMIN — METRONIDAZOLE 500 MG: 250 TABLET ORAL at 07:37

## 2023-12-12 RX ADMIN — NEPHROCAP 1 MG: 1 CAP ORAL at 08:42

## 2023-12-12 RX ADMIN — FUROSEMIDE 80 MG: 40 TABLET ORAL at 08:42

## 2023-12-12 RX ADMIN — SPIRONOLACTONE 25 MG: 25 TABLET ORAL at 08:42

## 2023-12-12 RX ADMIN — ISOSORBIDE MONONITRATE 60 MG: 60 TABLET, EXTENDED RELEASE ORAL at 08:42

## 2023-12-12 RX ADMIN — METOPROLOL TARTRATE 100 MG: 50 TABLET, FILM COATED ORAL at 08:42

## 2023-12-12 RX ADMIN — SODIUM CHLORIDE, PRESERVATIVE FREE 10 ML: 5 INJECTION INTRAVENOUS at 08:43

## 2023-12-12 RX ADMIN — LOSARTAN POTASSIUM 50 MG: 50 TABLET, FILM COATED ORAL at 08:42

## 2023-12-12 RX ADMIN — LEVOTHYROXINE SODIUM 175 MCG: 0.03 TABLET ORAL at 05:52

## 2023-12-12 RX ADMIN — VANCOMYCIN HYDROCHLORIDE 750 MG: 750 INJECTION, POWDER, LYOPHILIZED, FOR SOLUTION INTRAVENOUS at 06:07

## 2023-12-12 RX ADMIN — Medication 16 G: at 05:52

## 2023-12-12 RX ADMIN — ATORVASTATIN CALCIUM 10 MG: 10 TABLET, FILM COATED ORAL at 08:42

## 2023-12-12 NOTE — DISCHARGE INSTRUCTIONS
around wound, or change in wound drainage, you need to call Dr. Yara Alcaraz. Continue to work with PT/OT as ordered. You need to have your INR checked daily and your warfarin dosed based on this measurement. DIET: diabetic diet    ACTIVITY: activity as tolerated    WOUND CARE: RLQ: restart NPWT after discharge with setting of 125 mmHg continuous suction with twice weekly dressing changes. Tx: irrigated with saline and packed with saline-damp gauze. Covered with dry dressing. 125 mmHg suction reached using 2 white sponges and 2 black sponges     EQUIPMENT needed: wound vac        INFECTIOUS DISEASE discharge plan:     Diagnosis: Intra-abdominal infection   Antibiotic: ceftriaxone IV 2 g Q 24 H and flagyl  mg TID through 12/24/23   PICC line insertion for outpatient antibiotic. Routine PICC/ Yohan/ Portacath Care including PRN catheter flow management     Weekly labs with results fax to # [phone]. Call critical lab values to [phone]. [x] CBC w/diff, Chem 7   [] CPK  [] CRP, ESR  [] Vancomycin trough level goal  drawn every Monday and Thursday. Pharm D consult for Vancomycin dosing. 6. Home Health to pull PICC line after last dose or send to IR for Yohan removal   7. May send to IR for line evaluation or replacement PRN Phone # [phone]   8. Follow up with General Surgery                  DISCHARGE MEDICATIONS:   See Medication Reconciliation Form    It is important that you take the medication exactly as they are prescribed. Keep your medication in the bottles provided by the pharmacist and keep a list of the medication names, dosages, and times to be taken in your wallet. Do not take other medications without consulting your doctor. NOTIFY YOUR PHYSICIAN FOR ANY OF THE FOLLOWING:   Fever over 101 degrees for 24 hours. Chest pain, shortness of breath, fever, chills, nausea, vomiting, diarrhea, change in mentation, falling, weakness, bleeding.  Severe pain or pain not relieved by

## 2023-12-12 NOTE — DISCHARGE SUMMARY
tablet  Commonly known as: FLAGYL  Take 1 tablet by mouth every 8 hours for 12 days            CHANGE how you take these medications      warfarin 5 MG tablet  Commonly known as: COUMADIN  Take as directed. If you are unsure how to take this medication, talk to your nurse or doctor. Original instructions: Take 5 mg by mouth 12/12 PM.  What changed: See the new instructions.             CONTINUE taking these medications      allopurinol 300 MG tablet  Commonly known as: ZYLOPRIM     B-D ULTRAFINE III SHORT PEN 31G X 8 MM Misc  Generic drug: Insulin Pen Needle  USE TO INJECT INSULIN UNDER THE SKIN FOUR TIMES DAILY AS DIRECTED     FaBB 2.2-25-1 MG Tabs tablet  Generic drug: folic acid-pyridoxine-cyanocobalamine  TAKE 1 TABLET BY MOUTH DAILY     finasteride 5 MG tablet  Commonly known as: PROSCAR     Flaxseed Oil Oil     FREESTYLE TEST STRIPS strip  Generic drug: blood glucose test strips  Use to test blood sugar three times daily DX:E11.51     furosemide 40 MG tablet  Commonly known as: LASIX  Take 2 tablets by mouth daily     isosorbide mononitrate 60 MG extended release tablet  Commonly known as: IMDUR     levothyroxine 175 MCG tablet  Commonly known as: SYNTHROID  TAKE 1 TABLET BY MOUTH EVERY DAY     losartan 50 MG tablet  Commonly known as: COZAAR     lovastatin 40 MG tablet  Commonly known as: MEVACOR  Take 1 tablet by mouth daily     metoprolol 100 MG tablet  Commonly known as: LOPRESSOR  TAKE 1 TABLET TWICE A DAY     spironolactone 25 MG tablet  Commonly known as: ALDACTONE  TAKE 1 TABLET DAILY     terazosin 10 MG capsule  Commonly known as: HYTRIN            STOP taking these medications      Lantus SoloStar 100 UNIT/ML injection pen  Generic drug: insulin glargine  Replaced by: insulin glargine 100 UNIT/ML injection vial               Where to Get Your Medications        Information about where to get these medications is not yet available    Ask your nurse or doctor about these medications  insulin glargine

## 2023-12-12 NOTE — CARE COORDINATION
Transition of Care Plan:    RUR: 18%   Prior Level of Functioning: Independent   Disposition: IPR   If SNF or IPR: Date FOC offered: 12/8   Date [address] received: 12/8   Pending facility: [facility]   IPR; Accepted   [facility]  IPR; Denial   [facility]-Per MD pt is most appropriate for Home Health or Out Patient. I will close referral. Ramon Shanta Londono [phone]    Date authorization started with reference number: N/A  Date authorization received and expires: N/A   Follow up appointments: PCP  DME needed: Roberta Rm to be provided at the facility   Transportation at discharge: BLS   IM/IMM Medicare/ letter given:   Caregiver ContactCojordin Lora (Child)  [phone]   Discharge Caregiver contacted prior to discharge? N/A   Care Conference needed? No   Barriers to discharge: Medical, Placement, Cultures pending        12/08/23 0981   Services At/After Discharge   Transition of Care Consult (CM Consult) Discharge Planning;Acute Rehab   Mode of Transport at Discharge BLS   Confirm Follow Up Transport Family   Condition of Participation: Discharge Planning   The Plan for Transition of Care is related to the following treatment goals: Acute Rehab   The Patient and/or Patient Representative was provided with a Choice of Provider? Patient   The Patient and/Or Patient Representative agree with the Discharge Plan? Yes   Freedom of Choice list was provided with basic dialogue that supports the patient's individualized plan of care/goals, treatment preferences, and shares the quality data associated with the providers?   Yes
Transition of Care Plan:    RUR: 18%  Prior Level of Functioning: Independent   Disposition: IPR   If SNF or IPR: Date FOC offered: 12/8   Date [address] received: 12/8   Accepting facility: [facility] Follow up appointments: PCP   DME needed: None   Transportation at discharge: BLS; [place] 3:00pm   IM/IMM Medicare/ letter given: Received   Caregiver ContactVance Lose (Child)  [phone]  Discharge Caregiver contacted prior to discharge? Yes   Care Conference needed? No       Inpatient Rehab/ Hospital to Hospital Transition of Care Note /Discharge Note     Accepting Physician: Dr. Brooklyn Bennett     Discharging Physician: Dr. Richie Gamez     Accepting Representative: Harish Sor: [facility]  RN call to report: [phone]    Transport: Kenzie Jacobo up time: 3pm     Ambulance packet at bedside chart. The attending physician and the primary nurse were notified of the plan.
Transition of Care Plan:    RUR: 19%  Prior Level of Functioning: Independent   Disposition: IPR  If SNF or IPR: Date FOC offered: 12/8  Date [address] received: 12/8  Accepting facility: [facility] Date authorization started with reference number: N/A  Date authorization received and expires: N/A   Follow up appointments: PCP   DME needed: Wound Vac- to be provided at the facility   Transportation at discharge: BLS   IM/IMM Medicare/ letter given: Received   Caregiver ContactPattieachu Harrington (Child)   [phone]  Discharge Caregiver contacted prior to discharge? Care Conference needed?    Barriers to discharge: ID Recs,
ADLs/IADLs   Current Functional Level Independent in ADLs/IADLs   Can patient return to prior living arrangement Yes   Ability to make needs known: Good   Family able to assist with home care needs: Yes   Would you like for me to discuss the discharge plan with any other family members/significant others, and if so, who?  No   Financial Resources [employer] None

## 2023-12-12 NOTE — PLAN OF CARE
INFECTIOUS DISEASE discharge plan:    Diagnosis: Intra-abdominal infection    Antibiotic: ceftriaxone IV 2 g Q 24 H and flagyl  mg TID through 12/24/23    PICC line insertion for outpatient antibiotic. Routine PICC/ Yohan/ Portacath Care including PRN catheter flow management    Weekly labs with results fax to # [phone]. Call critical lab values to [phone]. [x] CBC w/diff, Chem 7   [] CPK  [] CRP, ESR  [] Vancomycin trough level goal  drawn every Monday and Thursday. Pharm D consult for Vancomycin dosing.      Home Health to pull PICC line after last dose or send to IR for Yohan removal    May send to IR for line evaluation or replacement PRN Phone # [phone]    Follow up with General Surgery
Problem: Chronic Conditions and Co-morbidities  Goal: Patient's chronic conditions and co-morbidity symptoms are monitored and maintained or improved  Outcome: Progressing  Flowsheets (Taken 12/6/2023 2020)  Care Plan - Patient's Chronic Conditions and Co-Morbidity Symptoms are Monitored and Maintained or Improved: Monitor and assess patient's chronic conditions and comorbid symptoms for stability, deterioration, or improvement     Problem: Pain  Goal: Verbalizes/displays adequate comfort level or baseline comfort level  Outcome: Progressing  Flowsheets (Taken 12/6/2023 2030)  Verbalizes/displays adequate comfort level or baseline comfort level: Assess pain using appropriate pain scale     Problem: Safety - Adult  Goal: Free from fall injury  Outcome: Progressing
Problem: Discharge Planning  Goal: Discharge to home or other facility with appropriate resources  12/12/2023 1342 by Mendel Brock, RN  Outcome: [address] Resolved Met  12/12/2023 0257 by Demetris Atkins RN  Outcome: Progressing     Problem: Chronic Conditions and Co-morbidities  Goal: Patient's chronic conditions and co-morbidity symptoms are monitored and maintained or improved  12/12/2023 1342 by Mendel Brock, RN  Outcome: [address] Resolved Met  12/12/2023 0257 by Demetris Atkins RN  Outcome: Progressing     Problem: Pain  Goal: Verbalizes/displays adequate comfort level or baseline comfort level  12/12/2023 1342 by Mendel Brock, RN  Outcome: [address] Resolved Met  12/12/2023 0257 by Demetris Atkins RN  Outcome: Progressing     Problem: Safety - Adult  Goal: Free from fall injury  12/12/2023 1342 by Mendel Brock, RN  Outcome: [address] Resolved Met  12/12/2023 0257 by Demetris Atkins RN  Outcome: Progressing     Problem: ABCDS Injury Assessment  Goal: Absence of physical injury  12/12/2023 1342 by Mendel Brock, RN  Outcome: [address] Resolved Met  12/12/2023 0257 by Demetris Atkins RN  Outcome: Progressing
Problem: Discharge Planning  Goal: Discharge to home or other facility with appropriate resources  Flowsheets (Taken 12/6/2023 0420)  Discharge to home or other facility with appropriate resources:   Identify barriers to discharge with patient and caregiver   Arrange for needed discharge resources and transportation as appropriate   Identify discharge learning needs (meds, wound care, etc)   Arrange for interpreters to assist at discharge as needed   Refer to discharge planning if patient needs post-hospital services based on physician order or complex needs related to functional status, cognitive ability or social support system     Problem: Pain  Goal: Verbalizes/displays adequate comfort level or baseline comfort level  Flowsheets (Taken 12/6/2023 0420)  Verbalizes/displays adequate comfort level or baseline comfort level:   Encourage patient to monitor pain and request assistance   Administer analgesics based on type and severity of pain and evaluate response   Implement non-pharmacological measures as appropriate and evaluate response   Consider cultural and social influences on pain and pain management   Notify Licensed Independent Practitioner if interventions unsuccessful or patient reports new pain   Assess pain using appropriate pain scale     Problem: Safety - Adult  Goal: Free from fall injury  Flowsheets (Taken 12/6/2023 0420)  Free From Fall Injury:   Based on caregiver fall risk screen, instruct family/caregiver to ask for assistance with transferring infant if caregiver noted to have fall risk factors   Instruct family/caregiver on patient safety
Problem: Discharge Planning  Goal: Discharge to home or other facility with appropriate resources  Outcome: Progressing
Problem: Discharge Planning  Goal: Discharge to home or other facility with appropriate resources  Outcome: Progressing
Problem: Discharge Planning  Goal: Discharge to home or other facility with appropriate resources  Outcome: Progressing     Problem: Chronic Conditions and Co-morbidities  Goal: Patient's chronic conditions and co-morbidity symptoms are monitored and maintained or improved  Outcome: Progressing     Problem: Pain  Goal: Verbalizes/displays adequate comfort level or baseline comfort level  Outcome: Progressing     Problem: Safety - Adult  Goal: Free from fall injury  Outcome: Progressing     Problem: ABCDS Injury Assessment  Goal: Absence of physical injury  Outcome: Progressing
Problem: Discharge Planning  Goal: Discharge to home or other facility with appropriate resources  Outcome: Progressing     Problem: Discharge Planning  Goal: Discharge to home or other facility with appropriate resources  Outcome: Progressing
Problem: Discharge Planning  Goal: Discharge to home or other facility with appropriate resources  Outcome: Progressing  Flowsheets (Taken 12/5/2023 3814)  Discharge to home or other facility with appropriate resources: Identify barriers to discharge with patient and caregiver
Problem: Occupational Therapy - Adult  Goal: By Discharge: Performs self-care activities at highest level of function for planned discharge setting. See evaluation for individualized goals. Description: FUNCTIONAL STATUS PRIOR TO ADMISSION:  Pt was independent and active   , ADL Assistance: Independent,  ,  ,  ,  ,  , Homemaking Assistance: Independent, Ambulation Assistance: Independent, Transfer Assistance: Independent, Active : Yes     HOME SUPPORT: Patient lived alone. Son lives in [place] and daughter in [address] [place] [address] Goals:  Initiated 12/8/2023  1. Patient will perform lower body dressing with Modified [place] within 7 day(s). 2.  Patient will perform bathing with Modified [place] within 7 day(s). 3.  Patient will perform toilet transfers with Modified [place]  within 7 day(s). 4.  Patient will perform all aspects of toileting with Modified [place] within 7 day(s). 5.  Patient will participate in upper extremity therapeutic exercise/activities with Modified [place] for 15 minutes within 7 day(s). Outcome: Progressing    OCCUPATIONAL THERAPY TREATMENT  Patient: Mahendra Glass (49 y.o. male)  Date: 12/11/2023  Primary Diagnosis: Abdominal wall abscess [L02.211]  Abdominal wall cellulitis [L03.311]  Procedure(s) (LRB):  EXPLORATION AND EVACUATION OF INFECTED ABDOMINAL WALL HEMATOMA AND EXPLANTATION OF INFECTED MESH, [address] OUT (N/A) 5 Days Post-Op   Precautions: Fall Risk (wound vac)                Chart, occupational therapy assessment, plan of care, and goals were reviewed. ASSESSMENT  Patient continues to benefit from skilled OT services and is progressing towards goals. Pt noted with decreased distal reaching secondary to abdominal abscess; therefore, provided with AE kit and education on technique; good understanding demonstrated, with pt progressing to Supervision for seated aspects of LE dressing.   Continue skilled OT during acute hospitalization,
Problem: Occupational Therapy - Adult  Goal: By Discharge: Performs self-care activities at highest level of function for planned discharge setting. See evaluation for individualized goals. Description: FUNCTIONAL STATUS PRIOR TO ADMISSION:  Pt was independent and active   , ADL Assistance: Independent,  ,  ,  ,  ,  , Homemaking Assistance: Independent, Ambulation Assistance: Independent, Transfer Assistance: Independent, Active : Yes     HOME SUPPORT: Patient lived alone. Son lives in [place] and daughter in [address] [place] [address] Goals:  Initiated 12/8/2023  1. Patient will perform lower body dressing with Modified [place] within 7 day(s). 2.  Patient will perform bathing with Modified [place] within 7 day(s). 3.  Patient will perform toilet transfers with Modified [place]  within 7 day(s). 4.  Patient will perform all aspects of toileting with Modified [place] within 7 day(s). 5.  Patient will participate in upper extremity therapeutic exercise/activities with Modified [place] for 15 minutes within 7 day(s). Outcome: Progressing   OCCUPATIONAL THERAPY EVALUATION    Patient: Sebastian Jc (23 y.o. male)  Date: 12/8/2023  Primary Diagnosis: Abdominal wall abscess [L02.211]  Abdominal wall cellulitis [L03.311]  Procedure(s) (LRB):  EXPLORATION AND EVACUATION OF INFECTED ABDOMINAL WALL HEMATOMA AND EXPLANTATION OF INFECTED MESH, [address] OUT (N/A) 2 Days Post-Op     Precautions: Fall Risk (wound vac)                  ASSESSMENT :  The patient is limited by decreased functional mobility, independence in ADLs, high-level IADLs, strength, activity tolerance, endurance, balance. Based on the impairments listed above pt presents at [facility] to min assist level with mobility and self-care. Pt motivated to participate and supportive son present in the room. Prior to admission, pt very active and independent and living alone. Pt is below his baseline and now has wound vac.   Feel
Problem: Physical Therapy - Adult  Goal: By Discharge: Performs mobility at highest level of function for planned discharge setting. See evaluation for individualized goals. Description: FUNCTIONAL STATUS PRIOR TO ADMISSION: Patient was independent with recent use of SPC outside of the home, none inside the home, no recent falls, driving. HOME SUPPORT PRIOR TO ADMISSION: The patient lived alone. Has a son in [place] and daughter in [place]. Physical Therapy Goals  Initiated 12/7/2023  1. Patient will move from supine to sit and sit to supine in bed with modified independence within 7 day(s). 2.  Patient will perform sit to stand with modified independence within 7 day(s). 3.  Patient will transfer from bed to chair and chair to bed with modified independence using the least restrictive device within 7 day(s). 4.  Patient will ambulate with modified independence for 300 feet with the least restrictive device within 7 day(s). 5.  Patient will ascend/descend 4 stairs with  handrail(s) with modified independence within 7 day(s). Outcome: Progressing  PHYSICAL THERAPY TREATMENT    Patient: Trixie Johnson (68 y.o. male)  Date: 12/8/2023  Diagnosis: Abdominal wall abscess [L02.211]  Abdominal wall cellulitis [L03.311] Abdominal wall cellulitis  Procedure(s) (LRB):  EXPLORATION AND EVACUATION OF INFECTED ABDOMINAL WALL HEMATOMA AND EXPLANTATION OF INFECTED MESH, [address] OUT (N/A) 2 Days Post-Op  Precautions: Fall Risk (wound vac)                    ASSESSMENT:  Patient continues to benefit from skilled PT services and is progressing towards goals. Pt pleasant, agreeable, and motivated to work with therapy. Pt received in chair and able to transfer and progress ambulation. He did require use of RW (SPC at baseline), and required assistance for wound vac management. Pt lives alone and presents below his baseline function. Recommending short stay at [facility] prior to returning home.          PLAN:  Patient continues to
Problem: Safety - Adult  Goal: Free from fall injury  12/11/2023 0624 by Gold Yi RN  Outcome: Progressing  12/10/2023 1705 by Quin Walker RN  Outcome: Progressing     Problem: ABCDS Injury Assessment  Goal: Absence of physical injury  12/11/2023 0624 by Gold Yi RN  Outcome: Progressing  12/10/2023 1705 by Quin Walker RN  Outcome: Progressing
Problem: Safety - Adult  Goal: Free from fall injury  12/9/2023 0218 by Marii Trinh RN  Outcome: Progressing  12/8/2023 1516 by Savana Smith RN  Outcome: Progressing     Problem: ABCDS Injury Assessment  Goal: Absence of physical injury  12/9/2023 0218 by Marii Trinh RN  Outcome: Progressing  12/8/2023 1516 by Savana Smith RN  Outcome: Progressing
Problem: Safety - Adult  Goal: Free from fall injury  Outcome: Progressing     Problem: ABCDS Injury Assessment  Goal: Absence of physical injury  Outcome: Progressing
Length: Left shortened;Right shortened  Gait Abnormalities: Other (comment) (foward flexed posture w/ lean onto RW and body position posterior to walker frame - improves w/ instruction and cues provided)                                                                                                                                                                                                                                           Barthel Index:    Barthel Index Scale  Feeding: Independent, Able to apply any necessary device. Feeds in reasonable time  Bathing: Cannot perform activity  Grooming: Washes face, pacheco hair, brushes teeth, shaves (manages plug if electric razor)  Dressing: Cannot perform activity  Bowel Control: No accidents. Able to use enema or suppository if needed  Bladder Control: No accidents. Able to care for collecting device, if used  Toilet Transfers: Needs help for balance, handling clothes or toilet paper  Chair/Bed Trannsfers: Minimum assistance or supervision required  Ambulation: Cannot perform activity  Stairs: Cannot perform activity  Total Barthel Index Score: 50       The Barthel ADL Index: Guidelines  1. The index should be used as a record of what a patient does, not as a record of what a patient could do. 2. The main aim is to establish degree of independence from any help, physical or verbal, however minor and for whatever reason. 3. The need for supervision renders the patient not independent. 4. A patient's performance should be established using the best available evidence. Asking the patient, friends/relatives and nurses are the usual sources, but direct observation and common sense are also important. However direct testing is not needed. 5. Usually the patient's performance over the preceding 24-48 hours is important, but occasionally longer periods will be relevant. 6. Middle categories imply that the patient supplies over 50 per cent of the effort.   7. Use of aids to

## 2023-12-12 NOTE — WOUND CARE
Discharge planning today for rehab. He is mobile around room and toileting. VAC was pulled off last night and saline-damp packing in place. All packing removed and 4 rolls of Vashe-damp gauze placed with dry cover dressing. Wound assessment unchanged from yesterday - no concerns. NPWT to be re-started at rehab facility @ 125 mmHg suction with twice weekly dressing changes.    RAQUEL Thompson

## 2023-12-25 LAB
BACTERIA SPEC CULT: NORMAL
SERVICE CMNT-IMP: NORMAL

## 2023-12-27 ENCOUNTER — OFFICE VISIT (OUTPATIENT)
Facility: CLINIC | Age: 88
End: 2023-12-27

## 2023-12-27 VITALS
TEMPERATURE: 98.4 F | SYSTOLIC BLOOD PRESSURE: 148 MMHG | DIASTOLIC BLOOD PRESSURE: 79 MMHG | WEIGHT: 215 LBS | RESPIRATION RATE: 18 BRPM | HEART RATE: 74 BPM | HEIGHT: 70 IN | BODY MASS INDEX: 30.78 KG/M2 | OXYGEN SATURATION: 96 %

## 2023-12-27 DIAGNOSIS — I10 PRIMARY HYPERTENSION: ICD-10-CM

## 2023-12-27 DIAGNOSIS — E03.9 ACQUIRED HYPOTHYROIDISM: ICD-10-CM

## 2023-12-27 DIAGNOSIS — E11.51 TYPE 2 DIABETES MELLITUS WITH PERIPHERAL VASCULAR DISEASE (HCC): Primary | ICD-10-CM

## 2023-12-27 DIAGNOSIS — L02.211 ABDOMINAL WALL ABSCESS: ICD-10-CM

## 2023-12-27 DIAGNOSIS — S30.1XXS ABDOMINAL WALL SEROMA, SEQUELA: ICD-10-CM

## 2023-12-27 DIAGNOSIS — I48.20 CHRONIC ATRIAL FIBRILLATION (HCC): ICD-10-CM

## 2023-12-27 DIAGNOSIS — Z95.0 S/P PLACEMENT OF CARDIAC PACEMAKER: ICD-10-CM

## 2023-12-27 LAB
ALBUMIN SERPL-MCNC: 2.8 G/DL (ref 3.5–5)
ALBUMIN/GLOB SERPL: 0.8 (ref 1.1–2.2)
ALP SERPL-CCNC: 155 U/L (ref 45–117)
ALT SERPL-CCNC: 31 U/L (ref 12–78)
ANION GAP SERPL CALC-SCNC: 8 MMOL/L (ref 5–15)
AST SERPL-CCNC: 33 U/L (ref 15–37)
BASOPHILS # BLD: 0.1 K/UL (ref 0–0.1)
BASOPHILS NFR BLD: 1 % (ref 0–1)
BILIRUB SERPL-MCNC: 0.6 MG/DL (ref 0.2–1)
BUN SERPL-MCNC: 46 MG/DL (ref 6–20)
BUN/CREAT SERPL: 29 (ref 12–20)
CALCIUM SERPL-MCNC: 8.6 MG/DL (ref 8.5–10.1)
CHLORIDE SERPL-SCNC: 102 MMOL/L (ref 97–108)
CHOLEST SERPL-MCNC: 107 MG/DL
CO2 SERPL-SCNC: 29 MMOL/L (ref 21–32)
CREAT SERPL-MCNC: 1.59 MG/DL (ref 0.7–1.3)
DIFFERENTIAL METHOD BLD: ABNORMAL
EOSINOPHIL # BLD: 0.5 K/UL (ref 0–0.4)
EOSINOPHIL NFR BLD: 4 % (ref 0–7)
ERYTHROCYTE [DISTWIDTH] IN BLOOD BY AUTOMATED COUNT: 16.1 % (ref 11.5–14.5)
EST. AVERAGE GLUCOSE BLD GHB EST-MCNC: 126 MG/DL
GLOBULIN SER CALC-MCNC: 3.4 G/DL (ref 2–4)
GLUCOSE SERPL-MCNC: 196 MG/DL (ref 65–100)
HBA1C MFR BLD: 6 % (ref 4–5.6)
HCT VFR BLD AUTO: 41.7 % (ref 36.6–50.3)
HDLC SERPL-MCNC: 39 MG/DL
HDLC SERPL: 2.7 (ref 0–5)
HGB BLD-MCNC: 12.8 G/DL (ref 12.1–17)
IMM GRANULOCYTES # BLD AUTO: 0.1 K/UL (ref 0–0.04)
IMM GRANULOCYTES NFR BLD AUTO: 1 % (ref 0–0.5)
INR PPP: 1.3 (ref 0.9–1.1)
LDLC SERPL CALC-MCNC: 48.4 MG/DL (ref 0–100)
LYMPHOCYTES # BLD: 1.8 K/UL (ref 0.8–3.5)
LYMPHOCYTES NFR BLD: 16 % (ref 12–49)
MCH RBC QN AUTO: 32.4 PG (ref 26–34)
MCHC RBC AUTO-ENTMCNC: 30.7 G/DL (ref 30–36.5)
MCV RBC AUTO: 105.6 FL (ref 80–99)
MONOCYTES # BLD: 1.5 K/UL (ref 0–1)
MONOCYTES NFR BLD: 14 % (ref 5–13)
NEUTS SEG # BLD: 7 K/UL (ref 1.8–8)
NEUTS SEG NFR BLD: 64 % (ref 32–75)
NRBC # BLD: 0 K/UL (ref 0–0.01)
NRBC BLD-RTO: 0 PER 100 WBC
PLATELET # BLD AUTO: 225 K/UL (ref 150–400)
PMV BLD AUTO: 11.1 FL (ref 8.9–12.9)
POTASSIUM SERPL-SCNC: 4.7 MMOL/L (ref 3.5–5.1)
PROT SERPL-MCNC: 6.2 G/DL (ref 6.4–8.2)
PROTHROMBIN TIME: 13 SEC (ref 9–11.1)
RBC # BLD AUTO: 3.95 M/UL (ref 4.1–5.7)
SODIUM SERPL-SCNC: 139 MMOL/L (ref 136–145)
TRIGL SERPL-MCNC: 98 MG/DL
TSH SERPL DL<=0.05 MIU/L-ACNC: 1.76 UIU/ML (ref 0.36–3.74)
VLDLC SERPL CALC-MCNC: 19.6 MG/DL
WBC # BLD AUTO: 10.9 K/UL (ref 4.1–11.1)

## 2023-12-27 SDOH — ECONOMIC STABILITY: INCOME INSECURITY: HOW HARD IS IT FOR YOU TO PAY FOR THE VERY BASICS LIKE FOOD, HOUSING, MEDICAL CARE, AND HEATING?: NOT HARD AT ALL

## 2023-12-27 SDOH — ECONOMIC STABILITY: FOOD INSECURITY: WITHIN THE PAST 12 MONTHS, YOU WORRIED THAT YOUR FOOD WOULD RUN OUT BEFORE YOU GOT MONEY TO BUY MORE.: NEVER TRUE

## 2023-12-27 SDOH — ECONOMIC STABILITY: FOOD INSECURITY: WITHIN THE PAST 12 MONTHS, THE FOOD YOU BOUGHT JUST DIDN'T LAST AND YOU DIDN'T HAVE MONEY TO GET MORE.: NEVER TRUE

## 2023-12-27 SDOH — ECONOMIC STABILITY: HOUSING INSECURITY
IN THE LAST 12 MONTHS, WAS THERE A TIME WHEN YOU DID NOT HAVE A STEADY PLACE TO SLEEP OR SLEPT IN A SHELTER (INCLUDING NOW)?: NO

## 2023-12-27 ASSESSMENT — PATIENT HEALTH QUESTIONNAIRE - PHQ9
SUM OF ALL RESPONSES TO PHQ9 QUESTIONS 1 & 2: 0
1. LITTLE INTEREST OR PLEASURE IN DOING THINGS: 0
SUM OF ALL RESPONSES TO PHQ QUESTIONS 1-9: 0
SUM OF ALL RESPONSES TO PHQ QUESTIONS 1-9: 0
2. FEELING DOWN, DEPRESSED OR HOPELESS: 0
SUM OF ALL RESPONSES TO PHQ QUESTIONS 1-9: 0
SUM OF ALL RESPONSES TO PHQ QUESTIONS 1-9: 0

## 2023-12-27 NOTE — PROGRESS NOTES
Chief Complaint   Patient presents with    Follow-Up from Hospital         Health Maintenance Due   Topic Date Due    Respiratory Syncytial Virus (RSV) Pregnant or age 60 yrs+ (1 - 1-dose 60+ series) Never done    Flu vaccine (1) 08/01/2023    COVID-19 Vaccine (5 - 2023-24 season) 09/01/2023         \"Have you been to the ER, urgent care clinic since your last visit?  Hospitalized since your last visit?\"    Yes, on file     “Have you seen or consulted any other health care providers outside of [facility] since your last visit?”    NO

## 2023-12-27 NOTE — PROGRESS NOTES
Maryan Resendiz (: 1935) is a 88 y.o. male here for evaluation of the following chief complaint(s):  Follow-Up from Hospital           SUBJECTIVE/OBJECTIVE:  HPI-     His son August, is with him today.      Past medical history includes renal artery stenosis, peripheral vascular disease, hypertension, chronic atrial fibrillation, coronary artery disease with pacemaker, insulin-dependent type 2 diabetes, hypothyroidism, BPH, gout, long-term use of warfarin, anemia, hyperlipidemia     Warfarin-  2.5 mg coumadin daily    Wound care-  pump-  twice a week-  [employer] [facility] [phone]-  coming to house-  nurse is coming out today to reset the pump.    Antibiotics stopped 2023-      Lantus 40-45 units at dinner time 5-6 pm     has f/u with surgery.  Allergies   Allergen Reactions    Latex Other (See Comments)     Skin raw and severely irritated    Aspirin      Other reaction(s): Unknown (comments)    Hydrocodone Other (See Comments)     hallucinations    Oxycodone Other (See Comments)     hallucinations    Sulfa Antibiotics Rash    Tetracycline Rash       Current Outpatient Medications   Medication Sig Dispense Refill    FABB 2.2-25-1 MG TABS tablet TAKE 1 TABLET BY MOUTH DAILY 90 tablet 0    levothyroxine (SYNTHROID) 175 MCG tablet TAKE 1 TABLET BY MOUTH EVERY DAY 90 tablet 0    warfarin (COUMADIN) 5 MG tablet Take 5 mg by mouth 12/12 PM. (Patient taking differently: Take 0.5 tablets by mouth daily Take 5 mg by mouth 12/12 PM.) 1 tablet 0    blood glucose test strips (FREESTYLE TEST STRIPS) strip Use to test blood sugar three times daily DX:E11.51 300 strip 3    furosemide (LASIX) 40 MG tablet Take 2 tablets by mouth daily 180 tablet 1    B-D ULTRAFINE III SHORT PEN 31G X 8 MM MISC USE TO INJECT INSULIN UNDER THE SKIN FOUR TIMES DAILY AS DIRECTED 100 each 1    spironolactone (ALDACTONE) 25 MG tablet TAKE 1 TABLET DAILY 90 tablet 3    Flaxseed Oil OIL Take 1,000 mg by mouth daily

## 2024-01-01 LAB
BACTERIA SPEC CULT: NORMAL
SERVICE CMNT-IMP: NORMAL

## 2024-01-08 ENCOUNTER — OFFICE VISIT (OUTPATIENT)
Age: 89
End: 2024-01-08

## 2024-01-08 VITALS
DIASTOLIC BLOOD PRESSURE: 64 MMHG | HEART RATE: 76 BPM | TEMPERATURE: 97.7 F | BODY MASS INDEX: 30.21 KG/M2 | WEIGHT: 211 LBS | SYSTOLIC BLOOD PRESSURE: 106 MMHG | HEIGHT: 70 IN | OXYGEN SATURATION: 97 %

## 2024-01-08 DIAGNOSIS — Z51.89 VISIT FOR WOUND CHECK: Primary | ICD-10-CM

## 2024-01-08 LAB
BACTERIA SPEC CULT: NORMAL
SERVICE CMNT-IMP: NORMAL

## 2024-01-08 PROCEDURE — 99024 POSTOP FOLLOW-UP VISIT: CPT | Performed by: SURGERY

## 2024-01-08 ASSESSMENT — PATIENT HEALTH QUESTIONNAIRE - PHQ9
SUM OF ALL RESPONSES TO PHQ QUESTIONS 1-9: 0
2. FEELING DOWN, DEPRESSED OR HOPELESS: 0
SUM OF ALL RESPONSES TO PHQ QUESTIONS 1-9: 0
1. LITTLE INTEREST OR PLEASURE IN DOING THINGS: 0
SUM OF ALL RESPONSES TO PHQ QUESTIONS 1-9: 0
SUM OF ALL RESPONSES TO PHQ9 QUESTIONS 1 & 2: 0
SUM OF ALL RESPONSES TO PHQ QUESTIONS 1-9: 0

## 2024-01-08 NOTE — PROGRESS NOTES
Surgery Progress Note    1/8/2024    CC: Wound    Subjective:     Patient is status post evacuation of infected hematoma and mesh 1 month ago by Dr. Zapata.  Has wound VAC in place.  Off antibiotics as of Christmas.  Doing well.  Twice week dressing changes.    Constitutional: No fever or chills  Neurologic: No headache  Eyes: No scleral icterus or irritated eyes  Nose: No nasal pain or drainage  Mouth: No oral lesions or sore throat  Cardiac: No palpations or chest pain  Pulmonary: No cough or shortness of breath  Gastrointestinal: No nausea, emesis, diarrhea, or constipation  Genitourinary: No dysuria  Musculoskeletal: No muscle or joint tenderness  Skin: No rashes or lesions  Psychiatric: No anxiety or depressed mood    Objective:     Vitals:    01/08/24 0930   BP: 106/64   Pulse: 76   Temp: 97.7 °F (36.5 °C)   SpO2: 97%     Body mass index is 30.28 kg/m².      General: No acute distress, conversant  Eyes: PERRLA, no scleral icterus  HENT: Normocephalic without oral lesions  Neck: Trachea midline without LAD  Cardiac: Normal pulse rate and rhythm  Pulmonary: Symmetric chest rise with normal effort  GI: Soft, NT, ND, no hernia, no splenomegaly  Skin: Right lower quadrant wound about 3 cm deep, 5 cm cephalad caudad, and 12 cm wide, healthy granulation base at the bottom  Extremities: No edema or joint stiffness  Psych: Appropriate mood and affect    Assessment:     88-year-old male doing well with wound VAC therapy after evacuation of infected mesh and abscess    Plan:     Okay to stop white sponge therapy.  Continue black sponge.  I will see him back in 1 month.      Ricky Oneill MD, FACS, [facility]  Bariatric and General Surgeon  [facility]

## 2024-01-08 NOTE — PROGRESS NOTES
Identified patient with two patient identifiers (name and ). Reviewed chart in preparation for visit and have obtained necessary documentation.    Maryan Resendiz is a 88 y.o. male  No chief complaint on file.    /64 (Site: Right Upper Arm, Position: Sitting, Cuff Size: Medium Adult)   Pulse 76   Temp 97.7 °F (36.5 °C) (Oral)   Ht 1.778 m (5' 10\")   Wt 95.7 kg (211 lb)   SpO2 97%   BMI 30.28 kg/m²     1. Have you been to the ER, urgent care clinic since your last visit?  Hospitalized since your last visit?no    2. Have you seen or consulted any other health care providers outside of the [facility] since your last visit?  Include any pap smears or colon screening. no

## 2024-01-11 NOTE — TELEPHONE ENCOUNTER
PCP: Manuel Carvalho APRN - NP    Last appt: 12/27/2023    Future Appointments   Date Time Provider Department Center   2/6/2024  9:00 AM Ricky Oneill MD [facility] BS AMB   2/14/2024  8:40 AM LAB ONLY [facility]   2/19/2024 10:00 AM Manuel Carvalho APRN - NP [facility]       Requested Prescriptions     Pending Prescriptions Disp Refills    metoprolol (LOPRESSOR) 100 MG tablet [Pharmacy Med Name: METOPROLOL TARTRATE TABS 100MG] 180 tablet 3     Sig: TAKE 1 TABLET TWICE A DAY

## 2024-01-12 RX ORDER — METOPROLOL TARTRATE 100 MG/1
100 TABLET ORAL 2 TIMES DAILY
Qty: 180 TABLET | Refills: 3 | Status: SHIPPED | OUTPATIENT
Start: 2024-01-12

## 2024-01-22 RX ORDER — INSULIN GLARGINE 100 [IU]/ML
45 INJECTION, SOLUTION SUBCUTANEOUS NIGHTLY
Qty: 10 ML | Refills: 3 | Status: SHIPPED | OUTPATIENT
Start: 2024-01-22

## 2024-01-22 NOTE — TELEPHONE ENCOUNTER
PCP: Manuel Carvalho APRN - NP    Last appt: 12/27/2023  Future Appointments   Date Time Provider Department Center   2/6/2024  9:00 AM Ricky Oneill MD [facility]   2/14/2024  8:40 AM LAB ONLY [facility]   2/19/2024 10:00 AM [facility]       Requested Prescriptions     Pending Prescriptions Disp Refills    insulin glargine (LANTUS) 100 UNIT/ML injection vial 10 mL 0     Sig: Inject 45 Units into the skin nightly       Prior labs and Blood pressures:  BP Readings from Last 3 Encounters:   01/08/24 106/64   12/27/23 (!) 148/79   12/12/23 (!) 134/57     Lab Results   Component Value Date/Time     12/27/2023 12:16 PM    K 4.7 12/27/2023 12:16 PM     12/27/2023 12:16 PM    CO2 29 12/27/2023 12:16 PM    BUN 46 12/27/2023 12:16 PM    GFRAA 44 07/29/2022 10:24 AM     No results found for: \"HBA1C\", \"MVC9HWIE\"  Lab Results   Component Value Date/Time    CHOL 107 12/27/2023 12:16 PM    HDL 39 12/27/2023 12:16 PM    VLDL 36 06/11/2020 08:40 AM     No results found for: \"VITD3\", \"VD3RIA\"        Lab Results   Component Value Date/Time    TSH 0.85 02/06/2023 08:11 AM

## 2024-01-22 NOTE — TELEPHONE ENCOUNTER
Pharmacy: [facility] TEAM    insulin glargine (LANTUS) 100 UNIT/ML injection vial [[id number]]    Order Details  Dose: 45 Units Route: SubCUTAneous Frequency: NIGHTLY   Dispense Quantity: 10 mL Refills: 0          Sig: Inject 45 Units into the skin nightly

## 2024-01-26 ASSESSMENT — ENCOUNTER SYMPTOMS
SINUS PAIN: 0
CHOKING: 0
NAUSEA: 0
EYE ITCHING: 0
ABDOMINAL PAIN: 0
APNEA: 0
COUGH: 0
SINUS PRESSURE: 0
CONSTIPATION: 0
VOMITING: 0
DIARRHEA: 0
STRIDOR: 0
FACIAL SWELLING: 0
BLOOD IN STOOL: 0
SORE THROAT: 0
EYE PAIN: 0
RECTAL PAIN: 0
BACK PAIN: 0
WHEEZING: 0
ANAL BLEEDING: 0
COLOR CHANGE: 0
TROUBLE SWALLOWING: 0

## 2024-01-26 NOTE — TELEPHONE ENCOUNTER
PCP: Pal Gonzales MD    Last appt: 4/26/2022  Future Appointments   Date Time Provider Gail De La Fuente   7/29/2022  9:30 AM Pal Gonzales MD [facility] AMB       Requested Prescriptions     Pending Prescriptions Disp Refills    furosemide (LASIX) 40 mg tablet 180 Tablet 1     Sig: TAKE 2 TABLETS BY MOUTH DAILY       Prior labs and Blood pressures:  BP Readings from Last 3 Encounters:   04/26/22 136/86   01/12/22 126/70   06/23/21 132/76     Lab Results   Component Value Date/Time    Sodium 136 04/26/2022 04:48 PM    Potassium 4.7 04/26/2022 04:48 PM    Chloride 101 04/26/2022 04:48 PM    CO2 28 04/26/2022 04:48 PM    Anion gap 7 04/26/2022 04:48 PM    Glucose 207 (H) 04/26/2022 04:48 PM    BUN 62 (H) 04/26/2022 04:48 PM    Creatinine 1.87 (H) 04/26/2022 04:48 PM    BUN/Creatinine ratio 33 (H) 04/26/2022 04:48 PM    GFR est AA 42 (L) 04/26/2022 04:48 PM    GFR est non-AA 35 (L) 04/26/2022 04:48 PM    Calcium 9.2 04/26/2022 04:48 PM     Lab Results   Component Value Date/Time    Hemoglobin A1c 7.1 (H) 01/12/2022 11:33 AM    Hemoglobin A1c (POC) 7.1 (A) 06/18/2018 01:56 PM     Lab Results   Component Value Date/Time    Cholesterol, total 127 01/12/2022 11:33 AM    Cholesterol (POC) 116 06/18/2018 01:56 PM    HDL Cholesterol 42 01/12/2022 11:33 AM    HDL Cholesterol (POC) 35 06/18/2018 01:56 PM    LDL Cholesterol (POC) 35.4 06/18/2018 01:56 PM    LDL, calculated 59.8 01/12/2022 11:33 AM    VLDL, calculated 25.2 01/12/2022 11:33 AM    Triglyceride 126 01/12/2022 11:33 AM    Triglycerides (POC) 228 (A) 06/18/2018 01:56 PM    CHOL/HDL Ratio 3.0 01/12/2022 11:33 AM     No results found for: THONY Ahn    Lab Results   Component Value Date/Time    TSH 3.24 01/12/2022 11:33 AM    TSH, 3rd generation 2.28 06/11/2020 08:39 AM
PCP: Queen Jackie MD    Last appt: 4/26/2022  Future Appointments   Date Time Provider Gail De La Fuente   7/29/2022  9:30 AM Queen Jackie MD [facility] AMB       Requested Prescriptions     Pending Prescriptions Disp Refills    furosemide (LASIX) 40 mg tablet 180 Tablet 1     Sig: TAKE 2 TABLETS BY MOUTH DAILY         Other Comments:
no

## 2024-02-06 ENCOUNTER — OFFICE VISIT (OUTPATIENT)
Age: 89
End: 2024-02-06

## 2024-02-06 VITALS
SYSTOLIC BLOOD PRESSURE: 143 MMHG | OXYGEN SATURATION: 95 % | RESPIRATION RATE: 20 BRPM | BODY MASS INDEX: 30.21 KG/M2 | WEIGHT: 211 LBS | HEIGHT: 70 IN | DIASTOLIC BLOOD PRESSURE: 74 MMHG | HEART RATE: 74 BPM | TEMPERATURE: 97.7 F

## 2024-02-06 DIAGNOSIS — Z51.89 VISIT FOR WOUND CHECK: Primary | ICD-10-CM

## 2024-02-06 PROCEDURE — 99024 POSTOP FOLLOW-UP VISIT: CPT | Performed by: SURGERY

## 2024-02-06 ASSESSMENT — PATIENT HEALTH QUESTIONNAIRE - PHQ9
1. LITTLE INTEREST OR PLEASURE IN DOING THINGS: 0
SUM OF ALL RESPONSES TO PHQ QUESTIONS 1-9: 0
2. FEELING DOWN, DEPRESSED OR HOPELESS: 0
SUM OF ALL RESPONSES TO PHQ9 QUESTIONS 1 & 2: 0
SUM OF ALL RESPONSES TO PHQ QUESTIONS 1-9: 0

## 2024-02-06 NOTE — PROGRESS NOTES
Surgery Progress Note    2/6/2024    CC: wound check    Subjective:     Patient has been tolerating the wound VAC.  No acute issues.  Hopeful to get it off soon.    Constitutional: No fever or chills  Neurologic: No headache  Eyes: No scleral icterus or irritated eyes  Nose: No nasal pain or drainage  Mouth: No oral lesions or sore throat  Cardiac: No palpations or chest pain  Pulmonary: No cough or shortness of breath  Gastrointestinal: No nausea, emesis, diarrhea, or constipation  Genitourinary: No dysuria  Musculoskeletal: No muscle or joint tenderness  Skin: No rashes or lesions  Psychiatric: No anxiety or depressed mood    Objective:     Vitals:    02/06/24 0851   BP: (!) 143/74   Pulse: 74   Resp: 20   Temp: 97.7 °F (36.5 °C)   SpO2: 95%     Body mass index is 30.28 kg/m².      General: No acute distress, conversant  Eyes: PERRLA, no scleral icterus  HENT: Normocephalic without oral lesions  Neck: Trachea midline without LAD  Cardiac: Normal pulse rate and rhythm  Pulmonary: Symmetric chest rise with normal effort  GI: Soft, NT, ND, no hernia, no splenomegaly  Skin: Right lower quadrant wound about 2 x 5 cm in length and superficial right at skin edge, expected skin retraction  Extremities: No edema or joint stiffness  Psych: Appropriate mood and affect    Assessment:     88-year-old male responding well to VAC therapy after removal of infected mesh    Plan:     Okay to DC VAC.  Adaptic and gauze placed.  Okay to shower and get water in the wound.  Change dressing daily with either Aquacel or Adaptic per home wound care.  I am sure patient can learn to do this himself.  Can follow-up with us in 3 to 4 weeks      Ricky Oneill MD, FACS, [facility]  Bariatric and General Surgeon  [facility]

## 2024-02-06 NOTE — PATIENT INSTRUCTIONS
Wound VAC services now complete.    Okay to shower and get water and soap in and on the wound.    Dress wound daily with either Aquacel or Adaptic over the raw surface to maintain moisture followed by gauze and tape.    I will see him back in 3 to 4 weeks.

## 2024-02-06 NOTE — PROGRESS NOTES
Identified patient with two patient identifiers (name and ). Reviewed chart in preparation for visit and have obtained necessary documentation.    Maryan Resendiz is a 88 y.o. male  Chief Complaint   Patient presents with    Wound Check     8 weeks s/p EXPLORATION AND EVACUATION OF INFECTED ABDOMINAL WALL HEMATOMA AND EXPLANTATION OF INFECTED MESH, WASH OUT      BP (!) 143/74 (Site: Left Upper Arm, Position: Sitting, Cuff Size: Small Adult)   Pulse 74   Temp 97.7 °F (36.5 °C) (Oral)   Resp 20   Ht 1.778 m (5' 10\")   Wt 95.7 kg (211 lb)   SpO2 95%   BMI 30.28 kg/m²     1. Have you been to the ER, urgent care clinic since your last visit?  Hospitalized since your last visit?no    2. Have you seen or consulted any other health care providers outside of the [facility] since your last visit?  Include any pap smears or colon screening. no

## 2024-02-12 RX ORDER — LOVASTATIN 40 MG/1
40 TABLET ORAL DAILY
Qty: 90 TABLET | Refills: 3 | Status: SHIPPED | OUTPATIENT
Start: 2024-02-12

## 2024-02-12 NOTE — TELEPHONE ENCOUNTER
PCP: Manuel Carvalho APRN - NP    Last appt: 12/27/2023  Future Appointments   Date Time Provider Department Center   2/14/2024  8:40 AM LAB ONLY [facility] AMB   2/19/2024 10:00 AM Manuel Carvalho APRN - NP [facility]   3/5/2024  9:30 AM Ricky Oneill MD [facility] AMB       Requested Prescriptions     Pending Prescriptions Disp Refills    lovastatin (MEVACOR) 40 MG tablet [Pharmacy Med Name: LOVASTATIN TABS 40MG] 90 tablet 3     Sig: TAKE 1 TABLET DAILY       Prior labs and Blood pressures:  BP Readings from Last 3 Encounters:   02/06/24 (!) 143/74   01/08/24 106/64   12/27/23 (!) 148/79     Lab Results   Component Value Date/Time     12/27/2023 12:16 PM    K 4.7 12/27/2023 12:16 PM     12/27/2023 12:16 PM    CO2 29 12/27/2023 12:16 PM    BUN 46 12/27/2023 12:16 PM    GFRAA 44 07/29/2022 10:24 AM     No results found for: \"HBA1C\", \"[id number]\"  Lab Results   Component Value Date/Time    CHOL 107 12/27/2023 12:16 PM    HDL 39 12/27/2023 12:16 PM    VLDL 36 06/11/2020 08:40 AM     No results found for: \"VITD3\", \"VD3RIA\"        Lab Results   Component Value Date/Time    TSH 0.85 02/06/2023 08:11 AM

## 2024-02-13 DIAGNOSIS — I10 PRIMARY HYPERTENSION: ICD-10-CM

## 2024-02-13 DIAGNOSIS — E11.51 TYPE 2 DIABETES MELLITUS WITH PERIPHERAL VASCULAR DISEASE (HCC): ICD-10-CM

## 2024-02-13 DIAGNOSIS — E03.9 ACQUIRED HYPOTHYROIDISM: Primary | ICD-10-CM

## 2024-02-14 ENCOUNTER — NURSE ONLY (OUTPATIENT)
Facility: CLINIC | Age: 89
End: 2024-02-14

## 2024-02-14 DIAGNOSIS — I10 PRIMARY HYPERTENSION: ICD-10-CM

## 2024-02-14 DIAGNOSIS — I48.20 CHRONIC ATRIAL FIBRILLATION (HCC): ICD-10-CM

## 2024-02-14 DIAGNOSIS — Z79.01 LONG TERM CURRENT USE OF ANTICOAGULANT: ICD-10-CM

## 2024-02-14 DIAGNOSIS — E11.51 TYPE 2 DIABETES MELLITUS WITH PERIPHERAL VASCULAR DISEASE (HCC): ICD-10-CM

## 2024-02-14 DIAGNOSIS — E03.9 ACQUIRED HYPOTHYROIDISM: ICD-10-CM

## 2024-02-14 LAB
BASOPHILS # BLD: 0 K/UL (ref 0–0.1)
BASOPHILS NFR BLD: 0 % (ref 0–1)
DIFFERENTIAL METHOD BLD: ABNORMAL
EOSINOPHIL # BLD: 0.4 K/UL (ref 0–0.4)
EOSINOPHIL NFR BLD: 4 % (ref 0–7)
ERYTHROCYTE [DISTWIDTH] IN BLOOD BY AUTOMATED COUNT: 14.8 % (ref 11.5–14.5)
HCT VFR BLD AUTO: 47.6 % (ref 36.6–50.3)
HGB BLD-MCNC: 15.4 G/DL (ref 12.1–17)
IMM GRANULOCYTES # BLD AUTO: 0 K/UL (ref 0–0.04)
IMM GRANULOCYTES NFR BLD AUTO: 0 % (ref 0–0.5)
INR PPP: 1.3 (ref 0.9–1.1)
LYMPHOCYTES # BLD: 2.7 K/UL (ref 0.8–3.5)
LYMPHOCYTES NFR BLD: 27 % (ref 12–49)
MCH RBC QN AUTO: 32.9 PG (ref 26–34)
MCHC RBC AUTO-ENTMCNC: 32.4 G/DL (ref 30–36.5)
MCV RBC AUTO: 101.7 FL (ref 80–99)
MONOCYTES # BLD: 1.2 K/UL (ref 0–1)
MONOCYTES NFR BLD: 12 % (ref 5–13)
NEUTS SEG # BLD: 5.7 K/UL (ref 1.8–8)
NEUTS SEG NFR BLD: 57 % (ref 32–75)
NRBC # BLD: 0 K/UL (ref 0–0.01)
NRBC BLD-RTO: 0 PER 100 WBC
PLATELET # BLD AUTO: 208 K/UL (ref 150–400)
PMV BLD AUTO: 11.4 FL (ref 8.9–12.9)
PROTHROMBIN TIME: 13.5 SEC (ref 9–11.1)
RBC # BLD AUTO: 4.68 M/UL (ref 4.1–5.7)
WBC # BLD AUTO: 10.1 K/UL (ref 4.1–11.1)

## 2024-02-15 LAB
ALBUMIN SERPL-MCNC: 3.5 G/DL (ref 3.5–5)
ALBUMIN/GLOB SERPL: 1 (ref 1.1–2.2)
ALP SERPL-CCNC: 172 U/L (ref 45–117)
ALT SERPL-CCNC: 28 U/L (ref 12–78)
ANION GAP SERPL CALC-SCNC: 2 MMOL/L (ref 5–15)
AST SERPL-CCNC: 28 U/L (ref 15–37)
BILIRUB SERPL-MCNC: 0.8 MG/DL (ref 0.2–1)
BUN SERPL-MCNC: 62 MG/DL (ref 6–20)
BUN/CREAT SERPL: 34 (ref 12–20)
CALCIUM SERPL-MCNC: 9.7 MG/DL (ref 8.5–10.1)
CHLORIDE SERPL-SCNC: 103 MMOL/L (ref 97–108)
CHOLEST SERPL-MCNC: 189 MG/DL
CO2 SERPL-SCNC: 32 MMOL/L (ref 21–32)
CREAT SERPL-MCNC: 1.84 MG/DL (ref 0.7–1.3)
EST. AVERAGE GLUCOSE BLD GHB EST-MCNC: 128 MG/DL
GLOBULIN SER CALC-MCNC: 3.6 G/DL (ref 2–4)
GLUCOSE SERPL-MCNC: 101 MG/DL (ref 65–100)
HBA1C MFR BLD: 6.1 % (ref 4–5.6)
HDLC SERPL-MCNC: 45 MG/DL
HDLC SERPL: 4.2 (ref 0–5)
LDLC SERPL CALC-MCNC: 117.4 MG/DL (ref 0–100)
POTASSIUM SERPL-SCNC: 4.4 MMOL/L (ref 3.5–5.1)
PROT SERPL-MCNC: 7.1 G/DL (ref 6.4–8.2)
SODIUM SERPL-SCNC: 137 MMOL/L (ref 136–145)
TRIGL SERPL-MCNC: 133 MG/DL
TSH SERPL DL<=0.05 MIU/L-ACNC: 2.85 UIU/ML (ref 0.36–3.74)
VLDLC SERPL CALC-MCNC: 26.6 MG/DL

## 2024-02-21 ENCOUNTER — NURSE ONLY (OUTPATIENT)
Facility: CLINIC | Age: 89
End: 2024-02-21

## 2024-02-21 DIAGNOSIS — I48.20 CHRONIC ATRIAL FIBRILLATION (HCC): Primary | ICD-10-CM

## 2024-02-21 LAB
INR PPP: 1.8 (ref 0.9–1.1)
PROTHROMBIN TIME: 17.7 SEC (ref 9–11.1)

## 2024-02-25 PROBLEM — M15.9 PRIMARY OSTEOARTHRITIS INVOLVING MULTIPLE JOINTS: Status: ACTIVE | Noted: 2024-02-25

## 2024-02-25 PROBLEM — M15.0 PRIMARY OSTEOARTHRITIS INVOLVING MULTIPLE JOINTS: Status: ACTIVE | Noted: 2024-02-25

## 2024-02-25 PROBLEM — E03.9 ACQUIRED HYPOTHYROIDISM: Status: ACTIVE | Noted: 2017-09-12

## 2024-02-25 PROBLEM — Z79.01 LONG TERM CURRENT USE OF ANTICOAGULANT: Status: RESOLVED | Noted: 2017-10-24 | Resolved: 2024-02-25

## 2024-02-25 PROBLEM — E11.51 TYPE 2 DIABETES MELLITUS WITH PERIPHERAL VASCULAR DISEASE (HCC): Status: ACTIVE | Noted: 2021-05-11

## 2024-02-25 PROBLEM — L03.311 ABDOMINAL WALL CELLULITIS: Status: RESOLVED | Noted: 2023-12-08 | Resolved: 2024-02-25

## 2024-02-25 PROBLEM — E66.09 CLASS 1 OBESITY DUE TO EXCESS CALORIES WITHOUT SERIOUS COMORBIDITY WITH BODY MASS INDEX (BMI) OF 30.0 TO 30.9 IN ADULT: Status: ACTIVE | Noted: 2023-12-08

## 2024-02-25 PROBLEM — Z79.899 LONG TERM CURRENT USE OF AMIODARONE: Status: RESOLVED | Noted: 2017-10-24 | Resolved: 2024-02-25

## 2024-02-25 PROBLEM — M10.9 GOUT: Status: ACTIVE | Noted: 2017-10-24

## 2024-02-25 PROBLEM — K57.30 DIVERTICULOSIS OF LARGE INTESTINE WITHOUT HEMORRHAGE: Status: ACTIVE | Noted: 2017-10-24

## 2024-02-25 PROBLEM — I70.1 RENAL ARTERY STENOSIS (HCC): Status: ACTIVE | Noted: 2017-10-24

## 2024-02-25 PROBLEM — S30.1XXA ABDOMINAL WALL SEROMA: Status: RESOLVED | Noted: 2023-11-09 | Resolved: 2024-02-25

## 2024-02-25 PROBLEM — H35.30 MACULAR DEGENERATION: Status: ACTIVE | Noted: 2017-10-24

## 2024-02-25 PROBLEM — E66.01 SEVERE OBESITY (HCC): Status: RESOLVED | Noted: 2019-06-25 | Resolved: 2024-02-25

## 2024-02-25 PROBLEM — L02.211 ABDOMINAL WALL ABSCESS: Status: RESOLVED | Noted: 2023-12-05 | Resolved: 2024-02-25

## 2024-02-25 PROBLEM — Z79.899 LONG-TERM USE OF HIGH-RISK MEDICATION: Status: RESOLVED | Noted: 2017-10-24 | Resolved: 2024-02-25

## 2024-02-25 PROBLEM — H81.13 BENIGN PAROXYSMAL POSITIONAL VERTIGO DUE TO BILATERAL VESTIBULAR DISORDER: Status: ACTIVE | Noted: 2021-06-23

## 2024-02-25 PROBLEM — I65.21 RIGHT-SIDED EXTRACRANIAL CAROTID ARTERY STENOSIS: Status: ACTIVE | Noted: 2017-10-24

## 2024-02-26 ENCOUNTER — OFFICE VISIT (OUTPATIENT)
Facility: CLINIC | Age: 89
End: 2024-02-26
Payer: MEDICARE

## 2024-02-26 VITALS
HEIGHT: 70 IN | BODY MASS INDEX: 30.92 KG/M2 | SYSTOLIC BLOOD PRESSURE: 136 MMHG | RESPIRATION RATE: 18 BRPM | WEIGHT: 216 LBS | OXYGEN SATURATION: 95 % | HEART RATE: 74 BPM | DIASTOLIC BLOOD PRESSURE: 86 MMHG | TEMPERATURE: 97.3 F

## 2024-02-26 DIAGNOSIS — I73.9 PERIPHERAL VASCULAR DISEASE (HCC): ICD-10-CM

## 2024-02-26 DIAGNOSIS — I48.20 CHRONIC ATRIAL FIBRILLATION (HCC): ICD-10-CM

## 2024-02-26 DIAGNOSIS — E78.2 MIXED HYPERLIPIDEMIA: ICD-10-CM

## 2024-02-26 DIAGNOSIS — I50.32 CHRONIC DIASTOLIC CONGESTIVE HEART FAILURE (HCC): ICD-10-CM

## 2024-02-26 DIAGNOSIS — I10 PRIMARY HYPERTENSION: Primary | ICD-10-CM

## 2024-02-26 DIAGNOSIS — E11.51 TYPE 2 DIABETES MELLITUS WITH PERIPHERAL VASCULAR DISEASE (HCC): ICD-10-CM

## 2024-02-26 DIAGNOSIS — E66.09 CLASS 1 OBESITY DUE TO EXCESS CALORIES WITHOUT SERIOUS COMORBIDITY WITH BODY MASS INDEX (BMI) OF 30.0 TO 30.9 IN ADULT: ICD-10-CM

## 2024-02-26 DIAGNOSIS — M15.9 PRIMARY OSTEOARTHRITIS INVOLVING MULTIPLE JOINTS: ICD-10-CM

## 2024-02-26 DIAGNOSIS — N18.30 STAGE 3 CHRONIC KIDNEY DISEASE, UNSPECIFIED WHETHER STAGE 3A OR 3B CKD (HCC): ICD-10-CM

## 2024-02-26 LAB
INR PPP: 1.7 (ref 0.9–1.1)
PROTHROMBIN TIME: 16.8 SEC (ref 9–11.1)

## 2024-02-26 PROCEDURE — G8484 FLU IMMUNIZE NO ADMIN: HCPCS | Performed by: INTERNAL MEDICINE

## 2024-02-26 PROCEDURE — 3044F HG A1C LEVEL LT 7.0%: CPT | Performed by: INTERNAL MEDICINE

## 2024-02-26 PROCEDURE — 99215 OFFICE O/P EST HI 40 MIN: CPT | Performed by: INTERNAL MEDICINE

## 2024-02-26 PROCEDURE — 1036F TOBACCO NON-USER: CPT | Performed by: INTERNAL MEDICINE

## 2024-02-26 PROCEDURE — G8428 CUR MEDS NOT DOCUMENT: HCPCS | Performed by: INTERNAL MEDICINE

## 2024-02-26 PROCEDURE — G8417 CALC BMI ABV UP PARAM F/U: HCPCS | Performed by: INTERNAL MEDICINE

## 2024-02-26 PROCEDURE — 1123F ACP DISCUSS/DSCN MKR DOCD: CPT | Performed by: INTERNAL MEDICINE

## 2024-02-26 NOTE — PROGRESS NOTES
Chief Complaint   Patient presents with    6 Month Follow-Up       SUBJECTIVE:    Maryan Resendiz is a 88 y.o. male who has never been seen by me before who comes in to establish care for follow-up of his multiple medical problems some of which are very complex his medical problems include hypertension, diabetes, hyperlipidemia, history of renal artery stenosis, peripheral vascular disease, ASCVD, chronic atrial fibrillation, compensated chronic diastolic CHF, CKD stage III, gout, chronic Coumadin therapy for his chronic atrial fibs and a recent problem within the past few months where he had a severe infection in the right lower abdominal wall area which was presumed to have originated from a prior hernia repair.  He had open surgical treatment with debridement and is still getting wound care by home health nurse twice a week with this.  He currently takes Coumadin on a daily basis 5 mg alternate with 2-1/2 every other day.  He notes no current chest pain, shortness of breath, palpitations, PND, orthopnea or other cardiac respiratory complaints.  He notes no current GI or  complaints.  He notes no headaches, dizziness or neurologic complaints.  He has no change of his chronic arthritic complaints and there are no other complaints on complete review of systems.      Current Outpatient Medications   Medication Sig Dispense Refill    lovastatin (MEVACOR) 40 MG tablet TAKE 1 TABLET DAILY 90 tablet 3    insulin glargine (LANTUS) 100 UNIT/ML injection vial Inject 45 Units into the skin nightly 10 mL 3    metoprolol (LOPRESSOR) 100 MG tablet TAKE 1 TABLET TWICE A  tablet 3    FABB 2.2-25-1 MG TABS tablet TAKE 1 TABLET BY MOUTH DAILY 90 tablet 0    levothyroxine (SYNTHROID) 175 MCG tablet TAKE 1 TABLET BY MOUTH EVERY DAY 90 tablet 0    warfarin (COUMADIN) 5 MG tablet Take 5 mg by mouth 12/12 PM. (Patient taking differently: Take 0.5 tablets by mouth daily Take 5 mg by mouth 12/12 PM.) 1 tablet 0    blood

## 2024-02-26 NOTE — PROGRESS NOTES
Maryan Resendiz is a 88 y.o. male     Chief Complaint   Patient presents with    6 Month Follow-Up       BP (!) 156/94 (Site: Left Upper Arm, Position: Sitting, Cuff Size: Medium Adult)   Pulse 74   Temp 97.3 °F (36.3 °C) (Oral)   Resp 18   Ht 1.778 m (5' 10\")   Wt 98 kg (216 lb)   SpO2 95%   BMI 30.99 kg/m²     Health Maintenance Due   Topic Date Due    Respiratory Syncytial Virus (RSV) Pregnant or age 60 yrs+ (1 - 1-dose 60+ series) Never done    Flu vaccine (1) 08/01/2023    COVID-19 Vaccine (5 - 2023-24 season) 09/01/2023         \"Have you been to the ER, urgent care clinic since your last visit?  Hospitalized since your last visit?\"    Yes, on file, December 2023.    “Have you seen or consulted any other health care providers outside of [facility] since your last visit?”    NO

## 2024-03-05 ENCOUNTER — OFFICE VISIT (OUTPATIENT)
Age: 89
End: 2024-03-05

## 2024-03-05 VITALS
SYSTOLIC BLOOD PRESSURE: 121 MMHG | RESPIRATION RATE: 20 BRPM | WEIGHT: 214 LBS | HEART RATE: 72 BPM | OXYGEN SATURATION: 96 % | BODY MASS INDEX: 30.64 KG/M2 | TEMPERATURE: 97.5 F | HEIGHT: 70 IN | DIASTOLIC BLOOD PRESSURE: 68 MMHG

## 2024-03-05 DIAGNOSIS — Z51.89 VISIT FOR WOUND CHECK: Primary | ICD-10-CM

## 2024-03-05 PROCEDURE — 99024 POSTOP FOLLOW-UP VISIT: CPT | Performed by: SURGERY

## 2024-03-05 ASSESSMENT — PATIENT HEALTH QUESTIONNAIRE - PHQ9
SUM OF ALL RESPONSES TO PHQ9 QUESTIONS 1 & 2: 0
SUM OF ALL RESPONSES TO PHQ QUESTIONS 1-9: 0
1. LITTLE INTEREST OR PLEASURE IN DOING THINGS: 0
SUM OF ALL RESPONSES TO PHQ QUESTIONS 1-9: 0
2. FEELING DOWN, DEPRESSED OR HOPELESS: 0

## 2024-03-05 NOTE — PROGRESS NOTES
Identified patient with two patient identifiers (name and ). Reviewed chart in preparation for visit and have obtained necessary documentation.    Maryan Resendiz is a 88 y.o. male  Chief Complaint   Patient presents with    Post-Op Check     11 weeks S/P EXPLORATION AND EVACUATION OF INFECTED ABDOMINAL WALL HEMATOMA AND EXPLANTATION OF INFECTED MESH, WASH OUT 23    Wound Check     /68 (Site: Left Upper Arm, Position: Sitting, Cuff Size: Medium Adult)   Pulse 72   Temp 97.5 °F (36.4 °C)   Resp 20   Ht 1.778 m (5' 10\")   Wt 97.1 kg (214 lb)   SpO2 96%   BMI 30.71 kg/m²     1. Have you been to the ER, urgent care clinic since your last visit?  Hospitalized since your last visit?no    2. Have you seen or consulted any other health care providers outside of the [facility] since your last visit?  Include any pap smears or colon screening. no

## 2024-03-05 NOTE — PROGRESS NOTES
Surgery Progress Note    3/5/2024    CC: wound check    Subjective:     Patient doing well with the Adaptic over the last few weeks.  No issues.  Home health helping him with changes once daily.    Constitutional: No fever or chills  Neurologic: No headache  Eyes: No scleral icterus or irritated eyes  Nose: No nasal pain or drainage  Mouth: No oral lesions or sore throat  Cardiac: No palpations or chest pain  Pulmonary: No cough or shortness of breath  Gastrointestinal: No nausea, emesis, diarrhea, or constipation  Genitourinary: No dysuria  Musculoskeletal: No muscle or joint tenderness  Skin: No rashes or lesions  Psychiatric: No anxiety or depressed mood    Objective:     Vitals:    03/05/24 0911   BP: 121/68   Pulse: 72   Resp: 20   Temp: 97.5 °F (36.4 °C)   SpO2: 96%     Body mass index is 30.71 kg/m².    General: No acute distress, conversant  Eyes: PERRLA, no scleral icterus  HENT: Normocephalic without oral lesions  Neck: Trachea midline without LAD  Cardiac: Normal pulse rate and rhythm  Pulmonary: Symmetric chest rise with normal effort  GI: Soft, NT, ND, no hernia, no splenomegaly  Skin: Right lower quadrant wound about 2 x 7 cm with healthy wound bed without erythema  Extremities: No edema or joint stiffness  Psych: Appropriate mood and affect    Assessment:     88-year-old male was healing right lower quadrant wound after explantation of infected mesh    Plan:     Continue Adaptic once daily.  Follow-up with Dr. Zapata in 1 month.    Ricky Oneill MD, [facility], [facility]  Bariatric and General Surgeon  [facility]

## 2024-03-11 RX ORDER — ISOSORBIDE MONONITRATE 60 MG/1
60 TABLET, EXTENDED RELEASE ORAL DAILY
Qty: 90 TABLET | Refills: 3 | Status: SHIPPED | OUTPATIENT
Start: 2024-03-11

## 2024-03-11 RX ORDER — WARFARIN SODIUM 5 MG/1
5 TABLET ORAL DAILY
Qty: 90 TABLET | Refills: 3 | Status: SHIPPED | OUTPATIENT
Start: 2024-03-11 | End: 2024-03-13 | Stop reason: SDUPTHER

## 2024-03-11 RX ORDER — ALLOPURINOL 300 MG/1
300 TABLET ORAL DAILY
Qty: 90 TABLET | Refills: 3 | Status: SHIPPED | OUTPATIENT
Start: 2024-03-11

## 2024-03-11 NOTE — TELEPHONE ENCOUNTER
PCP: Prashanth Livingston MD    Last appt: 2/26/2024    Future Appointments   Date Time Provider Department Center   3/26/2024  1:40 PM LAB ONLY [facility] AMB   4/11/2024  9:00 AM Michell Zapata Jr., MD [facility] AMB   4/26/2024  1:00 PM LAB ONLY [facility] AMB   5/30/2024  9:10 AM Prashanth Livingston MD [facility] AMB       Requested Prescriptions     Pending Prescriptions Disp Refills    allopurinol (ZYLOPRIM) 300 MG tablet 90 tablet 3     Sig: Take 1 tablet by mouth daily    isosorbide mononitrate (IMDUR) 60 MG extended release tablet 90 tablet 3     Sig: Take 1 tablet by mouth daily    warfarin (COUMADIN) 5 MG tablet 90 tablet 3     Sig: Take 1 tablet by mouth daily Monday and Thursday 2.5 mg and 5 mg Tuesday, Wednesday, Friday, Saturday and Sunday

## 2024-03-13 RX ORDER — WARFARIN SODIUM 5 MG/1
5 TABLET ORAL DAILY
Qty: 90 TABLET | Refills: 1 | Status: SHIPPED | OUTPATIENT
Start: 2024-03-13

## 2024-03-13 NOTE — TELEPHONE ENCOUNTER
PCP: Prashanth Livingston MD    Last appt: 2/26/2024    Future Appointments   Date Time Provider Department Center   3/26/2024  1:40 PM LAB ONLY [facility] AMB   4/11/2024  9:00 AM Michell Zapata Jr., MD [facility] AMB   4/26/2024  1:00 PM LAB ONLY [facility] AMB   5/30/2024  9:10 AM Prashanth Livingston MD [facility] AMB       Requested Prescriptions     Pending Prescriptions Disp Refills    warfarin (COUMADIN) 5 MG tablet 90 tablet 1     Sig: Take 1 tablet by mouth daily Monday and Thursday 2.5 mg and 5 mg Tuesday, Wednesday, Friday, Saturday and Sunday

## 2024-03-26 ENCOUNTER — NURSE ONLY (OUTPATIENT)
Facility: CLINIC | Age: 89
End: 2024-03-26

## 2024-03-26 DIAGNOSIS — I48.20 CHRONIC ATRIAL FIBRILLATION (HCC): Primary | ICD-10-CM

## 2024-03-26 LAB
INR PPP: 1.6 (ref 0.9–1.1)
PROTHROMBIN TIME: 16.7 SEC (ref 9–11.1)

## 2024-03-27 ENCOUNTER — ANTI-COAG VISIT (OUTPATIENT)
Facility: CLINIC | Age: 89
End: 2024-03-27

## 2024-03-27 DIAGNOSIS — I48.20 CHRONIC ATRIAL FIBRILLATION (HCC): Primary | ICD-10-CM

## 2024-03-27 NOTE — PROGRESS NOTES
Anticoagulation Episode Summary       Current INR goal:     TTR:  --   Next INR check:  4/26/2024   INR from last check:  1.6 (3/26/2024)   Weekly max warfarin dose:     Target end date:     INR check location:     Preferred lab:     Send INR reminders to:         Comments:              INR 1.6.  Continue current dose of Coumadin and repeat in 1 month.

## 2024-04-15 RX ORDER — PEN NEEDLE, DIABETIC 31 GX5/16"
NEEDLE, DISPOSABLE MISCELLANEOUS
Qty: 100 EACH | Refills: 1 | Status: SHIPPED | OUTPATIENT
Start: 2024-04-15

## 2024-04-15 NOTE — TELEPHONE ENCOUNTER
PCP: Prashanth Livingston MD    Last appt: Visit date not found    Future Appointments   Date Time Provider Department Center   4/26/2024  1:00 PM LAB ONLY [facility]   5/30/2024  9:10 AM Prashanth Livingston MD [facility] AMB       Requested Prescriptions     Pending Prescriptions Disp Refills    B-D ULTRAFINE III SHORT PEN 31G X 8 MM MISC [Pharmacy Med Name: B-D PEN NDL SHRT [id number](5/16) SALIMA] 100 each 1     Sig: USE TO INJECT INSULIN UNDER THE SKIN 4 TIMES DAILY AS DIRECTED

## 2024-04-22 ENCOUNTER — APPOINTMENT (OUTPATIENT)
Facility: HOSPITAL | Age: 89
End: 2024-04-22
Payer: MEDICARE

## 2024-04-22 ENCOUNTER — HOSPITAL ENCOUNTER (EMERGENCY)
Facility: HOSPITAL | Age: 89
Discharge: HOME | End: 2024-04-22
Attending: EMERGENCY MEDICINE
Payer: MEDICARE

## 2024-04-22 VITALS
WEIGHT: 211.2 LBS | DIASTOLIC BLOOD PRESSURE: 79 MMHG | TEMPERATURE: 97.2 F | BODY MASS INDEX: 30.3 KG/M2 | OXYGEN SATURATION: 92 % | RESPIRATION RATE: 16 BRPM | SYSTOLIC BLOOD PRESSURE: 127 MMHG | HEART RATE: 92 BPM

## 2024-04-22 DIAGNOSIS — M79.642 HAND PAIN, LEFT: Primary | ICD-10-CM

## 2024-04-22 DIAGNOSIS — L03.114 CELLULITIS OF LEFT UPPER EXTREMITY: ICD-10-CM

## 2024-04-22 LAB
ALBUMIN SERPL-MCNC: 2.9 G/DL (ref 3.5–5)
ALBUMIN/GLOB SERPL: 0.7 (ref 1.1–2.2)
ALP SERPL-CCNC: 162 U/L (ref 45–117)
ALT SERPL-CCNC: 26 U/L (ref 12–78)
ANION GAP SERPL CALC-SCNC: 8 MMOL/L (ref 5–15)
AST SERPL-CCNC: 24 U/L (ref 15–37)
BASOPHILS # BLD: 0 K/UL (ref 0–0.1)
BASOPHILS NFR BLD: 0 % (ref 0–1)
BILIRUB SERPL-MCNC: 1.3 MG/DL (ref 0.2–1)
BUN SERPL-MCNC: 63 MG/DL (ref 6–20)
BUN/CREAT SERPL: 36 (ref 12–20)
CALCIUM SERPL-MCNC: 9.7 MG/DL (ref 8.5–10.1)
CHLORIDE SERPL-SCNC: 100 MMOL/L (ref 97–108)
CO2 SERPL-SCNC: 27 MMOL/L (ref 21–32)
CREAT SERPL-MCNC: 1.77 MG/DL (ref 0.7–1.3)
DIFFERENTIAL METHOD BLD: ABNORMAL
EOSINOPHIL # BLD: 0 K/UL (ref 0–0.4)
EOSINOPHIL NFR BLD: 0 % (ref 0–7)
ERYTHROCYTE [DISTWIDTH] IN BLOOD BY AUTOMATED COUNT: 14.3 % (ref 11.5–14.5)
GLOBULIN SER CALC-MCNC: 4.2 G/DL (ref 2–4)
GLUCOSE SERPL-MCNC: 186 MG/DL (ref 65–100)
HCT VFR BLD AUTO: 40.7 % (ref 36.6–50.3)
HGB BLD-MCNC: 13 G/DL (ref 12.1–17)
IMM GRANULOCYTES # BLD AUTO: 0.1 K/UL (ref 0–0.04)
IMM GRANULOCYTES NFR BLD AUTO: 1 % (ref 0–0.5)
LACTATE BLD-SCNC: 1.45 MMOL/L (ref 0.4–2)
LYMPHOCYTES # BLD: 1.3 K/UL (ref 0.8–3.5)
LYMPHOCYTES NFR BLD: 8 % (ref 12–49)
MCH RBC QN AUTO: 31.7 PG (ref 26–34)
MCHC RBC AUTO-ENTMCNC: 31.9 G/DL (ref 30–36.5)
MCV RBC AUTO: 99.3 FL (ref 80–99)
MONOCYTES # BLD: 1.9 K/UL (ref 0–1)
MONOCYTES NFR BLD: 11 % (ref 5–13)
NEUTS SEG # BLD: 13.8 K/UL (ref 1.8–8)
NEUTS SEG NFR BLD: 80 % (ref 32–75)
NRBC # BLD: 0 K/UL (ref 0–0.01)
NRBC BLD-RTO: 0 PER 100 WBC
PLATELET # BLD AUTO: 164 K/UL (ref 150–400)
PMV BLD AUTO: 10.6 FL (ref 8.9–12.9)
POTASSIUM SERPL-SCNC: 4.1 MMOL/L (ref 3.5–5.1)
PROT SERPL-MCNC: 7.1 G/DL (ref 6.4–8.2)
RBC # BLD AUTO: 4.1 M/UL (ref 4.1–5.7)
SODIUM SERPL-SCNC: 135 MMOL/L (ref 136–145)
WBC # BLD AUTO: 17.2 K/UL (ref 4.1–11.1)

## 2024-04-22 PROCEDURE — 96374 THER/PROPH/DIAG INJ IV PUSH: CPT

## 2024-04-22 PROCEDURE — 87040 BLOOD CULTURE FOR BACTERIA: CPT

## 2024-04-22 PROCEDURE — 2580000003 HC RX 258: Performed by: EMERGENCY MEDICINE

## 2024-04-22 PROCEDURE — 6370000000 HC RX 637 (ALT 250 FOR IP): Performed by: EMERGENCY MEDICINE

## 2024-04-22 PROCEDURE — 80053 COMPREHEN METABOLIC PANEL: CPT

## 2024-04-22 PROCEDURE — 6360000002 HC RX W HCPCS: Performed by: EMERGENCY MEDICINE

## 2024-04-22 PROCEDURE — 36415 COLL VENOUS BLD VENIPUNCTURE: CPT

## 2024-04-22 PROCEDURE — 85025 COMPLETE CBC W/AUTO DIFF WBC: CPT

## 2024-04-22 PROCEDURE — 73130 X-RAY EXAM OF HAND: CPT

## 2024-04-22 PROCEDURE — 99284 EMERGENCY DEPT VISIT MOD MDM: CPT

## 2024-04-22 PROCEDURE — 83605 ASSAY OF LACTIC ACID: CPT

## 2024-04-22 RX ORDER — DOXYCYCLINE HYCLATE 100 MG
100 TABLET ORAL 2 TIMES DAILY
Qty: 20 TABLET | Refills: 0 | Status: SHIPPED | OUTPATIENT
Start: 2024-04-22 | End: 2024-05-02

## 2024-04-22 RX ORDER — DOXYCYCLINE HYCLATE 100 MG
100 TABLET ORAL
Status: COMPLETED | OUTPATIENT
Start: 2024-04-22 | End: 2024-04-22

## 2024-04-22 RX ADMIN — SODIUM CHLORIDE 1000 MG: 900 INJECTION INTRAVENOUS at 14:01

## 2024-04-22 RX ADMIN — DOXYCYCLINE HYCLATE 100 MG: 100 TABLET, COATED ORAL at 14:01

## 2024-04-22 ASSESSMENT — PAIN SCALES - GENERAL: PAINLEVEL_OUTOF10: 5

## 2024-04-22 NOTE — ED NOTES
Pt discharged by MD Mcneal. Pt verbalized understanding of follow up appointments, Rx instructions, and results of care. No further questions or concerns at this time. Pt out of ED ambulatory accompanied by self. Pt has no allergic sxs from doxycyline after monitored x1 hr. Pt states he feels fine and is ready for dc.

## 2024-04-22 NOTE — ED PROVIDER NOTES
unanticipated grammatical, syntax, homophones, and other interpretive errors are inadvertently transcribed by the computer software. Please disregards these errors. Please excuse any errors that have escaped final proofreading.)          Eric Mcneal,   04/22/24 1437

## 2024-04-24 ENCOUNTER — OFFICE VISIT (OUTPATIENT)
Facility: CLINIC | Age: 89
End: 2024-04-24
Payer: MEDICARE

## 2024-04-24 VITALS
SYSTOLIC BLOOD PRESSURE: 150 MMHG | WEIGHT: 207.9 LBS | DIASTOLIC BLOOD PRESSURE: 72 MMHG | HEIGHT: 70 IN | RESPIRATION RATE: 19 BRPM | HEART RATE: 70 BPM | TEMPERATURE: 98.9 F | BODY MASS INDEX: 29.76 KG/M2 | OXYGEN SATURATION: 94 %

## 2024-04-24 DIAGNOSIS — L03.114 CELLULITIS OF LEFT UPPER EXTREMITY: Primary | ICD-10-CM

## 2024-04-24 PROBLEM — I25.119 ATHEROSCLEROTIC HEART DISEASE OF NATIVE CORONARY ARTERY WITH UNSPECIFIED ANGINA PECTORIS (HCC): Status: ACTIVE | Noted: 2024-04-24

## 2024-04-24 PROBLEM — I20.9 ANGINA PECTORIS, UNSPECIFIED (HCC): Status: ACTIVE | Noted: 2024-04-24

## 2024-04-24 PROCEDURE — 99213 OFFICE O/P EST LOW 20 MIN: CPT | Performed by: INTERNAL MEDICINE

## 2024-04-24 PROCEDURE — G8417 CALC BMI ABV UP PARAM F/U: HCPCS | Performed by: INTERNAL MEDICINE

## 2024-04-24 PROCEDURE — G8427 DOCREV CUR MEDS BY ELIG CLIN: HCPCS | Performed by: INTERNAL MEDICINE

## 2024-04-24 PROCEDURE — 1036F TOBACCO NON-USER: CPT | Performed by: INTERNAL MEDICINE

## 2024-04-24 PROCEDURE — 1123F ACP DISCUSS/DSCN MKR DOCD: CPT | Performed by: INTERNAL MEDICINE

## 2024-04-24 NOTE — PROGRESS NOTES
Maryan Resendiz is a 88 y.o. male     Chief Complaint   Patient presents with    Follow-Up from Hospital       BP (!) 161/70 (Site: Right Upper Arm, Position: Sitting, Cuff Size: Medium Adult)   Pulse 70   Temp 98.9 °F (37.2 °C) (Oral)   Resp 19   Ht 1.778 m (5' 10\")   Wt 94.3 kg (207 lb 14.4 oz)   SpO2 94%   BMI 29.83 kg/m²     Health Maintenance Due   Topic Date Due    Respiratory Syncytial Virus (RSV) Pregnant or age 60 yrs+ (1 - 1-dose 60+ series) Never done    COVID-19 Vaccine (5 - 2023-24 season) 09/01/2023         \"Have you been to the ER, urgent care clinic since your last visit?  Hospitalized since your last visit?\"    Yes, on file. 4/22/24    “Have you seen or consulted any other health care providers outside of [facility] since your last visit?”    NO                   
     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    CAD (coronary artery disease)     Chronic atrial fibrillation (HCC) 2015    Chronic systolic heart failure (HCC)     CKD (chronic kidney disease) stage 3, GFR 30-59 ml/min ([facility]) 2015    Diabetes ([facility])     Diverticulosis of large intestine without hemorrhage 10/24/2017    Former smoker     Gout 10/24/2017    History of echocardiogram 2018    LVEF 40-45%, global HK, mild to mod LAE, no AS, mild MR, RVSP 26 mmHg    Hyperlipidemia 2015    Hypertension     Long term current use of anticoagulant 10/24/2017    Long-term use of high-risk medication 10/24/2017    Macular degeneration 10/24/2017    Microalbuminuria     Peripheral vascular disease ([facility]) 10/24/2017    Primary osteoarthritis involving multiple joints     knees and back    Renal artery stenosis ([facility]) 10/24/2017    Right-sided extracranial carotid artery stenosis 10/24/2017    Sick sinus syndrome ([facility]) 10/24/2017    Status post pacemaker      Allergies   Allergen Reactions    Latex Other (See Comments)     Skin raw and severely irritated    Aspirin      Other reaction(s): Unknown (comments)    Hydrocodone Other (See Comments)     hallucinations    Oxycodone Other (See Comments)     hallucinations    Sulfa Antibiotics Rash    Tetracycline Rash      Family History   Problem Relation Age of Onset    Diabetes Father     Cancer Mother       Social History     Socioeconomic History    Marital status:      Spouse name: Not on file    Number of children: Not on file    Years of education: Not on file    Highest education level: Not on file   Occupational History    Not on file   Tobacco Use    Smoking status: Former     Current packs/day: 0.00     Types: Cigarettes     Quit date: 1990     Years since quittin.8     Passive exposure: Never    Smokeless tobacco: Never

## 2024-04-26 ENCOUNTER — NURSE ONLY (OUTPATIENT)
Facility: CLINIC | Age: 89
End: 2024-04-26

## 2024-04-26 DIAGNOSIS — I48.20 CHRONIC ATRIAL FIBRILLATION (HCC): Primary | ICD-10-CM

## 2024-04-26 LAB
INR PPP: 2.2 (ref 0.9–1.1)
PROTHROMBIN TIME: 21.9 SEC (ref 9–11.1)

## 2024-04-28 LAB
BACTERIA SPEC CULT: NORMAL
BACTERIA SPEC CULT: NORMAL
SERVICE CMNT-IMP: NORMAL
SERVICE CMNT-IMP: NORMAL

## 2024-05-01 ENCOUNTER — HOSPITAL ENCOUNTER (EMERGENCY)
Facility: HOSPITAL | Age: 89
Discharge: HOME | End: 2024-05-01
Payer: MEDICARE

## 2024-05-01 ENCOUNTER — ANTI-COAG VISIT (OUTPATIENT)
Facility: CLINIC | Age: 89
End: 2024-05-01

## 2024-05-01 ENCOUNTER — OFFICE VISIT (OUTPATIENT)
Facility: CLINIC | Age: 89
End: 2024-05-01

## 2024-05-01 ENCOUNTER — APPOINTMENT (OUTPATIENT)
Facility: HOSPITAL | Age: 89
End: 2024-05-01
Payer: MEDICARE

## 2024-05-01 VITALS
HEART RATE: 70 BPM | SYSTOLIC BLOOD PRESSURE: 153 MMHG | BODY MASS INDEX: 29.2 KG/M2 | OXYGEN SATURATION: 95 % | DIASTOLIC BLOOD PRESSURE: 83 MMHG | TEMPERATURE: 98.7 F | WEIGHT: 204 LBS | RESPIRATION RATE: 17 BRPM | HEIGHT: 70 IN

## 2024-05-01 VITALS
HEIGHT: 70 IN | DIASTOLIC BLOOD PRESSURE: 90 MMHG | RESPIRATION RATE: 18 BRPM | BODY MASS INDEX: 29.92 KG/M2 | TEMPERATURE: 97.7 F | HEART RATE: 62 BPM | SYSTOLIC BLOOD PRESSURE: 138 MMHG | WEIGHT: 209 LBS | OXYGEN SATURATION: 97 %

## 2024-05-01 DIAGNOSIS — Z79.4 TYPE 2 DIABETES MELLITUS WITH HYPERGLYCEMIA, WITH LONG-TERM CURRENT USE OF INSULIN (HCC): ICD-10-CM

## 2024-05-01 DIAGNOSIS — L02.512 ABSCESS OF DORSUM OF LEFT HAND: Primary | ICD-10-CM

## 2024-05-01 DIAGNOSIS — I10 ESSENTIAL HYPERTENSION: ICD-10-CM

## 2024-05-01 DIAGNOSIS — E11.65 TYPE 2 DIABETES MELLITUS WITH HYPERGLYCEMIA, WITH LONG-TERM CURRENT USE OF INSULIN (HCC): ICD-10-CM

## 2024-05-01 DIAGNOSIS — L03.114 CELLULITIS OF LEFT UPPER EXTREMITY: ICD-10-CM

## 2024-05-01 DIAGNOSIS — N18.9 CHRONIC KIDNEY DISEASE, UNSPECIFIED CKD STAGE: ICD-10-CM

## 2024-05-01 DIAGNOSIS — L03.114 CELLULITIS OF LEFT HAND: Primary | ICD-10-CM

## 2024-05-01 LAB
ALBUMIN SERPL-MCNC: 2.7 G/DL (ref 3.5–5)
ALBUMIN/GLOB SERPL: 0.6 (ref 1.1–2.2)
ALP SERPL-CCNC: 188 U/L (ref 45–117)
ALT SERPL-CCNC: 30 U/L (ref 12–78)
ANION GAP SERPL CALC-SCNC: 5 MMOL/L (ref 5–15)
AST SERPL-CCNC: 22 U/L (ref 15–37)
BASOPHILS # BLD: 0.1 K/UL (ref 0–0.1)
BASOPHILS NFR BLD: 1 % (ref 0–1)
BILIRUB SERPL-MCNC: 0.6 MG/DL (ref 0.2–1)
BUN SERPL-MCNC: 67 MG/DL (ref 6–20)
BUN/CREAT SERPL: 37 (ref 12–20)
CALCIUM SERPL-MCNC: 9.4 MG/DL (ref 8.5–10.1)
CHLORIDE SERPL-SCNC: 104 MMOL/L (ref 97–108)
CO2 SERPL-SCNC: 27 MMOL/L (ref 21–32)
CREAT SERPL-MCNC: 1.82 MG/DL (ref 0.7–1.3)
DIFFERENTIAL METHOD BLD: ABNORMAL
EOSINOPHIL # BLD: 0.5 K/UL (ref 0–0.4)
EOSINOPHIL NFR BLD: 4 % (ref 0–7)
ERYTHROCYTE [DISTWIDTH] IN BLOOD BY AUTOMATED COUNT: 14.2 % (ref 11.5–14.5)
GLOBULIN SER CALC-MCNC: 4.2 G/DL (ref 2–4)
GLUCOSE SERPL-MCNC: 202 MG/DL (ref 65–100)
HCT VFR BLD AUTO: 41.3 % (ref 36.6–50.3)
HGB BLD-MCNC: 13.3 G/DL (ref 12.1–17)
IMM GRANULOCYTES # BLD AUTO: 0.1 K/UL (ref 0–0.04)
IMM GRANULOCYTES NFR BLD AUTO: 1 % (ref 0–0.5)
LYMPHOCYTES # BLD: 1.9 K/UL (ref 0.8–3.5)
LYMPHOCYTES NFR BLD: 16 % (ref 12–49)
MCH RBC QN AUTO: 31.9 PG (ref 26–34)
MCHC RBC AUTO-ENTMCNC: 32.2 G/DL (ref 30–36.5)
MCV RBC AUTO: 99 FL (ref 80–99)
MONOCYTES # BLD: 1.2 K/UL (ref 0–1)
MONOCYTES NFR BLD: 10 % (ref 5–13)
NEUTS SEG # BLD: 8.3 K/UL (ref 1.8–8)
NEUTS SEG NFR BLD: 68 % (ref 32–75)
NRBC # BLD: 0 K/UL (ref 0–0.01)
NRBC BLD-RTO: 0 PER 100 WBC
PLATELET # BLD AUTO: 234 K/UL (ref 150–400)
PMV BLD AUTO: 10 FL (ref 8.9–12.9)
POTASSIUM SERPL-SCNC: 4.7 MMOL/L (ref 3.5–5.1)
PROT SERPL-MCNC: 6.9 G/DL (ref 6.4–8.2)
RBC # BLD AUTO: 4.17 M/UL (ref 4.1–5.7)
SODIUM SERPL-SCNC: 136 MMOL/L (ref 136–145)
WBC # BLD AUTO: 12.1 K/UL (ref 4.1–11.1)

## 2024-05-01 PROCEDURE — 73200 CT UPPER EXTREMITY W/O DYE: CPT

## 2024-05-01 PROCEDURE — 99284 EMERGENCY DEPT VISIT MOD MDM: CPT

## 2024-05-01 PROCEDURE — 36415 COLL VENOUS BLD VENIPUNCTURE: CPT

## 2024-05-01 PROCEDURE — 96365 THER/PROPH/DIAG IV INF INIT: CPT

## 2024-05-01 PROCEDURE — 2580000003 HC RX 258: Performed by: PHYSICIAN ASSISTANT

## 2024-05-01 PROCEDURE — 6360000002 HC RX W HCPCS: Performed by: PHYSICIAN ASSISTANT

## 2024-05-01 PROCEDURE — 87070 CULTURE OTHR SPECIMN AEROBIC: CPT

## 2024-05-01 PROCEDURE — 85025 COMPLETE CBC W/AUTO DIFF WBC: CPT

## 2024-05-01 PROCEDURE — 87205 SMEAR GRAM STAIN: CPT

## 2024-05-01 PROCEDURE — 80053 COMPREHEN METABOLIC PANEL: CPT

## 2024-05-01 RX ORDER — CEPHALEXIN 500 MG/1
500 CAPSULE ORAL 3 TIMES DAILY
Qty: 21 CAPSULE | Refills: 0 | Status: SHIPPED | OUTPATIENT
Start: 2024-05-01 | End: 2024-05-08

## 2024-05-01 RX ADMIN — SODIUM CHLORIDE 1000 MG: 900 INJECTION INTRAVENOUS at 19:50

## 2024-05-01 ASSESSMENT — LIFESTYLE VARIABLES
HOW OFTEN DO YOU HAVE A DRINK CONTAINING ALCOHOL: 2-3 TIMES A WEEK
HOW MANY STANDARD DRINKS CONTAINING ALCOHOL DO YOU HAVE ON A TYPICAL DAY: 1 OR 2

## 2024-05-01 NOTE — PROGRESS NOTES
Advised patient that his PT/INR okay at 2.2.  Dr. Livingston recommends continuing the same dose of blood thinner which is none on Saturday; 5 mg all other days and f/up as scheduled.

## 2024-05-01 NOTE — PROGRESS NOTES
Maryan Resendiz is a 88 y.o. male     Chief Complaint   Patient presents with    Hand Injury     Left hand       BP (!) 153/83 (Site: Left Upper Arm, Position: Sitting, Cuff Size: Medium Adult)   Pulse 70   Temp 98.7 °F (37.1 °C) (Oral)   Resp 17   Ht 1.778 m (5' 10\")   Wt 92.5 kg (204 lb)   SpO2 95%   BMI 29.27 kg/m²     Health Maintenance Due   Topic Date Due    Respiratory Syncytial Virus (RSV) Pregnant or age 60 yrs+ (1 - 1-dose 60+ series) Never done    COVID-19 Vaccine (5 - 2023-24 season) 09/01/2023         \"Have you been to the ER, urgent care clinic since your last visit?  Hospitalized since your last visit?\"    NO    “Have you seen or consulted any other health care providers outside of [facility] since your last visit?”    NO                   
MM MISC USE TO INJECT INSULIN UNDER THE SKIN 4 TIMES DAILY AS DIRECTED 4/15/24  Yes Prashanth Livingston MD   warfarin (COUMADIN) 5 MG tablet Take 1 tablet by mouth daily Monday and Thursday 2.5 mg and 5 mg Tuesday, Wednesday, Friday, Saturday and Mj 3/13/24  Yes Prashanth Livingston MD   allopurinol (ZYLOPRIM) 300 MG tablet Take 1 tablet by mouth daily 3/11/24  Yes Prashanth Livingston MD   isosorbide mononitrate (IMDUR) 60 MG extended release tablet Take 1 tablet by mouth daily 3/11/24  Yes Prashanth Livingston MD   lovastatin (MEVACOR) 40 MG tablet TAKE 1 TABLET DAILY 2/12/24  Yes Manuel Carvalho APRN - NP   insulin glargine (LANTUS) 100 UNIT/ML injection vial Inject 45 Units into the skin nightly 1/22/24  Yes Manuel Carvalho APRN - NP   metoprolol (LOPRESSOR) 100 MG tablet TAKE 1 TABLET TWICE A DAY 1/12/24  Yes Manuel Carvalho APRN - NP   FABB 2.2-25-1 MG TABS tablet TAKE 1 TABLET BY MOUTH DAILY 12/18/23  Yes Manuel Carvalho APRN - NP   levothyroxine (SYNTHROID) 175 MCG tablet TAKE 1 TABLET BY MOUTH EVERY DAY 12/18/23  Yes Manuel Carvalho APRN - NP   blood glucose test strips (FREESTYLE TEST STRIPS) strip Use to test blood sugar three times daily DX:E11.51 11/27/23  Yes Manuel Carvalho APRN - NP   furosemide (LASIX) 40 MG tablet Take 2 tablets by mouth daily 11/8/23  Yes Manuel Carvalho APRN - NP   spironolactone (ALDACTONE) 25 MG tablet TAKE 1 TABLET DAILY 5/31/23  Yes Pako Collazo MD   Flaxseed Oil OIL Take 1,000 mg by mouth daily   Yes Automatic Reconciliation, Ar   finasteride (PROSCAR) 5 MG tablet Take 1 tablet by mouth every other day   Yes Automatic Reconciliation, Ar   losartan (COZAAR) 50 MG tablet Take 1 tablet by mouth daily   Yes Automatic Reconciliation, Ar   terazosin (HYTRIN) 10 MG capsule Take 1 capsule by mouth 1/30/15  Yes Automatic Reconciliation, Ar        Review of Systems              Constitutional:  He denies fever, weight loss, sweats or fatigue.              EYES: No blurred or

## 2024-05-01 NOTE — ED PROVIDER NOTES
START taking these medications      cephALEXin 500 MG capsule  Commonly known as: KEFLEX  Take 1 capsule by mouth 3 times daily for 7 days            CONTINUE taking these medications      B-D ULTRAFINE III SHORT PEN 31G X 8 MM Misc  Generic drug: Insulin Pen Needle  USE TO INJECT INSULIN UNDER THE SKIN 4 TIMES DAILY AS DIRECTED            ASK your doctor about these medications      allopurinol 300 MG tablet  Commonly known as: ZYLOPRIM  Take 1 tablet by mouth daily     doxycycline hyclate 100 MG tablet  Commonly known as: VIBRA-TABS  Take 1 tablet by mouth 2 times daily for 10 days     FaBB 2.2-25-1 MG Tabs tablet  Generic drug: folic acid-pyridoxine-cyanocobalamine  TAKE 1 TABLET BY MOUTH DAILY     finasteride 5 MG tablet  Commonly known as: PROSCAR     Flaxseed Oil Oil     FREESTYLE TEST STRIPS strip  Generic drug: blood glucose test strips  Use to test blood sugar three times daily DX:E11.51     furosemide 40 MG tablet  Commonly known as: LASIX  Take 2 tablets by mouth daily     insulin glargine 100 UNIT/ML injection vial  Commonly known as: LANTUS  Inject 45 Units into the skin nightly     isosorbide mononitrate 60 MG extended release tablet  Commonly known as: IMDUR  Take 1 tablet by mouth daily     levothyroxine 175 MCG tablet  Commonly known as: SYNTHROID  TAKE 1 TABLET BY MOUTH EVERY DAY     losartan 50 MG tablet  Commonly known as: COZAAR     lovastatin 40 MG tablet  Commonly known as: MEVACOR  TAKE 1 TABLET DAILY     metoprolol 100 MG tablet  Commonly known as: LOPRESSOR  TAKE 1 TABLET TWICE A DAY     spironolactone 25 MG tablet  Commonly known as: ALDACTONE  TAKE 1 TABLET DAILY     terazosin 10 MG capsule  Commonly known as: HYTRIN     warfarin 5 MG tablet  Commonly known as: COUMADIN  Take as directed. If you are unsure how to take this medication, talk to your nurse or doctor.  Original instructions: Take 1 tablet by mouth daily Monday and Thursday 2.5 mg and 5 mg Tuesday, Wednesday, Friday,

## 2024-05-02 NOTE — DISCHARGE INSTRUCTIONS
Thank You!    It was a pleasure taking care of you in our Emergency Department today. We know that when you come to our Emergency Department, you are entrusting us with your health, comfort, and safety. Our physicians and nurses honor that trust, and truly appreciate the opportunity to care for you and your loved ones.      We also value your feedback. If you receive a survey about your Emergency Department experience today, please fill it out.  We care about our patients' feedback, and we listen to what you have to say.  Thank you.    Aly Leigh PA-C      ________________________________________________________________________  I have included a copy of your lab results and/or radiologic studies from today's visit so you can have them easily available at your follow-up visit. We hope you feel better and please do not hesitate to contact the ED if you have any questions at all!    Recent Results (from the past 12 hour(s))   CBC with Auto Differential    Collection Time: 05/01/24  6:44 PM   Result Value Ref Range    WBC 12.1 (H) 4.1 - 11.1 K/uL    RBC 4.17 4.10 - 5.70 M/uL    Hemoglobin 13.3 12.1 - 17.0 g/dL    Hematocrit 41.3 36.6 - 50.3 %    MCV 99.0 80.0 - 99.0 FL    MCH 31.9 26.0 - 34.0 PG    MCHC 32.2 30.0 - 36.5 g/dL    RDW 14.2 11.5 - 14.5 %    Platelets 234 150 - 400 K/uL    MPV 10.0 8.9 - 12.9 FL    Nucleated RBCs 0.0 0  WBC    nRBC 0.00 0.00 - 0.01 K/uL    Neutrophils % 68 32 - 75 %    Lymphocytes % 16 12 - 49 %    Monocytes % 10 5 - 13 %    Eosinophils % 4 0 - 7 %    Basophils % 1 0 - 1 %    Immature Granulocytes % 1 (H) 0.0 - 0.5 %    Neutrophils Absolute 8.3 (H) 1.8 - 8.0 K/UL    Lymphocytes Absolute 1.9 0.8 - 3.5 K/UL    Monocytes Absolute 1.2 (H) 0.0 - 1.0 K/UL    Eosinophils Absolute 0.5 (H) 0.0 - 0.4 K/UL    Basophils Absolute 0.1 0.0 - 0.1 K/UL    Immature Granulocytes Absolute 0.1 (H) 0.00 - 0.04 K/UL    Differential Type AUTOMATED     CMP    Collection Time: 05/01/24  6:44 PM   Result

## 2024-05-03 LAB
BACTERIA SPEC CULT: NORMAL
GRAM STN SPEC: NORMAL
GRAM STN SPEC: NORMAL
SERVICE CMNT-IMP: NORMAL

## 2024-05-04 LAB
BACTERIA SPEC CULT: ABNORMAL
GRAM STN SPEC: ABNORMAL
GRAM STN SPEC: ABNORMAL
SERVICE CMNT-IMP: ABNORMAL

## 2024-05-07 ENCOUNTER — OFFICE VISIT (OUTPATIENT)
Age: 89
End: 2024-05-07

## 2024-05-07 VITALS
SYSTOLIC BLOOD PRESSURE: 132 MMHG | TEMPERATURE: 97.9 F | DIASTOLIC BLOOD PRESSURE: 80 MMHG | HEIGHT: 70 IN | RESPIRATION RATE: 17 BRPM | WEIGHT: 206.6 LBS | BODY MASS INDEX: 29.58 KG/M2 | OXYGEN SATURATION: 94 % | HEART RATE: 79 BPM

## 2024-05-07 DIAGNOSIS — L08.9 FOREIGN BODY OF ABDOMINAL WALL WITH INFECTION, SUBSEQUENT ENCOUNTER: ICD-10-CM

## 2024-05-07 DIAGNOSIS — L08.9 LACERATION OF LEFT HAND WITH INFECTION, SUBSEQUENT ENCOUNTER: Primary | ICD-10-CM

## 2024-05-07 DIAGNOSIS — S30.851D FOREIGN BODY OF ABDOMINAL WALL WITH INFECTION, SUBSEQUENT ENCOUNTER: ICD-10-CM

## 2024-05-07 DIAGNOSIS — S61.412D LACERATION OF LEFT HAND WITH INFECTION, SUBSEQUENT ENCOUNTER: Primary | ICD-10-CM

## 2024-05-07 PROBLEM — S30.851A FOREIGN BODY OF ABDOMINAL WALL WITH INFECTION: Status: RESOLVED | Noted: 2024-05-07 | Resolved: 2024-05-07

## 2024-05-07 PROBLEM — S61.412A LACERATION OF LEFT HAND WITH INFECTION: Status: ACTIVE | Noted: 2024-05-07

## 2024-05-07 PROBLEM — S30.851A FOREIGN BODY OF ABDOMINAL WALL WITH INFECTION: Status: ACTIVE | Noted: 2024-05-07

## 2024-05-07 RX ORDER — FUROSEMIDE 40 MG/1
80 TABLET ORAL DAILY
Qty: 180 TABLET | Refills: 3 | Status: SHIPPED | OUTPATIENT
Start: 2024-05-07

## 2024-05-07 ASSESSMENT — ENCOUNTER SYMPTOMS
ABDOMINAL DISTENTION: 0
DIARRHEA: 0
NAUSEA: 0
ABDOMINAL PAIN: 0
BLOOD IN STOOL: 0
VOMITING: 0
CONSTIPATION: 0

## 2024-05-07 NOTE — ASSESSMENT & PLAN NOTE
Doing well after evacuation and explantation of mesh with wound vac treatment.  May follow up as needed.

## 2024-05-07 NOTE — TELEPHONE ENCOUNTER
RX refill request from the patient/pharmacy. Patient last seen 05- with labs, and next appt. scheduled for 05-  Requested Prescriptions     Pending Prescriptions Disp Refills    furosemide (LASIX) 40 MG tablet 180 tablet 3     Sig: Take 2 tablets by mouth daily    .

## 2024-05-07 NOTE — PROGRESS NOTES
[facility]    Subjective    Patient ID: Maryan Resendiz is a 88 y.o. male. No chief complaint on file.    HPI Comments: Maryan Resendiz presents today for follow up after exploration of abdominal wall abscess and explantation of mesh, with wound vac.  Now doing great without any issues and that area is well healed.  He does have a laceration to his left hand.  Was [facility] ED [facility] and received antibiotics and while it has gotten somewhat better it is still problematic.  And there continues to be an opening in the skin on the dorsum of his left hand.  It has been there since April 14.  Has an appt with hand surgery in 2 weeks.      Allergies   Allergen Reactions    Latex Other (See Comments)     Skin raw and severely irritated    Aspirin      Other reaction(s): Unknown (comments)    Hydrocodone Other (See Comments)     hallucinations    Oxycodone Other (See Comments)     hallucinations    Sulfa Antibiotics Rash    Tetracycline Rash       Current Outpatient Medications:     cephALEXin (KEFLEX) 500 MG capsule, Take 1 capsule by mouth 3 times daily for 7 days, Disp: 21 capsule, Rfl: 0    B-D ULTRAFINE III SHORT PEN 31G X 8 MM MISC, USE TO INJECT INSULIN UNDER THE SKIN 4 TIMES DAILY AS DIRECTED, Disp: 100 each, Rfl: 1    warfarin (COUMADIN) 5 MG tablet, Take 1 tablet by mouth daily Monday and Thursday 2.5 mg and 5 mg Tuesday, Wednesday, Friday, Saturday and Sunday, Disp: 90 tablet, Rfl: 1    allopurinol (ZYLOPRIM) 300 MG tablet, Take 1 tablet by mouth daily, Disp: 90 tablet, Rfl: 3    isosorbide mononitrate (IMDUR) 60 MG extended release tablet, Take 1 tablet by mouth daily, Disp: 90 tablet, Rfl: 3    lovastatin (MEVACOR) 40 MG tablet, TAKE 1 TABLET DAILY, Disp: 90 tablet, Rfl: 3    insulin glargine (LANTUS) 100 UNIT/ML injection vial, Inject 45 Units into the skin nightly, Disp: 10 mL, Rfl: 3    metoprolol (LOPRESSOR) 100 MG tablet, TAKE 1 TABLET TWICE A DAY, Disp: 180

## 2024-05-08 RX ORDER — LINEZOLID 600 MG/1
600 TABLET, FILM COATED ORAL 2 TIMES DAILY
Qty: 20 TABLET | Refills: 0 | Status: SHIPPED | OUTPATIENT
Start: 2024-05-08 | End: 2024-05-18

## 2024-05-08 NOTE — TELEPHONE ENCOUNTER
RX refill request from the patient/pharmacy. Patient last seen 05- with labs, and next appt. scheduled for 05-  Requested Prescriptions     Pending Prescriptions Disp Refills    linezolid (ZYVOX) 600 MG tablet 20 tablet 0     Sig: Take 1 tablet by mouth 2 times daily for 10 days    .

## 2024-05-21 RX ORDER — SPIRONOLACTONE 25 MG/1
25 TABLET ORAL DAILY
Qty: 90 TABLET | Refills: 3 | Status: SHIPPED | OUTPATIENT
Start: 2024-05-21

## 2024-05-21 NOTE — TELEPHONE ENCOUNTER
PCP: Prashanth Livingston MD    Last appt: 5/1/2024    Future Appointments   Date Time Provider Department Center   5/30/2024  9:10 AM Prashanth Livingston MD [facility]       Requested Prescriptions     Pending Prescriptions Disp Refills    spironolactone (ALDACTONE) 25 MG tablet 90 tablet 3     Sig: Take 1 tablet by mouth daily

## 2024-05-29 PROBLEM — I25.119 ATHEROSCLEROTIC HEART DISEASE OF NATIVE CORONARY ARTERY WITH UNSPECIFIED ANGINA PECTORIS (HCC): Status: RESOLVED | Noted: 2024-04-24 | Resolved: 2024-05-29

## 2024-05-29 PROBLEM — I20.9 ANGINA PECTORIS, UNSPECIFIED (HCC): Status: RESOLVED | Noted: 2024-04-24 | Resolved: 2024-05-29

## 2024-05-30 ENCOUNTER — OFFICE VISIT (OUTPATIENT)
Facility: CLINIC | Age: 89
End: 2024-05-30

## 2024-05-30 VITALS
HEIGHT: 70 IN | SYSTOLIC BLOOD PRESSURE: 120 MMHG | OXYGEN SATURATION: 95 % | TEMPERATURE: 97.8 F | HEART RATE: 70 BPM | DIASTOLIC BLOOD PRESSURE: 70 MMHG | RESPIRATION RATE: 17 BRPM | BODY MASS INDEX: 29.71 KG/M2 | WEIGHT: 207.5 LBS

## 2024-05-30 DIAGNOSIS — E78.2 MIXED HYPERLIPIDEMIA: ICD-10-CM

## 2024-05-30 DIAGNOSIS — N18.30 STAGE 3 CHRONIC KIDNEY DISEASE, UNSPECIFIED WHETHER STAGE 3A OR 3B CKD (HCC): ICD-10-CM

## 2024-05-30 DIAGNOSIS — E11.51 TYPE 2 DIABETES MELLITUS WITH PERIPHERAL VASCULAR DISEASE (HCC): ICD-10-CM

## 2024-05-30 DIAGNOSIS — I10 PRIMARY HYPERTENSION: Primary | ICD-10-CM

## 2024-05-30 DIAGNOSIS — M15.9 PRIMARY OSTEOARTHRITIS INVOLVING MULTIPLE JOINTS: ICD-10-CM

## 2024-05-30 DIAGNOSIS — I73.9 PERIPHERAL VASCULAR DISEASE (HCC): ICD-10-CM

## 2024-05-30 DIAGNOSIS — I50.32 CHRONIC DIASTOLIC CONGESTIVE HEART FAILURE (HCC): ICD-10-CM

## 2024-05-30 DIAGNOSIS — I48.20 CHRONIC ATRIAL FIBRILLATION (HCC): ICD-10-CM

## 2024-05-30 NOTE — PROGRESS NOTES
Maryan Resendiz is a 88 y.o. male     Chief Complaint   Patient presents with    3 Month Follow-Up       /70 (Site: Left Upper Arm, Position: Sitting, Cuff Size: Large Adult)   Pulse 70   Temp 97.8 °F (36.6 °C) (Oral)   Resp 17   Ht 1.778 m (5' 10\")   Wt 94.1 kg (207 lb 8 oz)   SpO2 95%   BMI 29.77 kg/m²     Health Maintenance Due   Topic Date Due    Respiratory Syncytial Virus (RSV) Pregnant or age 60 yrs+ (1 - 1-dose 60+ series) Never done    COVID-19 Vaccine (5 - 2023-24 season) 09/01/2023         \"Have you been to the ER, urgent care clinic since your last visit?  Hospitalized since your last visit?\"    NO    “Have you seen or consulted any other health care providers outside of [facility] since your last visit?”    NO                    
Strain: Low Risk  (12/27/2023)    Overall Financial Resource Strain (CARDIA)     Difficulty of Paying Living Expenses: Not hard at all   Food Insecurity: No Food Insecurity (12/27/2023)    Hunger Vital Sign     Worried About Running Out of Food in the Last Year: Never true     Ran Out of Food in the Last Year: Never true   Transportation Needs: No Transportation Needs (12/27/2023)    PRAPARE - Transportation     Lack of Transportation (Medical): No     Lack of Transportation (Non-Medical): No   Physical Activity: Insufficiently Active (8/14/2023)    Exercise Vital Sign     Days of Exercise per Week: 7 days     Minutes of Exercise per Session: 20 min   Housing Stability: Low Risk  (12/27/2023)    Housing Stability Vital Sign     Unable to Pay for Housing in the Last Year: No     Number of Places Lived in the Last Year: 1     Unstable Housing in the Last Year: No     Family History   Problem Relation Age of Onset    Diabetes Father     Cancer Mother        OBJECTIVE:     /70 (Site: Left Upper Arm, Position: Sitting, Cuff Size: Large Adult)   Pulse 70   Temp 97.8 °F (36.6 °C) (Oral)   Resp 17   Ht 1.778 m (5' 10\")   Wt 94.1 kg (207 lb 8 oz)   SpO2 95%   BMI 29.77 kg/m²   CONSTITUTIONAL:   well nourished, appears age appropriate  EYES: sclera anicteric, PERRL, EOMI  ENMT:nares clear, moist mucous membranes, pharynx clear  NECK: supple. Thyroid normal, No JVD or bruits  RESPIRATORY: Chest: clear to ascultation and percussion, normal inspiratory effort  CARDIOVASCULAR: Heart: regular rate and rhythm no murmurs, rubs or gallops, PMI not displaced, No thrills, no peripheral edema  GASTROINTESTINAL: Abdomen: non distended, soft, non tender, bowel sounds normal  HEMATOLOGIC: no purpura, petechiae or bruising  LYMPHATIC: No lymph node enlargemant  MUSCULOSKELETAL: Extremities: no active synovitis, pulse 1+   INTEGUMENT: No unusual rashes or suspicious skin lesions noted. Nails appear normal.  PERIPHERAL VASCULAR:

## 2024-06-12 ENCOUNTER — OFFICE VISIT (OUTPATIENT)
Facility: CLINIC | Age: 89
End: 2024-06-12
Payer: MEDICARE

## 2024-06-12 VITALS
SYSTOLIC BLOOD PRESSURE: 120 MMHG | TEMPERATURE: 97.5 F | HEIGHT: 70 IN | BODY MASS INDEX: 29.48 KG/M2 | RESPIRATION RATE: 17 BRPM | HEART RATE: 70 BPM | DIASTOLIC BLOOD PRESSURE: 70 MMHG | WEIGHT: 205.9 LBS | OXYGEN SATURATION: 96 %

## 2024-06-12 DIAGNOSIS — L03.115 CELLULITIS OF RIGHT LOWER EXTREMITY: Primary | ICD-10-CM

## 2024-06-12 PROCEDURE — 1123F ACP DISCUSS/DSCN MKR DOCD: CPT | Performed by: INTERNAL MEDICINE

## 2024-06-12 PROCEDURE — G8417 CALC BMI ABV UP PARAM F/U: HCPCS | Performed by: INTERNAL MEDICINE

## 2024-06-12 PROCEDURE — G8427 DOCREV CUR MEDS BY ELIG CLIN: HCPCS | Performed by: INTERNAL MEDICINE

## 2024-06-12 PROCEDURE — 1036F TOBACCO NON-USER: CPT | Performed by: INTERNAL MEDICINE

## 2024-06-12 PROCEDURE — 99213 OFFICE O/P EST LOW 20 MIN: CPT | Performed by: INTERNAL MEDICINE

## 2024-06-12 RX ORDER — CEPHALEXIN 500 MG/1
500 CAPSULE ORAL 3 TIMES DAILY
Qty: 30 CAPSULE | Refills: 0 | Status: SHIPPED | OUTPATIENT
Start: 2024-06-12 | End: 2024-06-22

## 2024-06-12 NOTE — PROGRESS NOTES
Maryan Resendiz is a 89 y.o. male     Chief Complaint   Patient presents with    Infection     Infection in legs (red and tender)       /70 (Site: Left Upper Arm, Position: Sitting, Cuff Size: Large Adult)   Pulse 70   Temp 97.5 °F (36.4 °C) (Oral)   Resp 17   Ht 1.778 m (5' 10\")   Wt 93.4 kg (205 lb 14.4 oz)   SpO2 96%   BMI 29.54 kg/m²     Health Maintenance Due   Topic Date Due    Respiratory Syncytial Virus (RSV) Pregnant or age 60 yrs+ (1 - 1-dose 60+ series) Never done    COVID-19 Vaccine (5 - 2023-24 season) 09/01/2023         \"Have you been to the ER, urgent care clinic since your last visit?  Hospitalized since your last visit?\"    NO    “Have you seen or consulted any other health care providers outside of [facility] since your last visit?”    NO

## 2024-06-12 NOTE — PROGRESS NOTES
Subjective:   Maryan Resendiz is a 89 y.o. male      Chief Complaint   Patient presents with    Infection     Infection in legs (red and tender)        History of present illness: He presents complaints redness and swelling of his right lower extremity and is concerned about infection since it seems to be a little tender.    Patient Active Problem List   Diagnosis    Benign paroxysmal positional vertigo due to bilateral vestibular disorder    S/P knee replacement    Primary hypertension    Chronic atrial fibrillation (HCC)    S/P placement of cardiac pacemaker    Acquired hypothyroidism    Gout    Chronic diastolic congestive heart failure (HCC)    Macular degeneration    Anemia    DJD (degenerative joint disease) of knee    CAD (coronary artery disease)    Renal artery stenosis (HCC)    Diverticulosis of large intestine without hemorrhage    Type 2 diabetes mellitus with peripheral vascular disease ([facility])    Degenerative joint disease    Knee arthropathy    CKD (chronic kidney disease) stage 3, GFR 30-59 ml/min ([facility])    Peripheral vascular disease ([facility])    Mixed hyperlipidemia    Right-sided extracranial carotid artery stenosis    Cyst of skin    Recurrent ventral incisional hernia    LUCIA (acute kidney injury) ([facility])    Leukocytosis    Class 1 obesity due to excess calories without serious comorbidity with body mass index (BMI) of 30.0 to 30.9 in adult    Primary osteoarthritis involving multiple joints    Laceration of left hand with infection    Cellulitis of right lower extremity      Past Medical History:   Diagnosis Date    Acquired hypothyroidism 09/12/2017    Anemia 02/01/2015    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

## 2024-07-01 ENCOUNTER — NURSE ONLY (OUTPATIENT)
Facility: CLINIC | Age: 89
End: 2024-07-01

## 2024-07-01 DIAGNOSIS — I48.20 CHRONIC ATRIAL FIBRILLATION (HCC): Primary | ICD-10-CM

## 2024-07-01 LAB
INR PPP: 2.1 (ref 0.9–1.1)
PROTHROMBIN TIME: 20.8 SEC (ref 9–11.1)

## 2024-08-01 ENCOUNTER — LAB (OUTPATIENT)
Facility: CLINIC | Age: 89
End: 2024-08-01

## 2024-08-01 DIAGNOSIS — I48.20 CHRONIC ATRIAL FIBRILLATION (HCC): Primary | ICD-10-CM

## 2024-08-01 LAB
INR PPP: 2.3 (ref 0.9–1.1)
PROTHROMBIN TIME: 22.7 SEC (ref 9–11.1)

## 2024-09-09 PROBLEM — Z13.39 ALCOHOL SCREENING: Status: ACTIVE | Noted: 2024-09-09

## 2024-09-09 PROBLEM — Z12.5 PROSTATE CANCER SCREENING: Status: ACTIVE | Noted: 2024-09-09

## 2024-09-09 PROBLEM — E55.9 VITAMIN D DEFICIENCY: Status: ACTIVE | Noted: 2024-09-09

## 2024-09-11 ENCOUNTER — OFFICE VISIT (OUTPATIENT)
Facility: CLINIC | Age: 89
End: 2024-09-11

## 2024-09-11 VITALS
HEART RATE: 77 BPM | BODY MASS INDEX: 29.76 KG/M2 | RESPIRATION RATE: 17 BRPM | OXYGEN SATURATION: 93 % | TEMPERATURE: 97.6 F | WEIGHT: 207.9 LBS | DIASTOLIC BLOOD PRESSURE: 60 MMHG | SYSTOLIC BLOOD PRESSURE: 110 MMHG | HEIGHT: 70 IN

## 2024-09-11 DIAGNOSIS — I10 PRIMARY HYPERTENSION: Primary | ICD-10-CM

## 2024-09-11 DIAGNOSIS — Z00.00 MEDICARE ANNUAL WELLNESS VISIT, SUBSEQUENT: ICD-10-CM

## 2024-09-11 DIAGNOSIS — E03.9 ACQUIRED HYPOTHYROIDISM: ICD-10-CM

## 2024-09-11 DIAGNOSIS — S80.812A INFECTED ABRASION OF LEFT LOWER EXTREMITY, INITIAL ENCOUNTER: ICD-10-CM

## 2024-09-11 DIAGNOSIS — L08.9 INFECTED ABRASION OF LEFT LOWER EXTREMITY, INITIAL ENCOUNTER: ICD-10-CM

## 2024-09-11 DIAGNOSIS — M15.9 PRIMARY OSTEOARTHRITIS INVOLVING MULTIPLE JOINTS: ICD-10-CM

## 2024-09-11 DIAGNOSIS — M10.019 ACUTE IDIOPATHIC GOUT OF SHOULDER, UNSPECIFIED LATERALITY: ICD-10-CM

## 2024-09-11 DIAGNOSIS — Z13.39 ALCOHOL SCREENING: ICD-10-CM

## 2024-09-11 DIAGNOSIS — K57.30 DIVERTICULOSIS OF LARGE INTESTINE WITHOUT HEMORRHAGE: ICD-10-CM

## 2024-09-11 DIAGNOSIS — H35.30 MACULAR DEGENERATION, UNSPECIFIED LATERALITY, UNSPECIFIED TYPE: ICD-10-CM

## 2024-09-11 DIAGNOSIS — I50.32 CHRONIC DIASTOLIC CONGESTIVE HEART FAILURE (HCC): ICD-10-CM

## 2024-09-11 DIAGNOSIS — E11.51 TYPE 2 DIABETES MELLITUS WITH PERIPHERAL VASCULAR DISEASE (HCC): ICD-10-CM

## 2024-09-11 DIAGNOSIS — N18.30 STAGE 3 CHRONIC KIDNEY DISEASE, UNSPECIFIED WHETHER STAGE 3A OR 3B CKD (HCC): ICD-10-CM

## 2024-09-11 DIAGNOSIS — I65.21 RIGHT-SIDED EXTRACRANIAL CAROTID ARTERY STENOSIS: ICD-10-CM

## 2024-09-11 DIAGNOSIS — E55.9 VITAMIN D DEFICIENCY: ICD-10-CM

## 2024-09-11 DIAGNOSIS — Z12.5 PROSTATE CANCER SCREENING: ICD-10-CM

## 2024-09-11 DIAGNOSIS — I48.20 CHRONIC ATRIAL FIBRILLATION (HCC): ICD-10-CM

## 2024-09-11 DIAGNOSIS — I73.9 PERIPHERAL VASCULAR DISEASE (HCC): ICD-10-CM

## 2024-09-11 DIAGNOSIS — I70.1 RENAL ARTERY STENOSIS (HCC): ICD-10-CM

## 2024-09-11 DIAGNOSIS — H81.13 BENIGN PAROXYSMAL POSITIONAL VERTIGO DUE TO BILATERAL VESTIBULAR DISORDER: ICD-10-CM

## 2024-09-11 DIAGNOSIS — E78.2 MIXED HYPERLIPIDEMIA: ICD-10-CM

## 2024-09-11 DIAGNOSIS — Z13.39 SCREENING FOR ALCOHOLISM: ICD-10-CM

## 2024-09-11 DIAGNOSIS — E66.09 CLASS 1 OBESITY DUE TO EXCESS CALORIES WITHOUT SERIOUS COMORBIDITY WITH BODY MASS INDEX (BMI) OF 30.0 TO 30.9 IN ADULT: ICD-10-CM

## 2024-09-11 RX ORDER — CEPHALEXIN 500 MG/1
500 CAPSULE ORAL 3 TIMES DAILY
Qty: 30 CAPSULE | Refills: 0 | Status: SHIPPED | OUTPATIENT
Start: 2024-09-11 | End: 2024-09-21

## 2024-09-11 ASSESSMENT — PATIENT HEALTH QUESTIONNAIRE - PHQ9
2. FEELING DOWN, DEPRESSED OR HOPELESS: NOT AT ALL
SUM OF ALL RESPONSES TO PHQ QUESTIONS 1-9: 0
SUM OF ALL RESPONSES TO PHQ9 QUESTIONS 1 & 2: 0
1. LITTLE INTEREST OR PLEASURE IN DOING THINGS: NOT AT ALL

## 2024-09-11 ASSESSMENT — LIFESTYLE VARIABLES
HOW MANY STANDARD DRINKS CONTAINING ALCOHOL DO YOU HAVE ON A TYPICAL DAY: 1 OR 2
HOW OFTEN DO YOU HAVE A DRINK CONTAINING ALCOHOL: MONTHLY OR LESS

## 2024-09-12 LAB
25(OH)D3 SERPL-MCNC: 43.5 NG/ML (ref 30–100)
ALBUMIN SERPL-MCNC: 3.5 G/DL (ref 3.5–5)
ALBUMIN/GLOB SERPL: 1.1 (ref 1.1–2.2)
ALP SERPL-CCNC: 176 U/L (ref 45–117)
ALT SERPL-CCNC: 36 U/L (ref 12–78)
ANION GAP SERPL CALC-SCNC: 5 MMOL/L (ref 2–12)
APPEARANCE UR: CLEAR
AST SERPL-CCNC: 31 U/L (ref 15–37)
BACTERIA URNS QL MICRO: NEGATIVE /HPF
BASOPHILS # BLD: 0 K/UL (ref 0–0.1)
BASOPHILS NFR BLD: 0 % (ref 0–1)
BILIRUB SERPL-MCNC: 0.9 MG/DL (ref 0.2–1)
BILIRUB UR QL: NEGATIVE
BUN SERPL-MCNC: 64 MG/DL (ref 6–20)
BUN/CREAT SERPL: 37 (ref 12–20)
CALCIUM SERPL-MCNC: 9.2 MG/DL (ref 8.5–10.1)
CHLORIDE SERPL-SCNC: 101 MMOL/L (ref 97–108)
CHOLEST SERPL-MCNC: 98 MG/DL
CK SERPL-CCNC: 51 U/L (ref 39–308)
CO2 SERPL-SCNC: 31 MMOL/L (ref 21–32)
COLOR UR: NORMAL
CREAT SERPL-MCNC: 1.75 MG/DL (ref 0.7–1.3)
CREAT UR-MCNC: 48.4 MG/DL
DIFFERENTIAL METHOD BLD: ABNORMAL
EOSINOPHIL # BLD: 0.2 K/UL (ref 0–0.4)
EOSINOPHIL NFR BLD: 3 % (ref 0–7)
EPITH CASTS URNS QL MICRO: NORMAL /LPF
ERYTHROCYTE [DISTWIDTH] IN BLOOD BY AUTOMATED COUNT: 14.4 % (ref 11.5–14.5)
EST. AVERAGE GLUCOSE BLD GHB EST-MCNC: 151 MG/DL
GLOBULIN SER CALC-MCNC: 3.2 G/DL (ref 2–4)
GLUCOSE SERPL-MCNC: 171 MG/DL (ref 65–100)
GLUCOSE UR STRIP.AUTO-MCNC: NEGATIVE MG/DL
HBA1C MFR BLD: 6.9 % (ref 4–5.6)
HCT VFR BLD AUTO: 41.5 % (ref 36.6–50.3)
HDLC SERPL-MCNC: 38 MG/DL
HDLC SERPL: 2.6 (ref 0–5)
HGB BLD-MCNC: 13 G/DL (ref 12.1–17)
HGB UR QL STRIP: NEGATIVE
HYALINE CASTS URNS QL MICRO: NORMAL /LPF (ref 0–5)
IMM GRANULOCYTES # BLD AUTO: 0 K/UL (ref 0–0.04)
IMM GRANULOCYTES NFR BLD AUTO: 0 % (ref 0–0.5)
KETONES UR QL STRIP.AUTO: NEGATIVE MG/DL
LDLC SERPL CALC-MCNC: 36.8 MG/DL (ref 0–100)
LEUKOCYTE ESTERASE UR QL STRIP.AUTO: NEGATIVE
LYMPHOCYTES # BLD: 2 K/UL (ref 0.8–3.5)
LYMPHOCYTES NFR BLD: 21 % (ref 12–49)
MCH RBC QN AUTO: 32.5 PG (ref 26–34)
MCHC RBC AUTO-ENTMCNC: 31.3 G/DL (ref 30–36.5)
MCV RBC AUTO: 103.8 FL (ref 80–99)
MICROALBUMIN UR-MCNC: 3.37 MG/DL
MICROALBUMIN/CREAT UR-RTO: 70 MG/G (ref 0–30)
MONOCYTES # BLD: 1.2 K/UL (ref 0–1)
MONOCYTES NFR BLD: 13 % (ref 5–13)
NEUTS SEG # BLD: 6 K/UL (ref 1.8–8)
NEUTS SEG NFR BLD: 63 % (ref 32–75)
NITRITE UR QL STRIP.AUTO: NEGATIVE
NRBC # BLD: 0 K/UL (ref 0–0.01)
NRBC BLD-RTO: 0 PER 100 WBC
PH UR STRIP: 6 (ref 5–8)
PLATELET # BLD AUTO: 160 K/UL (ref 150–400)
PMV BLD AUTO: 10.9 FL (ref 8.9–12.9)
POTASSIUM SERPL-SCNC: 4.8 MMOL/L (ref 3.5–5.1)
PROT SERPL-MCNC: 6.7 G/DL (ref 6.4–8.2)
PROT UR STRIP-MCNC: NEGATIVE MG/DL
PSA SERPL-MCNC: 0.1 NG/ML (ref 0.01–4)
RBC # BLD AUTO: 4 M/UL (ref 4.1–5.7)
RBC #/AREA URNS HPF: NORMAL /HPF (ref 0–5)
SODIUM SERPL-SCNC: 137 MMOL/L (ref 136–145)
SP GR UR REFRACTOMETRY: 1.01 (ref 1–1.03)
T4 FREE SERPL-MCNC: 1.5 NG/DL (ref 0.8–1.5)
TRIGL SERPL-MCNC: 116 MG/DL
TSH SERPL DL<=0.05 MIU/L-ACNC: 0.59 UIU/ML (ref 0.36–3.74)
UROBILINOGEN UR QL STRIP.AUTO: 0.2 EU/DL (ref 0.2–1)
VLDLC SERPL CALC-MCNC: 23.2 MG/DL
WBC # BLD AUTO: 9.5 K/UL (ref 4.1–11.1)
WBC URNS QL MICRO: NORMAL /HPF (ref 0–4)

## 2024-09-16 DIAGNOSIS — Z22.322 MRSA (METHICILLIN RESISTANT STAPH AUREUS) CULTURE POSITIVE: Primary | ICD-10-CM

## 2024-09-16 RX ORDER — MUPIROCIN 20 MG/G
OINTMENT TOPICAL
Qty: 45 G | Refills: 0 | Status: SHIPPED | OUTPATIENT
Start: 2024-09-16 | End: 2024-09-23

## 2024-09-16 RX ORDER — LINEZOLID 600 MG/1
600 TABLET, FILM COATED ORAL 2 TIMES DAILY
Qty: 28 TABLET | Refills: 0 | Status: SHIPPED | OUTPATIENT
Start: 2024-09-16 | End: 2024-09-30

## 2024-10-05 NOTE — TELEPHONE ENCOUNTER
Pharmacy on file verified  Requested Prescriptions     Pending Prescriptions Disp Refills    insulin lispro (HUMALOG) 100 unit/mL injection 1 Vial      Si-10 Units by SubCUTAneous route three (3) times daily as needed.      LOV 18  NOV 19  LF - not listed denies pain/discomfort (Rating = 0)

## 2024-10-09 PROBLEM — Z12.5 PROSTATE CANCER SCREENING: Status: RESOLVED | Noted: 2024-09-09 | Resolved: 2024-10-09

## 2024-10-09 PROBLEM — Z00.00 MEDICARE ANNUAL WELLNESS VISIT, SUBSEQUENT: Status: RESOLVED | Noted: 2023-08-14 | Resolved: 2024-10-09

## 2024-10-11 ENCOUNTER — LAB (OUTPATIENT)
Facility: CLINIC | Age: 89
End: 2024-10-11

## 2024-10-11 DIAGNOSIS — I48.20 CHRONIC ATRIAL FIBRILLATION (HCC): Primary | ICD-10-CM

## 2024-10-11 LAB
INR PPP: 1.4 (ref 0.9–1.1)
PROTHROMBIN TIME: 14.4 SEC (ref 9–11.1)

## 2024-10-16 ENCOUNTER — ANTI-COAG VISIT (OUTPATIENT)
Facility: CLINIC | Age: 89
End: 2024-10-16

## 2024-11-05 ENCOUNTER — OFFICE VISIT (OUTPATIENT)
Facility: CLINIC | Age: 89
End: 2024-11-05

## 2024-11-05 VITALS
WEIGHT: 201.7 LBS | TEMPERATURE: 97.9 F | SYSTOLIC BLOOD PRESSURE: 130 MMHG | RESPIRATION RATE: 17 BRPM | DIASTOLIC BLOOD PRESSURE: 80 MMHG | HEIGHT: 70 IN | BODY MASS INDEX: 28.88 KG/M2 | HEART RATE: 75 BPM | OXYGEN SATURATION: 97 %

## 2024-11-05 DIAGNOSIS — E11.621 ULCER OF RIGHT GREAT TOE DUE TO DIABETES MELLITUS (HCC): Primary | ICD-10-CM

## 2024-11-05 DIAGNOSIS — N18.30 STAGE 3 CHRONIC KIDNEY DISEASE, UNSPECIFIED WHETHER STAGE 3A OR 3B CKD (HCC): ICD-10-CM

## 2024-11-05 DIAGNOSIS — E11.51 TYPE 2 DIABETES MELLITUS WITH PERIPHERAL VASCULAR DISEASE (HCC): ICD-10-CM

## 2024-11-05 DIAGNOSIS — L97.519 ULCER OF RIGHT GREAT TOE DUE TO DIABETES MELLITUS (HCC): Primary | ICD-10-CM

## 2024-11-05 DIAGNOSIS — I73.9 PERIPHERAL VASCULAR DISEASE (HCC): ICD-10-CM

## 2024-11-05 RX ORDER — LINEZOLID 600 MG/1
600 TABLET, FILM COATED ORAL 2 TIMES DAILY
Qty: 28 TABLET | Refills: 0 | Status: SHIPPED | OUTPATIENT
Start: 2024-11-05 | End: 2024-11-19

## 2024-11-05 NOTE — PROGRESS NOTES
Maryan Resendiz is a 89 y.o. male     Chief Complaint   Patient presents with    Toe Pain     Check right foot, big toe       /80 (Site: Left Upper Arm, Position: Sitting, Cuff Size: Large Adult)   Pulse 75   Temp 97.9 °F (36.6 °C) (Oral)   Resp 17   Ht 1.778 m (5' 10\")   Wt 91.5 kg (201 lb 11.2 oz)   SpO2 97%   BMI 28.94 kg/m²     Health Maintenance Due   Topic Date Due    Respiratory Syncytial Virus (RSV) Pregnant or age 60 yrs+ (1 - 1-dose 60+ series) Never done    Flu vaccine (1) 08/01/2024    COVID-19 Vaccine (5 - 2023-24 season) 09/01/2024         \"Have you been to the ER, urgent care clinic since your last visit?  Hospitalized since your last visit?\"    NO    “Have you seen or consulted any other health care providers outside of [facility] since your last visit?”    NO                    
8 MM MISC USE TO INJECT INSULIN UNDER THE SKIN 4 TIMES DAILY AS DIRECTED 4/15/24  Yes Prashanth Livingston MD   warfarin (COUMADIN) 5 MG tablet Take 1 tablet by mouth daily Monday and Thursday 2.5 mg and 5 mg Tuesday, Wednesday, Friday, Saturday and Mj 3/13/24  Yes Prashanth Livingston MD   allopurinol (ZYLOPRIM) 300 MG tablet Take 1 tablet by mouth daily 3/11/24  Yes Prashanth Livingston MD   isosorbide mononitrate (IMDUR) 60 MG extended release tablet Take 1 tablet by mouth daily 3/11/24  Yes Prashanth Livingston MD   lovastatin (MEVACOR) 40 MG tablet TAKE 1 TABLET DAILY 2/12/24  Yes Manuel Carvalho APRN - NP   insulin glargine (LANTUS) 100 UNIT/ML injection vial Inject 45 Units into the skin nightly 1/22/24  Yes Manuel Carvalho APRN - NP   metoprolol (LOPRESSOR) 100 MG tablet TAKE 1 TABLET TWICE A DAY 1/12/24  Yes Manuel Carvalho APRN - NP   FABB 2.2-25-1 MG TABS tablet TAKE 1 TABLET BY MOUTH DAILY 12/18/23  Yes Manuel Carvalho APRN - NP   levothyroxine (SYNTHROID) 175 MCG tablet TAKE 1 TABLET BY MOUTH EVERY DAY 12/18/23  Yes Manuel Carvalho APRN - NP   blood glucose test strips (FREESTYLE TEST STRIPS) strip Use to test blood sugar three times daily [id number] 11/27/23  Yes Manuel Carvalho APRN - NP   finasteride (PROSCAR) 5 MG tablet Take 1 tablet by mouth every other day   Yes Automatic Reconciliation, Ar   losartan (COZAAR) 50 MG tablet Take 1 tablet by mouth daily   Yes Automatic Reconciliation, Ar   terazosin (HYTRIN) 10 MG capsule Take 1 capsule by mouth 1/30/15  Yes Automatic Reconciliation, Ar        Review of Systems              Constitutional:  He denies fever, weight loss, sweats or fatigue.              EYES: No blurred or double vision,               ENT: no nasal congestion, no headache or dizziness.  No difficulty with               swallowing, taste, speech or smell.  Respiratory:  No cough, wheezing or shortness of breath.  No sputum production.  Cardiac:  Denies chest pain, palpitations,

## 2024-11-06 LAB
ANION GAP SERPL CALC-SCNC: 3 MMOL/L (ref 2–12)
BASOPHILS # BLD: 0.1 K/UL (ref 0–0.1)
BASOPHILS NFR BLD: 1 % (ref 0–1)
BUN SERPL-MCNC: 59 MG/DL (ref 6–20)
BUN/CREAT SERPL: 30 (ref 12–20)
CALCIUM SERPL-MCNC: 9.7 MG/DL (ref 8.5–10.1)
CHLORIDE SERPL-SCNC: 107 MMOL/L (ref 97–108)
CO2 SERPL-SCNC: 29 MMOL/L (ref 21–32)
CREAT SERPL-MCNC: 1.99 MG/DL (ref 0.7–1.3)
DIFFERENTIAL METHOD BLD: ABNORMAL
EOSINOPHIL # BLD: 0.3 K/UL (ref 0–0.4)
EOSINOPHIL NFR BLD: 2 % (ref 0–7)
ERYTHROCYTE [DISTWIDTH] IN BLOOD BY AUTOMATED COUNT: 14.6 % (ref 11.5–14.5)
GLUCOSE SERPL-MCNC: 208 MG/DL (ref 65–100)
HCT VFR BLD AUTO: 40.4 % (ref 36.6–50.3)
HGB BLD-MCNC: 12.6 G/DL (ref 12.1–17)
IMM GRANULOCYTES # BLD AUTO: 0.1 K/UL (ref 0–0.04)
IMM GRANULOCYTES NFR BLD AUTO: 1 % (ref 0–0.5)
LYMPHOCYTES # BLD: 2.7 K/UL (ref 0.8–3.5)
LYMPHOCYTES NFR BLD: 22 % (ref 12–49)
MCH RBC QN AUTO: 31.8 PG (ref 26–34)
MCHC RBC AUTO-ENTMCNC: 31.2 G/DL (ref 30–36.5)
MCV RBC AUTO: 102 FL (ref 80–99)
MONOCYTES # BLD: 1.2 K/UL (ref 0–1)
MONOCYTES NFR BLD: 10 % (ref 5–13)
NEUTS SEG # BLD: 8.1 K/UL (ref 1.8–8)
NEUTS SEG NFR BLD: 64 % (ref 32–75)
NRBC # BLD: 0 K/UL (ref 0–0.01)
NRBC BLD-RTO: 0 PER 100 WBC
PLATELET # BLD AUTO: 227 K/UL (ref 150–400)
PMV BLD AUTO: 11 FL (ref 8.9–12.9)
POTASSIUM SERPL-SCNC: 4.5 MMOL/L (ref 3.5–5.1)
RBC # BLD AUTO: 3.96 M/UL (ref 4.1–5.7)
SODIUM SERPL-SCNC: 139 MMOL/L (ref 136–145)
WBC # BLD AUTO: 12.3 K/UL (ref 4.1–11.1)

## 2024-11-08 LAB
BACTERIA SPEC CULT: NORMAL
GRAM STN SPEC: NORMAL
SERVICE CMNT-IMP: NORMAL

## 2024-11-11 ENCOUNTER — OFFICE VISIT (OUTPATIENT)
Facility: CLINIC | Age: 89
End: 2024-11-11

## 2024-11-11 VITALS
WEIGHT: 202.7 LBS | HEIGHT: 70 IN | HEART RATE: 70 BPM | DIASTOLIC BLOOD PRESSURE: 78 MMHG | BODY MASS INDEX: 29.02 KG/M2 | SYSTOLIC BLOOD PRESSURE: 128 MMHG | RESPIRATION RATE: 17 BRPM | OXYGEN SATURATION: 96 % | TEMPERATURE: 97.6 F

## 2024-11-11 DIAGNOSIS — L97.519 ULCER OF RIGHT GREAT TOE DUE TO DIABETES MELLITUS (HCC): Primary | ICD-10-CM

## 2024-11-11 DIAGNOSIS — E11.621 ULCER OF RIGHT GREAT TOE DUE TO DIABETES MELLITUS (HCC): Primary | ICD-10-CM

## 2024-11-11 DIAGNOSIS — E11.51 TYPE 2 DIABETES MELLITUS WITH PERIPHERAL VASCULAR DISEASE (HCC): ICD-10-CM

## 2024-11-11 DIAGNOSIS — I73.9 PERIPHERAL VASCULAR DISEASE (HCC): ICD-10-CM

## 2024-11-11 DIAGNOSIS — N18.30 STAGE 3 CHRONIC KIDNEY DISEASE, UNSPECIFIED WHETHER STAGE 3A OR 3B CKD (HCC): ICD-10-CM

## 2024-11-11 NOTE — PROGRESS NOTES
Maryan Resendiz is a 89 y.o. male     Chief Complaint   Patient presents with    1 week f/u     PT/INR       /78 (Site: Left Upper Arm, Position: Sitting, Cuff Size: Large Adult)   Pulse 70   Temp 97.6 °F (36.4 °C) (Oral)   Resp 17   Ht 1.778 m (5' 10\")   Wt 91.9 kg (202 lb 11.2 oz)   SpO2 96%   BMI 29.08 kg/m²     Health Maintenance Due   Topic Date Due    Respiratory Syncytial Virus (RSV) Pregnant or age 60 yrs+ (1 - 1-dose 60+ series) Never done    Flu vaccine (1) 08/01/2024    COVID-19 Vaccine (5 - 2023-24 season) 09/01/2024         \"Have you been to the ER, urgent care clinic since your last visit?  Hospitalized since your last visit?\"    NO    “Have you seen or consulted any other health care providers outside of [facility] since your last visit?”    NO                    
Basic Metabolic Panel; Future  4. Type 2 diabetes mellitus with peripheral vascular disease (HCC)          ATTENTION:   This medical record was transcribed using an electronic medical records system.  Although proofread, it may and can contain electronic and spelling errors.  Other human spelling and other errors may be present.  Corrections may be executed at a later time.  Please feel free to contact us for any clarifications as needed.      No follow-up provider specified.    No results found for any visits on 11/11/24.    Prashanth Livingston MD    The patient verbalized understanding of the problems and plans as explained.

## 2024-11-12 ENCOUNTER — LAB (OUTPATIENT)
Facility: CLINIC | Age: 89
End: 2024-11-12

## 2024-11-12 DIAGNOSIS — E11.621 ULCER OF RIGHT GREAT TOE DUE TO DIABETES MELLITUS (HCC): ICD-10-CM

## 2024-11-12 DIAGNOSIS — N18.30 STAGE 3 CHRONIC KIDNEY DISEASE, UNSPECIFIED WHETHER STAGE 3A OR 3B CKD (HCC): ICD-10-CM

## 2024-11-12 DIAGNOSIS — L97.519 ULCER OF RIGHT GREAT TOE DUE TO DIABETES MELLITUS (HCC): ICD-10-CM

## 2024-11-12 LAB
ANION GAP SERPL CALC-SCNC: 6 MMOL/L (ref 2–12)
BASOPHILS # BLD: 0.1 K/UL (ref 0–0.1)
BASOPHILS NFR BLD: 1 % (ref 0–1)
BUN SERPL-MCNC: 69 MG/DL (ref 6–20)
BUN/CREAT SERPL: 30 (ref 12–20)
CALCIUM SERPL-MCNC: 9.1 MG/DL (ref 8.5–10.1)
CHLORIDE SERPL-SCNC: 101 MMOL/L (ref 97–108)
CO2 SERPL-SCNC: 29 MMOL/L (ref 21–32)
CREAT SERPL-MCNC: 2.29 MG/DL (ref 0.7–1.3)
DIFFERENTIAL METHOD BLD: ABNORMAL
EOSINOPHIL # BLD: 0.2 K/UL (ref 0–0.4)
EOSINOPHIL NFR BLD: 2 % (ref 0–7)
ERYTHROCYTE [DISTWIDTH] IN BLOOD BY AUTOMATED COUNT: 14.9 % (ref 11.5–14.5)
GLUCOSE SERPL-MCNC: 202 MG/DL (ref 65–100)
HCT VFR BLD AUTO: 39.2 % (ref 36.6–50.3)
HGB BLD-MCNC: 12.2 G/DL (ref 12.1–17)
IMM GRANULOCYTES # BLD AUTO: 0 K/UL (ref 0–0.04)
IMM GRANULOCYTES NFR BLD AUTO: 0 % (ref 0–0.5)
LYMPHOCYTES # BLD: 2 K/UL (ref 0.8–3.5)
LYMPHOCYTES NFR BLD: 24 % (ref 12–49)
MCH RBC QN AUTO: 30.9 PG (ref 26–34)
MCHC RBC AUTO-ENTMCNC: 31.1 G/DL (ref 30–36.5)
MCV RBC AUTO: 99.2 FL (ref 80–99)
MONOCYTES # BLD: 0.9 K/UL (ref 0–1)
MONOCYTES NFR BLD: 11 % (ref 5–13)
NEUTS SEG # BLD: 5.1 K/UL (ref 1.8–8)
NEUTS SEG NFR BLD: 62 % (ref 32–75)
NRBC # BLD: 0 K/UL (ref 0–0.01)
NRBC BLD-RTO: 0 PER 100 WBC
PLATELET # BLD AUTO: 190 K/UL (ref 150–400)
PMV BLD AUTO: 10.2 FL (ref 8.9–12.9)
POTASSIUM SERPL-SCNC: 5 MMOL/L (ref 3.5–5.1)
RBC # BLD AUTO: 3.95 M/UL (ref 4.1–5.7)
SODIUM SERPL-SCNC: 136 MMOL/L (ref 136–145)
WBC # BLD AUTO: 8.3 K/UL (ref 4.1–11.1)

## 2024-11-18 ENCOUNTER — OFFICE VISIT (OUTPATIENT)
Facility: CLINIC | Age: 89
End: 2024-11-18
Payer: MEDICARE

## 2024-11-18 VITALS
HEIGHT: 70 IN | HEART RATE: 76 BPM | BODY MASS INDEX: 28.99 KG/M2 | DIASTOLIC BLOOD PRESSURE: 71 MMHG | WEIGHT: 202.5 LBS | SYSTOLIC BLOOD PRESSURE: 110 MMHG | RESPIRATION RATE: 17 BRPM

## 2024-11-18 DIAGNOSIS — I73.9 PERIPHERAL VASCULAR DISEASE (HCC): ICD-10-CM

## 2024-11-18 DIAGNOSIS — N18.30 STAGE 3 CHRONIC KIDNEY DISEASE, UNSPECIFIED WHETHER STAGE 3A OR 3B CKD (HCC): ICD-10-CM

## 2024-11-18 DIAGNOSIS — E11.621 ULCER OF RIGHT GREAT TOE DUE TO DIABETES MELLITUS (HCC): Primary | ICD-10-CM

## 2024-11-18 DIAGNOSIS — L97.519 ULCER OF RIGHT GREAT TOE DUE TO DIABETES MELLITUS (HCC): Primary | ICD-10-CM

## 2024-11-18 PROCEDURE — 1159F MED LIST DOCD IN RCRD: CPT | Performed by: INTERNAL MEDICINE

## 2024-11-18 PROCEDURE — 1036F TOBACCO NON-USER: CPT | Performed by: INTERNAL MEDICINE

## 2024-11-18 PROCEDURE — 3044F HG A1C LEVEL LT 7.0%: CPT | Performed by: INTERNAL MEDICINE

## 2024-11-18 PROCEDURE — G8484 FLU IMMUNIZE NO ADMIN: HCPCS | Performed by: INTERNAL MEDICINE

## 2024-11-18 PROCEDURE — G8417 CALC BMI ABV UP PARAM F/U: HCPCS | Performed by: INTERNAL MEDICINE

## 2024-11-18 PROCEDURE — G8427 DOCREV CUR MEDS BY ELIG CLIN: HCPCS | Performed by: INTERNAL MEDICINE

## 2024-11-18 PROCEDURE — 99213 OFFICE O/P EST LOW 20 MIN: CPT | Performed by: INTERNAL MEDICINE

## 2024-11-18 PROCEDURE — 1123F ACP DISCUSS/DSCN MKR DOCD: CPT | Performed by: INTERNAL MEDICINE

## 2024-11-18 PROCEDURE — 1126F AMNT PAIN NOTED NONE PRSNT: CPT | Performed by: INTERNAL MEDICINE

## 2024-11-18 RX ORDER — LINEZOLID 600 MG/1
600 TABLET, FILM COATED ORAL 2 TIMES DAILY
Qty: 28 TABLET | Refills: 0 | Status: SHIPPED | OUTPATIENT
Start: 2024-11-18 | End: 2024-12-02

## 2024-11-18 NOTE — PROGRESS NOTES
Maryan Resendiz is a 89 y.o. male     Chief Complaint   Patient presents with    1 week recheck     Right foot, big toe       /71 (Site: Left Upper Arm, Position: Sitting, Cuff Size: Large Adult)   Pulse 76   Resp 17   Ht 1.778 m (5' 10\")   Wt 91.9 kg (202 lb 8 oz)   BMI 29.06 kg/m²     Health Maintenance Due   Topic Date Due    Respiratory Syncytial Virus (RSV) Pregnant or age 60 yrs+ (1 - 1-dose 75+ series) Never done    Flu vaccine (1) 08/01/2024    COVID-19 Vaccine (5 - 2023-24 season) 09/01/2024         \"Have you been to the ER, urgent care clinic since your last visit?  Hospitalized since your last visit?\"    NO    “Have you seen or consulted any other health care providers outside of [facility] since your last visit?”    NO                    
Activity    Alcohol use: Yes     Alcohol/week: 0.0 standard drinks of alcohol    Drug use: No     Types: OTC, Prescription     Social Determinants of Health     Financial Resource Strain: Low Risk  (12/27/2023)    Overall Financial Resource Strain (CARDIA)     Difficulty of Paying Living Expenses: Not hard at all   Food Insecurity: No Food Insecurity (12/27/2023)    Hunger Vital Sign     Worried About Running Out of Food in the Last Year: Never true     Ran Out of Food in the Last Year: Never true   Transportation Needs: No Transportation Needs (12/27/2023)    PRAPARE - Transportation     Lack of Transportation (Medical): No     Lack of Transportation (Non-Medical): No   Physical Activity: Insufficiently Active (9/11/2024)    Exercise Vital Sign     Days of Exercise per Week: 4 days     Minutes of Exercise per Session: 20 min   Housing Stability: Low Risk  (12/27/2023)    Housing Stability Vital Sign     Unable to Pay for Housing in the Last Year: No     Number of Places Lived in the Last Year: 1     Unstable Housing in the Last Year: No     Family History   Problem Relation Age of Onset    Diabetes Father     Cancer Mother        OBJECTIVE:     /71 (Site: Left Upper Arm, Position: Sitting, Cuff Size: Large Adult)   Pulse 76   Resp 17   Ht 1.778 m (5' 10\")   Wt 91.9 kg (202 lb 8 oz)   BMI 29.06 kg/m²   CONSTITUTIONAL:   well nourished, appears age appropriate  EYES: sclera anicteric, PERRL, EOMI  ENMT:nares clear, moist mucous membranes, pharynx clear  NECK: supple. Thyroid normal, No JVD or bruits  RESPIRATORY: Chest: clear to ascultation and percussion, normal inspiratory effort  CARDIOVASCULAR: Heart: regular rate and rhythm no murmurs, rubs or gallops, PMI not displaced, No thrills, no peripheral edema  GASTROINTESTINAL: Abdomen: non distended, soft, non tender, bowel sounds normal  HEMATOLOGIC: no purpura, petechiae or bruising  LYMPHATIC: No lymph node enlargemant  MUSCULOSKELETAL: Extremities: no

## 2024-11-19 LAB
ANION GAP SERPL CALC-SCNC: 7 MMOL/L (ref 2–12)
BUN SERPL-MCNC: 80 MG/DL (ref 6–20)
BUN/CREAT SERPL: 34 (ref 12–20)
CALCIUM SERPL-MCNC: 9 MG/DL (ref 8.5–10.1)
CHLORIDE SERPL-SCNC: 101 MMOL/L (ref 97–108)
CO2 SERPL-SCNC: 29 MMOL/L (ref 21–32)
CREAT SERPL-MCNC: 2.37 MG/DL (ref 0.7–1.3)
GLUCOSE SERPL-MCNC: 70 MG/DL (ref 65–100)
POTASSIUM SERPL-SCNC: 5 MMOL/L (ref 3.5–5.1)
SODIUM SERPL-SCNC: 137 MMOL/L (ref 136–145)

## 2024-11-22 NOTE — PROGRESS NOTES
Chief Complaint   Patient presents with    Cellulitis     1 week f/up    Chronic Kidney Disease       SUBJECTIVE:    Maryan Resendiz is a 89 y.o. male who presents today in follow-up of his right great toe ulcer with cellulitis with the cellulitis is improved, peripheral vascular disease, CKD stage III with slight deterioration over the last few weeks on antibiotics, chronic atrial fibrillation on Coumadin, and he had significant problem with a nosebleed yesterday on the right side that lasted about 4 hours before he was able to get it stopped.  He continues taking his Coumadin.  He notes no chest pain, shortness of breath, palpitations, PND, orthopnea or other cardiac respiratory complaints.  He notes no current GI or  complaints.  He notes no headaches, dizziness or neurologic complaints in particular that related to his neuropathy he has no pain when he has the right great toe also.  He is due to have arterial Dopplers done tomorrow by vascular surgery who he has been evaluated on this occasion by the nurse practitioner and he said that he thinks he supposed to see Dr. August Kenney.  He notes no current arthritic complaints and there are no other complaints on complete review of systems.      Current Outpatient Medications   Medication Sig Dispense Refill    linezolid (ZYVOX) 600 MG tablet Take 1 tablet by mouth 2 times daily for 14 days 28 tablet 0    spironolactone (ALDACTONE) 25 MG tablet Take 1 tablet by mouth daily 90 tablet 3    furosemide (LASIX) 40 MG tablet Take 2 tablets by mouth daily 180 tablet 3    B-D ULTRAFINE III SHORT PEN 31G X 8 MM MISC USE TO INJECT INSULIN UNDER THE SKIN 4 TIMES DAILY AS DIRECTED 100 each 1    warfarin (COUMADIN) 5 MG tablet Take 1 tablet by mouth daily Monday and Thursday 2.5 mg and 5 mg Tuesday, Wednesday, Friday, Saturday and Sunday 90 tablet 1    allopurinol (ZYLOPRIM) 300 MG tablet Take 1 tablet by mouth daily 90 tablet 3    isosorbide mononitrate (IMDUR) 60 MG

## 2024-11-25 ENCOUNTER — OFFICE VISIT (OUTPATIENT)
Facility: CLINIC | Age: 89
End: 2024-11-25
Payer: MEDICARE

## 2024-11-25 VITALS
HEART RATE: 56 BPM | HEIGHT: 70 IN | DIASTOLIC BLOOD PRESSURE: 58 MMHG | OXYGEN SATURATION: 89 % | BODY MASS INDEX: 28.69 KG/M2 | WEIGHT: 200.4 LBS | SYSTOLIC BLOOD PRESSURE: 112 MMHG | TEMPERATURE: 98 F

## 2024-11-25 DIAGNOSIS — N18.30 STAGE 3 CHRONIC KIDNEY DISEASE, UNSPECIFIED WHETHER STAGE 3A OR 3B CKD (HCC): ICD-10-CM

## 2024-11-25 DIAGNOSIS — L97.519 ULCER OF RIGHT GREAT TOE DUE TO DIABETES MELLITUS (HCC): Primary | ICD-10-CM

## 2024-11-25 DIAGNOSIS — I48.20 CHRONIC ATRIAL FIBRILLATION (HCC): ICD-10-CM

## 2024-11-25 DIAGNOSIS — E11.621 ULCER OF RIGHT GREAT TOE DUE TO DIABETES MELLITUS (HCC): Primary | ICD-10-CM

## 2024-11-25 DIAGNOSIS — R04.0 EPISTAXIS: ICD-10-CM

## 2024-11-25 DIAGNOSIS — I73.9 PERIPHERAL VASCULAR DISEASE (HCC): ICD-10-CM

## 2024-11-25 LAB
ANION GAP SERPL CALC-SCNC: 7 MMOL/L (ref 2–12)
BUN SERPL-MCNC: 90 MG/DL (ref 6–20)
BUN/CREAT SERPL: 34 (ref 12–20)
CALCIUM SERPL-MCNC: 9.7 MG/DL (ref 8.5–10.1)
CHLORIDE SERPL-SCNC: 101 MMOL/L (ref 97–108)
CO2 SERPL-SCNC: 26 MMOL/L (ref 21–32)
CREAT SERPL-MCNC: 2.66 MG/DL (ref 0.7–1.3)
GLUCOSE SERPL-MCNC: 120 MG/DL (ref 65–100)
INR PPP: 3.7 (ref 0.9–1.1)
POTASSIUM SERPL-SCNC: 5.9 MMOL/L (ref 3.5–5.1)
PROTHROMBIN TIME: 36 SEC (ref 9–11.1)
SODIUM SERPL-SCNC: 134 MMOL/L (ref 136–145)

## 2024-11-25 PROCEDURE — 3044F HG A1C LEVEL LT 7.0%: CPT | Performed by: INTERNAL MEDICINE

## 2024-11-25 PROCEDURE — G8417 CALC BMI ABV UP PARAM F/U: HCPCS | Performed by: INTERNAL MEDICINE

## 2024-11-25 PROCEDURE — 1159F MED LIST DOCD IN RCRD: CPT | Performed by: INTERNAL MEDICINE

## 2024-11-25 PROCEDURE — G8484 FLU IMMUNIZE NO ADMIN: HCPCS | Performed by: INTERNAL MEDICINE

## 2024-11-25 PROCEDURE — 99214 OFFICE O/P EST MOD 30 MIN: CPT | Performed by: INTERNAL MEDICINE

## 2024-11-25 PROCEDURE — 1036F TOBACCO NON-USER: CPT | Performed by: INTERNAL MEDICINE

## 2024-11-25 PROCEDURE — 1160F RVW MEDS BY RX/DR IN RCRD: CPT | Performed by: INTERNAL MEDICINE

## 2024-11-25 PROCEDURE — 1123F ACP DISCUSS/DSCN MKR DOCD: CPT | Performed by: INTERNAL MEDICINE

## 2024-11-25 PROCEDURE — 1126F AMNT PAIN NOTED NONE PRSNT: CPT | Performed by: INTERNAL MEDICINE

## 2024-11-25 PROCEDURE — G8427 DOCREV CUR MEDS BY ELIG CLIN: HCPCS | Performed by: INTERNAL MEDICINE

## 2024-11-25 ASSESSMENT — PATIENT HEALTH QUESTIONNAIRE - PHQ9
SUM OF ALL RESPONSES TO PHQ QUESTIONS 1-9: 0
SUM OF ALL RESPONSES TO PHQ QUESTIONS 1-9: 0
2. FEELING DOWN, DEPRESSED OR HOPELESS: NOT AT ALL
SUM OF ALL RESPONSES TO PHQ9 QUESTIONS 1 & 2: 0
1. LITTLE INTEREST OR PLEASURE IN DOING THINGS: NOT AT ALL
SUM OF ALL RESPONSES TO PHQ QUESTIONS 1-9: 0
SUM OF ALL RESPONSES TO PHQ QUESTIONS 1-9: 0

## 2024-11-25 NOTE — PROGRESS NOTES
Maryan Resendiz is a 89 y.o. male     Chief Complaint   Patient presents with    Cellulitis     1 week f/up    Chronic Kidney Disease       \"Have you been to the ER, urgent care clinic since your last visit?  Hospitalized since your last visit?\"    NO    “Have you seen or consulted any other health care providers outside of [facility] since your last visit?”    NO

## 2024-11-27 ENCOUNTER — TELEPHONE (OUTPATIENT)
Facility: CLINIC | Age: 89
End: 2024-11-27

## 2024-11-27 ENCOUNTER — LAB (OUTPATIENT)
Facility: CLINIC | Age: 89
End: 2024-11-27

## 2024-11-27 DIAGNOSIS — E87.5 HYPERKALEMIA: Primary | ICD-10-CM

## 2024-11-27 NOTE — TELEPHONE ENCOUNTER
Spoke with patient about holding his Coumadin until Friday and patient wanted to cancel his STAT Potassium level for today. Is it okay for the patient to get his potassium levels done when he comes in Friday? Please advise

## 2024-11-29 ENCOUNTER — APPOINTMENT (OUTPATIENT)
Facility: HOSPITAL | Age: 89
DRG: 982 | End: 2024-11-29
Payer: MEDICARE

## 2024-11-29 ENCOUNTER — LAB (OUTPATIENT)
Facility: CLINIC | Age: 89
End: 2024-11-29

## 2024-11-29 ENCOUNTER — HOSPITAL ENCOUNTER (INPATIENT)
Facility: HOSPITAL | Age: 89
LOS: 13 days | Discharge: NURSING, REHAB OR LONG-TERM CARE | DRG: 982 | End: 2024-12-12
Attending: EMERGENCY MEDICINE | Admitting: STUDENT IN AN ORGANIZED HEALTH CARE EDUCATION/TRAINING PROGRAM
Payer: MEDICARE

## 2024-11-29 DIAGNOSIS — D69.6 THROMBOCYTOPENIA (HCC): ICD-10-CM

## 2024-11-29 DIAGNOSIS — R04.0 EPISTAXIS: ICD-10-CM

## 2024-11-29 DIAGNOSIS — R42 LIGHTHEADEDNESS: Primary | ICD-10-CM

## 2024-11-29 DIAGNOSIS — N18.30 STAGE 3 CHRONIC KIDNEY DISEASE, UNSPECIFIED WHETHER STAGE 3A OR 3B CKD (HCC): ICD-10-CM

## 2024-11-29 DIAGNOSIS — T67.1XXS HEAT SYNCOPE, SEQUELA: ICD-10-CM

## 2024-11-29 DIAGNOSIS — D64.9 ANEMIA, UNSPECIFIED TYPE: ICD-10-CM

## 2024-11-29 DIAGNOSIS — I48.20 CHRONIC ATRIAL FIBRILLATION (HCC): Primary | ICD-10-CM

## 2024-11-29 PROBLEM — K92.2 GIB (GASTROINTESTINAL BLEEDING): Status: ACTIVE | Noted: 2024-11-29

## 2024-11-29 LAB
ALBUMIN SERPL-MCNC: 2.8 G/DL (ref 3.5–5)
ALBUMIN/GLOB SERPL: 0.8 (ref 1.1–2.2)
ALP SERPL-CCNC: 143 U/L (ref 45–117)
ALT SERPL-CCNC: 31 U/L (ref 12–78)
ANION GAP SERPL CALC-SCNC: 8 MMOL/L (ref 2–12)
APPEARANCE UR: CLEAR
AST SERPL-CCNC: 30 U/L (ref 15–37)
BACTERIA URNS QL MICRO: ABNORMAL /HPF
BASOPHILS # BLD: 0 K/UL (ref 0–0.1)
BASOPHILS NFR BLD: 0 % (ref 0–1)
BILIRUB SERPL-MCNC: 0.6 MG/DL (ref 0.2–1)
BILIRUB UR QL: NEGATIVE
BUN SERPL-MCNC: 109 MG/DL (ref 6–20)
BUN/CREAT SERPL: 39 (ref 12–20)
CALCIUM SERPL-MCNC: 8.8 MG/DL (ref 8.5–10.1)
CHLORIDE SERPL-SCNC: 98 MMOL/L (ref 97–108)
CO2 SERPL-SCNC: 24 MMOL/L (ref 21–32)
COLOR UR: ABNORMAL
CREAT SERPL-MCNC: 2.79 MG/DL (ref 0.7–1.3)
DIFFERENTIAL METHOD BLD: ABNORMAL
EOSINOPHIL # BLD: 0.2 K/UL (ref 0–0.4)
EOSINOPHIL NFR BLD: 2 % (ref 0–7)
EPITH CASTS URNS QL MICRO: ABNORMAL /LPF
ERYTHROCYTE [DISTWIDTH] IN BLOOD BY AUTOMATED COUNT: 14.6 % (ref 11.5–14.5)
EST. AVERAGE GLUCOSE BLD GHB EST-MCNC: 154 MG/DL
FERRITIN SERPL-MCNC: 665 NG/ML (ref 26–388)
FIBRINOGEN PPP-MCNC: 355 MG/DL (ref 200–475)
FOLATE SERPL-MCNC: 19.1 NG/ML (ref 5–21)
GLOBULIN SER CALC-MCNC: 3.5 G/DL (ref 2–4)
GLUCOSE BLD STRIP.AUTO-MCNC: 198 MG/DL (ref 65–117)
GLUCOSE BLD STRIP.AUTO-MCNC: 259 MG/DL (ref 65–117)
GLUCOSE SERPL-MCNC: 199 MG/DL (ref 65–100)
GLUCOSE UR STRIP.AUTO-MCNC: NEGATIVE MG/DL
HAPTOGLOB SERPL-MCNC: 161 MG/DL (ref 30–200)
HBA1C MFR BLD: 7 % (ref 4–5.6)
HCT VFR BLD AUTO: 23.3 % (ref 36.6–50.3)
HGB BLD-MCNC: 7.8 G/DL (ref 12.1–17)
HGB UR QL STRIP: NEGATIVE
IMM GRANULOCYTES # BLD AUTO: 0.1 K/UL (ref 0–0.04)
IMM GRANULOCYTES NFR BLD AUTO: 1 % (ref 0–0.5)
INR PPP: 2.2 (ref 0.9–1.1)
IRON SATN MFR SERPL: 96 % (ref 20–50)
IRON SERPL-MCNC: 205 UG/DL (ref 35–150)
KETONES UR QL STRIP.AUTO: NEGATIVE MG/DL
LDH SERPL L TO P-CCNC: 267 U/L (ref 85–241)
LEUKOCYTE ESTERASE UR QL STRIP.AUTO: ABNORMAL
LYMPHOCYTES # BLD: 2.1 K/UL (ref 0.8–3.5)
LYMPHOCYTES NFR BLD: 27 % (ref 12–49)
MAGNESIUM SERPL-MCNC: 2.3 MG/DL (ref 1.6–2.4)
MCH RBC QN AUTO: 31.1 PG (ref 26–34)
MCHC RBC AUTO-ENTMCNC: 33.5 G/DL (ref 30–36.5)
MCV RBC AUTO: 92.8 FL (ref 80–99)
MONOCYTES # BLD: 0.5 K/UL (ref 0–1)
MONOCYTES NFR BLD: 6 % (ref 5–13)
NEUTS SEG # BLD: 4.8 K/UL (ref 1.8–8)
NEUTS SEG NFR BLD: 64 % (ref 32–75)
NITRITE UR QL STRIP.AUTO: NEGATIVE
NRBC # BLD: 0 K/UL (ref 0–0.01)
NRBC BLD-RTO: 0 PER 100 WBC
PERIPHERAL SMEAR, MD REVIEW: NORMAL
PH UR STRIP: 5.5 (ref 5–8)
PLATELET # BLD AUTO: 45 K/UL (ref 150–400)
PMV BLD AUTO: 11.2 FL (ref 8.9–12.9)
POTASSIUM SERPL-SCNC: 5.2 MMOL/L (ref 3.5–5.1)
PROT SERPL-MCNC: 6.3 G/DL (ref 6.4–8.2)
PROT UR STRIP-MCNC: NEGATIVE MG/DL
PROTHROMBIN TIME: 21.9 SEC (ref 9–11.1)
RBC # BLD AUTO: 2.51 M/UL (ref 4.1–5.7)
RBC #/AREA URNS HPF: ABNORMAL /HPF (ref 0–5)
RBC MORPH BLD: ABNORMAL
SERVICE CMNT-IMP: ABNORMAL
SERVICE CMNT-IMP: ABNORMAL
SODIUM SERPL-SCNC: 130 MMOL/L (ref 136–145)
SP GR UR REFRACTOMETRY: 1.01
TIBC SERPL-MCNC: 214 UG/DL (ref 250–450)
TROPONIN I SERPL HS-MCNC: 43 NG/L (ref 0–76)
TROPONIN I SERPL HS-MCNC: 43 NG/L (ref 0–76)
URINE CULTURE IF INDICATED: ABNORMAL
UROBILINOGEN UR QL STRIP.AUTO: 0.2 EU/DL (ref 0.2–1)
VIT B12 SERPL-MCNC: 835 PG/ML (ref 193–986)
WBC # BLD AUTO: 7.7 K/UL (ref 4.1–11.1)
WBC URNS QL MICRO: ABNORMAL /HPF (ref 0–4)

## 2024-11-29 PROCEDURE — 83010 ASSAY OF HAPTOGLOBIN QUANT: CPT

## 2024-11-29 PROCEDURE — 6360000002 HC RX W HCPCS

## 2024-11-29 PROCEDURE — 84484 ASSAY OF TROPONIN QUANT: CPT

## 2024-11-29 PROCEDURE — 87086 URINE CULTURE/COLONY COUNT: CPT

## 2024-11-29 PROCEDURE — 36415 COLL VENOUS BLD VENIPUNCTURE: CPT

## 2024-11-29 PROCEDURE — 71250 CT THORAX DX C-: CPT

## 2024-11-29 PROCEDURE — 6370000000 HC RX 637 (ALT 250 FOR IP): Performed by: STUDENT IN AN ORGANIZED HEALTH CARE EDUCATION/TRAINING PROGRAM

## 2024-11-29 PROCEDURE — 85610 PROTHROMBIN TIME: CPT

## 2024-11-29 PROCEDURE — 85384 FIBRINOGEN ACTIVITY: CPT

## 2024-11-29 PROCEDURE — 81001 URINALYSIS AUTO W/SCOPE: CPT

## 2024-11-29 PROCEDURE — 83540 ASSAY OF IRON: CPT

## 2024-11-29 PROCEDURE — 82607 VITAMIN B-12: CPT

## 2024-11-29 PROCEDURE — 2580000003 HC RX 258: Performed by: STUDENT IN AN ORGANIZED HEALTH CARE EDUCATION/TRAINING PROGRAM

## 2024-11-29 PROCEDURE — 82728 ASSAY OF FERRITIN: CPT

## 2024-11-29 PROCEDURE — 72125 CT NECK SPINE W/O DYE: CPT

## 2024-11-29 PROCEDURE — 85025 COMPLETE CBC W/AUTO DIFF WBC: CPT

## 2024-11-29 PROCEDURE — 6360000002 HC RX W HCPCS: Performed by: STUDENT IN AN ORGANIZED HEALTH CARE EDUCATION/TRAINING PROGRAM

## 2024-11-29 PROCEDURE — 84466 ASSAY OF TRANSFERRIN: CPT

## 2024-11-29 PROCEDURE — 83036 HEMOGLOBIN GLYCOSYLATED A1C: CPT

## 2024-11-29 PROCEDURE — 93005 ELECTROCARDIOGRAM TRACING: CPT

## 2024-11-29 PROCEDURE — 73630 X-RAY EXAM OF FOOT: CPT

## 2024-11-29 PROCEDURE — 70450 CT HEAD/BRAIN W/O DYE: CPT

## 2024-11-29 PROCEDURE — 82962 GLUCOSE BLOOD TEST: CPT

## 2024-11-29 PROCEDURE — 87186 SC STD MICRODIL/AGAR DIL: CPT

## 2024-11-29 PROCEDURE — 83735 ASSAY OF MAGNESIUM: CPT

## 2024-11-29 PROCEDURE — 87088 URINE BACTERIA CULTURE: CPT

## 2024-11-29 PROCEDURE — 1100000003 HC PRIVATE W/ TELEMETRY

## 2024-11-29 PROCEDURE — 82746 ASSAY OF FOLIC ACID SERUM: CPT

## 2024-11-29 PROCEDURE — 80053 COMPREHEN METABOLIC PANEL: CPT

## 2024-11-29 PROCEDURE — 83615 LACTATE (LD) (LDH) ENZYME: CPT

## 2024-11-29 PROCEDURE — 99285 EMERGENCY DEPT VISIT HI MDM: CPT

## 2024-11-29 PROCEDURE — 2580000003 HC RX 258

## 2024-11-29 RX ORDER — MUPIROCIN 20 MG/G
OINTMENT TOPICAL 3 TIMES DAILY
COMMUNITY
End: 2024-12-17

## 2024-11-29 RX ORDER — ANTIOX #8/OM3/DHA/EPA/LUT/ZEAX 250-2.5 MG
1 CAPSULE ORAL 2 TIMES DAILY
COMMUNITY
End: 2024-12-17

## 2024-11-29 RX ORDER — INSULIN GLARGINE 100 [IU]/ML
45 INJECTION, SOLUTION SUBCUTANEOUS NIGHTLY
Status: DISCONTINUED | OUTPATIENT
Start: 2024-11-29 | End: 2024-11-29

## 2024-11-29 RX ORDER — MULTIVITAMIN WITH IRON
1 TABLET ORAL DAILY
COMMUNITY

## 2024-11-29 RX ORDER — POLYETHYLENE GLYCOL 3350 17 G/17G
17 POWDER, FOR SOLUTION ORAL DAILY PRN
Status: DISCONTINUED | OUTPATIENT
Start: 2024-11-29 | End: 2024-12-12 | Stop reason: HOSPADM

## 2024-11-29 RX ORDER — ONDANSETRON 4 MG/1
4 TABLET, ORALLY DISINTEGRATING ORAL EVERY 8 HOURS PRN
Status: DISCONTINUED | OUTPATIENT
Start: 2024-11-29 | End: 2024-12-12 | Stop reason: HOSPADM

## 2024-11-29 RX ORDER — ACETAMINOPHEN 325 MG/1
650 TABLET ORAL EVERY 6 HOURS PRN
Status: DISCONTINUED | OUTPATIENT
Start: 2024-11-29 | End: 2024-12-12 | Stop reason: HOSPADM

## 2024-11-29 RX ORDER — SODIUM CHLORIDE 0.9 % (FLUSH) 0.9 %
5-40 SYRINGE (ML) INJECTION EVERY 12 HOURS SCHEDULED
Status: DISCONTINUED | OUTPATIENT
Start: 2024-11-29 | End: 2024-12-12 | Stop reason: HOSPADM

## 2024-11-29 RX ORDER — SODIUM CHLORIDE 9 MG/ML
INJECTION, SOLUTION INTRAVENOUS PRN
Status: DISCONTINUED | OUTPATIENT
Start: 2024-11-29 | End: 2024-12-04

## 2024-11-29 RX ORDER — SODIUM CHLORIDE, SODIUM LACTATE, POTASSIUM CHLORIDE, CALCIUM CHLORIDE 600; 310; 30; 20 MG/100ML; MG/100ML; MG/100ML; MG/100ML
INJECTION, SOLUTION INTRAVENOUS CONTINUOUS
Status: ACTIVE | OUTPATIENT
Start: 2024-11-29 | End: 2024-11-30

## 2024-11-29 RX ORDER — GLUCAGON 1 MG/ML
1 KIT INJECTION PRN
Status: DISCONTINUED | OUTPATIENT
Start: 2024-11-29 | End: 2024-12-12 | Stop reason: HOSPADM

## 2024-11-29 RX ORDER — ALLOPURINOL 300 MG/1
300 TABLET ORAL DAILY
Status: DISCONTINUED | OUTPATIENT
Start: 2024-11-29 | End: 2024-12-12 | Stop reason: HOSPADM

## 2024-11-29 RX ORDER — INSULIN GLARGINE 100 [IU]/ML
30 INJECTION, SOLUTION SUBCUTANEOUS NIGHTLY
Status: DISCONTINUED | OUTPATIENT
Start: 2024-11-29 | End: 2024-12-12 | Stop reason: HOSPADM

## 2024-11-29 RX ORDER — DOXAZOSIN 2 MG/1
4 TABLET ORAL DAILY
Status: DISCONTINUED | OUTPATIENT
Start: 2024-11-29 | End: 2024-12-12 | Stop reason: HOSPADM

## 2024-11-29 RX ORDER — ACETAMINOPHEN 650 MG/1
650 SUPPOSITORY RECTAL EVERY 6 HOURS PRN
Status: DISCONTINUED | OUTPATIENT
Start: 2024-11-29 | End: 2024-12-12 | Stop reason: HOSPADM

## 2024-11-29 RX ORDER — ATORVASTATIN CALCIUM 10 MG/1
10 TABLET, FILM COATED ORAL DAILY
Status: DISCONTINUED | OUTPATIENT
Start: 2024-11-29 | End: 2024-12-12 | Stop reason: HOSPADM

## 2024-11-29 RX ORDER — DEXTROSE MONOHYDRATE 100 MG/ML
INJECTION, SOLUTION INTRAVENOUS CONTINUOUS PRN
Status: DISCONTINUED | OUTPATIENT
Start: 2024-11-29 | End: 2024-12-12 | Stop reason: HOSPADM

## 2024-11-29 RX ORDER — FINASTERIDE 5 MG/1
5 TABLET, FILM COATED ORAL EVERY OTHER DAY
Status: DISCONTINUED | OUTPATIENT
Start: 2024-11-29 | End: 2024-12-12 | Stop reason: HOSPADM

## 2024-11-29 RX ORDER — INSULIN LISPRO 100 [IU]/ML
5-10 INJECTION, SOLUTION INTRAVENOUS; SUBCUTANEOUS AS NEEDED
COMMUNITY

## 2024-11-29 RX ORDER — ONDANSETRON 2 MG/ML
4 INJECTION INTRAMUSCULAR; INTRAVENOUS EVERY 6 HOURS PRN
Status: DISCONTINUED | OUTPATIENT
Start: 2024-11-29 | End: 2024-12-12 | Stop reason: HOSPADM

## 2024-11-29 RX ORDER — SODIUM CHLORIDE 0.9 % (FLUSH) 0.9 %
5-40 SYRINGE (ML) INJECTION PRN
Status: DISCONTINUED | OUTPATIENT
Start: 2024-11-29 | End: 2024-12-12 | Stop reason: HOSPADM

## 2024-11-29 RX ORDER — INSULIN LISPRO 100 [IU]/ML
0-4 INJECTION, SOLUTION INTRAVENOUS; SUBCUTANEOUS
Status: DISCONTINUED | OUTPATIENT
Start: 2024-11-29 | End: 2024-12-05

## 2024-11-29 RX ADMIN — Medication 2 UNITS: at 21:43

## 2024-11-29 RX ADMIN — SODIUM CHLORIDE, POTASSIUM CHLORIDE, SODIUM LACTATE AND CALCIUM CHLORIDE: 600; 310; 30; 20 INJECTION, SOLUTION INTRAVENOUS at 17:21

## 2024-11-29 RX ADMIN — ATORVASTATIN CALCIUM 10 MG: 10 TABLET, FILM COATED ORAL at 17:19

## 2024-11-29 RX ADMIN — DOXAZOSIN 4 MG: 2 TABLET ORAL at 18:06

## 2024-11-29 RX ADMIN — SODIUM CHLORIDE, PRESERVATIVE FREE 10 ML: 5 INJECTION INTRAVENOUS at 21:46

## 2024-11-29 RX ADMIN — ALLOPURINOL 300 MG: 300 TABLET ORAL at 17:20

## 2024-11-29 RX ADMIN — INSULIN GLARGINE 30 UNITS: 100 INJECTION, SOLUTION SUBCUTANEOUS at 21:42

## 2024-11-29 RX ADMIN — LEVOTHYROXINE SODIUM 175 MCG: 0.15 TABLET ORAL at 17:19

## 2024-11-29 RX ADMIN — FINASTERIDE 5 MG: 5 TABLET, FILM COATED ORAL at 18:06

## 2024-11-29 RX ADMIN — Medication 1 UNITS: at 18:56

## 2024-11-29 RX ADMIN — WATER 1000 MG: 1 INJECTION INTRAMUSCULAR; INTRAVENOUS; SUBCUTANEOUS at 14:43

## 2024-11-29 RX ADMIN — SODIUM CHLORIDE 40 MG: 9 INJECTION INTRAMUSCULAR; INTRAVENOUS; SUBCUTANEOUS at 17:21

## 2024-11-29 NOTE — ED PROVIDER NOTES
[facility] EMERGENCY DEPT  EMERGENCY DEPARTMENT ENCOUNTER    Patient Name: Maryan Resendiz  MRN: [id number]  YOB: 1935  PCP: Prashanth Livingston MD    Time/Date of evaluation: 2:14 PM EST on 11/29/24    History of Presenting Illness     Chief Complaint   Patient presents with    Dizziness     Pt ambulatory from [facility] with EMS cc of dizziness for the past week. Reports hx of vertigo and states this feels like the same thing. Pt has had several falls this week, including one with a headstrike 2 days ago. Denies neck/back pain, headache, blurred vision. Takes Warfarin but has not taken since Monday in preparation for a procedure. Stroke Scale negative.      History Provided by: Patient   History is limited by: Nothing    HISTORY (Narrative):   Maryan Resendiz is a 89 y.o. male with past history of coronary peripheral artery disease, atrial fibrillation hypothyroidism, chronic kidney disease stage 3.  Patient is here for dizziness and falls.  He states on Tuesday he felt dizzy described as while walking his body gave out.  No room spinning or him spinning.  He is fallen twice he states he was told to hold his warfarin on Friday because of an abnormal number but he is unsure what the number was.  He denies fevers or chills.  Dysuria or trouble going to the bathroom for bowel movement.  He has been eating well.  He is being treated for a wound on his foot, scheduled for vascular surgery to that leg.  He states he changes the dressing daily and has not noticed any redness swelling or discharge.      Nursing Notes were all reviewed and agreed with or any disagreements were addressed in the HPI.    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   Abdominal:      General: Abdomen is flat.      Tenderness: There is no abdominal tenderness. There is no guarding or rebound.      Comments: No major injury   Musculoskeletal:      Cervical back: No tenderness.      Right lower leg: No edema.      Left lower leg: No edema.   Skin:     General: Skin is warm and dry.      Comments: Right great toe has soft eschar covering chronic wound on the plantar side.  No surrounding redness or discharge noted.  However unstageable   Neurological:      Mental Status: He is alert and oriented to person, place, and time. Mental status is at baseline.      Sensory: No sensory deficit.      Motor: No weakness.      Comments: Hints exam negative       Diagnostic Study Results     LABS:  Results for orders placed or performed during the hospital encounter of 11/29/24   Protime-INR   Result Value Ref Range    INR 2.2 (H) 0.9 - 1.1      Protime 21.9 (H) 9.0 - 11.1 sec   CBC with Auto Differential   Result Value Ref Range    WBC 7.7 4.1 - 11.1 K/uL    RBC 2.51 (L) 4.10 - 5.70 M/uL    Hemoglobin 7.8 (L) 12.1 - 17.0 g/dL    Hematocrit 23.3 (L) 36.6 - 50.3 %    MCV 92.8 80.0 - 99.0 FL    MCH 31.1 26.0 - 34.0 PG    MCHC 33.5 30.0 - 36.5 g/dL    RDW 14.6 (H) 11.5 - 14.5 %    Platelets 45 (LL) 150 - 400 K/uL    MPV 11.2 8.9 - 12.9 FL    Nucleated RBCs 0.0 0  WBC    nRBC 0.00 0.00 - 0.01 K/uL    Neutrophils % 64 32 - 75 %    Lymphocytes % 27 12 - 49 %    Monocytes % 6 5 - 13 %    Eosinophils % 2 0 - 7 %    Basophils % 0 0 - 1 %    Immature Granulocytes % 1 (H) 0.0 - 0.5 %    Neutrophils Absolute 4.8 1.8 - 8.0 K/UL    Lymphocytes Absolute 2.1 0.8 - 3.5 K/UL    Monocytes Absolute 0.5 0.0 - 1.0 K/UL    Eosinophils Absolute 0.2 0.0 - 0.4 K/UL    Basophils Absolute 0.0 0.0 - 0.1 K/UL    Immature Granulocytes Absolute 0.1 (H) 0.00 - 0.04 K/UL    Differential Type SMEAR SCANNED      RBC Comment NORMOCYTIC, NORMOCHROMIC     Comprehensive Metabolic Panel   Result Value Ref

## 2024-11-29 NOTE — PROGRESS NOTES
End of Shift Note    Bedside shift change report given to MIN Jin (oncoming nurse) by THERESA AMADOR RN (offgoing nurse).  Report included the following information SBAR, Kardex, Intake/Output, and MAR    Shift worked:  [id number]     Shift summary and any significant changes:     Patient had no complaints. Dual skin assessment completed. Photos of skin issues taken and added to the chart, see media. Scheduled medications given per MAR.      Concerns for physician to address:    Zone phone for oncoming shift:       Activity:  Level of Assistance: Minimal assist, patient does 75% or more    Cardiac:   Cardiac Monitoring:  yes - V paced    Access:  Current line(s): PIV     Genitourinary:        Respiratory:   O2 Device: None (Room air)    GI:  Current diet: ADULT DIET; Clear Liquid; 3 carb choices (45 gm/meal)    Pain Management:   Patient states pain is manageable on current regimen: N/A    Skin:  Binh Scale Score: 19  Interventions: Wound Offloading (Prevention Methods): Turning, Repositioning  Pressure injury: no    Patient Safety:  Fall Score: Chin Total Score: 55  Fall Risk Interventions  Nursing Judgement-Fall Risk High(Add Comments): Yes  Toilet Every 2 Hours-In Advance of Need: Yes  Hourly Visual Checks: Awake, In bed  Fall Visual Posted: Socks  Room Door Open: Yes  Alarm On: Bed  Patient Moved Closer to Nursing Station: No    Active Consults:  IP CONSULT TO ONCOLOGY  IP CONSULT TO PODIATRY  IP CONSULT TO GI  IP CONSULT TO NEPHROLOGY    Length of Stay:  Expected LOS: 3  Actual LOS: 0      THERESA AMADOR RN

## 2024-11-29 NOTE — H&P
Hospitalist Admission Note    NAME:   Maryan Resendiz   : 1935   MRN: [id number]     Date/Time: 2024 2:35 PM    Patient PCP: Prashanth Livingston MD    ______________________________________________________________________  Given the patient's current clinical presentation, I have a high level of concern for decompensation if discharged from the emergency department.  Complex decision making was performed, which includes reviewing the patient's available past medical records, laboratory results, and x-ray films.       My assessment of this patient's clinical condition and my plan of care is as follows.    Assessment / Plan:      Acute anemia  Upper GI bleed  Hemoglobin on presentation 7.8, baseline Hb 12-13   Baseline hemoglobin 12-13  --Ferritin 665, iron 20 5, TIBC 214, iron saturation 96  -- INR 2.2, INR was 3.7 on   -- Fibrinogen 355  -- Urinalysis normal without any blood in the urine  --GI consult   --H & H Q12 h   --PPI IV BID       Dizziness like to fall   Dizziness he could be secondary to anemia  -Patient has had status post Bruise on R side of head  CT head without contrast without acute abnormality  CT cervical spine: Without fracture  CT chest: Mildly complex fluid collection in the right anterior abdominal wall has decreased in size.  Complex mass upper pole left kidney does not appear to be simple cyst, recommend renal protocol CT  EKG with ventricular paced rhythm    -- Will continue on telemetry  --Orthostatic vitals  -- TTE  --PT       Acute decrease in platelet count  Baseline platelet count around 200  --Platelet count during this presentation 45  -- Will consult oncology  --Could be due to linezolid         Acute kidney injury on chronic kidney disease  Baseline creatinine 1.7-1.8  Creatinine has been trending up since last few weeks  --Most recent creatinine in the hospital 2.79, then the other previous readings  --BUN is also significantly elevated to 109  --1L fluid  (207 lb 14.4 oz)   06/12/24 93.4 kg (205 lb 14.4 oz)   05/30/24 94.1 kg (207 lb 8 oz)   05/07/24 93.7 kg (206 lb 9.6 oz)   05/01/24 94.8 kg (208 lb 15.9 oz)   05/01/24 92.5 kg (204 lb)   04/24/24 94.3 kg (207 lb 14.4 oz)   04/22/24 95.8 kg (211 lb 3.2 oz)         PHYSICAL EXAM:  General:    Alert, cooperative, appears stated age.some blood around nose      Lungs:   CTA b/l.  No wheezing or Rhonchi. No rales.  Chest wall:  No tenderness.  No accessory muscle use.  Heart:   Regular  rhythm,  No  Murmur.   No edema  Abdomen:   Soft, NT. ND  BS+  Extremities: No cyanosis.  No clubbing,      Skin turgor normal, Radial dial pulse 2+. Capillary refill normal  Neurologic: No facial asymmetry. No aphasia or slurred speech. Symmetrical strength, Sensation grossly intact. AAOx4.         LAB DATA REVIEWED:    Recent Results (from the past 12 hour(s))   Protime-INR    Collection Time: 11/29/24 10:24 AM   Result Value Ref Range    INR 2.2 (H) 0.9 - 1.1      Protime 21.9 (H) 9.0 - 11.1 sec   CBC with Auto Differential    Collection Time: 11/29/24 10:24 AM   Result Value Ref Range    WBC 7.7 4.1 - 11.1 K/uL    RBC 2.51 (L) 4.10 - 5.70 M/uL    Hemoglobin 7.8 (L) 12.1 - 17.0 g/dL    Hematocrit 23.3 (L) 36.6 - 50.3 %    MCV 92.8 80.0 - 99.0 FL    MCH 31.1 26.0 - 34.0 PG    MCHC 33.5 30.0 - 36.5 g/dL    RDW 14.6 (H) 11.5 - 14.5 %    Platelets 45 (LL) 150 - 400 K/uL    MPV 11.2 8.9 - 12.9 FL    Nucleated RBCs 0.0 0  WBC    nRBC 0.00 0.00 - 0.01 K/uL    Neutrophils % 64 32 - 75 %    Lymphocytes % 27 12 - 49 %    Monocytes % 6 5 - 13 %    Eosinophils % 2 0 - 7 %    Basophils % 0 0 - 1 %    Immature Granulocytes % 1 (H) 0.0 - 0.5 %    Neutrophils Absolute 4.8 1.8 - 8.0 K/UL    Lymphocytes Absolute 2.1 0.8 - 3.5 K/UL    Monocytes Absolute 0.5 0.0 - 1.0 K/UL    Eosinophils Absolute 0.2 0.0 - 0.4 K/UL    Basophils Absolute 0.0 0.0 - 0.1 K/UL    Immature Granulocytes Absolute 0.1 (H) 0.00 - 0.04 K/UL    Differential Type SMEAR SCANNED

## 2024-11-29 NOTE — PROGRESS NOTES
Pharmacy Medication History Review    Medication history obtained by Candy Herbert while patient was in room ER09/09 and was completed based on information available during current patient encounter. Medication history was completed before home meds were reconciled by provider.    Pharmacist Admission Medication Reconciliation Recommendations:   none         PTA medication list was corrected to the following:   Prior to Admission Medications   Prescriptions Last Dose Informant   B-D ULTRAFINE III SHORT PEN 31G X 8 MM MISC  Self   Sig: USE TO INJECT INSULIN UNDER THE SKIN 4 TIMES DAILY AS DIRECTED   FABB 2.2-25-1 MG TABS tablet 11/28/2024 Self   Sig: TAKE 1 TABLET BY MOUTH DAILY   Multiple Vitamin (MULTIVITAMIN) TABS tablet 11/28/2024 Self   Sig: Take 1 tablet by mouth daily   Multiple Vitamins-Minerals (PRESERVISION AREDS 2) CAPS 11/28/2024 Self   Sig: Take 1 Capful by mouth in the morning and at bedtime   allopurinol (ZYLOPRIM) 300 MG tablet 11/28/2024 Self   Sig: Take 1 tablet by mouth daily   Patient taking differently: Take 1 tablet by mouth at bedtime   blood glucose test strips (FREESTYLE TEST STRIPS) strip  Self   Sig: Use to test blood sugar three times daily DX:E11.51   finasteride (PROSCAR) 5 MG tablet 11/28/2024 Self   Sig: Take 1 tablet by mouth every other day   furosemide (LASIX) 40 MG tablet 11/28/2024 Self   Sig: Take 2 tablets by mouth daily   Patient taking differently: Take 1-2 tablets by mouth daily   insulin glargine (LANTUS) 100 UNIT/ML injection vial 11/28/2024 Self   Sig: Inject 45 Units into the skin nightly   Patient taking differently: Inject 60 Units into the skin nightly   insulin lispro, 1 Unit Dial, (HUMALOG KWIKPEN) 100 UNIT/ML SOPN 11/28/2024 Self   Sig: Inject 5-10 Units into the skin as needed for High Blood Sugar Per sliding scale   isosorbide mononitrate (IMDUR) 60 MG extended release tablet 11/28/2024 Self   Sig: Take 1 tablet by mouth daily   Patient taking differently: Take  Hydrocodone, Oxycodone, Sulfa antibiotics, and Tetracycline        Of Note:   Unsure about compliance with medications. (patient states he took his medications yesterday, when I questioned him about some of the refill dates, he also stated he sometimes will forget)    Patient's PTA medication list include the high risk meds: None.  Patient's medical history include the following CMS readmission measures: None .    Thank you,  Candy Herbert CPhT  Medication History Pharmacy Technician Specialist   Available M-F 9:00am - 5:00pm via [employer] ([phone])      Disclaimer:    Patient was interviewed regarding current PTA medication list, use and drug allergies and was questioned regarding use of any other inhalers, topical products, over the counter medications, herbal medications, vitamin products or ophthalmic/nasal/otic medication use.

## 2024-11-29 NOTE — ED NOTES
Pt assisted to bedside commode with x1 assist and had BM at this time. Pt back in bed on monitor x3 with call bell in reach.

## 2024-11-29 NOTE — CARE COORDINATION
CM attempted to meet with pt at bedside, pt receiving care at this time. CM to re-attempt at later time.    CARLOS Corona.  Care Manager, [facility]  [phone]/Available on [employer]

## 2024-11-30 ENCOUNTER — APPOINTMENT (OUTPATIENT)
Facility: HOSPITAL | Age: 89
DRG: 982 | End: 2024-11-30
Payer: MEDICARE

## 2024-11-30 LAB
ALBUMIN SERPL-MCNC: 2.7 G/DL (ref 3.5–5)
ANION GAP SERPL CALC-SCNC: 9 MMOL/L (ref 2–12)
BASOPHILS # BLD: 0 K/UL (ref 0–0.1)
BASOPHILS NFR BLD: 0 % (ref 0–1)
BUN SERPL-MCNC: 96 MG/DL (ref 6–20)
BUN/CREAT SERPL: 40 (ref 12–20)
CALCIUM SERPL-MCNC: 8.6 MG/DL (ref 8.5–10.1)
CHLORIDE SERPL-SCNC: 102 MMOL/L (ref 97–108)
CO2 SERPL-SCNC: 24 MMOL/L (ref 21–32)
CREAT SERPL-MCNC: 2.38 MG/DL (ref 0.7–1.3)
DIFFERENTIAL METHOD BLD: ABNORMAL
EKG ATRIAL RATE: 227 BPM
EKG DIAGNOSIS: NORMAL
EKG Q-T INTERVAL: 456 MS
EKG QRS DURATION: 136 MS
EKG QTC CALCULATION (BAZETT): 506 MS
EKG R AXIS: -82 DEGREES
EKG T AXIS: 92 DEGREES
EKG VENTRICULAR RATE: 74 BPM
EOSINOPHIL # BLD: 0.2 K/UL (ref 0–0.4)
EOSINOPHIL NFR BLD: 3 % (ref 0–7)
ERYTHROCYTE [DISTWIDTH] IN BLOOD BY AUTOMATED COUNT: 14.7 % (ref 11.5–14.5)
FIBRINOGEN PPP-MCNC: 359 MG/DL (ref 200–475)
GLUCOSE BLD STRIP.AUTO-MCNC: 122 MG/DL (ref 65–117)
GLUCOSE BLD STRIP.AUTO-MCNC: 151 MG/DL (ref 65–117)
GLUCOSE BLD STRIP.AUTO-MCNC: 202 MG/DL (ref 65–117)
GLUCOSE BLD STRIP.AUTO-MCNC: 225 MG/DL (ref 65–117)
GLUCOSE SERPL-MCNC: 83 MG/DL (ref 65–100)
HCT VFR BLD AUTO: 20.9 % (ref 36.6–50.3)
HCT VFR BLD AUTO: 20.9 % (ref 36.6–50.3)
HCT VFR BLD AUTO: 24.5 % (ref 36.6–50.3)
HEMOCCULT STL QL: POSITIVE
HGB BLD-MCNC: 7 G/DL (ref 12.1–17)
HGB BLD-MCNC: 7 G/DL (ref 12.1–17)
HGB BLD-MCNC: 8.3 G/DL (ref 12.1–17)
HISTORY CHECK: NORMAL
IMM GRANULOCYTES # BLD AUTO: 0 K/UL (ref 0–0.04)
IMM GRANULOCYTES NFR BLD AUTO: 0 % (ref 0–0.5)
LDH SERPL L TO P-CCNC: 177 U/L (ref 85–241)
LYMPHOCYTES # BLD: 2.2 K/UL (ref 0.8–3.5)
LYMPHOCYTES NFR BLD: 35 % (ref 12–49)
MCH RBC QN AUTO: 32 PG (ref 26–34)
MCHC RBC AUTO-ENTMCNC: 33.5 G/DL (ref 30–36.5)
MCV RBC AUTO: 95.4 FL (ref 80–99)
MONOCYTES # BLD: 0.5 K/UL (ref 0–1)
MONOCYTES NFR BLD: 8 % (ref 5–13)
NEUTS SEG # BLD: 3.4 K/UL (ref 1.8–8)
NEUTS SEG NFR BLD: 54 % (ref 32–75)
NRBC # BLD: 0 K/UL (ref 0–0.01)
NRBC BLD-RTO: 0 PER 100 WBC
PHOSPHATE SERPL-MCNC: 3.3 MG/DL (ref 2.6–4.7)
PLATELET # BLD AUTO: 33 K/UL (ref 150–400)
POTASSIUM SERPL-SCNC: 4.3 MMOL/L (ref 3.5–5.1)
RBC # BLD AUTO: 2.19 M/UL (ref 4.1–5.7)
RBC MORPH BLD: ABNORMAL
RBC MORPH BLD: ABNORMAL
SERVICE CMNT-IMP: ABNORMAL
SODIUM SERPL-SCNC: 135 MMOL/L (ref 136–145)
TRANSFERRIN SERPL-MCNC: 168 MG/DL (ref 149–313)
WBC # BLD AUTO: 6.3 K/UL (ref 4.1–11.1)

## 2024-11-30 PROCEDURE — 85018 HEMOGLOBIN: CPT

## 2024-11-30 PROCEDURE — 86900 BLOOD TYPING SEROLOGIC ABO: CPT

## 2024-11-30 PROCEDURE — 85384 FIBRINOGEN ACTIVITY: CPT

## 2024-11-30 PROCEDURE — 86850 RBC ANTIBODY SCREEN: CPT

## 2024-11-30 PROCEDURE — 80069 RENAL FUNCTION PANEL: CPT

## 2024-11-30 PROCEDURE — 6360000002 HC RX W HCPCS: Performed by: STUDENT IN AN ORGANIZED HEALTH CARE EDUCATION/TRAINING PROGRAM

## 2024-11-30 PROCEDURE — 30233N1 TRANSFUSION OF NONAUTOLOGOUS RED BLOOD CELLS INTO PERIPHERAL VEIN, PERCUTANEOUS APPROACH: ICD-10-PCS | Performed by: INTERNAL MEDICINE

## 2024-11-30 PROCEDURE — 86901 BLOOD TYPING SEROLOGIC RH(D): CPT

## 2024-11-30 PROCEDURE — 85014 HEMATOCRIT: CPT

## 2024-11-30 PROCEDURE — 82272 OCCULT BLD FECES 1-3 TESTS: CPT

## 2024-11-30 PROCEDURE — 82525 ASSAY OF COPPER: CPT

## 2024-11-30 PROCEDURE — 6370000000 HC RX 637 (ALT 250 FOR IP): Performed by: STUDENT IN AN ORGANIZED HEALTH CARE EDUCATION/TRAINING PROGRAM

## 2024-11-30 PROCEDURE — 82542 COL CHROMOTOGRAPHY QUAL/QUAN: CPT

## 2024-11-30 PROCEDURE — 85025 COMPLETE CBC W/AUTO DIFF WBC: CPT

## 2024-11-30 PROCEDURE — 2580000003 HC RX 258: Performed by: STUDENT IN AN ORGANIZED HEALTH CARE EDUCATION/TRAINING PROGRAM

## 2024-11-30 PROCEDURE — P9016 RBC LEUKOCYTES REDUCED: HCPCS

## 2024-11-30 PROCEDURE — 83615 LACTATE (LD) (LDH) ENZYME: CPT

## 2024-11-30 PROCEDURE — 93970 EXTREMITY STUDY: CPT

## 2024-11-30 PROCEDURE — 82962 GLUCOSE BLOOD TEST: CPT

## 2024-11-30 PROCEDURE — 99222 1ST HOSP IP/OBS MODERATE 55: CPT | Performed by: INTERNAL MEDICINE

## 2024-11-30 PROCEDURE — 86923 COMPATIBILITY TEST ELECTRIC: CPT

## 2024-11-30 PROCEDURE — 36415 COLL VENOUS BLD VENIPUNCTURE: CPT

## 2024-11-30 PROCEDURE — 86022 PLATELET ANTIBODIES: CPT

## 2024-11-30 PROCEDURE — 36430 TRANSFUSION BLD/BLD COMPNT: CPT

## 2024-11-30 PROCEDURE — 1100000003 HC PRIVATE W/ TELEMETRY

## 2024-11-30 RX ORDER — SODIUM CHLORIDE 9 MG/ML
INJECTION, SOLUTION INTRAVENOUS PRN
Status: DISCONTINUED | OUTPATIENT
Start: 2024-11-30 | End: 2024-12-04

## 2024-11-30 RX ADMIN — SODIUM CHLORIDE, POTASSIUM CHLORIDE, SODIUM LACTATE AND CALCIUM CHLORIDE: 600; 310; 30; 20 INJECTION, SOLUTION INTRAVENOUS at 04:28

## 2024-11-30 RX ADMIN — SODIUM CHLORIDE, PRESERVATIVE FREE 10 ML: 5 INJECTION INTRAVENOUS at 21:19

## 2024-11-30 RX ADMIN — DOXAZOSIN 4 MG: 2 TABLET ORAL at 08:22

## 2024-11-30 RX ADMIN — Medication 1 UNITS: at 18:14

## 2024-11-30 RX ADMIN — ATORVASTATIN CALCIUM 10 MG: 10 TABLET, FILM COATED ORAL at 08:22

## 2024-11-30 RX ADMIN — Medication 1 UNITS: at 13:21

## 2024-11-30 RX ADMIN — SODIUM CHLORIDE, PRESERVATIVE FREE 10 ML: 5 INJECTION INTRAVENOUS at 08:22

## 2024-11-30 RX ADMIN — SODIUM CHLORIDE 40 MG: 9 INJECTION INTRAMUSCULAR; INTRAVENOUS; SUBCUTANEOUS at 05:23

## 2024-11-30 RX ADMIN — INSULIN GLARGINE 30 UNITS: 100 INJECTION, SOLUTION SUBCUTANEOUS at 21:17

## 2024-11-30 RX ADMIN — LEVOTHYROXINE SODIUM 175 MCG: 0.15 TABLET ORAL at 08:21

## 2024-11-30 RX ADMIN — SODIUM CHLORIDE 40 MG: 9 INJECTION INTRAMUSCULAR; INTRAVENOUS; SUBCUTANEOUS at 18:16

## 2024-11-30 RX ADMIN — ALLOPURINOL 300 MG: 300 TABLET ORAL at 08:22

## 2024-11-30 NOTE — PROGRESS NOTES
End of Shift Note    Bedside shift change report given to Bindu COPELAND (oncoming nurse) by Liam Kenney RN (offgoing nurse).  Report included the following information SBAR, Kardex, Intake/Output, MAR, Recent Results, and Cardiac Rhythm Vpaced    Shift worked: Night   Shift summary and any significant changes:    Appeared lethargic at shift change. Improved and was able to use the BSC. Pass lots of watery/loose greenish stool.  Got the evening insulins and snack given at MN. Labs drawn this morn. VS stable.       Liam Kenney RN

## 2024-11-30 NOTE — PLAN OF CARE
Problem: Chronic Conditions and Co-morbidities  Goal: Patient's chronic conditions and co-morbidity symptoms are monitored and maintained or improved  Outcome: Progressing     Problem: Safety - Adult  Goal: Free from fall injury  Outcome: Progressing     Problem: Cognitive:  Goal: Knowledge of wound care  Description: Knowledge of wound care  Outcome: Progressing  Goal: Understands risk factors for wounds  Description: Understands risk factors for wounds  Outcome: Progressing     Problem: Wound:  Goal: Will show signs of wound healing; wound closure and no evidence of infection  Description: Will show signs of wound healing; wound closure and no evidence of infection  Outcome: Progressing     Problem: Smoking cessation:  Goal: Ability to formulate a plan to maintain a tobacco-free life will be supported  Description: Ability to formulate a plan to maintain a tobacco-free life will be supported  Outcome: Progressing     Problem: Falls - Risk of:  Goal: Will remain free from falls  Description: Will remain free from falls  Outcome: Progressing     Problem: Blood Glucose:  Goal: Ability to maintain appropriate glucose levels will improve  Description: Ability to maintain appropriate glucose levels will improve  Outcome: Progressing     Problem: ABCDS Injury Assessment  Goal: Absence of physical injury  Outcome: Progressing

## 2024-11-30 NOTE — CONSULTS
[facility]  [address], [address] [facility] [address]  W: [phone]  F: [phone]    Reason for Evaluation:   Maryan Resendiz is a 89 y.o. male with history of CKD stage III-IV, toe ulcer, and PVD who is seen in consultation at the request of Dr. Sheron Armstrong for evaluation of anemia and thrombocytopenia.    History of Present Illness:   Maryan Resendiz is a pleasant 89 y.o. male who presents today for evaluation of anemia and thrombocytopenia.    Patient presented to ER for significant fatigue and multiple falls/presyncopal episodes. Also reports 2 tarry stools this week and one nosebleed. He is on warfarin for atrial fibrillation. His INR was elevated at INR check on 11/25, so he had been holding this for the last five days.     Patient has history of R toe ulcer with cellulitis. He was seen initiated on linezolid on 11/5. At follow up, cellulitis had improved, but recommendation was to continue linezolid for additional 2 weeks. Patient reports after he had the nosebleed, he was instructing to decrease dosing from BID to daily.     On arrival to ER, labs remarkable for hemoglobin 7.8 (declined from 12.2 on 11/12), MCV 92, platelets 45 (previously 190 on 11/12), creatinine 2.79 (baseline ~2.3). CT CAP showed mildly complex fluid collection within R anterior abdominal wall that has decreased in size and complex upper pole kidney mass, not felt to represent simple cyst. Patient reports he had additional melanotic stool this morning.     Iron studies with elevated ferritin 665, elevated iron 205, iron sat 96%. B12 and folate normal. Hemolysis labs negative (haptoglobin normal). Has minimally elevated LDH. Smear with thrombocytopenia, morphologically unremarkable platelets. Normocytic normochromic anemia with mild anisocytosis; no significant schistocytes. Unremarkable RBCs, no blasts.     Review of systems was obtained and pertinent findings reviewed above. Past medical history, social history,  need repeat iron studies     #) Thrombocytopenia  New this admission.   B12 and folate normal.  No schistocytes on peripheral smear. Platelet appearance unremarkable; no increased immature fraction to suggest ITP.   CT A/P with normal spleen.  Overall, I suspect marrow suppression due to linezolid; this is now on hold  Patient did receive heparin flush on 11/16. 4T score of 4, intermediate probability of HIT  - Continue to hold linezolid  - Send ZOEY and HIT Antibody panel  - Obtain 4 pound Doppler ultrasound to rule out thrombosis  - Monitor CBC with differential daily   - Transfuse for platelets <10 or <50 if active bleeding  - Avoid all heparin containing products  - Warfarin on hold due to GI bleed; would not start argatroban drip for possible HIT given only intermediate probability and active GI bleed    #) L upper pole kidney mass  - Will need further work up with renal CT protocol if able in setting of CKD    #) R toe infection  Has completed 2 weeks of linezolid.  - Management per medicine and podiatry    #) LUCIA on CKD stage IV  - Nephrology consulted    #) Afib  - Hold warfarin due to acute blood loss anemia.     Thank you for involving me in the care of this patient. Hematology will follow along.     Signed By: Leigh Browning MD

## 2024-11-30 NOTE — CONSULTS
Consulted for anemia and thrombocytopenia, new onset compared to 11/12.   Smear with thrombocytopenia, morphologically unremarkable platelets. Normocytic normochromic anemia with mild anisocytosis; no significant schistocytes. Unremarkable RBCs, no blasts.  Iron studies with elevated ferritin and elevated iron sat.   Additional lab work ordered.  Possibly due to linezolid.  Full consult note to follow.

## 2024-11-30 NOTE — CONSULTS
Consultation Note    NAME: Maryan Resendiz   :  1935   MRN:  [id number]     Date/Time:  2024 8:50 AM    I have been asked to see this patient by Dr. Joseph  for advice/opinion re: CKD/LUCIA.     Assessment :    Plan:  CKD-3b/4 - followed by me - baseline creatinine 1.8-2.0  LUCIA  DM  HTN  Hyperkalemia  Anemia   Creatinine a little above baseline.  I suspect he is dry.  Continue IVF.  Creatinine is improving.     Potassium is better.           Subjective:   CHIEF COMPLAINT:  \"I feel OK.\"    HISTORY OF PRESENT ILLNESS:     Maryan is a 89 y.o.   male who has a history of CKD-3/4 followed by me last seen in the office in 2024.  He says that he has had trouble with is toe.  He has been on antibiotics.  He says that he has been as \"weak as a kitten.\"  He had a fall.  He also says that he has had tarry stools.    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    CAD (coronary artery disease)     Chronic atrial fibrillation (HCC) 2015    Chronic systolic heart failure (HCC)     CKD (chronic kidney disease) stage 3, GFR 30-59 ml/min ([facility]) 2015    Diabetes ([facility])     Diverticulosis of large intestine without hemorrhage 10/24/2017    Former smoker     Gout 10/24/2017    History of echocardiogram 2018    LVEF 40-45%, global HK, mild to mod LAE, no AS, mild MR, RVSP 26 mmHg    Hyperlipidemia 2015    Hypertension     Long term current use of anticoagulant 10/24/2017    Long-term use of high-risk medication 10/24/2017    Macular degeneration 10/24/2017    Microalbuminuria     Peripheral vascular disease (HCC) 10/24/2017    Primary osteoarthritis involving multiple joints     knees and back    Renal artery stenosis ([facility]) 10/24/2017    Right-sided extracranial carotid artery stenosis 10/24/2017    Sick sinus syndrome (HCC) 10/24/2017    Status post pacemaker      Past Surgical  History:   Procedure Laterality Date    ABDOMINAL AORTIC ANEURYSM REPAIR      APPENDECTOMY      CATARACT REMOVAL      / lens implant    CHOLECYSTECTOMY      LAPAROTOMY N/A 2023    EXPLORATION ABDOMINAL WALL HEMATOMA/  performed by Michell Zapata Jr., MD at [facility] MAIN OR    LAPAROTOMY N/A 2023    EXPLORATION AND EVACUATION OF INFECTED ABDOMINAL WALL HEMATOMA AND EXPLANTATION OF INFECTED MESH, WASH OUT performed by Michell Zapata Jr., MD at [facility] MAIN OR    ORTHOPEDIC SURGERY Right     rt unicondular knee replacement    ORTHOPEDIC SURGERY  1/26/15    LEFT UNICONDYLAR KNEE ARTHROPLASTY    PACEMAKER      pacemaker    TONSILLECTOMY      UROLOGICAL SURGERY      rt renal stentx2     Social History     Tobacco Use    Smoking status: Former     Current packs/day: 0.00     Types: Cigarettes     Quit date: 1990     Years since quittin.4     Passive exposure: Never    Smokeless tobacco: Never   Substance Use Topics    Alcohol use: Yes     Alcohol/week: 0.0 standard drinks of alcohol      Family History   Problem Relation Age of Onset    Diabetes Father     Cancer Mother       Allergies   Allergen Reactions    Latex Other (See Comments)     Skin raw and severely irritated    Aspirin      Other reaction(s): Unknown (comments)    Hydrocodone Other (See Comments)     hallucinations    Oxycodone Other (See Comments)     hallucinations    Sulfa Antibiotics Rash    Tetracycline Rash      Prior to Admission medications    Medication Sig Start Date End Date Taking? Authorizing Provider   mupirocin (BACTROBAN) 2 % ointment Apply topically 3 times daily Apply topically 3 times daily.   Yes Becky Bashir MD   Multiple Vitamin (MULTIVITAMIN) TABS tablet Take 1 tablet by mouth daily   Yes Becky Bashir MD   insulin lispro, 1 Unit Dial, (HUMALOG KWIKPEN) 100 UNIT/ML SOPN Inject 5-10 Units into the skin as needed for High Blood Sugar Per sliding scale   Yes Becky Bashir MD

## 2024-11-30 NOTE — CONSULTS
Gastroenterology Consult Note  Portions of this record may be dictated using voice recognition Dragon software.   Variances in spelling and vocabulary are possible and unintentional.   Some errors may not be recognized or corrected at the time of dictation.    NAME: Maryan Resendiz : 1935 MRN: [id number]   ATTG: [unfilled] PCP: Prashanth Livingston MD  Date/Time:  2024 11:38 AM  Subjective:   REASON FOR CONSULT:      Maryan is a 89 y.o.   male who I was asked to see for low H/H and dark stool.    Came in w/ fatigue and multiple falls. + epistaxis and dark stool was reported.  He is on warfarin . His INR was elevated  at 3.7. Now 2.2   Hemoglobin is 7(Was 12.2 on ), MCV 95, platelets 33   CTAP  mildly complex fluid collection within R anterior abdominal wall that has decreased in size and complex upper pole kidney mass, not felt to represent simple cyst.         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    CAD (coronary artery disease)     Chronic atrial fibrillation (HCC) 2015    Chronic systolic heart failure (HCC)     CKD (chronic kidney disease) stage 3, GFR 30-59 ml/min ([facility]) 2015    Diabetes ([facility])     Diverticulosis of large intestine without hemorrhage 10/24/2017    Former smoker     Gout 10/24/2017    History of echocardiogram 2018    LVEF 40-45%, global HK, mild to mod LAE, no AS, mild MR, RVSP 26 mmHg    Hyperlipidemia 2015    Hypertension     Long term current use of anticoagulant 10/24/2017    Long-term use of high-risk medication 10/24/2017    Macular degeneration 10/24/2017    Microalbuminuria     Peripheral vascular disease (HCC) 10/24/2017    Primary osteoarthritis involving multiple joints     knees and back    Renal artery stenosis (HCC) 10/24/2017    Right-sided extracranial carotid artery stenosis 10/24/2017    Sick sinus  episodes of melena.  He was on Coumadin for A-fib with an INR of 3.7.  Also found to have significantly low platelets at 33,000.    Hemoglobin  dropped to 7.  BUN is elevated at 96 with a creatinine of 2.3 (Baseline Cr 2.29-2.6, + Hx of CKD).  Agree that this could be from a GI bleed but recently had epistaxis as well.    I suggest holding off the Coumadin till correction of the INR and platelet transfusions as an endoscopy with such a low platelet count may be detrimental.   Needs platelets to be in >75,000+ range.  Seen by hematology.  Transfuse packed red blood cells to get the hemoglobin > 8.    Start IV PPI therapy.   Consider upper endoscopy after the above parameters are optimized and if hemoglobin does not improve or w/ a destabilizing bleed.    Thank you for entrusting me with this patient's care. Please do not hesitate to contact me with any questions or if I can be of assistance with this patient or any of your other patients' GI needs.     Discussed Code Status:    [x]    Full Code      []    DNR    ___________________________________________________  Care Plan discussed with:    [x]    Patient   []    Family   [x]    Nursing   []    Attending  Total Time :    minutes   ___________________________________________________  GI: RUBÉN Gillespie MD

## 2024-11-30 NOTE — H&P
Hospitalist Progress Note    NAME:   Maryan Resendiz   : 1935   MRN: [id number]     Date/Time: 2024 10:38 AM  Patient PCP: Prashanth Livingston MD    Estimated discharge date: 48 hrs pending clinical improvement      Assessment / Plan:  cute anemia  Upper GI bleed  Hemoglobin on presentation 7.8, baseline Hb 12-13   --quite symptomatic this AM during round with presyncope symptoms.  Hbg 7, will give 1U prbc due to persistent of dizziness, lightheaded, sensation of SOB.  --GI consultation pending.  --GI consult   --H & H Q12 h   --PPI IV BID   -OAC on hold    Dizziness like to fall   Dizziness he could be secondary to anemia  -Patient has had status post Bruise on R side of head  CT head without contrast without acute abnormality  CT cervical spine: Without fracture  CT chest: Mildly complex fluid collection in the right anterior abdominal wall has decreased in size.  Complex mass upper pole left kidney does not appear to be simple cyst, recommend renal protocol CT  EKG with ventricular paced rhythm  -- Will continue on telemetry  --Orthostatic vitals  -- TTE  --PT/OT  --fall precautions, discussed with RN    Acute decrease in platelet count  Baseline platelet count around 200  --Platelet downtrending  --hematology following  --Could be due to linezolid     Acute kidney injury on chronic kidney disease  Baseline creatinine 1.7-1.8  Creatinine has been trending up since last few weeks  --Most recent creatinine in the hospital 2.79, then the other previous readings  --BUN is also significantly elevated to 109  --cont' IVF, cr is down trending  --appreciate nephro following    Right Toe infection   --s/p 2 weeks of antibiotics for right toe   --XR right knee   --Podiatry consult   --Holding MRI with contrast because of LUCIA     Type 2 diabetes  Home medications include lispro 5 to 10 units sliding scale  --Glargine 45 units nightly home dose   --Will start from 30 units daily   --SSI    CHF   History of

## 2024-11-30 NOTE — PROGRESS NOTES
Informed Consent for Blood Component Transfusion Note    I have discussed with the patient the rationale for blood component transfusion; its benefits in treating or preventing fatigue, organ damage, or death; and its risk which includes mild transfusion reactions, rare risk of blood borne infection, or more serious but rare reactions. I have discussed the alternatives to transfusion, including the risk and consequences of not receiving transfusion. The patient had an opportunity to ask questions and had agreed to proceed with transfusion of blood components.    Electronically signed by Sheron Armstrong MD on 11/30/24 at 10:31 AM EST

## 2024-11-30 NOTE — CONSULTS
Podiatry History and Physical    Subjective:         Date of Consultation:  November 30, 2024    Referring Physician: Opal Hare    Patient is a 89 y.o. male who is being seen for right big toe infection with suspected OM.   Workup has revealed right distal hallux ulcer with xray evidence of resorbed tip of the distal phalanx. Pleasant male with hx of ulcer was being treated by his PMD seen Dr. Larose prior to this wound but not under his care for the current wound. Has been taking Linezolid for the toe infection initially twice a day then because of the nose bleeding he was told to take one a day. As per the patient the wound has been there for a few weeks. Patient states that he was referred to Dr. Kenney and has seen [facility] NP and they have planned on scheduling a vascular procedure.     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 ([facility]) 11/05/2024    Vitamin D deficiency 09/09/2024    Alcohol screening 09/09/2024    Cellulitis of right lower extremity 06/12/2024    Laceration of left hand with infection 05/07/2024    Primary osteoarthritis involving multiple joints 02/25/2024    LUCIA (acute kidney injury) (HCC) 12/08/2023    Leukocytosis 12/08/2023    Class 1 obesity due to excess calories without serious comorbidity with body mass index (BMI) of 30.0 to 30.9 in adult 12/08/2023    Recurrent ventral incisional hernia 11/09/2023    Cyst of skin 08/14/2023    Type 2 diabetes mellitus with peripheral

## 2024-11-30 NOTE — PROGRESS NOTES
End of Shift Note    Bedside shift change report given to MIN Chambers (oncoming nurse) by THERESA AMADOR RN (offgoing nurse).  Report included the following information SBAR, Kardex, Intake/Output, and MAR    Shift worked:  5242-4778     Shift summary and any significant changes:     Patient had complaints of dizziness, blood ordered. Blood transfusion infusing at end of shift, pt tolerating well. Scheduled medications given per MAR. Occult stool sample sent.      Concerns for physician to address:    Zone phone for oncoming shift:       Activity:  Level of Assistance: Dependent, patient does less than 25%    Cardiac:   Cardiac Monitoring:  yes - V paced    Access:  Current line(s): PIV     Genitourinary:   Urinary Status: Voiding (urinal)    Respiratory:   O2 Device: None (Room air)    GI:  Current diet: ADULT DIET; Regular; 3 carb choices (45 gm/meal)    Pain Management:   Patient states pain is manageable on current regimen: N/A    Skin:  Binh Scale Score: 19  Interventions: Wound Offloading (Prevention Methods): Repositioning, Turning  Pressure injury: no    Patient Safety:  Fall Score: Chin Total Score: 60  Fall Risk Interventions  Nursing Judgement-Fall Risk High(Add Comments): Yes  Toilet Every 2 Hours-In Advance of Need: Yes  Hourly Visual Checks: Awake, Eyes closed, Quiet  Fall Visual Posted: Fall sign posted, Socks  Room Door Open: No (Comment)  Alarm On: Bed  Patient Moved Closer to Nursing Station: No    Active Consults:  IP CONSULT TO ONCOLOGY  IP CONSULT TO PODIATRY  IP CONSULT TO GI  IP CONSULT TO NEPHROLOGY  IP CONSULT TO VASCULAR SURGERY    Length of Stay:  Expected LOS: 3  Actual LOS: 1      THERESA AMADOR RN

## 2024-12-01 LAB
ALBUMIN SERPL-MCNC: 2.8 G/DL (ref 3.5–5)
ANION GAP SERPL CALC-SCNC: 4 MMOL/L (ref 2–12)
BACTERIA SPEC CULT: ABNORMAL
BASOPHILS # BLD: 0 K/UL (ref 0–0.1)
BASOPHILS # BLD: 0 K/UL (ref 0–0.1)
BASOPHILS NFR BLD: 0 % (ref 0–1)
BASOPHILS NFR BLD: 0 % (ref 0–1)
BUN SERPL-MCNC: 70 MG/DL (ref 6–20)
BUN/CREAT SERPL: 36 (ref 12–20)
CALCIUM SERPL-MCNC: 8.8 MG/DL (ref 8.5–10.1)
CC UR VC: ABNORMAL
CHLORIDE SERPL-SCNC: 105 MMOL/L (ref 97–108)
CO2 SERPL-SCNC: 25 MMOL/L (ref 21–32)
CREAT SERPL-MCNC: 1.97 MG/DL (ref 0.7–1.3)
DIFFERENTIAL METHOD BLD: ABNORMAL
DIFFERENTIAL METHOD BLD: ABNORMAL
EOSINOPHIL # BLD: 0.1 K/UL (ref 0–0.4)
EOSINOPHIL # BLD: 0.1 K/UL (ref 0–0.4)
EOSINOPHIL NFR BLD: 1 % (ref 0–7)
EOSINOPHIL NFR BLD: 2 % (ref 0–7)
ERYTHROCYTE [DISTWIDTH] IN BLOOD BY AUTOMATED COUNT: 14.9 % (ref 11.5–14.5)
ERYTHROCYTE [DISTWIDTH] IN BLOOD BY AUTOMATED COUNT: 15 % (ref 11.5–14.5)
GLUCOSE BLD STRIP.AUTO-MCNC: 128 MG/DL (ref 65–117)
GLUCOSE BLD STRIP.AUTO-MCNC: 168 MG/DL (ref 65–117)
GLUCOSE BLD STRIP.AUTO-MCNC: 200 MG/DL (ref 65–117)
GLUCOSE BLD STRIP.AUTO-MCNC: 212 MG/DL (ref 65–117)
GLUCOSE SERPL-MCNC: 126 MG/DL (ref 65–100)
HCT VFR BLD AUTO: 24.3 % (ref 36.6–50.3)
HCT VFR BLD AUTO: 25 % (ref 36.6–50.3)
HGB BLD-MCNC: 8.1 G/DL (ref 12.1–17)
HGB BLD-MCNC: 8.2 G/DL (ref 12.1–17)
IMM GRANULOCYTES # BLD AUTO: 0.1 K/UL (ref 0–0.04)
IMM GRANULOCYTES # BLD AUTO: 0.1 K/UL (ref 0–0.04)
IMM GRANULOCYTES NFR BLD AUTO: 1 % (ref 0–0.5)
IMM GRANULOCYTES NFR BLD AUTO: 1 % (ref 0–0.5)
LYMPHOCYTES # BLD: 1.2 K/UL (ref 0.8–3.5)
LYMPHOCYTES # BLD: 1.3 K/UL (ref 0.8–3.5)
LYMPHOCYTES NFR BLD: 22 % (ref 12–49)
LYMPHOCYTES NFR BLD: 23 % (ref 12–49)
MCH RBC QN AUTO: 30.2 PG (ref 26–34)
MCH RBC QN AUTO: 30.8 PG (ref 26–34)
MCHC RBC AUTO-ENTMCNC: 32.8 G/DL (ref 30–36.5)
MCHC RBC AUTO-ENTMCNC: 33.3 G/DL (ref 30–36.5)
MCV RBC AUTO: 90.7 FL (ref 80–99)
MCV RBC AUTO: 94 FL (ref 80–99)
MONOCYTES # BLD: 0.7 K/UL (ref 0–1)
MONOCYTES # BLD: 0.8 K/UL (ref 0–1)
MONOCYTES NFR BLD: 13 % (ref 5–13)
MONOCYTES NFR BLD: 14 % (ref 5–13)
NEUTS SEG # BLD: 3.3 K/UL (ref 1.8–8)
NEUTS SEG # BLD: 3.7 K/UL (ref 1.8–8)
NEUTS SEG NFR BLD: 61 % (ref 32–75)
NEUTS SEG NFR BLD: 62 % (ref 32–75)
NRBC # BLD: 0 K/UL (ref 0–0.01)
NRBC # BLD: 0 K/UL (ref 0–0.01)
NRBC BLD-RTO: 0 PER 100 WBC
NRBC BLD-RTO: 0 PER 100 WBC
PHOSPHATE SERPL-MCNC: 3.1 MG/DL (ref 2.6–4.7)
PLATELET # BLD AUTO: 27 K/UL (ref 150–400)
PLATELET # BLD AUTO: 30 K/UL (ref 150–400)
PMV BLD AUTO: 10.3 FL (ref 8.9–12.9)
PMV BLD AUTO: 10.7 FL (ref 8.9–12.9)
POTASSIUM SERPL-SCNC: 4.3 MMOL/L (ref 3.5–5.1)
RBC # BLD AUTO: 2.66 M/UL (ref 4.1–5.7)
RBC # BLD AUTO: 2.68 M/UL (ref 4.1–5.7)
RBC MORPH BLD: ABNORMAL
RBC MORPH BLD: ABNORMAL
SERVICE CMNT-IMP: ABNORMAL
SODIUM SERPL-SCNC: 134 MMOL/L (ref 136–145)
WBC # BLD AUTO: 5.4 K/UL (ref 4.1–11.1)
WBC # BLD AUTO: 6 K/UL (ref 4.1–11.1)

## 2024-12-01 PROCEDURE — 2Y41X5Z PACKING OF NASAL REGION USING PACKING MATERIAL: ICD-10-PCS | Performed by: PODIATRIST

## 2024-12-01 PROCEDURE — 82962 GLUCOSE BLOOD TEST: CPT

## 2024-12-01 PROCEDURE — 6360000002 HC RX W HCPCS: Performed by: STUDENT IN AN ORGANIZED HEALTH CARE EDUCATION/TRAINING PROGRAM

## 2024-12-01 PROCEDURE — 36415 COLL VENOUS BLD VENIPUNCTURE: CPT

## 2024-12-01 PROCEDURE — 85025 COMPLETE CBC W/AUTO DIFF WBC: CPT

## 2024-12-01 PROCEDURE — 2580000003 HC RX 258: Performed by: STUDENT IN AN ORGANIZED HEALTH CARE EDUCATION/TRAINING PROGRAM

## 2024-12-01 PROCEDURE — 6370000000 HC RX 637 (ALT 250 FOR IP): Performed by: INTERNAL MEDICINE

## 2024-12-01 PROCEDURE — 6370000000 HC RX 637 (ALT 250 FOR IP): Performed by: STUDENT IN AN ORGANIZED HEALTH CARE EDUCATION/TRAINING PROGRAM

## 2024-12-01 PROCEDURE — 36430 TRANSFUSION BLD/BLD COMPNT: CPT

## 2024-12-01 PROCEDURE — 80069 RENAL FUNCTION PANEL: CPT

## 2024-12-01 PROCEDURE — 1100000003 HC PRIVATE W/ TELEMETRY

## 2024-12-01 PROCEDURE — P9073 PLATELETS PHERESIS PATH REDU: HCPCS

## 2024-12-01 RX ORDER — METOPROLOL TARTRATE 25 MG/1
25 TABLET, FILM COATED ORAL 2 TIMES DAILY
Status: DISCONTINUED | OUTPATIENT
Start: 2024-12-01 | End: 2024-12-12 | Stop reason: HOSPADM

## 2024-12-01 RX ORDER — SODIUM CHLORIDE 9 MG/ML
INJECTION, SOLUTION INTRAVENOUS PRN
Status: DISCONTINUED | OUTPATIENT
Start: 2024-12-01 | End: 2024-12-04

## 2024-12-01 RX ORDER — SODIUM CHLORIDE 9 MG/ML
INJECTION, SOLUTION INTRAVENOUS PRN
Status: DISCONTINUED | OUTPATIENT
Start: 2024-12-01 | End: 2024-12-01

## 2024-12-01 RX ORDER — OXYMETAZOLINE HYDROCHLORIDE 0.05 G/100ML
2 SPRAY NASAL 2 TIMES DAILY
Status: COMPLETED | OUTPATIENT
Start: 2024-12-01 | End: 2024-12-03

## 2024-12-01 RX ADMIN — METOPROLOL TARTRATE 25 MG: 25 TABLET, FILM COATED ORAL at 21:19

## 2024-12-01 RX ADMIN — CEPHALEXIN 500 MG: 250 CAPSULE ORAL at 21:19

## 2024-12-01 RX ADMIN — ONDANSETRON 4 MG: 2 INJECTION INTRAMUSCULAR; INTRAVENOUS at 09:17

## 2024-12-01 RX ADMIN — FINASTERIDE 5 MG: 5 TABLET, FILM COATED ORAL at 09:07

## 2024-12-01 RX ADMIN — LEVOTHYROXINE SODIUM 175 MCG: 0.15 TABLET ORAL at 09:02

## 2024-12-01 RX ADMIN — ATORVASTATIN CALCIUM 10 MG: 10 TABLET, FILM COATED ORAL at 09:02

## 2024-12-01 RX ADMIN — SODIUM CHLORIDE, PRESERVATIVE FREE 10 ML: 5 INJECTION INTRAVENOUS at 21:20

## 2024-12-01 RX ADMIN — DOXAZOSIN 4 MG: 2 TABLET ORAL at 09:02

## 2024-12-01 RX ADMIN — METOPROLOL TARTRATE 25 MG: 25 TABLET, FILM COATED ORAL at 14:15

## 2024-12-01 RX ADMIN — Medication 2 SPRAY: at 22:32

## 2024-12-01 RX ADMIN — INSULIN GLARGINE 30 UNITS: 100 INJECTION, SOLUTION SUBCUTANEOUS at 21:19

## 2024-12-01 RX ADMIN — SODIUM CHLORIDE 40 MG: 9 INJECTION INTRAMUSCULAR; INTRAVENOUS; SUBCUTANEOUS at 17:02

## 2024-12-01 RX ADMIN — ALLOPURINOL 300 MG: 300 TABLET ORAL at 09:02

## 2024-12-01 RX ADMIN — SODIUM CHLORIDE, PRESERVATIVE FREE 10 ML: 5 INJECTION INTRAVENOUS at 09:09

## 2024-12-01 RX ADMIN — SODIUM CHLORIDE 40 MG: 9 INJECTION INTRAMUSCULAR; INTRAVENOUS; SUBCUTANEOUS at 06:15

## 2024-12-01 NOTE — PROGRESS NOTES
End of Shift Note    Bedside shift change report given to MIN Rodriges (oncoming nurse) by THERESA AMADOR RN (offgoing nurse).  Report included the following information SBAR, Kardex, Intake/Output, and MAR    Shift worked:  3464-6198     Shift summary and any significant changes:     Pt had a nose bleed that lasted from 3545-8254. RN reached out to Dr. Armstrong. RRT nurse called, rhino rocket placed in left nostril at 1830. Rhino rocket to stay in for 24 hours. 1 unit of platelets ordered since patient was not clotting for so long. Pt coughed up at least 5 quarter sized blood clots. CBC drawn since platelets will not be available for at least 2 hours. Pt had no complaints of pain or nausea. Son at bedside.      Concerns for physician to address:    Zone phone for oncoming shift:       Activity:  Level of Assistance: Moderate assist, patient does 50-74%    Cardiac:   Cardiac Monitoring:  yes - V paced    Access:  Current line(s): PIV     Genitourinary:   Urinary Status: Voiding    Respiratory:   O2 Device: None (Room air)    GI:  Current diet: ADULT DIET; Regular; 3 carb choices (45 gm/meal)    Pain Management:   Patient states pain is manageable on current regimen: N/A    Skin:  Binh Scale Score: 19  Interventions: Wound Offloading (Prevention Methods): Turning, Repositioning  Pressure injury: no    Patient Safety:  Fall Score: Chin Total Score: 60  Fall Risk Interventions  Nursing Judgement-Fall Risk High(Add Comments): Yes  Toilet Every 2 Hours-In Advance of Need: Yes  Hourly Visual Checks: Eyes closed, In bed, Quiet  Fall Visual Posted: Fall sign posted, Socks  Room Door Open: Deferred to promote rest  Alarm On: Bed  Patient Moved Closer to Nursing Station: No    Active Consults:  IP CONSULT TO ONCOLOGY  IP CONSULT TO PODIATRY  IP CONSULT TO GI  IP CONSULT TO NEPHROLOGY  IP CONSULT TO VASCULAR SURGERY    Length of Stay:  Expected LOS: 3  Actual LOS: 2      THERESA AMADOR RN

## 2024-12-01 NOTE — PROGRESS NOTES
1759  RAPID RESPONSE TEAM    Reason for rapid response:  Call from Primary RN, Bindu, with concerns of nose bleeding and PLTs 27.       Background:   Anemia with bleeding from nares and coughing up small clots of blood.  Plan for EGD when PLT >75.    Assessment/Interventions:  Bleeding from nares freely. CNA assisting pt by applying direct pressure to bilateral nares. Left side dripping blood when manual pressure released.     Orders received for the following Interventions:    Plts 1 unit  Rhino rocket to stop epistaxis. (Placed 11 cc's of air to stop bleeding).  Empiric  Antibiotics: Keflex 500 mg po TID for Rhino rocket.     Update: @ 18:30  CBC ordered, 2 hour wait time for PLTs.

## 2024-12-01 NOTE — PROGRESS NOTES
Patient seen and examined  Has reportedly had studies that showed \"60% blood flow\"  Will discuss with Dr Kenney  Likely will benefit from angiographic evaluation (possibly as an outpatient) once renal function normalizes and platelet count higher.

## 2024-12-01 NOTE — H&P
Hospitalist Progress Note    NAME:   Maryan Resendiz   : 1935   MRN: [id number]     Date/Time: 2024 1:51 PM  Patient PCP: Prashanth Livingston MD    Estimated discharge date: 48 hrs pending clinical improvement      Assessment / Plan:  Acute anemia  Upper GI bleed  Hemoglobin on presentation 7.8, baseline Hb 12-13. +FOBT  S/p 1 U PRBC for hbg 7 + presyncopal symptoms  H & H Q12 h   PPI IV BID   OAC on hold  Gi following, discussed with Dr Gillespie , no plans for any procedure until PLT improves    Dizziness like to fall   Dizziness he could be secondary to anemia  Patient has had status post Bruise on R side of head  CT head without contrast without acute abnormality  CT cervical spine: Without fracture  CT chest: Mildly complex fluid collection in the right anterior abdominal wall has decreased in size.  Complex mass upper pole left kidney does not appear to be simple cyst, recommend renal protocol CT  EKG with ventricular paced rhythm  Will continue on telemetry  TTE  PT/OT  Fall precautions, discussed with RN    Acute decrease in platelet count  Baseline platelet count around 200  Platelet down trending, additional labs ordered  Hematology following  ? Related to linezolid     Acute kidney injury on chronic kidney disease  Baseline creatinine 1.7-1.8  Creatinine has been trending up since last few weeks  Most recent creatinine in the hospital 2.79, then the other previous readings  BUN is also significantly elevated to 109  S/p IVF, cr is down trending  appreciate nephro following    Right Toe infection   s/p 2 weeks of antibiotics for right toe   Holding MRI with contrast because of LUCIA   XR right knee   Vascular and podiatry following  Consider ID consultation    Type 2 diabetes  Home medications include lispro 5 to 10 units sliding scale  Cont' lower dose of lantus, titrate prn  SSI    CHF   History of Angina   S/p Pacemaker   All PTA antihypertensive meds held on admission, BP is trending

## 2024-12-01 NOTE — PLAN OF CARE
Ability to maintain appropriate glucose levels will improve  Description: Ability to maintain appropriate glucose levels will improve  12/1/2024 0406 by Tiff Almanzar RN  Outcome: Progressing  11/30/2024 1604 by Bindu Carmona RN  Outcome: Progressing     Problem: ABCDS Injury Assessment  Goal: Absence of physical injury  12/1/2024 0406 by Tiff Almanzar RN  Outcome: Progressing  11/30/2024 1604 by Bindu Carmona RN  Outcome: Progressing

## 2024-12-01 NOTE — PROGRESS NOTES
NAME: Maryan Resendiz        :  1935        MRN:  [id number]                       Assessment   :                                               Plan:  CKD-3b/4 - followed by me - baseline creatinine 1.8-2.0  LUCIA  DM  HTN  Hyperkalemia  Anemia    Creatinine a little better today. I suspect he was dry.      Potassium is better.         Subjective:     Chief Complaint:  Having nausea and vomiting this AM.      Review of Systems:    Symptom Y/N Comments  Symptom Y/N Comments   Fever/Chills    Chest Pain     Poor Appetite    Edema     Cough    Abdominal Pain     Sputum    Joint Pain     SOB/CARTER    Pruritis/Rash     Nausea/vomit    Tolerating PT/OT     Diarrhea    Tolerating Diet     Constipation    Other       Could not obtain due to:      Objective:     VITALS:   Last 24hrs VS reviewed since prior progress note. Most recent are:  Vitals:    24 0900   BP: (!) 149/86   Pulse: 71   Resp: 16   Temp: 97.5 °F (36.4 °C)   SpO2: 98%       Intake/Output Summary (Last 24 hours) at 2024 0921  Last data filed at 2024 0620  Gross per 24 hour   Intake --   Output 875 ml   Net -875 ml      Telemetry Reviewed:     PHYSICAL EXAM:  General: NAD  No edema      Lab Data Reviewed: (see below)    Medications Reviewed: (see below)    PMH/SH reviewed - no change compared to H&P  ________________________________________________________________________  Care Plan discussed with:  Patient     Family      RN     Care Manager                    Consultant:          Comments   >50% of visit spent in counseling and coordination of care       ________________________________________________________________________  Pj Christian MD     Procedures: see electronic medical records for all procedures/Xrays and details which  were not copied into this note but were reviewed prior to creation of Plan.      LABS:  Recent Labs     24  9109  injection 1 mg  1 mg SubCUTAneous PRN    dextrose 10 % infusion   IntraVENous Continuous PRN    insulin lispro (HUMALOG,ADMELOG) injection vial 0-4 Units  0-4 Units SubCUTAneous 4x Daily AC & HS    pantoprazole (PROTONIX) 40 mg in sodium chloride (PF) 0.9 % 10 mL injection  40 mg IntraVENous Q12H

## 2024-12-01 NOTE — PROGRESS NOTES
End of Shift Note    Bedside shift change report given to Bindu (oncoming nurse) by Tiff Almanzar RN (offgoing nurse).  Report included the following information SBAR, Kardex, and MAR    Shift worked:  7p-7a     Shift summary and any significant changes:     Humalog was held due to the patients blood glucose level, see MAR.  IV has been flushed and is patent.  Patient tolerated blood transfusion well.  H & H was drawn post transfusion and his level was 8.3.  Morning lab was done and his platelet was 27.  Family member was present at bedside at change of shift.  Patient had a bowel movement during my shift.  Patient is up with the assistance of one to the bedside commode.  Patient teaching and routine rounding has been done.     Concerns for physician to address:       Zone phone for oncoming shift:          Activity:  Level of Assistance: Moderate assist, patient does 50-74%  Number times ambulated in hallways past shift: 0  Number of times OOB to chair past shift: 0    Cardiac:   Cardiac Monitoring: Yes           Access:  Current line(s): PIV     Genitourinary:   Urinary Status: Voiding (uses urinal)    Respiratory:   O2 Device: None (Room air)  Chronic home O2 use?: NO  Incentive spirometer at bedside: NO    GI:  Last BM (including prior to admit): 11/30/24  Current diet:  ADULT DIET; Regular; 3 carb choices (45 gm/meal)  Passing flatus: YES    Pain Management:   Patient states pain is manageable on current regimen: YES    Skin:  Binh Scale Score: 19  Interventions: Wound Offloading (Prevention Methods): Blankets, Pillows, Repositioning, Turning    Patient Safety:  Fall Risk: Nursing Judgement-Fall Risk High(Add Comments): Yes  Fall Risk Interventions  Nursing Judgement-Fall Risk High(Add Comments): Yes  Toilet Every 2 Hours-In Advance of Need: Yes  Hourly Visual Checks: Eyes closed, In bed, Quiet  Fall Visual Posted: Fall sign posted, Socks  Room Door Open: Deferred to promote rest  Alarm On: Bed  Patient

## 2024-12-01 NOTE — PLAN OF CARE
Problem: Chronic Conditions and Co-morbidities  Goal: Patient's chronic conditions and co-morbidity symptoms are monitored and maintained or improved  12/1/2024 1544 by Bindu Carmona RN  Outcome: Progressing  12/1/2024 0406 by Tiff Almanzar RN  Outcome: Progressing  12/1/2024 0406 by Tiff Almanzar RN  Outcome: Progressing     Problem: Safety - Adult  Goal: Free from fall injury  12/1/2024 1544 by Bindu Carmona RN  Outcome: Progressing  12/1/2024 0406 by Tiff Almanzar RN  Outcome: Progressing     Problem: Cognitive:  Goal: Knowledge of wound care  Description: Knowledge of wound care  12/1/2024 1544 by Bindu Carmona RN  Outcome: Progressing  12/1/2024 0406 by Tiff Almanzar RN  Outcome: Progressing  Goal: Understands risk factors for wounds  Description: Understands risk factors for wounds  12/1/2024 1544 by Bindu Carmona RN  Outcome: Progressing  12/1/2024 0406 by Tiff Almanzar RN  Outcome: Progressing     Problem: Wound:  Goal: Will show signs of wound healing; wound closure and no evidence of infection  Description: Will show signs of wound healing; wound closure and no evidence of infection  12/1/2024 1544 by Bindu Carmona RN  Outcome: Progressing  12/1/2024 0406 by Tiff Almanzar RN  Outcome: Progressing     Problem: Smoking cessation:  Goal: Ability to formulate a plan to maintain a tobacco-free life will be supported  Description: Ability to formulate a plan to maintain a tobacco-free life will be supported  12/1/2024 1544 by Bindu Carmona RN  Outcome: Progressing  12/1/2024 0406 by Tiff Almanzar RN  Outcome: Progressing     Problem: Falls - Risk of:  Goal: Will remain free from falls  Description: Will remain free from falls  12/1/2024 1544 by Bindu Carmona RN  Outcome: Progressing  12/1/2024 0406 by Tiff Almanzar RN  Outcome: Progressing     Problem: Blood Glucose:  Goal: Ability to maintain appropriate

## 2024-12-01 NOTE — PROGRESS NOTES
Brief Hematology Follow up Note    Platelets down to 27k this admission.   H/H with appropriate response to 1 unit PRBCs.  FOBT+  GI note appreciated. No current plans for EGD until platelets improved >75k.   Work up of thrombocytopenia ongoing. HIT and OZEY panel pending, although low-intermediate probability.   BLE Doppler negative for DVT. BUE Doppler ordered, not yet done.  Highest concern for cause of thrombocytopenia remains linezolid, especially in setting of acute on chronic kidney disease  If active bleeding, would transfuse platelets >50k    Hematology will follow along. My partners, Dr. Peters and Dr. Leblanc, will take over after the holiday weekend.    Leigh Browning MD  Attending Medical Oncologist and Hematologist  [facility]    Electronically signed by Leigh Browning MD on 12/1/2024 at 4:22 PM

## 2024-12-02 ENCOUNTER — APPOINTMENT (OUTPATIENT)
Facility: HOSPITAL | Age: 89
DRG: 982 | End: 2024-12-02
Attending: INTERNAL MEDICINE
Payer: MEDICARE

## 2024-12-02 ENCOUNTER — APPOINTMENT (OUTPATIENT)
Facility: HOSPITAL | Age: 89
DRG: 982 | End: 2024-12-02
Attending: STUDENT IN AN ORGANIZED HEALTH CARE EDUCATION/TRAINING PROGRAM
Payer: MEDICARE

## 2024-12-02 PROBLEM — D69.6 THROMBOCYTOPENIA (HCC): Status: ACTIVE | Noted: 2024-12-02

## 2024-12-02 PROBLEM — D63.8 ANEMIA, CHRONIC DISEASE: Status: ACTIVE | Noted: 2024-11-29

## 2024-12-02 LAB
ABO + RH BLD: NORMAL
ALBUMIN SERPL-MCNC: 2.9 G/DL (ref 3.5–5)
ANION GAP SERPL CALC-SCNC: 5 MMOL/L (ref 2–12)
ANION GAP SERPL CALC-SCNC: 6 MMOL/L (ref 2–12)
BASOPHILS # BLD: 0 K/UL (ref 0–0.1)
BASOPHILS NFR BLD: 0 % (ref 0–1)
BLD PROD TYP BPU: NORMAL
BLD PROD TYP BPU: NORMAL
BLOOD BANK BLOOD PRODUCT EXPIRATION DATE: NORMAL
BLOOD BANK BLOOD PRODUCT EXPIRATION DATE: NORMAL
BLOOD BANK DISPENSE STATUS: NORMAL
BLOOD BANK DISPENSE STATUS: NORMAL
BLOOD BANK ISBT PRODUCT BLOOD TYPE: 5100
BLOOD BANK ISBT PRODUCT BLOOD TYPE: 5100
BLOOD BANK PRODUCT CODE: NORMAL
BLOOD BANK PRODUCT CODE: NORMAL
BLOOD BANK UNIT TYPE AND RH: NORMAL
BLOOD BANK UNIT TYPE AND RH: NORMAL
BLOOD GROUP ANTIBODIES SERPL: NORMAL
BPU ID: NORMAL
BPU ID: NORMAL
BUN SERPL-MCNC: 59 MG/DL (ref 6–20)
BUN SERPL-MCNC: 64 MG/DL (ref 6–20)
BUN/CREAT SERPL: 32 (ref 12–20)
BUN/CREAT SERPL: 34 (ref 12–20)
CALCIUM SERPL-MCNC: 8.7 MG/DL (ref 8.5–10.1)
CALCIUM SERPL-MCNC: 8.8 MG/DL (ref 8.5–10.1)
CHLORIDE SERPL-SCNC: 104 MMOL/L (ref 97–108)
CHLORIDE SERPL-SCNC: 105 MMOL/L (ref 97–108)
CO2 SERPL-SCNC: 25 MMOL/L (ref 21–32)
CO2 SERPL-SCNC: 27 MMOL/L (ref 21–32)
COPPER SERPL-MCNC: 118 UG/DL (ref 69–132)
CREAT SERPL-MCNC: 1.82 MG/DL (ref 0.7–1.3)
CREAT SERPL-MCNC: 1.89 MG/DL (ref 0.7–1.3)
CROSSMATCH RESULT: NORMAL
DIFFERENTIAL METHOD BLD: ABNORMAL
ECHO AO ROOT DIAM: 3.6 CM
ECHO AV AREA PEAK VELOCITY: 2.8 CM2
ECHO AV AREA PEAK VELOCITY: 2.8 CM2
ECHO AV AREA PEAK VELOCITY: 2.9 CM2
ECHO AV AREA PEAK VELOCITY: 2.9 CM2
ECHO AV AREA VTI: 2.9 CM2
ECHO AV CUSP MM: 1.9 CM
ECHO AV MEAN GRADIENT: 2 MMHG
ECHO AV MEAN VELOCITY: 0.7 M/S
ECHO AV PEAK GRADIENT: 4 MMHG
ECHO AV PEAK GRADIENT: 4 MMHG
ECHO AV PEAK VELOCITY: 1 M/S
ECHO AV PEAK VELOCITY: 1 M/S
ECHO AV VTI: 18.5 CM
ECHO LA DIAMETER: 4.9 CM
ECHO LA TO AORTIC ROOT RATIO: 1.36
ECHO LV EF PHYSICIAN: 55 %
ECHO LV FRACTIONAL SHORTENING: 20 % (ref 28–44)
ECHO LV INTERNAL DIMENSION DIASTOLIC: 4.5 CM (ref 4.2–5.9)
ECHO LV INTERNAL DIMENSION SYSTOLIC: 3.6 CM
ECHO LV IVSD: 1.6 CM (ref 0.6–1)
ECHO LV MASS 2D: 304.6 G (ref 88–224)
ECHO LV POSTERIOR WALL DIASTOLIC: 1.6 CM (ref 0.6–1)
ECHO LV RELATIVE WALL THICKNESS RATIO: 0.71
ECHO LVOT AREA: 3.5 CM2
ECHO LVOT AV VTI INDEX: 0.86
ECHO LVOT DIAM: 2.1 CM
ECHO LVOT MEAN GRADIENT: 1 MMHG
ECHO LVOT PEAK GRADIENT: 3 MMHG
ECHO LVOT PEAK GRADIENT: 3 MMHG
ECHO LVOT PEAK VELOCITY: 0.8 M/S
ECHO LVOT PEAK VELOCITY: 0.8 M/S
ECHO LVOT SV: 55.4 ML
ECHO LVOT VTI: 16 CM
ECHO MV A VELOCITY: 0.39 M/S
ECHO MV AREA VTI: 2.3 CM2
ECHO MV E DECELERATION TIME (DT): 94.6 MS
ECHO MV E VELOCITY: 0.89 M/S
ECHO MV E/A RATIO: 2.28
ECHO MV LVOT VTI INDEX: 1.53
ECHO MV MAX VELOCITY: 1.2 M/S
ECHO MV MEAN GRADIENT: 2 MMHG
ECHO MV MEAN VELOCITY: 0.6 M/S
ECHO MV PEAK GRADIENT: 5 MMHG
ECHO MV REGURGITANT PEAK GRADIENT: 61 MMHG
ECHO MV REGURGITANT PEAK VELOCITY: 3.9 M/S
ECHO MV REGURGITANT VTIA: 124.7 CM
ECHO MV VTI: 24.4 CM
ECHO PV MAX VELOCITY: 0.8 M/S
ECHO PV PEAK GRADIENT: 3 MMHG
ECHO RV FREE WALL PEAK S': 11.2 CM/S
EOSINOPHIL # BLD: 0.1 K/UL (ref 0–0.4)
EOSINOPHIL NFR BLD: 1 % (ref 0–7)
ERYTHROCYTE [DISTWIDTH] IN BLOOD BY AUTOMATED COUNT: 14.7 % (ref 11.5–14.5)
ERYTHROCYTE [DISTWIDTH] IN BLOOD BY AUTOMATED COUNT: 14.8 % (ref 11.5–14.5)
GLUCOSE BLD STRIP.AUTO-MCNC: 175 MG/DL (ref 65–117)
GLUCOSE BLD STRIP.AUTO-MCNC: 195 MG/DL (ref 65–117)
GLUCOSE BLD STRIP.AUTO-MCNC: 224 MG/DL (ref 65–117)
GLUCOSE BLD STRIP.AUTO-MCNC: 266 MG/DL (ref 65–117)
GLUCOSE SERPL-MCNC: 157 MG/DL (ref 65–100)
GLUCOSE SERPL-MCNC: 172 MG/DL (ref 65–100)
HCT VFR BLD AUTO: 22.2 % (ref 36.6–50.3)
HCT VFR BLD AUTO: 23.5 % (ref 36.6–50.3)
HCT VFR BLD AUTO: 24.7 % (ref 36.6–50.3)
HGB BLD-MCNC: 7.4 G/DL (ref 12.1–17)
HGB BLD-MCNC: 7.8 G/DL (ref 12.1–17)
HGB BLD-MCNC: 8 G/DL (ref 12.1–17)
IMM GRANULOCYTES # BLD AUTO: 0.1 K/UL (ref 0–0.04)
IMM GRANULOCYTES NFR BLD AUTO: 1 % (ref 0–0.5)
INR PPP: 1.4 (ref 0.9–1.1)
LYMPHOCYTES # BLD: 1.1 K/UL (ref 0.8–3.5)
LYMPHOCYTES NFR BLD: 14 % (ref 12–49)
MCH RBC QN AUTO: 31.4 PG (ref 26–34)
MCH RBC QN AUTO: 31.5 PG (ref 26–34)
MCHC RBC AUTO-ENTMCNC: 33.2 G/DL (ref 30–36.5)
MCHC RBC AUTO-ENTMCNC: 33.3 G/DL (ref 30–36.5)
MCV RBC AUTO: 94.1 FL (ref 80–99)
MCV RBC AUTO: 94.8 FL (ref 80–99)
MONOCYTES # BLD: 1.4 K/UL (ref 0–1)
MONOCYTES NFR BLD: 18 % (ref 5–13)
NEUTS SEG # BLD: 5.2 K/UL (ref 1.8–8)
NEUTS SEG NFR BLD: 66 % (ref 32–75)
NRBC # BLD: 0 K/UL (ref 0–0.01)
NRBC # BLD: 0 K/UL (ref 0–0.01)
NRBC BLD-RTO: 0 PER 100 WBC
NRBC BLD-RTO: 0 PER 100 WBC
PF4 HEPARIN CMPLX AB SER-ACNC: 0.04 OD (ref 0–0.4)
PHOSPHATE SERPL-MCNC: 2.1 MG/DL (ref 2.6–4.7)
PLATELET # BLD AUTO: 39 K/UL (ref 150–400)
PLATELET # BLD AUTO: 41 K/UL (ref 150–400)
PLATELET COMMENT: ABNORMAL
PMV BLD AUTO: 10.4 FL (ref 8.9–12.9)
PMV BLD AUTO: 11.2 FL (ref 8.9–12.9)
POTASSIUM SERPL-SCNC: 4.7 MMOL/L (ref 3.5–5.1)
POTASSIUM SERPL-SCNC: 4.9 MMOL/L (ref 3.5–5.1)
PROTHROMBIN TIME: 14 SEC (ref 9–11.1)
RBC # BLD AUTO: 2.36 M/UL (ref 4.1–5.7)
RBC # BLD AUTO: 2.48 M/UL (ref 4.1–5.7)
RBC MORPH BLD: ABNORMAL
SERVICE CMNT-IMP: ABNORMAL
SODIUM SERPL-SCNC: 135 MMOL/L (ref 136–145)
SODIUM SERPL-SCNC: 137 MMOL/L (ref 136–145)
SPECIMEN EXP DATE BLD: NORMAL
UNIT DIVISION: 0
UNIT DIVISION: 0
UNIT ISSUE DATE/TIME: NORMAL
UNIT ISSUE DATE/TIME: NORMAL
WBC # BLD AUTO: 7.1 K/UL (ref 4.1–11.1)
WBC # BLD AUTO: 7.9 K/UL (ref 4.1–11.1)

## 2024-12-02 PROCEDURE — 86900 BLOOD TYPING SEROLOGIC ABO: CPT

## 2024-12-02 PROCEDURE — 85027 COMPLETE CBC AUTOMATED: CPT

## 2024-12-02 PROCEDURE — 6360000002 HC RX W HCPCS: Performed by: STUDENT IN AN ORGANIZED HEALTH CARE EDUCATION/TRAINING PROGRAM

## 2024-12-02 PROCEDURE — 6370000000 HC RX 637 (ALT 250 FOR IP): Performed by: INTERNAL MEDICINE

## 2024-12-02 PROCEDURE — 99222 1ST HOSP IP/OBS MODERATE 55: CPT | Performed by: INTERNAL MEDICINE

## 2024-12-02 PROCEDURE — 82962 GLUCOSE BLOOD TEST: CPT

## 2024-12-02 PROCEDURE — 85610 PROTHROMBIN TIME: CPT

## 2024-12-02 PROCEDURE — 93970 EXTREMITY STUDY: CPT | Performed by: SURGERY

## 2024-12-02 PROCEDURE — 86923 COMPATIBILITY TEST ELECTRIC: CPT

## 2024-12-02 PROCEDURE — 85018 HEMOGLOBIN: CPT

## 2024-12-02 PROCEDURE — 86850 RBC ANTIBODY SCREEN: CPT

## 2024-12-02 PROCEDURE — 6370000000 HC RX 637 (ALT 250 FOR IP): Performed by: STUDENT IN AN ORGANIZED HEALTH CARE EDUCATION/TRAINING PROGRAM

## 2024-12-02 PROCEDURE — 85025 COMPLETE CBC W/AUTO DIFF WBC: CPT

## 2024-12-02 PROCEDURE — 93306 TTE W/DOPPLER COMPLETE: CPT

## 2024-12-02 PROCEDURE — 93970 EXTREMITY STUDY: CPT

## 2024-12-02 PROCEDURE — 86901 BLOOD TYPING SEROLOGIC RH(D): CPT

## 2024-12-02 PROCEDURE — 80048 BASIC METABOLIC PNL TOTAL CA: CPT

## 2024-12-02 PROCEDURE — 85014 HEMATOCRIT: CPT

## 2024-12-02 PROCEDURE — 80069 RENAL FUNCTION PANEL: CPT

## 2024-12-02 PROCEDURE — 36415 COLL VENOUS BLD VENIPUNCTURE: CPT

## 2024-12-02 PROCEDURE — 1100000003 HC PRIVATE W/ TELEMETRY

## 2024-12-02 PROCEDURE — 2580000003 HC RX 258: Performed by: STUDENT IN AN ORGANIZED HEALTH CARE EDUCATION/TRAINING PROGRAM

## 2024-12-02 RX ORDER — SODIUM CHLORIDE 9 MG/ML
INJECTION, SOLUTION INTRAVENOUS PRN
Status: DISCONTINUED | OUTPATIENT
Start: 2024-12-02 | End: 2024-12-04

## 2024-12-02 RX ADMIN — Medication 1 UNITS: at 13:16

## 2024-12-02 RX ADMIN — SODIUM CHLORIDE, PRESERVATIVE FREE 10 ML: 5 INJECTION INTRAVENOUS at 20:25

## 2024-12-02 RX ADMIN — Medication 2 SPRAY: at 08:48

## 2024-12-02 RX ADMIN — CEPHALEXIN 500 MG: 250 CAPSULE ORAL at 15:08

## 2024-12-02 RX ADMIN — SODIUM CHLORIDE 40 MG: 9 INJECTION INTRAMUSCULAR; INTRAVENOUS; SUBCUTANEOUS at 17:26

## 2024-12-02 RX ADMIN — ATORVASTATIN CALCIUM 10 MG: 10 TABLET, FILM COATED ORAL at 08:47

## 2024-12-02 RX ADMIN — SODIUM CHLORIDE, PRESERVATIVE FREE 10 ML: 5 INJECTION INTRAVENOUS at 08:48

## 2024-12-02 RX ADMIN — CEPHALEXIN 500 MG: 250 CAPSULE ORAL at 08:47

## 2024-12-02 RX ADMIN — METOPROLOL TARTRATE 25 MG: 25 TABLET, FILM COATED ORAL at 08:47

## 2024-12-02 RX ADMIN — DOXAZOSIN 4 MG: 2 TABLET ORAL at 08:47

## 2024-12-02 RX ADMIN — SODIUM CHLORIDE 40 MG: 9 INJECTION INTRAMUSCULAR; INTRAVENOUS; SUBCUTANEOUS at 05:34

## 2024-12-02 RX ADMIN — Medication 2 UNITS: at 20:34

## 2024-12-02 RX ADMIN — Medication 2 SPRAY: at 20:19

## 2024-12-02 RX ADMIN — ALLOPURINOL 300 MG: 300 TABLET ORAL at 08:47

## 2024-12-02 RX ADMIN — METOPROLOL TARTRATE 25 MG: 25 TABLET, FILM COATED ORAL at 20:26

## 2024-12-02 RX ADMIN — INSULIN GLARGINE 30 UNITS: 100 INJECTION, SOLUTION SUBCUTANEOUS at 20:34

## 2024-12-02 RX ADMIN — CEPHALEXIN 500 MG: 250 CAPSULE ORAL at 20:27

## 2024-12-02 RX ADMIN — LEVOTHYROXINE SODIUM 175 MCG: 0.15 TABLET ORAL at 08:47

## 2024-12-02 RX ADMIN — Medication 1 UNITS: at 17:26

## 2024-12-02 NOTE — PROGRESS NOTES
Nunu Lujan, MEL-REGINALDO                       [phone] cell                 Monday-Thursday 7:30-5:00                               GI PROGRESS NOTE        NAME:   Maryan Resendiz       :    1935       MRN:    [id number]     Assessment/Plan     89 y.o.   male who I was asked to see for low H/H and dark stool.    Came in w/ fatigue and multiple falls. + epistaxis and dark stool was reported.  He is on warfarin . His INR was elevated  at 3.7. Now 2.2   Hemoglobin is 7(Was 12.2 on ), MCV 95, platelets 33   CTAP  mildly complex fluid collection within R anterior abdominal wall that has decreased in size and complex upper pole kidney mass, not felt to represent simple cyst.     2024: Hgb stable at 8. No iron deficiency on labs. No INR checked since 2024. No platelets drawn today. No bowel movement yesterday. Great appetite. No abdominal pain.     Recent Labs     24  0100 24  1840 24  0522 24  2154 24  0414   WBC  --  6.0 5.4  --  6.3   HGB 8.0* 8.2* 8.1*   < > 7.0*  7.0*   HCT 24.7* 25.0* 24.3*   < > 20.9*  20.9*   MCV  --  94.0 90.7  --  95.4   PLT  --  30* 27*  --  33*    < > = values in this interval not displayed.     Lab Results   Component Value Date    IRON 205 (H) 2024    TIBC 214 (L) 2024    FERRITIN 665 (H) 2024     Lab Results   Component Value Date    INR 2.2 (H) 2024    INR 3.7 (H) 2024    INR 1.4 (H) 10/11/2024    PROTIME 21.9 (H) 2024    PROTIME 36.0 (H) 2024    PROTIME 14.4 (H) 10/11/2024     Plan:  - Continue PPI  - Plan EGD once INR less than 2 and platelets greater than 50--ordered CBC and INR for today and tomorrow. Will make NPO past midnight in case labs meet parameters. Discussed with patient and he is agreeable.      Patient Active Problem List   Diagnosis    Benign paroxysmal positional vertigo  due to bilateral vestibular disorder    S/P knee replacement    Primary hypertension    Chronic atrial fibrillation (HCC)    S/P placement of cardiac pacemaker    Acquired hypothyroidism    Gout    Chronic diastolic congestive heart failure (HCC)    Macular degeneration    Anemia    DJD (degenerative joint disease) of knee    CAD (coronary artery disease)    Renal artery stenosis (HCC)    Diverticulosis of large intestine without hemorrhage    Type 2 diabetes mellitus with peripheral vascular disease (HCC)    Degenerative joint disease    Knee arthropathy    CKD (chronic kidney disease) stage 3, GFR 30-59 ml/min (HCC)    Peripheral vascular disease (HCC)    Mixed hyperlipidemia    Right-sided extracranial carotid artery stenosis    Cyst of skin    Recurrent ventral incisional hernia    LUCIA (acute kidney injury) ([facility])    Leukocytosis    Class 1 obesity due to excess calories without serious comorbidity with body mass index (BMI) of 30.0 to 30.9 in adult    Primary osteoarthritis involving multiple joints    Laceration of left hand with infection    Cellulitis of right lower extremity    Vitamin D deficiency    Alcohol screening    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 Most recent are:  Vitals:    12/02/24 0317   BP: (!) 159/57   Pulse: 70   Resp: 16   Temp: 97.3 °F (36.3 °C)   SpO2: 98%       Intake/Output Summary (Last 24 hours) at 12/2/2024 0840  Last data filed at 12/2/2024 0317  Gross per 24 hour   Intake --   Output 775 ml   Net -775 ml        PHYSICAL EXAM:  General   well developed, well nourished, appears stated age, in no acute distress  EENT  Normocephalic, Atraumatic, PERRLA, EOMI, sclera clear  Respiratory   Clear To Auscultation bilaterally - no wheezes, rales, rhonchi, or

## 2024-12-02 NOTE — PROGRESS NOTES
Received call from the primary RN Reena, requesting assistance with Rhino rocket removal.   On arrival, patient awake, laying in bed, states \" I have rested well this afternoon\" family at bedside. Patient ID and order to remove Rhino rocket confirmed. Patient VSS. OAC has been held for the past few days due to GI bleed and epistaxis. Most recent labs, Plt improved to 41, . Patient education provided prior to removal to avoid blowing nose, picking at nose and to inform nursing staff in event of rebleed. Left nare, Rhino rocket deflated and was removed intact without resistance. Dry blood and scant amount of fresh blood noted post removal and no active bleeding observed. Primary RN to spray afrin nasal spray x1 to left nare.       Electronically signed by:    RUTH Abbott, RN, CCRN, TCRN  Rapid Response Team  [facility]  #[phone]        SHORTNESS OF BREATH

## 2024-12-02 NOTE — PROGRESS NOTES
Hospitalist Progress Note    NAME:   Maryan Resendiz   : 1935   MRN: [id number]     Date/Time: 2024 8:34 AM  Patient PCP: Prashanth Livingston MD    Estimated discharge date:   Barriers: Hb and platelet stability, improvement in Cr, c scope when platelet stable, GI clearance      Assessment / Plan:  Acute anemia  Upper GI bleed  Epistaxis  Hemoglobin on presentation 7.8, baseline Hb 12-13. +FOBT  S/p 1 U PRBC for hbg 7 + presyncopal symptoms  H & H Q12 h   PPI IV BID   OAC on hold  Gi following, discussed with Dr Gillespie , no plans for any procedure until PLT improves  : H&H of 7.8/23.5 this morning.  GI planning for EGD tomorrow if platelet is more than 50,000.  Patient also had left epistaxis and has a Rhino Rocket.  N.p.o. after midnight for EGD tomorrow morning.     Dizziness  Fall   Dizziness he could be secondary to anemia  Patient has had status post Bruise on R side of head  CT head without contrast without acute abnormality  CT cervical spine: Without fracture  CT chest: Mildly complex fluid collection in the right anterior abdominal wall has decreased in size.  Complex mass upper pole left kidney does not appear to be simple cyst, recommend renal protocol CT  EKG with ventricular paced rhythm  Will continue on telemetry  TTE  PT/OT  Fall precautions, discussed with RN     Acute decrease in platelet count  Baseline platelet count around 200  Platelet down trending, additional labs ordered  Hematology following  ? Related to linezolid   : Platelet is up at 41 this morning.  GI planning for EGD tomorrow if platelet is more than 50,000.  Patient also had left epistaxis and has a Rhino Rocket.       LUCIA on CKD  Hypophosphatemia  Baseline creatinine 1.7-1.8  Creatinine has been trending up since last few weeks  Most recent creatinine in the hospital 2.79, then the other previous readings  BUN is also significantly elevated to 109  S/p IVF, cr is down trending  appreciate nephro

## 2024-12-02 NOTE — PROGRESS NOTES
End of Shift Note    Bedside shift change report given to MIN Joseph (oncoming nurse) by Antelmo Toro RN (offgoing nurse).  Report included the following information SBAR, Kardex, Intake/Output, MAR, Recent Results, and Cardiac Rhythm sinus rhythm    Shift worked:  7p-7a     Shift summary and any significant changes:     Patient tolerated care. Scheduled med given per MAR. Rhino rocket still in place. To stay in place for 24 hours. Placed at 1815. Platelets given.   Labs drawn. Pt had no complaints of nausea or pain. Pt able to rest over shift. Caring rounds.Pt was in chair for the shift then transitioned to the bed.     Concerns for physician to address:  none     Zone phone for oncoming shift:          Activity:  Level of Assistance: Moderate assist, patient does 50-74%    Cardiac:   Cardiac Monitoring:  yes - NS    Access:  Current line(s): PIV     Genitourinary:   Urinary Status: Voiding    Respiratory:   O2 Device: None (Room air)    GI:  Current diet: ADULT DIET; Regular; 3 carb choices (45 gm/meal)    Pain Management:   Patient states pain is manageable on current regimen: YES    Skin:  Binh Scale Score: 19  Interventions: Wound Offloading (Prevention Methods): Repositioning, Turning  Pressure injury: no    Patient Safety:  Fall Score: Chin Total Score: 60  Fall Risk Interventions  Nursing Judgement-Fall Risk High(Add Comments): Yes  Toilet Every 2 Hours-In Advance of Need: Yes  Hourly Visual Checks: In bed, Awake  Fall Visual Posted: Fall sign posted, Socks  Room Door Open: Yes  Alarm On: Bed  Patient Moved Closer to Nursing Station: No    Active Consults:  IP CONSULT TO ONCOLOGY  IP CONSULT TO PODIATRY  IP CONSULT TO GI  IP CONSULT TO NEPHROLOGY  IP CONSULT TO VASCULAR SURGERY    Length of Stay:  Expected LOS: 3  Actual LOS: 3      Antelmo Toro RN

## 2024-12-02 NOTE — CONSULTS
Vascular Surgery Consult Note  12/2/2024    Subjective:     Mr. Maryan Resendiz is an 89 y.o. male with a pmhx significant for IDDM, CAD, HF, HTN, HLD, AFIB, PPM, thyroid disease, CKD, and BPH.  He has a remote hx of tobacco use.  He is currently taking a statin. He is intolerant of aspirin.  He has a hx of AAA s/p repair.  He has known renal artery stenosis with a hx of renal artery stenting.      He is a known patient of the practice for the management of PAD.  He was last seen on 11/7/24 and has a right leg arteriogram scheduled for 12/6/24 for an non-healing ulceration of the RT great toe with exposed bone.  He reports \"scrabbing it on the carpet in the middle of the night\".  It has been present for nearly three months.  He has failed treatment with oral antibiotics.  His ABIs on 11/7 were RT 0.69 amd LT 0.67.    He is admitted to the hospital with an upper GIB with symptomatic anemia.  He presented with thrombocytopenia and acute kidney injury.  He recently completed a course of Linezolid on 11/18.   He has a history of benign paroxysmal positional vertigo due to bilateral vestibular disorder     Past medical history  Peripheral arterial disease   Abdominal aortic aneurysm   Renal artery disease   Insulin dependent diabetes mellitus  Coronary artery disease   Heart failure w/ reduced EF   Hypertension  Hyperlipidemia  Permanent atrial fibrillation  Sick sinus syndrome   Presence of pacemaker   Hypothyroidism  Chronic renal disease   Benign prostatic hyperplasia  Benign paroxysmal positional vertigo due to bilateral vestibular disorder   Gout   Macular degeneration  Diverticulosis   Degenerative joint disease  Degenerative disc disease   Vitamin D deficiency     Past procedural history  Abdominal aortic aneurysm repair  Appendectomy   Removal of bilateral cataracts  Cholecystectomy   Exploration of abdominal wall hematoma   Exploration and evacuation of infected abdominal wall hematoma and explantation of infected

## 2024-12-02 NOTE — PROGRESS NOTES
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 Likely prerenal/dry.      Potassium is better.    Will follow         Subjective:     Chief Complaint:  alert. Talking with GI doc now. Chart reviewed.      Review of Systems:    Symptom Y/N Comments  Symptom Y/N Comments   Fever/Chills    Chest Pain     Poor Appetite    Edema     Cough    Abdominal Pain     Sputum    Joint Pain     SOB/CARTER    Pruritis/Rash     Nausea/vomit    Tolerating PT/OT     Diarrhea    Tolerating Diet     Constipation    Other       Could not obtain due to:      Objective:     VITALS:   Last 24hrs VS reviewed since prior progress note. Most recent are:  Vitals:    24   BP: (!) 159/57   Pulse: 70   Resp: 16   Temp: 97.3 °F (36.3 °C)   SpO2: 98%       Intake/Output Summary (Last 24 hours) at 2024 0624  Last data filed at 2024 0317  Gross per 24 hour   Intake --   Output 775 ml   Net -775 ml      Telemetry Reviewed:     PHYSICAL EXAM:  General: NAD  No edema      Lab Data Reviewed: (see below)    Medications Reviewed: (see below)    PMH/SH reviewed - no change compared to H&P  ________________________________________________________________________  Care Plan discussed with:  Patient     Family      RN     Care Manager                    Consultant:          Comments   >50% of visit spent in counseling and coordination of care       ________________________________________________________________________  Angelita Gramajo MD     Procedures: see electronic medical records for all procedures/Xrays and details which  were not copied into this note but were reviewed prior to creation of Plan.

## 2024-12-02 NOTE — CONSULTS
[facility]  [address] [facility] [address] [address]  W: [phone] F: [phone]  Hematology/ Oncology Consult Note      Reason for consult:     Maryan Resendiz is a 89 y.o. male who we have been asked to see by Dr. Silva for acute thrombocytopenia.    Subjective:     Maryan Resendiz is a 89-year-old male admitted to [facility] with acute thrombocytopenia, recent upper GI bleed, acute anemia, LUCIA on CKD, failed antibiotics for right toe infection.  Patient has a past medical history of type 2 diabetes, gout, hypothyroidism, CHF, history of angina, status post pacemaker, BPH, CAD, former smoker, hypertension, hyperlipidemia, PVD, renal artery stenosis and sick sinus syndrome.    Patient is seen at the bedside with his son present.  Discussed workup for HIT versus acute thrombocytopenia related to linezolid use.  At this time lab work remains pending to rule out HIT although no other symptoms have been noted, so far patient Dopplers are negative for DVT.  Strong suspicion acute thrombocytopenia is related to linezolid as well as recent of upper GI bleed.  Platelets are continuing to trend up, although patient reports he received another transfusion of platelets overnight.  He is anxious for platelets to being WNL as he is hopeful to pursue surgical intervention for his toe infection with podiatry.  He is anxious and does not want to lose his foot.  Patient and son verbalized understanding and agreement after discussion.      Review of Systems:  Pertinent items are noted in the History of Present Illness.       Past Medical History:   Diagnosis Date    Acquired hypothyroidism 09/12/2017    Anemia 02/01/2015    BPH with obstruction/lower urinary tract symptoms 10/24/2017    CAD (coronary artery disease)     Chronic atrial fibrillation (HCC) 02/01/2015    Chronic systolic heart failure (HCC)     CKD (chronic kidney

## 2024-12-02 NOTE — PROGRESS NOTES
End of Shift Note    Bedside shift change report given to MIN Rodriges (oncoming nurse) by LORIN SHAH RN (offgoing nurse).  Report included the following information SBAR, Kardex, and MAR    Shift worked:  7am-7pm     Shift summary and any significant changes:     Pt tolerated care fairly well. Medications were given and education was provided. Hourly rounding completed. Pt made no complaints of pain during this shift. Pt up x1 to the BSC; urinal at bedside. Family present at bedside. Labs completed. Rhinorocket removed by rapid nurse per MD order at 1820.      Concerns for physician to address:  None     Zone phone for oncoming shift:   [phone]       Activity:  Level of Assistance: Moderate assist, patient does 50-74%  Number times ambulated in hallways past shift: 0  Number of times OOB to chair past shift: 0    Cardiac:   Cardiac Monitoring: Yes      Cardiac Rhythm: Sinus rhythm    Access:  Current line(s): PIV     Genitourinary:   Urinary Status: Voiding    Respiratory:   O2 Device: None (Room air)  Chronic home O2 use?: NO  Incentive spirometer at bedside: NO    GI:  Last BM (including prior to admit): 11/30/24  Current diet:  ADULT DIET; Regular; 3 carb choices (45 gm/meal)  Diet NPO  Passing flatus: YES    Pain Management:   Patient states pain is manageable on current regimen: YES    Skin:  Binh Scale Score: 19  Interventions: Wound Offloading (Prevention Methods): Bed, pressure reduction mattress, Elevate heels, Repositioning, Pillows, Turning    Patient Safety:  Fall Risk: Nursing Judgement-Fall Risk High(Add Comments): Yes  Fall Risk Interventions  Nursing Judgement-Fall Risk High(Add Comments): Yes  Toilet Every 2 Hours-In Advance of Need: Yes  Hourly Visual Checks: Awake, In bed  Fall Visual Posted: Fall sign posted, Socks  Room Door Open: Yes  Alarm On: Bed  Patient Moved Closer to Nursing Station: No    Active Consults:   IP CONSULT TO ONCOLOGY  IP CONSULT TO PODIATRY  IP CONSULT TO GI  IP CONSULT

## 2024-12-03 ENCOUNTER — ANESTHESIA (OUTPATIENT)
Facility: HOSPITAL | Age: 89
DRG: 982 | End: 2024-12-03
Payer: MEDICARE

## 2024-12-03 ENCOUNTER — ANESTHESIA EVENT (OUTPATIENT)
Facility: HOSPITAL | Age: 89
DRG: 982 | End: 2024-12-03
Payer: MEDICARE

## 2024-12-03 LAB
ANION GAP SERPL CALC-SCNC: 6 MMOL/L (ref 2–12)
BUN SERPL-MCNC: 61 MG/DL (ref 6–20)
BUN/CREAT SERPL: 30 (ref 12–20)
CALCIUM SERPL-MCNC: 8.5 MG/DL (ref 8.5–10.1)
CHLORIDE SERPL-SCNC: 104 MMOL/L (ref 97–108)
CO2 SERPL-SCNC: 25 MMOL/L (ref 21–32)
CREAT SERPL-MCNC: 2.04 MG/DL (ref 0.7–1.3)
ERYTHROCYTE [DISTWIDTH] IN BLOOD BY AUTOMATED COUNT: 14.7 % (ref 11.5–14.5)
ERYTHROCYTE [DISTWIDTH] IN BLOOD BY AUTOMATED COUNT: 14.8 % (ref 11.5–14.5)
GLUCOSE BLD STRIP.AUTO-MCNC: 147 MG/DL (ref 65–117)
GLUCOSE BLD STRIP.AUTO-MCNC: 149 MG/DL (ref 65–117)
GLUCOSE BLD STRIP.AUTO-MCNC: 216 MG/DL (ref 65–117)
GLUCOSE BLD STRIP.AUTO-MCNC: 318 MG/DL (ref 65–117)
GLUCOSE SERPL-MCNC: 149 MG/DL (ref 65–100)
HCT VFR BLD AUTO: 21.3 % (ref 36.6–50.3)
HCT VFR BLD AUTO: 22.3 % (ref 36.6–50.3)
HGB BLD-MCNC: 7 G/DL (ref 12.1–17)
HGB BLD-MCNC: 7.2 G/DL (ref 12.1–17)
INR PPP: 1.3 (ref 0.9–1.1)
MAGNESIUM SERPL-MCNC: 1.9 MG/DL (ref 1.6–2.4)
MCH RBC QN AUTO: 30.6 PG (ref 26–34)
MCH RBC QN AUTO: 31 PG (ref 26–34)
MCHC RBC AUTO-ENTMCNC: 32.3 G/DL (ref 30–36.5)
MCHC RBC AUTO-ENTMCNC: 32.9 G/DL (ref 30–36.5)
MCV RBC AUTO: 94.2 FL (ref 80–99)
MCV RBC AUTO: 94.9 FL (ref 80–99)
NRBC # BLD: 0 K/UL (ref 0–0.01)
NRBC # BLD: 0 K/UL (ref 0–0.01)
NRBC BLD-RTO: 0 PER 100 WBC
NRBC BLD-RTO: 0 PER 100 WBC
PHOSPHATE SERPL-MCNC: 2.3 MG/DL (ref 2.6–4.7)
PLATELET # BLD AUTO: 49 K/UL (ref 150–400)
PLATELET # BLD AUTO: 68 K/UL (ref 150–400)
PMV BLD AUTO: 11 FL (ref 8.9–12.9)
PMV BLD AUTO: 11.2 FL (ref 8.9–12.9)
POTASSIUM SERPL-SCNC: 4.7 MMOL/L (ref 3.5–5.1)
PROTHROMBIN TIME: 13.3 SEC (ref 9–11.1)
RBC # BLD AUTO: 2.26 M/UL (ref 4.1–5.7)
RBC # BLD AUTO: 2.35 M/UL (ref 4.1–5.7)
SERVICE CMNT-IMP: ABNORMAL
SODIUM SERPL-SCNC: 135 MMOL/L (ref 136–145)
WBC # BLD AUTO: 8.1 K/UL (ref 4.1–11.1)
WBC # BLD AUTO: 8.2 K/UL (ref 4.1–11.1)

## 2024-12-03 PROCEDURE — 1100000003 HC PRIVATE W/ TELEMETRY

## 2024-12-03 PROCEDURE — P9073 PLATELETS PHERESIS PATH REDU: HCPCS

## 2024-12-03 PROCEDURE — 2500000003 HC RX 250 WO HCPCS: Performed by: INTERNAL MEDICINE

## 2024-12-03 PROCEDURE — 6370000000 HC RX 637 (ALT 250 FOR IP): Performed by: INTERNAL MEDICINE

## 2024-12-03 PROCEDURE — 82962 GLUCOSE BLOOD TEST: CPT

## 2024-12-03 PROCEDURE — 30233R1 TRANSFUSION OF NONAUTOLOGOUS PLATELETS INTO PERIPHERAL VEIN, PERCUTANEOUS APPROACH: ICD-10-PCS | Performed by: INTERNAL MEDICINE

## 2024-12-03 PROCEDURE — 6370000000 HC RX 637 (ALT 250 FOR IP): Performed by: STUDENT IN AN ORGANIZED HEALTH CARE EDUCATION/TRAINING PROGRAM

## 2024-12-03 PROCEDURE — 80048 BASIC METABOLIC PNL TOTAL CA: CPT

## 2024-12-03 PROCEDURE — 36430 TRANSFUSION BLD/BLD COMPNT: CPT

## 2024-12-03 PROCEDURE — 84100 ASSAY OF PHOSPHORUS: CPT

## 2024-12-03 PROCEDURE — 6360000002 HC RX W HCPCS: Performed by: STUDENT IN AN ORGANIZED HEALTH CARE EDUCATION/TRAINING PROGRAM

## 2024-12-03 PROCEDURE — 36415 COLL VENOUS BLD VENIPUNCTURE: CPT

## 2024-12-03 PROCEDURE — 2580000003 HC RX 258: Performed by: STUDENT IN AN ORGANIZED HEALTH CARE EDUCATION/TRAINING PROGRAM

## 2024-12-03 PROCEDURE — 83735 ASSAY OF MAGNESIUM: CPT

## 2024-12-03 PROCEDURE — 85027 COMPLETE CBC AUTOMATED: CPT

## 2024-12-03 PROCEDURE — 85610 PROTHROMBIN TIME: CPT

## 2024-12-03 PROCEDURE — 2580000003 HC RX 258: Performed by: INTERNAL MEDICINE

## 2024-12-03 RX ORDER — SODIUM CHLORIDE 9 MG/ML
INJECTION, SOLUTION INTRAVENOUS CONTINUOUS
Status: DISCONTINUED | OUTPATIENT
Start: 2024-12-03 | End: 2024-12-03 | Stop reason: HOSPADM

## 2024-12-03 RX ORDER — SODIUM CHLORIDE 9 MG/ML
INJECTION, SOLUTION INTRAVENOUS PRN
Status: DISCONTINUED | OUTPATIENT
Start: 2024-12-03 | End: 2024-12-12 | Stop reason: HOSPADM

## 2024-12-03 RX ORDER — SODIUM CHLORIDE 0.9 % (FLUSH) 0.9 %
5-40 SYRINGE (ML) INJECTION PRN
Status: DISCONTINUED | OUTPATIENT
Start: 2024-12-03 | End: 2024-12-03 | Stop reason: HOSPADM

## 2024-12-03 RX ADMIN — METOPROLOL TARTRATE 25 MG: 25 TABLET, FILM COATED ORAL at 21:19

## 2024-12-03 RX ADMIN — SODIUM CHLORIDE 40 MG: 9 INJECTION INTRAMUSCULAR; INTRAVENOUS; SUBCUTANEOUS at 15:58

## 2024-12-03 RX ADMIN — FINASTERIDE 5 MG: 5 TABLET, FILM COATED ORAL at 08:58

## 2024-12-03 RX ADMIN — Medication 3 UNITS: at 21:20

## 2024-12-03 RX ADMIN — CEPHALEXIN 500 MG: 250 CAPSULE ORAL at 08:58

## 2024-12-03 RX ADMIN — SODIUM CHLORIDE, PRESERVATIVE FREE 10 ML: 5 INJECTION INTRAVENOUS at 08:58

## 2024-12-03 RX ADMIN — SODIUM CHLORIDE 40 MG: 9 INJECTION INTRAMUSCULAR; INTRAVENOUS; SUBCUTANEOUS at 04:11

## 2024-12-03 RX ADMIN — LEVOTHYROXINE SODIUM 175 MCG: 0.15 TABLET ORAL at 06:08

## 2024-12-03 RX ADMIN — METOPROLOL TARTRATE 25 MG: 25 TABLET, FILM COATED ORAL at 08:57

## 2024-12-03 RX ADMIN — DOXAZOSIN 4 MG: 2 TABLET ORAL at 08:57

## 2024-12-03 RX ADMIN — ATORVASTATIN CALCIUM 10 MG: 10 TABLET, FILM COATED ORAL at 08:58

## 2024-12-03 RX ADMIN — CEPHALEXIN 500 MG: 250 CAPSULE ORAL at 15:58

## 2024-12-03 RX ADMIN — INSULIN GLARGINE 30 UNITS: 100 INJECTION, SOLUTION SUBCUTANEOUS at 21:21

## 2024-12-03 RX ADMIN — SODIUM PHOSPHATE, MONOBASIC, MONOHYDRATE AND SODIUM PHOSPHATE, DIBASIC, ANHYDROUS 30 MMOL: 276; 142 INJECTION, SOLUTION INTRAVENOUS at 16:03

## 2024-12-03 RX ADMIN — CEPHALEXIN 500 MG: 250 CAPSULE ORAL at 21:19

## 2024-12-03 RX ADMIN — ALLOPURINOL 300 MG: 300 TABLET ORAL at 08:57

## 2024-12-03 RX ADMIN — SODIUM CHLORIDE, PRESERVATIVE FREE 10 ML: 5 INJECTION INTRAVENOUS at 21:22

## 2024-12-03 RX ADMIN — Medication 1 UNITS: at 17:38

## 2024-12-03 RX ADMIN — Medication 2 SPRAY: at 08:58

## 2024-12-03 ASSESSMENT — PAIN - FUNCTIONAL ASSESSMENT: PAIN_FUNCTIONAL_ASSESSMENT: 0-10

## 2024-12-03 NOTE — PROGRESS NOTES
GI note    Chart reviewed, platelets 49, INR 1.3. Nursing note indicates he had epistaxis again overnight. Hgb 7.2.     Recent Labs     12/03/24  0400 12/02/24  2049 12/02/24  1119   WBC 8.1 7.9 7.1   HGB 7.2* 7.4* 7.8*   HCT 22.3* 22.2* 23.5*   MCV 94.9 94.1 94.8   PLT 49* 39* 41*     He has been NPO since midnight. Plan EGD today. Notified his son, August Resendiz ([phone]), of plans. Discussed plans for EGD with Mr. Resendiz and his son yesterday during my visit. Please keep NPO.

## 2024-12-03 NOTE — PROGRESS NOTES
Report received from MIN Atwood on patient for endoscopy procedure. Will send for patient at 1230 for 1330 case.

## 2024-12-03 NOTE — CONSULTS
MD   300 mg at 12/03/24 0857    finasteride (PROSCAR) tablet 5 mg  5 mg Oral Every Other Day Lisa Joseph MD   5 mg at 12/03/24 0858    levothyroxine (SYNTHROID) tablet 175 mcg  175 mcg Oral Daily Lisa Joseph MD   175 mcg at 12/03/24 0608    atorvastatin (LIPITOR) tablet 10 mg  10 mg Oral Daily Lisa Joseph MD   10 mg at 12/03/24 0858    doxazosin (CARDURA) tablet 4 mg  4 mg Oral Daily Lisa Joseph MD   4 mg at 12/03/24 0857    insulin glargine (LANTUS) injection vial 30 Units  30 Units SubCUTAneous Nightly Lisa Joseph MD   30 Units at 12/02/24 2034    glucose chewable tablet 16 g  4 tablet Oral PRN Lisa Joseph MD        dextrose bolus 10% 125 mL  125 mL IntraVENous PRN Lisa Joseph MD        Or    dextrose bolus 10% 250 mL  250 mL IntraVENous PRN Lisa Joseph MD        glucagon injection 1 mg  1 mg SubCUTAneous PRN Lisa Joseph MD        dextrose 10 % infusion   IntraVENous Continuous PRN Lisa Joseph MD        insulin lispro (HUMALOG,ADMELOG) injection vial 0-4 Units  0-4 Units SubCUTAneous 4x Daily AC & HS Lisa Joseph MD   2 Units at 12/02/24 2034    pantoprazole (PROTONIX) 40 mg in sodium chloride (PF) 0.9 % 10 mL injection  40 mg IntraVENous Q12H Lisa Joseph MD   40 mg at 12/03/24 0411        Allergies   Allergen Reactions    Latex Other (See Comments)     Skin raw and severely irritated    Aspirin      Other reaction(s): Unknown (comments)    Hydrocodone Other (See Comments)     hallucinations    Oxycodone Other (See Comments)     hallucinations    Sulfa Antibiotics Rash    Tetracycline Rash          Objective:     Patient Vitals for the past 8 hrs:   BP Temp Temp src Pulse Resp SpO2   12/03/24 1010 (!) 128/51 98.6 °F (37 °C) Oral 76 18 93 %   12/03/24 0946 (!) 123/50 98.8 °F (37.1 °C) Oral 73 18 93 %   12/03/24 0931 (!) 128/55 97.7 °F (36.5 °C) Oral 74 18 95 %   12/03/24 0745 (!) 140/59 98.1 °F (36.7 °C) Oral 76 18 94 %   12/03/24 0403 (!) 150/55 97.5 °F (36.4 °C) Oral 72 16 95 %       Lab  Results   Component Value Date    WBC 8.1 12/03/2024    HGB 7.2 (L) 12/03/2024    HCT 22.3 (L) 12/03/2024    MCV 94.9 12/03/2024    PLT 49 (LL) 12/03/2024       Physical Exam:   BP (!) 128/51   Pulse 76   Temp 98.6 °F (37 °C) (Oral)   Resp 18   Wt 92 kg (202 lb 13.2 oz)   SpO2 93%   BMI 29.10 kg/m²   General appearance: alert, appears stated age, and cooperative  Abdomen: soft, non-tender; bowel sounds normal; no masses,  no organomegaly  Skin: Skin color, texture, turgor normal. No rashes or lesions  Neurologic: Grossly normal    Assessment:     Acute thrombocytopenia in setting of right great toe infection  Recent use of Linezolid   Recent GIB  HIT/ZOEY panel pending  So far upper and lower Dopplers are negative for DVT  Acute thrombocytopenia is most likely related to drug toxicity (linezolid)   Discussed with patient and son, HIT is less likely and most likely acute thrombocytopenia is related to antibiotic use.  Also at this time infection could contribute to thrombocytopenia.    Plan:     Trend CBC, would recommend transfusion to maintain platelets greater than 10 or greater than 50 if bleeding is suspected  HIT panel is negative, HIT very unlikely.  ZOEY is still pending.  Will continue to follow patient peripherally until ZOEY results  Upper and lower Dopplers are negative for DVT  Acute thrombocytopenia is related to antibiotic use.  Added linezolid to patient's allergy list.  GI plans to proceed with EGD today    Thank you for allowing us to participate in the care of Mr. Steinberg. Will continue to follow peripherally until ZOEY results.  There is no need at this time for patient to follow-up with outpatient hematology.  Please call with questions or concerns.      Primitivo Ho, ANIVAL - NP

## 2024-12-03 NOTE — PROGRESS NOTES
CKD  Hypophosphatemia  Baseline creatinine 1.7-1.8  Creatinine has been trending up since last few weeks  Most recent creatinine in the hospital 2.79, then the other previous readings  BUN is also significantly elevated to 109  S/p IVF, cr is down trending  appreciate nephro following  12/2: BUN/creatinine of 59/1.8 which seems to be stable and at his baseline.  Phosphorus of 2.1 and will give 8 packets of K-Phos.  12/3: BUN/creatinine of 61/2.0 and kidney function seems to be stable.  Phosphorus is still low at 2.3 and will give 30 mmol of IV phosphate.     Right Toe infection   s/p 2 weeks of antibiotics for right toe   Holding MRI with contrast because of LUCIA   XR right knee   Vascular and podiatry following  Consider ID consultation     Type 2 diabetes  Home medications include lispro 5 to 10 units sliding scale  Cont' lower dose of lantus, titrate prn  SSI     CHF   History of Angina   S/p Pacemaker   All PTA antihypertensive meds held on admission, BP is trending up  Will resume lower dose of BB for better BP control, titrate up as needed  Warfarin on hold     Gout  Cont allopurinol     Hypothyroidism  Levothyroxine 175 mcg daily     BPH  Finasteride 5 mg daily  Terazosin 5 mg nightly      Medical Decision Making:   I personally reviewed labs: CBC, BMP, magnesium, phosphorus  I personally reviewed imaging:   I personally reviewed EKG:  Toxic drug monitoring: Platelet transfusion  Discussed case with: Patient, RN, son at the bedside        Code Status: Full code  DVT Prophylaxis: SCDs, no chemoprophylaxis because of severe thrombocytopenia, active GI bleed and epistaxis  GI Prophylaxis: Protonix    Subjective:     Chief Complaint / Reason for Physician Visit  \" Follow-up for acute blood loss anemia secondary to upper GI bleed, epistaxis, dizziness, fall, severe thrombocytopenia, LUCIA on CKD, right toe infection, diabetes mellitus, CHF, angina, status post pacemaker, GERD, hypothyroidism, BPH.\".  Discussed with  RN events overnight.       Objective:     VITALS:   Last 24hrs VS reviewed since prior progress note. Most recent are:  Patient Vitals for the past 24 hrs:   BP Temp Temp src Pulse Resp SpO2   12/03/24 0931 (!) 128/55 97.7 °F (36.5 °C) Oral 74 18 95 %   12/03/24 0745 (!) 140/59 98.1 °F (36.7 °C) -- 76 18 94 %   12/03/24 0403 (!) 150/55 97.5 °F (36.4 °C) Oral 72 16 95 %   12/03/24 0103 (!) 141/65 97.9 °F (36.6 °C) Oral 70 16 90 %   12/03/24 0043 (!) 154/52 98.4 °F (36.9 °C) Oral 72 16 94 %   12/03/24 0008 (!) 138/90 99.5 °F (37.5 °C) Oral 76 16 95 %   12/02/24 2026 (!) 153/61 -- -- 70 -- --   12/1935 (!) 153/61 99.1 °F (37.3 °C) Oral 70 16 94 %   12/02/24 1527 (!) 153/46 97.9 °F (36.6 °C) Oral 71 16 95 %         Intake/Output Summary (Last 24 hours) at 12/3/2024 0941  Last data filed at 12/3/2024 0305  Gross per 24 hour   Intake 528.34 ml   Output --   Net 528.34 ml        I had a face to face encounter and independently examined this patient on 12/3/2024, as outlined below:  PHYSICAL EXAM:  General: Alert, ill looking, cooperative  Extremities: Right foot bandaged  EENT:  EOMI. Anicteric sclerae.  Rhino Rocket in left nostril  Resp:  CTA bilaterally, no wheezing or rales.  No accessory muscle use  CV:  Regular  rhythm,  No edema  GI:  Soft, Non distended, Non tender.  +Bowel sounds  Neurologic:  Alert and oriented X 3, normal speech,   Psych:   Good insight. Not anxious nor agitated  Skin:  As below              Reviewed most current lab test results and cultures  YES  Reviewed most current radiology test results   YES  Review and summation of old records today    NO  Reviewed patient's current orders and MAR    YES  PMH/SH reviewed - no change compared to H&P    Procedures: see electronic medical records for all procedures/Xrays and details which were not copied into this note but were reviewed prior to creation of Plan.      LABS:  I reviewed today's most current labs and imaging studies.  Pertinent labs

## 2024-12-03 NOTE — PERIOP NOTE
ARRIVAL INFORMATION:  Verified patient name and date of birth, scheduled procedure, and informed consent.       Belongings with patient include:  Clothing,Glasses    GI FOCUSED ASSESSMENT:  Neuro: Awake, alert, oriented x4  Respiratory: even and unlabored   GI: soft and non-distended  EKG Rhythm: BBB    Education:Reviewed general discharge instructions and  information.      The risks and benefits of the bite block have been explained to patient.  Patient verbalizes understanding.

## 2024-12-03 NOTE — PROGRESS NOTES
End of Shift Note    Bedside shift change report given to MIN Rodriges (oncoming nurse) by LORIN SHAH RN (offgoing nurse).  Report included the following information SBAR, Kardex, and MAR    Shift worked:  7am-7pm     Shift summary and any significant changes:     Pt tolerated care fairly well. Medications were given and education was provided. Hourly rounding completed. Pt made no complaints of pain during this shift. Pt up x1 to the BSC; urinal at bedside. Family present at bedside. Labs completed. One unit of platelets completed. Pt had a few instances of nose bleeding from the left nare, which was controlled.      Concerns for physician to address:  None     Zone phone for oncoming shift:   [phone]       Activity:  Level of Assistance: Moderate assist, patient does 50-74%  Number times ambulated in hallways past shift: 0  Number of times OOB to chair past shift: 0    Cardiac:   Cardiac Monitoring: Yes      Cardiac Rhythm: BBB    Access:  Current line(s): PIV     Genitourinary:   Urinary Status: Voiding    Respiratory:   O2 Device: None (Room air)  Chronic home O2 use?: NO  Incentive spirometer at bedside: NO    GI:  Last BM (including prior to admit): 11/30/24  Current diet:  ADULT DIET; Regular; Low Fat/Low Chol/High Fiber/ILDA; Low Fat (less than or equal to 50 gm/day)  Passing flatus: YES    Pain Management:   Patient states pain is manageable on current regimen: YES    Skin:  Binh Scale Score: 19  Interventions: Wound Offloading (Prevention Methods): Bed, pressure reduction mattress, Elevate heels, Repositioning, Pillows, Turning    Patient Safety:  Fall Risk: Nursing Judgement-Fall Risk High(Add Comments): Yes  Fall Risk Interventions  Nursing Judgement-Fall Risk High(Add Comments): Yes  Toilet Every 2 Hours-In Advance of Need: Yes  Hourly Visual Checks: Awake, In bed  Fall Visual Posted: Fall sign posted, Socks  Room Door Open: Yes  Alarm On: Bed  Patient Moved Closer to Nursing Station: No    Active  Consults:   IP CONSULT TO ONCOLOGY  IP CONSULT TO PODIATRY  IP CONSULT TO GI  IP CONSULT TO NEPHROLOGY  IP CONSULT TO VASCULAR SURGERY    Length of Stay:  Expected LOS: 6  Actual LOS: 4    LORIN SHAH RN

## 2024-12-03 NOTE — ANESTHESIA PRE PROCEDURE
Department of Anesthesiology  Preprocedure Note       Name:  Maryan Resendiz   Age:  89 y.o.  :  1935                                          MRN:  [id number]         Date:  12/3/2024      Surgeon: Surgeon(s):  Karla Shah MD    Procedure: Procedure(s):  ESOPHAGOGASTRODUODENOSCOPY DIAGNOSTIC ONLY    Medications prior to admission:   Prior to Admission medications    Medication Sig Start Date End Date Taking? Authorizing Provider   mupirocin (BACTROBAN) 2 % ointment Apply topically 3 times daily Apply topically 3 times daily.   Yes Becky Bashir MD   Multiple Vitamin (MULTIVITAMIN) TABS tablet Take 1 tablet by mouth daily   Yes Becky Bashir MD   insulin lispro, 1 Unit Dial, (HUMALOG KWIKPEN) 100 UNIT/ML SOPN Inject 5-10 Units into the skin as needed for High Blood Sugar Per sliding scale   Yes Becky Bashir MD   Multiple Vitamins-Minerals (PRESERVISION AREDS 2) CAPS Take 1 Capful by mouth in the morning and at bedtime   Yes Becky Bashir MD   spironolactone (ALDACTONE) 25 MG tablet Take 1 tablet by mouth daily 24  Yes Prashanth Livingston MD   furosemide (LASIX) 40 MG tablet Take 2 tablets by mouth daily  Patient taking differently: Take 1-2 tablets by mouth daily 24  Yes Prashanth Livingston MD   allopurinol (ZYLOPRIM) 300 MG tablet Take 1 tablet by mouth daily  Patient taking differently: Take 1 tablet by mouth at bedtime 3/11/24  Yes Prashanth Livingston MD   isosorbide mononitrate (IMDUR) 60 MG extended release tablet Take 1 tablet by mouth daily  Patient taking differently: Take 1 tablet by mouth at bedtime 3/11/24  Yes Parshanth Livingston MD   lovastatin (MEVACOR) 40 MG tablet TAKE 1 TABLET DAILY  Patient taking differently: Take 1 tablet by mouth nightly 24  Yes Manuel Carvalho APRN - NP   insulin glargine (LANTUS) 100 UNIT/ML injection vial Inject 45 Units into the skin nightly  Patient taking differently: Inject 60 Units into the skin nightly  MD Lisa   10 mL at 12/03/24 0858   • sodium chloride flush 0.9 % injection 5-40 mL  5-40 mL IntraVENous PRN Lisa Joseph MD       • 0.9 % sodium chloride infusion   IntraVENous PRN Lisa Joseph MD       • ondansetron (ZOFRAN-ODT) disintegrating tablet 4 mg  4 mg Oral Q8H PRN Lisa Joseph MD        Or   • ondansetron (ZOFRAN) injection 4 mg  4 mg IntraVENous Q6H PRN Lisa Joseph MD   4 mg at 12/01/24 0917   • polyethylene glycol (GLYCOLAX) packet 17 g  17 g Oral Daily PRN Lisa Joseph MD       • acetaminophen (TYLENOL) tablet 650 mg  650 mg Oral Q6H PRN Lisa Joseph MD        Or   • acetaminophen (TYLENOL) suppository 650 mg  650 mg Rectal Q6H PRN Lisa Joseph MD       • allopurinol (ZYLOPRIM) tablet 300 mg  300 mg Oral Daily Lisa Joseph MD   300 mg at 12/03/24 0857   • finasteride (PROSCAR) tablet 5 mg  5 mg Oral Every Other Day Lisa Joseph MD   5 mg at 12/03/24 0858   • levothyroxine (SYNTHROID) tablet 175 mcg  175 mcg Oral Daily Lisa Joseph MD   175 mcg at 12/03/24 0608   • atorvastatin (LIPITOR) tablet 10 mg  10 mg Oral Daily Lisa Joseph MD   10 mg at 12/03/24 0858   • doxazosin (CARDURA) tablet 4 mg  4 mg Oral Daily Lisa Joseph MD   4 mg at 12/03/24 0857   • insulin glargine (LANTUS) injection vial 30 Units  30 Units SubCUTAneous Nightly Lisa Joseph MD   30 Units at 12/02/24 2034   • glucose chewable tablet 16 g  4 tablet Oral PRN Lisa Joseph MD       • dextrose bolus 10% 125 mL  125 mL IntraVENous PRN Lisa Joseph MD        Or   • dextrose bolus 10% 250 mL  250 mL IntraVENous PRN Lisa Joseph MD       • glucagon injection 1 mg  1 mg SubCUTAneous PRN Lisa Joseph MD       • dextrose 10 % infusion   IntraVENous Continuous PRN Lisa Joseph MD       • insulin lispro (HUMALOG,ADMELOG) injection vial 0-4 Units  0-4 Units SubCUTAneous 4x Daily AC & HS Lisa Joseph MD   2 Units at 12/02/24 2034   • pantoprazole (PROTONIX) 40 mg in sodium chloride (PF) 0.9 % 10 mL injection  40 mg IntraVENous

## 2024-12-03 NOTE — PROGRESS NOTES
End of Shift Note    Bedside shift change report given to MIN Joseph (oncoming nurse) by Antelmo Toro RN (offgoing nurse).  Report included the following information SBAR, Kardex, Intake/Output, MAR, Recent Results, and Cardiac Rhythm A- Paced    Shift worked:  7p-7a     Shift summary and any significant changes:     Patient tolerated care. Scheduled medication given. At the beginning of the shift pt nose began to breed again. Stuffed cloth in upper lip and eat some ice. Messaged the provider. And call Rapid nurse who said to use the Afrin. After the Afrin it bleed a lot for about 30 mins before it started to slow down and eventually stopped. Rapid nurse rounded on pt. Continued to have random single short bleeds until about 4am- after platelets.  Family at bedside at the beginning of the shift.     Labs drawn. Caring rounds provided     Concerns for physician to address:  H&H 7.2  Platelets 49 up from 39     Zone phone for oncoming shift:   [phone]       Activity:  Level of Assistance: Moderate assist, patient does 50-74%  Number times ambulated in hallways past shift: 0  Number of times OOB to chair past shift: 0    Cardiac:   Cardiac Monitoring: Yes      Cardiac Rhythm: Sinus rhythm    Access:  Current line(s): PIV     Genitourinary:   Urinary Status: Voiding    Respiratory:   O2 Device: None (Room air)  Chronic home O2 use?: NO  Incentive spirometer at bedside: NO    GI:  Last BM (including prior to admit): 11/30/24  Current diet:  Diet NPO  Passing flatus: YES    Pain Management:   Patient states pain is manageable on current regimen: YES    Skin:  Binh Scale Score: 19  Interventions: Wound Offloading (Prevention Methods): Bed, pressure reduction mattress    Patient Safety:  Fall Risk: Nursing Judgement-Fall Risk High(Add Comments): Yes  Fall Risk Interventions  Nursing Judgement-Fall Risk High(Add Comments): Yes  Toilet Every 2 Hours-In Advance of Need: Yes  Hourly Visual Checks: Awake, In bed  Fall Visual

## 2024-12-03 NOTE — PROGRESS NOTES
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 Likely prerenal/dry.      Potassium is better, today is 4.7.    Will follow         Subjective:     Chief Complaint:  alert. No complaint. Chart reviewed.      Review of Systems:    Symptom Y/N Comments  Symptom Y/N Comments   Fever/Chills    Chest Pain     Poor Appetite    Edema     Cough    Abdominal Pain     Sputum    Joint Pain     SOB/CARTER    Pruritis/Rash     Nausea/vomit    Tolerating PT/OT     Diarrhea    Tolerating Diet     Constipation    Other       Could not obtain due to:      Objective:     VITALS:   Last 24hrs VS reviewed since prior progress note. Most recent are:  Vitals:    24 0403   BP: (!) 150/55   Pulse: 72   Resp: 16   Temp: 97.5 °F (36.4 °C)   SpO2: 95%     No intake or output data in the 24 hours ending 24 0641     Telemetry Reviewed:     PHYSICAL EXAM:  General: NAD  No edema      Lab Data Reviewed: (see below)    Medications Reviewed: (see below)    PMH/SH reviewed - no change compared to H&P  ________________________________________________________________________  Care Plan discussed with:  Patient     Family      RN     Care Manager                    Consultant:          Comments   >50% of visit spent in counseling and coordination of care       ________________________________________________________________________  Angelita Gramajo MD     Procedures: see electronic medical records for all procedures/Xrays and details which  were not copied into this note but were reviewed prior to creation of Plan.      LABS:  Recent Labs     24  0400   WBC 7.9 8.1   HGB 7.4* 7.2*    HCT 22.2* 22.3*   PLT 39* 49*     Recent Labs     12/01/24  0522 12/02/24  0100 12/02/24  1119 12/03/24  0400   * 137 135* 135*   K 4.3 4.9 4.7 4.7    105 104 104   CO2 25 27 25 25   BUN 70* 64* 59* 61*   MG  --   --   --  1.9   PHOS 3.1  --  2.1* 2.3*     No results for input(s): \"TP\", \"GLOB\", \"GGT\" in the last 72 hours.    Invalid input(s): \"SGOT\", \"GPT\", \"AP\", \"TBIL\", \"ALB\", \"AML\", \"AMYP\", \"LPSE\", \"HLPSE\"    Recent Labs     12/02/24  1119 12/03/24  0400   INR 1.4* 1.3*      No results for input(s): \"TIBC\" in the last 72 hours.    Invalid input(s): \"FE\", \"PSAT\", \"FERR\"     No results found for: \"RBCF\"   No results for input(s): \"PH\", \"PCO2\", \"PO2\" in the last 72 hours.  No results for input(s): \"CPK\", \"CKMB\", \"TROPONINI\" in the last 72 hours.  No components found for: \"GLPOC\"  @labua@    MEDICATIONS:  Current Facility-Administered Medications   Medication Dose Route Frequency    0.9 % sodium chloride infusion   IntraVENous PRN    0.9 % sodium chloride infusion   IntraVENous PRN    metoprolol tartrate (LOPRESSOR) tablet 25 mg  25 mg Oral BID    0.9 % sodium chloride infusion   IntraVENous PRN    oxymetazoline (AFRIN) 0.05 % nasal spray 2 spray  2 spray Each Nostril BID    cephALEXin (KEFLEX) capsule 500 mg  500 mg Oral TID    0.9 % sodium chloride infusion   IntraVENous PRN    sodium chloride flush 0.9 % injection 5-40 mL  5-40 mL IntraVENous 2 times per day    sodium chloride flush 0.9 % injection 5-40 mL  5-40 mL IntraVENous PRN    0.9 % sodium chloride infusion   IntraVENous PRN    ondansetron (ZOFRAN-ODT) disintegrating tablet 4 mg  4 mg Oral Q8H PRN    Or    ondansetron (ZOFRAN) injection 4 mg  4 mg IntraVENous Q6H PRN    polyethylene glycol (GLYCOLAX) packet 17 g  17 g Oral Daily PRN    acetaminophen (TYLENOL) tablet 650 mg  650 mg Oral Q6H PRN    Or    acetaminophen (TYLENOL) suppository 650 mg  650 mg Rectal Q6H PRN    allopurinol (ZYLOPRIM) tablet 300 mg  300 mg Oral Daily    finasteride

## 2024-12-03 NOTE — PROGRESS NOTES
Nursing contacted Nocturnist/cross cover provider via non-urgent messaging system [employer] and notified patient rhino rocket removed around 1815, states day provider told nursing pt may need to have replaced if rebleed. nurse noted nose bld again w/o severity of bld provided. hematologist already following with thrombocytopenia. no vs changes or other acute concerns reported.. No other concerns reported. No acute distress reported. No other information provided by nurse. VSS.     Ordered nursing to treat nose bleeding per protocol with hold and ice,  nurse to page rapid nurse and rapid nurse may page in house provider for assistance if needed. stat cbc and t/c ordered for plt eval- pnd. would consider plt trs if needed for continued or profuse bld.. Will defer further evaluation/management to the day shift primary attending care team. Patient denies any further complaints or concerns.     Nursing to notify Hospitalist for further/continued concerns. Will remain available overnight for further concerns if nursing/patient needs. Please note, there are RRT systems in this hospital in place that if nursing has acute or critical patient condition change or concern, this is to help facilitate and notify that patient needs immediate bedside evaluation by a provider.     Update  2211 plt 39, pending egd in am as long as plt greater than 50 per notes. Epistaxis stopped s/p ice, pressure nares w/o rhino rocket insertion per nurse reported. No further concerns or active bleeding reported. Nad reported. Vss. Ordered 1 unit plt with transfusion protocol.     Update   0613 plt 49, no active bleeding reported. Will add one additional plt trs with protocol as per gi notes plt greater than 50 will do procedure. Vss. No other concerns reported.     Non-billable note.

## 2024-12-04 LAB
ANION GAP SERPL CALC-SCNC: 6 MMOL/L (ref 2–12)
BLD PROD TYP BPU: NORMAL
BLD PROD TYP BPU: NORMAL
BLOOD BANK BLOOD PRODUCT EXPIRATION DATE: NORMAL
BLOOD BANK BLOOD PRODUCT EXPIRATION DATE: NORMAL
BLOOD BANK DISPENSE STATUS: NORMAL
BLOOD BANK DISPENSE STATUS: NORMAL
BLOOD BANK ISBT PRODUCT BLOOD TYPE: 7300
BLOOD BANK ISBT PRODUCT BLOOD TYPE: 8400
BLOOD BANK PRODUCT CODE: NORMAL
BLOOD BANK PRODUCT CODE: NORMAL
BLOOD BANK UNIT TYPE AND RH: NORMAL
BLOOD BANK UNIT TYPE AND RH: NORMAL
BPU ID: NORMAL
BPU ID: NORMAL
BUN SERPL-MCNC: 60 MG/DL (ref 6–20)
BUN/CREAT SERPL: 30 (ref 12–20)
CALCIUM SERPL-MCNC: 8.1 MG/DL (ref 8.5–10.1)
CHLORIDE SERPL-SCNC: 102 MMOL/L (ref 97–108)
CO2 SERPL-SCNC: 25 MMOL/L (ref 21–32)
CREAT SERPL-MCNC: 2.01 MG/DL (ref 0.7–1.3)
ERYTHROCYTE [DISTWIDTH] IN BLOOD BY AUTOMATED COUNT: 14.9 % (ref 11.5–14.5)
GLUCOSE BLD STRIP.AUTO-MCNC: 123 MG/DL (ref 65–117)
GLUCOSE BLD STRIP.AUTO-MCNC: 141 MG/DL (ref 65–117)
GLUCOSE BLD STRIP.AUTO-MCNC: 184 MG/DL (ref 65–117)
GLUCOSE BLD STRIP.AUTO-MCNC: 209 MG/DL (ref 65–117)
GLUCOSE SERPL-MCNC: 150 MG/DL (ref 65–100)
HCT VFR BLD AUTO: 18.7 % (ref 36.6–50.3)
HCT VFR BLD AUTO: 24.5 % (ref 36.6–50.3)
HGB BLD-MCNC: 6.5 G/DL (ref 12.1–17)
HGB BLD-MCNC: 8.2 G/DL (ref 12.1–17)
HISTORY CHECK: NORMAL
MAGNESIUM SERPL-MCNC: 1.9 MG/DL (ref 1.6–2.4)
MCH RBC QN AUTO: 32.2 PG (ref 26–34)
MCHC RBC AUTO-ENTMCNC: 34.8 G/DL (ref 30–36.5)
MCV RBC AUTO: 92.6 FL (ref 80–99)
NRBC # BLD: 0 K/UL (ref 0–0.01)
NRBC BLD-RTO: 0 PER 100 WBC
PHOSPHATE SERPL-MCNC: 3.3 MG/DL (ref 2.6–4.7)
PLATELET # BLD AUTO: 76 K/UL (ref 150–400)
PMV BLD AUTO: 12 FL (ref 8.9–12.9)
POTASSIUM SERPL-SCNC: 4.2 MMOL/L (ref 3.5–5.1)
RBC # BLD AUTO: 2.02 M/UL (ref 4.1–5.7)
SERVICE CMNT-IMP: ABNORMAL
SODIUM SERPL-SCNC: 133 MMOL/L (ref 136–145)
SRA .2 IU/ML UFH SER-ACNC: <1 % (ref 0–20)
SRA 100IU/ML UFH SER-ACNC: <1 % (ref 0–20)
SRA UFH SER-IMP: NORMAL
UNIT DIVISION: 0
UNIT DIVISION: 0
UNIT ISSUE DATE/TIME: NORMAL
UNIT ISSUE DATE/TIME: NORMAL
WBC # BLD AUTO: 8.2 K/UL (ref 4.1–11.1)

## 2024-12-04 PROCEDURE — 6360000002 HC RX W HCPCS: Performed by: STUDENT IN AN ORGANIZED HEALTH CARE EDUCATION/TRAINING PROGRAM

## 2024-12-04 PROCEDURE — P9016 RBC LEUKOCYTES REDUCED: HCPCS

## 2024-12-04 PROCEDURE — 1100000003 HC PRIVATE W/ TELEMETRY

## 2024-12-04 PROCEDURE — 85014 HEMATOCRIT: CPT

## 2024-12-04 PROCEDURE — 82962 GLUCOSE BLOOD TEST: CPT

## 2024-12-04 PROCEDURE — 85018 HEMOGLOBIN: CPT

## 2024-12-04 PROCEDURE — 84100 ASSAY OF PHOSPHORUS: CPT

## 2024-12-04 PROCEDURE — 80048 BASIC METABOLIC PNL TOTAL CA: CPT

## 2024-12-04 PROCEDURE — 85027 COMPLETE CBC AUTOMATED: CPT

## 2024-12-04 PROCEDURE — 36430 TRANSFUSION BLD/BLD COMPNT: CPT

## 2024-12-04 PROCEDURE — 2580000003 HC RX 258: Performed by: STUDENT IN AN ORGANIZED HEALTH CARE EDUCATION/TRAINING PROGRAM

## 2024-12-04 PROCEDURE — 6370000000 HC RX 637 (ALT 250 FOR IP): Performed by: INTERNAL MEDICINE

## 2024-12-04 PROCEDURE — 83735 ASSAY OF MAGNESIUM: CPT

## 2024-12-04 PROCEDURE — 6370000000 HC RX 637 (ALT 250 FOR IP): Performed by: STUDENT IN AN ORGANIZED HEALTH CARE EDUCATION/TRAINING PROGRAM

## 2024-12-04 PROCEDURE — 36415 COLL VENOUS BLD VENIPUNCTURE: CPT

## 2024-12-04 RX ORDER — SODIUM CHLORIDE 9 MG/ML
INJECTION, SOLUTION INTRAVENOUS PRN
Status: DISCONTINUED | OUTPATIENT
Start: 2024-12-04 | End: 2024-12-12 | Stop reason: HOSPADM

## 2024-12-04 RX ADMIN — METOPROLOL TARTRATE 25 MG: 25 TABLET, FILM COATED ORAL at 09:02

## 2024-12-04 RX ADMIN — LEVOTHYROXINE SODIUM 175 MCG: 0.15 TABLET ORAL at 06:16

## 2024-12-04 RX ADMIN — ALLOPURINOL 300 MG: 300 TABLET ORAL at 09:02

## 2024-12-04 RX ADMIN — Medication 1 UNITS: at 18:41

## 2024-12-04 RX ADMIN — DOXAZOSIN 4 MG: 2 TABLET ORAL at 09:02

## 2024-12-04 RX ADMIN — CEPHALEXIN 500 MG: 250 CAPSULE ORAL at 09:01

## 2024-12-04 RX ADMIN — INSULIN GLARGINE 30 UNITS: 100 INJECTION, SOLUTION SUBCUTANEOUS at 21:14

## 2024-12-04 RX ADMIN — SODIUM CHLORIDE 40 MG: 9 INJECTION INTRAMUSCULAR; INTRAVENOUS; SUBCUTANEOUS at 06:16

## 2024-12-04 RX ADMIN — Medication 1 UNITS: at 21:15

## 2024-12-04 RX ADMIN — METOPROLOL TARTRATE 25 MG: 25 TABLET, FILM COATED ORAL at 21:17

## 2024-12-04 RX ADMIN — SODIUM CHLORIDE 40 MG: 9 INJECTION INTRAMUSCULAR; INTRAVENOUS; SUBCUTANEOUS at 18:42

## 2024-12-04 RX ADMIN — ATORVASTATIN CALCIUM 10 MG: 10 TABLET, FILM COATED ORAL at 09:02

## 2024-12-04 RX ADMIN — SODIUM CHLORIDE, PRESERVATIVE FREE 10 ML: 5 INJECTION INTRAVENOUS at 21:18

## 2024-12-04 RX ADMIN — SODIUM CHLORIDE, PRESERVATIVE FREE 10 ML: 5 INJECTION INTRAVENOUS at 09:02

## 2024-12-04 ASSESSMENT — PAIN SCALES - GENERAL
PAINLEVEL_OUTOF10: 0
PAINLEVEL_OUTOF10: 0

## 2024-12-04 NOTE — CONSENT
Informed Consent for Blood Component Transfusion Note    I have discussed with the patient the rationale for blood component transfusion; its benefits in treating or preventing fatigue, organ damage, or death; and its risk which includes mild transfusion reactions, rare risk of blood borne infection, or more serious but rare reactions. I have discussed the alternatives to transfusion, including the risk and consequences of not receiving transfusion. The patient had an opportunity to ask questions and had agreed to proceed with transfusion of blood components.    Electronically signed by Vania Rodrigues MD on 12/4/24 at 12:07 PM EST

## 2024-12-04 NOTE — PLAN OF CARE
Problem: Chronic Conditions and Co-morbidities  Goal: Patient's chronic conditions and co-morbidity symptoms are monitored and maintained or improved  Outcome: Progressing     Problem: Safety - Adult  Goal: Free from fall injury  Outcome: Progressing     Problem: Cognitive:  Goal: Knowledge of wound care  Description: Knowledge of wound care  Outcome: Progressing  Goal: Understands risk factors for wounds  Description: Understands risk factors for wounds  Outcome: Progressing     Problem: Wound:  Goal: Will show signs of wound healing; wound closure and no evidence of infection  Description: Will show signs of wound healing; wound closure and no evidence of infection  Outcome: Progressing     Problem: Smoking cessation:  Goal: Ability to formulate a plan to maintain a tobacco-free life will be supported  Description: Ability to formulate a plan to maintain a tobacco-free life will be supported  Outcome: Progressing     Problem: Falls - Risk of:  Goal: Will remain free from falls  Description: Will remain free from falls  Outcome: Progressing     Problem: Blood Glucose:  Goal: Ability to maintain appropriate glucose levels will improve  Description: Ability to maintain appropriate glucose levels will improve  Outcome: Progressing     Problem: ABCDS Injury Assessment  Goal: Absence of physical injury  Outcome: Progressing     Problem: Pain  Goal: Verbalizes/displays adequate comfort level or baseline comfort level  Outcome: Progressing

## 2024-12-04 NOTE — PROGRESS NOTES
End of Shift Note    Bedside shift change report given to MIN Ruano (oncoming nurse) by Antelmo Toro RN (offgoing nurse).  Report included the following information SBAR, Kardex, MAR, Recent Results, and Cardiac Rhythm V-paced    Shift worked:  7p-7a     Shift summary and any significant changes:     Patient tolerated care well. Medication provided. Pt had no complaint of pain over shift. Pt up to the bedside commode x1. Caring rounds provided. Pt able to rest over shift.Pt had 3 small nose bleeds from the left nostril over shift.     Concerns for physician to address:  none     Zone phone for oncoming shift:   [phone]       Activity:  Level of Assistance: Moderate assist, patient does 50-74%  Number times ambulated in hallways past shift: 0  Number of times OOB to chair past shift: 1    Cardiac:   Cardiac Monitoring: Yes      Cardiac Rhythm: Sinus rhythm    Access:  Current line(s): PIV     Genitourinary:   Urinary Status: Voiding    Respiratory:   O2 Device: None (Room air)  Chronic home O2 use?: NO  Incentive spirometer at bedside: NO    GI:  Last BM (including prior to admit): 11/30/24  Current diet:  ADULT DIET; Regular; Low Fat/Low Chol/High Fiber/ILDA; Low Fat (less than or equal to 50 gm/day)  Passing flatus: YES    Pain Management:   Patient states pain is manageable on current regimen: YES    Skin:  Binh Scale Score: 19  Interventions: Wound Offloading (Prevention Methods): Bed, pressure reduction mattress, Pillows, Repositioning    Patient Safety:  Fall Risk: Nursing Judgement-Fall Risk High(Add Comments): Yes  Fall Risk Interventions  Nursing Judgement-Fall Risk High(Add Comments): Yes  Toilet Every 2 Hours-In Advance of Need: Yes  Hourly Visual Checks: Awake, In bed  Fall Visual Posted: Fall sign posted, Socks  Room Door Open: Yes  Alarm On: Bed  Patient Moved Closer to Nursing Station: No    Active Consults:   IP CONSULT TO ONCOLOGY  IP CONSULT TO PODIATRY  IP CONSULT TO GI  IP CONSULT TO

## 2024-12-04 NOTE — PROGRESS NOTES
Nunu Lujan, MEL-C                       [phone] cell                 Monday-Thursday 7:30-5:00                               GI PROGRESS NOTE        NAME:   Maryan Resendiz       :    1935       MRN:    [id number]     Assessment/Plan     89 y.o.   male who I was asked to see for low H/H and dark stool.    Came in w/ fatigue and multiple falls. + epistaxis and dark stool was reported.  He is on warfarin . His INR was elevated  at 3.7. Now 2.2   Hemoglobin is 7(Was 12.2 on ), MCV 95, platelets 33   CTAP  mildly complex fluid collection within R anterior abdominal wall that has decreased in size and complex upper pole kidney mass, not felt to represent simple cyst.     2024: Hgb stable at 8. No iron deficiency on labs. No INR checked since 2024. No platelets drawn today. No bowel movement yesterday. Great appetite. No abdominal pain.     2024: Planned EGD yesterday cancelled due to ongoing issues with nosebleeds. Had 3 small nosebleeds overnight. Hgb 6.5 this morning. Platelets 76. Tolerating regular diet.     Recent Labs     24  0525 24  1121 24  0400   WBC 8.2 8.2 8.1   HGB 6.5* 7.0* 7.2*   HCT 18.7* 21.3* 22.3*   MCV 92.6 94.2 94.9   PLT 76* 68* 49*     Lab Results   Component Value Date    IRON 205 (H) 2024    TIBC 214 (L) 2024    FERRITIN 665 (H) 2024     Lab Results   Component Value Date    INR 1.3 (H) 2024    INR 1.4 (H) 2024    INR 2.2 (H) 2024    PROTIME 13.3 (H) 2024    PROTIME 14.0 (H) 2024    PROTIME 21.9 (H) 2024     Plan:  - Continue PPI  - Holding off on EGD until resolution of epistaxis unless he were to start actively GI bleeding or become hemodynamically unstable with dropping hgb.        Patient Active Problem List   Diagnosis    Benign paroxysmal positional vertigo due to bilateral  Please let Ms Taylor know that her ultrasound of the abdomen show fatty liver - follow a low sugar and low carbohydrate diet / avoid alcohol - her hepatitis studies are negative-will recheck in a year

## 2024-12-04 NOTE — PROGRESS NOTES
Spiritual Health History and Assessment/Progress Note  [facility]    Initial Encounter,  , Adjustment to illness, Life Adjustments,      Name: Maryan Resendiz MRN: [id number]    Age: 89 y.o.     Sex: male   Language: English   Quaker: Quaker   Anemia, chronic disease     Date: 12/4/2024            Total Time Calculated: 33 min              Spiritual Assessment began in [facility] MEDICAL ONCOLOGY        Referral/Consult From: Rounding   Encounter Overview/Reason: Initial Encounter  Service Provided For: Patient    Sabra, Belief, Meaning:   Patient is connected with a sabra tradition or spiritual practice and has beliefs or practices that help with coping during difficult times  Family/Friends have beliefs or practices that help with coping during difficult times      Importance and Influence:  Patient has spiritual/personal beliefs that influence decisions regarding their health  Family/Friends have spiritual/personal beliefs that influence decisions regarding the patient's health    Community:  Patient is connected with a spiritual community and feels well-supported. Support system includes: Children, Sabra Community, Friends, and Extended family  Family/Friends feel well-supported. Support system includes: Children, Sabra Community, Friends, and Extended family    Assessment and Plan of Care:     Patient Interventions include: Facilitated expression of thoughts and feelings, Explored spiritual coping/struggle/distress, Engaged in theological reflection, Affirmed coping skills/support systems, and Facilitated life review and/ or legacy  Family/Friends Interventions include: Affirmed coping skills/support systems    Patient Plan of Care: Spiritual Care available upon further referral  Family/Friends Plan of Care: Spiritual Care available upon further referral    Electronically signed by NATALIA Ellis on 12/4/2024 at 12:46 PM

## 2024-12-04 NOTE — PROGRESS NOTES
End of Shift Note    Bedside shift change report given to Trish RN (oncoming nurse) by THERESA AMADOR RN (offgoing nurse).  Report included the following information SBAR, Kardex, and MAR    Shift worked:  7am-7pm     Shift summary and any significant changes:     Pt had no complaints of pain or nausea. Pt had 2 small nose bleed. Pt sat up in the chair for half of the shift. 1 unit of PRBC given. H&H drawn post transfusion. 2 IV's removed for infiltration and new IV started for blood transfusion.      Concerns for physician to address:  None     Zone phone for oncoming shift:   [phone]       Activity:  Level of Assistance: Moderate assist, patient does 50-74%  Number times ambulated in hallways past shift: 0  Number of times OOB to chair past shift: 0    Cardiac:   Cardiac Monitoring: Yes      Cardiac Rhythm: Sinus rhythm    Access:  Current line(s): PIV     Genitourinary:   Urinary Status: Voiding    Respiratory:   O2 Device: None (Room air)  Chronic home O2 use?: NO  Incentive spirometer at bedside: NO    GI:  Last BM (including prior to admit): 11/30/24  Current diet:  ADULT DIET; Regular; Low Fat/Low Chol/High Fiber/ILDA; Low Fat (less than or equal to 50 gm/day)  Passing flatus: YES    Pain Management:   Patient states pain is manageable on current regimen: YES    Skin:  Binh Scale Score: 19  Interventions: Wound Offloading (Prevention Methods): Repositioning, Turning    Patient Safety:  Fall Risk: Nursing Judgement-Fall Risk High(Add Comments): Yes  Fall Risk Interventions  Nursing Judgement-Fall Risk High(Add Comments): Yes  Toilet Every 2 Hours-In Advance of Need: Yes  Hourly Visual Checks: Awake, In bed  Fall Visual Posted: Fall sign posted, Socks  Room Door Open: Yes  Alarm On: Bed  Patient Moved Closer to Nursing Station: No    Active Consults:   IP CONSULT TO ONCOLOGY  IP CONSULT TO PODIATRY  IP CONSULT TO GI  IP CONSULT TO NEPHROLOGY  IP CONSULT TO VASCULAR SURGERY    Length of Stay:  Expected LOS:

## 2024-12-04 NOTE — PROGRESS NOTES
Monitoring the Platelets, noted improvement will plan for surgical treatment when crosses over 100

## 2024-12-04 NOTE — PROGRESS NOTES
Hospitalist Progress Note    NAME:   Maryan Resendiz   : 1935   MRN: [id number]     Date/Time: 2024 2:17 PM  Patient PCP: Prashanth Livingston MD    Estimated discharge date:   Barriers: Hb and platelet stability, EGD pending , GI clearance        Assessment / Plan:  Acute anemia  Upper GI bleed  Epistaxis  Hemoglobin on presentation 7.8, baseline Hb 12-13. +FOBT  S/p 1 U PRBC for hbg 7 + presyncopal symptoms    - PPI iv bid   - OAC on hold  -S/p rhino rocket for epistaxis   -EGD on 12/3 was cancelled due to intermittent episodes of epistaxis   - Hb dropped to 6.5 on - ordered 1 units of PRBC transfusion   -However platelet improving   -Gi following   - Discontinued Keflex as patient is off rhino rocket      Dizziness  Fall   Dizziness he could be secondary to anemia  Patient has had status post Bruise on R side of head  CT head without contrast without acute abnormality  CT cervical spine: Without fracture  CT chest: Mildly complex fluid collection in the right anterior abdominal wall has decreased in size.  Complex mass upper pole left kidney does not appear to be simple cyst, recommend renal protocol CT  EKG with ventricular paced rhythm  Repeat Echo on - Normal left ventricular systolic function with a visually estimated EF of 55 - 60%. Left ventricle size is normal. Increased wall thickness. Normal wall motion.    Right Ventricle: Right ventricle size is normal. Normal systolic function.    Mitral Valve: Mild regurgitation.    Left Atrium: Left atrium is mildly dilated.    - continue on telemetry  - PT/OT  - Fall precautions       Acute decrease in platelet count  Baseline platelet count around 200  Platelet slowly  trending up  Hematology following  Possible  Related to linezolid         LUCIA on CKD  Hypophosphatemia  Baseline creatinine 1.7-1.8  Creatinine has been trending up since last few weeks  Most recent creatinine in the hospital 2.8, then the other previous readings  BUN is  (!) 128/59 97.3 °F (36.3 °C) Oral 78 16 92 %   12/04/24 0521 (!) 128/54 98.6 °F (37 °C) Oral 75 16 90 %   12/04/24 0346 (!) 127/57 98.2 °F (36.8 °C) Oral 72 18 94 %   12/03/24 2119 129/81 -- -- 72 -- --   12/03/24 1953 129/81 99 °F (37.2 °C) Oral 72 16 92 %   12/03/24 1458 (!) 129/52 97.2 °F (36.2 °C) -- 72 -- 92 %       No intake or output data in the 24 hours ending 12/04/24 1417     I had a face to face encounter and independently examined this patient on 12/4/2024, as outlined below:  PHYSICAL EXAM:  General: Alert, cooperative  EENT:  EOMI. Anicteric sclerae.  Resp:  CTA bilaterally, no wheezing or rales.  No accessory muscle use  CV:  Regular  rhythm,  No edema  GI:  Soft, Non distended, Non tender.  +Bowel sounds  Neurologic:  Alert and oriented X 3, normal speech,   Psych:   Good insight. Not anxious nor agitated  Skin:  Rt 1st toes covered with dressing     Reviewed most current lab test results and cultures  YES  Reviewed most current radiology test results   YES  Review and summation of old records today    NO  Reviewed patient's current orders and MAR    YES  PMH/SH reviewed - no change compared to H&P    Procedures: see electronic medical records for all procedures/Xrays and details which were not copied into this note but were reviewed prior to creation of Plan.      LABS:  I reviewed today's most current labs and imaging studies.  Pertinent labs include:  Recent Labs     12/03/24  0400 12/03/24  1121 12/04/24  0525   WBC 8.1 8.2 8.2   HGB 7.2* 7.0* 6.5*   HCT 22.3* 21.3* 18.7*   PLT 49* 68* 76*     Recent Labs     12/02/24  1119 12/03/24  0400 12/04/24  0525   * 135* 133*   K 4.7 4.7 4.2    104 102   CO2 25 25 25   GLUCOSE 172* 149* 150*   BUN 59* 61* 60*   CREATININE 1.82* 2.04* 2.01*   CALCIUM 8.8 8.5 8.1*   MG  --  1.9 1.9   PHOS 2.1* 2.3* 3.3   INR 1.4* 1.3*  --        Signed: Vania Rodrigues MD

## 2024-12-04 NOTE — CONSULTS
ID  Consult received -for antibiotic recommendations  No available cultures at this time  Pt will require bone biopsy and culture  Please call back when available.  D/w Dr Rodrigues hospitalist.

## 2024-12-04 NOTE — PROGRESS NOTES
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      Potassium is better, today is 4.2    Mildly low sodium, 133, trend    Will follow         Subjective:     Chief Complaint:  alert. No complaint. Chart reviewed.      Review of Systems:    Symptom Y/N Comments  Symptom Y/N Comments   Fever/Chills    Chest Pain     Poor Appetite    Edema     Cough    Abdominal Pain     Sputum    Joint Pain     SOB/CARTER    Pruritis/Rash     Nausea/vomit    Tolerating PT/OT     Diarrhea    Tolerating Diet     Constipation    Other       Could not obtain due to:      Objective:     VITALS:   Last 24hrs VS reviewed since prior progress note. Most recent are:  Vitals:    24 0521   BP: (!) 128/54   Pulse: 75   Resp: 16   Temp: 98.6 °F (37 °C)   SpO2: 90%       Intake/Output Summary (Last 24 hours) at 2024 0616  Last data filed at 12/3/2024 1010  Gross per 24 hour   Intake 418.35 ml   Output --   Net 418.35 ml        Telemetry Reviewed:     PHYSICAL EXAM:  General: NAD  No edema      Lab Data Reviewed: (see below)    Medications Reviewed: (see below)    PMH/SH reviewed - no change compared to H&P  ________________________________________________________________________  Care Plan discussed with:  Patient     Family      RN     Care Manager                    Consultant:          Comments   >50% of visit spent in counseling and coordination of care       ________________________________________________________________________  Aneglita Gramajo MD     Procedures: see electronic medical records for all procedures/Xrays and details which  were not copied into this

## 2024-12-05 LAB
ABO + RH BLD: NORMAL
ANION GAP SERPL CALC-SCNC: 7 MMOL/L (ref 2–12)
BASOPHILS # BLD: 0 K/UL (ref 0–0.1)
BASOPHILS NFR BLD: 0 % (ref 0–1)
BLD PROD TYP BPU: NORMAL
BLOOD BANK BLOOD PRODUCT EXPIRATION DATE: NORMAL
BLOOD BANK DISPENSE STATUS: NORMAL
BLOOD BANK ISBT PRODUCT BLOOD TYPE: 5100
BLOOD BANK PRODUCT CODE: NORMAL
BLOOD BANK UNIT TYPE AND RH: NORMAL
BLOOD GROUP ANTIBODIES SERPL: NORMAL
BPU ID: NORMAL
BUN SERPL-MCNC: 64 MG/DL (ref 6–20)
BUN/CREAT SERPL: 33 (ref 12–20)
CALCIUM SERPL-MCNC: 8.3 MG/DL (ref 8.5–10.1)
CHLORIDE SERPL-SCNC: 101 MMOL/L (ref 97–108)
CO2 SERPL-SCNC: 24 MMOL/L (ref 21–32)
CREAT SERPL-MCNC: 1.94 MG/DL (ref 0.7–1.3)
CROSSMATCH RESULT: NORMAL
DIFFERENTIAL METHOD BLD: ABNORMAL
EOSINOPHIL # BLD: 0.1 K/UL (ref 0–0.4)
EOSINOPHIL NFR BLD: 1 % (ref 0–7)
ERYTHROCYTE [DISTWIDTH] IN BLOOD BY AUTOMATED COUNT: 15.6 % (ref 11.5–14.5)
GLUCOSE BLD STRIP.AUTO-MCNC: 220 MG/DL (ref 65–117)
GLUCOSE BLD STRIP.AUTO-MCNC: 231 MG/DL (ref 65–117)
GLUCOSE BLD STRIP.AUTO-MCNC: 249 MG/DL (ref 65–117)
GLUCOSE BLD STRIP.AUTO-MCNC: 266 MG/DL (ref 65–117)
GLUCOSE SERPL-MCNC: 200 MG/DL (ref 65–100)
HCT VFR BLD AUTO: 23.7 % (ref 36.6–50.3)
HGB BLD-MCNC: 7.9 G/DL (ref 12.1–17)
IMM GRANULOCYTES # BLD AUTO: 0 K/UL (ref 0–0.04)
IMM GRANULOCYTES NFR BLD AUTO: 0 % (ref 0–0.5)
LYMPHOCYTES # BLD: 0.8 K/UL (ref 0.8–3.5)
LYMPHOCYTES NFR BLD: 9 % (ref 12–49)
MCH RBC QN AUTO: 30.7 PG (ref 26–34)
MCHC RBC AUTO-ENTMCNC: 33.3 G/DL (ref 30–36.5)
MCV RBC AUTO: 92.2 FL (ref 80–99)
MONOCYTES # BLD: 1.2 K/UL (ref 0–1)
MONOCYTES NFR BLD: 14 % (ref 5–13)
NEUTS BAND NFR BLD MANUAL: 1 %
NEUTS SEG # BLD: 6.5 K/UL (ref 1.8–8)
NEUTS SEG NFR BLD: 75 % (ref 32–75)
NRBC # BLD: 0.1 K/UL (ref 0–0.01)
NRBC BLD-RTO: 1.2 PER 100 WBC
PLATELET # BLD AUTO: 102 K/UL (ref 150–400)
PMV BLD AUTO: 11.4 FL (ref 8.9–12.9)
POTASSIUM SERPL-SCNC: 3.9 MMOL/L (ref 3.5–5.1)
RBC # BLD AUTO: 2.57 M/UL (ref 4.1–5.7)
RBC MORPH BLD: ABNORMAL
SERVICE CMNT-IMP: ABNORMAL
SODIUM SERPL-SCNC: 132 MMOL/L (ref 136–145)
SPECIMEN EXP DATE BLD: NORMAL
UNIT DIVISION: 0
UNIT ISSUE DATE/TIME: NORMAL
WBC # BLD AUTO: 8.6 K/UL (ref 4.1–11.1)

## 2024-12-05 PROCEDURE — 2580000003 HC RX 258: Performed by: STUDENT IN AN ORGANIZED HEALTH CARE EDUCATION/TRAINING PROGRAM

## 2024-12-05 PROCEDURE — 36415 COLL VENOUS BLD VENIPUNCTURE: CPT

## 2024-12-05 PROCEDURE — 6370000000 HC RX 637 (ALT 250 FOR IP): Performed by: STUDENT IN AN ORGANIZED HEALTH CARE EDUCATION/TRAINING PROGRAM

## 2024-12-05 PROCEDURE — 6360000002 HC RX W HCPCS: Performed by: STUDENT IN AN ORGANIZED HEALTH CARE EDUCATION/TRAINING PROGRAM

## 2024-12-05 PROCEDURE — 82962 GLUCOSE BLOOD TEST: CPT

## 2024-12-05 PROCEDURE — 85025 COMPLETE CBC W/AUTO DIFF WBC: CPT

## 2024-12-05 PROCEDURE — 6370000000 HC RX 637 (ALT 250 FOR IP): Performed by: INTERNAL MEDICINE

## 2024-12-05 PROCEDURE — 1100000003 HC PRIVATE W/ TELEMETRY

## 2024-12-05 PROCEDURE — 94760 N-INVAS EAR/PLS OXIMETRY 1: CPT

## 2024-12-05 PROCEDURE — 80048 BASIC METABOLIC PNL TOTAL CA: CPT

## 2024-12-05 RX ORDER — INSULIN LISPRO 100 [IU]/ML
0-8 INJECTION, SOLUTION INTRAVENOUS; SUBCUTANEOUS
Status: DISCONTINUED | OUTPATIENT
Start: 2024-12-05 | End: 2024-12-05

## 2024-12-05 RX ORDER — ISOSORBIDE MONONITRATE 30 MG/1
60 TABLET, EXTENDED RELEASE ORAL NIGHTLY
Status: DISCONTINUED | OUTPATIENT
Start: 2024-12-05 | End: 2024-12-12 | Stop reason: HOSPADM

## 2024-12-05 RX ORDER — SPIRONOLACTONE 25 MG/1
25 TABLET ORAL DAILY
Status: DISCONTINUED | OUTPATIENT
Start: 2024-12-05 | End: 2024-12-12 | Stop reason: HOSPADM

## 2024-12-05 RX ORDER — INSULIN LISPRO 100 [IU]/ML
2 INJECTION, SOLUTION INTRAVENOUS; SUBCUTANEOUS ONCE
Status: COMPLETED | OUTPATIENT
Start: 2024-12-05 | End: 2024-12-05

## 2024-12-05 RX ORDER — INSULIN LISPRO 100 [IU]/ML
0-8 INJECTION, SOLUTION INTRAVENOUS; SUBCUTANEOUS
Status: DISCONTINUED | OUTPATIENT
Start: 2024-12-05 | End: 2024-12-12 | Stop reason: HOSPADM

## 2024-12-05 RX ORDER — FUROSEMIDE 40 MG/1
40 TABLET ORAL DAILY
Status: DISCONTINUED | OUTPATIENT
Start: 2024-12-05 | End: 2024-12-12 | Stop reason: HOSPADM

## 2024-12-05 RX ORDER — LOSARTAN POTASSIUM 50 MG/1
50 TABLET ORAL DAILY
Status: DISCONTINUED | OUTPATIENT
Start: 2024-12-05 | End: 2024-12-12 | Stop reason: HOSPADM

## 2024-12-05 RX ADMIN — SODIUM CHLORIDE, PRESERVATIVE FREE 10 ML: 5 INJECTION INTRAVENOUS at 09:36

## 2024-12-05 RX ADMIN — SODIUM CHLORIDE, PRESERVATIVE FREE 10 ML: 5 INJECTION INTRAVENOUS at 22:40

## 2024-12-05 RX ADMIN — INSULIN LISPRO 2 UNITS: 100 INJECTION, SOLUTION INTRAVENOUS; SUBCUTANEOUS at 22:45

## 2024-12-05 RX ADMIN — LEVOTHYROXINE SODIUM 175 MCG: 0.15 TABLET ORAL at 06:27

## 2024-12-05 RX ADMIN — INSULIN LISPRO 4 UNITS: 100 INJECTION, SOLUTION INTRAVENOUS; SUBCUTANEOUS at 18:34

## 2024-12-05 RX ADMIN — ALLOPURINOL 300 MG: 300 TABLET ORAL at 09:29

## 2024-12-05 RX ADMIN — METOPROLOL TARTRATE 25 MG: 25 TABLET, FILM COATED ORAL at 09:29

## 2024-12-05 RX ADMIN — ISOSORBIDE MONONITRATE 60 MG: 30 TABLET, EXTENDED RELEASE ORAL at 22:38

## 2024-12-05 RX ADMIN — ATORVASTATIN CALCIUM 10 MG: 10 TABLET, FILM COATED ORAL at 09:29

## 2024-12-05 RX ADMIN — FINASTERIDE 5 MG: 5 TABLET, FILM COATED ORAL at 09:35

## 2024-12-05 RX ADMIN — Medication 1 UNITS: at 09:31

## 2024-12-05 RX ADMIN — Medication 1 UNITS: at 14:01

## 2024-12-05 RX ADMIN — INSULIN GLARGINE 30 UNITS: 100 INJECTION, SOLUTION SUBCUTANEOUS at 22:40

## 2024-12-05 RX ADMIN — DOXAZOSIN 4 MG: 2 TABLET ORAL at 09:29

## 2024-12-05 RX ADMIN — SODIUM CHLORIDE 40 MG: 9 INJECTION INTRAMUSCULAR; INTRAVENOUS; SUBCUTANEOUS at 18:34

## 2024-12-05 RX ADMIN — METOPROLOL TARTRATE 25 MG: 25 TABLET, FILM COATED ORAL at 22:39

## 2024-12-05 RX ADMIN — SODIUM CHLORIDE 40 MG: 9 INJECTION INTRAMUSCULAR; INTRAVENOUS; SUBCUTANEOUS at 06:27

## 2024-12-05 ASSESSMENT — PAIN SCALES - GENERAL: PAINLEVEL_OUTOF10: 0

## 2024-12-05 NOTE — PROGRESS NOTES
End of Shift Note    Bedside shift change report given to Bindu (oncoming nurse) by Tiff Almanzar RN (offgoing nurse).  Report included the following information SBAR, Kardex, and MAR    Shift worked:  7p-7a     Shift summary and any significant changes:     Scheduled medications were given, see MAR. Iv has been flushed and is patent.  Patient had a bowel movement during my shift.  Patient is up with the assistance of one to the bedside commode.  Patient teaching and routine rounding has been done.     Concerns for physician to address:       Zone phone for oncoming shift:          Activity:  Level of Assistance: Moderate assist, patient does 50-74%  Number times ambulated in hallways past shift: 0  Number of times OOB to chair past shift: 0    Cardiac:   Cardiac Monitoring: No      Cardiac Rhythm: Sinus rhythm    Access:  Current line(s): PIV     Genitourinary:   Urinary Status: Voiding, External catheter    Respiratory:   O2 Device: None (Room air)  Chronic home O2 use?: NO  Incentive spirometer at bedside: NO    GI:  Last BM (including prior to admit): 12/04/24  Current diet:  ADULT DIET; Regular; Low Fat/Low Chol/High Fiber/ILDA; Low Fat (less than or equal to 50 gm/day)  Passing flatus: YES    Pain Management:   Patient states pain is manageable on current regimen: YES    Skin:  Binh Scale Score: 19  Interventions: Wound Offloading (Prevention Methods): Blankets, Pillows, Repositioning, Turning    Patient Safety:  Fall Risk: Nursing Judgement-Fall Risk High(Add Comments): Yes  Fall Risk Interventions  Nursing Judgement-Fall Risk High(Add Comments): Yes  Toilet Every 2 Hours-In Advance of Need: Yes  Hourly Visual Checks: Awake, In bed  Fall Visual Posted: Fall sign posted, Socks  Room Door Open: Yes  Alarm On: Bed  Patient Moved Closer to Nursing Station: No    Active Consults:   IP CONSULT TO ONCOLOGY  IP CONSULT TO PODIATRY  IP CONSULT TO GI  IP CONSULT TO NEPHROLOGY  IP CONSULT TO VASCULAR SURGERY  IP  CONSULT TO INFECTIOUS DISEASES    Length of Stay:  Expected LOS: 6  Actual LOS: 6    Tiff Almanzar, MIN

## 2024-12-05 NOTE — PROGRESS NOTES
End of Shift Note    Bedside shift change report given to Sharron RN (oncoming nurse) by THERESA AMADOR RN (offgoing nurse).  Report included the following information SBAR, Kardex, and MAR    Shift worked:  7am-7pm     Shift summary and any significant changes:     Pt had no complaints. No nose bleeds on this shift. Pt sat up in the chair for most of the shift. Pt waiting to see vascular surgeon tonight to evaluate for surgery tomorrow.     Concerns for physician to address:  None     Zone phone for oncoming shift:   [phone]       Activity:  Level of Assistance: Moderate assist, patient does 50-74%  Number times ambulated in hallways past shift: 0  Number of times OOB to chair past shift: 0    Cardiac:   Cardiac Monitoring: Yes      Cardiac Rhythm: Sinus rhythm    Access:  Current line(s): PIV     Genitourinary:   Urinary Status: Voiding, External catheter    Respiratory:   O2 Device: None (Room air)  Chronic home O2 use?: NO  Incentive spirometer at bedside: NO    GI:  Last BM (including prior to admit): 12/04/24  Current diet:  ADULT DIET; Regular; Low Fat/Low Chol/High Fiber/ILDA; Low Fat (less than or equal to 50 gm/day)  Passing flatus: YES    Pain Management:   Patient states pain is manageable on current regimen: YES    Skin:  Binh Scale Score: 20  Interventions: Wound Offloading (Prevention Methods): Repositioning, Turning    Patient Safety:  Fall Risk: Nursing Judgement-Fall Risk High(Add Comments): Yes  Fall Risk Interventions  Nursing Judgement-Fall Risk High(Add Comments): Yes  Toilet Every 2 Hours-In Advance of Need: Yes  Hourly Visual Checks: Awake, In bed  Fall Visual Posted: Fall sign posted, Socks  Room Door Open: Yes  Alarm On: Bed  Patient Moved Closer to Nursing Station: No    Active Consults:   IP CONSULT TO ONCOLOGY  IP CONSULT TO PODIATRY  IP CONSULT TO GI  IP CONSULT TO NEPHROLOGY  IP CONSULT TO VASCULAR SURGERY  IP CONSULT TO INFECTIOUS DISEASES    Length of Stay:  Expected LOS:

## 2024-12-05 NOTE — PROGRESS NOTES
Hospitalist Progress Note    NAME:   Maryan Resendiz   : 1935   MRN: [id number]     Date/Time: 2024 5:23 PM  Patient PCP: Prashanth Livingston MD    Estimated discharge date:   Barriers: Hb and platelet stability, EGD pending , GI clearance        Assessment / Plan:  Acute anemia  Upper GI bleed  Epistaxis  Hemoglobin on presentation 7.8, baseline Hb 12-13. +FOBT  S/p 1 U PRBC for hbg 7 + presyncopal symptoms    - PPI iv bid   - OAC on hold  -S/p rhino rocket for epistaxis   -EGD on 12/3 was cancelled due to intermittent episodes of epistaxis  - Gi recommended to hold plan for EGD in patient    - Hb dropped to 6.5 on - S/p  1 units of PRBC transfusion .Improved to 8.2, slight drop on  - 7.9 , not in the range for transfusion   - Discontinued Keflex as patient is off rhino rocket on      Dizziness  Fall   Dizziness he could be secondary to anemia  Patient has had status post Bruise on R side of head  CT head without contrast without acute abnormality  CT cervical spine: Without fracture  CT chest: Mildly complex fluid collection in the right anterior abdominal wall has decreased in size.  Complex mass upper pole left kidney does not appear to be simple cyst, recommend renal protocol CT  EKG with ventricular paced rhythm  Repeat Echo on - Normal left ventricular systolic function with a visually estimated EF of 55 - 60%. Left ventricle size is normal. Increased wall thickness. Normal wall motion.    Right Ventricle: Right ventricle size is normal. Normal systolic function.    Mitral Valve: Mild regurgitation.    Left Atrium: Left atrium is mildly dilated.    - continue on telemetry  - PT/OT  - Fall precautions       Acute decrease in platelet count- likely medication side effect   Baseline platelet count around 200  Platelet slowly  trending up- 102 at present   Hematology following  Possible  Related to linezolid         LUCIA on CKD  Hypophosphatemia  Baseline creatinine

## 2024-12-06 ENCOUNTER — APPOINTMENT (OUTPATIENT)
Facility: HOSPITAL | Age: 89
DRG: 982 | End: 2024-12-06
Payer: MEDICARE

## 2024-12-06 ENCOUNTER — ANESTHESIA (OUTPATIENT)
Facility: HOSPITAL | Age: 89
End: 2024-12-06
Payer: MEDICARE

## 2024-12-06 ENCOUNTER — ANESTHESIA EVENT (OUTPATIENT)
Facility: HOSPITAL | Age: 89
End: 2024-12-06
Payer: MEDICARE

## 2024-12-06 LAB
ANION GAP SERPL CALC-SCNC: 4 MMOL/L (ref 2–12)
BASOPHILS # BLD: 0 K/UL (ref 0–0.1)
BASOPHILS NFR BLD: 0 % (ref 0–1)
BUN SERPL-MCNC: 58 MG/DL (ref 6–20)
BUN/CREAT SERPL: 32 (ref 12–20)
CALCIUM SERPL-MCNC: 8.5 MG/DL (ref 8.5–10.1)
CHLORIDE SERPL-SCNC: 105 MMOL/L (ref 97–108)
CO2 SERPL-SCNC: 28 MMOL/L (ref 21–32)
CREAT SERPL-MCNC: 1.81 MG/DL (ref 0.7–1.3)
DIFFERENTIAL METHOD BLD: ABNORMAL
EOSINOPHIL # BLD: 0.2 K/UL (ref 0–0.4)
EOSINOPHIL NFR BLD: 2 % (ref 0–7)
ERYTHROCYTE [DISTWIDTH] IN BLOOD BY AUTOMATED COUNT: 15.5 % (ref 11.5–14.5)
GLUCOSE BLD STRIP.AUTO-MCNC: 180 MG/DL (ref 65–117)
GLUCOSE BLD STRIP.AUTO-MCNC: 187 MG/DL (ref 65–117)
GLUCOSE BLD STRIP.AUTO-MCNC: 208 MG/DL (ref 65–117)
GLUCOSE BLD STRIP.AUTO-MCNC: 225 MG/DL (ref 65–117)
GLUCOSE BLD STRIP.AUTO-MCNC: 250 MG/DL (ref 65–117)
GLUCOSE BLD STRIP.AUTO-MCNC: 262 MG/DL (ref 65–117)
GLUCOSE SERPL-MCNC: 235 MG/DL (ref 65–100)
HCT VFR BLD AUTO: 23.4 % (ref 36.6–50.3)
HGB BLD-MCNC: 7.7 G/DL (ref 12.1–17)
IMM GRANULOCYTES # BLD AUTO: 0 K/UL (ref 0–0.04)
IMM GRANULOCYTES NFR BLD AUTO: 0 % (ref 0–0.5)
INR PPP: 1.3 (ref 0.9–1.1)
LYMPHOCYTES # BLD: 0.9 K/UL (ref 0.8–3.5)
LYMPHOCYTES NFR BLD: 10 % (ref 12–49)
MCH RBC QN AUTO: 30.2 PG (ref 26–34)
MCHC RBC AUTO-ENTMCNC: 32.9 G/DL (ref 30–36.5)
MCV RBC AUTO: 91.8 FL (ref 80–99)
MONOCYTES # BLD: 1 K/UL (ref 0–1)
MONOCYTES NFR BLD: 12 % (ref 5–13)
NEUTS SEG # BLD: 6.4 K/UL (ref 1.8–8)
NEUTS SEG NFR BLD: 76 % (ref 32–75)
NRBC # BLD: 0.3 K/UL (ref 0–0.01)
NRBC BLD-RTO: 3.5 PER 100 WBC
PLATELET # BLD AUTO: 118 K/UL (ref 150–400)
PMV BLD AUTO: 11.2 FL (ref 8.9–12.9)
POTASSIUM SERPL-SCNC: 4.1 MMOL/L (ref 3.5–5.1)
PROTHROMBIN TIME: 13.1 SEC (ref 9–11.1)
RBC # BLD AUTO: 2.55 M/UL (ref 4.1–5.7)
RBC MORPH BLD: ABNORMAL
SERVICE CMNT-IMP: ABNORMAL
SODIUM SERPL-SCNC: 137 MMOL/L (ref 136–145)
WBC # BLD AUTO: 8.5 K/UL (ref 4.1–11.1)

## 2024-12-06 PROCEDURE — 82962 GLUCOSE BLOOD TEST: CPT

## 2024-12-06 PROCEDURE — 85610 PROTHROMBIN TIME: CPT

## 2024-12-06 PROCEDURE — 3600000012 HC SURGERY LEVEL 2 ADDTL 15MIN: Performed by: SURGERY

## 2024-12-06 PROCEDURE — 36415 COLL VENOUS BLD VENIPUNCTURE: CPT

## 2024-12-06 PROCEDURE — C1876 STENT, NON-COA/NON-COV W/DEL: HCPCS | Performed by: SURGERY

## 2024-12-06 PROCEDURE — 80048 BASIC METABOLIC PNL TOTAL CA: CPT

## 2024-12-06 PROCEDURE — 2709999900 HC NON-CHARGEABLE SUPPLY: Performed by: SURGERY

## 2024-12-06 PROCEDURE — 047K3EZ DILATION OF RIGHT FEMORAL ARTERY WITH TWO INTRALUMINAL DEVICES, PERCUTANEOUS APPROACH: ICD-10-PCS | Performed by: SURGERY

## 2024-12-06 PROCEDURE — 2580000003 HC RX 258: Performed by: SURGERY

## 2024-12-06 PROCEDURE — 6370000000 HC RX 637 (ALT 250 FOR IP): Performed by: STUDENT IN AN ORGANIZED HEALTH CARE EDUCATION/TRAINING PROGRAM

## 2024-12-06 PROCEDURE — 3700000000 HC ANESTHESIA ATTENDED CARE: Performed by: SURGERY

## 2024-12-06 PROCEDURE — 85025 COMPLETE CBC W/AUTO DIFF WBC: CPT

## 2024-12-06 PROCEDURE — 3600000002 HC SURGERY LEVEL 2 BASE: Performed by: SURGERY

## 2024-12-06 PROCEDURE — 2580000003 HC RX 258: Performed by: STUDENT IN AN ORGANIZED HEALTH CARE EDUCATION/TRAINING PROGRAM

## 2024-12-06 PROCEDURE — 2580000003 HC RX 258: Performed by: NURSE ANESTHETIST, CERTIFIED REGISTERED

## 2024-12-06 PROCEDURE — 1100000003 HC PRIVATE W/ TELEMETRY

## 2024-12-06 PROCEDURE — 94760 N-INVAS EAR/PLS OXIMETRY 1: CPT

## 2024-12-06 PROCEDURE — 6360000002 HC RX W HCPCS: Performed by: NURSE ANESTHETIST, CERTIFIED REGISTERED

## 2024-12-06 PROCEDURE — 7100000001 HC PACU RECOVERY - ADDTL 15 MIN: Performed by: SURGERY

## 2024-12-06 PROCEDURE — 6360000002 HC RX W HCPCS: Performed by: SURGERY

## 2024-12-06 PROCEDURE — C1887 CATHETER, GUIDING: HCPCS | Performed by: SURGERY

## 2024-12-06 PROCEDURE — C1760 CLOSURE DEV, VASC: HCPCS | Performed by: SURGERY

## 2024-12-06 PROCEDURE — 3700000001 HC ADD 15 MINUTES (ANESTHESIA): Performed by: SURGERY

## 2024-12-06 PROCEDURE — 6370000000 HC RX 637 (ALT 250 FOR IP): Performed by: INTERNAL MEDICINE

## 2024-12-06 PROCEDURE — B41F1ZZ FLUOROSCOPY OF RIGHT LOWER EXTREMITY ARTERIES USING LOW OSMOLAR CONTRAST: ICD-10-PCS | Performed by: SURGERY

## 2024-12-06 PROCEDURE — 7100000000 HC PACU RECOVERY - FIRST 15 MIN: Performed by: SURGERY

## 2024-12-06 PROCEDURE — 6360000004 HC RX CONTRAST MEDICATION: Performed by: SURGERY

## 2024-12-06 PROCEDURE — 6360000002 HC RX W HCPCS: Performed by: STUDENT IN AN ORGANIZED HEALTH CARE EDUCATION/TRAINING PROGRAM

## 2024-12-06 PROCEDURE — C1769 GUIDE WIRE: HCPCS | Performed by: SURGERY

## 2024-12-06 PROCEDURE — C1725 CATH, TRANSLUMIN NON-LASER: HCPCS | Performed by: SURGERY

## 2024-12-06 PROCEDURE — C1894 INTRO/SHEATH, NON-LASER: HCPCS | Performed by: SURGERY

## 2024-12-06 DEVICE — IMPLANTABLE DEVICE: Type: IMPLANTABLE DEVICE | Site: LEG | Status: FUNCTIONAL

## 2024-12-06 DEVICE — STENT VASC SELF EXP 0.035IN 6X80MM 6FR120CM LP EVERFLEX: Type: IMPLANTABLE DEVICE | Site: LEG | Status: FUNCTIONAL

## 2024-12-06 RX ORDER — SODIUM CHLORIDE, SODIUM LACTATE, POTASSIUM CHLORIDE, CALCIUM CHLORIDE 600; 310; 30; 20 MG/100ML; MG/100ML; MG/100ML; MG/100ML
INJECTION, SOLUTION INTRAVENOUS CONTINUOUS
Status: DISCONTINUED | OUTPATIENT
Start: 2024-12-06 | End: 2024-12-06

## 2024-12-06 RX ORDER — PROTAMINE SULFATE 10 MG/ML
INJECTION, SOLUTION INTRAVENOUS
Status: DISCONTINUED | OUTPATIENT
Start: 2024-12-06 | End: 2024-12-06 | Stop reason: SDUPTHER

## 2024-12-06 RX ORDER — HEPARIN SODIUM 1000 [USP'U]/ML
INJECTION, SOLUTION INTRAVENOUS; SUBCUTANEOUS
Status: DISCONTINUED | OUTPATIENT
Start: 2024-12-06 | End: 2024-12-06 | Stop reason: SDUPTHER

## 2024-12-06 RX ORDER — SODIUM CHLORIDE 9 MG/ML
INJECTION, SOLUTION INTRAVENOUS CONTINUOUS
Status: DISCONTINUED | OUTPATIENT
Start: 2024-12-06 | End: 2024-12-06

## 2024-12-06 RX ORDER — LIDOCAINE HYDROCHLORIDE 20 MG/ML
INJECTION, SOLUTION EPIDURAL; INFILTRATION; INTRACAUDAL; PERINEURAL
Status: DISCONTINUED | OUTPATIENT
Start: 2024-12-06 | End: 2024-12-06 | Stop reason: SDUPTHER

## 2024-12-06 RX ORDER — LIDOCAINE HYDROCHLORIDE 10 MG/ML
INJECTION, SOLUTION INFILTRATION; PERINEURAL PRN
Status: DISCONTINUED | OUTPATIENT
Start: 2024-12-06 | End: 2024-12-06 | Stop reason: ALTCHOICE

## 2024-12-06 RX ORDER — IOPAMIDOL 755 MG/ML
INJECTION, SOLUTION INTRAVASCULAR PRN
Status: DISCONTINUED | OUTPATIENT
Start: 2024-12-06 | End: 2024-12-06 | Stop reason: ALTCHOICE

## 2024-12-06 RX ADMIN — SODIUM CHLORIDE 40 MG: 9 INJECTION INTRAMUSCULAR; INTRAVENOUS; SUBCUTANEOUS at 06:11

## 2024-12-06 RX ADMIN — PHENYLEPHRINE HYDROCHLORIDE 20 MCG/MIN: 10 INJECTION INTRAVENOUS at 13:54

## 2024-12-06 RX ADMIN — LEVOTHYROXINE SODIUM 175 MCG: 0.15 TABLET ORAL at 06:11

## 2024-12-06 RX ADMIN — ISOSORBIDE MONONITRATE 60 MG: 30 TABLET, EXTENDED RELEASE ORAL at 22:31

## 2024-12-06 RX ADMIN — INSULIN GLARGINE 30 UNITS: 100 INJECTION, SOLUTION SUBCUTANEOUS at 22:29

## 2024-12-06 RX ADMIN — SODIUM CHLORIDE, PRESERVATIVE FREE 10 ML: 5 INJECTION INTRAVENOUS at 22:31

## 2024-12-06 RX ADMIN — SODIUM CHLORIDE, PRESERVATIVE FREE 10 ML: 5 INJECTION INTRAVENOUS at 08:51

## 2024-12-06 RX ADMIN — HEPARIN SODIUM 5000 UNITS: 1000 INJECTION, SOLUTION INTRAVENOUS; SUBCUTANEOUS at 14:12

## 2024-12-06 RX ADMIN — LIDOCAINE HYDROCHLORIDE 60 MG: 20 INJECTION, SOLUTION EPIDURAL; INFILTRATION; INTRACAUDAL; PERINEURAL at 13:52

## 2024-12-06 RX ADMIN — METOPROLOL TARTRATE 25 MG: 25 TABLET, FILM COATED ORAL at 22:31

## 2024-12-06 RX ADMIN — SODIUM CHLORIDE: 9 INJECTION, SOLUTION INTRAVENOUS at 13:15

## 2024-12-06 RX ADMIN — PROPOFOL 40 MCG/KG/MIN: 10 INJECTION, EMULSION INTRAVENOUS at 13:53

## 2024-12-06 RX ADMIN — INSULIN LISPRO 4 UNITS: 100 INJECTION, SOLUTION INTRAVENOUS; SUBCUTANEOUS at 22:28

## 2024-12-06 RX ADMIN — SODIUM CHLORIDE 40 MG: 9 INJECTION INTRAMUSCULAR; INTRAVENOUS; SUBCUTANEOUS at 18:23

## 2024-12-06 RX ADMIN — PROPOFOL 20 MG: 10 INJECTION, EMULSION INTRAVENOUS at 13:52

## 2024-12-06 RX ADMIN — PROTAMINE SULFATE 20 MG: 10 INJECTION, SOLUTION INTRAVENOUS at 15:10

## 2024-12-06 ASSESSMENT — PAIN SCALES - GENERAL
PAINLEVEL_OUTOF10: 0

## 2024-12-06 NOTE — PROGRESS NOTES
1035: Pt signed surgical consent.  Surgical consent complete and placed in bedside chart. No needs at this time expressed by pt.  Visitor at bedside with pt.  PCT into check POC Glucose as pt is currently NPO.

## 2024-12-06 NOTE — PROGRESS NOTES
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      Mildly low sodium, 132, trend    For rle angio    Will see again on Monday, but please call with questions or changes in the meantime.          Subjective:     Chief Complaint:  in OR at the time of my rounds     Review of Systems:    Symptom Y/N Comments  Symptom Y/N Comments   Fever/Chills    Chest Pain     Poor Appetite    Edema     Cough    Abdominal Pain     Sputum    Joint Pain     SOB/CARTER    Pruritis/Rash     Nausea/vomit    Tolerating PT/OT     Diarrhea    Tolerating Diet     Constipation    Other       Could not obtain due to:      Objective:     VITALS:   Last 24hrs VS reviewed since prior progress note. Most recent are:  Vitals:    24 2239   BP: (!) 163/61   Pulse: 76   Resp:    Temp:    SpO2:      No intake or output data in the 24 hours ending 24 0647       Telemetry Reviewed:     PHYSICAL EXAM:  General:     Lab Data Reviewed: (see below)    Medications Reviewed: (see below)    PMH/SH reviewed - no change compared to H&P  ________________________________________________________________________  Care Plan discussed with:  Patient     Family      RN     Care Manager                    Consultant:          Comments   >50% of visit spent in counseling and coordination of care       ________________________________________________________________________  Angelita Gramajo MD     Procedures: see electronic medical records for all procedures/Xrays and details which  were not copied into this note but were reviewed prior to creation of Plan.      LABS:  Recent Labs

## 2024-12-06 NOTE — PROGRESS NOTES
Hospitalist Progress Note    NAME:   Maryan Resendiz   : 1935   MRN: [id number]     Date/Time: 2024 5:06 PM  Patient PCP: Prashanth Livingston MD    Estimated discharge date:   Barriers: Podiatry clearance        Assessment / Plan:  Acute anemia  Upper GI bleed  Epistaxis  Hemoglobin on presentation 7.8, baseline Hb 12-13. +FOBT  S/p 1 U PRBC for hbg 7 + presyncopal symptoms    - PPI iv bid   - OAC on hold  -S/p rhino rocket for epistaxis   -EGD on 12/3 was cancelled due to intermittent episodes of epistaxis  - Gi recommended to hold plan for EGD in patient    - Hb dropped to 6.5 on - S/p  1 units of PRBC transfusion .Improved to 8.2, slight drop on  - 7.9  and has remained stable >7 , not in the range for transfusion   - Discontinued Keflex as patient is off rhino rocket on      Dizziness  Fall   Dizziness he could be secondary to anemia  Patient has had status post Bruise on R side of head  CT head without contrast without acute abnormality  CT cervical spine: Without fracture  CT chest: Mildly complex fluid collection in the right anterior abdominal wall has decreased in size.  Complex mass upper pole left kidney does not appear to be simple cyst, recommend renal protocol CT  EKG with ventricular paced rhythm  Repeat Echo on - Normal left ventricular systolic function with a visually estimated EF of 55 - 60%. Left ventricle size is normal. Increased wall thickness. Normal wall motion.    Right Ventricle: Right ventricle size is normal. Normal systolic function.    Mitral Valve: Mild regurgitation.    Left Atrium: Left atrium is mildly dilated.    - continue on telemetry  - PT/OT  - Fall precautions       Acute decrease in platelet count- likely medication side effect - improving   Baseline platelet count around 200  Platelet slowly  trending up- 118 at present   Possible  Related to linezolid         LUCIA on CKD  Hypophosphatemia  Baseline creatinine 1.7-1.8  S/p IVF, cr now at  12/05/24  0502 12/06/24  0528   * 132* 137   K 4.2 3.9 4.1    101 105   CO2 25 24 28   GLUCOSE 150* 200* 235*   BUN 60* 64* 58*   CREATININE 2.01* 1.94* 1.81*   CALCIUM 8.1* 8.3* 8.5   MG 1.9  --   --    PHOS 3.3  --   --    INR  --   --  1.3*       Signed: Vania Rodrigues MD

## 2024-12-06 NOTE — PROGRESS NOTES
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      Mildly low sodium, 132, trend    Will follow         Subjective:     Chief Complaint:  alert. No complaint. Chart reviewed.      Review of Systems:    Symptom Y/N Comments  Symptom Y/N Comments   Fever/Chills    Chest Pain     Poor Appetite    Edema     Cough    Abdominal Pain     Sputum    Joint Pain     SOB/CARTER    Pruritis/Rash     Nausea/vomit    Tolerating PT/OT     Diarrhea    Tolerating Diet     Constipation    Other       Could not obtain due to:      Objective:     VITALS:   Last 24hrs VS reviewed since prior progress note. Most recent are:  Vitals:    24 0730   BP: (!) 147/67   Pulse: 74   Resp: 17   Temp: 97.5 °F (36.4 °C)   SpO2: 92%       Intake/Output Summary (Last 24 hours) at 2024 191  Last data filed at 2024 0628  Gross per 24 hour   Intake --   Output 225 ml   Net -225 ml        Telemetry Reviewed:     PHYSICAL EXAM:  General: NAD  No edema      Lab Data Reviewed: (see below)    Medications Reviewed: (see below)    PMH/SH reviewed - no change compared to H&P  ________________________________________________________________________  Care Plan discussed with:  Patient     Family      RN     Care Manager                    Consultant:          Comments   >50% of visit spent in counseling and coordination of care       ________________________________________________________________________  Angelita Gramajo MD     Procedures: see electronic medical records for all procedures/Xrays and details which  were not copied into this note but were reviewed prior

## 2024-12-06 NOTE — PLAN OF CARE
Problem: Chronic Conditions and Co-morbidities  Goal: Patient's chronic conditions and co-morbidity symptoms are monitored and maintained or improved  Outcome: Progressing     Problem: Safety - Adult  Goal: Free from fall injury  12/6/2024 1443 by Bindu Carmona RN  Outcome: Progressing  12/6/2024 0442 by Sharron Pineda RN  Outcome: Progressing  Flowsheets  Taken 12/6/2024 0320  Free From Fall Injury: Instruct family/caregiver on patient safety  Taken 12/5/2024 2132  Free From Fall Injury: Instruct family/caregiver on patient safety     Problem: Cognitive:  Goal: Knowledge of wound care  Description: Knowledge of wound care  Outcome: Progressing  Goal: Understands risk factors for wounds  Description: Understands risk factors for wounds  Outcome: Progressing     Problem: Wound:  Goal: Will show signs of wound healing; wound closure and no evidence of infection  Description: Will show signs of wound healing; wound closure and no evidence of infection  Outcome: Progressing     Problem: Smoking cessation:  Goal: Ability to formulate a plan to maintain a tobacco-free life will be supported  Description: Ability to formulate a plan to maintain a tobacco-free life will be supported  Outcome: Progressing     Problem: Falls - Risk of:  Goal: Will remain free from falls  Description: Will remain free from falls  Outcome: Progressing     Problem: Blood Glucose:  Goal: Ability to maintain appropriate glucose levels will improve  Description: Ability to maintain appropriate glucose levels will improve  Outcome: Progressing     Problem: ABCDS Injury Assessment  Goal: Absence of physical injury  Outcome: Progressing  Flowsheets (Taken 12/6/2024 0320 by Sharron Pineda RN)  Absence of Physical Injury: Implement safety measures based on patient assessment     Problem: Pain  Goal: Verbalizes/displays adequate comfort level or baseline comfort level  12/6/2024 1443 by Bindu Carmona RN  Outcome:

## 2024-12-06 NOTE — PROGRESS NOTES
End of Shift Note    Bedside shift change report given to MIN Ruano (oncoming nurse) by Sharron Pineda RN (offgoing nurse).  Report included the following information SBAR, Kardex, and MAR    Shift worked:  7p - 7a     Shift summary and any significant changes:    Medications administered per MAR, education provided.  NPO since midnight for possible procedure, education provided and patient verbalized understanding.  Wound care provided to right great toe, tolerated well.  AM labs obtained per order, patient tolerated well.  Caring rounds completed.   Concerns for physician to address:       Zone phone for oncoming shift:          Activity:  Level of Assistance: Moderate assist, patient does 50-74%    Cardiac:   Cardiac Monitoring:  no    Access:  Current line(s): PIV     Genitourinary:   Urinary Status: Voiding, External catheter    Respiratory:   O2 Device: None (Room air)    GI:  Current diet: Diet NPO    Pain Management:   Patient states pain is manageable on current regimen: YES    Skin:  Binh Scale Score: 20  Interventions: Wound Offloading (Prevention Methods): Repositioning, Turning  Pressure injury: no    Patient Safety:  Fall Score: Chin Total Score: 15  Fall Risk Interventions  Nursing Judgement-Fall Risk High(Add Comments): Yes  Toilet Every 2 Hours-In Advance of Need: Yes  Hourly Visual Checks: Awake, In bed  Fall Visual Posted: Fall sign posted, Socks  Room Door Open: Yes  Alarm On: Bed  Patient Moved Closer to Nursing Station: No    Active Consults:  IP CONSULT TO ONCOLOGY  IP CONSULT TO PODIATRY  IP CONSULT TO GI  IP CONSULT TO NEPHROLOGY  IP CONSULT TO VASCULAR SURGERY  IP CONSULT TO INFECTIOUS DISEASES    Length of Stay:  Expected LOS: 10  Actual LOS: 7      Sharron Pineda RN

## 2024-12-06 NOTE — ANESTHESIA PRE PROCEDURE
Other Findings:       Anesthesia Plan      general     ASA 3       Induction: intravenous.      Anesthetic plan and risks discussed with patient.      Plan discussed with CRNA.                Anthony Hernandez MD   12/6/2024

## 2024-12-06 NOTE — PROGRESS NOTES
Noted the vascular procedure scheduled for today   Will wait for their recs and proceed for the sx amp asap

## 2024-12-06 NOTE — PERIOP NOTE
TRANSFER - IN REPORT:    Verbal report received from Nisreen COPELAND on Maryan Resendiz  being received from 108 for ordered procedure      Report consisted of patient’s Situation, Background, Assessment and   Recommendations(SBAR).     Information from the following report(s) Intake/Output, MAR, Recent Results, and Cardiac Rhythm V Paced  was reviewed with the receiving nurse.    Opportunity for questions and clarification was provided.      Assessment completed upon patient’s arrival to unit and care assume

## 2024-12-06 NOTE — BRIEF OP NOTE
Brief Postoperative Note      Patient: Maryan Resendiz  YOB: 1935  MRN: [id number]    Date of Procedure: 12/6/2024    Pre-Op Diagnosis Codes:      * PVD (peripheral vascular disease) ([facility]) [I73.9]    Post-Op Diagnosis: Same       Procedure(s):  RIGHT LOWER EXTREMITY ARTERIOGRAM, ANGIOPLASTY AND STENTING    Surgeon(s):  lFip Donald MD    Assistant:  Surgical Assistant: Vernon Greco    Anesthesia: Monitor Anesthesia Care    Estimated Blood Loss (mL): Minimal    Complications: None    Specimens:   * No specimens in log *    Implants:  Implant Name Type Inv. Item Serial No.  Lot No. LRB No. Used Action   STENT VASC SELF EXP 0.035IN [id number] LP EVERFLEX - SN/A Peripheral stents STENT VASC SELF EXP 0.035IN [id number] LP EVERFLEX N/A [employer] VASC[employer]-WD [id number] Right 1 Implanted   STENT VASC SELF EXP 0.035IN [id number] LP EVERFLEX - SN/A Peripheral stents STENT VASC SELF EXP 0.035IN 6X80MM [id number] LP EVERFLEX N/A [employer] VASCULAR-WD [id number] Right 1 Implanted         Drains: * No LDAs found *    Findings:  Infection Present At Time Of Surgery (PATOS) (choose all levels that have infection present):  - Superficial Infection (skin/subcutaneous) present as evidenced by fluid consistent with infection  Other Findings: Previous aortobifemoral artery bypass, heavily calcified focal SFA occlusion, crossed and stented with nice result.     Ok to proceed with toe amputation from our standpoint  4 hours bedrest flat    Electronically signed by Flip Donald MD on 12/6/2024 at 3:46 PM

## 2024-12-06 NOTE — PROGRESS NOTES
Comprehensive Nutrition Assessment    Type and Reason for Visit:  Initial, LOS    Nutrition Recommendations/Plan:   Resume diet as medically able  Monitor and record PO intakes and Bms in I/Os     Malnutrition Assessment:  Malnutrition Status:  No malnutrition (12/06/24 1634)    Context:  Acute Illness     Findings of the 6 clinical characteristics of malnutrition:  Energy Intake:  No decrease in energy intake  Weight Loss:  Mild weight loss     Body Fat Loss:  Unable to assess     Muscle Mass Loss:  Unable to assess    Fluid Accumulation:  No fluid accumulation     Strength:  Not Performed    Nutrition Assessment:    Admitted for anemia, epistaxis and dark stool. PMHx includes hypothyroidism, CAD, HF, CKD, DM, diverticulosis, gout, HLD, HTN, PVD. Screened for LOS. Pt BRYAN at interview attempt and NPO for procedure today. No noted decreased intakes. Slight weight loss x1 year, not nutritionally significant % (-5.6%). Rec resume diet as medically able.    Nutrition Related Findings:    Labs: Na 137, K 4.1, BUN 58, Creat 1.81, Gluc 187. Meds: atorvastatin, furosemide, insulin glargine, insulin lispro, levothyroxine, pantoprazole, spironolactone, 0.9%NaCl. Trace b/l UE, 1+ b/l LE edema. BM 12/4. Wound Type: None       Current Nutrition Intake & Therapies:    Average Meal Intake: NPO  Average Supplements Intake: NPO  ADULT DIET; Regular; 4 carb choices (60 gm/meal); Low Fat/Low Chol/High Fiber/ILDA; Low Fat (less than or equal to 50 gm/day)    Anthropometric Measures:  Height: 177.8 cm (5' 10\")  Ideal Body Weight (IBW): 166 lbs (75 kg)    Current Body Weight: 92 kg (202 lb 13.2 oz), 122.2 % IBW. Weight Source: Bed scale  Current BMI (kg/m2): 29.1  BMI Categories: Overweight (BMI 25.0-29.9)    Estimated Daily Nutrient Needs:  Energy Requirements Based On: Kcal/kg  Weight Used for Energy Requirements: Current  Energy (kcal/day): 2300kcal (25kcal/kg)  Weight Used for Protein Requirements: Current  Protein (g/day): 92g

## 2024-12-06 NOTE — PROGRESS NOTES
Vascular    Plts>100  Will try to get him on for RLE arteriogram tomorrow  Not sure OR schedule will allow  NPO after MN

## 2024-12-06 NOTE — ANESTHESIA POSTPROCEDURE EVALUATION
Department of Anesthesiology  Postprocedure Note    Patient: Maryan Resendiz  MRN: [id number]  YOB: 1935  Date of evaluation: 12/6/2024    Procedure Summary       Date: 12/06/24 Room / Location: [facility] MAIN OR M2 / [facility] MAIN OR    Anesthesia Start: 1346 Anesthesia Stop: 1533    Procedure: RIGHT LOWER EXTREMITY ARTERIOGRAM, ANGIOPLASTY AND STENTING (Right: Leg Upper) Diagnosis:       PVD (peripheral vascular disease) ([facility])      (PVD (peripheral vascular disease) ([facility]) [I73.9])    Providers: Flip Donald MD Responsible Provider: Brigido Cary MD    Anesthesia Type: MAC ASA Status: 3            Anesthesia Type: MAC    Kamran Phase I: Kamran Score: 10    Kamran Phase II:      Anesthesia Post Evaluation    Patient location during evaluation: PACU  Patient participation: complete - patient participated  Level of consciousness: sleepy but conscious and responsive to verbal stimuli  Airway patency: patent  Nausea & Vomiting: no vomiting and no nausea  Cardiovascular status: blood pressure returned to baseline and hemodynamically stable  Respiratory status: acceptable  Hydration status: stable  Pain management: adequate    No notable events documented.

## 2024-12-06 NOTE — FLOWSHEET NOTE
12/06/24 1630   Handoff   Communication Given Transfer Handoff   Handoff Given To Tera COPELAND   Handoff Received From Ab RN   Handoff Communication Telephone   Time Handoff Given 1630     Pt receiving RN made aware pt to be bedrest flat x 4 hours. Time started at 1530

## 2024-12-06 NOTE — FLOWSHEET NOTE
12/06/24 1530   Handoff   Communication Given Transfer Handoff   Handoff Given To Ab COPELAND   Handoff Received From Mariama VÁSQUEZ/ Edward RN   Handoff Communication Face to Face;At bedside   Time Handoff Given 1530

## 2024-12-07 LAB
ANION GAP SERPL CALC-SCNC: 6 MMOL/L (ref 2–12)
BASOPHILS # BLD: 0 K/UL (ref 0–0.1)
BASOPHILS NFR BLD: 0 % (ref 0–1)
BUN SERPL-MCNC: 46 MG/DL (ref 6–20)
BUN/CREAT SERPL: 29 (ref 12–20)
CALCIUM SERPL-MCNC: 8.3 MG/DL (ref 8.5–10.1)
CHLORIDE SERPL-SCNC: 106 MMOL/L (ref 97–108)
CO2 SERPL-SCNC: 26 MMOL/L (ref 21–32)
CREAT SERPL-MCNC: 1.61 MG/DL (ref 0.7–1.3)
DIFFERENTIAL METHOD BLD: ABNORMAL
EOSINOPHIL # BLD: 0.2 K/UL (ref 0–0.4)
EOSINOPHIL NFR BLD: 2 % (ref 0–7)
ERYTHROCYTE [DISTWIDTH] IN BLOOD BY AUTOMATED COUNT: 15.7 % (ref 11.5–14.5)
GLUCOSE BLD STRIP.AUTO-MCNC: 210 MG/DL (ref 65–117)
GLUCOSE BLD STRIP.AUTO-MCNC: 211 MG/DL (ref 65–117)
GLUCOSE BLD STRIP.AUTO-MCNC: 241 MG/DL (ref 65–117)
GLUCOSE BLD STRIP.AUTO-MCNC: 258 MG/DL (ref 65–117)
GLUCOSE SERPL-MCNC: 175 MG/DL (ref 65–100)
HCT VFR BLD AUTO: 22.4 % (ref 36.6–50.3)
HGB BLD-MCNC: 7.4 G/DL (ref 12.1–17)
IMM GRANULOCYTES # BLD AUTO: 0.1 K/UL (ref 0–0.04)
IMM GRANULOCYTES NFR BLD AUTO: 1 % (ref 0–0.5)
INR PPP: 1.3 (ref 0.9–1.1)
LYMPHOCYTES # BLD: 0.9 K/UL (ref 0.8–3.5)
LYMPHOCYTES NFR BLD: 9 % (ref 12–49)
MCH RBC QN AUTO: 30.6 PG (ref 26–34)
MCHC RBC AUTO-ENTMCNC: 33 G/DL (ref 30–36.5)
MCV RBC AUTO: 92.6 FL (ref 80–99)
MONOCYTES # BLD: 1.2 K/UL (ref 0–1)
MONOCYTES NFR BLD: 12 % (ref 5–13)
NEUTS SEG # BLD: 7.7 K/UL (ref 1.8–8)
NEUTS SEG NFR BLD: 76 % (ref 32–75)
NRBC # BLD: 0.23 K/UL (ref 0–0.01)
NRBC BLD-RTO: 2.3 PER 100 WBC
PLATELET # BLD AUTO: 143 K/UL (ref 150–400)
PMV BLD AUTO: 10.8 FL (ref 8.9–12.9)
POTASSIUM SERPL-SCNC: 3.8 MMOL/L (ref 3.5–5.1)
PROTHROMBIN TIME: 13.4 SEC (ref 9–11.1)
RBC # BLD AUTO: 2.42 M/UL (ref 4.1–5.7)
SERVICE CMNT-IMP: ABNORMAL
SODIUM SERPL-SCNC: 138 MMOL/L (ref 136–145)
WBC # BLD AUTO: 10.1 K/UL (ref 4.1–11.1)

## 2024-12-07 PROCEDURE — 80048 BASIC METABOLIC PNL TOTAL CA: CPT

## 2024-12-07 PROCEDURE — 1100000003 HC PRIVATE W/ TELEMETRY

## 2024-12-07 PROCEDURE — 85025 COMPLETE CBC W/AUTO DIFF WBC: CPT

## 2024-12-07 PROCEDURE — 6370000000 HC RX 637 (ALT 250 FOR IP): Performed by: INTERNAL MEDICINE

## 2024-12-07 PROCEDURE — 6370000000 HC RX 637 (ALT 250 FOR IP): Performed by: STUDENT IN AN ORGANIZED HEALTH CARE EDUCATION/TRAINING PROGRAM

## 2024-12-07 PROCEDURE — 2580000003 HC RX 258: Performed by: STUDENT IN AN ORGANIZED HEALTH CARE EDUCATION/TRAINING PROGRAM

## 2024-12-07 PROCEDURE — 2500000003 HC RX 250 WO HCPCS: Performed by: STUDENT IN AN ORGANIZED HEALTH CARE EDUCATION/TRAINING PROGRAM

## 2024-12-07 PROCEDURE — 6360000002 HC RX W HCPCS: Performed by: STUDENT IN AN ORGANIZED HEALTH CARE EDUCATION/TRAINING PROGRAM

## 2024-12-07 PROCEDURE — 94760 N-INVAS EAR/PLS OXIMETRY 1: CPT

## 2024-12-07 PROCEDURE — 82962 GLUCOSE BLOOD TEST: CPT

## 2024-12-07 PROCEDURE — 36415 COLL VENOUS BLD VENIPUNCTURE: CPT

## 2024-12-07 PROCEDURE — 85610 PROTHROMBIN TIME: CPT

## 2024-12-07 RX ORDER — PANTOPRAZOLE SODIUM 40 MG/1
40 TABLET, DELAYED RELEASE ORAL
Status: DISCONTINUED | OUTPATIENT
Start: 2024-12-07 | End: 2024-12-12 | Stop reason: HOSPADM

## 2024-12-07 RX ORDER — GUAIFENESIN 200 MG/10ML
200 LIQUID ORAL EVERY 4 HOURS PRN
Status: DISCONTINUED | OUTPATIENT
Start: 2024-12-07 | End: 2024-12-12 | Stop reason: HOSPADM

## 2024-12-07 RX ORDER — GUAIFENESIN 600 MG/1
600 TABLET, EXTENDED RELEASE ORAL 2 TIMES DAILY
Status: DISCONTINUED | OUTPATIENT
Start: 2024-12-07 | End: 2024-12-12 | Stop reason: HOSPADM

## 2024-12-07 RX ADMIN — GUAIFENESIN 200 MG: 200 SOLUTION ORAL at 09:22

## 2024-12-07 RX ADMIN — SODIUM CHLORIDE 40 MG: 9 INJECTION INTRAMUSCULAR; INTRAVENOUS; SUBCUTANEOUS at 05:19

## 2024-12-07 RX ADMIN — INSULIN GLARGINE 30 UNITS: 100 INJECTION, SOLUTION SUBCUTANEOUS at 21:29

## 2024-12-07 RX ADMIN — INSULIN LISPRO 2 UNITS: 100 INJECTION, SOLUTION INTRAVENOUS; SUBCUTANEOUS at 17:22

## 2024-12-07 RX ADMIN — SODIUM CHLORIDE, PRESERVATIVE FREE 10 ML: 5 INJECTION INTRAVENOUS at 08:16

## 2024-12-07 RX ADMIN — LEVOTHYROXINE SODIUM 175 MCG: 0.15 TABLET ORAL at 05:19

## 2024-12-07 RX ADMIN — PANTOPRAZOLE SODIUM 40 MG: 40 TABLET, DELAYED RELEASE ORAL at 17:12

## 2024-12-07 RX ADMIN — INSULIN LISPRO 2 UNITS: 100 INJECTION, SOLUTION INTRAVENOUS; SUBCUTANEOUS at 21:29

## 2024-12-07 RX ADMIN — METOPROLOL TARTRATE 25 MG: 25 TABLET, FILM COATED ORAL at 21:29

## 2024-12-07 RX ADMIN — SODIUM CHLORIDE, PRESERVATIVE FREE 10 ML: 5 INJECTION INTRAVENOUS at 21:29

## 2024-12-07 RX ADMIN — ATORVASTATIN CALCIUM 10 MG: 10 TABLET, FILM COATED ORAL at 08:17

## 2024-12-07 RX ADMIN — INSULIN LISPRO 2 UNITS: 100 INJECTION, SOLUTION INTRAVENOUS; SUBCUTANEOUS at 08:18

## 2024-12-07 RX ADMIN — DOXAZOSIN 4 MG: 2 TABLET ORAL at 08:17

## 2024-12-07 RX ADMIN — FINASTERIDE 5 MG: 5 TABLET, FILM COATED ORAL at 08:22

## 2024-12-07 RX ADMIN — METOPROLOL TARTRATE 25 MG: 25 TABLET, FILM COATED ORAL at 08:17

## 2024-12-07 RX ADMIN — ALLOPURINOL 300 MG: 300 TABLET ORAL at 08:17

## 2024-12-07 RX ADMIN — GUAIFENESIN 600 MG: 600 TABLET, MULTILAYER, EXTENDED RELEASE ORAL at 21:29

## 2024-12-07 RX ADMIN — INSULIN LISPRO 2 UNITS: 100 INJECTION, SOLUTION INTRAVENOUS; SUBCUTANEOUS at 13:26

## 2024-12-07 RX ADMIN — GUAIFENESIN 600 MG: 600 TABLET, MULTILAYER, EXTENDED RELEASE ORAL at 12:44

## 2024-12-07 RX ADMIN — ISOSORBIDE MONONITRATE 60 MG: 30 TABLET, EXTENDED RELEASE ORAL at 21:29

## 2024-12-07 RX ADMIN — GUAIFENESIN 200 MG: 200 SOLUTION ORAL at 17:22

## 2024-12-07 ASSESSMENT — PAIN SCALES - GENERAL: PAINLEVEL_OUTOF10: 0

## 2024-12-07 NOTE — PROGRESS NOTES
End of Shift Note    Bedside shift change report given MIN Martinez (oncoming nurse) by Sharron Pineda RN (offgoing nurse).  Report included the following information SBAR, Kardex, and MAR    Shift worked:  7p - 7a     Shift summary and any significant changes:    Medications administered per order, education provided.  Left groin area without hematoma, redness or drainage, skin glue intact.  Patient up to the BSC with assistance x1.  AM labs obtained per order, tolerated well.  Caring rounds completed.     Concerns for physician to address:       Zone phone for oncoming shift:          Activity:  Level of Assistance: Moderate assist, patient does 50-74%    Cardiac:   Cardiac Monitoring:  no    Access:  Current line(s): PIV     Genitourinary:   Urinary Status: Has not voided    Respiratory:   O2 Device: None (Room air)    GI:  Current diet: ADULT DIET; Regular; 4 carb choices (60 gm/meal); Low Fat/Low Chol/High Fiber/ILDA; Low Fat (less than or equal to 50 gm/day)    Pain Management:   Patient states pain is manageable on current regimen: YES    Skin:  Binh Scale Score: 18  Interventions: Wound Offloading (Prevention Methods): Turning, Repositioning  Pressure injury: no    Patient Safety:  Fall Score: Chin Total Score: 70  Fall Risk Interventions  Nursing Judgement-Fall Risk High(Add Comments): Yes  Toilet Every 2 Hours-In Advance of Need: Yes  Hourly Visual Checks: Awake, In bed, Quiet  Fall Visual Posted: Armband, Fall sign posted, Socks  Room Door Open: Deferred to promote rest  Alarm On: Bed  Patient Moved Closer to Nursing Station: No    Active Consults:  IP CONSULT TO ONCOLOGY  IP CONSULT TO PODIATRY  IP CONSULT TO GI  IP CONSULT TO NEPHROLOGY  IP CONSULT TO VASCULAR SURGERY  IP CONSULT TO INFECTIOUS DISEASES    Length of Stay:  Expected LOS: 12  Actual LOS: 8      Sharron Pineda RN

## 2024-12-07 NOTE — PROGRESS NOTES
Doing well, no complaints  Vitals stable  Right foot dressed  Groin soft    89 y/o RLE ischemia with right great toe wound  - Doing well s/p SFA PTA/stenting 12/6  - Ok to proceed with toe amputation. Discussed with Dr. Alcala. Plans for surgery tomorrow am.    - Thrombocytopenia, resolving.  Will need to start baby aspirin AFTER surgery tomorrow. Lets start it Monday am, 81m, as long as platelets cont to trend up.  - Will need vascular followup in a few week with Dr. Vivas, call with questions.

## 2024-12-07 NOTE — PROGRESS NOTES
12/07/24 0745 (!) 154/55 98.1 °F (36.7 °C) Oral 73 18 95 % --   12/07/24 0330 -- -- -- -- 18 -- --   12/06/24 2228 (!) 146/64 -- -- 72 -- -- --   12/06/24 2104 (!) 159/52 97.7 °F (36.5 °C) Oral 73 18 99 % --   12/06/24 1715 (!) 112/54 97.3 °F (36.3 °C) Oral 73 16 96 % --   12/06/24 1634 -- -- -- -- -- -- 1.778 m (5' 10\")   12/06/24 1630 (!) 144/82 98.1 °F (36.7 °C) -- 75 11 93 % --   12/06/24 1615 (!) 151/64 -- -- 71 11 98 % --   12/06/24 1600 (!) 152/64 -- -- 73 20 98 % --   12/06/24 1545 (!) 130/98 -- -- 73 20 96 % --   12/06/24 1540 (!) 148/68 -- -- 76 20 98 % --   12/06/24 1535 (!) 154/64 -- -- 73 13 97 % --   12/06/24 1533 (!) 141/61 97.6 °F (36.4 °C) -- 77 20 97 % --   12/06/24 1530 -- -- -- 74 17 96 % --   12/06/24 1256 (!) 150/63 97.9 °F (36.6 °C) Oral 76 13 98 % --         Intake/Output Summary (Last 24 hours) at 12/7/2024 1218  Last data filed at 12/7/2024 0644  Gross per 24 hour   Intake 300 ml   Output 325 ml   Net -25 ml        I had a face to face encounter and independently examined this patient on 12/7/2024, as outlined below:  PHYSICAL EXAM:  General: Alert, cooperative  EENT:  EOMI. Anicteric sclerae.  Resp:  CTA bilaterally, no wheezing or rales.  No accessory muscle use  CV:  Regular  rhythm,  No edema  GI:  Soft, Non distended, Non tender.  +Bowel sounds  Neurologic:  Alert and oriented X 3, normal speech,   Psych:   Good insight. Not anxious nor agitated  Skin:  Rt 1st toes covered with dressing     Reviewed most current lab test results and cultures  YES  Reviewed most current radiology test results   YES  Review and summation of old records today    NO  Reviewed patient's current orders and MAR    YES  PMH/SH reviewed - no change compared to H&P    Procedures: see electronic medical records for all procedures/Xrays and details which were not copied into this note but were reviewed prior to creation of Plan.      LABS:  I reviewed today's most current labs and imaging studies.  Pertinent  labs include:  Recent Labs     12/05/24  0502 12/06/24  0528 12/07/24  0437   WBC 8.6 8.5 10.1   HGB 7.9* 7.7* 7.4*   HCT 23.7* 23.4* 22.4*   * 118* 143*     Recent Labs     12/05/24  0502 12/06/24  0528 12/07/24  0437   * 137 138   K 3.9 4.1 3.8    105 106   CO2 24 28 26   GLUCOSE 200* 235* 175*   BUN 64* 58* 46*   CREATININE 1.94* 1.81* 1.61*   CALCIUM 8.3* 8.5 8.3*   INR  --  1.3* 1.3*       Signed: Vania Rodrigues MD

## 2024-12-07 NOTE — PROGRESS NOTES
.End of Shift Note    Bedside shift change report given to Hailey COPELAND (oncoming nurse) by Luciana Ospina RN (offgoing nurse).  Report included the following information SBAR, Kardex, Intake/Output, MAR, Recent Results, and Med Rec Status    Shift worked:  0295-0225     Shift summary and any significant changes:     Pt given prescribed med's per MAR. Pt up to recliner with x1 assist. Wound care completed today. Consent signed for surgery on Monday. Caring rounds completed.     Concerns for physician to address:  none     Zone phone for oncoming shift:   [phone]       Activity:  Level of Assistance: Moderate assist, patient does 50-74%  Number times ambulated in hallways past shift: 0  Number of times OOB to chair past shift: 2    Cardiac:   Cardiac Monitoring: Yes      Cardiac Rhythm:  (paced)    Access:  Current line(s): PIV     Genitourinary:   Urinary Status: Voiding, Bathroom privileges    Respiratory:   O2 Device: None (Room air)  Chronic home O2 use?: NO  Incentive spirometer at bedside: NO    GI:  Last BM (including prior to admit): 12/04/24  Current diet:  ADULT DIET; Regular; 4 carb choices (60 gm/meal); Low Fat/Low Chol/High Fiber/ILDA; Low Fat (less than or equal to 50 gm/day)  Passing flatus: YES    Pain Management:   Patient states pain is manageable on current regimen: YES    Skin:  Binh Scale Score: 18  Interventions: Wound Offloading (Prevention Methods): Bed, pressure redistribution/air    Patient Safety:  Fall Risk: Nursing Judgement-Fall Risk High(Add Comments): Yes  Fall Risk Interventions  Nursing Judgement-Fall Risk High(Add Comments): Yes  Toilet Every 2 Hours-In Advance of Need: Yes  Hourly Visual Checks: Awake, In chair  Fall Visual Posted: Armband, Fall sign posted, Socks  Room Door Open: Deferred to promote rest  Alarm On: Chair  Patient Moved Closer to Nursing Station: No    Active Consults:   IP CONSULT TO ONCOLOGY  IP CONSULT TO PODIATRY  IP CONSULT TO GI  IP CONSULT TO NEPHROLOGY  IP

## 2024-12-08 ENCOUNTER — ANESTHESIA EVENT (OUTPATIENT)
Facility: HOSPITAL | Age: 89
End: 2024-12-08
Payer: MEDICARE

## 2024-12-08 ENCOUNTER — ANESTHESIA (OUTPATIENT)
Facility: HOSPITAL | Age: 89
End: 2024-12-08
Payer: MEDICARE

## 2024-12-08 LAB
ANION GAP SERPL CALC-SCNC: 7 MMOL/L (ref 2–12)
BASOPHILS # BLD: 0 K/UL (ref 0–0.1)
BASOPHILS NFR BLD: 0 % (ref 0–1)
BUN SERPL-MCNC: 49 MG/DL (ref 6–20)
BUN/CREAT SERPL: 29 (ref 12–20)
CALCIUM SERPL-MCNC: 8.4 MG/DL (ref 8.5–10.1)
CHLORIDE SERPL-SCNC: 109 MMOL/L (ref 97–108)
CO2 SERPL-SCNC: 23 MMOL/L (ref 21–32)
CREAT SERPL-MCNC: 1.69 MG/DL (ref 0.7–1.3)
DIFFERENTIAL METHOD BLD: ABNORMAL
EOSINOPHIL # BLD: 0.2 K/UL (ref 0–0.4)
EOSINOPHIL NFR BLD: 1 % (ref 0–7)
ERYTHROCYTE [DISTWIDTH] IN BLOOD BY AUTOMATED COUNT: 16.2 % (ref 11.5–14.5)
GLUCOSE BLD STRIP.AUTO-MCNC: 124 MG/DL (ref 65–117)
GLUCOSE BLD STRIP.AUTO-MCNC: 142 MG/DL (ref 65–117)
GLUCOSE BLD STRIP.AUTO-MCNC: 203 MG/DL (ref 65–117)
GLUCOSE BLD STRIP.AUTO-MCNC: 224 MG/DL (ref 65–117)
GLUCOSE SERPL-MCNC: 125 MG/DL (ref 65–100)
HCT VFR BLD AUTO: 23.2 % (ref 36.6–50.3)
HGB BLD-MCNC: 7.8 G/DL (ref 12.1–17)
IMM GRANULOCYTES # BLD AUTO: 0.1 K/UL (ref 0–0.04)
IMM GRANULOCYTES NFR BLD AUTO: 1 % (ref 0–0.5)
INR PPP: 1.3 (ref 0.9–1.1)
LYMPHOCYTES # BLD: 0.8 K/UL (ref 0.8–3.5)
LYMPHOCYTES NFR BLD: 6 % (ref 12–49)
MCH RBC QN AUTO: 31.2 PG (ref 26–34)
MCHC RBC AUTO-ENTMCNC: 33.6 G/DL (ref 30–36.5)
MCV RBC AUTO: 92.8 FL (ref 80–99)
MONOCYTES # BLD: 1.4 K/UL (ref 0–1)
MONOCYTES NFR BLD: 11 % (ref 5–13)
NEUTS SEG # BLD: 10 K/UL (ref 1.8–8)
NEUTS SEG NFR BLD: 80 % (ref 32–75)
NRBC # BLD: 0.12 K/UL (ref 0–0.01)
NRBC BLD-RTO: 1 PER 100 WBC
PLATELET # BLD AUTO: 138 K/UL (ref 150–400)
PMV BLD AUTO: 10.9 FL (ref 8.9–12.9)
POTASSIUM SERPL-SCNC: 3.9 MMOL/L (ref 3.5–5.1)
PROTHROMBIN TIME: 13.1 SEC (ref 9–11.1)
RBC # BLD AUTO: 2.5 M/UL (ref 4.1–5.7)
SERVICE CMNT-IMP: ABNORMAL
SODIUM SERPL-SCNC: 139 MMOL/L (ref 136–145)
WBC # BLD AUTO: 12.5 K/UL (ref 4.1–11.1)

## 2024-12-08 PROCEDURE — 2580000003 HC RX 258: Performed by: STUDENT IN AN ORGANIZED HEALTH CARE EDUCATION/TRAINING PROGRAM

## 2024-12-08 PROCEDURE — 6370000000 HC RX 637 (ALT 250 FOR IP): Performed by: INTERNAL MEDICINE

## 2024-12-08 PROCEDURE — 3700000001 HC ADD 15 MINUTES (ANESTHESIA): Performed by: PODIATRIST

## 2024-12-08 PROCEDURE — 87077 CULTURE AEROBIC IDENTIFY: CPT

## 2024-12-08 PROCEDURE — 87075 CULTR BACTERIA EXCEPT BLOOD: CPT

## 2024-12-08 PROCEDURE — 7100000001 HC PACU RECOVERY - ADDTL 15 MIN: Performed by: PODIATRIST

## 2024-12-08 PROCEDURE — 80048 BASIC METABOLIC PNL TOTAL CA: CPT

## 2024-12-08 PROCEDURE — 85025 COMPLETE CBC W/AUTO DIFF WBC: CPT

## 2024-12-08 PROCEDURE — 3600000012 HC SURGERY LEVEL 2 ADDTL 15MIN: Performed by: PODIATRIST

## 2024-12-08 PROCEDURE — 82962 GLUCOSE BLOOD TEST: CPT

## 2024-12-08 PROCEDURE — 6360000002 HC RX W HCPCS: Performed by: NURSE ANESTHETIST, CERTIFIED REGISTERED

## 2024-12-08 PROCEDURE — 87186 SC STD MICRODIL/AGAR DIL: CPT

## 2024-12-08 PROCEDURE — 0Y6P0Z0 DETACHMENT AT RIGHT 1ST TOE, COMPLETE, OPEN APPROACH: ICD-10-PCS | Performed by: PODIATRIST

## 2024-12-08 PROCEDURE — 6360000002 HC RX W HCPCS: Performed by: PODIATRIST

## 2024-12-08 PROCEDURE — 6370000000 HC RX 637 (ALT 250 FOR IP): Performed by: STUDENT IN AN ORGANIZED HEALTH CARE EDUCATION/TRAINING PROGRAM

## 2024-12-08 PROCEDURE — 88305 TISSUE EXAM BY PATHOLOGIST: CPT

## 2024-12-08 PROCEDURE — 85610 PROTHROMBIN TIME: CPT

## 2024-12-08 PROCEDURE — 87070 CULTURE OTHR SPECIMN AEROBIC: CPT

## 2024-12-08 PROCEDURE — 7100000000 HC PACU RECOVERY - FIRST 15 MIN: Performed by: PODIATRIST

## 2024-12-08 PROCEDURE — 94760 N-INVAS EAR/PLS OXIMETRY 1: CPT

## 2024-12-08 PROCEDURE — 6370000000 HC RX 637 (ALT 250 FOR IP): Performed by: PODIATRIST

## 2024-12-08 PROCEDURE — 2580000003 HC RX 258: Performed by: PODIATRIST

## 2024-12-08 PROCEDURE — 2709999900 HC NON-CHARGEABLE SUPPLY: Performed by: PODIATRIST

## 2024-12-08 PROCEDURE — 3700000000 HC ANESTHESIA ATTENDED CARE: Performed by: PODIATRIST

## 2024-12-08 PROCEDURE — 36415 COLL VENOUS BLD VENIPUNCTURE: CPT

## 2024-12-08 PROCEDURE — 1100000003 HC PRIVATE W/ TELEMETRY

## 2024-12-08 PROCEDURE — 87205 SMEAR GRAM STAIN: CPT

## 2024-12-08 PROCEDURE — 2580000003 HC RX 258: Performed by: NURSE ANESTHETIST, CERTIFIED REGISTERED

## 2024-12-08 PROCEDURE — 2500000003 HC RX 250 WO HCPCS: Performed by: PODIATRIST

## 2024-12-08 PROCEDURE — 88311 DECALCIFY TISSUE: CPT

## 2024-12-08 PROCEDURE — 3600000002 HC SURGERY LEVEL 2 BASE: Performed by: PODIATRIST

## 2024-12-08 PROCEDURE — 2500000003 HC RX 250 WO HCPCS: Performed by: STUDENT IN AN ORGANIZED HEALTH CARE EDUCATION/TRAINING PROGRAM

## 2024-12-08 RX ORDER — SODIUM CHLORIDE 0.9 % (FLUSH) 0.9 %
5-40 SYRINGE (ML) INJECTION EVERY 12 HOURS SCHEDULED
Status: DISCONTINUED | OUTPATIENT
Start: 2024-12-08 | End: 2024-12-08 | Stop reason: HOSPADM

## 2024-12-08 RX ORDER — MIDAZOLAM HYDROCHLORIDE 2 MG/2ML
2 INJECTION, SOLUTION INTRAMUSCULAR; INTRAVENOUS
Status: DISCONTINUED | OUTPATIENT
Start: 2024-12-08 | End: 2024-12-08 | Stop reason: HOSPADM

## 2024-12-08 RX ORDER — SODIUM CHLORIDE 0.9 % (FLUSH) 0.9 %
5-40 SYRINGE (ML) INJECTION PRN
Status: DISCONTINUED | OUTPATIENT
Start: 2024-12-08 | End: 2024-12-08 | Stop reason: HOSPADM

## 2024-12-08 RX ORDER — DEXAMETHASONE SODIUM PHOSPHATE 4 MG/ML
4 INJECTION, SOLUTION INTRA-ARTICULAR; INTRALESIONAL; INTRAMUSCULAR; INTRAVENOUS; SOFT TISSUE
Status: DISCONTINUED | OUTPATIENT
Start: 2024-12-08 | End: 2024-12-08 | Stop reason: HOSPADM

## 2024-12-08 RX ORDER — FENTANYL CITRATE 50 UG/ML
25 INJECTION, SOLUTION INTRAMUSCULAR; INTRAVENOUS EVERY 5 MIN PRN
Status: DISCONTINUED | OUTPATIENT
Start: 2024-12-08 | End: 2024-12-08 | Stop reason: HOSPADM

## 2024-12-08 RX ORDER — BUPIVACAINE HYDROCHLORIDE 5 MG/ML
INJECTION, SOLUTION PERINEURAL PRN
Status: DISCONTINUED | OUTPATIENT
Start: 2024-12-08 | End: 2024-12-08 | Stop reason: ALTCHOICE

## 2024-12-08 RX ORDER — FENTANYL CITRATE 50 UG/ML
100 INJECTION, SOLUTION INTRAMUSCULAR; INTRAVENOUS
Status: DISCONTINUED | OUTPATIENT
Start: 2024-12-08 | End: 2024-12-08 | Stop reason: HOSPADM

## 2024-12-08 RX ORDER — ACETAMINOPHEN 325 MG/1
650 TABLET ORAL
Status: DISCONTINUED | OUTPATIENT
Start: 2024-12-08 | End: 2024-12-08 | Stop reason: HOSPADM

## 2024-12-08 RX ORDER — SODIUM CHLORIDE 9 MG/ML
INJECTION, SOLUTION INTRAVENOUS PRN
Status: DISCONTINUED | OUTPATIENT
Start: 2024-12-08 | End: 2024-12-08 | Stop reason: HOSPADM

## 2024-12-08 RX ORDER — ONDANSETRON 2 MG/ML
4 INJECTION INTRAMUSCULAR; INTRAVENOUS ONCE
Status: DISCONTINUED | OUTPATIENT
Start: 2024-12-08 | End: 2024-12-08 | Stop reason: HOSPADM

## 2024-12-08 RX ORDER — ACETAMINOPHEN 500 MG
1000 TABLET ORAL ONCE
Status: DISCONTINUED | OUTPATIENT
Start: 2024-12-08 | End: 2024-12-08 | Stop reason: HOSPADM

## 2024-12-08 RX ORDER — HYDROMORPHONE HYDROCHLORIDE 1 MG/ML
0.25 INJECTION, SOLUTION INTRAMUSCULAR; INTRAVENOUS; SUBCUTANEOUS EVERY 5 MIN PRN
Status: DISCONTINUED | OUTPATIENT
Start: 2024-12-08 | End: 2024-12-08 | Stop reason: HOSPADM

## 2024-12-08 RX ORDER — HYDRALAZINE HYDROCHLORIDE 20 MG/ML
10 INJECTION INTRAMUSCULAR; INTRAVENOUS
Status: DISCONTINUED | OUTPATIENT
Start: 2024-12-08 | End: 2024-12-08 | Stop reason: HOSPADM

## 2024-12-08 RX ORDER — PROCHLORPERAZINE EDISYLATE 5 MG/ML
5 INJECTION INTRAMUSCULAR; INTRAVENOUS
Status: DISCONTINUED | OUTPATIENT
Start: 2024-12-08 | End: 2024-12-08 | Stop reason: HOSPADM

## 2024-12-08 RX ORDER — NALOXONE HYDROCHLORIDE 0.4 MG/ML
INJECTION, SOLUTION INTRAMUSCULAR; INTRAVENOUS; SUBCUTANEOUS PRN
Status: DISCONTINUED | OUTPATIENT
Start: 2024-12-08 | End: 2024-12-08 | Stop reason: HOSPADM

## 2024-12-08 RX ORDER — SODIUM CHLORIDE, SODIUM LACTATE, POTASSIUM CHLORIDE, CALCIUM CHLORIDE 600; 310; 30; 20 MG/100ML; MG/100ML; MG/100ML; MG/100ML
INJECTION, SOLUTION INTRAVENOUS CONTINUOUS
Status: DISCONTINUED | OUTPATIENT
Start: 2024-12-08 | End: 2024-12-08 | Stop reason: HOSPADM

## 2024-12-08 RX ORDER — OXYCODONE HYDROCHLORIDE 5 MG/1
5 TABLET ORAL
Status: DISCONTINUED | OUTPATIENT
Start: 2024-12-08 | End: 2024-12-08 | Stop reason: HOSPADM

## 2024-12-08 RX ORDER — PHENYLEPHRINE HCL IN 0.9% NACL 0.4MG/10ML
SYRINGE (ML) INTRAVENOUS
Status: DISCONTINUED | OUTPATIENT
Start: 2024-12-08 | End: 2024-12-08 | Stop reason: SDUPTHER

## 2024-12-08 RX ORDER — VANCOMYCIN 1 G/200ML
1000 INJECTION, SOLUTION INTRAVENOUS ONCE
Status: COMPLETED | OUTPATIENT
Start: 2024-12-08 | End: 2024-12-08

## 2024-12-08 RX ORDER — SODIUM CHLORIDE, SODIUM LACTATE, POTASSIUM CHLORIDE, CALCIUM CHLORIDE 600; 310; 30; 20 MG/100ML; MG/100ML; MG/100ML; MG/100ML
INJECTION, SOLUTION INTRAVENOUS
Status: DISCONTINUED | OUTPATIENT
Start: 2024-12-08 | End: 2024-12-08 | Stop reason: SDUPTHER

## 2024-12-08 RX ADMIN — PANTOPRAZOLE SODIUM 40 MG: 40 TABLET, DELAYED RELEASE ORAL at 05:59

## 2024-12-08 RX ADMIN — ISOSORBIDE MONONITRATE 60 MG: 30 TABLET, EXTENDED RELEASE ORAL at 20:42

## 2024-12-08 RX ADMIN — ATORVASTATIN CALCIUM 10 MG: 10 TABLET, FILM COATED ORAL at 07:32

## 2024-12-08 RX ADMIN — GUAIFENESIN 600 MG: 600 TABLET, MULTILAYER, EXTENDED RELEASE ORAL at 07:31

## 2024-12-08 RX ADMIN — PANTOPRAZOLE SODIUM 40 MG: 40 TABLET, DELAYED RELEASE ORAL at 16:45

## 2024-12-08 RX ADMIN — LEVOTHYROXINE SODIUM 175 MCG: 0.15 TABLET ORAL at 05:59

## 2024-12-08 RX ADMIN — DOXAZOSIN 4 MG: 2 TABLET ORAL at 07:32

## 2024-12-08 RX ADMIN — GUAIFENESIN 200 MG: 200 SOLUTION ORAL at 07:31

## 2024-12-08 RX ADMIN — SODIUM CHLORIDE, POTASSIUM CHLORIDE, SODIUM LACTATE AND CALCIUM CHLORIDE: 600; 310; 30; 20 INJECTION, SOLUTION INTRAVENOUS at 10:31

## 2024-12-08 RX ADMIN — Medication 80 MCG: at 10:18

## 2024-12-08 RX ADMIN — ALLOPURINOL 300 MG: 300 TABLET ORAL at 07:31

## 2024-12-08 RX ADMIN — PROPOFOL 100 MCG/KG/MIN: 10 INJECTION, EMULSION INTRAVENOUS at 10:10

## 2024-12-08 RX ADMIN — METOPROLOL TARTRATE 25 MG: 25 TABLET, FILM COATED ORAL at 20:43

## 2024-12-08 RX ADMIN — INSULIN GLARGINE 30 UNITS: 100 INJECTION, SOLUTION SUBCUTANEOUS at 20:43

## 2024-12-08 RX ADMIN — SODIUM CHLORIDE, PRESERVATIVE FREE 10 ML: 5 INJECTION INTRAVENOUS at 07:31

## 2024-12-08 RX ADMIN — SODIUM CHLORIDE, PRESERVATIVE FREE 10 ML: 5 INJECTION INTRAVENOUS at 20:43

## 2024-12-08 RX ADMIN — INSULIN LISPRO 2 UNITS: 100 INJECTION, SOLUTION INTRAVENOUS; SUBCUTANEOUS at 18:05

## 2024-12-08 RX ADMIN — METOPROLOL TARTRATE 25 MG: 25 TABLET, FILM COATED ORAL at 07:31

## 2024-12-08 RX ADMIN — VANCOMYCIN 1000 MG: 1 INJECTION, SOLUTION INTRAVENOUS at 09:58

## 2024-12-08 RX ADMIN — Medication 80 MCG: at 10:29

## 2024-12-08 RX ADMIN — Medication 80 MCG: at 10:24

## 2024-12-08 RX ADMIN — GUAIFENESIN 600 MG: 600 TABLET, MULTILAYER, EXTENDED RELEASE ORAL at 20:43

## 2024-12-08 RX ADMIN — INSULIN LISPRO 2 UNITS: 100 INJECTION, SOLUTION INTRAVENOUS; SUBCUTANEOUS at 20:43

## 2024-12-08 RX ADMIN — GUAIFENESIN 200 MG: 200 SOLUTION ORAL at 16:51

## 2024-12-08 ASSESSMENT — PAIN SCALES - GENERAL
PAINLEVEL_OUTOF10: 0

## 2024-12-08 NOTE — PROGRESS NOTES
.End of Shift Note    Bedside shift change report given to Nicholas COPELAND (oncoming nurse) by Luciana Ospina RN (offgoing nurse).  Report included the following information SBAR, Kardex, Intake/Output, MAR, Recent Results, and Med Rec Status    Shift worked:  2856-9256     Shift summary and any significant changes:     Pt given prescribed med's per MAR. Pt had Greater Right Toe partial amputation today. Pt returned rt foot elevated on pillows. Ortho shoe by sink when ambulating. Caring rounds completed.     Concerns for physician to address:  none     Zone phone for oncoming shift:   [phone]       Activity:  Level of Assistance: Moderate assist, patient does 50-74%  Number times ambulated in hallways past shift: 1  Number of times OOB to chair past shift: 0    Cardiac:   Cardiac Monitoring: Yes      Cardiac Rhythm: Ventricular paced    Access:  Current line(s): PIV     Genitourinary:   Urinary Status: Has not voided    Respiratory:   O2 Device: None (Room air)  Chronic home O2 use?: NO  Incentive spirometer at bedside: NO    GI:  Last BM (including prior to admit): 12/07/24  Current diet:  ADULT DIET; Regular; 4 carb choices (60 gm/meal)  Passing flatus: YES    Pain Management:   Patient states pain is manageable on current regimen: YES    Skin:  Binh Scale Score: 19  Interventions: Wound Offloading (Prevention Methods): Turning, Repositioning, Pillows    Patient Safety:  Fall Risk: Nursing Judgement-Fall Risk High(Add Comments): Yes  Fall Risk Interventions  Nursing Judgement-Fall Risk High(Add Comments): Yes  Toilet Every 2 Hours-In Advance of Need: Yes  Hourly Visual Checks: Awake, In chair  Fall Visual Posted: Armband, Fall sign posted, Socks  Room Door Open: Deferred to promote rest  Alarm On: Chair  Patient Moved Closer to Nursing Station: No    Active Consults:   IP CONSULT TO ONCOLOGY  IP CONSULT TO PODIATRY  IP CONSULT TO GI  IP CONSULT TO NEPHROLOGY  IP CONSULT TO VASCULAR SURGERY  IP CONSULT TO INFECTIOUS

## 2024-12-08 NOTE — BRIEF OP NOTE
Brief Postoperative Note      Patient: Maryan Resendiz  YOB: 1935  MRN: [id number]    Date of Procedure: 12/8/2024    Pre-Op Diagnosis Codes:      * Osteomyelitis of right foot, unspecified type [M86.9]    Post-Op Diagnosis: Same       Procedure(s):  RIGHT FIRST TOE AMPUTATION to mid proximal phalanx     Surgeon(s):  Nick Alcala DPM    Assistant:  * No surgical staff found *    Anesthesia: MAC    Estimated Blood Loss (mL): less than 50 no tourniquet used    Complications: None    Specimens:   ID Type Source Tests Collected by Time Destination   1 : RIGHT FIRST TOE WITH MARGINS Tissue Foot SURGICAL PATHOLOGY Nick Alcala DPM 12/8/2024 1022    A : RIGHT FOOT WOUND CULTURE Swab Foot CULTURE, ANAEROBIC, CULTURE, WOUND Nick Alcala DPM 12/8/2024 1029        Implants:  * No implants in log *      Drains: * No LDAs found *    Findings:  Infection Present At Time Of Surgery (PATOS) (choose all levels that have infection present):  - Deep Infection (muscle/fascia) present as evidenced by fluid consistent with infection  Other Findings: viable margins in the soft tissue and bone    Electronically signed by Nick Alcala DPM on 12/8/2024 at 10:51 AM

## 2024-12-08 NOTE — PROGRESS NOTES
End of Shift Note    Bedside shift change report given to Michelle COPELAND (oncoming nurse) by Hailey García RN (offgoing nurse).  Report included the following information SBAR, Kardex, MAR, and Recent Results    Shift worked: 9746-8052   Shift summary and any significant changes:    Pt remained stable throughout shift. Scheduled meds given. Ordered labs drawn and sent. Wound care completed. Caring rounds completed.        Hailey García RN

## 2024-12-08 NOTE — PROGRESS NOTES
Hospitalist Progress Note    NAME:   Maryan Resendiz   : 1935   MRN: [id number]     Date/Time: 2024 12:44 PM  Patient PCP: Prashanth Livingston MD    Estimated discharge date: 12/10  Barriers: Podiatry clearance        Assessment / Plan:  Acute anemia  Upper GI bleed  Epistaxis  Hemoglobin on presentation 7.8, baseline Hb 12-13. +FOBT  S/p 1 U PRBC for hbg 7 + presyncopal symptoms  - Discontinued Keflex as patient is off rhino rocket on     - PPI iv bid   - OAC on hold  -S/p rhino rocket for epistaxis   -EGD on 12/3 was cancelled due to intermittent episodes of epistaxis  - Gi recommended to hold plan for EGD in patient    - Hb dropped to 6.5 on - S/p  1 units of PRBC transfusion  -Hb remained stable at 7.8        Dizziness  Fall   Dizziness he could be secondary to anemia  Patient has had status post Bruise on R side of head  CT head without contrast without acute abnormality  CT cervical spine: Without fracture  CT chest: Mildly complex fluid collection in the right anterior abdominal wall has decreased in size.  Complex mass upper pole left kidney does not appear to be simple cyst, recommend renal protocol CT  EKG with ventricular paced rhythm  Repeat Echo on - Normal left ventricular systolic function with a visually estimated EF of 55 - 60%. Left ventricle size is normal. Increased wall thickness. Normal wall motion.    Right Ventricle: Right ventricle size is normal. Normal systolic function.    Mitral Valve: Mild regurgitation.    Left Atrium: Left atrium is mildly dilated.    - continue on telemetry  - PT/OT  - Fall precautions       Acute decrease in platelet count- likely medication side effect - improving   Baseline platelet count around 200  Platelet slowly  trending up- 138 at present   Possible  Related to linezolid         LUCIA on CKD  Hypophosphatemia  Baseline creatinine 1.7-1.8  S/p IVF, cr now at baseline , 1.6  Mild low sodium - resolved 138   Appreciate renal recs

## 2024-12-08 NOTE — PLAN OF CARE
Problem: Safety - Adult  Goal: Free from fall injury  Outcome: Progressing     Problem: Cognitive:  Goal: Knowledge of wound care  Description: Knowledge of wound care  Outcome: Progressing

## 2024-12-09 LAB
ANION GAP SERPL CALC-SCNC: 5 MMOL/L (ref 2–12)
BASOPHILS # BLD: 0 K/UL (ref 0–0.1)
BASOPHILS NFR BLD: 0 % (ref 0–1)
BUN SERPL-MCNC: 47 MG/DL (ref 6–20)
BUN/CREAT SERPL: 30 (ref 12–20)
CALCIUM SERPL-MCNC: 8.6 MG/DL (ref 8.5–10.1)
CHLORIDE SERPL-SCNC: 110 MMOL/L (ref 97–108)
CO2 SERPL-SCNC: 25 MMOL/L (ref 21–32)
CREAT SERPL-MCNC: 1.55 MG/DL (ref 0.7–1.3)
DIFFERENTIAL METHOD BLD: ABNORMAL
EOSINOPHIL # BLD: 0.2 K/UL (ref 0–0.4)
EOSINOPHIL NFR BLD: 2 % (ref 0–7)
ERYTHROCYTE [DISTWIDTH] IN BLOOD BY AUTOMATED COUNT: 17.1 % (ref 11.5–14.5)
FERRITIN SERPL-MCNC: 973 NG/ML (ref 26–388)
FOLATE SERPL-MCNC: 18.2 NG/ML (ref 5–21)
GLUCOSE BLD STRIP.AUTO-MCNC: 109 MG/DL (ref 65–117)
GLUCOSE BLD STRIP.AUTO-MCNC: 155 MG/DL (ref 65–117)
GLUCOSE BLD STRIP.AUTO-MCNC: 197 MG/DL (ref 65–117)
GLUCOSE BLD STRIP.AUTO-MCNC: 93 MG/DL (ref 65–117)
GLUCOSE SERPL-MCNC: 104 MG/DL (ref 65–100)
HCT VFR BLD AUTO: 22.9 % (ref 36.6–50.3)
HGB BLD-MCNC: 7.3 G/DL (ref 12.1–17)
IMM GRANULOCYTES # BLD AUTO: 0.1 K/UL (ref 0–0.04)
IMM GRANULOCYTES NFR BLD AUTO: 1 % (ref 0–0.5)
IRON SATN MFR SERPL: 8 % (ref 20–50)
IRON SERPL-MCNC: 14 UG/DL (ref 35–150)
LYMPHOCYTES # BLD: 0.9 K/UL (ref 0.8–3.5)
LYMPHOCYTES NFR BLD: 7 % (ref 12–49)
MCH RBC QN AUTO: 30.2 PG (ref 26–34)
MCHC RBC AUTO-ENTMCNC: 31.9 G/DL (ref 30–36.5)
MCV RBC AUTO: 94.6 FL (ref 80–99)
MONOCYTES # BLD: 1.2 K/UL (ref 0–1)
MONOCYTES NFR BLD: 10 % (ref 5–13)
NEUTS SEG # BLD: 10.1 K/UL (ref 1.8–8)
NEUTS SEG NFR BLD: 80 % (ref 32–75)
NRBC # BLD: 0.05 K/UL (ref 0–0.01)
NRBC BLD-RTO: 0.4 PER 100 WBC
PLATELET # BLD AUTO: 148 K/UL (ref 150–400)
PMV BLD AUTO: 10.6 FL (ref 8.9–12.9)
POTASSIUM SERPL-SCNC: 3.9 MMOL/L (ref 3.5–5.1)
RBC # BLD AUTO: 2.42 M/UL (ref 4.1–5.7)
SERVICE CMNT-IMP: ABNORMAL
SERVICE CMNT-IMP: ABNORMAL
SERVICE CMNT-IMP: NORMAL
SERVICE CMNT-IMP: NORMAL
SODIUM SERPL-SCNC: 140 MMOL/L (ref 136–145)
TIBC SERPL-MCNC: 183 UG/DL (ref 250–450)
VIT B12 SERPL-MCNC: 1992 PG/ML (ref 193–986)
WBC # BLD AUTO: 12.4 K/UL (ref 4.1–11.1)

## 2024-12-09 PROCEDURE — 82746 ASSAY OF FOLIC ACID SERUM: CPT

## 2024-12-09 PROCEDURE — 2580000003 HC RX 258: Performed by: INTERNAL MEDICINE

## 2024-12-09 PROCEDURE — 97535 SELF CARE MNGMENT TRAINING: CPT | Performed by: OCCUPATIONAL THERAPIST

## 2024-12-09 PROCEDURE — 82607 VITAMIN B-12: CPT

## 2024-12-09 PROCEDURE — 1100000003 HC PRIVATE W/ TELEMETRY

## 2024-12-09 PROCEDURE — 94760 N-INVAS EAR/PLS OXIMETRY 1: CPT

## 2024-12-09 PROCEDURE — 2500000003 HC RX 250 WO HCPCS: Performed by: PODIATRIST

## 2024-12-09 PROCEDURE — 97162 PT EVAL MOD COMPLEX 30 MIN: CPT

## 2024-12-09 PROCEDURE — 2580000003 HC RX 258: Performed by: PODIATRIST

## 2024-12-09 PROCEDURE — 6360000002 HC RX W HCPCS: Performed by: INTERNAL MEDICINE

## 2024-12-09 PROCEDURE — 82728 ASSAY OF FERRITIN: CPT

## 2024-12-09 PROCEDURE — 80048 BASIC METABOLIC PNL TOTAL CA: CPT

## 2024-12-09 PROCEDURE — 97530 THERAPEUTIC ACTIVITIES: CPT | Performed by: OCCUPATIONAL THERAPIST

## 2024-12-09 PROCEDURE — 6370000000 HC RX 637 (ALT 250 FOR IP): Performed by: PODIATRIST

## 2024-12-09 PROCEDURE — 36415 COLL VENOUS BLD VENIPUNCTURE: CPT

## 2024-12-09 PROCEDURE — 97530 THERAPEUTIC ACTIVITIES: CPT

## 2024-12-09 PROCEDURE — 82962 GLUCOSE BLOOD TEST: CPT

## 2024-12-09 PROCEDURE — 85025 COMPLETE CBC W/AUTO DIFF WBC: CPT

## 2024-12-09 PROCEDURE — 83540 ASSAY OF IRON: CPT

## 2024-12-09 PROCEDURE — 97116 GAIT TRAINING THERAPY: CPT

## 2024-12-09 PROCEDURE — 83550 IRON BINDING TEST: CPT

## 2024-12-09 PROCEDURE — 97166 OT EVAL MOD COMPLEX 45 MIN: CPT | Performed by: OCCUPATIONAL THERAPIST

## 2024-12-09 RX ADMIN — ATORVASTATIN CALCIUM 10 MG: 10 TABLET, FILM COATED ORAL at 08:22

## 2024-12-09 RX ADMIN — ISOSORBIDE MONONITRATE 60 MG: 30 TABLET, EXTENDED RELEASE ORAL at 20:41

## 2024-12-09 RX ADMIN — PANTOPRAZOLE SODIUM 40 MG: 40 TABLET, DELAYED RELEASE ORAL at 06:15

## 2024-12-09 RX ADMIN — CEFEPIME 2000 MG: 2 INJECTION, POWDER, FOR SOLUTION INTRAVENOUS at 17:29

## 2024-12-09 RX ADMIN — DOXAZOSIN 4 MG: 2 TABLET ORAL at 08:23

## 2024-12-09 RX ADMIN — GUAIFENESIN 200 MG: 200 SOLUTION ORAL at 01:30

## 2024-12-09 RX ADMIN — GUAIFENESIN 600 MG: 600 TABLET, MULTILAYER, EXTENDED RELEASE ORAL at 20:41

## 2024-12-09 RX ADMIN — SODIUM CHLORIDE, PRESERVATIVE FREE 10 ML: 5 INJECTION INTRAVENOUS at 20:42

## 2024-12-09 RX ADMIN — GUAIFENESIN 200 MG: 200 SOLUTION ORAL at 08:32

## 2024-12-09 RX ADMIN — GUAIFENESIN 200 MG: 200 SOLUTION ORAL at 17:25

## 2024-12-09 RX ADMIN — ALLOPURINOL 300 MG: 300 TABLET ORAL at 08:23

## 2024-12-09 RX ADMIN — FINASTERIDE 5 MG: 5 TABLET, FILM COATED ORAL at 08:28

## 2024-12-09 RX ADMIN — INSULIN GLARGINE 30 UNITS: 100 INJECTION, SOLUTION SUBCUTANEOUS at 20:43

## 2024-12-09 RX ADMIN — PANTOPRAZOLE SODIUM 40 MG: 40 TABLET, DELAYED RELEASE ORAL at 17:32

## 2024-12-09 RX ADMIN — METOPROLOL TARTRATE 25 MG: 25 TABLET, FILM COATED ORAL at 08:23

## 2024-12-09 RX ADMIN — SODIUM CHLORIDE, PRESERVATIVE FREE 10 ML: 5 INJECTION INTRAVENOUS at 08:23

## 2024-12-09 RX ADMIN — METOPROLOL TARTRATE 25 MG: 25 TABLET, FILM COATED ORAL at 20:41

## 2024-12-09 RX ADMIN — INSULIN LISPRO 2 UNITS: 100 INJECTION, SOLUTION INTRAVENOUS; SUBCUTANEOUS at 17:26

## 2024-12-09 RX ADMIN — GUAIFENESIN 600 MG: 600 TABLET, MULTILAYER, EXTENDED RELEASE ORAL at 08:22

## 2024-12-09 RX ADMIN — LEVOTHYROXINE SODIUM 175 MCG: 0.15 TABLET ORAL at 06:15

## 2024-12-09 RX ADMIN — GUAIFENESIN 200 MG: 200 SOLUTION ORAL at 13:04

## 2024-12-09 ASSESSMENT — PAIN SCALES - GENERAL
PAINLEVEL_OUTOF10: 0
PAINLEVEL_OUTOF10: 0

## 2024-12-09 NOTE — OP NOTE
[facility]               [address]  [address]                            OPERATIVE REPORT      PATIENT NAME: ELIAS MARTINEZ               : 1935  MED REC NO: [id number]                       ROOM: 3410  ACCOUNT NO: [id number]                       ADMIT DATE: 2024  PROVIDER: Flip Donald MD    DATE OF SERVICE:  2024    PREOPERATIVE DIAGNOSES:  Right critical leg ischemia with great toe osteomyelitis and gangrene.    POSTOPERATIVE DIAGNOSES:  Right critical leg ischemia with great toe osteomyelitis and gangrene.    PROCEDURES PERFORMED:       1. Right lower extremity angiogram with catheter placement in the right external iliac artery.     2. Right superficial femoral artery angioplasty and stenting.    SURGEON:  Flip Donald MD    ASSISTANT:  SA.    ANESTHESIA:  MAC.    ESTIMATED BLOOD LOSS:  5 mL.    SPECIMENS REMOVED:  None.    INTRAOPERATIVE FINDINGS:  below     COMPLICATIONS:  None.    IMPLANTS:  Self-expanding stents listed below.    INDICATIONS:  The patient is an 89-year-old gentleman who presented with a right great toe ulceration.  There are obvious signs of infection and gangrene.  He underwent ankle-brachial index that showed YASMIN of 0.6.  He was admitted for antibiotics and he was found to have thrombocytopenia.  Once this got above 100, he presents for right lower extremity angiogram.  Prior to the procedure, the nature of procedure as well as the risks and benefits were explained to the patient in detail.  He elected to proceed.    DESCRIPTION OF PROCEDURE:  The patient was identified in the holding area.  His consent was signed.  He was brought back to the operating room where MAC anesthesia was induced.  His bilateral groins were prepped and draped in the usual standard fashion.  A time-out was then performed.    We began the procedure by obtaining ultrasound-guided access of the left common femoral artery.  A 6-Indonesian  showed significant improvement in flow, however, there was still a significant residual stenosis at the adductor hiatus in the 60%.  Given these findings, I then brought on the field a 6 x 100 stent and advanced it across the stenosis.  This proved to be very difficult and I could not get it across given the step-off of the 0.035 stent and a 0.014 wire.  Ultimately, I was able to switch to a stiff Glidewire and I was able to track.  I then placed a 6 x 100 stent across the area of stenosis.  There was some plaque high above this as well, so I then used a 6 x 80 self-expanding stent as well.  Both of these stents were then overlapped.  I then used a 5 x 100 balloon to perform angioplasty of the superficial femoral artery stent.  This opened it up well.  After successful angioplasty and stenting, a right lower extremity angiogram confirmed a great result with a widely patent superficial femoral artery stent with no evidence of residual stenosis.  The single-vessel runoff through the anterior tibial artery was stable.  Satisfied with the results, the catheters and wires were removed.  A nonselective left lower extremity angiogram was performed which showed good access in the kang of the bypass.  A Mynx closure device was then used successfully.  At the completion of the procedure, all needle, sponge, instrument counts were correct x2.  The patient tolerated the procedure well, was brought to the recovery in stable condition.        MD AL CUELLAR/AQS  D:  12/06/2024 15:54:45  T:  12/06/2024 22:06:11  JOB #:  [id number]

## 2024-12-09 NOTE — PROGRESS NOTES
Hospitalist Progress Note    NAME:   Maryan Resendiz   : 1935   MRN: [id number]     Date/Time: 2024 1:51 PM  Patient PCP: Prashanth Livingston MD    Estimated discharge date:   Barriers: Podiatry clearance, final culture results         Assessment / Plan:  Acute anemia  Upper GI bleed  Epistaxis  Hemoglobin on presentation 7.8, baseline Hb 12-13. +FOBT  S/p 1 U PRBC for hbg 7 + presyncopal symptoms  - Discontinued Keflex as patient is off rhino rocket on     - PPI iv bid   - OAC on hold  -S/p rhino rocket for epistaxis   -EGD on 12/3 was cancelled due to intermittent episodes of epistaxis  - Gi recommended to hold plan for EGD in patient    - Hb dropped to 6.5 on - S/p  1 units of PRBC transfusion  -Hb slowly down trending to 7.3   -Discussed with Dr Cooper (nephro)- plan for anemia workup and if needed iv iron.  -Prior anemia on  was remarkable for iron ,vitamin B12 of folate deficiency       Dizziness  Fall   Dizziness he could be secondary to anemia  Patient has had status post Bruise on R side of head  CT head without contrast without acute abnormality  CT cervical spine: Without fracture  CT chest: Mildly complex fluid collection in the right anterior abdominal wall has decreased in size.  Complex mass upper pole left kidney does not appear to be simple cyst, recommend renal protocol CT  EKG with ventricular paced rhythm  Repeat Echo on - Normal left ventricular systolic function with a visually estimated EF of 55 - 60%. Left ventricle size is normal. Increased wall thickness. Normal wall motion.    Right Ventricle: Right ventricle size is normal. Normal systolic function.    Mitral Valve: Mild regurgitation.    Left Atrium: Left atrium is mildly dilated.    - continue on telemetry  - PT/OT  - Fall precautions       Acute decrease in platelet count- likely medication side effect - improving   Baseline platelet count around 200  Platelet slowly  trending up- 148 at present    Possible  Related to linezolid         LUCIA on CKD  Hypophosphatemia  Baseline creatinine 1.7-1.8  S/p IVF, cr now at baseline , 1.6  Mild low sodium - resolved 138   Appreciate renal recs      Osteomyelitis of right great toe - s/p amputation to mid proximal phalanx on 12/8   Peripheral Vascular disease S/p RLE arteriogram , angioplasty and stenting on 12/7   s/p 2 weeks of antibiotics for right toe   No  MRI with contrast because of CKD  XR right knee : showed Bony destruction involving the tip of the first distal phalanx, with overlying soft tissue defect, compatible with osteomyelitis.    - Plan was  to resume Aspirin  today as per Vascular surgery: awaiting recs    -Patient will need vascular clinic follow up in few week with Dr Vivas   - S/p amputation of right great toe by podiatry   - Wound culture with pseudomonas sps  -Biopsy result pending   -Leucocytosis ,continue to trend  -ID consulted   -Podiatry following     Asymptomatic bacteruria      Type 2 diabetes  Home medications include lispro 5 to 10 units sliding scale  Cont' lower dose of lantus, titrate prn  SSI  BG at present in acceptable range      CHF   History of Angina   S/p Pacemaker   Losartan and Aldactone on hold  -Imdur resumed   Hold lasix - does not look volume overloaded   Continue lower dose of BB for better BP control  Warfarin was discontinued by his PCP due to low Platelets, plan to hold  on discharge and follow up with PCP to resume if needed      Gout  Cont allopurinol     Hypothyroidism  Levothyroxine 175 mcg daily     BPH  Finasteride 5 mg daily  Terazosin 5 mg nightly        Medical Decision Making:   I personally reviewed labs: yes  I personally reviewed imaging: yes  I personally reviewed EKG:  Toxic drug monitoring:    Discussed case with: Patient, RN, son           Code Status: Full code  DVT Prophylaxis: SCDs, no chemoprophylaxis because of severe thrombocytopenia, active GI bleed and epistaxis  GI Prophylaxis:

## 2024-12-09 NOTE — PROGRESS NOTES
Maria Ines Smart  : 1977  MRN: [id number]  CSN: [id number]    Hospital Day: 2    CC: hospital follow-up    Post-operative Day #1  Subjective   Her pain is well controlled. She is passing gas. Her bowels are working normally.     Objective     Min/max vitals past 24 hours:   Temp  Min: 97.2 °F (36.2 °C)  Max: 99.1 °F (37.3 °C)  BP  Min: 95/56  Max: 125/84  Pulse  Min: 78  Max: 100  Resp  Min: 10  Max: 20        I/O last 3 completed shifts:  In: 1150 [I.V.:900; IV Piggyback:250]  Out: 757 [Urine:600; Drains:157]    General: well developed; well nourished  no acute distress   Abdomen: soft, non-tender; no masses  no umbilical or inguinal hernias are present  no hepato-splenomegaly   Pelvic: Not performed   Ext: Calves NT     Last 3 values            Assessment   Post-op Day #1 S/P D&C, [facility] at the time of bilateral mastectomy.      Plan   I will call pt with pathology results although I suspect they will be normal.  Pt has 2 week post op appt with me.     Glo Benavides MD  2024  08:01 CST           Attempted to see pt for OT services. Breakfast arrived and pt didn't want to get out of bed to eat.  Will follow up later today for assessment.

## 2024-12-09 NOTE — PROGRESS NOTES
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  Chronic anemia noted-  Will do anemia workup  May need IV iron/LETHA  Discussed with patient and his son.  Discussed with hospitalist           Subjective:     Seen and examined.  No new complaints to me.  Resting in bed  States that he is voiding well.  Eating breakfast well.    Objective:     VITALS:   Last 24hrs VS reviewed since prior progress note. Most recent are:  Vitals:    24 1100   BP: 137/73   Pulse: 76   Resp:    Temp:    SpO2: 93%       Intake/Output Summary (Last 24 hours) at 2024 1209  Last data filed at 2024 2331  Gross per 24 hour   Intake --   Output 470 ml   Net -470 ml          Telemetry Reviewed:     Physical Exam  Vitals and nursing note reviewed.   Constitutional:       Appearance: Normal appearance.   HENT:      Head: Normocephalic.      Nose: Nose normal.      Mouth/Throat:      Mouth: Mucous membranes are moist.   Cardiovascular:      Rate and Rhythm: Normal rate.      Pulses: Normal pulses.      Heart sounds: Normal heart sounds.   Pulmonary:      Effort: Pulmonary effort is normal.   Abdominal:      General: Abdomen is flat.   Musculoskeletal:      Cervical back: Neck supple.      Right lower leg: No edema.      Left lower leg: No edema.   Neurological:      General: No focal deficit present.      Mental Status: He is disoriented.   Psychiatric:         Behavior: Behavior normal.          Lab Data Reviewed: (see below)    Medications Reviewed: (see below)    PMH/SH reviewed - no change compared to H&P  ________________________________________________________________________  Care Plan  discussed with:  Patient     Family      RN     Care Manager                    Consultant:          Comments   >50% of visit spent in counseling and coordination of care       ________________________________________________________________________  Martha Cooper MD     Procedures: see electronic medical records for all procedures/Xrays and details which  were not copied into this note but were reviewed prior to creation of Plan.      LABS:  Recent Labs     12/07/24 0437 12/08/24 0237   WBC 10.1 12.5*   HGB 7.4* 7.8*   HCT 22.4* 23.2*   * 138*     Recent Labs     12/07/24 0437 12/08/24 0237    139   K 3.8 3.9    109*   CO2 26 23   BUN 46* 49*     No results for input(s): \"TP\", \"GLOB\", \"GGT\" in the last 72 hours.    Invalid input(s): \"SGOT\", \"GPT\", \"AP\", \"TBIL\", \"ALB\", \"AML\", \"AMYP\", \"LPSE\", \"HLPSE\"    Recent Labs     12/07/24 0437 12/08/24  0237   INR 1.3* 1.3*      No results for input(s): \"TIBC\" in the last 72 hours.    Invalid input(s): \"FE\", \"PSAT\", \"FERR\"     No results found for: \"RBCF\"   No results for input(s): \"PH\", \"PCO2\", \"PO2\" in the last 72 hours.  No results for input(s): \"CPK\", \"CKMB\", \"TROPONINI\" in the last 72 hours.  No components found for: \"GLPOC\"  @labua@    MEDICATIONS:  Current Facility-Administered Medications   Medication Dose Route Frequency    guaiFENesin (ROBITUSSIN) 100 MG/5ML liquid 200 mg  200 mg Oral Q4H PRN    guaiFENesin (MUCINEX) extended release tablet 600 mg  600 mg Oral BID    pantoprazole (PROTONIX) tablet 40 mg  40 mg Oral BID AC    [Held by provider] furosemide (LASIX) tablet 40 mg  40 mg Oral Daily    isosorbide mononitrate (IMDUR) extended release tablet 60 mg  60 mg Oral Nightly    [Held by provider] losartan (COZAAR) tablet 50 mg  50 mg Oral Daily    [Held by provider] spironolactone (ALDACTONE) tablet 25 mg  25 mg Oral Daily    insulin lispro (HUMALOG,ADMELOG) injection vial 0-8 Units  0-8 Units SubCUTAneous 4x Daily AC & HS    0.9 % sodium

## 2024-12-09 NOTE — PROGRESS NOTES
End of Shift Note    Bedside shift change report given to Richa COPELAND (oncoming nurse) by Hailey García RN (offgoing nurse).  Report included the following information SBAR, Kardex, MAR, and Recent Results    Shift worked: 1900-0730   Shift summary and any significant changes:    Pt remained stable throughout shift. Scheduled meds given-PRN liquid guaifenesin given for coughing. Ordered labs drawn and sent. Caring rounds completed.        Hailey García RN

## 2024-12-09 NOTE — PLAN OF CARE
Problem: Physical Therapy - Adult  Goal: By Discharge: Performs mobility at highest level of function for planned discharge setting.  See evaluation for individualized goals.  Description: FUNCTIONAL STATUS PRIOR TO ADMISSION: Pt lives alone and is normally fully independent with use of a quad cane at times in the home and all the time when out, uses a scooter in stores, does his own grocery shopping, Indep in ADLS, microwaves his food. 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    HOME SUPPORT PRIOR TO ADMISSION: The patient lived alone, son here now but lives in [place]    Physical Therapy Goals  Initiated 12/9/2024  1.  Patient will move from supine to sit and sit to supine, scoot up and down, and roll side to side in bed with independence within 7 day(s).    2.  Patient will perform sit to stand with modified independence within 7 day(s).  3.  Patient will transfer from bed to chair and chair to bed with modified independence using the least restrictive device within 7 day(s).  4.  Patient will ambulate with modified independence for 150 feet with the least restrictive device within 7 day(s).   5.  Patient will ascend/descend 4 stairs with one handrail(s) with supervision/set-up within 7 day(s).    Outcome: Progressing   PHYSICAL THERAPY EVALUATION    Patient: Maryan Resendiz (89 y.o. male)  Date: 12/9/2024  Primary Diagnosis: Lightheadedness [R42]  Thrombocytopenia (HCC) [D69.6]  GIB (gastrointestinal bleeding) [K92.2]  Heat syncope, sequela [T67.1XXS]  Symptomatic anemia [D64.9]  Anemia, unspecified type [D64.9]  Procedure(s) (LRB):  RIGHT FIRST TOE AMPUTATION (Right) 1 Day Post-Op   Precautions: Restrictions/Precautions: Contact Precautions, Fall Risk, Weight Bearing, Bed Alarm   Lower Extremity Weight Bearing Restrictions  Right Lower Extremity Weight Bearing: Weight Bearing As Tolerated (with post op shoe)               activity recently, how much help from another person do you think they would need if they tried?) Total A Lot A Little None   1.  Turning from your back to your side while in a flat bed without using bedrails? []  1 []  2 [x]  3  []  4   2.  Moving from lying on your back to sitting on the side of a flat bed without using bedrails? []  1 []  2 [x]  3  []  4   3.  Moving to and from a bed to a chair (including a wheelchair)? []  1 []  2 [x]  3  []  4   4. Standing up from a chair using your arms (e.g. wheelchair or bedside chair)? []  1 []  2 [x]  3  []  4   5.  Walking in hospital room? []  1 []  2 [x]  3  []  4   6.  Climbing 3-5 steps with a railing? [x]  1 []  2 []  3  []  4     Raw Score: 16/24                            Cutoff score <=171,2,3 had higher odds of discharging home with home health or need of SNF/IPR.    1. Kenzie Rodriguez, Angelita Galvez, Antione Knott, Leeann Clark, Stephen Rausch, Robin Rodriguez.  Validity of the AM-PAC “6-Clicks” Inpatient Daily Activity and Basic Mobility Short Forms. Physical Therapy Mar 2014, [phone]; DOI: 10.2522/ptj.79613847  2. Philippe ZAMORA, Babatunde J, Maria Elena J, Scotty J. Association of AM-PAC \"6-Clicks\" Basic Mobility and Daily Activity Scores With Discharge Destination. Phys Ther. 2021 Apr 4;101(4):aeqv013. doi: 10.1093/ptj/widw085. PMID: 71531369.  3. Tomás NICOLE, Nam CERON, Ijeoma S, Isadora K, Michael S. Activity Measure for Post-Acute Care \"6-Clicks\" Basic Mobility Scores Predict Discharge Destination After Acute Care Hospitalization in Select Patient Groups: A Retrospective, Observational Study. Arch Rehabil Res Clin Transl. 2022 Jul 16;4(3):661007. doi: 10.1016/j.arrct.2022.770322. PMID: 87544370; PMCID: [id number].  4. Michael ANDREA, Bella S, Ezekiel W, Kierra P. -PAC Short Forms Manual 4.0. Revised 2/2020.

## 2024-12-09 NOTE — PROGRESS NOTES
Physical Therapy    Orders received, chart reviewed and patient evaluated by physical therapy. Pending progression with skilled acute physical therapy, recommend:    High intensity/comprehensive skilled physical therapy in a multidisciplinary setting as patient is working towards tolerating up to 3 hours of therapy/day 5-7x/week    Recommend with nursing OOB to chair 3x/day and walking daily with 1 assist and rolling walker. Thank you for completing as able in order to maintain patient strength, endurance and independence.     Full evaluation to follow.

## 2024-12-09 NOTE — PLAN OF CARE
Problem: Occupational Therapy - Adult  Goal: By Discharge: Performs self-care activities at highest level of function for planned discharge setting.  See evaluation for individualized goals.  Description: FUNCTIONAL STATUS PRIOR TO ADMISSION:  ambulated with quad cane in the home at times and outside of the home, just obtained a right AFO from  [employer] due to right foot drop, warms meals in the microwave, uses electric scooter in stores, able to drive and perform ADLs/IADLs on his own, drives   ,  ,  ,  ,  ,  ,  ,  ,  ,  ,       HOME SUPPORT: Patient lived alone and son .    Occupational Therapy Goals:  Initiated 12/9/2024  1.  Patient will perform grooming standing with Modified [place] within 7 day(s).  2.  Patient will perform lower body dressing with Modified [place] within 7 day(s).  3.  Patient will perform toileting with Modified [place] within 7 day(s).  4.  Patient will perform toilet transfers with Modified [place]  within 7 day(s).    Outcome: Progressing  OCCUPATIONAL THERAPY EVALUATION    Patient: Maryan Resendiz (89 y.o. male)  Date: 12/9/2024  Primary Diagnosis: Lightheadedness [R42]  Thrombocytopenia (HCC) [D69.6]  GIB (gastrointestinal bleeding) [K92.2]  Heat syncope, sequela [T67.1XXS]  Symptomatic anemia [D64.9]  Anemia, unspecified type [D64.9]  Procedure(s) (LRB):  RIGHT FIRST TOE AMPUTATION (Right) 1 Day Post-Op     Precautions: Contact Precautions, Fall Risk, Weight Bearing, Bed Alarm Right Lower Extremity Weight Bearing: Weight Bearing As Tolerated (with post op shoe)                ASSESSMENT :  The patient is limited by decreased functional mobility, independence in ADLs, high-level IADLs, ROM, strength, activity tolerance, balance, orthostatic hypotension.    Based on the impairments listed above pt was seated on bedside commode upon arrival with supportive son at bedside.  Pt reported controlled pain a right toe amputation site and was wearing his post op  Neurology, Neurosurgery, and Psychiatry, 66(4), [phone].  Van RICHARD CasitlloA, NANCIE Sullivan, & KENA Leblanc (2004.) Assessment of post-stroke quality of life in cost-effectiveness studies: The usefulness of the Barthel Index and the EuroQoL-5D. Quality of Life Research, 13, 427-43       Activity Tolerance:   Fair , requires rest breaks, observed shortness of breath on exertion, and orthostatic but asymptomatic, issued and educated on spirometer     After treatment:   Patient left in no apparent distress sitting up in chair, Call bell within reach, Bed/ chair alarm activated, Caregiver / family present, and reclined in recliner    COMMUNICATION/EDUCATION:   The patient's plan of care was discussed with: physical therapist, registered nurse, and patient    Patient Education  Education Given To: Patient;Family  Education Provided: Role of Therapy;Plan of Care;ADL Adaptive Strategies;Transfer Training;Precautions;Mobility Training  Education Method: Verbal;Demonstration;Teach Back  Barriers to Learning: None  Education Outcome: Verbalized understanding;Continued education needed    Thank you for this referral.  Razia Mistry OTR/L  Minutes: 33    Occupational Therapy Evaluation Charge Determination   History Examination Decision-Making   MEDIUM Complexity : Expanded review of history including physical, cognitive and psychocial history  MEDIUM Complexity: 3-5 Performance deficits relating to physical, cognitive, or psychosocial skills that result in activity limitations and/or participation restrictions MEDIUM Complexity: Patient may present with comorbidities that affect occupational performance. Minimal to moderate modifications of tasks or assist (eg. physical or verbal) with assist is necessary to enable pt to complete eval   Based on the above components, the patient evaluation is determined to be of the following complexity level: Medium

## 2024-12-09 NOTE — PROGRESS NOTES
..End of Shift Note    Bedside shift change report given to MIN Chambers (oncoming nurse) by Chris Villafana RN (offgoing nurse).  Report included the following information SBAR, Kardex, Intake/Output, MAR, and Recent Results    Shift worked:  5120-2650     Shift summary and any significant changes:     Pt given prescribed meds per MAR. PRN robitussin given x3. Labs drawn and sent per order. Pt up in chair today. Insulin coverage given x1 at dinner. Caring rounds completed.          Chris Villafana RN

## 2024-12-09 NOTE — CARE COORDINATION
Transition of Care Plan:    RUR: 20%  Prior Level of Functioning: Independent with ADls  Disposition: TBD-therapy?  JABARI: 48hrs  Follow up appointments: Follow up with PCP and/or Specialist   DME needed: has acces to equipment   Transportation at discharge: family to transport   IM/IMM Medicare/ letter given: 2nd IM Medicare Letter   Is patient a [place] and connected with VA? N/A   If yes, was  transfer form completed and VA notified? N/A  Caregiver Contact: August Resendiz (child) [phone]  Discharge Caregiver contacted prior to discharge? Family to be contacted   Care Conference needed? N/A  Barriers to discharge: Medical Clearance      CM staffed case with clinical team.  Pt will remain a inpatient for 48hrs.  Pt pending podiatry and nephro clearance.  Pt is pending funding cultures.    CM to consult with physician to determine if pt is in need of therapy consult.    CM will continue to follow.    PIYUSH Song CM  [phone]

## 2024-12-10 LAB
ANION GAP SERPL CALC-SCNC: 4 MMOL/L (ref 2–12)
BASOPHILS # BLD: 0 K/UL (ref 0–0.1)
BASOPHILS NFR BLD: 0 % (ref 0–1)
BUN SERPL-MCNC: 48 MG/DL (ref 6–20)
BUN/CREAT SERPL: 30 (ref 12–20)
CALCIUM SERPL-MCNC: 8.7 MG/DL (ref 8.5–10.1)
CHLORIDE SERPL-SCNC: 109 MMOL/L (ref 97–108)
CO2 SERPL-SCNC: 24 MMOL/L (ref 21–32)
CREAT SERPL-MCNC: 1.62 MG/DL (ref 0.7–1.3)
DIFFERENTIAL METHOD BLD: ABNORMAL
EOSINOPHIL # BLD: 0.3 K/UL (ref 0–0.4)
EOSINOPHIL NFR BLD: 3 % (ref 0–7)
ERYTHROCYTE [DISTWIDTH] IN BLOOD BY AUTOMATED COUNT: 17.2 % (ref 11.5–14.5)
GLUCOSE BLD STRIP.AUTO-MCNC: 149 MG/DL (ref 65–117)
GLUCOSE BLD STRIP.AUTO-MCNC: 159 MG/DL (ref 65–117)
GLUCOSE BLD STRIP.AUTO-MCNC: 175 MG/DL (ref 65–117)
GLUCOSE BLD STRIP.AUTO-MCNC: 202 MG/DL (ref 65–117)
GLUCOSE SERPL-MCNC: 198 MG/DL (ref 65–100)
HCT VFR BLD AUTO: 24.5 % (ref 36.6–50.3)
HGB BLD-MCNC: 7.7 G/DL (ref 12.1–17)
IMM GRANULOCYTES # BLD AUTO: 0.1 K/UL (ref 0–0.04)
IMM GRANULOCYTES NFR BLD AUTO: 1 % (ref 0–0.5)
LYMPHOCYTES # BLD: 0.9 K/UL (ref 0.8–3.5)
LYMPHOCYTES NFR BLD: 9 % (ref 12–49)
MCH RBC QN AUTO: 30 PG (ref 26–34)
MCHC RBC AUTO-ENTMCNC: 31.4 G/DL (ref 30–36.5)
MCV RBC AUTO: 95.3 FL (ref 80–99)
MONOCYTES # BLD: 1 K/UL (ref 0–1)
MONOCYTES NFR BLD: 10 % (ref 5–13)
NEUTS SEG # BLD: 7.7 K/UL (ref 1.8–8)
NEUTS SEG NFR BLD: 77 % (ref 32–75)
NRBC # BLD: 0.04 K/UL (ref 0–0.01)
NRBC BLD-RTO: 0.4 PER 100 WBC
PLATELET # BLD AUTO: 176 K/UL (ref 150–400)
PMV BLD AUTO: 10.7 FL (ref 8.9–12.9)
POTASSIUM SERPL-SCNC: 4 MMOL/L (ref 3.5–5.1)
RBC # BLD AUTO: 2.57 M/UL (ref 4.1–5.7)
SERVICE CMNT-IMP: ABNORMAL
SODIUM SERPL-SCNC: 137 MMOL/L (ref 136–145)
WBC # BLD AUTO: 9.9 K/UL (ref 4.1–11.1)

## 2024-12-10 PROCEDURE — 6360000002 HC RX W HCPCS: Performed by: INTERNAL MEDICINE

## 2024-12-10 PROCEDURE — 82962 GLUCOSE BLOOD TEST: CPT

## 2024-12-10 PROCEDURE — 2580000003 HC RX 258: Performed by: PODIATRIST

## 2024-12-10 PROCEDURE — 6370000000 HC RX 637 (ALT 250 FOR IP): Performed by: PODIATRIST

## 2024-12-10 PROCEDURE — 1100000003 HC PRIVATE W/ TELEMETRY

## 2024-12-10 PROCEDURE — 80048 BASIC METABOLIC PNL TOTAL CA: CPT

## 2024-12-10 PROCEDURE — 85025 COMPLETE CBC W/AUTO DIFF WBC: CPT

## 2024-12-10 PROCEDURE — 2500000003 HC RX 250 WO HCPCS: Performed by: PODIATRIST

## 2024-12-10 PROCEDURE — 94760 N-INVAS EAR/PLS OXIMETRY 1: CPT

## 2024-12-10 PROCEDURE — 36415 COLL VENOUS BLD VENIPUNCTURE: CPT

## 2024-12-10 PROCEDURE — 2580000003 HC RX 258: Performed by: INTERNAL MEDICINE

## 2024-12-10 RX ADMIN — DOXAZOSIN 4 MG: 2 TABLET ORAL at 10:15

## 2024-12-10 RX ADMIN — LEVOTHYROXINE SODIUM 175 MCG: 0.15 TABLET ORAL at 06:30

## 2024-12-10 RX ADMIN — PANTOPRAZOLE SODIUM 40 MG: 40 TABLET, DELAYED RELEASE ORAL at 06:29

## 2024-12-10 RX ADMIN — GUAIFENESIN 200 MG: 200 SOLUTION ORAL at 16:05

## 2024-12-10 RX ADMIN — GUAIFENESIN 200 MG: 200 SOLUTION ORAL at 04:00

## 2024-12-10 RX ADMIN — ALLOPURINOL 300 MG: 300 TABLET ORAL at 10:16

## 2024-12-10 RX ADMIN — GUAIFENESIN 600 MG: 600 TABLET, MULTILAYER, EXTENDED RELEASE ORAL at 10:16

## 2024-12-10 RX ADMIN — PANTOPRAZOLE SODIUM 40 MG: 40 TABLET, DELAYED RELEASE ORAL at 16:05

## 2024-12-10 RX ADMIN — INSULIN LISPRO 2 UNITS: 100 INJECTION, SOLUTION INTRAVENOUS; SUBCUTANEOUS at 18:32

## 2024-12-10 RX ADMIN — ISOSORBIDE MONONITRATE 60 MG: 30 TABLET, EXTENDED RELEASE ORAL at 20:24

## 2024-12-10 RX ADMIN — INSULIN GLARGINE 30 UNITS: 100 INJECTION, SOLUTION SUBCUTANEOUS at 20:45

## 2024-12-10 RX ADMIN — CEFEPIME 2000 MG: 2 INJECTION, POWDER, FOR SOLUTION INTRAVENOUS at 16:13

## 2024-12-10 RX ADMIN — ATORVASTATIN CALCIUM 10 MG: 10 TABLET, FILM COATED ORAL at 10:16

## 2024-12-10 RX ADMIN — GUAIFENESIN 200 MG: 200 SOLUTION ORAL at 20:24

## 2024-12-10 RX ADMIN — CEFEPIME 2000 MG: 2 INJECTION, POWDER, FOR SOLUTION INTRAVENOUS at 02:22

## 2024-12-10 RX ADMIN — GUAIFENESIN 600 MG: 600 TABLET, MULTILAYER, EXTENDED RELEASE ORAL at 20:24

## 2024-12-10 RX ADMIN — GUAIFENESIN 200 MG: 200 SOLUTION ORAL at 10:15

## 2024-12-10 RX ADMIN — METOPROLOL TARTRATE 25 MG: 25 TABLET, FILM COATED ORAL at 10:16

## 2024-12-10 RX ADMIN — SODIUM CHLORIDE, PRESERVATIVE FREE 10 ML: 5 INJECTION INTRAVENOUS at 10:16

## 2024-12-10 RX ADMIN — SODIUM CHLORIDE, PRESERVATIVE FREE 10 ML: 5 INJECTION INTRAVENOUS at 20:50

## 2024-12-10 RX ADMIN — IRON SUCROSE 200 MG: 20 INJECTION, SOLUTION INTRAVENOUS at 16:05

## 2024-12-10 RX ADMIN — METOPROLOL TARTRATE 25 MG: 25 TABLET, FILM COATED ORAL at 20:24

## 2024-12-10 NOTE — PROGRESS NOTES
End of Shift Note    Bedside shift change report given to Chris (oncoming nurse) by Tiff Almanzar RN (offgoing nurse).  Report included the following information SBAR, Kardex, and MAR    Shift worked:  7p-7a     Shift summary and any significant changes:     Humalog was held due to the patients blood glucose level, see MAR.  Scheduled medications were given, see MAR.  Patient was given Robitussin once, see PRN MAR.  Patient is up with the assistance of one with a walker.  Patient uses the urinal at bedside.  Family member was present at bedside at change of shift.  IV has been flushed and is patent.  Patient teaching and routine rounding has been done.     Concerns for physician to address:       Zone phone for oncoming shift:          Activity:  Level of Assistance: Moderate assist, patient does 50-74%  Number times ambulated in hallways past shift: 0  Number of times OOB to chair past shift: 0    Cardiac:   Cardiac Monitoring: No      Cardiac Rhythm: Ventricular paced    Access:  Current line(s): PIV     Genitourinary:   Urinary Status: Voiding    Respiratory:   O2 Device: None (Room air)  Chronic home O2 use?: NO  Incentive spirometer at bedside: NO    GI:  Last BM (including prior to admit): 12/07/24 (per chart)  Current diet:  ADULT DIET; Regular; 4 carb choices (60 gm/meal)  Passing flatus: YES    Pain Management:   Patient states pain is manageable on current regimen: YES    Skin:  Binh Scale Score: 18  Interventions: Wound Offloading (Prevention Methods): Blankets, Pillows, Repositioning, Elevate heels    Patient Safety:  Fall Risk: Nursing Judgement-Fall Risk High(Add Comments): Yes  Fall Risk Interventions  Nursing Judgement-Fall Risk High(Add Comments): Yes  Toilet Every 2 Hours-In Advance of Need: Yes  Hourly Visual Checks: Awake, In bed  Fall Visual Posted: Socks, Armband, Fall sign posted  Room Door Open: Deferred to promote rest  Alarm On: Bed  Patient Moved Closer to Nursing Station:  No    Active Consults:   IP CONSULT TO ONCOLOGY  IP CONSULT TO PODIATRY  IP CONSULT TO GI  IP CONSULT TO NEPHROLOGY  IP CONSULT TO VASCULAR SURGERY  IP CONSULT TO INFECTIOUS DISEASES    Length of Stay:  Expected LOS: 12  Actual LOS: 11    Tiff Almanzar RN

## 2024-12-10 NOTE — CARE COORDINATION
Transition of Care Plan:    RUR: 20%  Prior Level of Functioning: Independent with ADls  Disposition: TBD-therapy?  JABARI: 48hrs  Follow up appointments: Follow up with PCP and/or Specialist   DME needed: has acces to equipment   Transportation at discharge: family to transport   IM/IMM Medicare/ letter given: 2nd IM Medicare Letter   Is patient a [place] and connected with VA? N/A   If yes, was  transfer form completed and VA notified? N/A  Caregiver Contact: August Resendiz (child) [phone]  Discharge Caregiver contacted prior to discharge? Family to be contacted   Care Conference needed? N/A  Barriers to discharge: Medical Clearance    CM staffed case with clinical teamand pt will remain as inpatient for 24-48hrs.  Pt pending podiatry clearance and ID clearance.    CM aware that pts family are wanting snf placement.  CM completed room visit with pt and son at bedside.  Pts son selected [facility].    CM sent referral to snf, via epic and pt was accepted.  No insurance auth needed.    PIYUSH Song   [phone]

## 2024-12-10 NOTE — PROGRESS NOTES
..End of Shift Note    Bedside shift change report given to MIN Ruano (oncoming nurse) by Chris Villafana RN (offgoing nurse).  Report included the following information SBAR, Kardex, Intake/Output, MAR, and Recent Results    Shift worked:  8556-5392     Shift summary and any significant changes:     Pt given prescribed meds per MAR. PRN Robitussin given. Pt up in chair. Had x1 BM. No complaints of pain. Labs drawn and sent per order. Son at bedside updated and aware of plan of care. Caring rounds completed.        Chris Villafana RN

## 2024-12-10 NOTE — PROGRESS NOTES
Reviewed the path report clear margins   Can be discharged as per Podiatry to LTC for skilled PT and wound care   With following instructions and recommendations    Abx as per ID recommendations  Wound care once a week with adaptic, 4/4's, loosly applied Kirlex after NS cleansing  As tolerated weight bearing on the right foot with a post op shoe   Follow up in my office 2 weeks from discharge     If need further assistance from me please reach out to me on perfect serv or my cell at [phone]

## 2024-12-10 NOTE — PLAN OF CARE
Problem: Chronic Conditions and Co-morbidities  Goal: Patient's chronic conditions and co-morbidity symptoms are monitored and maintained or improved  Outcome: Progressing     Problem: Safety - Adult  Goal: Free from fall injury  Outcome: Progressing     Problem: Cognitive:  Goal: Knowledge of wound care  Description: Knowledge of wound care  Outcome: Progressing  Goal: Understands risk factors for wounds  Description: Understands risk factors for wounds  Outcome: Progressing     Problem: Wound:  Goal: Will show signs of wound healing; wound closure and no evidence of infection  Description: Will show signs of wound healing; wound closure and no evidence of infection  Outcome: Progressing     Problem: Smoking cessation:  Goal: Ability to formulate a plan to maintain a tobacco-free life will be supported  Description: Ability to formulate a plan to maintain a tobacco-free life will be supported  Outcome: Progressing     Problem: Falls - Risk of:  Goal: Will remain free from falls  Description: Will remain free from falls  Outcome: Progressing     Problem: Blood Glucose:  Goal: Ability to maintain appropriate glucose levels will improve  Description: Ability to maintain appropriate glucose levels will improve  Outcome: Progressing     Problem: ABCDS Injury Assessment  Goal: Absence of physical injury  Outcome: Progressing     Problem: Pain  Goal: Verbalizes/displays adequate comfort level or baseline comfort level  Outcome: Progressing     Problem: Skin/Tissue Integrity  Goal: Absence of new skin breakdown  Description: 1.  Monitor for areas of redness and/or skin breakdown  2.  Assess vascular access sites hourly  3.  Every 4-6 hours minimum:  Change oxygen saturation probe site  4.  Every 4-6 hours:  If on nasal continuous positive airway pressure, respiratory therapy assess nares and determine need for appliance change or resting period.  Outcome: Progressing

## 2024-12-10 NOTE — PROGRESS NOTES
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  This may perhaps lower the chances of him needing blood transfusion in the future.  She will renal function is baseline.  Will give IV Venofer 200 mg IV daily while he is in the hospital         Subjective:     Seen and examined.  No new complaints to me.  Accompanied by son    Objective:     VITALS:   Last 24hrs VS reviewed since prior progress note. Most recent are:  Vitals:    24   BP: (!) 154/65   Pulse: 70   Resp: 18   Temp: 97.3 °F (36.3 °C)   SpO2: 97%       Intake/Output Summary (Last 24 hours) at 12/10/2024 1116  Last data filed at 12/10/2024 0402  Gross per 24 hour   Intake --   Output 575 ml   Net -575 ml          Telemetry Reviewed:     Physical Exam  Vitals and nursing note reviewed.   Constitutional:       Appearance: Normal appearance.   HENT:      Head: Normocephalic.      Nose: Nose normal.      Mouth/Throat:      Mouth: Mucous membranes are moist.   Cardiovascular:      Rate and Rhythm: Normal rate.      Pulses: Normal pulses.      Heart sounds: Normal heart sounds.   Pulmonary:      Effort: Pulmonary effort is normal.   Abdominal:      General: Abdomen is flat.   Musculoskeletal:      Cervical back: Neck supple.      Right lower leg: No edema.      Left lower leg: No edema.   Neurological:      General: No focal deficit present.      Mental Status: He is disoriented.   Psychiatric:         Behavior: Behavior normal.          Lab Data Reviewed:  (see below)    Medications Reviewed: (see below)    PMH/SH reviewed - no change compared to H&P  ________________________________________________________________________  Care Plan discussed with:  Patient     Family      RN     Care Manager                    Consultant:          Comments   >50% of visit spent in counseling and coordination of care       ________________________________________________________________________  Martha Cooper MD     Procedures: see electronic medical records for all procedures/Xrays and details which  were not copied into this note but were reviewed prior to creation of Plan.      LABS:  Recent Labs     12/08/24 0237 12/09/24  1222   WBC 12.5* 12.4*   HGB 7.8* 7.3*   HCT 23.2* 22.9*   * 148*     Recent Labs     12/08/24 0237 12/09/24  1222    140   K 3.9 3.9   * 110*   CO2 23 25   BUN 49* 47*     No results for input(s): \"TP\", \"GLOB\", \"GGT\" in the last 72 hours.    Invalid input(s): \"SGOT\", \"GPT\", \"AP\", \"TBIL\", \"ALB\", \"AML\", \"AMYP\", \"LPSE\", \"HLPSE\"    Recent Labs     12/08/24  0237   INR 1.3*      Recent Labs     12/09/24  1222   TIBC 183*        No results found for: \"RBCF\"   No results for input(s): \"PH\", \"PCO2\", \"PO2\" in the last 72 hours.  No results for input(s): \"CPK\", \"CKMB\", \"TROPONINI\" in the last 72 hours.  No components found for: \"GLPOC\"  @labua@    MEDICATIONS:  Current Facility-Administered Medications   Medication Dose Route Frequency    ceFEPIme (MAXIPIME) 2,000 mg in sodium chloride 0.9 % 100 mL IVPB (mini-bag)  2,000 mg IntraVENous Q12H    guaiFENesin (ROBITUSSIN) 100 MG/5ML liquid 200 mg  200 mg Oral Q4H PRN    guaiFENesin (MUCINEX) extended release tablet 600 mg  600 mg Oral BID    pantoprazole (PROTONIX) tablet 40 mg  40 mg Oral BID AC    [Held by provider] furosemide (LASIX) tablet 40 mg  40 mg Oral Daily    isosorbide mononitrate (IMDUR) extended release tablet 60 mg  60 mg Oral Nightly    [Held by provider] losartan (COZAAR) tablet

## 2024-12-10 NOTE — PROGRESS NOTES
Hospitalist Progress Note    NAME:   Maryan Resendiz   : 1935   MRN: [id number]     Date/Time: 12/10/2024 2:04 PM  Patient PCP: Prashanth Livingston MD    Estimated discharge date:   Barriers: Podiatry clearance, final culture results - final antibiotic recommendations         Assessment / Plan:  Acute anemia  Iron deficiency anemia with %Sat-8   Possible Upper GI bleed  Epistaxis  Hemoglobin on presentation 7.8, baseline Hb 12-13. +FOBT  S/p 2 U PRBC for hbg 7 + presyncopal symptoms  - Discontinued Keflex as patient is off rhino rocket on   --S/p rhino rocket for epistaxis   -EGD on 12/3 was cancelled due to intermittent episodes of epistaxis  - Gi recommended to hold plan for EGD in patient    - PPI  bid   - OAC on hold  -Hb  has remained stable >7 but slowly down trending , no labs from today   -Discussed with Dr Cooper (nephro)- plan for iv iron during hospitalization          Dizziness  Fall   Dizziness possible  secondary to anemia  Patient has had status post Bruise on R side of head- resolving   CT head without contrast without acute abnormality  CT cervical spine: Without fracture  CT chest: Mildly complex fluid collection in the right anterior abdominal wall has decreased in size.  Complex mass upper pole left kidney does not appear to be simple cyst, recommend renal protocol CT  EKG with ventricular paced rhythm  Repeat Echo on - Normal left ventricular systolic function with a visually estimated EF of 55 - 60%. Left ventricle size is normal. Increased wall thickness. Normal wall motion.    Right Ventricle: Right ventricle size is normal. Normal systolic function.    Mitral Valve: Mild regurgitation.    Left Atrium: Left atrium is mildly dilated.      - PT/OT: recommended rehab   -Patient accepted to [facility]   - Appreciated CM help        Acute decrease in platelet count- likely medication side effect - improving ,Possible  Related to linezolid   Baseline platelet  reviewed imaging: yes  I personally reviewed EKG:  Toxic drug monitoring:    Discussed case with: Patient, RN, son           Code Status: Full code  DVT Prophylaxis: SCDs, no chemoprophylaxis because of severe thrombocytopenia, active GI bleed and epistaxis  GI Prophylaxis: Protonix  Subjective:     Chief Complaint / Reason for Physician Visit  \"He states feeling okay\".  Discussed with RN events overnight.       Objective:     VITALS:   Last 24hrs VS reviewed since prior progress note. Most recent are:  Patient Vitals for the past 24 hrs:   BP Temp Temp src Pulse Resp SpO2   12/09/24 2004 (!) 154/65 97.3 °F (36.3 °C) Axillary 70 18 97 %         Intake/Output Summary (Last 24 hours) at 12/10/2024 1404  Last data filed at 12/10/2024 0402  Gross per 24 hour   Intake --   Output 575 ml   Net -575 ml        I had a face to face encounter and independently examined this patient on 12/10/2024, as outlined below:  PHYSICAL EXAM:  General: Alert, cooperative  EENT:  EOMI. Anicteric sclerae.  Resp:  CTA bilaterally, no wheezing or rales.  No accessory muscle use  CV:  Regular  rhythm,  No edema  GI:  Soft, Non distended, Non tender.  +Bowel sounds  Neurologic:  Alert and oriented X 3, normal speech,   Psych:   Good insight. 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      LABS:  I reviewed today's most current labs and imaging studies.  Pertinent labs include:  Recent Labs     12/08/24  0237 12/09/24  1222   WBC 12.5* 12.4*   HGB 7.8* 7.3*   HCT 23.2* 22.9*   *

## 2024-12-11 LAB
ANION GAP SERPL CALC-SCNC: 4 MMOL/L (ref 2–12)
BACTERIA SPEC CULT: ABNORMAL
BASOPHILS # BLD: 0 K/UL (ref 0–0.1)
BASOPHILS NFR BLD: 0 % (ref 0–1)
BUN SERPL-MCNC: 48 MG/DL (ref 6–20)
BUN/CREAT SERPL: 32 (ref 12–20)
CALCIUM SERPL-MCNC: 8.2 MG/DL (ref 8.5–10.1)
CHLORIDE SERPL-SCNC: 111 MMOL/L (ref 97–108)
CO2 SERPL-SCNC: 25 MMOL/L (ref 21–32)
CREAT SERPL-MCNC: 1.52 MG/DL (ref 0.7–1.3)
DIFFERENTIAL METHOD BLD: ABNORMAL
EOSINOPHIL # BLD: 0.3 K/UL (ref 0–0.4)
EOSINOPHIL NFR BLD: 3 % (ref 0–7)
ERYTHROCYTE [DISTWIDTH] IN BLOOD BY AUTOMATED COUNT: 17.5 % (ref 11.5–14.5)
GLUCOSE BLD STRIP.AUTO-MCNC: 131 MG/DL (ref 65–117)
GLUCOSE BLD STRIP.AUTO-MCNC: 134 MG/DL (ref 65–117)
GLUCOSE BLD STRIP.AUTO-MCNC: 155 MG/DL (ref 65–117)
GLUCOSE BLD STRIP.AUTO-MCNC: 80 MG/DL (ref 65–117)
GLUCOSE SERPL-MCNC: 87 MG/DL (ref 65–100)
GRAM STN SPEC: ABNORMAL
GRAM STN SPEC: ABNORMAL
HCT VFR BLD AUTO: 23.1 % (ref 36.6–50.3)
HGB BLD-MCNC: 7.3 G/DL (ref 12.1–17)
IMM GRANULOCYTES # BLD AUTO: 0.1 K/UL (ref 0–0.04)
IMM GRANULOCYTES NFR BLD AUTO: 1 % (ref 0–0.5)
LYMPHOCYTES # BLD: 0.8 K/UL (ref 0.8–3.5)
LYMPHOCYTES NFR BLD: 8 % (ref 12–49)
MCH RBC QN AUTO: 30.7 PG (ref 26–34)
MCHC RBC AUTO-ENTMCNC: 31.6 G/DL (ref 30–36.5)
MCV RBC AUTO: 97.1 FL (ref 80–99)
MONOCYTES # BLD: 1.2 K/UL (ref 0–1)
MONOCYTES NFR BLD: 12 % (ref 5–13)
NEUTS SEG # BLD: 7.4 K/UL (ref 1.8–8)
NEUTS SEG NFR BLD: 76 % (ref 32–75)
NRBC # BLD: 0.04 K/UL (ref 0–0.01)
NRBC BLD-RTO: 0.4 PER 100 WBC
PLATELET # BLD AUTO: 166 K/UL (ref 150–400)
PMV BLD AUTO: 10.3 FL (ref 8.9–12.9)
POTASSIUM SERPL-SCNC: 3.9 MMOL/L (ref 3.5–5.1)
RBC # BLD AUTO: 2.38 M/UL (ref 4.1–5.7)
RBC MORPH BLD: ABNORMAL
SERVICE CMNT-IMP: ABNORMAL
SERVICE CMNT-IMP: NORMAL
SODIUM SERPL-SCNC: 140 MMOL/L (ref 136–145)
WBC # BLD AUTO: 9.8 K/UL (ref 4.1–11.1)

## 2024-12-11 PROCEDURE — 1100000003 HC PRIVATE W/ TELEMETRY

## 2024-12-11 PROCEDURE — 2580000003 HC RX 258: Performed by: INTERNAL MEDICINE

## 2024-12-11 PROCEDURE — 36415 COLL VENOUS BLD VENIPUNCTURE: CPT

## 2024-12-11 PROCEDURE — 94760 N-INVAS EAR/PLS OXIMETRY 1: CPT

## 2024-12-11 PROCEDURE — 6360000002 HC RX W HCPCS: Performed by: INTERNAL MEDICINE

## 2024-12-11 PROCEDURE — 80048 BASIC METABOLIC PNL TOTAL CA: CPT

## 2024-12-11 PROCEDURE — 2500000003 HC RX 250 WO HCPCS: Performed by: PODIATRIST

## 2024-12-11 PROCEDURE — C1751 CATH, INF, PER/CENT/MIDLINE: HCPCS

## 2024-12-11 PROCEDURE — 2580000003 HC RX 258: Performed by: PODIATRIST

## 2024-12-11 PROCEDURE — 97116 GAIT TRAINING THERAPY: CPT

## 2024-12-11 PROCEDURE — 99223 1ST HOSP IP/OBS HIGH 75: CPT | Performed by: INTERNAL MEDICINE

## 2024-12-11 PROCEDURE — 6370000000 HC RX 637 (ALT 250 FOR IP): Performed by: PODIATRIST

## 2024-12-11 PROCEDURE — 76937 US GUIDE VASCULAR ACCESS: CPT

## 2024-12-11 PROCEDURE — 82962 GLUCOSE BLOOD TEST: CPT

## 2024-12-11 PROCEDURE — 85025 COMPLETE CBC W/AUTO DIFF WBC: CPT

## 2024-12-11 PROCEDURE — 36410 VNPNXR 3YR/> PHY/QHP DX/THER: CPT

## 2024-12-11 PROCEDURE — 6370000000 HC RX 637 (ALT 250 FOR IP): Performed by: STUDENT IN AN ORGANIZED HEALTH CARE EDUCATION/TRAINING PROGRAM

## 2024-12-11 PROCEDURE — 97535 SELF CARE MNGMENT TRAINING: CPT

## 2024-12-11 RX ADMIN — DOXAZOSIN 4 MG: 2 TABLET ORAL at 08:56

## 2024-12-11 RX ADMIN — ALLOPURINOL 300 MG: 300 TABLET ORAL at 08:56

## 2024-12-11 RX ADMIN — IRON SUCROSE 200 MG: 20 INJECTION, SOLUTION INTRAVENOUS at 11:10

## 2024-12-11 RX ADMIN — ISOSORBIDE MONONITRATE 60 MG: 30 TABLET, EXTENDED RELEASE ORAL at 21:41

## 2024-12-11 RX ADMIN — CEFEPIME 2000 MG: 2 INJECTION, POWDER, FOR SOLUTION INTRAVENOUS at 02:49

## 2024-12-11 RX ADMIN — ATORVASTATIN CALCIUM 10 MG: 10 TABLET, FILM COATED ORAL at 08:56

## 2024-12-11 RX ADMIN — PANTOPRAZOLE SODIUM 40 MG: 40 TABLET, DELAYED RELEASE ORAL at 14:56

## 2024-12-11 RX ADMIN — GUAIFENESIN 200 MG: 200 SOLUTION ORAL at 06:06

## 2024-12-11 RX ADMIN — GUAIFENESIN 600 MG: 600 TABLET, MULTILAYER, EXTENDED RELEASE ORAL at 08:58

## 2024-12-11 RX ADMIN — GUAIFENESIN 600 MG: 600 TABLET, MULTILAYER, EXTENDED RELEASE ORAL at 21:41

## 2024-12-11 RX ADMIN — METOPROLOL TARTRATE 25 MG: 25 TABLET, FILM COATED ORAL at 08:56

## 2024-12-11 RX ADMIN — FINASTERIDE 5 MG: 5 TABLET, FILM COATED ORAL at 09:02

## 2024-12-11 RX ADMIN — FUROSEMIDE 40 MG: 40 TABLET ORAL at 08:56

## 2024-12-11 RX ADMIN — PANTOPRAZOLE SODIUM 40 MG: 40 TABLET, DELAYED RELEASE ORAL at 06:04

## 2024-12-11 RX ADMIN — SODIUM CHLORIDE, PRESERVATIVE FREE 10 ML: 5 INJECTION INTRAVENOUS at 09:08

## 2024-12-11 RX ADMIN — PIPERACILLIN AND TAZOBACTAM 3375 MG: 3; .375 INJECTION, POWDER, LYOPHILIZED, FOR SOLUTION INTRAVENOUS at 21:39

## 2024-12-11 RX ADMIN — METOPROLOL TARTRATE 25 MG: 25 TABLET, FILM COATED ORAL at 21:41

## 2024-12-11 RX ADMIN — SODIUM CHLORIDE, PRESERVATIVE FREE 10 ML: 5 INJECTION INTRAVENOUS at 21:44

## 2024-12-11 RX ADMIN — GUAIFENESIN 200 MG: 200 SOLUTION ORAL at 11:13

## 2024-12-11 RX ADMIN — PIPERACILLIN AND TAZOBACTAM 4500 MG: 4; .5 INJECTION, POWDER, FOR SOLUTION INTRAVENOUS at 14:58

## 2024-12-11 RX ADMIN — LEVOTHYROXINE SODIUM 175 MCG: 0.15 TABLET ORAL at 06:04

## 2024-12-11 RX ADMIN — INSULIN GLARGINE 30 UNITS: 100 INJECTION, SOLUTION SUBCUTANEOUS at 21:41

## 2024-12-11 RX ADMIN — GUAIFENESIN 200 MG: 200 SOLUTION ORAL at 21:41

## 2024-12-11 RX ADMIN — GUAIFENESIN 200 MG: 200 SOLUTION ORAL at 17:35

## 2024-12-11 ASSESSMENT — PAIN SCALES - GENERAL: PAINLEVEL_OUTOF10: 0

## 2024-12-11 NOTE — PROGRESS NOTES
End of Shift Note    Bedside shift change report given to MIN Lloyd (oncoming nurse) by THERESA AMADOR RN (offgoing nurse).  Report included the following information SBAR, Kardex, Intake/Output, and MAR    Shift worked:  8981-1004     Shift summary and any significant changes:     Patient had no complaints overnight. Pt sat up in the chair until about 11pm. Pt rested. Scheduled medications given per MAR. Labs collected.     Concerns for physician to address:    Zone phone for oncoming shift:       Activity:  Level of Assistance: Moderate assist, patient does 50-74%  Number times ambulated in hallways past shift: 0  Number of times OOB to chair past shift: 1    Cardiac:   Cardiac Monitoring: No      Cardiac Rhythm: Ventricular paced    Access:  Current line(s): PIV     Genitourinary:   Urinary Status: Voiding    Respiratory:   O2 Device: None (Room air)  Chronic home O2 use?: NO  Incentive spirometer at bedside: YES    GI:  Last BM (including prior to admit): 12/09/24  Current diet:  ADULT DIET; Regular; 4 carb choices (60 gm/meal)  Passing flatus: YES    Pain Management:   Patient states pain is manageable on current regimen: N/A    Skin:  Binh Scale Score: 19  Interventions: Wound Offloading (Prevention Methods): Repositioning, Turning    Patient Safety:  Fall Risk: Nursing Judgement-Fall Risk High(Add Comments): Yes  Fall Risk Interventions  Nursing Judgement-Fall Risk High(Add Comments): Yes  Toilet Every 2 Hours-In Advance of Need: Yes  Hourly Visual Checks: Awake, In bed  Fall Visual Posted: Socks  Room Door Open: Deferred to promote rest  Alarm On: Bed  Patient Moved Closer to Nursing Station: No    Active Consults:   IP CONSULT TO ONCOLOGY  IP CONSULT TO PODIATRY  IP CONSULT TO GI  IP CONSULT TO NEPHROLOGY  IP CONSULT TO VASCULAR SURGERY  IP CONSULT TO INFECTIOUS DISEASES    Length of Stay:  Expected LOS: 12  Actual LOS: 12    THERESA AMADOR RN

## 2024-12-11 NOTE — CONSULTS
spontaneous flow.   No evidence of deep vein or superficial vein thrombosis in the left upper extremity. The left internal jugular, subclavian, axillary, brachial, radial, ulnar, basilic, and cephalic veins were imaged in the transverse view and showed normal compressibility. The left internal jugular, brachial, subclavian, and axillary veins were imaged in the longitudinal view and showed normal color filling and normal phasic and spontaneous flow.     Echo (TTE) complete (PRN contrast/bubble/strain/3D)    Result Date: 12/2/2024    Left Ventricle: Normal left ventricular systolic function with a visually estimated EF of 55 - 60%. Left ventricle size is normal. Increased wall thickness. Normal wall motion.   Right Ventricle: Right ventricle size is normal. Normal systolic function.   Mitral Valve: Mild regurgitation.   Left Atrium: Left atrium is mildly dilated.   Image quality is adequate.     Vascular duplex lower extremity venous bilateral    Result Date: 11/30/2024    No evidence of deep vein thrombosis in the right lower extremity.   No evidence of deep vein thrombosis in the left lower extremity.     XR FOOT RIGHT (MIN 3 VIEWS)    Result Date: 11/29/2024  EXAM: XR FOOT RIGHT (MIN 3 VIEWS) INDICATION: rule out osteo. COMPARISON: None. FINDINGS: Three views of the right foot demonstrate bony destruction involving the tip of the first distal phalanx. There is and overlying soft tissue defect. Degenerative changes are seen in the midfoot, second MTP joint, and interphalangeal joints..     Bony destruction involving the tip of the first distal phalanx, with overlying soft tissue defect, compatible with osteomyelitis. Electronically signed by Devon Isbell    CT CHEST ABDOMEN PELVIS WO CONTRAST Additional Contrast? None    Result Date: 11/29/2024  INDICATION: Fall, low hgb on warfarin, Eval for fluid collection, fracture COMPARISON: 12/4/2023, 10/25/2023 TECHNIQUE: Noncontrast helical CT of the chest, abdomen, and  matter differentiation is well-preserved. The mastoid air cells are well pneumatized. The visualized paranasal sinuses are normal.     No acute intracranial hemorrhage, mass or infarct. Electronically signed by MD Kenzie Lopez MD FACP

## 2024-12-11 NOTE — PROGRESS NOTES
End of Shift Note    Bedside shift change report given to Bindu Carmona RN (oncoming nurse) by Mila Baig RN (offgoing nurse).  Report included the following information SBAR, MAR, and Recent Results    Shift worked:  7A-7P     Shift summary and any significant changes:    Patient tolerated care. Son at bedside most of day helping with ADLs. Patient sat in chair around 12pm for about three hours. Scheduled medications given per MAR. Midline placed for long term IV antibiotics. Robitussin PRN given two times.    Caring rounds completed.     Concerns for physician to address:       Zone phone for oncoming shift:          Activity:  Level of Assistance: Moderate assist, patient does 50-74%  Number times ambulated in hallways past shift: 1  Number of times OOB to chair past shift: 1    Cardiac:   Cardiac Monitoring: Yes      Cardiac Rhythm: Ventricular paced    Access:  Current line(s): PIV  and midline    Genitourinary:   Urinary Status: Voiding    Respiratory:   O2 Device: None (Room air)  Chronic home O2 use?: NO  Incentive spirometer at bedside: NO    GI:  Last BM (including prior to admit): 12/10/24  Current diet:  ADULT DIET; Regular; 4 carb choices (60 gm/meal)  Passing flatus: NO    Pain Management:   Patient states pain is manageable on current regimen: YES    Skin:  Binh Scale Score: 19  Interventions: Wound Offloading (Prevention Methods): Repositioning    Patient Safety:  Fall Risk: Nursing Judgement-Fall Risk High(Add Comments): Yes  Fall Risk Interventions  Nursing Judgement-Fall Risk High(Add Comments): Yes  Toilet Every 2 Hours-In Advance of Need: Yes  Hourly Visual Checks: Awake, In bed  Fall Visual Posted: Socks  Room Door Open: Deferred to decrease stimulation  Alarm On: Bed  Patient Moved Closer to Nursing Station: No    Active Consults:   IP CONSULT TO ONCOLOGY  IP CONSULT TO PODIATRY  IP CONSULT TO GI  IP CONSULT TO NEPHROLOGY  IP CONSULT TO VASCULAR SURGERY  IP CONSULT TO INFECTIOUS

## 2024-12-11 NOTE — CONSULTS
MIDLINE INSERTION PROCEDURE    INDICATIONS:    The Midline procedure, risks and benefits were discussed with patient and family All questions were answered. patient and family verbalized understanding and agreed to proceed. Midline education/instructions provided to patient and family    PROCEDURE DETAILS:  The patient was positioned and Ultrasound was used to confirm patency of the left Basilic Vein prior to obtaining venous access. The arm was scrubbed with 2% chlorhexidine per guidelines, allowed to dry and a maximum barrier sterile field was established for the patient. The clinician was attired  with cap, mask, sterile gloves and sterile gown. After verifying the vein again under ultrasound, 1% lidocaine was injected at intended puncture site and the left Basilic Vein was then accessed with a 21 gauge single wall needle under direct sonographic guidance. Guidewire was advanced, the needle was removed, and a peel away sheath was placed. The catheter was trimmed and advanced through the peel away sheath. The sheath was removed, brisk blood return confirmed, the catheter was flushed with normal saline and capped. The catheter was stabilized on the skin with a securement device. An antimicrobial impregnated dressing was applied using aseptic technique.    Patient did tolerate the procedure well.    Catheter Type:   Insertion site: left Basilic Vein  Catheter Length:  15 cm  External Length:0 cm  UAC: 29cm  Midline occupies 6 % of vein     MIDLINE: 4 FR  [employer] Midline  Reference #: [id number]  Lot#: [id number]  Expiration date: 02-    FINDINGS/CONCLUSIONS:  No signs of bleeding or symptoms of nerve irritation noted at time of procedure. Tip location is below the level of the axilla in the left Basilic Vein.    Midline is ready for immediate use.    Report given to bedside nurse:       Leila WHITEHEADN, RN  Vascular Access Team

## 2024-12-11 NOTE — DISCHARGE INSTRUCTIONS
Recommendations as per Podiatry:  Wound care once a week with adaptic, 4/4's, loosly applied Kirlex after NS cleansing  As tolerated weight bearing on the right foot with a post op shoe   Follow up in my office 2 weeks from discharge     Antimicrobial orders for discharge  -Zosyn 3.375 g IV every 8 hours  end date 12/22  -May administer as continuous infusion  -Pull line at end of therapy.    -Weekly CBC, CMP-  -Fax reports to [phone], call with critical labs  at [phone]  -Encourage adequate fluids, daily probiotic/yogurt  -If line malfunction occurs and home health cannot reposition  please send patient to ED immediately  -ID follow-up -no follow-up required  - If persistent side effects occur stop antibiotic and call-ID/PCP.

## 2024-12-11 NOTE — CARE COORDINATION
Transition of Care Plan:     RUR: 19%  Prior Level of Functioning: Independent with ADls  Disposition: [facility]  JABARI: 12/12/24  Follow up appointments: Follow up with PCP and/or Specialist   DME needed: has acces to equipment   Transportation at discharge: family to transport   IM/IMM Medicare/ letter given: 2nd IM Medicare Letter   Is patient a  and connected with [facility]? N/A              If yes, was [place] transfer form completed and [facility] notified? N/A  Caregiver Contact: August Resendiz (child) [phone]  Discharge Caregiver contacted prior to discharge? Family to be contacted   Care Conference needed? N/A  Barriers to discharge: Medical Clearance    CM met with pt and pt's son at bedside and provided update on [facility] accepting pt and expected d/c of tomorrow.  CM will continue to follow.    Kim Clancy LMSW  Supervisee in Social Work  Care Management, [facility]  [phone]

## 2024-12-11 NOTE — PROGRESS NOTES
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  No new complaints to me.  Accompanied by son    Objective:     VITALS:   Last 24hrs VS reviewed since prior progress note. Most recent are:  Vitals:    24 0853   BP: (!) 148/73   Pulse: 72   Resp: 16   Temp:    SpO2: 97%       Intake/Output Summary (Last 24 hours) at 2024 1420  Last data filed at 2024 0853  Gross per 24 hour   Intake --   Output 225 ml   Net -225 ml          Telemetry Reviewed:     Physical Exam  Vitals and nursing note reviewed.   Constitutional:       Appearance: Normal appearance.   HENT:      Head: Normocephalic.      Nose: Nose normal.      Mouth/Throat:      Mouth: Mucous membranes are moist.   Cardiovascular:      Rate and Rhythm: Normal rate.      Pulses: Normal pulses.      Heart sounds: Normal heart sounds.   Pulmonary:      Effort: Pulmonary effort is normal.   Abdominal:      General: Abdomen is flat.   Musculoskeletal:      Cervical back: Neck supple.      Right lower leg: No edema.      Left lower leg: No edema.   Neurological:      General: No focal deficit present.      Mental Status: He is oriented to person, place, and time.   Psychiatric:         Behavior: Behavior normal.          Lab Data Reviewed: (see below)    Medications Reviewed: (see below)    PMH/SH reviewed - no change compared to H&P  ________________________________________________________________________  Care Plan discussed with:  Patient x    Family      RN x    Care Manager                    Consultant:           Comments   >50% of visit spent in counseling and coordination of care       ________________________________________________________________________  Martha Cooper MD     Procedures: see electronic medical records for all procedures/Xrays and details which  were not copied into this note but were reviewed prior to creation of Plan.      LABS:  Recent Labs     12/10/24  1612 12/11/24  0308   WBC 9.9 9.8   HGB 7.7* 7.3*   HCT 24.5* 23.1*    166     Recent Labs     12/09/24  1222 12/10/24  1612 12/11/24  0308    137 140   K 3.9 4.0 3.9   * 109* 111*   CO2 25 24 25   BUN 47* 48* 48*     No results for input(s): \"TP\", \"GLOB\", \"GGT\" in the last 72 hours.    Invalid input(s): \"SGOT\", \"GPT\", \"AP\", \"TBIL\", \"ALB\", \"AML\", \"AMYP\", \"LPSE\", \"HLPSE\"    No results for input(s): \"INR\", \"APTT\" in the last 72 hours.    Invalid input(s): \"PTP\"     Recent Labs     12/09/24  1222   TIBC 183*        No results found for: \"RBCF\"   No results for input(s): \"PH\", \"PCO2\", \"PO2\" in the last 72 hours.  No results for input(s): \"CPK\", \"CKMB\", \"TROPONINI\" in the last 72 hours.  No components found for: \"GLPOC\"  @labua@    MEDICATIONS:  Current Facility-Administered Medications   Medication Dose Route Frequency    piperacillin-tazobactam (ZOSYN) 3,375 mg in sodium chloride 0.9 % 50 mL IVPB (mini-bag)  3,375 mg IntraVENous Q6H    iron sucrose (VENOFER) injection 200 mg  200 mg IntraVENous Q24H    guaiFENesin (ROBITUSSIN) 100 MG/5ML liquid 200 mg  200 mg Oral Q4H PRN    guaiFENesin (MUCINEX) extended release tablet 600 mg  600 mg Oral BID    pantoprazole (PROTONIX) tablet 40 mg  40 mg Oral BID AC    furosemide (LASIX) tablet 40 mg  40 mg Oral Daily    isosorbide mononitrate (IMDUR) extended release tablet 60 mg  60 mg Oral Nightly    [Held by provider] losartan (COZAAR) tablet 50 mg  50 mg Oral Daily    [Held by provider] spironolactone (ALDACTONE) tablet 25 mg  25 mg Oral Daily    insulin lispro (HUMALOG,ADMELOG) injection

## 2024-12-11 NOTE — PROGRESS NOTES
lower dose of BB for better BP control  Warfarin was discontinued by his PCP due to low Platelets, plan to hold  on discharge and follow up with PCP to resume if needed      Gout  Cont allopurinol     Hypothyroidism  Levothyroxine 175 mcg daily     BPH  Finasteride 5 mg daily  Terazosin 5 mg nightly      Medical Decision Making:   I personally reviewed labs: CBC, BMP, urine culture, wound culture, blood culture  I personally reviewed imaging:  I personally reviewed EKG:  Toxic drug monitoring: Platelet while patient is on Protonix  Discussed case with: Patient, RN, son, ID        Code Status: Full code  DVT Prophylaxis: SCDs, no chemoprophylaxis because for anemia secondary to GI bleed and epistaxis  GI Prophylaxis: Protonix    Subjective:     Chief Complaint / Reason for Physician Visit  \" Follow-up for her acute anemia, iron deficiency anemia, possible upper GI bleed, epistaxis, dizziness, fall, thrombocytopenia likely secondary to linezolid, LUCIA on CKD, hypophosphatemia, osteomyelitis of right great toe status post amputation on 12/8, asymptomatic bacteriuria, diabetes mellitus, CHF, angina, status post pacemaker, gout, hypothyroidism, BPH.\".  Discussed with RN events overnight.       Objective:     VITALS:   Last 24hrs VS reviewed since prior progress note. Most recent are:  Patient Vitals for the past 24 hrs:   BP Temp Temp src Pulse Resp SpO2   12/10/24 2015 (!) 144/70 97.5 °F (36.4 °C) Oral 71 18 93 %       No intake or output data in the 24 hours ending 12/11/24 0833     I had a face to face encounter and independently examined this patient on 12/11/2024, as outlined below:  PHYSICAL EXAM:  General: Alert, cooperative  Extremities: Right foot in postop shoe  EENT:  EOMI. pallor positive, anicteric sclerae.  Resp:  CTA bilaterally, no wheezing or rales.  No accessory muscle use  CV:  Regular  rhythm,  No edema  GI:  Soft, Non distended, Non tender.  +Bowel sounds  Neurologic:  Alert and oriented X 3, normal

## 2024-12-11 NOTE — PLAN OF CARE
Problem: Physical Therapy - Adult  Goal: By Discharge: Performs mobility at highest level of function for planned discharge setting.  See evaluation for individualized goals.  Description: FUNCTIONAL STATUS PRIOR TO ADMISSION: Pt lives alone and is normally fully independent with use of a quad cane at times in the home and all the time when out, uses a scooter in stores, does his own grocery shopping, Indep in ADLS, microwaves his food. 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    HOME SUPPORT PRIOR TO ADMISSION: The patient lived alone, son here now but lives in [place]    Physical Therapy Goals  Initiated 12/9/2024  1.  Patient will move from supine to sit and sit to supine, scoot up and down, and roll side to side in bed with independence within 7 day(s).    2.  Patient will perform sit to stand with modified independence within 7 day(s).  3.  Patient will transfer from bed to chair and chair to bed with modified independence using the least restrictive device within 7 day(s).  4.  Patient will ambulate with modified independence for 150 feet with the least restrictive device within 7 day(s).   5.  Patient will ascend/descend 4 stairs with one handrail(s) with supervision/set-up within 7 day(s).    Outcome: Progressing   PHYSICAL THERAPY TREATMENT    Patient: Maryan Resendiz (89 y.o. male)  Date: 12/11/2024  Diagnosis: Lightheadedness [R42]  Thrombocytopenia (HCC) [D69.6]  GIB (gastrointestinal bleeding) [K92.2]  Heat syncope, sequela [T67.1XXS]  Symptomatic anemia [D64.9]  Anemia, unspecified type [D64.9] Anemia, chronic disease  Procedure(s) (LRB):  RIGHT FIRST TOE AMPUTATION (Right) 3 Days Post-Op  Precautions: Contact Precautions, Fall Risk, Weight Bearing, Bed Alarm Right Lower Extremity Weight Bearing: Weight Bearing As Tolerated (with post op shoe)                    ASSESSMENT:  Patient continues to benefit from skilled PT  services and is progressing towards goals. Pt continues to require CGA with use of the rolling walker for gait, cues for proximity to the walker and maintaining clearance with RLE as he is not able to wear his R AFO post surgery (hx foot drop). Pt does get somewhat SOB with activity, sats appear to remain in safe ranges but readings are limited. Back to baseline at rest. Pt states he has been following through with his spirometer and was able to demontrate some of the seated exercises discussed in prior session. Reviewed all. Pt left in chair with call bell in reach, alarm on. Recommend continued therapy at d/c.         PLAN:  Patient continues to benefit from skilled intervention to address the above impairments.  Continue treatment per established plan of care.    Recommendations for staff mobility and toileting assistance:  Recommend that staff completes patient mobility with assist x1 using gait belt and rolling walker.      Recommendation for discharge: (in order for the patient to meet his/her long term goals):   High intensity/comprehensive skilled physical therapy in a multidisciplinary setting as patient is working towards tolerating up to 3 hours of therapy/day 5-7x/week although per chart family requesting [facility]; per CM pt is to d/c there tomorrow.    Other factors to consider for discharge: lives alone, patient's current support system is unable to meet their requirements for physical assistance, high risk for falls, not safe to be alone, and concern for safely navigating or managing the home environment    IF patient discharges home will need the following DME: patient owns DME required for discharge       SUBJECTIVE:   Patient stated, \"I'm ready to sit now.\"    OBJECTIVE DATA SUMMARY:   Critical Behavior:  Orientation  Orientation Level: Oriented X4  Cognition  Overall Cognitive Status: WFL    Functional Mobility Training:  Bed Mobility:  Bed Mobility Training  Bed Mobility Training: No (received

## 2024-12-11 NOTE — PLAN OF CARE
Problem: Occupational Therapy - Adult  Goal: By Discharge: Performs self-care activities at highest level of function for planned discharge setting.  See evaluation for individualized goals.  Description: FUNCTIONAL STATUS PRIOR TO ADMISSION:  ambulated with quad cane in the home at times and outside of the home, just obtained a right AFO from  [employer] due to right foot drop, warms meals in the microwave, uses electric scooter in stores, able to drive and perform ADLs/IADLs on his own, drives   ,  ,  ,  ,  ,  ,  ,  ,  ,  ,       HOME SUPPORT: Patient lived alone and son .    Occupational Therapy Goals:  Initiated 12/9/2024  1.  Patient will perform grooming standing with Modified [place] within 7 day(s).  2.  Patient will perform lower body dressing with Modified [place] within 7 day(s).  3.  Patient will perform toileting with Modified [place] within 7 day(s).  4.  Patient will perform toilet transfers with Modified [place]  within 7 day(s).    Outcome: Progressing  OCCUPATIONAL THERAPY TREATMENT  Patient: Maryan Resendiz (89 y.o. male)  Date: 12/11/2024  Primary Diagnosis: Lightheadedness [R42]  Thrombocytopenia (HCC) [D69.6]  GIB (gastrointestinal bleeding) [K92.2]  Heat syncope, sequela [T67.1XXS]  Symptomatic anemia [D64.9]  Anemia, unspecified type [D64.9]  Procedure(s) (LRB):  RIGHT FIRST TOE AMPUTATION (Right) 3 Days Post-Op   Precautions: Contact Precautions, Fall Risk, Weight Bearing, Bed Alarm Right Lower Extremity Weight Bearing: Weight Bearing As Tolerated (with post op shoe)              Chart, occupational therapy assessment, plan of care, and goals were reviewed.    ASSESSMENT  Patient continues to benefit from skilled OT services and is progressing towards goals. He was received seated in chair, agreeable to participate. Pt donned his L shoe with setup, R post op shoe already donned with assistance. Using RW pt ambulated in room with overall CGA and frequent cues for R

## 2024-12-12 ENCOUNTER — OFFICE VISIT (OUTPATIENT)
Facility: CLINIC | Age: 89
End: 2024-12-12

## 2024-12-12 VITALS
WEIGHT: 202.82 LBS | OXYGEN SATURATION: 96 % | DIASTOLIC BLOOD PRESSURE: 72 MMHG | RESPIRATION RATE: 16 BRPM | HEIGHT: 70 IN | SYSTOLIC BLOOD PRESSURE: 154 MMHG | BODY MASS INDEX: 29.04 KG/M2 | HEART RATE: 73 BPM | TEMPERATURE: 97.5 F

## 2024-12-12 DIAGNOSIS — E11.51 TYPE 2 DIABETES MELLITUS WITH PERIPHERAL VASCULAR DISEASE (HCC): Chronic | ICD-10-CM

## 2024-12-12 DIAGNOSIS — N40.1 BENIGN PROSTATIC HYPERPLASIA WITH URINARY FREQUENCY: Chronic | ICD-10-CM

## 2024-12-12 DIAGNOSIS — L03.115 CELLULITIS OF RIGHT FOOT: ICD-10-CM

## 2024-12-12 DIAGNOSIS — I73.9 PAD (PERIPHERAL ARTERY DISEASE) (HCC): Primary | Chronic | ICD-10-CM

## 2024-12-12 DIAGNOSIS — Z95.0 S/P PLACEMENT OF CARDIAC PACEMAKER: Chronic | ICD-10-CM

## 2024-12-12 DIAGNOSIS — M86.9 OSTEOMYELITIS OF GREAT TOE: ICD-10-CM

## 2024-12-12 DIAGNOSIS — R35.0 BENIGN PROSTATIC HYPERPLASIA WITH URINARY FREQUENCY: Chronic | ICD-10-CM

## 2024-12-12 DIAGNOSIS — I50.32 CHRONIC DIASTOLIC CONGESTIVE HEART FAILURE (HCC): Chronic | ICD-10-CM

## 2024-12-12 DIAGNOSIS — I10 PRIMARY HYPERTENSION: Chronic | ICD-10-CM

## 2024-12-12 DIAGNOSIS — E11.621 ULCER OF RIGHT GREAT TOE DUE TO DIABETES MELLITUS (HCC): ICD-10-CM

## 2024-12-12 DIAGNOSIS — E03.9 ACQUIRED HYPOTHYROIDISM: Chronic | ICD-10-CM

## 2024-12-12 DIAGNOSIS — I48.20 CHRONIC ATRIAL FIBRILLATION (HCC): Chronic | ICD-10-CM

## 2024-12-12 DIAGNOSIS — I34.0 NONRHEUMATIC MITRAL VALVE REGURGITATION: Chronic | ICD-10-CM

## 2024-12-12 DIAGNOSIS — N18.31 STAGE 3A CHRONIC KIDNEY DISEASE (HCC): Chronic | ICD-10-CM

## 2024-12-12 DIAGNOSIS — I25.10 CORONARY ARTERY DISEASE INVOLVING NATIVE CORONARY ARTERY OF NATIVE HEART WITHOUT ANGINA PECTORIS: Chronic | ICD-10-CM

## 2024-12-12 DIAGNOSIS — L97.519 ULCER OF RIGHT GREAT TOE DUE TO DIABETES MELLITUS (HCC): ICD-10-CM

## 2024-12-12 DIAGNOSIS — I65.21 STENOSIS OF RIGHT CAROTID ARTERY: Chronic | ICD-10-CM

## 2024-12-12 PROBLEM — A49.1 ENTEROCOCCAL INFECTION: Status: ACTIVE | Noted: 2024-12-12

## 2024-12-12 PROBLEM — R42 LIGHTHEADEDNESS: Status: ACTIVE | Noted: 2024-12-12

## 2024-12-12 PROBLEM — I70.229 CRITICAL ISCHEMIA OF LOWER EXTREMITY (HCC): Status: ACTIVE | Noted: 2024-12-12

## 2024-12-12 PROBLEM — Z91.81 STATUS POST FALL: Status: ACTIVE | Noted: 2024-12-12

## 2024-12-12 PROBLEM — A49.8 PSEUDOMONAS INFECTION: Status: ACTIVE | Noted: 2024-12-12

## 2024-12-12 LAB
ANION GAP SERPL CALC-SCNC: 4 MMOL/L (ref 2–12)
BASOPHILS # BLD: 0 K/UL (ref 0–0.1)
BASOPHILS NFR BLD: 0 % (ref 0–1)
BUN SERPL-MCNC: 42 MG/DL (ref 6–20)
BUN/CREAT SERPL: 26 (ref 12–20)
CALCIUM SERPL-MCNC: 8.4 MG/DL (ref 8.5–10.1)
CHLORIDE SERPL-SCNC: 111 MMOL/L (ref 97–108)
CO2 SERPL-SCNC: 25 MMOL/L (ref 21–32)
CREAT SERPL-MCNC: 1.6 MG/DL (ref 0.7–1.3)
DIFFERENTIAL METHOD BLD: ABNORMAL
EOSINOPHIL # BLD: 0.3 K/UL (ref 0–0.4)
EOSINOPHIL NFR BLD: 3 % (ref 0–7)
ERYTHROCYTE [DISTWIDTH] IN BLOOD BY AUTOMATED COUNT: 17.8 % (ref 11.5–14.5)
GLUCOSE BLD STRIP.AUTO-MCNC: 138 MG/DL (ref 65–117)
GLUCOSE BLD STRIP.AUTO-MCNC: 153 MG/DL (ref 65–117)
GLUCOSE BLD STRIP.AUTO-MCNC: 156 MG/DL (ref 65–117)
GLUCOSE SERPL-MCNC: 187 MG/DL (ref 65–100)
HCT VFR BLD AUTO: 24 % (ref 36.6–50.3)
HGB BLD-MCNC: 7.3 G/DL (ref 12.1–17)
IMM GRANULOCYTES # BLD AUTO: 0.1 K/UL (ref 0–0.04)
IMM GRANULOCYTES NFR BLD AUTO: 1 % (ref 0–0.5)
LYMPHOCYTES # BLD: 0.7 K/UL (ref 0.8–3.5)
LYMPHOCYTES NFR BLD: 8 % (ref 12–49)
MAGNESIUM SERPL-MCNC: 1.7 MG/DL (ref 1.6–2.4)
MCH RBC QN AUTO: 29.6 PG (ref 26–34)
MCHC RBC AUTO-ENTMCNC: 30.4 G/DL (ref 30–36.5)
MCV RBC AUTO: 97.2 FL (ref 80–99)
MONOCYTES # BLD: 1.1 K/UL (ref 0–1)
MONOCYTES NFR BLD: 12 % (ref 5–13)
NEUTS SEG # BLD: 6.6 K/UL (ref 1.8–8)
NEUTS SEG NFR BLD: 76 % (ref 32–75)
NRBC # BLD: 0.03 K/UL (ref 0–0.01)
NRBC BLD-RTO: 0.3 PER 100 WBC
PHOSPHATE SERPL-MCNC: 2.7 MG/DL (ref 2.6–4.7)
PLATELET # BLD AUTO: 178 K/UL (ref 150–400)
PMV BLD AUTO: 10.4 FL (ref 8.9–12.9)
POTASSIUM SERPL-SCNC: 3.9 MMOL/L (ref 3.5–5.1)
RBC # BLD AUTO: 2.47 M/UL (ref 4.1–5.7)
SERVICE CMNT-IMP: ABNORMAL
SODIUM SERPL-SCNC: 140 MMOL/L (ref 136–145)
WBC # BLD AUTO: 8.6 K/UL (ref 4.1–11.1)

## 2024-12-12 PROCEDURE — 36415 COLL VENOUS BLD VENIPUNCTURE: CPT

## 2024-12-12 PROCEDURE — 2500000003 HC RX 250 WO HCPCS: Performed by: PODIATRIST

## 2024-12-12 PROCEDURE — 6370000000 HC RX 637 (ALT 250 FOR IP): Performed by: PODIATRIST

## 2024-12-12 PROCEDURE — 80048 BASIC METABOLIC PNL TOTAL CA: CPT

## 2024-12-12 PROCEDURE — 2580000003 HC RX 258: Performed by: PODIATRIST

## 2024-12-12 PROCEDURE — 2580000003 HC RX 258: Performed by: INTERNAL MEDICINE

## 2024-12-12 PROCEDURE — 85025 COMPLETE CBC W/AUTO DIFF WBC: CPT

## 2024-12-12 PROCEDURE — 6370000000 HC RX 637 (ALT 250 FOR IP): Performed by: STUDENT IN AN ORGANIZED HEALTH CARE EDUCATION/TRAINING PROGRAM

## 2024-12-12 PROCEDURE — 6360000002 HC RX W HCPCS: Performed by: INTERNAL MEDICINE

## 2024-12-12 PROCEDURE — 84100 ASSAY OF PHOSPHORUS: CPT

## 2024-12-12 PROCEDURE — 82962 GLUCOSE BLOOD TEST: CPT

## 2024-12-12 PROCEDURE — 83735 ASSAY OF MAGNESIUM: CPT

## 2024-12-12 PROCEDURE — 94760 N-INVAS EAR/PLS OXIMETRY 1: CPT

## 2024-12-12 RX ORDER — ONDANSETRON 4 MG/1
4 TABLET, ORALLY DISINTEGRATING ORAL EVERY 8 HOURS PRN
Qty: 21 TABLET | Refills: 0 | Status: SHIPPED | OUTPATIENT
Start: 2024-12-12

## 2024-12-12 RX ORDER — GUAIFENESIN/DEXTROMETHORPHAN 100-10MG/5
5 SYRUP ORAL 3 TIMES DAILY PRN
Qty: 120 ML | Refills: 0 | Status: SHIPPED | OUTPATIENT
Start: 2024-12-12 | End: 2024-12-22

## 2024-12-12 RX ORDER — FUROSEMIDE 40 MG/1
40 TABLET ORAL DAILY
Qty: 30 TABLET | Refills: 0 | Status: SHIPPED | OUTPATIENT
Start: 2024-12-12

## 2024-12-12 RX ORDER — INSULIN GLARGINE 100 [IU]/ML
30 INJECTION, SOLUTION SUBCUTANEOUS NIGHTLY
Qty: 10 ML | Refills: 0 | Status: SHIPPED | OUTPATIENT
Start: 2024-12-12

## 2024-12-12 RX ORDER — GUAIFENESIN 600 MG/1
600 TABLET, EXTENDED RELEASE ORAL 2 TIMES DAILY
Qty: 20 TABLET | Refills: 0 | Status: SHIPPED | OUTPATIENT
Start: 2024-12-12

## 2024-12-12 RX ORDER — METOPROLOL TARTRATE 25 MG/1
25 TABLET, FILM COATED ORAL 2 TIMES DAILY
Qty: 60 TABLET | Refills: 0 | Status: SHIPPED | OUTPATIENT
Start: 2024-12-12

## 2024-12-12 RX ORDER — PANTOPRAZOLE SODIUM 40 MG/1
40 TABLET, DELAYED RELEASE ORAL
Qty: 60 TABLET | Refills: 0 | Status: SHIPPED | OUTPATIENT
Start: 2024-12-12

## 2024-12-12 RX ADMIN — GUAIFENESIN 200 MG: 200 SOLUTION ORAL at 09:30

## 2024-12-12 RX ADMIN — DOXAZOSIN 4 MG: 2 TABLET ORAL at 09:25

## 2024-12-12 RX ADMIN — ALLOPURINOL 300 MG: 300 TABLET ORAL at 09:27

## 2024-12-12 RX ADMIN — SODIUM CHLORIDE, PRESERVATIVE FREE 10 ML: 5 INJECTION INTRAVENOUS at 09:34

## 2024-12-12 RX ADMIN — FUROSEMIDE 40 MG: 40 TABLET ORAL at 09:26

## 2024-12-12 RX ADMIN — LEVOTHYROXINE SODIUM 175 MCG: 0.15 TABLET ORAL at 06:07

## 2024-12-12 RX ADMIN — ATORVASTATIN CALCIUM 10 MG: 10 TABLET, FILM COATED ORAL at 09:27

## 2024-12-12 RX ADMIN — PIPERACILLIN AND TAZOBACTAM 3375 MG: 3; .375 INJECTION, POWDER, LYOPHILIZED, FOR SOLUTION INTRAVENOUS at 12:34

## 2024-12-12 RX ADMIN — GUAIFENESIN 200 MG: 200 SOLUTION ORAL at 16:04

## 2024-12-12 RX ADMIN — METOPROLOL TARTRATE 25 MG: 25 TABLET, FILM COATED ORAL at 09:26

## 2024-12-12 RX ADMIN — PANTOPRAZOLE SODIUM 40 MG: 40 TABLET, DELAYED RELEASE ORAL at 06:07

## 2024-12-12 RX ADMIN — PANTOPRAZOLE SODIUM 40 MG: 40 TABLET, DELAYED RELEASE ORAL at 16:04

## 2024-12-12 RX ADMIN — IRON SUCROSE 200 MG: 20 INJECTION, SOLUTION INTRAVENOUS at 12:25

## 2024-12-12 RX ADMIN — GUAIFENESIN 200 MG: 200 SOLUTION ORAL at 06:06

## 2024-12-12 RX ADMIN — PIPERACILLIN AND TAZOBACTAM 3375 MG: 3; .375 INJECTION, POWDER, LYOPHILIZED, FOR SOLUTION INTRAVENOUS at 04:12

## 2024-12-12 RX ADMIN — GUAIFENESIN 600 MG: 600 TABLET, MULTILAYER, EXTENDED RELEASE ORAL at 09:27

## 2024-12-12 ASSESSMENT — PAIN SCALES - GENERAL: PAINLEVEL_OUTOF10: 0

## 2024-12-12 NOTE — PROGRESS NOTES
Patient determined to be stable for discharge by attending provider. I have reviewed the discharge instructions and follow-up appointments with the PCP, vascular, podiatry, nephrology and GI. They verbalized understanding and all questions were answered to their satisfaction. No complaints or further questions were expressed.       New medications: Appropriate educational materials and medication side effect teaching were provided.       PIV were removed prior to discharge. Midline left in place for long term IV antibiotics, per Jorge BRINK order.     All personal items collected during admission were returned to the patient prior to discharge. Report given to [facility] (to Kirti) and patient transported via JANETH Shelley RN

## 2024-12-12 NOTE — PROGRESS NOTES
End of Shift Note    Bedside shift change report given to MIN Lloyd (oncoming nurse) by THERESA AMADOR RN (offgoing nurse).  Report included the following information SBAR, Kardex, Intake/Output, and MAR    Shift worked:  1153-1886     Shift summary and any significant changes:     Patient had no complaints overnight. Labs collected. Scheduled medications given per MAR. PRN robitussin given x2 for cough.      Concerns for physician to address:    Zone phone for oncoming shift:       Activity:  Level of Assistance: Moderate assist, patient does 50-74%  Number times ambulated in hallways past shift: 0  Number of times OOB to chair past shift: 1    Cardiac:   Cardiac Monitoring: No      Cardiac Rhythm: Ventricular paced    Access:  Current line(s): PIV     Genitourinary:   Urinary Status: Voiding    Respiratory:   O2 Device: None (Room air)  Chronic home O2 use?: NO  Incentive spirometer at bedside: YES    GI:  Last BM (including prior to admit): 12/11/24  Current diet:  ADULT DIET; Regular; 4 carb choices (60 gm/meal)  Passing flatus: YES    Pain Management:   Patient states pain is manageable on current regimen: N/A    Skin:  Binh Scale Score: 19  Interventions: Wound Offloading (Prevention Methods): Repositioning, Turning    Patient Safety:  Fall Risk: Nursing Judgement-Fall Risk High(Add Comments): Yes  Fall Risk Interventions  Nursing Judgement-Fall Risk High(Add Comments): Yes  Toilet Every 2 Hours-In Advance of Need: Yes  Hourly Visual Checks: Awake, In bed  Fall Visual Posted: Socks  Room Door Open: Deferred to decrease stimulation  Alarm On: Bed  Patient Moved Closer to Nursing Station: No    Active Consults:   IP CONSULT TO ONCOLOGY  IP CONSULT TO PODIATRY  IP CONSULT TO GI  IP CONSULT TO NEPHROLOGY  IP CONSULT TO VASCULAR SURGERY  IP CONSULT TO INFECTIOUS DISEASES  IP CONSULT TO CASE MANAGEMENT  IP CONSULT TO VASCULAR ACCESS TEAM    Length of Stay:  Expected LOS: 13  Actual LOS: 13    THERESA

## 2024-12-12 NOTE — PLAN OF CARE
Problem: Chronic Conditions and Co-morbidities  Goal: Patient's chronic conditions and co-morbidity symptoms are monitored and maintained or improved  12/12/2024 1826 by Prema Shelley RN  Outcome: Adequate for Discharge  12/12/2024 1159 by Mila Baig RN  Outcome: Progressing     Problem: Safety - Adult  Goal: Free from fall injury  12/12/2024 1826 by Prema Shelley RN  Outcome: Adequate for Discharge  12/12/2024 1159 by Mila Baig RN  Outcome: Progressing     Problem: Cognitive:  Goal: Knowledge of wound care  Description: Knowledge of wound care  12/12/2024 1826 by Prema Shelley RN  Outcome: Adequate for Discharge  12/12/2024 1159 by Mila Baig RN  Outcome: Progressing  Goal: Understands risk factors for wounds  Description: Understands risk factors for wounds  12/12/2024 1826 by Prema Shelley RN  Outcome: Adequate for Discharge  12/12/2024 1159 by Mila Baig RN  Outcome: Progressing     Problem: Wound:  Goal: Will show signs of wound healing; wound closure and no evidence of infection  Description: Will show signs of wound healing; wound closure and no evidence of infection  12/12/2024 1826 by Prema Shelley RN  Outcome: Adequate for Discharge  12/12/2024 1159 by Mila aBig RN  Outcome: Progressing     Problem: Smoking cessation:  Goal: Ability to formulate a plan to maintain a tobacco-free life will be supported  Description: Ability to formulate a plan to maintain a tobacco-free life will be supported  12/12/2024 1826 by Prema Shelley RN  Outcome: Adequate for Discharge  12/12/2024 1159 by Mila Baig RN  Outcome: Progressing     Problem: ABCDS Injury Assessment  Goal: Absence of physical injury  12/12/2024 1826 by Prema Shelley RN  Outcome: Adequate for Discharge  12/12/2024 1159 by Mila Baig RN  Outcome: Progressing     Problem: Pain  Goal: Verbalizes/displays adequate comfort level or baseline comfort level  12/12/2024 1826 by Prema Shelley RN  Outcome: Adequate

## 2024-12-12 NOTE — DISCHARGE SUMMARY
weeks, he took only 3 dose of antibiotics   Patient vitally stable on initial presentation  CMP significant for sodium 130, baseline sodium 136  Mildly high potassium 5.2, baseline potassium 4.8-5  Mildly elevated creatinine 2.79, creatinine trending higher from the last few days  Lactate dehydrogenase elevated to 267        Brief Hospital Course by Main Problems:   Acute anemia  Iron deficiency anemia with %Sat-8   Possible Upper GI bleed  Epistaxis  Hemoglobin on presentation 7.8, baseline Hb 12-13. +FOBT  S/p 2 U PRBC for hbg 7 + presyncopal symptoms  - Discontinued Keflex as patient is off rhino rocket on 12/4  --S/p rhino rocket for epistaxis   -EGD on 12/3 was cancelled due to intermittent episodes of epistaxis  - Gi recommended to hold plan for EGD in patient  - PPI  bid   - OAC on hold  -Hb  has remained stable >7 but slowly down trending , no labs from today   -Discussed with Dr Cooper (nephro)- plan for iv iron during hospitalization  12/11: H&H of 7.3/23.1.  Patient getting IV iron infusion.  12/12: H&H has remained stable at 7.3/24.0.  Platelet is also stable at 178.  Kidney function is also stable with BUN/creatinine of 42/1.6.  ID has given final antibiotic recommendation and patient needs to continue IV Zosyn till 12/22 2024.  Midline has been placed.  Will discharge patient to [facility].  Patient and his son at the bedside agree with the plan.  The patient will need to follow-up with PCP, GI, heme-onc, vascular surgery, nephrology and podiatry outpatient.     Dizziness  Fall   Dizziness possible  secondary to anemia  Patient has had status post Bruise on R side of head- resolving   CT head without contrast without acute abnormality  CT cervical spine: Without fracture  CT chest: Mildly complex fluid collection in the right anterior abdominal wall has decreased in size.  Complex mass upper pole left kidney does not appear to be simple cyst, recommend renal protocol CT  EKG with  HYTRIN            STOP taking these medications      linezolid 600 MG tablet  Commonly known as: Zyvox     losartan 50 MG tablet  Commonly known as: COZAAR     spironolactone 25 MG tablet  Commonly known as: ALDACTONE     warfarin 5 MG tablet  Commonly known as: COUMADIN               Where to Get Your Medications        Information about where to get these medications is not yet available    Ask your nurse or doctor about these medications  furosemide 40 MG tablet  guaiFENesin 600 MG extended release tablet  guaiFENesin-dextromethorphan 100-10 MG/5ML syrup  insulin glargine 100 UNIT/ML injection vial  metoprolol tartrate 25 MG tablet  ondansetron 4 MG disintegrating tablet  pantoprazole 40 MG tablet             DISPOSITION:    Home with Family:       Home with HH/PT/OT/RN:    SNF/LTC: [facility]    MICHELLE:    OTHER:            Code status: Full code  Recommended diet: cardiac diet and diabetic diet  Recommended activity: activity as tolerated and as per PT/OT  Wound care: See surgical/procedure care instructions      Follow up with:   PCP : Prashanth Livingston MD Roberts, Kenneth H, MD  [address] [facility] [address]  [phone]    Follow up in 1 week(s)      Prashanth Livingston MD  [address] [facility] [address]  [phone]          Ben Leblanc MD  [address] [facility] [address]  [place] [facility] [address]  [phone]    Follow up in 1 week(s)      Ajay Gillespie MD  [address] [facility]  [address]  [place] [facility] [address]  [phone]    Follow up in 1 week(s)      Nick Alcala DPM  [address] [facility] [address]  [phone]    Follow up in 1 week(s)      Pj Christian MD  [address] [id number] [phone]    Follow up in 1 week(s)      Anil Vivas MD  [address] [facility] [address]  [phone]    Follow up in 1 week(s)      [facility] (LINK)  [address] [facility]

## 2024-12-12 NOTE — CARE COORDINATION
And communicated to facility via [employer], or CC link.     Discharge instructions to be fax'd to facility at (Fax #).     [] BCPI-A  Patient has been identified as part of the BCPI-A Program.  For Care Coordination associated with that Bundle Program, please contact Holley Delgado [phone]  Bundle information has been communicated to .    Nursing Please include all hard scripts for controlled substances, med rec and dc summary, and AVS in packet.     Reviewed and confirmed with facility, [facility] , can manage the patient care needs for the following:     Edvon with (X) only those applicable:  Medication:  [x]Medications are available at the facility  []IV Antibiotics    []Controlled Substance - hard copies available sent.  []Weekly Labs    Equipment:  []CPAP/BiPAP  []Wound Vacuum  []Daniels or Urinary Device  []PICC/Central Line  []Nebulizer  []Ventilator    Treatment:  []Isolation (for MRSA, VRE, etc.)  []Surgical Drain Management  []Tracheostomy Care  []Dressing Changes  []Dialysis with transportation  []PEG Care  []Oxygen  []Daily Weights for Heart Failure    Dietary:  []Any diet limitations  []Tube Feedings   []Total Parenteral Management (TPN)    Financial Resources:  []Medicaid Application Completed    []UAI Completed and copy given to pt/family  and copy given to pt/family  []A screening has previously been completed.    []Level II Completed    [] Private pay individual who will not become   financially eligible for Medicaid within 6 months from admission to a [facility].     [] Individual refused to have screening conducted.     []Medicaid Application Completed    []The screening denied because it was determined individual did not need/did not qualify for nursing facility level of care.  [] Out of state residents seeking direct admission to a VA nursing facility.  [] Individuals who are inpatients of an out of state hospital, or in state or out of state  [facility]/ hospital and seek direct admission to a VA nursing facility  [] Individuals who are pateints or residents of a state owned/operated facility that is licensed by Department of Behavioral Services (DBHDS) and seek direct admission to VA nursing facility  [] A screening not required for enrollment in Medicaid Hospice services as set out in 12 VAC 30-  [] [facility] ([facility]) staff shall perform screenings of the [facility] clients.    Advanced Care Plan:  []Surrogate Decision Maker of Care  []POA  []Communicated Code Status and copy sent.    Other:

## 2024-12-12 NOTE — PLAN OF CARE
Problem: Chronic Conditions and Co-morbidities  Goal: Patient's chronic conditions and co-morbidity symptoms are monitored and maintained or improved  12/12/2024 1159 by Mila Baig RN  Outcome: Progressing  12/11/2024 2318 by Bindu Carmona RN  Outcome: Progressing     Problem: Safety - Adult  Goal: Free from fall injury  12/12/2024 1159 by Mila Baig RN  Outcome: Progressing  12/11/2024 2318 by Bindu Carmona RN  Outcome: Progressing     Problem: Cognitive:  Goal: Knowledge of wound care  Description: Knowledge of wound care  12/12/2024 1159 by Mila Baig RN  Outcome: Progressing  12/11/2024 2318 by Bindu Carmona RN  Outcome: Progressing  Goal: Understands risk factors for wounds  Description: Understands risk factors for wounds  12/12/2024 1159 by Mila Baig RN  Outcome: Progressing  12/11/2024 2318 by Bindu Carmona RN  Outcome: Progressing     Problem: Wound:  Goal: Will show signs of wound healing; wound closure and no evidence of infection  Description: Will show signs of wound healing; wound closure and no evidence of infection  12/12/2024 1159 by Mila Baig RN  Outcome: Progressing  12/11/2024 2318 by Bindu Carmona RN  Outcome: Progressing     Problem: Smoking cessation:  Goal: Ability to formulate a plan to maintain a tobacco-free life will be supported  Description: Ability to formulate a plan to maintain a tobacco-free life will be supported  12/12/2024 1159 by Mila Baig RN  Outcome: Progressing  12/11/2024 2318 by Bindu Carmona RN  Outcome: Progressing     Problem: Falls - Risk of:  Goal: Will remain free from falls  Description: Will remain free from falls  12/12/2024 1159 by Mila Baig RN  Outcome: Progressing  12/11/2024 2318 by Bindu Carmona RN  Outcome: Progressing     Problem: Blood Glucose:  Goal: Ability to maintain appropriate glucose levels will improve  Description: Ability to maintain appropriate glucose

## 2024-12-12 NOTE — PLAN OF CARE
Problem: Chronic Conditions and Co-morbidities  Goal: Patient's chronic conditions and co-morbidity symptoms are monitored and maintained or improved  Outcome: Progressing     Problem: Safety - Adult  Goal: Free from fall injury  Outcome: Progressing     Problem: Cognitive:  Goal: Knowledge of wound care  Description: Knowledge of wound care  Outcome: Progressing  Goal: Understands risk factors for wounds  Description: Understands risk factors for wounds  12/11/2024 2318 by Bindu Carmona RN  Outcome: Progressing  12/11/2024 1128 by Mila Baig RN  Outcome: Progressing     Problem: Wound:  Goal: Will show signs of wound healing; wound closure and no evidence of infection  Description: Will show signs of wound healing; wound closure and no evidence of infection  Outcome: Progressing     Problem: Smoking cessation:  Goal: Ability to formulate a plan to maintain a tobacco-free life will be supported  Description: Ability to formulate a plan to maintain a tobacco-free life will be supported  Outcome: Progressing     Problem: Falls - Risk of:  Goal: Will remain free from falls  Description: Will remain free from falls  Outcome: Progressing     Problem: Blood Glucose:  Goal: Ability to maintain appropriate glucose levels will improve  Description: Ability to maintain appropriate glucose levels will improve  12/11/2024 2318 by Bindu Carmona RN  Outcome: Progressing  12/11/2024 1128 by Mila Baig RN  Outcome: Progressing     Problem: ABCDS Injury Assessment  Goal: Absence of physical injury  Outcome: Progressing     Problem: Pain  Goal: Verbalizes/displays adequate comfort level or baseline comfort level  12/11/2024 2318 by Bindu Carmona RN  Outcome: Progressing  12/11/2024 1128 by Mila Baig RN  Outcome: Progressing     Problem: Skin/Tissue Integrity  Goal: Absence of new skin breakdown  Description: 1.  Monitor for areas of redness and/or skin breakdown  2.  Assess vascular access sites  here now but lives in [place]    Physical Therapy Goals  Initiated 12/9/2024  1.  Patient will move from supine to sit and sit to supine, scoot up and down, and roll side to side in bed with independence within 7 day(s).    2.  Patient will perform sit to stand with modified independence within 7 day(s).  3.  Patient will transfer from bed to chair and chair to bed with modified independence using the least restrictive device within 7 day(s).  4.  Patient will ambulate with modified independence for 150 feet with the least restrictive device within 7 day(s).   5.  Patient will ascend/descend 4 stairs with one handrail(s) with supervision/set-up within 7 day(s).    12/11/2024 1444 by Mayte Linda, PT  Outcome: Progressing

## 2024-12-13 ENCOUNTER — TELEPHONE (OUTPATIENT)
Age: 89
End: 2024-12-13

## 2024-12-13 ENCOUNTER — CARE COORDINATION (OUTPATIENT)
Dept: CARE COORDINATION | Age: 89
End: 2024-12-13

## 2024-12-13 VITALS
OXYGEN SATURATION: 98 % | WEIGHT: 202.1 LBS | TEMPERATURE: 98.1 F | SYSTOLIC BLOOD PRESSURE: 136 MMHG | DIASTOLIC BLOOD PRESSURE: 82 MMHG | HEART RATE: 78 BPM | BODY MASS INDEX: 29 KG/M2 | RESPIRATION RATE: 18 BRPM

## 2024-12-13 PROBLEM — N40.1 BENIGN PROSTATIC HYPERPLASIA WITH URINARY FREQUENCY: Status: ACTIVE | Noted: 2017-10-24

## 2024-12-13 PROBLEM — H35.30 MACULAR DEGENERATION: Chronic | Status: ACTIVE | Noted: 2017-10-24

## 2024-12-13 PROBLEM — D72.829 LEUKOCYTOSIS: Status: RESOLVED | Noted: 2023-12-08 | Resolved: 2024-12-13

## 2024-12-13 PROBLEM — I70.1 RENAL ARTERY STENOSIS (HCC): Chronic | Status: ACTIVE | Noted: 2017-10-24

## 2024-12-13 PROBLEM — I70.229 CRITICAL ISCHEMIA OF LOWER EXTREMITY (HCC): Status: RESOLVED | Noted: 2024-12-12 | Resolved: 2024-12-13

## 2024-12-13 PROBLEM — K92.2 GIB (GASTROINTESTINAL BLEEDING): Status: RESOLVED | Noted: 2024-11-29 | Resolved: 2024-12-13

## 2024-12-13 PROBLEM — Z13.39 ALCOHOL SCREENING: Status: RESOLVED | Noted: 2024-09-09 | Resolved: 2024-12-13

## 2024-12-13 PROBLEM — E11.51 TYPE 2 DIABETES MELLITUS WITH PERIPHERAL VASCULAR DISEASE (HCC): Chronic | Status: ACTIVE | Noted: 2021-05-11

## 2024-12-13 PROBLEM — S61.412A LACERATION OF LEFT HAND WITH INFECTION: Status: RESOLVED | Noted: 2024-05-07 | Resolved: 2024-12-13

## 2024-12-13 PROBLEM — M15.0 PRIMARY OSTEOARTHRITIS INVOLVING MULTIPLE JOINTS: Chronic | Status: ACTIVE | Noted: 2024-02-25

## 2024-12-13 PROBLEM — N17.9 AKI (ACUTE KIDNEY INJURY) (HCC): Status: RESOLVED | Noted: 2023-12-08 | Resolved: 2024-12-13

## 2024-12-13 PROBLEM — M10.9 GOUT: Status: RESOLVED | Noted: 2017-10-24 | Resolved: 2024-12-13

## 2024-12-13 PROBLEM — I34.0 NONRHEUMATIC MITRAL VALVE REGURGITATION: Chronic | Status: ACTIVE | Noted: 2024-12-13

## 2024-12-13 PROBLEM — R35.0 BENIGN PROSTATIC HYPERPLASIA WITH URINARY FREQUENCY: Status: ACTIVE | Noted: 2017-10-24

## 2024-12-13 PROBLEM — N40.1 BENIGN PROSTATIC HYPERPLASIA WITH URINARY FREQUENCY: Chronic | Status: ACTIVE | Noted: 2017-10-24

## 2024-12-13 PROBLEM — I73.9 PERIPHERAL VASCULAR DISEASE (HCC): Status: RESOLVED | Noted: 2017-10-24 | Resolved: 2024-12-13

## 2024-12-13 PROBLEM — L08.9 LACERATION OF LEFT HAND WITH INFECTION: Status: RESOLVED | Noted: 2024-05-07 | Resolved: 2024-12-13

## 2024-12-13 PROBLEM — E03.9 ACQUIRED HYPOTHYROIDISM: Chronic | Status: ACTIVE | Noted: 2017-09-12

## 2024-12-13 PROBLEM — D63.8 ANEMIA, CHRONIC DISEASE: Chronic | Status: ACTIVE | Noted: 2024-11-29

## 2024-12-13 PROBLEM — R42 LIGHTHEADEDNESS: Status: RESOLVED | Noted: 2024-12-12 | Resolved: 2024-12-13

## 2024-12-13 PROBLEM — I65.21 STENOSIS OF RIGHT CAROTID ARTERY: Chronic | Status: ACTIVE | Noted: 2017-10-24

## 2024-12-13 PROBLEM — E55.9 VITAMIN D DEFICIENCY: Chronic | Status: ACTIVE | Noted: 2024-09-09

## 2024-12-13 PROBLEM — R35.0 BENIGN PROSTATIC HYPERPLASIA WITH URINARY FREQUENCY: Chronic | Status: ACTIVE | Noted: 2017-10-24

## 2024-12-13 NOTE — CARE COORDINATION
SECURE email notification sent to identified IDT members at   [facility]

## 2024-12-13 NOTE — PROGRESS NOTES
[facility] CARE SERVICES      Skilled Nursing Facility Admission History and Physical Examination      [facility]      Name: Maryan Resendiz      Room: H 415B  YOB: 1935  MRN: [id number]        ASSESSMENT:     Diagnosis Orders   1. PAD (peripheral artery disease) ([facility])        2. Ulcer of right great toe due to diabetes mellitus ([facility])        3. Cellulitis of right foot        4. Osteomyelitis of great toe        5. Coronary artery disease involving native coronary artery of native heart without angina pectoris        6. Nonrheumatic mitral valve regurgitation        7. Chronic diastolic congestive heart failure (HCC)        8. Chronic atrial fibrillation (HCC)        9. S/P placement of cardiac pacemaker        10. Primary hypertension        11. Stage 3a chronic kidney disease (HCC)        12. Stenosis of right carotid artery        13. Type 2 diabetes mellitus with peripheral vascular disease (HCC)        14. Acquired hypothyroidism        15. Benign prostatic hyperplasia with urinary frequency              PLAN:    PAD/ OSTEOMYELITIS R GREAT TOE: This problem has deteriorated. Will proceed with rehabilitation including PT and OT.     CAD/ VHD/ CHF/ AF/ HYPERTENSION/ CKD: This problem is stable. Current treatment was continued as noted above.     DIABETES MELLITUS: This problem is stable. Will continue current routine including diet, exercise and medication. Will continue close observation.    HYPOTHYROIDISM: This problem is stable. Will continue current treatment and close observation.    CEREBROVASCULAR DISEASE: This problem is stable. Will continue close surveillance.    BPH: This problem has deteriorated. Will continue close surveillance.      The patient is admitted to the SNF for rehabilitation for multiple medical issues as listed above.  will be consulted for discharge planning.       SUBJECTIVE:    Chief Complaint:  Chief

## 2024-12-17 ENCOUNTER — OFFICE VISIT (OUTPATIENT)
Facility: CLINIC | Age: 89
End: 2024-12-17
Payer: MEDICARE

## 2024-12-17 VITALS
BODY MASS INDEX: 29.16 KG/M2 | SYSTOLIC BLOOD PRESSURE: 151 MMHG | WEIGHT: 203.2 LBS | TEMPERATURE: 98.5 F | OXYGEN SATURATION: 98 % | RESPIRATION RATE: 18 BRPM | DIASTOLIC BLOOD PRESSURE: 88 MMHG | HEART RATE: 74 BPM

## 2024-12-17 DIAGNOSIS — I25.10 CORONARY ARTERY DISEASE INVOLVING NATIVE CORONARY ARTERY OF NATIVE HEART WITHOUT ANGINA PECTORIS: ICD-10-CM

## 2024-12-17 DIAGNOSIS — N40.1 BENIGN PROSTATIC HYPERPLASIA WITH URINARY FREQUENCY: ICD-10-CM

## 2024-12-17 DIAGNOSIS — T50.905A THROMBOCYTOPENIA DUE TO DRUGS: ICD-10-CM

## 2024-12-17 DIAGNOSIS — I73.9 PAD (PERIPHERAL ARTERY DISEASE) (HCC): ICD-10-CM

## 2024-12-17 DIAGNOSIS — N18.31 STAGE 3A CHRONIC KIDNEY DISEASE (HCC): ICD-10-CM

## 2024-12-17 DIAGNOSIS — E11.621 ULCER OF RIGHT GREAT TOE DUE TO DIABETES MELLITUS (HCC): ICD-10-CM

## 2024-12-17 DIAGNOSIS — L97.519 ULCER OF RIGHT GREAT TOE DUE TO DIABETES MELLITUS (HCC): ICD-10-CM

## 2024-12-17 DIAGNOSIS — D69.59 THROMBOCYTOPENIA DUE TO DRUGS: ICD-10-CM

## 2024-12-17 DIAGNOSIS — R35.0 BENIGN PROSTATIC HYPERPLASIA WITH URINARY FREQUENCY: ICD-10-CM

## 2024-12-17 DIAGNOSIS — E03.9 ACQUIRED HYPOTHYROIDISM: ICD-10-CM

## 2024-12-17 DIAGNOSIS — I50.32 CHRONIC DIASTOLIC CONGESTIVE HEART FAILURE (HCC): ICD-10-CM

## 2024-12-17 DIAGNOSIS — Z95.0 PRESENCE OF CARDIAC PACEMAKER: ICD-10-CM

## 2024-12-17 DIAGNOSIS — R05.2 SUBACUTE COUGH: ICD-10-CM

## 2024-12-17 DIAGNOSIS — M86.9 OSTEOMYELITIS OF GREAT TOE: Primary | ICD-10-CM

## 2024-12-17 DIAGNOSIS — E11.51 TYPE 2 DIABETES MELLITUS WITH PERIPHERAL VASCULAR DISEASE (HCC): ICD-10-CM

## 2024-12-17 DIAGNOSIS — I10 PRIMARY HYPERTENSION: ICD-10-CM

## 2024-12-17 DIAGNOSIS — I48.20 CHRONIC ATRIAL FIBRILLATION (HCC): ICD-10-CM

## 2024-12-17 PROCEDURE — 99309 SBSQ NF CARE MODERATE MDM 30: CPT | Performed by: NURSE PRACTITIONER

## 2024-12-17 PROCEDURE — G8484 FLU IMMUNIZE NO ADMIN: HCPCS | Performed by: NURSE PRACTITIONER

## 2024-12-17 PROCEDURE — 1123F ACP DISCUSS/DSCN MKR DOCD: CPT | Performed by: NURSE PRACTITIONER

## 2024-12-17 PROCEDURE — 3051F HG A1C>EQUAL 7.0%<8.0%: CPT | Performed by: NURSE PRACTITIONER

## 2024-12-17 NOTE — OP NOTE
[facility]              [address] [place]  [address]                            OPERATIVE REPORT      PATIENT NAME: ELIAS MARTINEZ               : 1935  MED REC NO: [id number]                       ROOM: 3410  ACCOUNT NO: [id number]                       ADMIT DATE: 2024  PROVIDER: Nick Alcala DPM    DATE OF SERVICE:  2024    PREOPERATIVE DIAGNOSES:  Osteomyelitis, right foot big toe.    POSTOPERATIVE DIAGNOSES:  Osteomyelitis, right foot big toe.    PROCEDURES PERFORMED:  Right 1st toe amputation to mid proximal phalanx.    SURGEON:  Nick Alcala DPM    ASSISTANT:  None.    ANESTHESIA:  MAC.    ESTIMATED BLOOD LOSS:  Less than 50 mL.    SPECIMENS REMOVED:  The removed toe with margins was sent to Pathology and deep cultures were taken.    INTRAOPERATIVE FINDINGS:  The patient did have infection and has been receiving IV antibiotics with viable margins.     COMPLICATIONS:  None.    IMPLANTS:  None.    INDICATIONS:  The patient had come in with infected big toe, which was confirmed with osteomyelitis of the tuft of distal phalanx.    DESCRIPTION OF PROCEDURE:  The patient was admitted through the ER for infected big toe on right and the patient had been medically cleared, has been receiving IV antibiotics as inpatient.  The patient has had severely low platelets to begin with, and once they were stabilized, the patient had gone through multiple procedures and was cleared for podiatry amputation.  The patient was consented for the same and scheduled for the same.  The patient was brought into the OR, left on patient's bed in supine position.  IV sedation was performed as per CRNA.  Local infiltration of 0.5% Marcaine plain was injected for digital block and the right foot was prepped and draped in the usual sterile manner.  Attention was directed to the right distal part of the toe where an incision was made in the mid toe slightly

## 2024-12-17 NOTE — PROGRESS NOTES
daily   4. Coronary artery disease involving native coronary artery of native heart without angina pectoris     No chest pain or palpitations  Continues isosorbide lovastatin metoprolol  Follow-up with usual outpatient cardiology   5. Chronic diastolic congestive heart failure (HCC)     Had preserved ejection fraction during hospitalization  Continues on Lasix 40 mg p.o. daily  Monitor weights   6. Chronic atrial fibrillation (HCC)     Rate controlled  Continue metoprolol 25 mg p.o. twice daily  Not currently on any anticoagulation due to recent thrombocytopenia and bleed  Needs follow-up with hematology - Dr. Leos on 01/02/2025   7. Presence of cardiac pacemaker     Left check pacemaker  Follow up routinely as outpatient   8. Primary hypertension     Monitor blood pressures  Currently controlled  Continue isosorbide, metoprolol, Lasix   9. Stage 3a chronic kidney disease (HCC)     Last labs on 12/16/2024 creatinine 0.88 stable  Was 12/23/2024   10. Type 2 diabetes mellitus with peripheral vascular disease (HCC)     Blood glucose on labs was elevated 437  Continues on Lantus 30 units at bedtime  Start Humalog sliding scale with meals  Monitor blood sugars ACHS  Adjust insulin as needed   11. Acquired hypothyroidism     Stable  Continue levothyroxine 175 mcg p.o. daily   12. Benign prostatic hyperplasia with urinary frequency     Currently asymptomatic  Continues on terazosin and finasteride   13. Subacute cough     Patient reports cough since he had surgery at hospital  Check chest x-ray to rule out underlying respiratory cause  Change Mucinex to 600 mg ER p.o. twice daily  Has as needed guaifenesin-dextro morphine 5 mL every 8 hours as needed  Monitor respiratory status   14. Thrombocytopenia due to drugs     It was thought that patient had acute anemia and thrombocytopenia due to drug reaction with previous antibiotic  Last platelet level on 12/16/2024 was 83  Labs on 12/23/2024 CBC with differential  Monitor

## 2024-12-20 ENCOUNTER — OFFICE VISIT (OUTPATIENT)
Facility: CLINIC | Age: 89
End: 2024-12-20

## 2024-12-20 VITALS
OXYGEN SATURATION: 99 % | RESPIRATION RATE: 18 BRPM | BODY MASS INDEX: 29.16 KG/M2 | WEIGHT: 203.2 LBS | DIASTOLIC BLOOD PRESSURE: 82 MMHG | SYSTOLIC BLOOD PRESSURE: 134 MMHG | TEMPERATURE: 97.8 F | HEART RATE: 83 BPM

## 2024-12-20 DIAGNOSIS — D69.59 THROMBOCYTOPENIA DUE TO DRUGS: ICD-10-CM

## 2024-12-20 DIAGNOSIS — E11.51 TYPE 2 DIABETES MELLITUS WITH PERIPHERAL VASCULAR DISEASE (HCC): ICD-10-CM

## 2024-12-20 DIAGNOSIS — R05.2 SUBACUTE COUGH: ICD-10-CM

## 2024-12-20 DIAGNOSIS — I73.9 PAD (PERIPHERAL ARTERY DISEASE) (HCC): ICD-10-CM

## 2024-12-20 DIAGNOSIS — L97.519 ULCER OF RIGHT GREAT TOE DUE TO DIABETES MELLITUS (HCC): ICD-10-CM

## 2024-12-20 DIAGNOSIS — N18.31 STAGE 3A CHRONIC KIDNEY DISEASE (HCC): ICD-10-CM

## 2024-12-20 DIAGNOSIS — Z95.0 PRESENCE OF CARDIAC PACEMAKER: ICD-10-CM

## 2024-12-20 DIAGNOSIS — E11.621 ULCER OF RIGHT GREAT TOE DUE TO DIABETES MELLITUS (HCC): ICD-10-CM

## 2024-12-20 DIAGNOSIS — I50.32 CHRONIC DIASTOLIC CONGESTIVE HEART FAILURE (HCC): ICD-10-CM

## 2024-12-20 DIAGNOSIS — M86.9 OSTEOMYELITIS OF GREAT TOE: Primary | ICD-10-CM

## 2024-12-20 DIAGNOSIS — I48.20 CHRONIC ATRIAL FIBRILLATION (HCC): ICD-10-CM

## 2024-12-20 DIAGNOSIS — T50.905A THROMBOCYTOPENIA DUE TO DRUGS: ICD-10-CM

## 2024-12-20 DIAGNOSIS — I10 PRIMARY HYPERTENSION: ICD-10-CM

## 2024-12-20 DIAGNOSIS — N40.1 BENIGN PROSTATIC HYPERPLASIA WITH URINARY FREQUENCY: ICD-10-CM

## 2024-12-20 DIAGNOSIS — E03.9 ACQUIRED HYPOTHYROIDISM: ICD-10-CM

## 2024-12-20 DIAGNOSIS — I25.10 CORONARY ARTERY DISEASE INVOLVING NATIVE CORONARY ARTERY OF NATIVE HEART WITHOUT ANGINA PECTORIS: ICD-10-CM

## 2024-12-20 DIAGNOSIS — R35.0 BENIGN PROSTATIC HYPERPLASIA WITH URINARY FREQUENCY: ICD-10-CM

## 2024-12-20 NOTE — PROGRESS NOTES
for hypophosphatemia during hospitalization.  In regards to osteomyelitis of right great toe he underwent amputation to mid proximal phalanx on 12/8/2024 by Dr. Nick Alcala biopsy showed gangrenous necrosis of distal soft tissue wound culture grew Pseudomonas.  He was treated with IV Zosyn during hospitalization and it was recommended that he continue IV Zosyn until 12/22/2024.  He has peripheral vascular disease underwent right lower extremity arteriogram and angioplasty and stenting on 12/7/2024.  Recommended follow up with vascular surgery and podiatry upon discharge.  Weightbearing as tolerated on right foot with postop shoe.  Patient has diabetes mellitus type 2 continues on Lantus and has sliding scale lispro.  He has an underlying history of CHF angina pacemaker gout hypothyroidism and BPH.  Discharge to [facility] and rehabilitation for strengthening and conditioning.  High risk for decompensation and rehospitalization.      Current visit:  12/17/2024 -patient sitting up in chair today he is awake alert pleasant oriented x 3.  His son-in-law was at the bedside and he noted provider could discuss anything in front of him.  Patient right foot wrapped in dressing with Kerlix and in postop shoe.  He had just returned from physical therapy.  He denies any acute complaints of pain.  Has good capillary refill on toe, he reports sensation in bilateral feet, was unable to undress food to palpate pulses but had good cap refill and was warm.  He denies any shortness of breath but does report cough that started after he had surgeries done during hospitalization.  He has dry barking cough no sputum production today, has some expiratory wheeze. He notes its been ongoing will get CXR to rule out any respiratory complaint. He had lab work on 12/16/2024 which was reviewed white blood cell count 3.4.  His hemoglobin has significantly improved and is now 11.2.  Reviewed those results with patient.  Did review

## 2024-12-23 ENCOUNTER — OFFICE VISIT (OUTPATIENT)
Facility: CLINIC | Age: 89
End: 2024-12-23
Payer: MEDICARE

## 2024-12-23 VITALS
DIASTOLIC BLOOD PRESSURE: 66 MMHG | OXYGEN SATURATION: 97 % | RESPIRATION RATE: 18 BRPM | WEIGHT: 203.2 LBS | SYSTOLIC BLOOD PRESSURE: 157 MMHG | HEART RATE: 71 BPM | BODY MASS INDEX: 29.16 KG/M2 | TEMPERATURE: 98.1 F

## 2024-12-23 DIAGNOSIS — R05.2 SUBACUTE COUGH: ICD-10-CM

## 2024-12-23 DIAGNOSIS — M86.9 OSTEOMYELITIS OF GREAT TOE: Primary | ICD-10-CM

## 2024-12-23 DIAGNOSIS — N18.31 STAGE 3A CHRONIC KIDNEY DISEASE (HCC): ICD-10-CM

## 2024-12-23 DIAGNOSIS — E03.9 ACQUIRED HYPOTHYROIDISM: ICD-10-CM

## 2024-12-23 DIAGNOSIS — R35.0 BENIGN PROSTATIC HYPERPLASIA WITH URINARY FREQUENCY: ICD-10-CM

## 2024-12-23 DIAGNOSIS — E11.621 ULCER OF RIGHT GREAT TOE DUE TO DIABETES MELLITUS (HCC): ICD-10-CM

## 2024-12-23 DIAGNOSIS — T50.905A THROMBOCYTOPENIA DUE TO DRUGS: ICD-10-CM

## 2024-12-23 DIAGNOSIS — I25.10 CORONARY ARTERY DISEASE INVOLVING NATIVE CORONARY ARTERY OF NATIVE HEART WITHOUT ANGINA PECTORIS: ICD-10-CM

## 2024-12-23 DIAGNOSIS — L97.519 ULCER OF RIGHT GREAT TOE DUE TO DIABETES MELLITUS (HCC): ICD-10-CM

## 2024-12-23 DIAGNOSIS — N40.1 BENIGN PROSTATIC HYPERPLASIA WITH URINARY FREQUENCY: ICD-10-CM

## 2024-12-23 DIAGNOSIS — I48.20 CHRONIC ATRIAL FIBRILLATION (HCC): ICD-10-CM

## 2024-12-23 DIAGNOSIS — I73.9 PAD (PERIPHERAL ARTERY DISEASE) (HCC): ICD-10-CM

## 2024-12-23 DIAGNOSIS — E11.51 TYPE 2 DIABETES MELLITUS WITH PERIPHERAL VASCULAR DISEASE (HCC): ICD-10-CM

## 2024-12-23 DIAGNOSIS — Z95.0 PRESENCE OF CARDIAC PACEMAKER: ICD-10-CM

## 2024-12-23 DIAGNOSIS — D69.59 THROMBOCYTOPENIA DUE TO DRUGS: ICD-10-CM

## 2024-12-23 DIAGNOSIS — I10 PRIMARY HYPERTENSION: ICD-10-CM

## 2024-12-23 DIAGNOSIS — I50.32 CHRONIC DIASTOLIC CONGESTIVE HEART FAILURE (HCC): ICD-10-CM

## 2024-12-23 PROCEDURE — 3051F HG A1C>EQUAL 7.0%<8.0%: CPT | Performed by: NURSE PRACTITIONER

## 2024-12-23 PROCEDURE — 99309 SBSQ NF CARE MODERATE MDM 30: CPT | Performed by: NURSE PRACTITIONER

## 2024-12-23 PROCEDURE — G8484 FLU IMMUNIZE NO ADMIN: HCPCS | Performed by: NURSE PRACTITIONER

## 2024-12-23 PROCEDURE — 1123F ACP DISCUSS/DSCN MKR DOCD: CPT | Performed by: NURSE PRACTITIONER

## 2024-12-23 NOTE — PROGRESS NOTES
with vascular surgery for ultrasound on 12/30/2024 than appointment on 1/7/2025  Continues on lovastatin 40 mg daily   4. Coronary artery disease involving native coronary artery of native heart without angina pectoris     No chest pain or palpitations  Continues isosorbide lovastatin metoprolol  Follow-up with usual outpatient cardiology   5. Chronic diastolic congestive heart failure (HCC)     Had preserved ejection fraction during hospitalization  Has 2 plus pedal edema in left foot this is increased his Lasix to 40 mg p.o. twice daily x 5 days then resume 40 mg p.o. daily worse than on admission  Monitor weights  Admission weight was 202.1 pounds, current weight is 220.2 pounds   6. Chronic atrial fibrillation (HCC)     Rate controlled  Continue metoprolol 25 mg p.o. twice daily  Not currently on any anticoagulation due to recent thrombocytopenia and bleed  Needs follow-up with hematology - Dr. Leos on 01/02/2025   7. Presence of cardiac pacemaker     Left check pacemaker  Follow up routinely as outpatient   8. Primary hypertension     Monitor blood pressures  Currently controlled  Continue isosorbide, metoprolol, Lasix   9. Stage 3a chronic kidney disease (HCC)     Last labs on 12/16/2024 creatinine 0.88 stable  Labs on 12/23/2024   10. Type 2 diabetes mellitus with peripheral vascular disease (HCC)     Continues on Lantus 30 units at bedtime  Continue Humalog sliding scale with meals  Monitor blood sugars ACHS  Adjust insulin as needed   11. Acquired hypothyroidism     Stable  Continue levothyroxine 175 mcg p.o. daily   12. Benign prostatic hyperplasia with urinary frequency     Currently asymptomatic  Continues on terazosin and finasteride   13. Subacute cough     Patient reports cough since he had surgery at hospital  CXR was checked on 12/17/24 was negative for acute findings  Contiune  Mucinex to 600 mg ER p.o. twice daily  Has as needed guaifenesin-dextro morphine 5 mL every 8 hours as needed  Notes

## 2024-12-26 ENCOUNTER — OFFICE VISIT (OUTPATIENT)
Facility: CLINIC | Age: 89
End: 2024-12-26

## 2024-12-26 VITALS
TEMPERATURE: 97.1 F | WEIGHT: 203.2 LBS | HEART RATE: 88 BPM | SYSTOLIC BLOOD PRESSURE: 157 MMHG | DIASTOLIC BLOOD PRESSURE: 70 MMHG | RESPIRATION RATE: 18 BRPM | BODY MASS INDEX: 29.16 KG/M2 | OXYGEN SATURATION: 97 %

## 2024-12-26 DIAGNOSIS — R05.2 SUBACUTE COUGH: ICD-10-CM

## 2024-12-26 DIAGNOSIS — R25.1 TREMOR: ICD-10-CM

## 2024-12-26 DIAGNOSIS — I10 PRIMARY HYPERTENSION: ICD-10-CM

## 2024-12-26 DIAGNOSIS — N18.31 STAGE 3A CHRONIC KIDNEY DISEASE (HCC): ICD-10-CM

## 2024-12-26 DIAGNOSIS — R35.0 BENIGN PROSTATIC HYPERPLASIA WITH URINARY FREQUENCY: ICD-10-CM

## 2024-12-26 DIAGNOSIS — D69.59 THROMBOCYTOPENIA DUE TO DRUGS: ICD-10-CM

## 2024-12-26 DIAGNOSIS — I48.20 CHRONIC ATRIAL FIBRILLATION (HCC): ICD-10-CM

## 2024-12-26 DIAGNOSIS — I50.32 CHRONIC DIASTOLIC CONGESTIVE HEART FAILURE (HCC): ICD-10-CM

## 2024-12-26 DIAGNOSIS — T50.905A THROMBOCYTOPENIA DUE TO DRUGS: ICD-10-CM

## 2024-12-26 DIAGNOSIS — L97.519 ULCER OF RIGHT GREAT TOE DUE TO DIABETES MELLITUS (HCC): ICD-10-CM

## 2024-12-26 DIAGNOSIS — E11.51 TYPE 2 DIABETES MELLITUS WITH PERIPHERAL VASCULAR DISEASE (HCC): ICD-10-CM

## 2024-12-26 DIAGNOSIS — M86.9 OSTEOMYELITIS OF GREAT TOE: Primary | ICD-10-CM

## 2024-12-26 DIAGNOSIS — I25.10 CORONARY ARTERY DISEASE INVOLVING NATIVE CORONARY ARTERY OF NATIVE HEART WITHOUT ANGINA PECTORIS: ICD-10-CM

## 2024-12-26 DIAGNOSIS — E03.9 ACQUIRED HYPOTHYROIDISM: ICD-10-CM

## 2024-12-26 DIAGNOSIS — N40.1 BENIGN PROSTATIC HYPERPLASIA WITH URINARY FREQUENCY: ICD-10-CM

## 2024-12-26 DIAGNOSIS — Z95.0 PRESENCE OF CARDIAC PACEMAKER: ICD-10-CM

## 2024-12-26 DIAGNOSIS — I73.9 PAD (PERIPHERAL ARTERY DISEASE) (HCC): ICD-10-CM

## 2024-12-26 DIAGNOSIS — M21.372 LEFT FOOT DROP: ICD-10-CM

## 2024-12-26 DIAGNOSIS — E11.621 ULCER OF RIGHT GREAT TOE DUE TO DIABETES MELLITUS (HCC): ICD-10-CM

## 2024-12-26 NOTE — PROGRESS NOTES
[place] [id number]  [address] [address]  Phone: [phone]  Fax: [phone]  Also available via [employer]     PLACE OF SERVICE:  [facility] [address] [place] [address]    SKILLED VISIT    Chief Complaint:   Chief Complaint   Patient presents with    Follow-up         HPI : Maryan Resendiz is a 89 y.o. male here for follow up.    Previous history prior to admission:   Patient was admitted to the hospital from 11/29/2024 until 12/12/2024.  He presented to the emergency room after having vertigo and fell he was in his kitchen and fell down all of a sudden and lost control.  He had previously had that happen on 11/27/2024 as well.  Reported to dark tarry stools and a nosebleed prior to coming to the emergency room.  He also had been receiving antibiotics for toe infection that increased in duration for another 2 weeks and patient had noted that he only took 3 doses of those antibiotics.  When he presented to the emergency room sodium was 130 mildly elevated potassium 5.2 and elevated creatinine 2.79.  He was treated for acute anemia and iron deficiency.  He had baseline hemoglobin of 12-13.  Positive fecal occult blood test he received 2 units packed red blood cells.  He had nasal packing and Rhino Rocket for epistaxis, oral anticoagulation was placed on hold.  He received IV iron during hospitalization.  He did have thrombocytopenia ? If this was due to antibiotics.  Ultimately his kidney function stabilized with BUN of 42 and creatinine of 1.6.  It was thought that the dizziness and fall was due to anemia CT of the head was negative CT of the cervical spine was without fracture CT of the chest showed complex mass in the upper pole of left kidney.  He had echocardiogram on 12/2/2024 that showed normal ejection fraction 55 to 60%.  It was thought that the decreased platelet count was likely due to medication side effect.  He was treated

## 2024-12-31 ENCOUNTER — OFFICE VISIT (OUTPATIENT)
Facility: CLINIC | Age: 89
End: 2024-12-31
Payer: MEDICARE

## 2024-12-31 VITALS
DIASTOLIC BLOOD PRESSURE: 68 MMHG | WEIGHT: 204.7 LBS | TEMPERATURE: 64 F | BODY MASS INDEX: 29.37 KG/M2 | SYSTOLIC BLOOD PRESSURE: 136 MMHG | OXYGEN SATURATION: 96 % | RESPIRATION RATE: 18 BRPM

## 2024-12-31 DIAGNOSIS — E11.621 ULCER OF RIGHT GREAT TOE DUE TO DIABETES MELLITUS (HCC): ICD-10-CM

## 2024-12-31 DIAGNOSIS — M86.9 OSTEOMYELITIS OF GREAT TOE: Primary | ICD-10-CM

## 2024-12-31 DIAGNOSIS — N18.31 STAGE 3A CHRONIC KIDNEY DISEASE (HCC): ICD-10-CM

## 2024-12-31 DIAGNOSIS — T50.905A THROMBOCYTOPENIA DUE TO DRUGS: ICD-10-CM

## 2024-12-31 DIAGNOSIS — I10 PRIMARY HYPERTENSION: ICD-10-CM

## 2024-12-31 DIAGNOSIS — R05.2 SUBACUTE COUGH: ICD-10-CM

## 2024-12-31 DIAGNOSIS — L97.519 ULCER OF RIGHT GREAT TOE DUE TO DIABETES MELLITUS (HCC): ICD-10-CM

## 2024-12-31 DIAGNOSIS — Z95.0 PRESENCE OF CARDIAC PACEMAKER: ICD-10-CM

## 2024-12-31 DIAGNOSIS — I25.10 CORONARY ARTERY DISEASE INVOLVING NATIVE CORONARY ARTERY OF NATIVE HEART WITHOUT ANGINA PECTORIS: ICD-10-CM

## 2024-12-31 DIAGNOSIS — I50.32 CHRONIC DIASTOLIC CONGESTIVE HEART FAILURE (HCC): ICD-10-CM

## 2024-12-31 DIAGNOSIS — I48.20 CHRONIC ATRIAL FIBRILLATION (HCC): ICD-10-CM

## 2024-12-31 DIAGNOSIS — E11.51 TYPE 2 DIABETES MELLITUS WITH PERIPHERAL VASCULAR DISEASE (HCC): ICD-10-CM

## 2024-12-31 DIAGNOSIS — R25.1 TREMOR: ICD-10-CM

## 2024-12-31 DIAGNOSIS — I73.9 PAD (PERIPHERAL ARTERY DISEASE) (HCC): ICD-10-CM

## 2024-12-31 DIAGNOSIS — R35.0 BENIGN PROSTATIC HYPERPLASIA WITH URINARY FREQUENCY: ICD-10-CM

## 2024-12-31 DIAGNOSIS — D69.59 THROMBOCYTOPENIA DUE TO DRUGS: ICD-10-CM

## 2024-12-31 DIAGNOSIS — M21.372 LEFT FOOT DROP: ICD-10-CM

## 2024-12-31 DIAGNOSIS — E03.9 ACQUIRED HYPOTHYROIDISM: ICD-10-CM

## 2024-12-31 DIAGNOSIS — N40.1 BENIGN PROSTATIC HYPERPLASIA WITH URINARY FREQUENCY: ICD-10-CM

## 2024-12-31 PROCEDURE — 99309 SBSQ NF CARE MODERATE MDM 30: CPT | Performed by: NURSE PRACTITIONER

## 2024-12-31 PROCEDURE — G8484 FLU IMMUNIZE NO ADMIN: HCPCS | Performed by: NURSE PRACTITIONER

## 2024-12-31 PROCEDURE — 1123F ACP DISCUSS/DSCN MKR DOCD: CPT | Performed by: NURSE PRACTITIONER

## 2024-12-31 PROCEDURE — 3051F HG A1C>EQUAL 7.0%<8.0%: CPT | Performed by: NURSE PRACTITIONER

## 2024-12-31 NOTE — PROGRESS NOTES
tablet 0    metoprolol tartrate (LOPRESSOR) 25 MG tablet Take 1 tablet by mouth 2 times daily 60 tablet 0    guaiFENesin (MUCINEX) 600 MG extended release tablet Take 1 tablet by mouth 2 times daily 20 tablet 0    furosemide (LASIX) 40 MG tablet Take 1 tablet by mouth daily 30 tablet 0    pantoprazole (PROTONIX) 40 MG tablet Take 1 tablet by mouth 2 times daily (before meals) 60 tablet 0    Multiple Vitamin (MULTIVITAMIN) TABS tablet Take 1 tablet by mouth daily      insulin lispro, 1 Unit Dial, (HUMALOG KWIKPEN) 100 UNIT/ML SOPN Inject 5-10 Units into the skin as needed for High Blood Sugar Per sliding scale      B-D ULTRAFINE III SHORT PEN 31G X 8 MM MISC USE TO INJECT INSULIN UNDER THE SKIN 4 TIMES DAILY AS DIRECTED 100 each 1    allopurinol (ZYLOPRIM) 300 MG tablet Take 1 tablet by mouth daily (Patient taking differently: Take 1 tablet by mouth at bedtime) 90 tablet 3    isosorbide mononitrate (IMDUR) 60 MG extended release tablet Take 1 tablet by mouth daily (Patient taking differently: Take 1 tablet by mouth at bedtime) 90 tablet 3    lovastatin (MEVACOR) 40 MG tablet TAKE 1 TABLET DAILY (Patient taking differently: Take 1 tablet by mouth nightly) 90 tablet 3    FABB 2.2-25-1 MG TABS tablet TAKE 1 TABLET BY MOUTH DAILY 90 tablet 0    levothyroxine (SYNTHROID) 175 MCG tablet TAKE 1 TABLET BY MOUTH EVERY DAY 90 tablet 0    blood glucose test strips (FREESTYLE TEST STRIPS) strip Use to test blood sugar three times daily DX:E11.51 300 strip 3    finasteride (PROSCAR) 5 MG tablet Take 1 tablet by mouth every other day      terazosin (HYTRIN) 5 MG capsule Take 1 capsule by mouth nightly       No current facility-administered medications for this visit.        Assessment/Plans:    Diagnosis Orders   1. Osteomyelitis of great toe     Osteomyelitis of right great toe status post amputation to mid proximal phalanx 12/8/2024 by Dr. Alcala  Continues on Zosyn until 12/24/2024 - NOW COMPLETED  Biopsy showed

## 2024-12-31 NOTE — PROGRESS NOTES
Maryan Resendiz is a 89 y.o. male here for new patient appt for thrombocytopenia/chronic anemia.  Hospital follow up per Primitivo Ho.  Residing at [facility].Says he has been progressing. Hoping to go home soon.     1. Have you been to the ER, urgent care clinic since your last visit?  Hospitalized since your last visit? New Pt    2. Have you seen or consulted any other health care providers outside of the [facility] since your last visit?  Include any pap smears or colon screening.  New Pt

## 2025-01-02 ENCOUNTER — OFFICE VISIT (OUTPATIENT)
Age: 89
End: 2025-01-02
Payer: MEDICARE

## 2025-01-02 VITALS
HEART RATE: 79 BPM | OXYGEN SATURATION: 94 % | BODY MASS INDEX: 29.37 KG/M2 | HEIGHT: 70 IN | SYSTOLIC BLOOD PRESSURE: 154 MMHG | TEMPERATURE: 97.3 F | DIASTOLIC BLOOD PRESSURE: 75 MMHG

## 2025-01-02 DIAGNOSIS — D50.0 IRON DEFICIENCY ANEMIA DUE TO CHRONIC BLOOD LOSS: Primary | ICD-10-CM

## 2025-01-02 DIAGNOSIS — D69.6 THROMBOCYTOPENIA (HCC): ICD-10-CM

## 2025-01-02 PROCEDURE — 1111F DSCHRG MED/CURRENT MED MERGE: CPT | Performed by: INTERNAL MEDICINE

## 2025-01-02 PROCEDURE — G8417 CALC BMI ABV UP PARAM F/U: HCPCS | Performed by: INTERNAL MEDICINE

## 2025-01-02 PROCEDURE — 99214 OFFICE O/P EST MOD 30 MIN: CPT | Performed by: INTERNAL MEDICINE

## 2025-01-02 PROCEDURE — 1126F AMNT PAIN NOTED NONE PRSNT: CPT | Performed by: INTERNAL MEDICINE

## 2025-01-02 PROCEDURE — 1036F TOBACCO NON-USER: CPT | Performed by: INTERNAL MEDICINE

## 2025-01-02 PROCEDURE — G8428 CUR MEDS NOT DOCUMENT: HCPCS | Performed by: INTERNAL MEDICINE

## 2025-01-02 PROCEDURE — 1123F ACP DISCUSS/DSCN MKR DOCD: CPT | Performed by: INTERNAL MEDICINE

## 2025-01-02 RX ORDER — INSULIN GLARGINE 100 [IU]/ML
INJECTION, SOLUTION SUBCUTANEOUS
COMMUNITY
Start: 2024-11-26 | End: 2025-01-03

## 2025-01-02 NOTE — PROGRESS NOTES
[facility] [place]  at [facility]  [address], [address]  [place] [address]  [phone]    Hematology Note        Patient: Maryan Resendiz MRN: [id number]  SSN: [id number]    YOB: 1935  Age: 89 y.o.  Sex: male      Subjective:      Maryan Resendiz is a 89 y.o. male who suffers with a multitude of medical illness such as CKD, Type 2 DM with vasculopathy, CHF etc. He has repeatedly admitted to the hospital for toe infection, dizziness, failure to thrive. He was seen by our service for anemia and thrombocytopenia secondary to acute illness, infection and medication usage such as Linezolid. I am seeing him in follow up in the clinic at this time. He is fatigued.       Review of Systems:  Constitutional: positive for fatigue  Eyes: negative  Ears, Nose, Mouth, Throat, and Face: negative  Respiratory: negative  Cardiovascular: negative  Gastrointestinal: negative  Genitourinary:negative  Integument/Breast: negative  Hematologic/Lymphatic: negative  Musculoskeletal:negative  Neurological: negative      Past Medical History:   Diagnosis Date    Acquired hypothyroidism 09/12/2017    Anemia 02/01/2015    BPH with obstruction/lower urinary tract symptoms 10/24/2017    CAD (coronary artery disease)     Chronic atrial fibrillation (HCC) 02/01/2015    Chronic systolic heart failure (HCC)     CKD (chronic kidney disease) stage 3, GFR 30-59 ml/min (HCC) 02/01/2015    Diabetes (HCC)     Diverticulosis of large intestine without hemorrhage 10/24/2017    Former smoker     Gout 10/24/2017    History of echocardiogram 02/2018    LVEF 40-45%, global HK, mild to mod LAE, no AS, mild MR, RVSP 26 mmHg    Hyperlipidemia 02/01/2015    Hypertension     Long term current use of anticoagulant 10/24/2017    Long-term use of high-risk medication 10/24/2017    Macular degeneration 10/24/2017    Microalbuminuria     Peripheral vascular disease (HCC) 10/24/2017

## 2025-01-02 NOTE — PROGRESS NOTES
Gammopathy panel retic count b12 folate iron profile and ferritin ldh epo on day 1 of iron infusion

## 2025-01-03 ENCOUNTER — TELEPHONE (OUTPATIENT)
Age: 89
End: 2025-01-03

## 2025-01-03 ENCOUNTER — OFFICE VISIT (OUTPATIENT)
Facility: CLINIC | Age: 89
End: 2025-01-03

## 2025-01-03 VITALS
OXYGEN SATURATION: 98 % | SYSTOLIC BLOOD PRESSURE: 118 MMHG | TEMPERATURE: 97.2 F | HEART RATE: 86 BPM | BODY MASS INDEX: 29.37 KG/M2 | WEIGHT: 204.7 LBS | DIASTOLIC BLOOD PRESSURE: 71 MMHG | RESPIRATION RATE: 18 BRPM

## 2025-01-03 DIAGNOSIS — N18.31 STAGE 3A CHRONIC KIDNEY DISEASE (HCC): ICD-10-CM

## 2025-01-03 DIAGNOSIS — I73.9 PAD (PERIPHERAL ARTERY DISEASE) (HCC): ICD-10-CM

## 2025-01-03 DIAGNOSIS — R25.1 TREMOR: ICD-10-CM

## 2025-01-03 DIAGNOSIS — Z95.0 PRESENCE OF CARDIAC PACEMAKER: ICD-10-CM

## 2025-01-03 DIAGNOSIS — E11.51 TYPE 2 DIABETES MELLITUS WITH PERIPHERAL VASCULAR DISEASE (HCC): ICD-10-CM

## 2025-01-03 DIAGNOSIS — E11.621 ULCER OF RIGHT GREAT TOE DUE TO DIABETES MELLITUS (HCC): ICD-10-CM

## 2025-01-03 DIAGNOSIS — I25.10 CORONARY ARTERY DISEASE INVOLVING NATIVE CORONARY ARTERY OF NATIVE HEART WITHOUT ANGINA PECTORIS: ICD-10-CM

## 2025-01-03 DIAGNOSIS — D69.59 THROMBOCYTOPENIA DUE TO DRUGS: ICD-10-CM

## 2025-01-03 DIAGNOSIS — L97.519 ULCER OF RIGHT GREAT TOE DUE TO DIABETES MELLITUS (HCC): ICD-10-CM

## 2025-01-03 DIAGNOSIS — I50.32 CHRONIC DIASTOLIC CONGESTIVE HEART FAILURE (HCC): ICD-10-CM

## 2025-01-03 DIAGNOSIS — R35.0 BENIGN PROSTATIC HYPERPLASIA WITH URINARY FREQUENCY: ICD-10-CM

## 2025-01-03 DIAGNOSIS — T50.905A THROMBOCYTOPENIA DUE TO DRUGS: ICD-10-CM

## 2025-01-03 DIAGNOSIS — I48.20 CHRONIC ATRIAL FIBRILLATION (HCC): ICD-10-CM

## 2025-01-03 DIAGNOSIS — D63.8 ANEMIA, CHRONIC DISEASE: Primary | ICD-10-CM

## 2025-01-03 DIAGNOSIS — R05.2 SUBACUTE COUGH: ICD-10-CM

## 2025-01-03 DIAGNOSIS — E03.9 ACQUIRED HYPOTHYROIDISM: ICD-10-CM

## 2025-01-03 DIAGNOSIS — M86.9 OSTEOMYELITIS OF GREAT TOE: Primary | ICD-10-CM

## 2025-01-03 DIAGNOSIS — M21.372 LEFT FOOT DROP: ICD-10-CM

## 2025-01-03 DIAGNOSIS — N40.1 BENIGN PROSTATIC HYPERPLASIA WITH URINARY FREQUENCY: ICD-10-CM

## 2025-01-03 DIAGNOSIS — I10 PRIMARY HYPERTENSION: ICD-10-CM

## 2025-01-03 RX ORDER — ALBUTEROL SULFATE 90 UG/1
4 INHALANT RESPIRATORY (INHALATION) PRN
OUTPATIENT
Start: 2025-01-10

## 2025-01-03 RX ORDER — SODIUM CHLORIDE 9 MG/ML
INJECTION, SOLUTION INTRAVENOUS CONTINUOUS
OUTPATIENT
Start: 2025-01-10

## 2025-01-03 RX ORDER — SODIUM CHLORIDE 9 MG/ML
5-250 INJECTION, SOLUTION INTRAVENOUS PRN
OUTPATIENT
Start: 2025-01-10

## 2025-01-03 RX ORDER — HYDROCORTISONE SODIUM SUCCINATE 100 MG/2ML
100 INJECTION INTRAMUSCULAR; INTRAVENOUS
OUTPATIENT
Start: 2025-01-10

## 2025-01-03 RX ORDER — FAMOTIDINE 10 MG/ML
20 INJECTION, SOLUTION INTRAVENOUS
OUTPATIENT
Start: 2025-01-10

## 2025-01-03 RX ORDER — FUROSEMIDE 40 MG/1
40 TABLET ORAL 2 TIMES DAILY
Qty: 30 TABLET | Refills: 0 | Status: SHIPPED
Start: 2025-01-03

## 2025-01-03 RX ORDER — BENZONATATE 200 MG/1
200 CAPSULE ORAL 3 TIMES DAILY
COMMUNITY
End: 2025-01-10

## 2025-01-03 RX ORDER — DIPHENHYDRAMINE HYDROCHLORIDE 50 MG/ML
50 INJECTION INTRAMUSCULAR; INTRAVENOUS
OUTPATIENT
Start: 2025-01-10

## 2025-01-03 RX ORDER — ACETAMINOPHEN 325 MG/1
650 TABLET ORAL
OUTPATIENT
Start: 2025-01-10

## 2025-01-03 RX ORDER — SODIUM CHLORIDE 0.9 % (FLUSH) 0.9 %
5-40 SYRINGE (ML) INJECTION PRN
OUTPATIENT
Start: 2025-01-10

## 2025-01-03 RX ORDER — HEPARIN SODIUM (PORCINE) LOCK FLUSH IV SOLN 100 UNIT/ML 100 UNIT/ML
500 SOLUTION INTRAVENOUS PRN
OUTPATIENT
Start: 2025-01-10

## 2025-01-03 RX ORDER — EPINEPHRINE 1 MG/ML
0.3 INJECTION, SOLUTION, CONCENTRATE INTRAVENOUS PRN
OUTPATIENT
Start: 2025-01-10

## 2025-01-03 RX ORDER — ONDANSETRON 2 MG/ML
8 INJECTION INTRAMUSCULAR; INTRAVENOUS
OUTPATIENT
Start: 2025-01-10

## 2025-01-03 NOTE — TELEPHONE ENCOUNTER
Patient's daughter called to schedule infusion appts. Lennie requests a call as soon as possible to set these appts up.     She states to first call her brother August Resendiz (on pt HIPAA) first at [phone] to schedule and if you are unable to reach him to call her at [phone].

## 2025-01-03 NOTE — PROGRESS NOTES
had lab work on 12/23/2024 hemoglobin stable at 9.4 white blood cell count 8 also sodium improved to 139.  Also noted previously had thrombocytopenia platelet level is improved 171 and stable.  These were reviewed with him this visit.  Patient is a diabetic he continues on Lantus 30 units daily.  He is on sliding scale with meals.  Will start this at 150 to increase sensitivity as blood sugars are ranging 180-298. Blood pressures have been reviewed and are stable he is afebrile heart rate rate controlled.  Most recent physical therapy noticed and reviewed from and he participated without any contraindications.  He is eating and drinking well.  Last bowel movement 12/25/2024 no  or GI complaints.  He does report tremor in upper extremities, he has no history of noted Parkinson's. It doesn't appear to worsen with use of hands.  It appears to improve with intentional movement. Appears to be present resting, no pill rolling. Will perform lab work B12 B1 B6 copper TSH to rule this out as underlying cause.     12/31/2024 -  patient sitting up in chair today he is awake alert pleasant oriented x 3.  Patient right foot wrapped in dressing with Kerlix and in postop shoe.  He went to see podiatry, Dr. Alcala on 12/20/2024 who noted area is well-healed no dehiscence can wrap foot with padded bandage dry gauze loosely applied Kerlix and Ace wrap and did not want Coban.  On left foot has edema with blisters and recommended to keep foot elevated. He also has left foot drop and PT has recommended AFO. In regards to blisters Skin-Prep twice daily has been ordered. Blisters no longer with liquid in them, but the skin has remained intact. He still has edema now 1-2+ up bilateral legs to knees. Will adjust lasix up to 40 mg PO BID. He denies SOB. His current weight is 204.7 lbs, admission weight was 202.1 lbs. When he came to facility he was complaining of subacute cough chest x-ray was negative at that time.  He reports that his

## 2025-01-06 ENCOUNTER — TELEPHONE (OUTPATIENT)
Age: 89
End: 2025-01-06

## 2025-01-06 NOTE — TELEPHONE ENCOUNTER
Iron infusion needed.    Also will need Gammopathy panel,retic count, iron profile and ferritin, ldh, epo, cbc with diff, and SBB, on day 1 of iron infusion    Please call pt sonAugust (emergency contact) to set these appointments up.      Patient is scheduled. Spoke w/son and made aware.

## 2025-01-07 ENCOUNTER — HOSPITAL ENCOUNTER (INPATIENT)
Facility: HOSPITAL | Age: 89
LOS: 6 days | Discharge: NURSING, REHAB OR LONG-TERM CARE | DRG: 177 | End: 2025-01-13
Attending: STUDENT IN AN ORGANIZED HEALTH CARE EDUCATION/TRAINING PROGRAM | Admitting: STUDENT IN AN ORGANIZED HEALTH CARE EDUCATION/TRAINING PROGRAM
Payer: MEDICARE

## 2025-01-07 ENCOUNTER — APPOINTMENT (OUTPATIENT)
Facility: HOSPITAL | Age: 89
DRG: 177 | End: 2025-01-07
Payer: MEDICARE

## 2025-01-07 DIAGNOSIS — U07.1 COVID-19: Primary | ICD-10-CM

## 2025-01-07 DIAGNOSIS — R09.02 HYPOXEMIA: ICD-10-CM

## 2025-01-07 PROBLEM — J96.01 ACUTE HYPOXIC RESPIRATORY FAILURE: Status: ACTIVE | Noted: 2025-01-07

## 2025-01-07 LAB
ALBUMIN SERPL-MCNC: 2.5 G/DL (ref 3.5–5)
ALBUMIN/GLOB SERPL: 0.7 (ref 1.1–2.2)
ALP SERPL-CCNC: 189 U/L (ref 45–117)
ALT SERPL-CCNC: 18 U/L (ref 12–78)
ANION GAP SERPL CALC-SCNC: 4 MMOL/L (ref 2–12)
AST SERPL-CCNC: 19 U/L (ref 15–37)
BASOPHILS # BLD: 0.04 K/UL (ref 0–0.1)
BASOPHILS NFR BLD: 0.6 % (ref 0–1)
BILIRUB SERPL-MCNC: 0.7 MG/DL (ref 0.2–1)
BUN SERPL-MCNC: 40 MG/DL (ref 6–20)
BUN/CREAT SERPL: 22 (ref 12–20)
CALCIUM SERPL-MCNC: 8.8 MG/DL (ref 8.5–10.1)
CHLORIDE SERPL-SCNC: 105 MMOL/L (ref 97–108)
CO2 SERPL-SCNC: 31 MMOL/L (ref 21–32)
CREAT SERPL-MCNC: 1.81 MG/DL (ref 0.7–1.3)
DIFFERENTIAL METHOD BLD: ABNORMAL
EOSINOPHIL # BLD: 0.2 K/UL (ref 0–0.4)
EOSINOPHIL NFR BLD: 2.8 % (ref 0–7)
ERYTHROCYTE [DISTWIDTH] IN BLOOD BY AUTOMATED COUNT: 18.9 % (ref 11.5–14.5)
GLOBULIN SER CALC-MCNC: 3.6 G/DL (ref 2–4)
GLUCOSE BLD STRIP.AUTO-MCNC: 300 MG/DL (ref 65–117)
GLUCOSE BLD STRIP.AUTO-MCNC: 303 MG/DL (ref 65–117)
GLUCOSE BLD STRIP.AUTO-MCNC: 303 MG/DL (ref 65–117)
GLUCOSE SERPL-MCNC: 236 MG/DL (ref 65–100)
HCT VFR BLD AUTO: 32.2 % (ref 36.6–50.3)
HGB BLD-MCNC: 9.7 G/DL (ref 12.1–17)
IMM GRANULOCYTES # BLD AUTO: 0.03 K/UL (ref 0–0.04)
IMM GRANULOCYTES NFR BLD AUTO: 0.4 % (ref 0–0.5)
LYMPHOCYTES # BLD: 0.95 K/UL (ref 0.8–3.5)
LYMPHOCYTES NFR BLD: 13.1 % (ref 12–49)
MCH RBC QN AUTO: 30.8 PG (ref 26–34)
MCHC RBC AUTO-ENTMCNC: 30.1 G/DL (ref 30–36.5)
MCV RBC AUTO: 102.2 FL (ref 80–99)
MONOCYTES # BLD: 1.04 K/UL (ref 0–1)
MONOCYTES NFR BLD: 14.4 % (ref 5–13)
NEUTS SEG # BLD: 4.98 K/UL (ref 1.8–8)
NEUTS SEG NFR BLD: 68.7 % (ref 32–75)
NRBC # BLD: 0 K/UL (ref 0–0.01)
NRBC BLD-RTO: 0 PER 100 WBC
NT PRO BNP: 8722 PG/ML
PLATELET # BLD AUTO: 114 K/UL (ref 150–400)
PMV BLD AUTO: 10.8 FL (ref 8.9–12.9)
POTASSIUM SERPL-SCNC: 3.8 MMOL/L (ref 3.5–5.1)
PROT SERPL-MCNC: 6.1 G/DL (ref 6.4–8.2)
RBC # BLD AUTO: 3.15 M/UL (ref 4.1–5.7)
SERVICE CMNT-IMP: ABNORMAL
SODIUM SERPL-SCNC: 140 MMOL/L (ref 136–145)
TROPONIN I SERPL HS-MCNC: 50 NG/L (ref 0–76)
TROPONIN I SERPL HS-MCNC: 51 NG/L (ref 0–76)
WBC # BLD AUTO: 7.2 K/UL (ref 4.1–11.1)

## 2025-01-07 PROCEDURE — 6370000000 HC RX 637 (ALT 250 FOR IP): Performed by: STUDENT IN AN ORGANIZED HEALTH CARE EDUCATION/TRAINING PROGRAM

## 2025-01-07 PROCEDURE — 6360000002 HC RX W HCPCS: Performed by: STUDENT IN AN ORGANIZED HEALTH CARE EDUCATION/TRAINING PROGRAM

## 2025-01-07 PROCEDURE — 99285 EMERGENCY DEPT VISIT HI MDM: CPT

## 2025-01-07 PROCEDURE — 82962 GLUCOSE BLOOD TEST: CPT

## 2025-01-07 PROCEDURE — 2500000003 HC RX 250 WO HCPCS: Performed by: STUDENT IN AN ORGANIZED HEALTH CARE EDUCATION/TRAINING PROGRAM

## 2025-01-07 PROCEDURE — 93005 ELECTROCARDIOGRAM TRACING: CPT | Performed by: STUDENT IN AN ORGANIZED HEALTH CARE EDUCATION/TRAINING PROGRAM

## 2025-01-07 PROCEDURE — 1100000003 HC PRIVATE W/ TELEMETRY

## 2025-01-07 PROCEDURE — 85025 COMPLETE CBC W/AUTO DIFF WBC: CPT

## 2025-01-07 PROCEDURE — 71045 X-RAY EXAM CHEST 1 VIEW: CPT

## 2025-01-07 PROCEDURE — 83880 ASSAY OF NATRIURETIC PEPTIDE: CPT

## 2025-01-07 PROCEDURE — 36415 COLL VENOUS BLD VENIPUNCTURE: CPT

## 2025-01-07 PROCEDURE — 94640 AIRWAY INHALATION TREATMENT: CPT

## 2025-01-07 PROCEDURE — 80053 COMPREHEN METABOLIC PANEL: CPT

## 2025-01-07 PROCEDURE — 84484 ASSAY OF TROPONIN QUANT: CPT

## 2025-01-07 RX ORDER — GUAIFENESIN 200 MG/10ML
200 LIQUID ORAL EVERY 4 HOURS PRN
COMMUNITY

## 2025-01-07 RX ORDER — FINASTERIDE 5 MG/1
5 TABLET, FILM COATED ORAL EVERY OTHER DAY
Status: DISCONTINUED | OUTPATIENT
Start: 2025-01-07 | End: 2025-01-13 | Stop reason: HOSPADM

## 2025-01-07 RX ORDER — INSULIN LISPRO 100 [IU]/ML
0-4 INJECTION, SOLUTION INTRAVENOUS; SUBCUTANEOUS
Status: DISCONTINUED | OUTPATIENT
Start: 2025-01-07 | End: 2025-01-13 | Stop reason: HOSPADM

## 2025-01-07 RX ORDER — FUROSEMIDE 10 MG/ML
40 INJECTION INTRAMUSCULAR; INTRAVENOUS 2 TIMES DAILY
Status: DISCONTINUED | OUTPATIENT
Start: 2025-01-07 | End: 2025-01-11

## 2025-01-07 RX ORDER — ONDANSETRON 2 MG/ML
4 INJECTION INTRAMUSCULAR; INTRAVENOUS EVERY 6 HOURS PRN
Status: DISCONTINUED | OUTPATIENT
Start: 2025-01-07 | End: 2025-01-13 | Stop reason: HOSPADM

## 2025-01-07 RX ORDER — GLUCAGON 1 MG/ML
1 KIT INJECTION PRN
Status: DISCONTINUED | OUTPATIENT
Start: 2025-01-07 | End: 2025-01-13 | Stop reason: HOSPADM

## 2025-01-07 RX ORDER — PANTOPRAZOLE SODIUM 40 MG/1
40 TABLET, DELAYED RELEASE ORAL
Status: DISCONTINUED | OUTPATIENT
Start: 2025-01-07 | End: 2025-01-13 | Stop reason: HOSPADM

## 2025-01-07 RX ORDER — SODIUM CHLORIDE 0.9 % (FLUSH) 0.9 %
5-40 SYRINGE (ML) INJECTION EVERY 12 HOURS SCHEDULED
Status: DISCONTINUED | OUTPATIENT
Start: 2025-01-07 | End: 2025-01-13 | Stop reason: HOSPADM

## 2025-01-07 RX ORDER — DEXTROSE MONOHYDRATE 100 MG/ML
INJECTION, SOLUTION INTRAVENOUS CONTINUOUS PRN
Status: DISCONTINUED | OUTPATIENT
Start: 2025-01-07 | End: 2025-01-13 | Stop reason: HOSPADM

## 2025-01-07 RX ORDER — SODIUM CHLORIDE 0.9 % (FLUSH) 0.9 %
5-40 SYRINGE (ML) INJECTION PRN
Status: DISCONTINUED | OUTPATIENT
Start: 2025-01-07 | End: 2025-01-13 | Stop reason: HOSPADM

## 2025-01-07 RX ORDER — FUROSEMIDE 10 MG/ML
40 INJECTION INTRAMUSCULAR; INTRAVENOUS DAILY
Status: DISCONTINUED | OUTPATIENT
Start: 2025-01-08 | End: 2025-01-07

## 2025-01-07 RX ORDER — ACETAMINOPHEN 325 MG/1
650 TABLET ORAL EVERY 4 HOURS PRN
Status: DISCONTINUED | OUTPATIENT
Start: 2025-01-07 | End: 2025-01-08

## 2025-01-07 RX ORDER — OXYMETAZOLINE HYDROCHLORIDE 0.05 G/100ML
2 SPRAY NASAL
Status: COMPLETED | OUTPATIENT
Start: 2025-01-07 | End: 2025-01-07

## 2025-01-07 RX ORDER — ISOSORBIDE MONONITRATE 30 MG/1
60 TABLET, EXTENDED RELEASE ORAL DAILY
Status: DISCONTINUED | OUTPATIENT
Start: 2025-01-07 | End: 2025-01-13 | Stop reason: HOSPADM

## 2025-01-07 RX ORDER — ALLOPURINOL 300 MG/1
300 TABLET ORAL NIGHTLY
Status: DISCONTINUED | OUTPATIENT
Start: 2025-01-07 | End: 2025-01-13 | Stop reason: HOSPADM

## 2025-01-07 RX ORDER — ATORVASTATIN CALCIUM 10 MG/1
10 TABLET, FILM COATED ORAL DAILY
Status: DISCONTINUED | OUTPATIENT
Start: 2025-01-07 | End: 2025-01-13 | Stop reason: HOSPADM

## 2025-01-07 RX ORDER — POLYETHYLENE GLYCOL 3350 17 G/17G
17 POWDER, FOR SOLUTION ORAL DAILY PRN
Status: DISCONTINUED | OUTPATIENT
Start: 2025-01-07 | End: 2025-01-13 | Stop reason: HOSPADM

## 2025-01-07 RX ORDER — SODIUM CHLORIDE 9 MG/ML
INJECTION, SOLUTION INTRAVENOUS PRN
Status: DISCONTINUED | OUTPATIENT
Start: 2025-01-07 | End: 2025-01-13 | Stop reason: HOSPADM

## 2025-01-07 RX ORDER — MAGNESIUM SULFATE IN WATER 40 MG/ML
2000 INJECTION, SOLUTION INTRAVENOUS PRN
Status: DISCONTINUED | OUTPATIENT
Start: 2025-01-07 | End: 2025-01-08

## 2025-01-07 RX ORDER — METOPROLOL TARTRATE 25 MG/1
25 TABLET, FILM COATED ORAL 2 TIMES DAILY
Status: DISCONTINUED | OUTPATIENT
Start: 2025-01-07 | End: 2025-01-08

## 2025-01-07 RX ORDER — ONDANSETRON 4 MG/1
4 TABLET, ORALLY DISINTEGRATING ORAL EVERY 8 HOURS PRN
Status: DISCONTINUED | OUTPATIENT
Start: 2025-01-07 | End: 2025-01-13 | Stop reason: HOSPADM

## 2025-01-07 RX ORDER — ACETAMINOPHEN 325 MG/1
650 TABLET ORAL EVERY 6 HOURS PRN
Status: DISCONTINUED | OUTPATIENT
Start: 2025-01-07 | End: 2025-01-13 | Stop reason: HOSPADM

## 2025-01-07 RX ORDER — FUROSEMIDE 10 MG/ML
40 INJECTION INTRAMUSCULAR; INTRAVENOUS ONCE
Status: COMPLETED | OUTPATIENT
Start: 2025-01-07 | End: 2025-01-07

## 2025-01-07 RX ORDER — ACETAMINOPHEN 650 MG/1
650 SUPPOSITORY RECTAL EVERY 6 HOURS PRN
Status: DISCONTINUED | OUTPATIENT
Start: 2025-01-07 | End: 2025-01-13 | Stop reason: HOSPADM

## 2025-01-07 RX ORDER — DOXAZOSIN 2 MG/1
4 TABLET ORAL DAILY
Status: DISCONTINUED | OUTPATIENT
Start: 2025-01-07 | End: 2025-01-13 | Stop reason: HOSPADM

## 2025-01-07 RX ORDER — ACETAMINOPHEN 325 MG/1
650 TABLET ORAL EVERY 6 HOURS PRN
COMMUNITY

## 2025-01-07 RX ORDER — IPRATROPIUM BROMIDE AND ALBUTEROL SULFATE 2.5; .5 MG/3ML; MG/3ML
1 SOLUTION RESPIRATORY (INHALATION)
Status: COMPLETED | OUTPATIENT
Start: 2025-01-07 | End: 2025-01-07

## 2025-01-07 RX ADMIN — METOPROLOL TARTRATE 25 MG: 25 TABLET ORAL at 15:32

## 2025-01-07 RX ADMIN — LEVOTHYROXINE SODIUM 175 MCG: 0.1 TABLET ORAL at 15:16

## 2025-01-07 RX ADMIN — ATORVASTATIN CALCIUM 10 MG: 10 TABLET, FILM COATED ORAL at 21:12

## 2025-01-07 RX ADMIN — IPRATROPIUM BROMIDE AND ALBUTEROL SULFATE 1 DOSE: .5; 3 SOLUTION RESPIRATORY (INHALATION) at 08:05

## 2025-01-07 RX ADMIN — OXYMETAZOLINE HYDROCHLORIDE 2 SPRAY: 0.5 SPRAY NASAL at 08:02

## 2025-01-07 RX ADMIN — ISOSORBIDE MONONITRATE 60 MG: 30 TABLET, EXTENDED RELEASE ORAL at 15:16

## 2025-01-07 RX ADMIN — SODIUM CHLORIDE, PRESERVATIVE FREE 10 ML: 5 INJECTION INTRAVENOUS at 15:16

## 2025-01-07 RX ADMIN — PANTOPRAZOLE SODIUM 40 MG: 40 TABLET, DELAYED RELEASE ORAL at 15:15

## 2025-01-07 RX ADMIN — FUROSEMIDE 40 MG: 10 INJECTION, SOLUTION INTRAMUSCULAR; INTRAVENOUS at 10:24

## 2025-01-07 RX ADMIN — METOPROLOL TARTRATE 25 MG: 25 TABLET ORAL at 21:12

## 2025-01-07 RX ADMIN — INSULIN LISPRO 3 UNITS: 100 INJECTION, SOLUTION INTRAVENOUS; SUBCUTANEOUS at 19:19

## 2025-01-07 RX ADMIN — SODIUM CHLORIDE, PRESERVATIVE FREE 10 ML: 5 INJECTION INTRAVENOUS at 21:12

## 2025-01-07 RX ADMIN — FUROSEMIDE 40 MG: 10 INJECTION, SOLUTION INTRAMUSCULAR; INTRAVENOUS at 19:20

## 2025-01-07 RX ADMIN — ALLOPURINOL 300 MG: 300 TABLET ORAL at 21:12

## 2025-01-07 ASSESSMENT — LIFESTYLE VARIABLES
HOW OFTEN DO YOU HAVE A DRINK CONTAINING ALCOHOL: NEVER
HOW MANY STANDARD DRINKS CONTAINING ALCOHOL DO YOU HAVE ON A TYPICAL DAY: PATIENT DOES NOT DRINK

## 2025-01-07 ASSESSMENT — PAIN - FUNCTIONAL ASSESSMENT: PAIN_FUNCTIONAL_ASSESSMENT: NONE - DENIES PAIN

## 2025-01-07 NOTE — ED NOTES
Pt has remained above 92% RA while nasal clamp applied. Will leave on 20 minutes and reevaluate. MD Mendel states fabianao rocket will be next step if bleeding restarts after clamp removal.

## 2025-01-07 NOTE — CARE COORDINATION
Care Management Initial Assessment       RUR: n/a, inpatient  Readmission? Yes - 2024 through 2024, discharge of SNF  1st IM letter given? No, Patient Access to provide  1st  letter given: N/A, not /VA affiliated    Readmission: 2024 through 2024, discharge of SNF ([facility]). Pt came from SNF to ED w/resp failure and nose bleed.     Son request CM to contact pt's appts unable to get through to offices today: TIMI called Dr. Leblanc office and [facility] OPI to advise pt has an appt scheduled for Friday for Iron Infusion, pt is in ED with admission orders. Roxy w/[facility] advises she will let Dr. Leblanc's office aware and cancel appt on Friday. [facility] informs offices at [facility] are closed today because of no water.   TIMI called Dr. Alcala ([phone]) Office to advise pt will be admitted to hospital and cannot make appt to have sutures removed today or tomorrow. Dr. Alcala advised hospitalist to consult him and he will come remove sutures from surgery.    Son and Pt requesting not to return to [facility], exploring other other options ( home care with HH, other SNF/IPR). TIMI provided pt with [facility] (Dorina Draper), personal paid caregivers list, SNF list, Pt and son request using [facility] as they used in past with a pleasant  experience. Pt and son agree they would like to review medical and therapy recommendations and then decide SNF vs home with HH.     Initial Assessment: Chart reviewed. CM met with pt at bedside, introduced role, confirmed demographic information is up-to-date in the chart and review needs for discharge planning.   Review Full Code status. Pt reports his has a new ACP at home appointing son, August Resendiz ([phone]) giving him POA. ACP on file in [facility] presently appoints Stella and Eric, which both are . CM changed pt Marital Status to  at pt's request.     Pt resides alone in a single story home with 3

## 2025-01-07 NOTE — H&P
Hospitalist Admission Note    NAME:   Maryan Resendiz   : 1935   MRN: [id number]     Date/Time: 2025 9:38 AM    Patient PCP: Prashanth Livingston MD    ______________________________________________________________________  Given the patient's current clinical presentation, I have a high level of concern for decompensation if discharged from the emergency department.  Complex decision making was performed, which includes reviewing the patient's available past medical records, laboratory results, and x-ray films.       My assessment of this patient's clinical condition and my plan of care is as follows.    Assessment / Plan:    AHRF  COVID positive at facility  Diastolic heart failure exacerbation  S/p pacemaker  ?Chronic Afib  85 on room air in ED, requiring 1-2 L. BNP elevated 8700.  CXR Moderate pulmonary vascular congestive changes. Trop 50. Lasix recently increased to 40 po BID. Not on AC due to recent thrombocytopenia and GIB.  - IV lasix 40 IV BID  - strict I/O, daily standing weights  - Repeat trop, ordered EKG  - Cardiology consulted, follows with [facility]  - continue home imdur, metoprolol  - wean O2 as tolerated    Epistaxis - improving  MELQUIADES  Chronic GIB  Appears to be a chronic issue per PCP notes. Hgb 9.7 on admission, increased from 7.3 prior. Received 2 sprays of afrin thus far. [facility].  - Rhino rocket if recurs  - if unable to stop with repeat afrin and rhino rocket, will need to transfer to facility with ENT, possibly [facility]    Peripheral Vascular disease  Angioplasty and stenting on    - was supposed to followup with Vascular after last hospitalization to discuss antiplatelets  - consulted Vascular to clarify antiplatelet strategy with recent stenting    Type 2 diabetes  - SSI    Gout  Cont allopurinol     Hypothyroidism  Levothyroxine 175 mcg daily     BPH  Finasteride 5 mg daily  Terazosin 5 mg nightly    Recent osteomyelitis of right great

## 2025-01-07 NOTE — ED NOTES
Pt keeps having intermittent nose bleeds while on 2LNC. NC removed and left nare plugged and clamp applied. Perfect Serve sent out to MD Mendel on further guidance referring to the epistaxis.

## 2025-01-07 NOTE — ED PROVIDER NOTES
EMERGENCY DEPARTMENT HISTORY AND PHYSICAL EXAM      Date: 1/7/2025  Patient Name: Maryan Resendiz    History of Presenting Illness     Chief Complaint   Patient presents with    Epistaxis     From [facility].  Tested positive for covid yesterday.  Patient had a nosebleed this morning and staff was unable to control bleeding.  Does have a history of nose bleeds. Bleeding under control en route.          HPI: History From: patient, History limited by: none  Maryan Resendiz, 89 y.o. male presents to the ED with cc of nosebleed.  He reports epistaxis that started overnight.  Bleeding has resolved on arrival.  He was diagnosed with COVID yesterday at his living facility, and has had a cough.  He currently denies complaints other than feeling a bit tired.  He does not take any blood thinners.    Son is later able to provide further history.  States that he has been increasingly weak at his facility, seems to be declining.      There are no other complaints, changes, or physical findings at this time.    PCP: Prashanth Livingston MD    No current facility-administered medications on file prior to encounter.     Current Outpatient Medications on File Prior to Encounter   Medication Sig Dispense Refill    furosemide (LASIX) 40 MG tablet Take 1 tablet by mouth 2 times daily 30 tablet 0    benzonatate (TESSALON) 200 MG capsule Take 1 capsule by mouth in the morning, at noon, and at bedtime      NONFORMULARY B Complex with Folic Acid      insulin glargine (LANTUS) 100 UNIT/ML injection vial Inject 30 Units into the skin nightly (Patient not taking: Reported on 1/2/2025) 10 mL 0    ondansetron (ZOFRAN-ODT) 4 MG disintegrating tablet Take 1 tablet by mouth every 8 hours as needed for Nausea or Vomiting 21 tablet 0    metoprolol tartrate (LOPRESSOR) 25 MG tablet Take 1 tablet by mouth 2 times daily 60 tablet 0    guaiFENesin (MUCINEX) 600 MG extended release tablet Take 1 tablet by mouth 2 times daily 20 tablet 0

## 2025-01-08 ENCOUNTER — TELEPHONE (OUTPATIENT)
Age: 89
End: 2025-01-08

## 2025-01-08 LAB
ANION GAP SERPL CALC-SCNC: 6 MMOL/L (ref 2–12)
BASOPHILS # BLD: 0.03 K/UL (ref 0–0.1)
BASOPHILS NFR BLD: 0.5 % (ref 0–1)
BUN SERPL-MCNC: 40 MG/DL (ref 6–20)
BUN/CREAT SERPL: 25 (ref 12–20)
CALCIUM SERPL-MCNC: 9.2 MG/DL (ref 8.5–10.1)
CHLORIDE SERPL-SCNC: 105 MMOL/L (ref 97–108)
CO2 SERPL-SCNC: 29 MMOL/L (ref 21–32)
CREAT SERPL-MCNC: 1.57 MG/DL (ref 0.7–1.3)
DIFFERENTIAL METHOD BLD: ABNORMAL
EKG ATRIAL RATE: 284 BPM
EKG DIAGNOSIS: NORMAL
EKG Q-T INTERVAL: 476 MS
EKG QRS DURATION: 134 MS
EKG QTC CALCULATION (BAZETT): 517 MS
EKG R AXIS: 253 DEGREES
EKG T AXIS: 81 DEGREES
EKG VENTRICULAR RATE: 71 BPM
EOSINOPHIL # BLD: 0.27 K/UL (ref 0–0.4)
EOSINOPHIL NFR BLD: 4.4 % (ref 0–7)
ERYTHROCYTE [DISTWIDTH] IN BLOOD BY AUTOMATED COUNT: 18.4 % (ref 11.5–14.5)
FLUAV RNA SPEC QL NAA+PROBE: NOT DETECTED
FLUBV RNA SPEC QL NAA+PROBE: NOT DETECTED
GLUCOSE BLD STRIP.AUTO-MCNC: 146 MG/DL (ref 65–117)
GLUCOSE BLD STRIP.AUTO-MCNC: 205 MG/DL (ref 65–117)
GLUCOSE BLD STRIP.AUTO-MCNC: 214 MG/DL (ref 65–117)
GLUCOSE BLD STRIP.AUTO-MCNC: 229 MG/DL (ref 65–117)
GLUCOSE BLD STRIP.AUTO-MCNC: 253 MG/DL (ref 65–117)
GLUCOSE SERPL-MCNC: 130 MG/DL (ref 65–100)
HCT VFR BLD AUTO: 31.6 % (ref 36.6–50.3)
HGB BLD-MCNC: 9.7 G/DL (ref 12.1–17)
IMM GRANULOCYTES # BLD AUTO: 0.03 K/UL (ref 0–0.04)
IMM GRANULOCYTES NFR BLD AUTO: 0.5 % (ref 0–0.5)
LYMPHOCYTES # BLD: 0.84 K/UL (ref 0.8–3.5)
LYMPHOCYTES NFR BLD: 13.7 % (ref 12–49)
MAGNESIUM SERPL-MCNC: 1.8 MG/DL (ref 1.6–2.4)
MCH RBC QN AUTO: 31.1 PG (ref 26–34)
MCHC RBC AUTO-ENTMCNC: 30.7 G/DL (ref 30–36.5)
MCV RBC AUTO: 101.3 FL (ref 80–99)
MONOCYTES # BLD: 0.92 K/UL (ref 0–1)
MONOCYTES NFR BLD: 15 % (ref 5–13)
NEUTS SEG # BLD: 4.03 K/UL (ref 1.8–8)
NEUTS SEG NFR BLD: 65.9 % (ref 32–75)
NRBC # BLD: 0 K/UL (ref 0–0.01)
NRBC BLD-RTO: 0 PER 100 WBC
PHOSPHATE SERPL-MCNC: 3.4 MG/DL (ref 2.6–4.7)
PLATELET # BLD AUTO: 110 K/UL (ref 150–400)
PMV BLD AUTO: 11.2 FL (ref 8.9–12.9)
POTASSIUM SERPL-SCNC: 4 MMOL/L (ref 3.5–5.1)
RBC # BLD AUTO: 3.12 M/UL (ref 4.1–5.7)
SARS-COV-2 RNA RESP QL NAA+PROBE: DETECTED
SERVICE CMNT-IMP: ABNORMAL
SODIUM SERPL-SCNC: 140 MMOL/L (ref 136–145)
SOURCE: ABNORMAL
WBC # BLD AUTO: 6.1 K/UL (ref 4.1–11.1)

## 2025-01-08 PROCEDURE — 6370000000 HC RX 637 (ALT 250 FOR IP): Performed by: STUDENT IN AN ORGANIZED HEALTH CARE EDUCATION/TRAINING PROGRAM

## 2025-01-08 PROCEDURE — 80048 BASIC METABOLIC PNL TOTAL CA: CPT

## 2025-01-08 PROCEDURE — 2500000003 HC RX 250 WO HCPCS: Performed by: STUDENT IN AN ORGANIZED HEALTH CARE EDUCATION/TRAINING PROGRAM

## 2025-01-08 PROCEDURE — 2500000003 HC RX 250 WO HCPCS: Performed by: NURSE PRACTITIONER

## 2025-01-08 PROCEDURE — 82962 GLUCOSE BLOOD TEST: CPT

## 2025-01-08 PROCEDURE — 83735 ASSAY OF MAGNESIUM: CPT

## 2025-01-08 PROCEDURE — 36415 COLL VENOUS BLD VENIPUNCTURE: CPT

## 2025-01-08 PROCEDURE — 97535 SELF CARE MNGMENT TRAINING: CPT

## 2025-01-08 PROCEDURE — 97165 OT EVAL LOW COMPLEX 30 MIN: CPT

## 2025-01-08 PROCEDURE — 97161 PT EVAL LOW COMPLEX 20 MIN: CPT

## 2025-01-08 PROCEDURE — 6360000002 HC RX W HCPCS: Performed by: STUDENT IN AN ORGANIZED HEALTH CARE EDUCATION/TRAINING PROGRAM

## 2025-01-08 PROCEDURE — 87636 SARSCOV2 & INF A&B AMP PRB: CPT

## 2025-01-08 PROCEDURE — 85025 COMPLETE CBC W/AUTO DIFF WBC: CPT

## 2025-01-08 PROCEDURE — 97116 GAIT TRAINING THERAPY: CPT

## 2025-01-08 PROCEDURE — 6370000000 HC RX 637 (ALT 250 FOR IP): Performed by: INTERNAL MEDICINE

## 2025-01-08 PROCEDURE — 1100000003 HC PRIVATE W/ TELEMETRY

## 2025-01-08 PROCEDURE — 84100 ASSAY OF PHOSPHORUS: CPT

## 2025-01-08 RX ORDER — CASTOR OIL AND BALSAM, PERU 788; 87 MG/G; MG/G
OINTMENT TOPICAL 2 TIMES DAILY
Status: DISCONTINUED | OUTPATIENT
Start: 2025-01-08 | End: 2025-01-13 | Stop reason: HOSPADM

## 2025-01-08 RX ORDER — METOPROLOL SUCCINATE 25 MG/1
25 TABLET, EXTENDED RELEASE ORAL DAILY
Status: DISCONTINUED | OUTPATIENT
Start: 2025-01-08 | End: 2025-01-13 | Stop reason: HOSPADM

## 2025-01-08 RX ORDER — GUAIFENESIN 200 MG/10ML
200 LIQUID ORAL EVERY 4 HOURS PRN
Status: DISCONTINUED | OUTPATIENT
Start: 2025-01-08 | End: 2025-01-13 | Stop reason: HOSPADM

## 2025-01-08 RX ADMIN — INSULIN LISPRO 1 UNITS: 100 INJECTION, SOLUTION INTRAVENOUS; SUBCUTANEOUS at 13:07

## 2025-01-08 RX ADMIN — Medication: at 21:20

## 2025-01-08 RX ADMIN — PANTOPRAZOLE SODIUM 40 MG: 40 TABLET, DELAYED RELEASE ORAL at 06:19

## 2025-01-08 RX ADMIN — SODIUM CHLORIDE, PRESERVATIVE FREE 10 ML: 5 INJECTION INTRAVENOUS at 21:21

## 2025-01-08 RX ADMIN — ISOSORBIDE MONONITRATE 60 MG: 30 TABLET, EXTENDED RELEASE ORAL at 10:00

## 2025-01-08 RX ADMIN — GUAIFENESIN 200 MG: 200 SOLUTION ORAL at 04:25

## 2025-01-08 RX ADMIN — METOPROLOL SUCCINATE 25 MG: 25 TABLET, FILM COATED, EXTENDED RELEASE ORAL at 15:54

## 2025-01-08 RX ADMIN — EMPAGLIFLOZIN 10 MG: 10 TABLET, FILM COATED ORAL at 15:54

## 2025-01-08 RX ADMIN — SODIUM CHLORIDE, PRESERVATIVE FREE 10 ML: 5 INJECTION INTRAVENOUS at 10:04

## 2025-01-08 RX ADMIN — METOPROLOL TARTRATE 25 MG: 25 TABLET ORAL at 10:00

## 2025-01-08 RX ADMIN — ATORVASTATIN CALCIUM 10 MG: 10 TABLET, FILM COATED ORAL at 21:20

## 2025-01-08 RX ADMIN — LEVOTHYROXINE SODIUM 175 MCG: 0.1 TABLET ORAL at 06:19

## 2025-01-08 RX ADMIN — ACETAMINOPHEN 650 MG: 325 TABLET ORAL at 06:26

## 2025-01-08 RX ADMIN — INSULIN LISPRO 2 UNITS: 100 INJECTION, SOLUTION INTRAVENOUS; SUBCUTANEOUS at 21:23

## 2025-01-08 RX ADMIN — DOXAZOSIN 4 MG: 2 TABLET ORAL at 10:00

## 2025-01-08 RX ADMIN — INSULIN LISPRO 1 UNITS: 100 INJECTION, SOLUTION INTRAVENOUS; SUBCUTANEOUS at 17:30

## 2025-01-08 RX ADMIN — INSULIN LISPRO 1 UNITS: 100 INJECTION, SOLUTION INTRAVENOUS; SUBCUTANEOUS at 09:59

## 2025-01-08 RX ADMIN — Medication: at 14:34

## 2025-01-08 RX ADMIN — ALLOPURINOL 300 MG: 300 TABLET ORAL at 21:20

## 2025-01-08 RX ADMIN — FUROSEMIDE 40 MG: 10 INJECTION, SOLUTION INTRAMUSCULAR; INTRAVENOUS at 10:02

## 2025-01-08 RX ADMIN — PANTOPRAZOLE SODIUM 40 MG: 40 TABLET, DELAYED RELEASE ORAL at 15:54

## 2025-01-08 RX ADMIN — FUROSEMIDE 40 MG: 10 INJECTION, SOLUTION INTRAMUSCULAR; INTRAVENOUS at 17:31

## 2025-01-08 NOTE — TELEPHONE ENCOUNTER
Pt son called and stated that the pt was admitted to [facility]. He said the pt tested positive for covid. He wanted to know if the pt is able to have his infusion at the hospital. Please advise     CB# [phone]

## 2025-01-08 NOTE — CONSULTS
Patient seen and examined. Full consult note to follow.   Will arrange battery check on PM     Paolo Loja MD  Clinical Cardiac Electrophysiology  [facility]

## 2025-01-08 NOTE — TELEPHONE ENCOUNTER
Case d/w NP.  Was not consulted on pt however we do not give injectafer inpt but give venofer.  Pt is on for infusion next Friday 1/17. Will keep as planned.    This RN called and left detailed VM on self identified line.

## 2025-01-08 NOTE — CONSULTS
[facility] EP/ ARRHYTHMIA/ CARDIOLOGY CONSULT      EP Cardiology consult requested by Regina Silva MD for atrial fibrillation   PCP:  Prashanth Livingston MD    Primary cardiologist: Dr. Voss      Electrophysiology Service  1/8/2025     Assessment:   Biventricular ICD in situ  HUGO  Has an abandoned right sided dual chamber pacemaker system and a left sided CRT-D system.   As of September of last year, he has 5 months left to reach [facility]ERE[place]  Permanent atrial fibrillation  Historically on warfarin which is now being held due to GIB  Dilated cardiomyoapthy  Super responder to CRTD  EF 55%  Hypertension  Carotic artery stenosis.   Chronic GIB  Epistaxis  DM2   Hypothyroid  CKD    Plan:   Start Jardiance @ 10 mg daily.   Hold warfarin. He is a reasonable candidate for LAAO device. I will defer this to Dr. Voss   I will have [employer] check his device.He will likely need a gen change prior to leaving.   Dr. Voss to see tomorrow.       [x]    High complexity decision making was performed  [x]    Patient is at high-risk of decompensation with multiple organ involvement       HPI:  Maryan Resendiz is a 89 y.o. male with history of:  Problem list:  1. Chronic systolic CHF EF 30%, down from 60% in 2015.   2. CAD, nuclear stress test in 2007 and 2009 revealed small posterior ischemia, treated medically.  3. Paroxysmal atrial flutter, currently A pacing, was on chronic amio and anticoagulation.  STOPPED amiodarone in 2/2018 due to possible intolerance (weakness, fatigue, thyroid, etc.)  Warfarin followed by Dr. Crespo.  4. DDD left SJM pacemaker 7/14/2011.  Old right dual chamber pacemaker (battery dead) with two leads and abandoned single pass lead.  s/p BIV-ICD upgrade in 2/2016 (see note).   5. 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  6. s/p L knee arthroplasty 1/2015 by Dr. Easley.  Bullae on left foot at that time.  7. Hypertension.  8. Dyslipidemia.  9. Diabetes mellitus type 2.  10.

## 2025-01-09 ENCOUNTER — APPOINTMENT (OUTPATIENT)
Facility: HOSPITAL | Age: 89
DRG: 177 | End: 2025-01-09
Payer: MEDICARE

## 2025-01-09 LAB
ANION GAP SERPL CALC-SCNC: 7 MMOL/L (ref 2–12)
BASOPHILS # BLD: 0.04 K/UL (ref 0–0.1)
BASOPHILS NFR BLD: 0.6 % (ref 0–1)
BUN SERPL-MCNC: 40 MG/DL (ref 6–20)
BUN/CREAT SERPL: 27 (ref 12–20)
CALCIUM SERPL-MCNC: 9.2 MG/DL (ref 8.5–10.1)
CHLORIDE SERPL-SCNC: 107 MMOL/L (ref 97–108)
CO2 SERPL-SCNC: 29 MMOL/L (ref 21–32)
CREAT SERPL-MCNC: 1.49 MG/DL (ref 0.7–1.3)
DIFFERENTIAL METHOD BLD: ABNORMAL
EOSINOPHIL # BLD: 0.26 K/UL (ref 0–0.4)
EOSINOPHIL NFR BLD: 4.2 % (ref 0–7)
ERYTHROCYTE [DISTWIDTH] IN BLOOD BY AUTOMATED COUNT: 18.6 % (ref 11.5–14.5)
GLUCOSE BLD STRIP.AUTO-MCNC: 142 MG/DL (ref 65–117)
GLUCOSE BLD STRIP.AUTO-MCNC: 179 MG/DL (ref 65–117)
GLUCOSE BLD STRIP.AUTO-MCNC: 254 MG/DL (ref 65–117)
GLUCOSE BLD STRIP.AUTO-MCNC: 261 MG/DL (ref 65–117)
GLUCOSE SERPL-MCNC: 126 MG/DL (ref 65–100)
HCT VFR BLD AUTO: 32 % (ref 36.6–50.3)
HGB BLD-MCNC: 9.8 G/DL (ref 12.1–17)
IMM GRANULOCYTES # BLD AUTO: 0.02 K/UL (ref 0–0.04)
IMM GRANULOCYTES NFR BLD AUTO: 0.3 % (ref 0–0.5)
LYMPHOCYTES # BLD: 1.19 K/UL (ref 0.8–3.5)
LYMPHOCYTES NFR BLD: 19 % (ref 12–49)
MAGNESIUM SERPL-MCNC: 1.9 MG/DL (ref 1.6–2.4)
MCH RBC QN AUTO: 31 PG (ref 26–34)
MCHC RBC AUTO-ENTMCNC: 30.6 G/DL (ref 30–36.5)
MCV RBC AUTO: 101.3 FL (ref 80–99)
MONOCYTES # BLD: 0.83 K/UL (ref 0–1)
MONOCYTES NFR BLD: 13.3 % (ref 5–13)
NEUTS SEG # BLD: 3.91 K/UL (ref 1.8–8)
NEUTS SEG NFR BLD: 62.6 % (ref 32–75)
NRBC # BLD: 0 K/UL (ref 0–0.01)
NRBC BLD-RTO: 0 PER 100 WBC
PHOSPHATE SERPL-MCNC: 3.3 MG/DL (ref 2.6–4.7)
PLATELET # BLD AUTO: 118 K/UL (ref 150–400)
PMV BLD AUTO: 10.6 FL (ref 8.9–12.9)
POTASSIUM SERPL-SCNC: 3.8 MMOL/L (ref 3.5–5.1)
RBC # BLD AUTO: 3.16 M/UL (ref 4.1–5.7)
SERVICE CMNT-IMP: ABNORMAL
SODIUM SERPL-SCNC: 143 MMOL/L (ref 136–145)
WBC # BLD AUTO: 6.3 K/UL (ref 4.1–11.1)

## 2025-01-09 PROCEDURE — 2580000003 HC RX 258: Performed by: NURSE PRACTITIONER

## 2025-01-09 PROCEDURE — 6370000000 HC RX 637 (ALT 250 FOR IP): Performed by: INTERNAL MEDICINE

## 2025-01-09 PROCEDURE — 82962 GLUCOSE BLOOD TEST: CPT

## 2025-01-09 PROCEDURE — 83735 ASSAY OF MAGNESIUM: CPT

## 2025-01-09 PROCEDURE — 85025 COMPLETE CBC W/AUTO DIFF WBC: CPT

## 2025-01-09 PROCEDURE — 6360000002 HC RX W HCPCS: Performed by: STUDENT IN AN ORGANIZED HEALTH CARE EDUCATION/TRAINING PROGRAM

## 2025-01-09 PROCEDURE — 97530 THERAPEUTIC ACTIVITIES: CPT

## 2025-01-09 PROCEDURE — 6370000000 HC RX 637 (ALT 250 FOR IP): Performed by: STUDENT IN AN ORGANIZED HEALTH CARE EDUCATION/TRAINING PROGRAM

## 2025-01-09 PROCEDURE — 2500000003 HC RX 250 WO HCPCS: Performed by: STUDENT IN AN ORGANIZED HEALTH CARE EDUCATION/TRAINING PROGRAM

## 2025-01-09 PROCEDURE — 6360000002 HC RX W HCPCS: Performed by: NURSE PRACTITIONER

## 2025-01-09 PROCEDURE — 80048 BASIC METABOLIC PNL TOTAL CA: CPT

## 2025-01-09 PROCEDURE — 97535 SELF CARE MNGMENT TRAINING: CPT

## 2025-01-09 PROCEDURE — 1100000003 HC PRIVATE W/ TELEMETRY

## 2025-01-09 PROCEDURE — 2700000000 HC OXYGEN THERAPY PER DAY

## 2025-01-09 PROCEDURE — 71045 X-RAY EXAM CHEST 1 VIEW: CPT

## 2025-01-09 PROCEDURE — 36415 COLL VENOUS BLD VENIPUNCTURE: CPT

## 2025-01-09 PROCEDURE — 84100 ASSAY OF PHOSPHORUS: CPT

## 2025-01-09 RX ORDER — INSULIN GLARGINE 100 [IU]/ML
10 INJECTION, SOLUTION SUBCUTANEOUS NIGHTLY
Status: DISCONTINUED | OUTPATIENT
Start: 2025-01-09 | End: 2025-01-13 | Stop reason: HOSPADM

## 2025-01-09 RX ADMIN — Medication: at 12:37

## 2025-01-09 RX ADMIN — EMPAGLIFLOZIN 10 MG: 10 TABLET, FILM COATED ORAL at 10:32

## 2025-01-09 RX ADMIN — FUROSEMIDE 40 MG: 10 INJECTION, SOLUTION INTRAMUSCULAR; INTRAVENOUS at 10:33

## 2025-01-09 RX ADMIN — FINASTERIDE 5 MG: 5 TABLET, FILM COATED ORAL at 10:32

## 2025-01-09 RX ADMIN — ALLOPURINOL 300 MG: 300 TABLET ORAL at 21:42

## 2025-01-09 RX ADMIN — INSULIN LISPRO 2 UNITS: 100 INJECTION, SOLUTION INTRAVENOUS; SUBCUTANEOUS at 12:29

## 2025-01-09 RX ADMIN — Medication: at 21:30

## 2025-01-09 RX ADMIN — FUROSEMIDE 40 MG: 10 INJECTION, SOLUTION INTRAMUSCULAR; INTRAVENOUS at 17:57

## 2025-01-09 RX ADMIN — ATORVASTATIN CALCIUM 10 MG: 10 TABLET, FILM COATED ORAL at 21:42

## 2025-01-09 RX ADMIN — SODIUM CHLORIDE, PRESERVATIVE FREE 10 ML: 5 INJECTION INTRAVENOUS at 10:33

## 2025-01-09 RX ADMIN — LEVOTHYROXINE SODIUM 175 MCG: 0.1 TABLET ORAL at 05:18

## 2025-01-09 RX ADMIN — PANTOPRAZOLE SODIUM 40 MG: 40 TABLET, DELAYED RELEASE ORAL at 17:57

## 2025-01-09 RX ADMIN — INSULIN LISPRO 2 UNITS: 100 INJECTION, SOLUTION INTRAVENOUS; SUBCUTANEOUS at 21:41

## 2025-01-09 RX ADMIN — PANTOPRAZOLE SODIUM 40 MG: 40 TABLET, DELAYED RELEASE ORAL at 05:18

## 2025-01-09 RX ADMIN — DOXAZOSIN 4 MG: 2 TABLET ORAL at 10:32

## 2025-01-09 RX ADMIN — ISOSORBIDE MONONITRATE 60 MG: 30 TABLET, EXTENDED RELEASE ORAL at 10:32

## 2025-01-09 RX ADMIN — SODIUM CHLORIDE 300 MG: 9 INJECTION, SOLUTION INTRAVENOUS at 12:35

## 2025-01-09 RX ADMIN — SODIUM CHLORIDE, PRESERVATIVE FREE 10 ML: 5 INJECTION INTRAVENOUS at 21:42

## 2025-01-09 RX ADMIN — INSULIN GLARGINE 10 UNITS: 100 INJECTION, SOLUTION SUBCUTANEOUS at 21:41

## 2025-01-09 RX ADMIN — METOPROLOL SUCCINATE 25 MG: 25 TABLET, FILM COATED, EXTENDED RELEASE ORAL at 10:32

## 2025-01-09 ASSESSMENT — PAIN SCALES - GENERAL: PAINLEVEL_OUTOF10: 0

## 2025-01-09 NOTE — CARE COORDINATION
SW received call from floor RN stated pt son would like to speak to CM regarding rehab options.  SW spoke with pt son, August, who stated they would like a referral sent to [facility] (first choice) and 2nd choice is [facility].  [facility] for their review.    CM will continue to follow and assist with additional discharge needs.    PIYUSH Duval

## 2025-01-09 NOTE — CONSULTS
[facility] [place] [facility]  [address] [address]  W: [phone] F: [phone]  Hematology/ Oncology Consult Note      Reason for consult:     Maryan Resendiz is a 89 y.o. male who we have been asked to see by Dr. Silva for consideration of IV iron while hospitalized    Subjective:     Maryan Resendiz is a 89-year-old male recently admitted to [facility] with acute thrombocytopenia, recent upper GI bleed, acute anemia, LUCIA on CKD, failed antibiotics for right toe infection.  During that admission he was followed for acute thrombocytopenia and ruled out HIT.  Thrombocytopenia was associated with infection recent linezolid use he is now rehospitalized with COVID.  Patient has a past medical history of type 2 diabetes, gout, hypothyroidism, CHF, history of angina, status post pacemaker, BPH, CAD, former smoker, hypertension, hyperlipidemia, PVD, renal artery stenosis and sick sinus syndrome.    Patient seen at request as he was due for Injectafer next week.  He is now currently hospitalized with COVID.  Discussed can pursue Venofer infusions while he is hospitalized.  Not much else discussed at bedside given significant hard of hearing and patient did not have hearing aids.      Review of Systems:  Pertinent items are noted in the History of Present Illness.       Past Medical History:   Diagnosis Date    Acquired hypothyroidism 09/12/2017    Anemia 02/01/2015    BPH with obstruction/lower urinary tract symptoms 10/24/2017    CAD (coronary artery disease)     Chronic atrial fibrillation (HCC) 02/01/2015    Chronic systolic heart failure (HCC)     CKD (chronic kidney disease) stage 3, GFR 30-59 ml/min (HCC) 02/01/2015    Diabetes (HCC)     Diverticulosis of large intestine without hemorrhage 10/24/2017    Former smoker     Gout 10/24/2017    History of echocardiogram 02/2018    LVEF 40-45%, global HK, mild to mod

## 2025-01-10 ENCOUNTER — HOSPITAL ENCOUNTER (OUTPATIENT)
Facility: HOSPITAL | Age: 89
Setting detail: INFUSION SERIES
End: 2025-01-10

## 2025-01-10 ENCOUNTER — APPOINTMENT (OUTPATIENT)
Facility: HOSPITAL | Age: 89
DRG: 177 | End: 2025-01-10
Payer: MEDICARE

## 2025-01-10 LAB
ANION GAP SERPL CALC-SCNC: 5 MMOL/L (ref 2–12)
BASOPHILS # BLD: 0.03 K/UL (ref 0–0.1)
BASOPHILS NFR BLD: 0.5 % (ref 0–1)
BUN SERPL-MCNC: 42 MG/DL (ref 6–20)
BUN/CREAT SERPL: 24 (ref 12–20)
CALCIUM SERPL-MCNC: 9.3 MG/DL (ref 8.5–10.1)
CHLORIDE SERPL-SCNC: 105 MMOL/L (ref 97–108)
CO2 SERPL-SCNC: 32 MMOL/L (ref 21–32)
CREAT SERPL-MCNC: 1.72 MG/DL (ref 0.7–1.3)
DIFFERENTIAL METHOD BLD: ABNORMAL
EOSINOPHIL # BLD: 0.2 K/UL (ref 0–0.4)
EOSINOPHIL NFR BLD: 3.5 % (ref 0–7)
ERYTHROCYTE [DISTWIDTH] IN BLOOD BY AUTOMATED COUNT: 18.9 % (ref 11.5–14.5)
GLUCOSE BLD STRIP.AUTO-MCNC: 158 MG/DL (ref 65–117)
GLUCOSE BLD STRIP.AUTO-MCNC: 202 MG/DL (ref 65–117)
GLUCOSE BLD STRIP.AUTO-MCNC: 212 MG/DL (ref 65–117)
GLUCOSE BLD STRIP.AUTO-MCNC: 262 MG/DL (ref 65–117)
GLUCOSE BLD STRIP.AUTO-MCNC: 264 MG/DL (ref 65–117)
GLUCOSE BLD STRIP.AUTO-MCNC: 266 MG/DL (ref 65–117)
GLUCOSE SERPL-MCNC: 186 MG/DL (ref 65–100)
HCT VFR BLD AUTO: 31.7 % (ref 36.6–50.3)
HGB BLD-MCNC: 9.8 G/DL (ref 12.1–17)
IMM GRANULOCYTES # BLD AUTO: 0.02 K/UL (ref 0–0.04)
IMM GRANULOCYTES NFR BLD AUTO: 0.4 % (ref 0–0.5)
LYMPHOCYTES # BLD: 0.92 K/UL (ref 0.8–3.5)
LYMPHOCYTES NFR BLD: 16.2 % (ref 12–49)
MAGNESIUM SERPL-MCNC: 1.8 MG/DL (ref 1.6–2.4)
MCH RBC QN AUTO: 30.5 PG (ref 26–34)
MCHC RBC AUTO-ENTMCNC: 30.9 G/DL (ref 30–36.5)
MCV RBC AUTO: 98.8 FL (ref 80–99)
MONOCYTES # BLD: 0.79 K/UL (ref 0–1)
MONOCYTES NFR BLD: 13.9 % (ref 5–13)
NEUTS SEG # BLD: 3.71 K/UL (ref 1.8–8)
NEUTS SEG NFR BLD: 65.5 % (ref 32–75)
NRBC # BLD: 0.02 K/UL (ref 0–0.01)
NRBC BLD-RTO: 0.4 PER 100 WBC
PHOSPHATE SERPL-MCNC: 3.6 MG/DL (ref 2.6–4.7)
PLATELET # BLD AUTO: 114 K/UL (ref 150–400)
PMV BLD AUTO: 10.9 FL (ref 8.9–12.9)
POTASSIUM SERPL-SCNC: 3.7 MMOL/L (ref 3.5–5.1)
RBC # BLD AUTO: 3.21 M/UL (ref 4.1–5.7)
SERVICE CMNT-IMP: ABNORMAL
SODIUM SERPL-SCNC: 142 MMOL/L (ref 136–145)
WBC # BLD AUTO: 5.7 K/UL (ref 4.1–11.1)

## 2025-01-10 PROCEDURE — 84100 ASSAY OF PHOSPHORUS: CPT

## 2025-01-10 PROCEDURE — 82962 GLUCOSE BLOOD TEST: CPT

## 2025-01-10 PROCEDURE — 70450 CT HEAD/BRAIN W/O DYE: CPT

## 2025-01-10 PROCEDURE — 85025 COMPLETE CBC W/AUTO DIFF WBC: CPT

## 2025-01-10 PROCEDURE — 36415 COLL VENOUS BLD VENIPUNCTURE: CPT

## 2025-01-10 PROCEDURE — 6370000000 HC RX 637 (ALT 250 FOR IP): Performed by: STUDENT IN AN ORGANIZED HEALTH CARE EDUCATION/TRAINING PROGRAM

## 2025-01-10 PROCEDURE — 99222 1ST HOSP IP/OBS MODERATE 55: CPT | Performed by: PSYCHIATRY & NEUROLOGY

## 2025-01-10 PROCEDURE — 1100000003 HC PRIVATE W/ TELEMETRY

## 2025-01-10 PROCEDURE — 2500000003 HC RX 250 WO HCPCS: Performed by: NURSE PRACTITIONER

## 2025-01-10 PROCEDURE — 6360000002 HC RX W HCPCS: Performed by: NURSE PRACTITIONER

## 2025-01-10 PROCEDURE — 2580000003 HC RX 258: Performed by: NURSE PRACTITIONER

## 2025-01-10 PROCEDURE — 6360000002 HC RX W HCPCS: Performed by: STUDENT IN AN ORGANIZED HEALTH CARE EDUCATION/TRAINING PROGRAM

## 2025-01-10 PROCEDURE — 6370000000 HC RX 637 (ALT 250 FOR IP): Performed by: INTERNAL MEDICINE

## 2025-01-10 PROCEDURE — 83735 ASSAY OF MAGNESIUM: CPT

## 2025-01-10 PROCEDURE — 80048 BASIC METABOLIC PNL TOTAL CA: CPT

## 2025-01-10 PROCEDURE — 2500000003 HC RX 250 WO HCPCS: Performed by: STUDENT IN AN ORGANIZED HEALTH CARE EDUCATION/TRAINING PROGRAM

## 2025-01-10 RX ADMIN — INSULIN GLARGINE 10 UNITS: 100 INJECTION, SOLUTION SUBCUTANEOUS at 21:00

## 2025-01-10 RX ADMIN — ATORVASTATIN CALCIUM 10 MG: 10 TABLET, FILM COATED ORAL at 21:48

## 2025-01-10 RX ADMIN — SODIUM CHLORIDE 300 MG: 9 INJECTION, SOLUTION INTRAVENOUS at 13:22

## 2025-01-10 RX ADMIN — SODIUM CHLORIDE, PRESERVATIVE FREE 10 ML: 5 INJECTION INTRAVENOUS at 21:48

## 2025-01-10 RX ADMIN — EMPAGLIFLOZIN 10 MG: 10 TABLET, FILM COATED ORAL at 10:12

## 2025-01-10 RX ADMIN — Medication: at 10:18

## 2025-01-10 RX ADMIN — INSULIN LISPRO 2 UNITS: 100 INJECTION, SOLUTION INTRAVENOUS; SUBCUTANEOUS at 21:47

## 2025-01-10 RX ADMIN — INSULIN LISPRO 2 UNITS: 100 INJECTION, SOLUTION INTRAVENOUS; SUBCUTANEOUS at 13:47

## 2025-01-10 RX ADMIN — PANTOPRAZOLE SODIUM 40 MG: 40 TABLET, DELAYED RELEASE ORAL at 06:32

## 2025-01-10 RX ADMIN — FUROSEMIDE 40 MG: 10 INJECTION, SOLUTION INTRAMUSCULAR; INTRAVENOUS at 17:53

## 2025-01-10 RX ADMIN — Medication: at 22:25

## 2025-01-10 RX ADMIN — LEVOTHYROXINE SODIUM 175 MCG: 0.1 TABLET ORAL at 06:32

## 2025-01-10 RX ADMIN — PANTOPRAZOLE SODIUM 40 MG: 40 TABLET, DELAYED RELEASE ORAL at 17:53

## 2025-01-10 RX ADMIN — ISOSORBIDE MONONITRATE 60 MG: 30 TABLET, EXTENDED RELEASE ORAL at 10:12

## 2025-01-10 RX ADMIN — INSULIN LISPRO 1 UNITS: 100 INJECTION, SOLUTION INTRAVENOUS; SUBCUTANEOUS at 10:11

## 2025-01-10 RX ADMIN — METOPROLOL SUCCINATE 25 MG: 25 TABLET, FILM COATED, EXTENDED RELEASE ORAL at 10:12

## 2025-01-10 RX ADMIN — SODIUM CHLORIDE, PRESERVATIVE FREE 10 ML: 5 INJECTION INTRAVENOUS at 10:58

## 2025-01-10 RX ADMIN — GUAIFENESIN 200 MG: 200 SOLUTION ORAL at 21:47

## 2025-01-10 RX ADMIN — ALLOPURINOL 300 MG: 300 TABLET ORAL at 21:48

## 2025-01-10 RX ADMIN — FUROSEMIDE 40 MG: 10 INJECTION, SOLUTION INTRAMUSCULAR; INTRAVENOUS at 10:17

## 2025-01-10 RX ADMIN — DOXAZOSIN 4 MG: 2 TABLET ORAL at 10:12

## 2025-01-10 ASSESSMENT — PAIN SCALES - GENERAL: PAINLEVEL_OUTOF10: 0

## 2025-01-10 NOTE — CARE COORDINATION
Transition of Care Plan:    RUR: 23% High Risk  Prior Level of Functioning: Assistance with ADL's  Disposition: IPR  JABARI: 1/11  If SNF or IPR: Date FOC offered:   Date FOC received:   Accepting facility:   [facility]  Date authorization started with reference number:   Date authorization received and expires:   Follow up appointments: Defer to Rehab  DME needed: Defer to Rehab  Transportation at discharge: BLS  IM/IMM Medicare/ letter given: 2 IM given 1/10  Is patient a [place] and connected with [facility]? N/A  Caregiver Contact:   August Resendiz (Child)  [phone] (Mobile)  Discharge Caregiver contacted prior to discharge? Yes at bedside  Care Conference needed?  N/A  Barriers to discharge:  Medical Clearance, bed availability      Initial Note: Chart Reviewed: TIMI spoke with Kourtney @[facility] [employer] and she stated pt could possible come over the weekend if there are any beds. Kourtney will put weekend coverage information for [facility] in [place]. CM to continue to follow.     RAFA Vincent,  [phone]

## 2025-01-10 NOTE — CONSULTS
CONSULT - Neurology      Name:  Maryan Resendiz       MRN: [id number]  Location: 3210/01    Date: 1/10/2025  Time:  12:52 PM        Chief Complaint:   Chief Complaint   Patient presents with    Epistaxis     From [facility].  Tested positive for covid yesterday.  Patient had a nosebleed this morning and staff was unable to control bleeding.  Does have a history of nose bleeds. Bleeding under control en route.        HPI:  It is a great pleasure to see Maryan Resendiz, a 89 y.o. male today in the hospital . Briefly these are the events happened as per the chart H&P and taken from the chart. He admitted for acute hypoxic respiratory failure secondary to COVID-19 infection, acute CHF exacerbation, chronic A-fib, status post pacemaker, epistaxis.   Patient had a nosebleed  morning and staff was unable to control bleeding.  Does have a history of nose bleeds. Neurology was consulted for the evaluation of lethargy and slurred speech.       PAST MEDICAL HISTORY:  Past Medical History:   Diagnosis Date    Acquired hypothyroidism 09/12/2017    Anemia 02/01/2015    BPH with obstruction/lower urinary tract symptoms 10/24/2017    CAD (coronary artery disease)     Chronic atrial fibrillation (HCC) 02/01/2015    Chronic systolic heart failure (HCC)     CKD (chronic kidney disease) stage 3, GFR 30-59 ml/min ([facility]) 02/01/2015    Diabetes ([facility])     Diverticulosis of large intestine without hemorrhage 10/24/2017    Former smoker     Gout 10/24/2017    History of echocardiogram 02/2018    LVEF 40-45%, global HK, mild to mod LAE, no AS, mild MR, RVSP 26 mmHg    Hyperlipidemia 02/01/2015    Hypertension     Long term current use of anticoagulant 10/24/2017    Long-term use of high-risk medication 10/24/2017    Macular degeneration 10/24/2017    Microalbuminuria     Peripheral vascular disease (HCC) 10/24/2017    Primary osteoarthritis involving multiple joints     knees and back    Renal artery stenosis (HCC) 10/24/2017

## 2025-01-11 LAB
ANION GAP SERPL CALC-SCNC: 5 MMOL/L (ref 2–12)
BUN SERPL-MCNC: 40 MG/DL (ref 6–20)
BUN/CREAT SERPL: 22 (ref 12–20)
CALCIUM SERPL-MCNC: 9.2 MG/DL (ref 8.5–10.1)
CHLORIDE SERPL-SCNC: 105 MMOL/L (ref 97–108)
CO2 SERPL-SCNC: 33 MMOL/L (ref 21–32)
CREAT SERPL-MCNC: 1.83 MG/DL (ref 0.7–1.3)
GLUCOSE BLD STRIP.AUTO-MCNC: 122 MG/DL (ref 65–117)
GLUCOSE BLD STRIP.AUTO-MCNC: 190 MG/DL (ref 65–117)
GLUCOSE BLD STRIP.AUTO-MCNC: 223 MG/DL (ref 65–117)
GLUCOSE BLD STRIP.AUTO-MCNC: 226 MG/DL (ref 65–117)
GLUCOSE SERPL-MCNC: 148 MG/DL (ref 65–100)
MAGNESIUM SERPL-MCNC: 1.7 MG/DL (ref 1.6–2.4)
PHOSPHATE SERPL-MCNC: 3.3 MG/DL (ref 2.6–4.7)
POTASSIUM SERPL-SCNC: 3.5 MMOL/L (ref 3.5–5.1)
SERVICE CMNT-IMP: ABNORMAL
SODIUM SERPL-SCNC: 143 MMOL/L (ref 136–145)

## 2025-01-11 PROCEDURE — 2500000003 HC RX 250 WO HCPCS: Performed by: STUDENT IN AN ORGANIZED HEALTH CARE EDUCATION/TRAINING PROGRAM

## 2025-01-11 PROCEDURE — 83735 ASSAY OF MAGNESIUM: CPT

## 2025-01-11 PROCEDURE — 6360000002 HC RX W HCPCS: Performed by: INTERNAL MEDICINE

## 2025-01-11 PROCEDURE — 6370000000 HC RX 637 (ALT 250 FOR IP): Performed by: INTERNAL MEDICINE

## 2025-01-11 PROCEDURE — 6360000002 HC RX W HCPCS: Performed by: NURSE PRACTITIONER

## 2025-01-11 PROCEDURE — 2500000003 HC RX 250 WO HCPCS: Performed by: NURSE PRACTITIONER

## 2025-01-11 PROCEDURE — 6370000000 HC RX 637 (ALT 250 FOR IP): Performed by: STUDENT IN AN ORGANIZED HEALTH CARE EDUCATION/TRAINING PROGRAM

## 2025-01-11 PROCEDURE — 2580000003 HC RX 258: Performed by: NURSE PRACTITIONER

## 2025-01-11 PROCEDURE — 80048 BASIC METABOLIC PNL TOTAL CA: CPT

## 2025-01-11 PROCEDURE — 82962 GLUCOSE BLOOD TEST: CPT

## 2025-01-11 PROCEDURE — 84100 ASSAY OF PHOSPHORUS: CPT

## 2025-01-11 PROCEDURE — 1100000003 HC PRIVATE W/ TELEMETRY

## 2025-01-11 PROCEDURE — 6360000002 HC RX W HCPCS: Performed by: STUDENT IN AN ORGANIZED HEALTH CARE EDUCATION/TRAINING PROGRAM

## 2025-01-11 PROCEDURE — 36415 COLL VENOUS BLD VENIPUNCTURE: CPT

## 2025-01-11 RX ORDER — FUROSEMIDE 40 MG/1
40 TABLET ORAL 2 TIMES DAILY
Status: DISCONTINUED | OUTPATIENT
Start: 2025-01-11 | End: 2025-01-13 | Stop reason: HOSPADM

## 2025-01-11 RX ORDER — POTASSIUM CHLORIDE 1500 MG/1
40 TABLET, EXTENDED RELEASE ORAL ONCE
Status: COMPLETED | OUTPATIENT
Start: 2025-01-11 | End: 2025-01-11

## 2025-01-11 RX ORDER — MAGNESIUM SULFATE IN WATER 40 MG/ML
2000 INJECTION, SOLUTION INTRAVENOUS ONCE
Status: COMPLETED | OUTPATIENT
Start: 2025-01-11 | End: 2025-01-11

## 2025-01-11 RX ADMIN — GUAIFENESIN 200 MG: 200 SOLUTION ORAL at 23:48

## 2025-01-11 RX ADMIN — MAGNESIUM SULFATE HEPTAHYDRATE 2000 MG: 40 INJECTION, SOLUTION INTRAVENOUS at 11:29

## 2025-01-11 RX ADMIN — INSULIN LISPRO 1 UNITS: 100 INJECTION, SOLUTION INTRAVENOUS; SUBCUTANEOUS at 20:44

## 2025-01-11 RX ADMIN — SODIUM CHLORIDE, PRESERVATIVE FREE 10 ML: 5 INJECTION INTRAVENOUS at 20:44

## 2025-01-11 RX ADMIN — Medication: at 20:20

## 2025-01-11 RX ADMIN — INSULIN LISPRO 1 UNITS: 100 INJECTION, SOLUTION INTRAVENOUS; SUBCUTANEOUS at 13:06

## 2025-01-11 RX ADMIN — FUROSEMIDE 40 MG: 10 INJECTION, SOLUTION INTRAMUSCULAR; INTRAVENOUS at 09:47

## 2025-01-11 RX ADMIN — METOPROLOL SUCCINATE 25 MG: 25 TABLET, FILM COATED, EXTENDED RELEASE ORAL at 09:47

## 2025-01-11 RX ADMIN — ALLOPURINOL 300 MG: 300 TABLET ORAL at 20:19

## 2025-01-11 RX ADMIN — ATORVASTATIN CALCIUM 10 MG: 10 TABLET, FILM COATED ORAL at 20:19

## 2025-01-11 RX ADMIN — LEVOTHYROXINE SODIUM 175 MCG: 0.1 TABLET ORAL at 06:45

## 2025-01-11 RX ADMIN — GUAIFENESIN 200 MG: 200 SOLUTION ORAL at 20:19

## 2025-01-11 RX ADMIN — SODIUM CHLORIDE, PRESERVATIVE FREE 10 ML: 5 INJECTION INTRAVENOUS at 09:47

## 2025-01-11 RX ADMIN — PANTOPRAZOLE SODIUM 40 MG: 40 TABLET, DELAYED RELEASE ORAL at 06:45

## 2025-01-11 RX ADMIN — PANTOPRAZOLE SODIUM 40 MG: 40 TABLET, DELAYED RELEASE ORAL at 17:11

## 2025-01-11 RX ADMIN — INSULIN GLARGINE 10 UNITS: 100 INJECTION, SOLUTION SUBCUTANEOUS at 20:43

## 2025-01-11 RX ADMIN — INSULIN LISPRO 1 UNITS: 100 INJECTION, SOLUTION INTRAVENOUS; SUBCUTANEOUS at 17:10

## 2025-01-11 RX ADMIN — FINASTERIDE 5 MG: 5 TABLET, FILM COATED ORAL at 09:55

## 2025-01-11 RX ADMIN — EMPAGLIFLOZIN 10 MG: 10 TABLET, FILM COATED ORAL at 09:47

## 2025-01-11 RX ADMIN — DOXAZOSIN 4 MG: 2 TABLET ORAL at 09:47

## 2025-01-11 RX ADMIN — POTASSIUM CHLORIDE 40 MEQ: 1500 TABLET, EXTENDED RELEASE ORAL at 11:21

## 2025-01-11 RX ADMIN — SODIUM CHLORIDE 300 MG: 9 INJECTION, SOLUTION INTRAVENOUS at 11:20

## 2025-01-11 RX ADMIN — Medication: at 09:49

## 2025-01-11 RX ADMIN — ISOSORBIDE MONONITRATE 60 MG: 30 TABLET, EXTENDED RELEASE ORAL at 09:47

## 2025-01-11 RX ADMIN — FUROSEMIDE 40 MG: 40 TABLET ORAL at 20:19

## 2025-01-11 ASSESSMENT — PAIN SCALES - GENERAL
PAINLEVEL_OUTOF10: 0
PAINLEVEL_OUTOF10: 0

## 2025-01-11 NOTE — CARE COORDINATION
Spoke with the patient's son regarding dcp. [facility] does not have a bed available today. The liaison anticipates Monday but states something may come up tomorrow. Son understands and states they really want him to go to [facility] when a bed is available.        Mackenzie Fisher  RN BSN CRM        [phone]

## 2025-01-12 LAB
GLUCOSE BLD STRIP.AUTO-MCNC: 174 MG/DL (ref 65–117)
GLUCOSE BLD STRIP.AUTO-MCNC: 212 MG/DL (ref 65–117)
GLUCOSE BLD STRIP.AUTO-MCNC: 217 MG/DL (ref 65–117)
GLUCOSE BLD STRIP.AUTO-MCNC: 219 MG/DL (ref 65–117)
SERVICE CMNT-IMP: ABNORMAL

## 2025-01-12 PROCEDURE — 6370000000 HC RX 637 (ALT 250 FOR IP): Performed by: INTERNAL MEDICINE

## 2025-01-12 PROCEDURE — 2500000003 HC RX 250 WO HCPCS: Performed by: STUDENT IN AN ORGANIZED HEALTH CARE EDUCATION/TRAINING PROGRAM

## 2025-01-12 PROCEDURE — 1100000003 HC PRIVATE W/ TELEMETRY

## 2025-01-12 PROCEDURE — 6370000000 HC RX 637 (ALT 250 FOR IP): Performed by: STUDENT IN AN ORGANIZED HEALTH CARE EDUCATION/TRAINING PROGRAM

## 2025-01-12 PROCEDURE — 82962 GLUCOSE BLOOD TEST: CPT

## 2025-01-12 RX ADMIN — Medication: at 21:08

## 2025-01-12 RX ADMIN — METOPROLOL SUCCINATE 25 MG: 25 TABLET, FILM COATED, EXTENDED RELEASE ORAL at 08:53

## 2025-01-12 RX ADMIN — SODIUM CHLORIDE, PRESERVATIVE FREE 10 ML: 5 INJECTION INTRAVENOUS at 08:54

## 2025-01-12 RX ADMIN — ATORVASTATIN CALCIUM 10 MG: 10 TABLET, FILM COATED ORAL at 21:07

## 2025-01-12 RX ADMIN — INSULIN GLARGINE 10 UNITS: 100 INJECTION, SOLUTION SUBCUTANEOUS at 21:07

## 2025-01-12 RX ADMIN — LEVOTHYROXINE SODIUM 175 MCG: 0.1 TABLET ORAL at 05:39

## 2025-01-12 RX ADMIN — INSULIN LISPRO 1 UNITS: 100 INJECTION, SOLUTION INTRAVENOUS; SUBCUTANEOUS at 21:07

## 2025-01-12 RX ADMIN — FUROSEMIDE 40 MG: 40 TABLET ORAL at 08:53

## 2025-01-12 RX ADMIN — INSULIN LISPRO 1 UNITS: 100 INJECTION, SOLUTION INTRAVENOUS; SUBCUTANEOUS at 12:18

## 2025-01-12 RX ADMIN — SODIUM CHLORIDE, PRESERVATIVE FREE 10 ML: 5 INJECTION INTRAVENOUS at 21:08

## 2025-01-12 RX ADMIN — FUROSEMIDE 40 MG: 40 TABLET ORAL at 21:07

## 2025-01-12 RX ADMIN — ALLOPURINOL 300 MG: 300 TABLET ORAL at 21:07

## 2025-01-12 RX ADMIN — PANTOPRAZOLE SODIUM 40 MG: 40 TABLET, DELAYED RELEASE ORAL at 17:56

## 2025-01-12 RX ADMIN — EMPAGLIFLOZIN 10 MG: 10 TABLET, FILM COATED ORAL at 08:53

## 2025-01-12 RX ADMIN — DOXAZOSIN 4 MG: 2 TABLET ORAL at 08:53

## 2025-01-12 RX ADMIN — PANTOPRAZOLE SODIUM 40 MG: 40 TABLET, DELAYED RELEASE ORAL at 05:39

## 2025-01-12 RX ADMIN — INSULIN LISPRO 1 UNITS: 100 INJECTION, SOLUTION INTRAVENOUS; SUBCUTANEOUS at 08:54

## 2025-01-12 RX ADMIN — Medication: at 08:54

## 2025-01-12 RX ADMIN — ISOSORBIDE MONONITRATE 60 MG: 30 TABLET, EXTENDED RELEASE ORAL at 08:53

## 2025-01-12 ASSESSMENT — PAIN SCALES - GENERAL: PAINLEVEL_OUTOF10: 0

## 2025-01-13 VITALS
RESPIRATION RATE: 18 BRPM | WEIGHT: 220.46 LBS | BODY MASS INDEX: 31.56 KG/M2 | TEMPERATURE: 97.5 F | HEIGHT: 70 IN | SYSTOLIC BLOOD PRESSURE: 164 MMHG | DIASTOLIC BLOOD PRESSURE: 68 MMHG | HEART RATE: 70 BPM | OXYGEN SATURATION: 92 %

## 2025-01-13 LAB
GLUCOSE BLD STRIP.AUTO-MCNC: 220 MG/DL (ref 65–117)
GLUCOSE BLD STRIP.AUTO-MCNC: 230 MG/DL (ref 65–117)
SERVICE CMNT-IMP: ABNORMAL
SERVICE CMNT-IMP: ABNORMAL

## 2025-01-13 PROCEDURE — 2500000003 HC RX 250 WO HCPCS: Performed by: STUDENT IN AN ORGANIZED HEALTH CARE EDUCATION/TRAINING PROGRAM

## 2025-01-13 PROCEDURE — 82962 GLUCOSE BLOOD TEST: CPT

## 2025-01-13 PROCEDURE — 2500000003 HC RX 250 WO HCPCS: Performed by: NURSE PRACTITIONER

## 2025-01-13 PROCEDURE — 6370000000 HC RX 637 (ALT 250 FOR IP): Performed by: INTERNAL MEDICINE

## 2025-01-13 PROCEDURE — 6370000000 HC RX 637 (ALT 250 FOR IP): Performed by: STUDENT IN AN ORGANIZED HEALTH CARE EDUCATION/TRAINING PROGRAM

## 2025-01-13 RX ORDER — ONDANSETRON 4 MG/1
4 TABLET, ORALLY DISINTEGRATING ORAL EVERY 8 HOURS PRN
Qty: 21 TABLET | Refills: 0 | Status: SHIPPED | OUTPATIENT
Start: 2025-01-13

## 2025-01-13 RX ORDER — METOPROLOL SUCCINATE 25 MG/1
25 TABLET, EXTENDED RELEASE ORAL DAILY
Qty: 30 TABLET | Refills: 0 | Status: SHIPPED | OUTPATIENT
Start: 2025-01-14

## 2025-01-13 RX ORDER — INSULIN GLARGINE 100 [IU]/ML
10 INJECTION, SOLUTION SUBCUTANEOUS NIGHTLY
Qty: 5 ADJUSTABLE DOSE PRE-FILLED PEN SYRINGE | Refills: 0 | Status: SHIPPED | OUTPATIENT
Start: 2025-01-13

## 2025-01-13 RX ADMIN — PANTOPRAZOLE SODIUM 40 MG: 40 TABLET, DELAYED RELEASE ORAL at 16:24

## 2025-01-13 RX ADMIN — INSULIN LISPRO 1 UNITS: 100 INJECTION, SOLUTION INTRAVENOUS; SUBCUTANEOUS at 12:39

## 2025-01-13 RX ADMIN — EMPAGLIFLOZIN 10 MG: 10 TABLET, FILM COATED ORAL at 08:56

## 2025-01-13 RX ADMIN — ISOSORBIDE MONONITRATE 60 MG: 30 TABLET, EXTENDED RELEASE ORAL at 08:56

## 2025-01-13 RX ADMIN — FUROSEMIDE 40 MG: 40 TABLET ORAL at 08:56

## 2025-01-13 RX ADMIN — GUAIFENESIN 200 MG: 200 SOLUTION ORAL at 06:59

## 2025-01-13 RX ADMIN — GUAIFENESIN 200 MG: 200 SOLUTION ORAL at 00:34

## 2025-01-13 RX ADMIN — FINASTERIDE 5 MG: 5 TABLET, FILM COATED ORAL at 12:39

## 2025-01-13 RX ADMIN — INSULIN LISPRO 1 UNITS: 100 INJECTION, SOLUTION INTRAVENOUS; SUBCUTANEOUS at 08:55

## 2025-01-13 RX ADMIN — METOPROLOL SUCCINATE 25 MG: 25 TABLET, FILM COATED, EXTENDED RELEASE ORAL at 08:56

## 2025-01-13 RX ADMIN — DOXAZOSIN 4 MG: 2 TABLET ORAL at 08:56

## 2025-01-13 RX ADMIN — LEVOTHYROXINE SODIUM 175 MCG: 0.1 TABLET ORAL at 06:15

## 2025-01-13 RX ADMIN — Medication: at 08:57

## 2025-01-13 RX ADMIN — PANTOPRAZOLE SODIUM 40 MG: 40 TABLET, DELAYED RELEASE ORAL at 06:16

## 2025-01-13 RX ADMIN — SODIUM CHLORIDE, PRESERVATIVE FREE 10 ML: 5 INJECTION INTRAVENOUS at 08:56

## 2025-01-13 ASSESSMENT — PAIN SCALES - GENERAL: PAINLEVEL_OUTOF10: 0

## 2025-01-13 NOTE — PROGRESS NOTES
Hospitalist Progress Note    NAME:   Maryan Resendiz   : 1935   MRN: [id number]     Date/Time: 2025 8:52 AM  Patient PCP: Prashanth Livingston MD    Estimated discharge date: 1/10  Barriers: Improvement in dyspnea, hemoglobin stability, cardiology clearance, heme-onc consult      Assessment / Plan:  AHRF  COVID positive at facility  Diastolic heart failure exacerbation  S/p pacemaker  ?Chronic Afib  85 on room air in ED, requiring 1-2 L. BNP elevated 8700.  CXR Moderate pulmonary vascular congestive changes. Trop 50. Lasix recently increased to 40 po BID. Not on AC due to recent thrombocytopenia and GIB.  - IV lasix 40 IV BID  - strict I/O, daily standing weights  - Repeat trop, ordered EKG  - Cardiology consulted, follows with [facility]  - continue home imdur, metoprolol  - wean O2 as tolerated  : COVID testing on facility is also positive.  He is currently on 1 L of oxygen.  Patient input from cardiology, patient will likely need generator change prior to leaving and patient will be seen by Dr. Voss tomorrow.  Patient was started on Jardiance by cardiology 10 mg daily.  Continue with IV diuresis and will repeat chest x-ray tomorrow morning.     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 Hgb 9.7 on admission, increased from 7.3 prior. Received 2 sprays of afrin thus far. [facility].  - Rhino rocket if recurs  - if unable to stop with repeat afrin and rhino rocket, will need to transfer to facility with ENT, possibly [place]  : Patient requesting heme-onc consult for IV iron infusion.     Peripheral Vascular disease  Angioplasty and stenting on    - was supposed to followup with Vascular after last hospitalization to discuss antiplatelets  - consulted [facility] to clarify antiplatelet strategy with recent stenting     Type 2 diabetes  - SSI     Gout  Cont allopurinol     Hypothyroidism  Levothyroxine 175 mcg 
      Hospitalist Progress Note    NAME:   Maryan Resendiz   : 1935   MRN: [id number]     Date/Time: 2025 10:18 AM  Patient PCP: Prashanth Livingston MD    Estimated discharge date: Medically stable  Barriers: Placement      Assessment / Plan:  AHRF  COVID positive at facility  Diastolic heart failure exacerbation  S/p pacemaker  ?Chronic Afib  85 on room air in ED, requiring 1-2 L. BNP elevated 8700.  CXR Moderate pulmonary vascular congestive changes. Trop 50. Lasix recently increased to 40 po BID. Not on AC due to recent thrombocytopenia and GIB.  - IV lasix 40 IV BID  - strict I/O, daily standing weights  - Repeat trop, ordered EKG  - Cardiology consulted, follows with [facility]  - continue home imdur, metoprolol  - wean O2 as tolerated  : COVID testing on facility is also positive.  He is currently on 1 L of oxygen.  Patient input from cardiology, patient will likely need generator change prior to leaving and patient will be seen by Dr. Voss tomorrow.  Patient was started on Jardiance by cardiology 10 mg daily.  Continue with IV diuresis and will repeat chest x-ray tomorrow morning.  : Patient was successfully weaned off oxygen.  Chest x-ray shows slightly worsening CHF.  He feels that his breathing is better.  PT OT recommending rehab for the patient.  I spoke with the patient's son at the bedside and they are interested in talking to the case management about their options.  : Patient is breathing comfortably on room air.  No chest pain or confusion.  Will switch him to 40 mg of oral Lasix twice daily.  At this point, he is medically stable and waiting for placement.  Case management on board.  Sutures were removed from his right foot.    Confusion with slurred speech  1/10: Patient had confusion with slurred speech this morning.  He did not sleep well last night.  He was dozing off.  CT head was done that was negative for any acute process.  I spoke with neurology Dr. Gardner, patient is back 
  Physician Progress Note      PATIENT:               ELIAS MARTINEZ  CSN #:                  [id number]  :                       1935  ADMIT DATE:       2025 7:39 AM  DISCH DATE:  RESPONDING  PROVIDER #:        Regina Silva MD          QUERY TEXT:    Noted documentation of acute respiratory failure in the H&P. Did note that the   patient had an initial pox 85% on room air. However, there does not appear to   be documentation of increased WOB and RR range was 13-18    In order to support the diagnosis of acute respiratory failure, please include   additional clinical indicators in your documentation.  Or please document if   the diagnosis of acute respiratory failure has been ruled out after further   study.    The medical record reflects the following:  Risk Factors: has COVID, CHF exacerbation  Clinical Indicators: pox 85% on ra on , pox 86% on ra on 1/10, RR 13-18 to   date  ED MD  Physical Exam  Constitutional:  General: He is not in acute distress  H&P-PHYSICAL EXAM:  General:          Alert, cooperative, appears stated age.  HEENT:Scant dried blood in nares  Lungs:             CTA b/l.  No wheezing or Rhonchi. No rales.  Chest wall:      No tenderness.  No accessory muscle use.  AHRF  COVID positive at facility  Diastolic heart failure exacerbation  S/p pacemaker  ?Chronic Afib  85 on room air in ED, requiring 1-2 L  Treatment: Monitor vital signs, labwork, imaging, pulse oximetry, Oxygen  Options provided:  -- Acute Respiratory Failure as evidenced by, Please document evidence.  -- Acute Respiratory Failure ruled out after study  -- Other - I will add my own diagnosis  -- Disagree - Not applicable / Not valid  -- Disagree - Clinically unable to determine / Unknown  -- Refer to Clinical Documentation Reviewer    PROVIDER RESPONSE TEXT:    This patient is in acute respiratory failure as evidenced by hypoxia requiring   supplemental O2 in the setting of covid infection    Query created by: 
.End of Shift Note    Bedside shift change report given to deon leblanc (oncoming nurse) by Betty Brito RN (offgoing nurse).  Report included the following information SBAR, ED Summary, Procedure Summary, Intake/Output, MAR, Recent Results, Med Rec Status, and Dual Neuro Assessment    Shift worked:  morning     Shift summary and any significant changes:     At 6:00 pm - pt arrived from ED   Vitals check , dual skin assess , hygiene completed .  Pt is alert and ox 3 to 4 , on NC 2 L ,   No complain of pain   Comfort position is given   Call bell within reach   Bed alarm is on      Concerns for physician to address:  -     Zone phone for oncoming shift:   [phone]        Activity:  Level of Assistance: Moderate assist, patient does 50-74%  Number times ambulated in hallways past shift:   Number of times OOB to chair past shift:     Cardiac:   Cardiac Monitoring:       Cardiac Rhythm: Ventricular paced    Access:  Current line(s):     Genitourinary:   Urinary Status: Voiding (urinal)    Respiratory:   O2 Device: Nasal cannula  Chronic home O2 use?:   Incentive spirometer at bedside:     GI:     Current diet:  ADULT DIET; Regular; 3 carb choices (45 gm/meal); Low Fat/Low Chol/High Fiber/2 gm Na; 2000 ml  Passing flatus:     Pain Management:   Patient states pain is manageable on current regimen:     Skin:  Binh Scale Score: 18  Interventions: Wound Offloading (Prevention Methods): Turning, Repositioning, Pillows    Patient Safety:  Fall Risk: Nursing Judgement-Fall Risk High(Add Comments): Yes  Fall Risk Interventions  Nursing Judgement-Fall Risk High(Add Comments): Yes  Toilet Every 2 Hours-In Advance of Need: Yes  Hourly Visual Checks: Awake, In bed  Fall Visual Posted: Socks, Fall sign posted  Room Door Open: Deferred to decrease stimulation  Alarm On: Bed  Patient Moved Closer to Nursing Station: No    Active Consults:   IP CONSULT TO HOSPITALIST  IP CONSULT TO CARDIOLOGY  IP CONSULT TO VASCULAR SURGERY    Length of 
Bedside report received from MIN Pope. Report included the following information SBAR, Kardex, ED Summary, MAR, and Recent Results. This RN verbalized understanding of plan of care with opportunity for clarification and questions.     1630 R toe amputation dressing changed at this time. Cleansed with betadine. Covered with xeroform, dry gauze, and wrap gauze. Secured with soft tape.     TRANSFER - OUT REPORT:    Verbal report given to RN on Maryan Resendiz  being transferred to [facility] for routine progression of patient care       Report consisted of patient's Situation, Background, Assessment and   Recommendations(SBAR).     Information from the following report(s) Nurse Handoff Report was reviewed with the receiving nurse.    Mike Fall Assessment:    Presents to emergency department  because of falls (Syncope, seizure, or loss of consciousness): No  Age > 70: Yes  Altered Mental Status, Intoxication with alcohol or substance confusion (Disorientation, impaired judgment, poor safety awaremess, or inability to follow instructions): No  Impaired Mobility: Ambulates or transfers with assistive devices or assistance; Unable to ambulate or transer.: Yes  Nursing Judgement: Yes          Lines:   Peripheral IV 01/07/25 Right Forearm (Active)   Site Assessment Clean, dry & intact 01/07/25 1509   Line Status Flushed;Normal saline locked;Capped 01/07/25 1509   Line Care Ports disinfected;Connections checked and tightened 01/07/25 1509   Phlebitis Assessment No symptoms 01/07/25 1509   Infiltration Assessment 0 01/07/25 1509   Dressing Status Clean, dry & intact 01/07/25 1509   Dressing Type Transparent 01/07/25 1509        Opportunity for questions and clarification was provided.      Patient transported with:  Monitor        
Discharge instructions reviewed with Patient and son at bedside. Dressing to right great toe clean, dry and intact. Home medications reviewed instructions given on how/when to take, side effects and safe monitoring of each medication. Opportunity given for questions and feedback. Patient and son verbalized understanding.   
End of Shift Note    Bedside shift change report given to Debbie COPELAND (oncoming nurse) by Sadia Amaya, MIN (offgoing nurse).  Report included the following information SBAR, Kardex, and MAR    Shift worked:  7a-7p     Shift summary and any significant changes:    Patient tolerated care well. Medications were given per the MAR. Hourly rounding has been completed. Patient is a standby to x1 assist to the bedside commode and the chair. Patient is on a 2000ml fluid restriction where I & Os have been documented. Patients son has been at bedside part of the day. Patient has been up to the chair most of the day.       Activity:  Level of Assistance: Standby assist, set-up cues, supervision of patient - no hands on    Cardiac:   Cardiac Monitoring:  yes - v- paced    Access:  Current line(s): PIV     Genitourinary:   Urinary Status: Voiding, Bathroom privileges    Respiratory:   O2 Device: Nasal cannula    GI:  Current diet: ADULT DIET; Regular; 3 carb choices (45 gm/meal); Low Fat/Low Chol/High Fiber/2 gm Na; 2000 ml    Pain Management:   Patient states pain is manageable on current regimen: YES    Skin:  Binh Scale Score: 17  Interventions: Wound Offloading (Prevention Methods): Pillows, Repositioning, Turning  Pressure injury: yes - sacrum    Patient Safety:  Fall Score: Chni Total Score: 60  Fall Risk Interventions  Nursing Judgement-Fall Risk High(Add Comments): Yes  Toilet Every 2 Hours-In Advance of Need: Yes  Hourly Visual Checks: In bed, Eyes closed, Quiet  Fall Visual Posted: Socks, Fall sign posted  Room Door Open: Deferred for droplet isolation  Alarm On: Bed  Patient Moved Closer to Nursing Station: No    Active Consults:  IP CONSULT TO HOSPITALIST  IP CONSULT TO CARDIOLOGY  IP CONSULT TO VASCULAR SURGERY    Length of Stay:  Expected LOS: 3  Actual LOS: 1      Sadia Amaya, RN    
End of Shift Note    Bedside shift change report given to MIN Perez (oncoming nurse) by Moiz Hillman RN (offgoing nurse).  Report included the following information SBAR, Kardex, ED Summary, Intake/Output, MAR, Recent Results, Med Rec Status, Cardiac Rhythm V paced , Alarm Parameters , Quality Measures, and Dual Neuro Assessment    Shift worked:  7a-7p     Shift summary and any significant changes:     Patient voiced no complaints of pain throughout shift. Tolerated diet. Ambulated with assistance.      Concerns for physician to address:  None      Zone phone for oncoming shift:   [phone]       Activity:  Level of Assistance: Standby assist, set-up cues, supervision of patient - no hands on  Number times ambulated in hallways past shift: 0  Number of times OOB to chair past shift: 2    Cardiac:   Cardiac Monitoring: Yes      Cardiac Rhythm: Ventricular paced    Access:  Current line(s): PIV     Genitourinary:   Urinary Status: Voiding, Bathroom privileges    Respiratory:   O2 Device: None (Room air)  Chronic home O2 use?: NO  Incentive spirometer at bedside: NO    GI:  Last BM (including prior to admit): 01/08/25  Current diet:  ADULT DIET; Regular; 3 carb choices (45 gm/meal); Low Fat/Low Chol/High Fiber/2 gm Na; 2000 ml  Passing flatus: YES    Pain Management:   Patient states pain is manageable on current regimen: YES    Skin:  Binh Scale Score: 19  Interventions: Wound Offloading (Prevention Methods): Elevate heels, Turning, Repositioning, Pillows    Patient Safety:  Fall Risk: Nursing Judgement-Fall Risk High(Add Comments): Yes  Fall Risk Interventions  Nursing Judgement-Fall Risk High(Add Comments): Yes  Toilet Every 2 Hours-In Advance of Need: Yes  Hourly Visual Checks: Quiet, In bed  Fall Visual Posted: Socks  Room Door Open: Deferred for droplet isolation  Alarm On: Chair  Patient Moved Closer to Nursing Station: No    Active Consults:   IP CONSULT TO HOSPITALIST  IP CONSULT TO CARDIOLOGY  IP 
End of Shift Note    Bedside shift change report given to Moiz COPELAND (oncoming nurse) by MARLA MORRIS RN (offgoing nurse).  Report included the following information {SBAR REPORTS LIST:[id number]}    Shift worked:  ***     Shift summary and any significant changes:     ***     Concerns for physician to address:  ***     Zone phone for oncoming shift:   ***       Activity:  Level of Assistance: Standby assist, set-up cues, supervision of patient - no hands on  Number times ambulated in hallways past shift: 0  Number of times OOB to chair past shift: 1    Cardiac:   Cardiac Monitoring: Yes      Cardiac Rhythm: Ventricular paced    Access:  Current line(s): PIV     Genitourinary:   Urinary Status: Voiding, Bathroom privileges    Respiratory:   O2 Device: None (Room air)  Chronic home O2 use?: NO  Incentive spirometer at bedside: NO    GI:  Last BM (including prior to admit): 01/08/25  Current diet:  ADULT DIET; Regular; 3 carb choices (45 gm/meal); Low Fat/Low Chol/High Fiber/2 gm Na; 2000 ml  Passing flatus: YES    Pain Management:   Patient states pain is manageable on current regimen: YES    Skin:  Binh Scale Score: 19  Interventions: Wound Offloading (Prevention Methods): Elevate heels, Repositioning, Turning    Patient Safety:  Fall Risk: Nursing Judgement-Fall Risk High(Add Comments): Yes  Fall Risk Interventions  Nursing Judgement-Fall Risk High(Add Comments): Yes  Toilet Every 2 Hours-In Advance of Need: Yes  Hourly Visual Checks: In chair, Quiet  Fall Visual Posted: Socks  Room Door Open: Deferred for droplet isolation  Alarm On: Chair  Patient Moved Closer to Nursing Station: No    Active Consults:   IP CONSULT TO HOSPITALIST  IP CONSULT TO CARDIOLOGY  IP CONSULT TO VASCULAR SURGERY  IP CONSULT TO HEMATOLOGY  IP CONSULT TO PODIATRY  IP CONSULT TO NEUROLOGY    Length of Stay:  Expected LOS: 4  Actual LOS: 5    MARLA MORRIS RN                            
End of Shift Note    Bedside shift change report given to NADER Mccullough LPN (oncoming nurse) by MARLA MORRIS RN (offgoing nurse).  Report included the following information SBAR, Intake/Output, MAR, and Recent Results    Shift worked:  3380-0815     Shift summary and any significant changes:     Patient had no significant changes over night. No pain was reported. Care and medication were tolerated well.      Concerns for physician to address:  -     Zone phone for oncoming shift:   -       Activity:  Level of Assistance: Standby assist, set-up cues, supervision of patient - no hands on  Number times ambulated in hallways past shift: 0  Number of times OOB to chair past shift: 0    Cardiac:   Cardiac Monitoring: Yes      Cardiac Rhythm: Ventricular paced    Access:  Current line(s): PIV     Genitourinary:   Urinary Status: Voiding, Bathroom privileges    Respiratory:   O2 Device: None (Room air)  Chronic home O2 use?: NO  Incentive spirometer at bedside: NO    GI:  Last BM (including prior to admit): 01/08/25  Current diet:  ADULT DIET; Regular; 3 carb choices (45 gm/meal); Low Fat/Low Chol/High Fiber/2 gm Na; 2000 ml  Passing flatus: YES    Pain Management:   Patient states pain is manageable on current regimen: YES    Skin:  Binh Scale Score: 19  Interventions: Wound Offloading (Prevention Methods): Elevate heels, Repositioning, Turning    Patient Safety:  Fall Risk: Nursing Judgement-Fall Risk High(Add Comments): Yes  Fall Risk Interventions  Nursing Judgement-Fall Risk High(Add Comments): Yes  Toilet Every 2 Hours-In Advance of Need: Yes  Hourly Visual Checks: In bed, Quiet  Fall Visual Posted: Socks  Room Door Open: Deferred for droplet isolation  Alarm On: Bed  Patient Moved Closer to Nursing Station: No    Active Consults:   IP CONSULT TO HOSPITALIST  IP CONSULT TO CARDIOLOGY  IP CONSULT TO VASCULAR SURGERY  IP CONSULT TO HEMATOLOGY  IP CONSULT TO PODIATRY  IP CONSULT TO NEUROLOGY    Length of Stay:  Expected 
End of Shift Note    Bedside shift change report given to Pamela COPELAND (oncoming nurse) by Sadia Amaya RN (offgoing nurse).  Report included the following information SBAR, Kardex, and MAR    Shift worked:  7a-7p     Shift summary and any significant changes:    Patient tolerated care well. Medications given per the MAR. Hourly rounding has been completed. Patient has been up to chair most of the day. Patient is q4 neuro checks. Patient is a standby to the bathroom.        Activity:  Level of Assistance: Standby assist, set-up cues, supervision of patient - no hands on    Cardiac:   Cardiac Monitoring:  yes - v paced    Access:  Current line(s): PIV     Genitourinary:   Urinary Status: Voiding, Bathroom privileges    Respiratory:   O2 Device: None (Room air)    GI:  Current diet: ADULT DIET; Regular; 3 carb choices (45 gm/meal); Low Fat/Low Chol/High Fiber/2 gm Na; 2000 ml    Pain Management:   Patient states pain is manageable on current regimen: YES    Skin:  Binh Scale Score: 19  Interventions: Wound Offloading (Prevention Methods): Repositioning, Turning  Pressure injury: no    Patient Safety:  Fall Score: Chin Total Score: 60  Fall Risk Interventions  Nursing Judgement-Fall Risk High(Add Comments): Yes  Toilet Every 2 Hours-In Advance of Need: Yes  Hourly Visual Checks: In bed, Eyes closed, Quiet  Fall Visual Posted: Socks, Fall sign posted  Room Door Open: Deferred for droplet isolation  Alarm On: Bed  Patient Moved Closer to Nursing Station: No    Active Consults:  IP CONSULT TO HOSPITALIST  IP CONSULT TO CARDIOLOGY  IP CONSULT TO VASCULAR SURGERY  IP CONSULT TO HEMATOLOGY  IP CONSULT TO PODIATRY  IP CONSULT TO NEUROLOGY    Length of Stay:  Expected LOS: 4  Actual LOS: 3      Sadia Amaya, RN   
End of Shift Note    Bedside shift change report given to Pamela COPELAND (oncoming nurse) by Sadia Amaya RN (offgoing nurse).  Report included the following information SBAR, Kardex, and MAR    Shift worked:  7a-7p     Shift summary and any significant changes:    Patient tolerated care well. Medications were given per the MAR. Hourly rounding has been completed. Patient has been up to chair most of the day. Patient has bouts of confusion. Patient had iron infusion completed today. Patient had podiatry consulted for sutures on great toe.        Activity:  Level of Assistance: Standby assist, set-up cues, supervision of patient - no hands on    Cardiac:   Cardiac Monitoring:  yes - normal sinus    Access:  Current line(s): PIV     Genitourinary:   Urinary Status: Voiding, Bathroom privileges    Respiratory:   O2 Device: None (Room air)    GI:  Current diet: ADULT DIET; Regular; 3 carb choices (45 gm/meal); Low Fat/Low Chol/High Fiber/2 gm Na; 2000 ml    Pain Management:   Patient states pain is manageable on current regimen: YES    Skin:  Binh Scale Score: 19  Interventions: Wound Offloading (Prevention Methods): Pillows, Repositioning, Turning  Pressure injury: no    Patient Safety:  Fall Score: Chin Total Score: 60  Fall Risk Interventions  Nursing Judgement-Fall Risk High(Add Comments): Yes  Toilet Every 2 Hours-In Advance of Need: Yes  Hourly Visual Checks: In bed, Eyes closed, Quiet  Fall Visual Posted: Socks, Fall sign posted  Room Door Open: Deferred for droplet isolation  Alarm On: Bed  Patient Moved Closer to Nursing Station: No    Active Consults:  IP CONSULT TO HOSPITALIST  IP CONSULT TO CARDIOLOGY  IP CONSULT TO VASCULAR SURGERY  IP CONSULT TO HEMATOLOGY  IP CONSULT TO PODIATRY    Length of Stay:  Expected LOS: 3  Actual LOS: 2      Sadia Amaya, RN    
End of Shift Note    Bedside shift change report given to Sadia COPELAND (oncoming nurse) by Marsha Jung RN (offgoing nurse).  Report included the following information SBAR, Intake/Output, MAR, Recent Results, and Cardiac Rhythm V paced    At handoff report pt is sleeping in bed easily arousable, on 1L NC.     Shift worked:   NIGHT      Shift summary and any significant changes:     -Pt had a calm night    -Able to ambulate to bedside commode   -Labs drawn    -Actual standing weight 222 lbs     -Tried to wean off from O2, but pt goes down to 88% at times.    -Had 1 episode of minimal epistaxis while the pt is trying to have a BM. He mentioned that he usually gets nose bleed whenever he tries to have a BM. I told him not to strain hard and not to force it.       0330H Informed NP [place] [facility] pt complaint of productive cough and not able to expectorate.    -Provider ordered PRN Robitussin     Concerns for physician to address:  - Pending Cardiology consult   - Pt is having some wheezes more likely from the throat? (Breath sounds are diminished)    - Covid/Flu test in this facility    Zone phone for oncoming shift:           Activity:  Level of Assistance: Standby assist, set-up cues, supervision of patient - no hands on  Number times ambulated in hallways past shift: 0  Number of times OOB to chair past shift: 0    Cardiac:   Cardiac Monitoring: Yes      Cardiac Rhythm: Ventricular paced    Access:  Current line(s): PIV     Genitourinary:   Urinary Status: Voiding    Respiratory:   O2 Device: None (Room air)  Chronic home O2 use?: NO  Incentive spirometer at bedside: YES    GI:     Current diet:  ADULT DIET; Regular; 3 carb choices (45 gm/meal); Low Fat/Low Chol/High Fiber/2 gm Na; 2000 ml  Passing flatus: YES    Pain Management:   Patient states pain is manageable on current regimen: YES    Skin:  Binh Scale Score: 17  Interventions: Wound Offloading (Prevention Methods): Repositioning, Pillows, Turning, 
End of Shift Note    Bedside shift change report given to Sadia Chua RN (oncoming nurse) by Debbie Woo RN (offgoing nurse).  Report included the following information SBAR, ED Summary, OR Summary, Procedure Summary, Intake/Output, MAR, Recent Results, Med Rec Status, and Cardiac Rhythm (Sinus tachy)    Shift worked:  7522-2832     Shift summary and any significant changes:     Pt had a calm night, no complaints made. No respiratory distress seen     Concerns for physician to address:  none     Zone phone for oncoming shift:   [phone]       Activity:  Level of Assistance: Standby assist, set-up cues, supervision of patient - no hands on  Number times ambulated in hallways past shift: 2  Number of times OOB to chair past shift: 2    Cardiac:   Cardiac Monitoring: Yes      Cardiac Rhythm: Ventricular paced    Access:  Current line(s): PIV     Genitourinary:   Urinary Status: Voiding, Bathroom privileges    Respiratory:   O2 Device: Nasal cannula  Chronic home O2 use?: NO  Incentive spirometer at bedside: NO    GI:  Last BM (including prior to admit): 01/08/25  Current diet:  ADULT DIET; Regular; 3 carb choices (45 gm/meal); Low Fat/Low Chol/High Fiber/2 gm Na; 2000 ml  Passing flatus: YES    Pain Management:   Patient states pain is manageable on current regimen: YES    Skin:  Binh Scale Score: 19  Interventions: Wound Offloading (Prevention Methods): Pillows, Repositioning, Turning    Patient Safety:  Fall Risk: Nursing Judgement-Fall Risk High(Add Comments): Yes  Fall Risk Interventions  Nursing Judgement-Fall Risk High(Add Comments): Yes  Toilet Every 2 Hours-In Advance of Need: Yes  Hourly Visual Checks: In bed, Eyes closed, Quiet  Fall Visual Posted: Socks, Fall sign posted  Room Door Open: Deferred for droplet isolation  Alarm On: Bed  Patient Moved Closer to Nursing Station: No    Active Consults:   IP CONSULT TO HOSPITALIST  IP CONSULT TO CARDIOLOGY  IP CONSULT TO VASCULAR SURGERY  IP CONSULT TO 
End of Shift Note    Bedside shift change report given to Sadia ROBLERO RN (oncoming nurse) by Corina Leigh LPN (offgoing nurse).  Report included the following information SBAR    Shift worked:  Night      Shift summary and any significant changes:     Pt restless through the shift. Repeatedly removed gown and tele leads along with the box. Pt appeared to be hot as he removed all clothing and sheets off. Otherwise no further concerns at this time outside of the care.      Concerns for physician to address:       Zone phone for oncoming shift:          Activity:  Level of Assistance: Standby assist, set-up cues, supervision of patient - no hands on  Number times ambulated in hallways past shift: 0  Number of times OOB to chair past shift: 0    Cardiac:   Cardiac Monitoring: Yes      Cardiac Rhythm: Ventricular paced    Access:  Current line(s): PIV     Genitourinary:   Urinary Status: Voiding, Bathroom privileges    Respiratory:   O2 Device: None (Room air)  Incentive spirometer at bedside: YES    GI:  Last BM (including prior to admit): 01/08/25  Current diet:  ADULT DIET; Regular; 3 carb choices (45 gm/meal); Low Fat/Low Chol/High Fiber/2 gm Na; 2000 ml  Passing flatus: YES    Pain Management:   Patient states pain is manageable on current regimen: YES    Skin:  Binh Scale Score: 19  Interventions: Wound Offloading (Prevention Methods): Repositioning, Turning    Patient Safety:  Fall Risk: Nursing Judgement-Fall Risk High(Add Comments): Yes  Fall Risk Interventions  Nursing Judgement-Fall Risk High(Add Comments): Yes  Toilet Every 2 Hours-In Advance of Need: Yes  Hourly Visual Checks: In bed, Eyes closed, Quiet  Fall Visual Posted: Socks, Fall sign posted  Room Door Open: Deferred for droplet isolation  Alarm On: Bed  Patient Moved Closer to Nursing Station: No    Active Consults:   IP CONSULT TO HOSPITALIST  IP CONSULT TO CARDIOLOGY  IP CONSULT TO VASCULAR SURGERY  IP CONSULT TO HEMATOLOGY  IP CONSULT TO 
Nursing contacted Nocturnist/cross cover provider via non-urgent messaging system [employer] and notified patient congested, non productive cough, no adventitious lung sounds reported, no inc wob, no inc o2 use.. No other concerns reported. No acute distress reported. No other information provided by nurse. VSS.     Ordered mucinex liq po prn per pt request.. Will defer further evaluation/management to the day shift primary attending care team. Patient denies any further complaints or concerns.     Nursing to notify Hospitalist for further/continued concerns. Will remain available overnight for further concerns if nursing/patient needs. Please note, there are RRT systems in this hospital in place that if nursing has acute or critical patient condition change or concern, this is to help facilitate and notify that patient needs immediate bedside evaluation by a provider.     Non-billable note.       
OT note:  OT reviewed chart and attempted to see pt and he was leaving the floor for CT.  Will defer and continue to follow.   
Pharmacy Medication History Review    Medication history obtained by Candy Herbert while patient was in room ER26/26 and was completed based on information available during current patient encounter. Medication history was completed after home meds were reconciled by provider.    PTA medication list was corrected to the following:   Prior to Admission Medications   Prescriptions Last Dose Informant   B-D ULTRAFINE III SHORT PEN 31G X 8 MM MISC  Self, Transfer Papers/EMS   Sig: USE TO INJECT INSULIN UNDER THE SKIN 4 TIMES DAILY AS DIRECTED   Dextromethorphan-guaiFENesin (GUAIFENESIN-DEXTROMETHORPHAN 100-10 MG/5 ML ORAL SYRP CMPD) Unknown Transfer Papers/EMS   Sig: Take 5 mLs by mouth every 8 hours as needed (cough)   Multiple Vitamin (MULTIVITAMIN) TABS tablet 1/6/2025 Self, Transfer Papers/EMS   Sig: Take 1 tablet by mouth daily   NONFORMULARY 1/6/2025 Transfer Papers/EMS   Sig: Take 1 tablet by mouth daily B Complex with Folic Acid   acetaminophen (TYLENOL) 325 MG tablet 1/6/2025 Transfer Papers/EMS   Sig: Take 2 tablets by mouth every 6 hours as needed for Pain or Fever   allopurinol (ZYLOPRIM) 300 MG tablet 1/6/2025 Self, Transfer Papers/EMS   Sig: Take 1 tablet by mouth daily at bedtime   benzonatate (TESSALON) 200 MG capsule 1/6/2025 Transfer Papers/EMS   Sig: Take 1 capsule by mouth in the morning, at noon, and at bedtime For 7 days   blood glucose test strips (FREESTYLE TEST STRIPS) strip  Self, Transfer Papers/EMS   Sig: Use to test blood sugar three times daily DX:E11.51   finasteride (PROSCAR) 5 MG tablet 1/5/2025 Self, Transfer Papers/EMS   Sig: Take 1 tablet by mouth every other day   furosemide (LASIX) 40 MG tablet 1/6/2025 Transfer Papers/EMS   Sig: Take 1 tablet by mouth 2 times daily   guaiFENesin (DIABETIC TUSSIN EX) 100 MG/5ML liquid Unknown Transfer Papers/EMS   Sig: Take 10 mLs by mouth every 4 hours as needed for Cough   guaiFENesin (MUCINEX) 600 MG extended release tablet 1/6/2025 Transfer 
Physical Therapy    Pt unavailable for PT treatment due to leaving floor for testing. Will con't to follow.    Mayte Leigh, PT, MPT  
IV Venofer.     Peripheral Vascular disease  Angioplasty and stenting on 12/7   - was supposed to followup with Vascular after last hospitalization to discuss antiplatelets  - consulted Vascular to clarify antiplatelet strategy with recent stenting     Type 2 diabetes  - SSI     Gout  Cont allopurinol     Hypothyroidism  Levothyroxine 175 mcg daily     BPH  Finasteride 5 mg daily  Terazosin 5 mg nightly     Recent osteomyelitis of right great toe  CKD stage 3b   - monitor  1/9: Podiatry consult for removal of toe suture.      Medical Decision Making:   I personally reviewed labs: CBC, BMP magnesium, phosphorus  I personally reviewed imaging: Chest x-ray  I personally reviewed EKG:  Toxic drug monitoring: Creatinine while patient is on IV Lasix  Discussed case with: Patient, RN, oncology ANIVAL Primitivo, patient's son at the bedside        Code Status: Full code  DVT Prophylaxis: holding with epistaxis and LE edema  GI Prophylaxis:  Contact: August Resendiz (Child) [phone] (Mobile)     Subjective:     Chief Complaint / Reason for Physician Visit  \" Follow-up for acute hypoxic respiratory failure secondary to COVID-19 infection, acute CHF exacerbation, chronic A-fib, status post pacemaker, epistaxis, iron deficiency anemia, chronic GI bleed, peripheral vascular disease, diabetes mellitus, gout, hypothyroidism, BPH, recent osteomyelitis of right great toe.\".  Discussed with RN events overnight.       Objective:     VITALS:   Last 24hrs VS reviewed since prior progress note. Most recent are:  Patient Vitals for the past 24 hrs:   BP Temp Pulse Resp SpO2 Weight   01/09/25 0824 (!) 166/80 -- 73 16 95 % --   01/09/25 0421 -- -- -- -- -- 101.5 kg (223 lb 12.3 oz)   01/09/25 0337 (!) 157/73 -- 70 -- 95 % --   01/08/25 2108 (!) 149/73 98.2 °F (36.8 °C) 72 17 97 % --   01/08/25 1545 138/68 -- 73 16 98 % --   01/08/25 1000 139/66 -- 75 -- -- --         Intake/Output Summary (Last 24 hours) at 1/9/2025 0909  Last data filed at 
Patient had confusion with slurred speech this morning.  He did not sleep well last night.  He was dozing off.  CT head was done that was negative for any acute process.  I spoke with neurology Dr. Gardenr, patient is back to his baseline mentation and has been cleared and stable for discharge.     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 Hgb 9.7 on admission, increased from 7.3 prior. Received 2 sprays of afrin thus far. Follows with HemeOnc Dr. Leblanc for IV iron.  - Rhino rocket if recurs  - if unable to stop with repeat afrin and rhino rocket, will need to transfer to facility with ENT, possibly [place]  1/8: Patient requesting heme-onc consult for IV iron infusion.  1/9: I spoke with oncology ANIVAL Langford and plan is for IV Venofer.     Peripheral Vascular disease  Angioplasty and stenting on 12/7   - was supposed to followup with Vascular after last hospitalization to discuss antiplatelets  - consulted Vascular to clarify antiplatelet strategy with recent stenting     Type 2 diabetes  - SSI     Gout  Cont allopurinol     Hypothyroidism  Levothyroxine 175 mcg daily     BPH  Finasteride 5 mg daily  Terazosin 5 mg nightly     Recent osteomyelitis of right great toe  CKD stage 3b   - monitor  1/9: Podiatry consult for removal of toe suture.  1/10: Right foot suture will be removed today.      Medical Decision Making:   I personally reviewed labs: CBC, BMP magnesium, phosphorus  I personally reviewed imaging:   I personally reviewed EKG:  Toxic drug monitoring: Creatinine while patient is on Lasix  Discussed case with: Patient, RN        Code Status: Full code  DVT Prophylaxis: holding with epistaxis and LE edema  GI Prophylaxis:  Contact: August Resendiz (Child) [phone] (Mobile)     Subjective:     Chief Complaint / Reason for Physician Visit  \" Follow-up for acute hypoxic respiratory failure secondary to COVID-19 infection, acute CHF exacerbation, chronic A-fib, status 
  PHOS 3.4 3.3 3.6       Signed: Regina Silva MD          
01/08/25  0425 01/09/25  0515 01/10/25  0500   BUN 40* 40* 42*   CREATININE 1.57* 1.49* 1.72*   CALCIUM 9.2 9.2 9.3   GLUCOSE 130* 126* 186*     Recent Labs     01/09/25  0515 01/10/25  0500   WBC 6.3 5.7   RBC 3.16* 3.21*   HGB 9.8* 9.8*   HCT 32.0* 31.7*   .3* 98.8   MCH 31.0 30.5   MCHC 30.6 30.9   * 114*   MPV 10.6 10.9     INR   Date/Time Value Ref Range Status   12/08/2024 02:37 AM 1.3 (H) 0.9 - 1.1   Final     Comment:     A single therapeutic range for Vit K antagonists may not be optimal for all indications - see June, 2008 issue of Chest, American College of Chest [employer]icians Evidence-Based Clinical Practice Guidelines, 8th Edition.   12/07/2024 04:37 AM 1.3 (H) 0.9 - 1.1   Final     Comment:     A single therapeutic range for Vit K antagonists may not be optimal for all indications - see June, 2008 issue of Chest, American College of Chest [employer]icians Evidence-Based Clinical Practice Guidelines, 8th Edition.   12/06/2024 05:28 AM 1.3 (H) 0.9 - 1.1   Final     Comment:     A single therapeutic range for Vit K antagonists may not be optimal for all indications - see June, 2008 issue of Chest, American College of Chest Physicians Evidence-Based Clinical Practice Guidelines, 8th Edition.       Telemetry:   Biv paced rhythm    EKG:   Reviewed. BiV paced rhythm.      Echocardiogram:    Left Ventricle: Normal left ventricular systolic function with a visually estimated EF of 55 - 60%. Left ventricle size is normal. Increased wall thickness. Normal wall motion.    Right Ventricle: Right ventricle size is normal. Normal systolic function.    Mitral Valve: Mild regurgitation.    Left Atrium: Left atrium is mildly dilated.    Image quality is adequate.           Current Medications:  Current Facility-Administered Medications   Medication Dose Route Frequency Provider Last Rate Last Admin    insulin glargine (LANTUS) injection vial 10 Units  10 Units SubCUTAneous Nightly Regina Silva MD   10 Units 
   polyethylene glycol (GLYCOLAX) packet 17 g  17 g Oral Daily PRN Mendel, David L, MD        acetaminophen (TYLENOL) tablet 650 mg  650 mg Oral Q6H PRN Mendel, David L, MD   650 mg at 01/08/25 0626    Or    acetaminophen (TYLENOL) suppository 650 mg  650 mg Rectal Q6H PRN Mendel, David L, MD        insulin lispro (HUMALOG,ADMELOG) injection vial 0-4 Units  0-4 Units SubCUTAneous 4x Daily AC & HS Mendel, David L, MD   2 Units at 01/09/25 1229    furosemide (LASIX) injection 40 mg  40 mg IntraVENous BID Mendel, David L, MD   40 mg at 01/09/25 1033            Paolo Loja MD  Clinical Cardiac Electrophysiology  [facility]  1/9/2025     
infusion   IntraVENous PRN Mendel, David L, MD        ondansetron (ZOFRAN-ODT) disintegrating tablet 4 mg  4 mg Oral Q8H PRN Mendel, David L, MD        Or    ondansetron (ZOFRAN) injection 4 mg  4 mg IntraVENous Q6H PRN Mendel, David L, MD        polyethylene glycol (GLYCOLAX) packet 17 g  17 g Oral Daily PRN Mendel, David L, MD        acetaminophen (TYLENOL) tablet 650 mg  650 mg Oral Q6H PRN Mendel, David L, MD   650 mg at 01/08/25 0626    Or    acetaminophen (TYLENOL) suppository 650 mg  650 mg Rectal Q6H PRN Mendel, David L, MD        insulin lispro (HUMALOG,ADMELOG) injection vial 0-4 Units  0-4 Units SubCUTAneous 4x Daily AC & HS Mendel, David L, MD   1 Units at 01/13/25 1239            Paolo Loja MD  Clinical Cardiac Electrophysiology  [facility]  1/13/2025

## 2025-01-13 NOTE — DISCHARGE INSTRUCTIONS
Recommendations as per wound care:    Please remove sutures off the right big toe stump and apply steri strips and dry gauze bandage   If possible take a picture for the chart

## 2025-01-13 NOTE — DISCHARGE SUMMARY
directions carefully, and ask your doctor or other care provider to review them with you.                STOP taking these medications      benzonatate 200 MG capsule  Commonly known as: TESSALON     FaBB 2.2-25-1 MG Tabs tablet  Generic drug: folic acid-pyridoxine-cyanocobalamine     insulin glargine 100 UNIT/ML injection vial  Commonly known as: LANTUS  Replaced by: Lantus SoloStar 100 UNIT/ML injection pen     metoprolol tartrate 25 MG tablet  Commonly known as: LOPRESSOR               Where to Get Your Medications        These medications were sent to [id number] - [place], [place] - [address] - P [phone] - F [phone] [address] [address]      Phone: [phone]   ondansetron 4 MG disintegrating tablet       Information about where to get these medications is not yet available    Ask your nurse or doctor about these medications  empagliflozin 10 MG tablet  Lantus SoloStar 100 UNIT/ML injection pen  metoprolol succinate 25 MG extended release tablet             DISPOSITION:    Home with Family:       Home with HH/PT/OT/RN:    SNF/LTC:    MICHELLE: [facility]   OTHER:            Code status: Full code  Recommended diet: cardiac diet and diabetic diet  Recommended activity: activity as tolerated and as per PT/OT.  Wound care: See surgical/procedure care instructions      Follow up with:   PCP : Prashanth Livingston MD Roberts, Kenneth H, MD  [address] [facility] [address] [phone]    Follow up      [address]  [address] [place] [address]  [phone]  Follow up  As needed - [facility] is a mobile urgent care provider that comes to your home. You may call them if you would like to set up an appt to be seen for a follow up while waiting to be seen by your PCP.    Nick Alcala, ROB  [address] [facility] [id number] [phone]    Follow up in 1 week(s)      Nate oVss,

## 2025-01-13 NOTE — CARE COORDINATION
Transition of Care Plan:     RUR: 21% High Risk  Prior Level of Functioning: Assistance with ADL's  Disposition: IPR  JABARI: 1/11  If SNF or IPR: Date FOC offered:   Date FOC received:   Accepting facility:   [facility]  Report#[phone]  Accepting LUAN Pineda  Date authorization started with reference number:   Date authorization received and expires:   Follow up appointments: [facility]hab  DME needed: Defer to Rehab  Transportation at discharge: [facility] @4:30PM  IM/IMM Medicare/ letter given: 2 IM given 1/13  Is patient a  and connected with [facility]? N/A  Caregiver Contact:   August Resendiz (Child)  [phone] (Mobile)  Discharge Caregiver contacted prior to discharge? Yes at bedside  Care Conference needed?  N/A  Barriers to discharge:  N/A    CM acknowledged d/c. Chart reviewed.  Pt will d/c to [facility] for IPR intervention. [facility] will transport pt @3:30PM. Pt, pts son, and nurse notified. Transportation documents left in pts chart. ?2 IM letter given 1/13. CM will remain accessible if any needs arise prior to discharge.         01/13/25 1415   Services At/After Discharge   Transition of Care Consult (CM Consult) Acute Rehab   Services At/After Discharge Acute Hospital   [place] Resource Information Provided? No   Mode of Transport at Discharge [facility] Transport Time of Discharge 1630   Confirm Follow Up Transport Family   Condition of Participation: Discharge Planning   The Plan for Transition of Care is related to the following treatment goals: Pt will discharge to [facility] for IPR intervention   The Patient and/or Patient Representative was provided with a Choice of Provider? Patient   The Patient and/Or Patient Representative agree with the Discharge Plan? Yes   Freedom of Choice list was provided with basic dialogue that supports the patient's individualized plan of care/goals, treatment preferences, and shares the quality data associated with the providers?  Yes

## 2025-01-13 NOTE — PLAN OF CARE
Problem: Occupational Therapy - Adult  Goal: By Discharge: Performs self-care activities at highest level of function for planned discharge setting.  See evaluation for individualized goals.  Description: FUNCTIONAL STATUS PRIOR TO ADMISSION:  The patient presents from SNF rehab. He reports working with PT and OT using RW, and also using a w/c. Prior recent hospitalization and SNF stay pt was mod I with RW and lived alone.    Occupational Therapy Goals:  Initiated 1/8/2025  1.  Patient will perform grooming with Supervision in standing within 7 day(s).  2.  Patient will perform lower body dressing with Minimal Assist within 7 day(s).  3.  Patient will perform toilet transfers with Supervision  within 7 day(s).  4.  Patient will perform all aspects of toileting with Supervision within 7 day(s).  5.  Patient will participate in upper extremity therapeutic exercise/activities with [place] for 5 minutes within 7 day(s).    6.  Patient will utilize energy conservation techniques during functional activities with verbal cues within 7 day(s).     Outcome: Progressing    OCCUPATIONAL THERAPY TREATMENT  Patient: Maryan Resendiz (89 y.o. male)  Date: 1/9/2025  Primary Diagnosis: Hypoxemia [R09.02]  Acute hypoxic respiratory failure [J96.01]  COVID-19 [U07.1]       Precautions: Fall Risk, Other (Comment) (droplet +, WBAT post-op shoe on R)                Chart, occupational therapy assessment, plan of care, and goals were reviewed.    ASSESSMENT  Patient continues to benefit from skilled OT services and is progressing towards goals. Pt highly pleasant and agreeable to therapy, with great engagement with presented challenges and pt progressing with activity tolerance.  Pt completed session on RA this date, with 02 briefly dropping to 88% during mobility/balance; however, quickly rebounded with rest breaks and PLB.  Pt highly motivated to return to Mod Indep level for ADL/IADL completion and demonstrates ability to 
  Problem: Physical Therapy - Adult  Goal: By Discharge: Performs mobility at highest level of function for planned discharge setting.  See evaluation for individualized goals.  Description: FUNCTIONAL STATUS PRIOR TO ADMISSION: Pt recently residing at SNF where he is ambulating with RW and assist    HOME SUPPORT PRIOR TO ADMISSION: Pt lives alone at baseline    Physical Therapy Goals  Initiated 1/8/2025  1.  Patient will move from supine to sit and sit to supine in bed with supervision/set-up within 7 day(s).    2.  Patient will perform sit to stand with supervision/set-up within 7 day(s).  3.  Patient will transfer from bed to chair and chair to bed with supervision/set-up using the least restrictive device within 7 day(s).  4.  Patient will ambulate with supervision/set-up for 50 feet with the least restrictive device within 7 day(s).   Outcome: Progressing   PHYSICAL THERAPY EVALUATION    Patient: Maryan Resendiz (89 y.o. male)  Date: 1/8/2025  Primary Diagnosis: Hypoxemia [R09.02]  Acute hypoxic respiratory failure [J96.01]  COVID-19 [U07.1]       Precautions: Restrictions/Precautions: Fall Risk, Other (Comment) (droplet +, WBAT post-op shoe on R)                      ASSESSMENT :   DEFICITS/IMPAIRMENTS:   The patient is limited by decreased functional mobility, strength, activity tolerance, cognition, coordination, balance following admit from SNF with Covid infection. Of note, pt recently admitted for toe amputation R foot.     Pt received in chair with 1L O2. Mobilized with min A overall for transfer sit to stand from chair and BSC height and short distance amb in room with RW. Noted steppage gait on R due to h/o foot drop; unable to wear AFO due to post-op shoe. Activity assessed on RA with SpO2 88%; recovered quickly with seated rest, left with 1L O2 in place.    Based on the impairments listed above recommend con't PT for activity progression and functional mobility training to enable pt return to 
  Problem: Safety - Adult  Goal: Free from fall injury  1/12/2025 1049 by Moiz Hillman RN  Outcome: Progressing  Flowsheets (Taken 1/12/2025 1049)  Free From Fall Injury:   Instruct family/caregiver on patient safety   Based on caregiver fall risk screen, instruct family/caregiver to ask for assistance with transferring infant if caregiver noted to have fall risk factors  1/12/2025 0019 by Pamela Martinez RN  Outcome: Progressing     Problem: Chronic Conditions and Co-morbidities  Goal: Patient's chronic conditions and co-morbidity symptoms are monitored and maintained or improved  1/12/2025 1049 by Moiz Hillman RN  Outcome: Progressing  1/12/2025 0019 by Pamela Martinez RN  Outcome: Progressing     Problem: Discharge Planning  Goal: Discharge to home or other facility with appropriate resources  1/12/2025 1049 by Moiz Hillman RN  Outcome: Progressing  1/12/2025 0019 by Pamela Martinez RN  Outcome: Progressing     Problem: Skin/Tissue Integrity  Goal: Absence of new skin breakdown  Description: 1.  Monitor for areas of redness and/or skin breakdown  2.  Assess vascular access sites hourly  3.  Every 4-6 hours minimum:  Change oxygen saturation probe site  4.  Every 4-6 hours:  If on nasal continuous positive airway pressure, respiratory therapy assess nares and determine need for appliance change or resting period.  1/12/2025 1049 by Moiz Hillman RN  Outcome: Progressing  1/12/2025 0019 by Pamela Martinez RN  Outcome: Progressing     Problem: Pain  Goal: Verbalizes/displays adequate comfort level or baseline comfort level  1/12/2025 1049 by Moiz Hillman RN  Outcome: Progressing  1/12/2025 0019 by Pamela Martinez RN  Outcome: Progressing     
  Problem: Safety - Adult  Goal: Free from fall injury  1/13/2025 1057 by Moiz Hillman RN  Outcome: Progressing  1/13/2025 0452 by Ana Schmidt RN  Outcome: Progressing     Problem: Chronic Conditions and Co-morbidities  Goal: Patient's chronic conditions and co-morbidity symptoms are monitored and maintained or improved  1/13/2025 1057 by Moiz Hillman RN  Outcome: Progressing  1/13/2025 0452 by Ana Schmidt RN  Outcome: Progressing     Problem: Discharge Planning  Goal: Discharge to home or other facility with appropriate resources  1/13/2025 1057 by Moiz Hillman RN  Outcome: Progressing  1/13/2025 0452 by Ana Schmidt RN  Outcome: Progressing     Problem: Skin/Tissue Integrity  Goal: Absence of new skin breakdown  Description: 1.  Monitor for areas of redness and/or skin breakdown  2.  Assess vascular access sites hourly  3.  Every 4-6 hours minimum:  Change oxygen saturation probe site  4.  Every 4-6 hours:  If on nasal continuous positive airway pressure, respiratory therapy assess nares and determine need for appliance change or resting period.  1/13/2025 1057 by Moiz Hillman RN  Outcome: Progressing  1/13/2025 0452 by Ana Schmidt RN  Outcome: Progressing     Problem: Pain  Goal: Verbalizes/displays adequate comfort level or baseline comfort level  1/13/2025 1057 by Moiz Hillman RN  Outcome: Progressing  1/13/2025 0452 by Ana Schmidt RN  Outcome: Progressing     
  Problem: Safety - Adult  Goal: Free from fall injury  1/8/2025 0007 by Mrasha Jung RN  Outcome: Progressing  1/7/2025 2025 by Betty Brito RN  Outcome: Progressing     Problem: Chronic Conditions and Co-morbidities  Goal: Patient's chronic conditions and co-morbidity symptoms are monitored and maintained or improved  1/8/2025 0007 by Marsha Jung RN  Outcome: Progressing  1/7/2025 2025 by Betty Brito RN  Outcome: Progressing     Problem: Discharge Planning  Goal: Discharge to home or other facility with appropriate resources  1/8/2025 0007 by Marsha Jung RN  Outcome: Progressing  1/7/2025 2025 by Betty Brito RN  Outcome: Progressing     
  Problem: Safety - Adult  Goal: Free from fall injury  1/9/2025 0037 by Debbie Woo RN  Outcome: Progressing  1/8/2025 1132 by Sadia Amaya RN  Outcome: Progressing     Problem: Chronic Conditions and Co-morbidities  Goal: Patient's chronic conditions and co-morbidity symptoms are monitored and maintained or improved  1/9/2025 0037 by Debbie Woo RN  Outcome: Progressing  1/8/2025 1132 by Sadia Amaya RN  Outcome: Progressing     Problem: Discharge Planning  Goal: Discharge to home or other facility with appropriate resources  1/9/2025 0037 by Debbie Woo RN  Outcome: Progressing  1/8/2025 1132 by Sadia Amaya RN  Outcome: Progressing     Problem: Skin/Tissue Integrity  Goal: Absence of new skin breakdown  Description: 1.  Monitor for areas of redness and/or skin breakdown  2.  Assess vascular access sites hourly  3.  Every 4-6 hours minimum:  Change oxygen saturation probe site  4.  Every 4-6 hours:  If on nasal continuous positive airway pressure, respiratory therapy assess nares and determine need for appliance change or resting period.  Outcome: Progressing     Problem: Occupational Therapy - Adult  Goal: By Discharge: Performs self-care activities at highest level of function for planned discharge setting.  See evaluation for individualized goals.  Description: FUNCTIONAL STATUS PRIOR TO ADMISSION:  The patient presents from SNF rehab. He reports working with PT and OT using RW, and also using a w/c. Prior recent hospitalization and SNF stay pt was mod I with RW and lived alone.    Occupational Therapy Goals:  Initiated 1/8/2025  1.  Patient will perform grooming with Supervision in standing within 7 day(s).  2.  Patient will perform lower body dressing with Minimal Assist within 7 day(s).  3.  Patient will perform toilet transfers with Supervision  within 7 day(s).  4.  Patient will perform all aspects of toileting with Supervision within 7 day(s).  5.  Patient will participate in upper extremity 
  Problem: Safety - Adult  Goal: Free from fall injury  Outcome: Progressing     Problem: Chronic Conditions and Co-morbidities  Goal: Patient's chronic conditions and co-morbidity symptoms are monitored and maintained or improved  Outcome: Progressing     Problem: Discharge Planning  Goal: Discharge to home or other facility with appropriate resources  Outcome: Progressing     
  Problem: Safety - Adult  Goal: Free from fall injury  Outcome: Progressing     Problem: Chronic Conditions and Co-morbidities  Goal: Patient's chronic conditions and co-morbidity symptoms are monitored and maintained or improved  Outcome: Progressing     Problem: Discharge Planning  Goal: Discharge to home or other facility with appropriate resources  Outcome: Progressing     Problem: Skin/Tissue Integrity  Goal: Absence of new skin breakdown  Description: 1.  Monitor for areas of redness and/or skin breakdown  2.  Assess vascular access sites hourly  3.  Every 4-6 hours minimum:  Change oxygen saturation probe site  4.  Every 4-6 hours:  If on nasal continuous positive airway pressure, respiratory therapy assess nares and determine need for appliance change or resting period.  Outcome: Progressing     Problem: Pain  Goal: Verbalizes/displays adequate comfort level or baseline comfort level  Outcome: Progressing     
  Problem: Safety - Adult  Goal: Free from fall injury  Outcome: Progressing     Problem: Chronic Conditions and Co-morbidities  Goal: Patient's chronic conditions and co-morbidity symptoms are monitored and maintained or improved  Outcome: Progressing     Problem: Discharge Planning  Goal: Discharge to home or other facility with appropriate resources  Outcome: Progressing     Problem: Skin/Tissue Integrity  Goal: Absence of new skin breakdown  Description: 1.  Monitor for areas of redness and/or skin breakdown  2.  Assess vascular access sites hourly  3.  Every 4-6 hours minimum:  Change oxygen saturation probe site  4.  Every 4-6 hours:  If on nasal continuous positive airway pressure, respiratory therapy assess nares and determine need for appliance change or resting period.  Outcome: Progressing     Problem: Pain  Goal: Verbalizes/displays adequate comfort level or baseline comfort level  Outcome: Progressing     
Primary osteoarthritis involving multiple joints     knees and back    Renal artery stenosis (HCC) 10/24/2017    Right-sided extracranial carotid artery stenosis 10/24/2017    Sick sinus syndrome (HCC) 10/24/2017    Status post pacemaker     Past Surgical History:   Procedure Laterality Date    ABDOMINAL AORTIC ANEURYSM REPAIR      APPENDECTOMY      CATARACT REMOVAL      / lens implant    CHOLECYSTECTOMY      LAPAROTOMY N/A 12/5/2023    EXPLORATION ABDOMINAL WALL HEMATOMA/  performed by Michell Zapata Jr., MD at [facility] MAIN OR    LAPAROTOMY N/A 12/6/2023    EXPLORATION AND EVACUATION OF INFECTED ABDOMINAL WALL HEMATOMA AND EXPLANTATION OF INFECTED MESH, WASH OUT performed by Michell Zapata Jr., MD at [facility] MAIN OR    ORTHOPEDIC SURGERY Right     rt unicondular knee replacement    ORTHOPEDIC SURGERY  1/26/15    LEFT UNICONDYLAR KNEE ARTHROPLASTY    PACEMAKER  7/11    pacemaker    TOE AMPUTATION Right 12/8/2024    RIGHT FIRST TOE AMPUTATION performed by Nick Alcala DPM at hospitals MAIN OR    TONSILLECTOMY      UROLOGICAL SURGERY      rt renal stentx2    VASCULAR SURGERY Right 12/6/2024    RIGHT LOWER EXTREMITY ARTERIOGRAM, ANGIOPLASTY AND STENTING performed by Flip Donald MD at hospitals MAIN OR          Expanded or extensive additional review of patient history:   Social/Functional History  Lives With: Alone  Type of Home: House  Home Layout: One level  Home Access: Stairs to enter with rails  Entrance Stairs - Number of Steps: 3  Bathroom Shower/Tub: Walk-in shower  Bathroom Equipment: Shower chair, Hand-held shower, Grab bars in shower (pt uses sink and door frame near toilet as grab bar for toilet)  Home Equipment: Cane, Walker - Rolling, Walker - Standard, Rollator, Wheelchair - Manual  Receives Help From: Family, Friend(s)  Prior Level of Assist for ADLs: Needs assistance  Prior Level of Assist for Homemaking: Needs assistance  Prior Level of Assist for Transfers: Needs assistance  Active

## 2025-01-15 ENCOUNTER — TELEPHONE (OUTPATIENT)
Age: 89
End: 2025-01-15

## 2025-01-15 DIAGNOSIS — D50.9 IRON DEFICIENCY ANEMIA, UNSPECIFIED IRON DEFICIENCY ANEMIA TYPE: Primary | ICD-10-CM

## 2025-01-15 NOTE — TELEPHONE ENCOUNTER
Patient contacted [facility] to reschedule 1/17 appointment. Called patient to reschedule, no answer, left  requesting a return call.

## 2025-01-17 ENCOUNTER — HOSPITAL ENCOUNTER (OUTPATIENT)
Facility: HOSPITAL | Age: 89
Setting detail: INFUSION SERIES
End: 2025-01-17

## 2025-01-22 NOTE — TELEPHONE ENCOUNTER
Patient son returned call. Son stated patient received two infusions while he was in the hospital. He would like to know if patient still needs the iron infusions scheduled in [facility]. Please advise.    I will call son back to make aware.

## 2025-01-22 NOTE — TELEPHONE ENCOUNTER
Returned call to son and made him aware patient does not need to have additional iron infusions. Appointments canceled.  He is aware patient is to get labs drawn in March and office visit is scheduled in June.

## 2025-01-22 NOTE — TELEPHONE ENCOUNTER
Please let pt know he can go [place] [facility] [place] in March to get labs drawn.  Also, please schedule a 6 month follow up in June.      MARIAELENA FROM DR PELAYO CBC WITH DIFF FERRITIN IRON PROFILE. IN 2 MONTHS.

## 2025-01-24 ENCOUNTER — OFFICE VISIT (OUTPATIENT)
Facility: CLINIC | Age: 89
End: 2025-01-24

## 2025-01-24 ENCOUNTER — HOSPITAL ENCOUNTER (OUTPATIENT)
Facility: HOSPITAL | Age: 89
Setting detail: INFUSION SERIES
End: 2025-01-24

## 2025-01-24 VITALS
DIASTOLIC BLOOD PRESSURE: 60 MMHG | BODY MASS INDEX: 30.38 KG/M2 | OXYGEN SATURATION: 93 % | HEART RATE: 80 BPM | SYSTOLIC BLOOD PRESSURE: 120 MMHG | WEIGHT: 211.7 LBS

## 2025-01-24 DIAGNOSIS — J96.01 ACUTE HYPOXIC RESPIRATORY FAILURE: ICD-10-CM

## 2025-01-24 DIAGNOSIS — U07.1 COVID-19: ICD-10-CM

## 2025-01-24 DIAGNOSIS — Z09 HOSPITAL DISCHARGE FOLLOW-UP: Primary | ICD-10-CM

## 2025-01-24 DIAGNOSIS — D69.6 THROMBOCYTOPENIA (HCC): ICD-10-CM

## 2025-01-24 DIAGNOSIS — D63.8 ANEMIA, CHRONIC DISEASE: Chronic | ICD-10-CM

## 2025-01-24 DIAGNOSIS — I48.20 CHRONIC ATRIAL FIBRILLATION (HCC): Chronic | ICD-10-CM

## 2025-01-24 DIAGNOSIS — I50.32 CHRONIC DIASTOLIC CONGESTIVE HEART FAILURE (HCC): Chronic | ICD-10-CM

## 2025-01-24 DIAGNOSIS — E11.51 TYPE 2 DIABETES MELLITUS WITH PERIPHERAL VASCULAR DISEASE (HCC): Chronic | ICD-10-CM

## 2025-01-24 RX ORDER — DOXAZOSIN 4 MG/1
4 TABLET ORAL NIGHTLY
COMMUNITY
Start: 2025-01-20

## 2025-01-24 RX ORDER — AMLODIPINE BESYLATE 10 MG/1
10 TABLET ORAL NIGHTLY
COMMUNITY
Start: 2025-01-20

## 2025-01-24 NOTE — PROGRESS NOTES
Follow-Up from Hospital (Pt states that he was in the twice. Pt states that he went to the ER the day after Thanksgiving for weakness and low platelets. Pt states that he was in [facility]  he had a severe nosebleed that he was taken to the ER for. He was also covid positive. Pt states that he still feels weak with no pain.  )       HPI:  Maryan Resendiz is a 89 y.o. year old male who is here for a follow up visit for hospitalization transition of care.   He was last seen by me on 11/25/2024.     Discharged on: Admitted 1/7/2025 to [facility] with D/C 1/13/2025 to [facility] rehab and discharged from [facility] rehab on 1/22/2025 to home.  [facility] call made within 2 days of hospital/rehab discharge    Diagnosis in the Hospital:  AHRF  2.   COVID positive at facility  3.   Diastolic heart failure exacerbation  4.   S/p pacemaker  5.   Chronic Afib  6.   Confusion with slurred speech  7.   Epistaxis  8.   Chronic GIB  9.   Peripheral Vascular disease   10. Angioplasty and stenting on 12/7   11.  Type 2 diabetes  12. Gout  13. Hypothyroidism  14. BPH  15. Recent osteomyelitis of right great toe  16. CKD stage 3b     Complications in hospital: No complications    Medication changes: Started Jardiance 10 mg daily Lantus dose decreased from 30 units daily to 6 units daily started metoprolol XL 25 mg daily, discontinued Tessalon, folic acid/B12 tablets, and Lopressor not extended release.    Discharge Summary reviewed.  Today 1/24/2025      He reports the following: He is accompanied by his son at the visit today and he seems to be doing much better.  Oxygen as a general rule is good at home although when he lays flat in bed sometimes it does go up.  He still having some swelling in his ankles.  He noted that they recommended elevating them as well as compression stockings which I agree with that.  He currently notes no chest pain, palpitations, PND, orthopnea or other cardiorespiratory complaints except some dyspnea on

## 2025-01-24 NOTE — PROGRESS NOTES
\"Have you been to the ER, urgent care clinic since your last visit?  Hospitalized since your last visit?\"    YES - When: approximately 2 weeks ago.  Where and Why: [facility] for weakness and Covid.    “Have you seen or consulted any other health care providers outside our system since your last visit?”    NO

## 2025-01-25 LAB
ANION GAP SERPL CALC-SCNC: 6 MMOL/L (ref 2–12)
BASOPHILS # BLD: 0.03 K/UL (ref 0–0.1)
BASOPHILS NFR BLD: 0.4 % (ref 0–1)
BUN SERPL-MCNC: 76 MG/DL (ref 6–20)
BUN/CREAT SERPL: 37 (ref 12–20)
CALCIUM SERPL-MCNC: 9.6 MG/DL (ref 8.5–10.1)
CHLORIDE SERPL-SCNC: 101 MMOL/L (ref 97–108)
CO2 SERPL-SCNC: 32 MMOL/L (ref 21–32)
CREAT SERPL-MCNC: 2.03 MG/DL (ref 0.7–1.3)
DIFFERENTIAL METHOD BLD: ABNORMAL
EOSINOPHIL # BLD: 0.12 K/UL (ref 0–0.4)
EOSINOPHIL NFR BLD: 1.5 % (ref 0–7)
ERYTHROCYTE [DISTWIDTH] IN BLOOD BY AUTOMATED COUNT: 18.6 % (ref 11.5–14.5)
GLUCOSE SERPL-MCNC: 123 MG/DL (ref 65–100)
HCT VFR BLD AUTO: 35.4 % (ref 36.6–50.3)
HGB BLD-MCNC: 10.5 G/DL (ref 12.1–17)
IMM GRANULOCYTES # BLD AUTO: 0.05 K/UL (ref 0–0.04)
IMM GRANULOCYTES NFR BLD AUTO: 0.6 % (ref 0–0.5)
LYMPHOCYTES # BLD: 0.84 K/UL (ref 0.8–3.5)
LYMPHOCYTES NFR BLD: 10.6 % (ref 12–49)
MCH RBC QN AUTO: 30.6 PG (ref 26–34)
MCHC RBC AUTO-ENTMCNC: 29.7 G/DL (ref 30–36.5)
MCV RBC AUTO: 103.2 FL (ref 80–99)
MONOCYTES # BLD: 0.81 K/UL (ref 0–1)
MONOCYTES NFR BLD: 10.2 % (ref 5–13)
NEUTS SEG # BLD: 6.06 K/UL (ref 1.8–8)
NEUTS SEG NFR BLD: 76.7 % (ref 32–75)
NRBC # BLD: 0.02 K/UL (ref 0–0.01)
NRBC BLD-RTO: 0.3 PER 100 WBC
PLATELET # BLD AUTO: 124 K/UL (ref 150–400)
PMV BLD AUTO: 12 FL (ref 8.9–12.9)
POTASSIUM SERPL-SCNC: 3.9 MMOL/L (ref 3.5–5.1)
RBC # BLD AUTO: 3.43 M/UL (ref 4.1–5.7)
SODIUM SERPL-SCNC: 139 MMOL/L (ref 136–145)
WBC # BLD AUTO: 7.9 K/UL (ref 4.1–11.1)

## 2025-01-27 ENCOUNTER — HOSPITAL ENCOUNTER (INPATIENT)
Facility: HOSPITAL | Age: 89
LOS: 14 days | Discharge: HOSPICE | DRG: 689 | End: 2025-02-10
Admitting: STUDENT IN AN ORGANIZED HEALTH CARE EDUCATION/TRAINING PROGRAM
Payer: MEDICARE

## 2025-01-27 ENCOUNTER — APPOINTMENT (OUTPATIENT)
Facility: HOSPITAL | Age: 89
DRG: 689 | End: 2025-01-27
Payer: MEDICARE

## 2025-01-27 DIAGNOSIS — R53.1 GENERALIZED WEAKNESS: Primary | ICD-10-CM

## 2025-01-27 DIAGNOSIS — R06.00 DYSPNEA, UNSPECIFIED TYPE: ICD-10-CM

## 2025-01-27 LAB
ALBUMIN SERPL-MCNC: 2.9 G/DL (ref 3.5–5)
ALBUMIN/GLOB SERPL: 0.8 (ref 1.1–2.2)
ALP SERPL-CCNC: 194 U/L (ref 45–117)
ALT SERPL-CCNC: 25 U/L (ref 12–78)
ANION GAP SERPL CALC-SCNC: 5 MMOL/L (ref 2–12)
AST SERPL-CCNC: 27 U/L (ref 15–37)
BASE EXCESS BLDV CALC-SCNC: 5.9 MMOL/L
BASOPHILS # BLD: 0.02 K/UL (ref 0–0.1)
BASOPHILS NFR BLD: 0.3 % (ref 0–1)
BILIRUB SERPL-MCNC: 0.7 MG/DL (ref 0.2–1)
BUN SERPL-MCNC: 99 MG/DL (ref 6–20)
BUN/CREAT SERPL: 47 (ref 12–20)
CALCIUM SERPL-MCNC: 9.6 MG/DL (ref 8.5–10.1)
CHLORIDE SERPL-SCNC: 101 MMOL/L (ref 97–108)
CO2 SERPL-SCNC: 32 MMOL/L (ref 21–32)
CREAT SERPL-MCNC: 2.11 MG/DL (ref 0.7–1.3)
DIFFERENTIAL METHOD BLD: ABNORMAL
EOSINOPHIL # BLD: 0.08 K/UL (ref 0–0.4)
EOSINOPHIL NFR BLD: 1.2 % (ref 0–7)
ERYTHROCYTE [DISTWIDTH] IN BLOOD BY AUTOMATED COUNT: 18.7 % (ref 11.5–14.5)
FLUAV RNA SPEC QL NAA+PROBE: NOT DETECTED
FLUBV RNA SPEC QL NAA+PROBE: NOT DETECTED
GLOBULIN SER CALC-MCNC: 3.6 G/DL (ref 2–4)
GLUCOSE SERPL-MCNC: 142 MG/DL (ref 65–100)
HCO3 BLDV-SCNC: 32.8 MMOL/L (ref 23–28)
HCT VFR BLD AUTO: 34.4 % (ref 36.6–50.3)
HGB BLD-MCNC: 10.4 G/DL (ref 12.1–17)
IMM GRANULOCYTES # BLD AUTO: 0.03 K/UL (ref 0–0.04)
IMM GRANULOCYTES NFR BLD AUTO: 0.5 % (ref 0–0.5)
LYMPHOCYTES # BLD: 0.5 K/UL (ref 0.8–3.5)
LYMPHOCYTES NFR BLD: 7.8 % (ref 12–49)
MAGNESIUM SERPL-MCNC: 2.5 MG/DL (ref 1.6–2.4)
MCH RBC QN AUTO: 30.7 PG (ref 26–34)
MCHC RBC AUTO-ENTMCNC: 30.2 G/DL (ref 30–36.5)
MCV RBC AUTO: 101.5 FL (ref 80–99)
MONOCYTES # BLD: 0.7 K/UL (ref 0–1)
MONOCYTES NFR BLD: 10.9 % (ref 5–13)
NEUTS SEG # BLD: 5.1 K/UL (ref 1.8–8)
NEUTS SEG NFR BLD: 79.3 % (ref 32–75)
NRBC # BLD: 0.02 K/UL (ref 0–0.01)
NRBC BLD-RTO: 0.3 PER 100 WBC
PCO2 BLDV: 57.8 MMHG (ref 41–51)
PH BLDV: 7.36 (ref 7.32–7.42)
PLATELET # BLD AUTO: 105 K/UL (ref 150–400)
PMV BLD AUTO: 11.1 FL (ref 8.9–12.9)
PO2 BLDV: 48 MMHG (ref 25–40)
POTASSIUM SERPL-SCNC: 4.1 MMOL/L (ref 3.5–5.1)
PROT SERPL-MCNC: 6.5 G/DL (ref 6.4–8.2)
RBC # BLD AUTO: 3.39 M/UL (ref 4.1–5.7)
SAO2 % BLDV: 80.6 % (ref 65–88)
SARS-COV-2 RNA RESP QL NAA+PROBE: NOT DETECTED
SERVICE CMNT-IMP: ABNORMAL
SERVICE CMNT-IMP: ABNORMAL
SODIUM SERPL-SCNC: 138 MMOL/L (ref 136–145)
SOURCE: NORMAL
SPECIMEN TYPE: ABNORMAL
TROPONIN I SERPL HS-MCNC: 58 NG/L (ref 0–76)
WBC # BLD AUTO: 6.4 K/UL (ref 4.1–11.1)

## 2025-01-27 PROCEDURE — 94762 N-INVAS EAR/PLS OXIMTRY CONT: CPT

## 2025-01-27 PROCEDURE — 82803 BLOOD GASES ANY COMBINATION: CPT

## 2025-01-27 PROCEDURE — 70450 CT HEAD/BRAIN W/O DYE: CPT

## 2025-01-27 PROCEDURE — 85025 COMPLETE CBC W/AUTO DIFF WBC: CPT

## 2025-01-27 PROCEDURE — 6360000004 HC RX CONTRAST MEDICATION

## 2025-01-27 PROCEDURE — 83880 ASSAY OF NATRIURETIC PEPTIDE: CPT

## 2025-01-27 PROCEDURE — 36415 COLL VENOUS BLD VENIPUNCTURE: CPT

## 2025-01-27 PROCEDURE — 99285 EMERGENCY DEPT VISIT HI MDM: CPT

## 2025-01-27 PROCEDURE — 71275 CT ANGIOGRAPHY CHEST: CPT

## 2025-01-27 PROCEDURE — 83735 ASSAY OF MAGNESIUM: CPT

## 2025-01-27 PROCEDURE — 84484 ASSAY OF TROPONIN QUANT: CPT

## 2025-01-27 PROCEDURE — 71045 X-RAY EXAM CHEST 1 VIEW: CPT

## 2025-01-27 PROCEDURE — 1100000003 HC PRIVATE W/ TELEMETRY

## 2025-01-27 PROCEDURE — 1100000000 HC RM PRIVATE

## 2025-01-27 PROCEDURE — 87636 SARSCOV2 & INF A&B AMP PRB: CPT

## 2025-01-27 PROCEDURE — 80053 COMPREHEN METABOLIC PANEL: CPT

## 2025-01-27 RX ORDER — DOXAZOSIN 2 MG/1
4 TABLET ORAL NIGHTLY
Status: DISCONTINUED | OUTPATIENT
Start: 2025-01-27 | End: 2025-02-06

## 2025-01-27 RX ORDER — FINASTERIDE 5 MG/1
5 TABLET, FILM COATED ORAL EVERY OTHER DAY
Status: DISCONTINUED | OUTPATIENT
Start: 2025-01-28 | End: 2025-02-09

## 2025-01-27 RX ORDER — IOPAMIDOL 755 MG/ML
100 INJECTION, SOLUTION INTRAVASCULAR
Status: COMPLETED | OUTPATIENT
Start: 2025-01-27 | End: 2025-01-27

## 2025-01-27 RX ORDER — ALLOPURINOL 100 MG/1
300 TABLET ORAL NIGHTLY
Status: DISCONTINUED | OUTPATIENT
Start: 2025-01-27 | End: 2025-02-05

## 2025-01-27 RX ORDER — POLYETHYLENE GLYCOL 3350 17 G/17G
17 POWDER, FOR SOLUTION ORAL DAILY PRN
Status: DISCONTINUED | OUTPATIENT
Start: 2025-01-27 | End: 2025-02-10

## 2025-01-27 RX ORDER — ACETAMINOPHEN 650 MG/1
650 SUPPOSITORY RECTAL EVERY 6 HOURS PRN
Status: DISCONTINUED | OUTPATIENT
Start: 2025-01-27 | End: 2025-02-10 | Stop reason: HOSPADM

## 2025-01-27 RX ORDER — INSULIN LISPRO 100 [IU]/ML
0-8 INJECTION, SOLUTION INTRAVENOUS; SUBCUTANEOUS
Status: DISCONTINUED | OUTPATIENT
Start: 2025-01-27 | End: 2025-02-09

## 2025-01-27 RX ORDER — SODIUM CHLORIDE 0.9 % (FLUSH) 0.9 %
5-40 SYRINGE (ML) INJECTION EVERY 12 HOURS SCHEDULED
Status: DISCONTINUED | OUTPATIENT
Start: 2025-01-27 | End: 2025-02-10

## 2025-01-27 RX ORDER — SODIUM CHLORIDE 0.9 % (FLUSH) 0.9 %
5-40 SYRINGE (ML) INJECTION PRN
Status: DISCONTINUED | OUTPATIENT
Start: 2025-01-27 | End: 2025-02-10

## 2025-01-27 RX ORDER — ISOSORBIDE MONONITRATE 30 MG/1
60 TABLET, EXTENDED RELEASE ORAL EVERY MORNING
Status: DISCONTINUED | OUTPATIENT
Start: 2025-01-28 | End: 2025-02-09

## 2025-01-27 RX ORDER — SODIUM CHLORIDE 9 MG/ML
INJECTION, SOLUTION INTRAVENOUS PRN
Status: DISCONTINUED | OUTPATIENT
Start: 2025-01-27 | End: 2025-02-10

## 2025-01-27 RX ORDER — FUROSEMIDE 40 MG/1
40 TABLET ORAL 2 TIMES DAILY
Status: DISCONTINUED | OUTPATIENT
Start: 2025-01-27 | End: 2025-02-03

## 2025-01-27 RX ORDER — PANTOPRAZOLE SODIUM 40 MG/1
40 TABLET, DELAYED RELEASE ORAL
Status: DISCONTINUED | OUTPATIENT
Start: 2025-01-28 | End: 2025-02-09

## 2025-01-27 RX ORDER — INSULIN GLARGINE 100 [IU]/ML
12 INJECTION, SOLUTION SUBCUTANEOUS NIGHTLY
Status: DISCONTINUED | OUTPATIENT
Start: 2025-01-27 | End: 2025-01-28

## 2025-01-27 RX ORDER — ONDANSETRON 4 MG/1
4 TABLET, ORALLY DISINTEGRATING ORAL EVERY 8 HOURS PRN
Status: DISCONTINUED | OUTPATIENT
Start: 2025-01-27 | End: 2025-02-10

## 2025-01-27 RX ORDER — ENOXAPARIN SODIUM 100 MG/ML
30 INJECTION SUBCUTANEOUS DAILY
Status: DISCONTINUED | OUTPATIENT
Start: 2025-01-28 | End: 2025-02-09

## 2025-01-27 RX ORDER — ACETAMINOPHEN 325 MG/1
650 TABLET ORAL EVERY 6 HOURS PRN
Status: DISCONTINUED | OUTPATIENT
Start: 2025-01-27 | End: 2025-02-10

## 2025-01-27 RX ORDER — AMLODIPINE BESYLATE 5 MG/1
10 TABLET ORAL NIGHTLY
Status: DISCONTINUED | OUTPATIENT
Start: 2025-01-27 | End: 2025-01-30

## 2025-01-27 RX ORDER — GLUCAGON 1 MG/ML
1 KIT INJECTION PRN
Status: DISCONTINUED | OUTPATIENT
Start: 2025-01-27 | End: 2025-01-31

## 2025-01-27 RX ORDER — FUROSEMIDE 10 MG/ML
20 INJECTION INTRAMUSCULAR; INTRAVENOUS ONCE
Status: COMPLETED | OUTPATIENT
Start: 2025-01-27 | End: 2025-01-28

## 2025-01-27 RX ORDER — METOPROLOL SUCCINATE 25 MG/1
25 TABLET, EXTENDED RELEASE ORAL DAILY
Status: DISCONTINUED | OUTPATIENT
Start: 2025-01-28 | End: 2025-02-09

## 2025-01-27 RX ORDER — ATORVASTATIN CALCIUM 20 MG/1
20 TABLET, FILM COATED ORAL NIGHTLY
Status: DISCONTINUED | OUTPATIENT
Start: 2025-01-27 | End: 2025-02-06

## 2025-01-27 RX ORDER — DEXTROSE MONOHYDRATE 100 MG/ML
INJECTION, SOLUTION INTRAVENOUS CONTINUOUS PRN
Status: DISCONTINUED | OUTPATIENT
Start: 2025-01-27 | End: 2025-01-31

## 2025-01-27 RX ORDER — LEVALBUTEROL INHALATION SOLUTION 0.63 MG/3ML
0.63 SOLUTION RESPIRATORY (INHALATION) EVERY 6 HOURS PRN
Status: DISCONTINUED | OUTPATIENT
Start: 2025-01-27 | End: 2025-02-10

## 2025-01-27 RX ORDER — ONDANSETRON 2 MG/ML
4 INJECTION INTRAMUSCULAR; INTRAVENOUS EVERY 6 HOURS PRN
Status: DISCONTINUED | OUTPATIENT
Start: 2025-01-27 | End: 2025-02-10 | Stop reason: HOSPADM

## 2025-01-27 RX ADMIN — IOPAMIDOL 70 ML: 755 INJECTION, SOLUTION INTRAVENOUS at 20:02

## 2025-01-27 ASSESSMENT — LIFESTYLE VARIABLES
HOW MANY STANDARD DRINKS CONTAINING ALCOHOL DO YOU HAVE ON A TYPICAL DAY: PATIENT DOES NOT DRINK
HOW OFTEN DO YOU HAVE A DRINK CONTAINING ALCOHOL: NEVER

## 2025-01-27 ASSESSMENT — PAIN - FUNCTIONAL ASSESSMENT: PAIN_FUNCTIONAL_ASSESSMENT: NONE - DENIES PAIN

## 2025-01-28 PROBLEM — R62.7 FTT (FAILURE TO THRIVE) IN ADULT: Status: ACTIVE | Noted: 2025-01-28

## 2025-01-28 LAB
25(OH)D3 SERPL-MCNC: 57.2 NG/ML (ref 30–100)
ANION GAP SERPL CALC-SCNC: 4 MMOL/L (ref 2–12)
APPEARANCE UR: ABNORMAL
APPEARANCE UR: ABNORMAL
B PERT DNA SPEC QL NAA+PROBE: NOT DETECTED
BACTERIA URNS QL MICRO: ABNORMAL /HPF
BACTERIA URNS QL MICRO: ABNORMAL /HPF
BASOPHILS # BLD: 0.01 K/UL (ref 0–0.1)
BASOPHILS NFR BLD: 0.2 % (ref 0–1)
BILIRUB UR QL: NEGATIVE
BILIRUB UR QL: NEGATIVE
BORDETELLA PARAPERTUSSIS BY PCR: NOT DETECTED
BUN SERPL-MCNC: 99 MG/DL (ref 6–20)
BUN/CREAT SERPL: 51 (ref 12–20)
C PNEUM DNA SPEC QL NAA+PROBE: NOT DETECTED
CALCIUM SERPL-MCNC: 8.9 MG/DL (ref 8.5–10.1)
CHLORIDE SERPL-SCNC: 102 MMOL/L (ref 97–108)
CHOLEST SERPL-MCNC: 99 MG/DL
CO2 SERPL-SCNC: 31 MMOL/L (ref 21–32)
COLOR UR: ABNORMAL
COLOR UR: ABNORMAL
CORTIS SERPL-MCNC: 20.3 UG/DL
CREAT SERPL-MCNC: 1.96 MG/DL (ref 0.7–1.3)
DIFFERENTIAL METHOD BLD: ABNORMAL
EOSINOPHIL # BLD: 0.09 K/UL (ref 0–0.4)
EOSINOPHIL NFR BLD: 1.8 % (ref 0–7)
EPITH CASTS URNS QL MICRO: ABNORMAL /LPF
EPITH CASTS URNS QL MICRO: ABNORMAL /LPF
ERYTHROCYTE [DISTWIDTH] IN BLOOD BY AUTOMATED COUNT: 18.6 % (ref 11.5–14.5)
EST. AVERAGE GLUCOSE BLD GHB EST-MCNC: 117 MG/DL
FLUAV SUBTYP SPEC NAA+PROBE: NOT DETECTED
FLUBV RNA SPEC QL NAA+PROBE: NOT DETECTED
GLUCOSE BLD STRIP.AUTO-MCNC: 103 MG/DL (ref 65–117)
GLUCOSE BLD STRIP.AUTO-MCNC: 122 MG/DL (ref 65–117)
GLUCOSE BLD STRIP.AUTO-MCNC: 124 MG/DL (ref 65–117)
GLUCOSE BLD STRIP.AUTO-MCNC: 132 MG/DL (ref 65–117)
GLUCOSE BLD STRIP.AUTO-MCNC: 88 MG/DL (ref 65–117)
GLUCOSE SERPL-MCNC: 100 MG/DL (ref 65–100)
GLUCOSE UR STRIP.AUTO-MCNC: NEGATIVE MG/DL
GLUCOSE UR STRIP.AUTO-MCNC: NEGATIVE MG/DL
HADV DNA SPEC QL NAA+PROBE: NOT DETECTED
HBA1C MFR BLD: 5.7 % (ref 4–5.6)
HCOV 229E RNA SPEC QL NAA+PROBE: NOT DETECTED
HCOV HKU1 RNA SPEC QL NAA+PROBE: NOT DETECTED
HCOV NL63 RNA SPEC QL NAA+PROBE: NOT DETECTED
HCOV OC43 RNA SPEC QL NAA+PROBE: NOT DETECTED
HCT VFR BLD AUTO: 32.5 % (ref 36.6–50.3)
HDLC SERPL-MCNC: 56 MG/DL
HDLC SERPL: 1.8 (ref 0–5)
HGB BLD-MCNC: 10.1 G/DL (ref 12.1–17)
HGB UR QL STRIP: ABNORMAL
HGB UR QL STRIP: ABNORMAL
HMPV RNA SPEC QL NAA+PROBE: NOT DETECTED
HPIV1 RNA SPEC QL NAA+PROBE: NOT DETECTED
HPIV2 RNA SPEC QL NAA+PROBE: NOT DETECTED
HPIV3 RNA SPEC QL NAA+PROBE: NOT DETECTED
HPIV4 RNA SPEC QL NAA+PROBE: NOT DETECTED
IMM GRANULOCYTES # BLD AUTO: 0.03 K/UL (ref 0–0.04)
IMM GRANULOCYTES NFR BLD AUTO: 0.6 % (ref 0–0.5)
IRON SATN MFR SERPL: 20 % (ref 20–50)
IRON SERPL-MCNC: 43 UG/DL (ref 35–150)
KETONES UR QL STRIP.AUTO: ABNORMAL MG/DL
KETONES UR QL STRIP.AUTO: NEGATIVE MG/DL
LACTATE SERPL-SCNC: 0.9 MMOL/L (ref 0.4–2)
LDLC SERPL CALC-MCNC: 34.2 MG/DL (ref 0–100)
LEUKOCYTE ESTERASE UR QL STRIP.AUTO: ABNORMAL
LEUKOCYTE ESTERASE UR QL STRIP.AUTO: ABNORMAL
LYMPHOCYTES # BLD: 0.57 K/UL (ref 0.8–3.5)
LYMPHOCYTES NFR BLD: 11.2 % (ref 12–49)
M PNEUMO DNA SPEC QL NAA+PROBE: NOT DETECTED
MCH RBC QN AUTO: 30.4 PG (ref 26–34)
MCHC RBC AUTO-ENTMCNC: 31.1 G/DL (ref 30–36.5)
MCV RBC AUTO: 97.9 FL (ref 80–99)
MONOCYTES # BLD: 0.53 K/UL (ref 0–1)
MONOCYTES NFR BLD: 10.4 % (ref 5–13)
MUCOUS THREADS URNS QL MICRO: ABNORMAL /LPF
NEUTS SEG # BLD: 3.87 K/UL (ref 1.8–8)
NEUTS SEG NFR BLD: 75.8 % (ref 32–75)
NITRITE UR QL STRIP.AUTO: NEGATIVE
NITRITE UR QL STRIP.AUTO: NEGATIVE
NRBC # BLD: 0 K/UL (ref 0–0.01)
NRBC BLD-RTO: 0 PER 100 WBC
NT PRO BNP: 1724 PG/ML
PH UR STRIP: 5 (ref 5–8)
PH UR STRIP: 5.5 (ref 5–8)
PLATELET # BLD AUTO: 92 K/UL (ref 150–400)
PMV BLD AUTO: 10.8 FL (ref 8.9–12.9)
POTASSIUM SERPL-SCNC: 3.9 MMOL/L (ref 3.5–5.1)
PROCALCITONIN SERPL-MCNC: 0.07 NG/ML
PROT UR STRIP-MCNC: 30 MG/DL
PROT UR STRIP-MCNC: ABNORMAL MG/DL
RBC # BLD AUTO: 3.32 M/UL (ref 4.1–5.7)
RBC #/AREA URNS HPF: ABNORMAL /HPF (ref 0–5)
RBC #/AREA URNS HPF: ABNORMAL /HPF (ref 0–5)
RBC MORPH BLD: ABNORMAL
RSV RNA SPEC QL NAA+PROBE: NOT DETECTED
RV+EV RNA SPEC QL NAA+PROBE: NOT DETECTED
SARS-COV-2 RNA RESP QL NAA+PROBE: NOT DETECTED
SERVICE CMNT-IMP: ABNORMAL
SERVICE CMNT-IMP: NORMAL
SERVICE CMNT-IMP: NORMAL
SODIUM SERPL-SCNC: 137 MMOL/L (ref 136–145)
SP GR UR REFRACTOMETRY: 1.02
SP GR UR REFRACTOMETRY: 1.02
T4 FREE SERPL-MCNC: 1.8 NG/DL (ref 0.8–1.5)
TIBC SERPL-MCNC: 216 UG/DL (ref 250–450)
TRIGL SERPL-MCNC: 44 MG/DL
TSH SERPL DL<=0.05 MIU/L-ACNC: 0.18 UIU/ML (ref 0.36–3.74)
TSH SERPL DL<=0.05 MIU/L-ACNC: 0.2 UIU/ML (ref 0.36–3.74)
URINE CULTURE IF INDICATED: ABNORMAL
URINE CULTURE IF INDICATED: ABNORMAL
UROBILINOGEN UR QL STRIP.AUTO: 0.2 EU/DL (ref 0.2–1)
UROBILINOGEN UR QL STRIP.AUTO: 0.2 EU/DL (ref 0.2–1)
VLDLC SERPL CALC-MCNC: 8.8 MG/DL
WBC # BLD AUTO: 5.1 K/UL (ref 4.1–11.1)
WBC URNS QL MICRO: >100 /HPF (ref 0–4)
WBC URNS QL MICRO: ABNORMAL /HPF (ref 0–4)

## 2025-01-28 PROCEDURE — 6370000000 HC RX 637 (ALT 250 FOR IP): Performed by: NURSE PRACTITIONER

## 2025-01-28 PROCEDURE — 81001 URINALYSIS AUTO W/SCOPE: CPT

## 2025-01-28 PROCEDURE — 87086 URINE CULTURE/COLONY COUNT: CPT

## 2025-01-28 PROCEDURE — 6360000002 HC RX W HCPCS: Performed by: NURSE PRACTITIONER

## 2025-01-28 PROCEDURE — 82533 TOTAL CORTISOL: CPT

## 2025-01-28 PROCEDURE — 2060000000 HC ICU INTERMEDIATE R&B

## 2025-01-28 PROCEDURE — 87186 SC STD MICRODIL/AGAR DIL: CPT

## 2025-01-28 PROCEDURE — 87899 AGENT NOS ASSAY W/OPTIC: CPT

## 2025-01-28 PROCEDURE — 87070 CULTURE OTHR SPECIMN AEROBIC: CPT

## 2025-01-28 PROCEDURE — 83036 HEMOGLOBIN GLYCOSYLATED A1C: CPT

## 2025-01-28 PROCEDURE — 6370000000 HC RX 637 (ALT 250 FOR IP)

## 2025-01-28 PROCEDURE — 87449 NOS EACH ORGANISM AG IA: CPT

## 2025-01-28 PROCEDURE — 85025 COMPLETE CBC W/AUTO DIFF WBC: CPT

## 2025-01-28 PROCEDURE — 84443 ASSAY THYROID STIM HORMONE: CPT

## 2025-01-28 PROCEDURE — 0202U NFCT DS 22 TRGT SARS-COV-2: CPT

## 2025-01-28 PROCEDURE — 80061 LIPID PANEL: CPT

## 2025-01-28 PROCEDURE — 6360000002 HC RX W HCPCS: Performed by: STUDENT IN AN ORGANIZED HEALTH CARE EDUCATION/TRAINING PROGRAM

## 2025-01-28 PROCEDURE — 83540 ASSAY OF IRON: CPT

## 2025-01-28 PROCEDURE — 82962 GLUCOSE BLOOD TEST: CPT

## 2025-01-28 PROCEDURE — 87205 SMEAR GRAM STAIN: CPT

## 2025-01-28 PROCEDURE — 82306 VITAMIN D 25 HYDROXY: CPT

## 2025-01-28 PROCEDURE — 83605 ASSAY OF LACTIC ACID: CPT

## 2025-01-28 PROCEDURE — 87077 CULTURE AEROBIC IDENTIFY: CPT

## 2025-01-28 PROCEDURE — 82607 VITAMIN B-12: CPT

## 2025-01-28 PROCEDURE — 84439 ASSAY OF FREE THYROXINE: CPT

## 2025-01-28 PROCEDURE — 83550 IRON BINDING TEST: CPT

## 2025-01-28 PROCEDURE — 80048 BASIC METABOLIC PNL TOTAL CA: CPT

## 2025-01-28 PROCEDURE — 82728 ASSAY OF FERRITIN: CPT

## 2025-01-28 PROCEDURE — 2500000003 HC RX 250 WO HCPCS: Performed by: NURSE PRACTITIONER

## 2025-01-28 PROCEDURE — 99221 1ST HOSP IP/OBS SF/LOW 40: CPT

## 2025-01-28 PROCEDURE — 82668 ASSAY OF ERYTHROPOIETIN: CPT

## 2025-01-28 PROCEDURE — 84145 PROCALCITONIN (PCT): CPT

## 2025-01-28 PROCEDURE — 2500000003 HC RX 250 WO HCPCS: Performed by: STUDENT IN AN ORGANIZED HEALTH CARE EDUCATION/TRAINING PROGRAM

## 2025-01-28 PROCEDURE — 36415 COLL VENOUS BLD VENIPUNCTURE: CPT

## 2025-01-28 PROCEDURE — 87040 BLOOD CULTURE FOR BACTERIA: CPT

## 2025-01-28 PROCEDURE — 87088 URINE BACTERIA CULTURE: CPT

## 2025-01-28 PROCEDURE — 82746 ASSAY OF FOLIC ACID SERUM: CPT

## 2025-01-28 RX ORDER — INSULIN GLARGINE 100 [IU]/ML
5 INJECTION, SOLUTION SUBCUTANEOUS NIGHTLY
Status: DISCONTINUED | OUTPATIENT
Start: 2025-01-28 | End: 2025-01-30

## 2025-01-28 RX ADMIN — METOPROLOL SUCCINATE 25 MG: 25 TABLET, EXTENDED RELEASE ORAL at 10:08

## 2025-01-28 RX ADMIN — LEVOTHYROXINE SODIUM 175 MCG: 0.12 TABLET ORAL at 10:08

## 2025-01-28 RX ADMIN — ALLOPURINOL 300 MG: 100 TABLET ORAL at 21:04

## 2025-01-28 RX ADMIN — SODIUM CHLORIDE, PRESERVATIVE FREE 10 ML: 5 INJECTION INTRAVENOUS at 21:06

## 2025-01-28 RX ADMIN — INSULIN GLARGINE 12 UNITS: 100 INJECTION, SOLUTION SUBCUTANEOUS at 02:09

## 2025-01-28 RX ADMIN — PANTOPRAZOLE SODIUM 40 MG: 40 TABLET, DELAYED RELEASE ORAL at 06:36

## 2025-01-28 RX ADMIN — ATORVASTATIN CALCIUM 20 MG: 20 TABLET, FILM COATED ORAL at 00:46

## 2025-01-28 RX ADMIN — ALLOPURINOL 300 MG: 100 TABLET ORAL at 00:46

## 2025-01-28 RX ADMIN — ATORVASTATIN CALCIUM 20 MG: 20 TABLET, FILM COATED ORAL at 21:04

## 2025-01-28 RX ADMIN — WATER 1000 MG: 1 INJECTION INTRAMUSCULAR; INTRAVENOUS; SUBCUTANEOUS at 16:40

## 2025-01-28 RX ADMIN — PANTOPRAZOLE SODIUM 40 MG: 40 TABLET, DELAYED RELEASE ORAL at 16:40

## 2025-01-28 RX ADMIN — FUROSEMIDE 20 MG: 10 INJECTION, SOLUTION INTRAMUSCULAR; INTRAVENOUS at 00:46

## 2025-01-28 RX ADMIN — INSULIN GLARGINE 5 UNITS: 100 INJECTION, SOLUTION SUBCUTANEOUS at 21:04

## 2025-01-28 RX ADMIN — ENOXAPARIN SODIUM 30 MG: 100 INJECTION SUBCUTANEOUS at 10:08

## 2025-01-28 RX ADMIN — ISOSORBIDE MONONITRATE 60 MG: 30 TABLET, EXTENDED RELEASE ORAL at 10:08

## 2025-01-28 RX ADMIN — FINASTERIDE 5 MG: 5 TABLET, FILM COATED ORAL at 10:09

## 2025-01-28 NOTE — ED NOTES
Report given to MIN Gonsalves. Nurse was informed of reason for arrival, vitals, labs, medications, orders, procedures, results, anything left pending and current plan of action. Questions were asked and received prior to departure from the patient.

## 2025-01-28 NOTE — H&P
Hospitalist Admission Note    NAME:   Maryan Resendiz   : 1935   MRN: [id number]     Date/Time: 2025 10:55 PM    Patient PCP: Prashanth Livingston MD    ______________________________________________________________________  Given the patient's current clinical presentation, I have a high level of concern for decompensation if discharged from the emergency department.  Complex decision making was performed, which includes reviewing the patient's available past medical records, laboratory results, and x-ray films.       My assessment of this patient's clinical condition and my plan of care is as follows.    Assessment / Plan:    Weakness   SOB with and without exertion   Uncontrollable FSBS   Confusion   Dysphagia   Poor po intake   Adult failure to thrive   Epistaxis     Recently discharged from [facility] approx 6 ago     Admit to medicine     EKG -Bi V paced 70's  CT Head- IMPRESSION: No acute process  CTA Chest IMPRESSION:   No pulmonary embolism  Cardiomegaly with coronary artery disease  Small bilateral pleural transudates with bibasilar subsegmental atelectasis  CXR IMPRESSION:  Small bilateral pleural effusions and bilateral lower lobe  atelectasis again identified.  - lasix 20 mg IV x 1  - COVID influenza A/B negative   - chemistry unremarkable  - hematology unremarkable  - ck anemia labs hx of thrombocytopenia  -followed by Dr. Leblanc  - pending UA   -ck vit D , vit B 12, TSH   - ck pro BNP  - consult PT OT   - oxygen protocol/ neb prn cough SOB   - xopenex prn   - consult dietitian /poor po intake   - consult case management - lives alone recent d/c from [facility]       HTN  HLD  BPH  CKD   Dilated Cardiomyopathy   Pacer   Hx Afib - stopped AC 2/2 low plt and epistaxis   -vitals per routine   -telemetry   - hold Norvasc 2/2 soft BP  - hold cardura 2/2 soft BP  - resume Imdur   - resume metoprolol   -ck Pro BNP   - resume proscar   - CKD - admission 2.11 baseline around

## 2025-01-28 NOTE — ED NOTES
Report received from MIN Flood. Reviewed reason for patient arrival, vitals, labs, medications, orders, procedures, results, pending orders/results and current plan for disposition. Questions were asked and answered prior to departure.

## 2025-01-28 NOTE — ED PROVIDER NOTES
exacerbation of chronic conditions)  - Likely Admission as family is unable to care for him    Worsening dyspnea  Patient reports progressively worsening shortness of breath, initially with activity but now occurring at rest. This symptom has become more pronounced over the last 48 hours. Given the patient's history of diastolic heart failure and recent COVID-19 infection, multiple etiologies need to be considered, including CHF exacerbation, post-COVID complications, or pulmonary embolism. Low concern for ACS given lack of CP or EKG changes, Trop pending for NSTEMI evaluation.   - Obtain chest X-ray  - Order CTA of chest to evaluate for pulmonary embolism and assess lung parenchyma  - CBC, CMP, Trop, EKG    ADDITIONAL CONSIDERATIONS:  None  Procedures/Critical Care     Procedures    ED FINAL IMPRESSION     1. Generalized weakness    2. Dyspnea, unspecified type        DISPOSITION/PLAN     DISPOSITION Admitted 01/27/2025 11:38:20 PM            Admit Note: Pt is being admitted by Hospitalist . The results of their tests and reason(s) for their admission have been discussed with pt and/or available family. They convey agreement and understanding for the need to be admitted and for the admission diagnosis.    PATIENT REFERRED TO:    No follow-up provider specified.    DISCHARGE MEDICATIONS:       Medication List        CONTINUE taking these medications      B-D ULTRAFINE III SHORT PEN 31G X 8 MM Misc  Generic drug: Insulin Pen Needle  USE TO INJECT INSULIN UNDER THE SKIN 4 TIMES DAILY AS DIRECTED            ASK your doctor about these medications      acetaminophen 325 MG tablet  Commonly known as: TYLENOL     allopurinol 300 MG tablet  Commonly known as: ZYLOPRIM  Take 1 tablet by mouth daily     amLODIPine 10 MG tablet  Commonly known as: NORVASC     doxazosin 4 MG tablet  Commonly known as: CARDURA     empagliflozin 10 MG tablet  Commonly known as: JARDIANCE  Take 1 tablet by mouth daily     finasteride 5 MG

## 2025-01-29 LAB
ANION GAP SERPL CALC-SCNC: 5 MMOL/L (ref 2–12)
BASOPHILS # BLD: 0.03 K/UL (ref 0–0.1)
BASOPHILS NFR BLD: 0.4 % (ref 0–1)
BUN SERPL-MCNC: 103 MG/DL (ref 6–20)
BUN/CREAT SERPL: 44 (ref 12–20)
CALCIUM SERPL-MCNC: 8.7 MG/DL (ref 8.5–10.1)
CHLORIDE SERPL-SCNC: 104 MMOL/L (ref 97–108)
CO2 SERPL-SCNC: 28 MMOL/L (ref 21–32)
CORTIS AM PEAK SERPL-MCNC: 19.2 UG/DL (ref 4.3–22.45)
CORTIS PM SERPL-MCNC: 20.2 UG/DL (ref 3.44–16.7)
CREAT SERPL-MCNC: 2.36 MG/DL (ref 0.7–1.3)
DIFFERENTIAL METHOD BLD: ABNORMAL
EOSINOPHIL # BLD: 0.08 K/UL (ref 0–0.4)
EOSINOPHIL NFR BLD: 1.1 % (ref 0–7)
EPO SERPL-ACNC: 97.2 MIU/ML (ref 2.6–18.5)
ERYTHROCYTE [DISTWIDTH] IN BLOOD BY AUTOMATED COUNT: 18.9 % (ref 11.5–14.5)
GLUCOSE BLD STRIP.AUTO-MCNC: 127 MG/DL (ref 65–117)
GLUCOSE BLD STRIP.AUTO-MCNC: 179 MG/DL (ref 65–117)
GLUCOSE BLD STRIP.AUTO-MCNC: 54 MG/DL (ref 65–117)
GLUCOSE BLD STRIP.AUTO-MCNC: 54 MG/DL (ref 65–117)
GLUCOSE BLD STRIP.AUTO-MCNC: 62 MG/DL (ref 65–117)
GLUCOSE BLD STRIP.AUTO-MCNC: 84 MG/DL (ref 65–117)
GLUCOSE BLD STRIP.AUTO-MCNC: 91 MG/DL (ref 65–117)
GLUCOSE SERPL-MCNC: 111 MG/DL (ref 65–100)
HCT VFR BLD AUTO: 33.9 % (ref 36.6–50.3)
HGB BLD-MCNC: 10.3 G/DL (ref 12.1–17)
IMM GRANULOCYTES # BLD AUTO: 0.04 K/UL (ref 0–0.04)
IMM GRANULOCYTES NFR BLD AUTO: 0.5 % (ref 0–0.5)
LYMPHOCYTES # BLD: 0.64 K/UL (ref 0.8–3.5)
LYMPHOCYTES NFR BLD: 8.6 % (ref 12–49)
MCH RBC QN AUTO: 31 PG (ref 26–34)
MCHC RBC AUTO-ENTMCNC: 30.4 G/DL (ref 30–36.5)
MCV RBC AUTO: 102.1 FL (ref 80–99)
MONOCYTES # BLD: 0.78 K/UL (ref 0–1)
MONOCYTES NFR BLD: 10.5 % (ref 5–13)
NEUTS SEG # BLD: 5.88 K/UL (ref 1.8–8)
NEUTS SEG NFR BLD: 78.9 % (ref 32–75)
NRBC # BLD: 0.03 K/UL (ref 0–0.01)
NRBC BLD-RTO: 0.4 PER 100 WBC
PLATELET # BLD AUTO: 101 K/UL (ref 150–400)
PMV BLD AUTO: 11.1 FL (ref 8.9–12.9)
POTASSIUM SERPL-SCNC: 4.3 MMOL/L (ref 3.5–5.1)
RBC # BLD AUTO: 3.32 M/UL (ref 4.1–5.7)
SERVICE CMNT-IMP: ABNORMAL
SERVICE CMNT-IMP: NORMAL
SERVICE CMNT-IMP: NORMAL
SODIUM SERPL-SCNC: 137 MMOL/L (ref 136–145)
WBC # BLD AUTO: 7.5 K/UL (ref 4.1–11.1)

## 2025-01-29 PROCEDURE — 80048 BASIC METABOLIC PNL TOTAL CA: CPT

## 2025-01-29 PROCEDURE — 6360000002 HC RX W HCPCS: Performed by: NURSE PRACTITIONER

## 2025-01-29 PROCEDURE — 2500000003 HC RX 250 WO HCPCS: Performed by: NURSE PRACTITIONER

## 2025-01-29 PROCEDURE — 85025 COMPLETE CBC W/AUTO DIFF WBC: CPT

## 2025-01-29 PROCEDURE — 93005 ELECTROCARDIOGRAM TRACING: CPT | Performed by: NURSE PRACTITIONER

## 2025-01-29 PROCEDURE — 6360000002 HC RX W HCPCS: Performed by: STUDENT IN AN ORGANIZED HEALTH CARE EDUCATION/TRAINING PROGRAM

## 2025-01-29 PROCEDURE — 82533 TOTAL CORTISOL: CPT

## 2025-01-29 PROCEDURE — 99231 SBSQ HOSP IP/OBS SF/LOW 25: CPT

## 2025-01-29 PROCEDURE — 2060000000 HC ICU INTERMEDIATE R&B

## 2025-01-29 PROCEDURE — 82962 GLUCOSE BLOOD TEST: CPT

## 2025-01-29 PROCEDURE — 6370000000 HC RX 637 (ALT 250 FOR IP): Performed by: NURSE PRACTITIONER

## 2025-01-29 PROCEDURE — 2500000003 HC RX 250 WO HCPCS: Performed by: STUDENT IN AN ORGANIZED HEALTH CARE EDUCATION/TRAINING PROGRAM

## 2025-01-29 PROCEDURE — 36415 COLL VENOUS BLD VENIPUNCTURE: CPT

## 2025-01-29 RX ADMIN — PANTOPRAZOLE SODIUM 40 MG: 40 TABLET, DELAYED RELEASE ORAL at 06:24

## 2025-01-29 RX ADMIN — WATER 1000 MG: 1 INJECTION INTRAMUSCULAR; INTRAVENOUS; SUBCUTANEOUS at 17:04

## 2025-01-29 RX ADMIN — METOPROLOL SUCCINATE 25 MG: 25 TABLET, EXTENDED RELEASE ORAL at 09:11

## 2025-01-29 RX ADMIN — PANTOPRAZOLE SODIUM 40 MG: 40 TABLET, DELAYED RELEASE ORAL at 17:04

## 2025-01-29 RX ADMIN — ATORVASTATIN CALCIUM 20 MG: 20 TABLET, FILM COATED ORAL at 20:47

## 2025-01-29 RX ADMIN — SODIUM CHLORIDE, PRESERVATIVE FREE 10 ML: 5 INJECTION INTRAVENOUS at 09:12

## 2025-01-29 RX ADMIN — ENOXAPARIN SODIUM 30 MG: 100 INJECTION SUBCUTANEOUS at 09:11

## 2025-01-29 RX ADMIN — ISOSORBIDE MONONITRATE 60 MG: 30 TABLET, EXTENDED RELEASE ORAL at 09:11

## 2025-01-29 RX ADMIN — LEVOTHYROXINE SODIUM 175 MCG: 0.12 TABLET ORAL at 09:11

## 2025-01-29 RX ADMIN — ALLOPURINOL 300 MG: 100 TABLET ORAL at 20:46

## 2025-01-29 RX ADMIN — SODIUM CHLORIDE, PRESERVATIVE FREE 10 ML: 5 INJECTION INTRAVENOUS at 20:49

## 2025-01-29 NOTE — WOUND CARE
Wound care consult for the buttocks skin issue that was present on admission.  Chart reviewed and patient assessed.   Past Medical History:   Diagnosis Date    Acquired hypothyroidism 09/12/2017    Anemia 02/01/2015    BPH with obstruction/lower urinary tract symptoms 10/24/2017    CAD (coronary artery disease)     Chronic atrial fibrillation (HCC) 02/01/2015    Chronic systolic heart failure (HCC)     CKD (chronic kidney disease) stage 3, GFR 30-59 ml/min (HCC) 02/01/2015    COVID-19 01/2025    Diabetes ([facility])     Diverticulosis of large intestine without hemorrhage 10/24/2017    Former smoker     Gout 10/24/2017    History of echocardiogram 02/2018    LVEF 40-45%, global HK, mild to mod LAE, no AS, mild MR, RVSP 26 mmHg    Hyperlipidemia 02/01/2015    Hypertension     Long term current use of anticoagulant 10/24/2017    Long-term use of high-risk medication 10/24/2017    Macular degeneration 10/24/2017    Microalbuminuria     Peripheral vascular disease (HCC) 10/24/2017    Primary osteoarthritis involving multiple joints     knees and back    Renal artery stenosis (HCC) 10/24/2017    Right-sided extracranial carotid artery stenosis 10/24/2017    Sick sinus syndrome ([facility]) 10/24/2017    Status post pacemaker     Past Surgical History:   Procedure Laterality Date    ABDOMINAL AORTIC ANEURYSM REPAIR      APPENDECTOMY      CATARACT REMOVAL      / lens implant    CHOLECYSTECTOMY      LAPAROTOMY N/A 12/5/2023    EXPLORATION ABDOMINAL WALL HEMATOMA/  performed by Michell Zapata Jr., MD at [facility] MAIN OR    LAPAROTOMY N/A 12/6/2023    EXPLORATION AND EVACUATION OF INFECTED ABDOMINAL WALL HEMATOMA AND EXPLANTATION OF INFECTED MESH, WASH OUT performed by Michell Zapata Jr., MD at [facility] MAIN OR    ORTHOPEDIC SURGERY Right     rt unicondular knee replacement    ORTHOPEDIC SURGERY  1/26/15    LEFT UNICONDYLAR KNEE ARTHROPLASTY    PACEMAKER  7/11    pacemaker    TOE AMPUTATION Right 12/8/2024    RIGHT FIRST TOE

## 2025-01-29 NOTE — CARE COORDINATION
Care Management Initial Assessment       RUR:24%  Readmission? No  1st IM letter given? Yes -     1st  letter given: No     01/29/25 0956   Service Assessment   Patient Orientation Other (see comment)   Cognition Other (see comment)   History Provided By Child/Family   Primary Caregiver Self   Accompanied By/Relationship none   Support Systems Children   Patient's Healthcare Decision Maker is: Legal Next of Kin   PCP Verified by CM Yes   Last Visit to PCP Within last 3 months   Prior Functional Level Assistance with the following:   Current Functional Level Assistance with the following:;Bathing;Dressing;Toileting;Cooking;Housework;Shopping;Mobility   Can patient return to prior living arrangement Unknown at present   Ability to make needs known: Other (see comment)  (patient sleeping)   Family able to assist with home care needs: Other (comment)  (he lives alone)   Would you like for me to discuss the discharge plan with any other family members/significant others, and if so, who? Yes  (son)   Financial Resources Medicare   Social/Functional History   Lives With Alone   Type of Home House   Bathroom Toilet Standard   Bathroom Accessibility Accessible   Home Equipment Cane;Walker - Standard;Wheelchair - Manual   Receives Help From Family   Prior Level of Assist for ADLs Needs assistance   Prior Level of Assist for Homemaking Needs assistance   Ambulation Assistance Needs assistance   Occupation Retired   Discharge Planning   Type of Residence House   Potential Assistance Needed Home Care;Skilled Nursing Facility   Patient expects to be discharged to: Unknown   Services At/After Discharge   [place] Resource Information Provided? No     CM attempted to speak with patient but he was sleeping. I called and spoke with patient's son August and was able to answer questions. Patient lives alone and has needed increased assistance recently. [facility] admission to [facility] and also to [facility].  He has not had HH

## 2025-01-29 NOTE — CARE COORDINATION
Advance Care Planning     General Advance Care Planning (ACP) Conversation    Date of Conversation: 1/29/2025  Conducted with: Patient with Decision Making Capacity  Other persons present: None    Healthcare Decision Maker:   Primary Decision Maker: August Resendiz - [facility] [place] - [phone]    Primary Decision Maker: Alicja Frankel       Content/Action Overview:  Has ACP document(s) on file - reflects the patient's care preferences  Reviewed DNR/DNI and patient elects Full Code (Attempt Resuscitation)        Length of Voluntary ACP Conversation in minutes:  <16 minutes (Non-Billable)    ENGLISH H HOSAY

## 2025-01-29 NOTE — CARE COORDINATION
01/29/25 1227   Readmission Assessment   Number of Days since last admission? 8-30 days   Previous Disposition Home Alone   Who is being Interviewed Caregiver   What was the patient's/caregiver's perception as to why they think they needed to return back to the hospital? Other (Comment)   Did you visit your Primary Care Physician after you left the hospital, before you returned this time? Yes   Did you see a specialist, such as Cardiac, Pulmonary, Orthopedic Physician, etc. after you left the hospital? No   Who advised the patient to return to the hospital? Self-referral   Does the patient report anything that got in the way of taking their medications? No  (interviewed son)   In our efforts to provide the best possible care to you and others like you, can you think of anything that we could have done to help you after you left the hospital the first time, so that you might not have needed to return so soon? Other (Comment)     TIMI will continue to follow for DC planning. He has had recent admissions to SNF and IPR since November 2024.  English Jimmy COPELAND CM   [phone]

## 2025-01-30 ENCOUNTER — APPOINTMENT (OUTPATIENT)
Facility: HOSPITAL | Age: 89
DRG: 689 | End: 2025-01-30
Payer: MEDICARE

## 2025-01-30 LAB
ANION GAP SERPL CALC-SCNC: 7 MMOL/L (ref 2–12)
BASOPHILS # BLD: 0.02 K/UL (ref 0–0.1)
BASOPHILS NFR BLD: 0.3 % (ref 0–1)
BUN SERPL-MCNC: 109 MG/DL (ref 6–20)
BUN/CREAT SERPL: 44 (ref 12–20)
CALCIUM SERPL-MCNC: 9 MG/DL (ref 8.5–10.1)
CHLORIDE SERPL-SCNC: 104 MMOL/L (ref 97–108)
CO2 SERPL-SCNC: 27 MMOL/L (ref 21–32)
CREAT SERPL-MCNC: 2.48 MG/DL (ref 0.7–1.3)
DIFFERENTIAL METHOD BLD: ABNORMAL
EOSINOPHIL # BLD: 0.11 K/UL (ref 0–0.4)
EOSINOPHIL NFR BLD: 1.5 % (ref 0–7)
ERYTHROCYTE [DISTWIDTH] IN BLOOD BY AUTOMATED COUNT: 19 % (ref 11.5–14.5)
FERRITIN SERPL-MCNC: 950 NG/ML (ref 26–388)
FLUID CULTURE: NORMAL
FOLATE SERPL-MCNC: 19.5 NG/ML (ref 5–21)
GLUCOSE BLD STRIP.AUTO-MCNC: 117 MG/DL (ref 65–117)
GLUCOSE BLD STRIP.AUTO-MCNC: 152 MG/DL (ref 65–117)
GLUCOSE BLD STRIP.AUTO-MCNC: 190 MG/DL (ref 65–117)
GLUCOSE BLD STRIP.AUTO-MCNC: 262 MG/DL (ref 65–117)
GLUCOSE BLD STRIP.AUTO-MCNC: 392 MG/DL (ref 65–117)
GLUCOSE SERPL-MCNC: 123 MG/DL (ref 65–100)
HCT VFR BLD AUTO: 33.7 % (ref 36.6–50.3)
HGB BLD-MCNC: 10.3 G/DL (ref 12.1–17)
IMM GRANULOCYTES # BLD AUTO: 0.05 K/UL (ref 0–0.04)
IMM GRANULOCYTES NFR BLD AUTO: 0.7 % (ref 0–0.5)
L PNEUMO1 AG UR QL IA: NEGATIVE
LYMPHOCYTES # BLD: 0.93 K/UL (ref 0.8–3.5)
LYMPHOCYTES NFR BLD: 12.5 % (ref 12–49)
Lab: NORMAL
MCH RBC QN AUTO: 30.7 PG (ref 26–34)
MCHC RBC AUTO-ENTMCNC: 30.6 G/DL (ref 30–36.5)
MCV RBC AUTO: 100.3 FL (ref 80–99)
MONOCYTES # BLD: 0.95 K/UL (ref 0–1)
MONOCYTES NFR BLD: 12.8 % (ref 5–13)
NEUTS SEG # BLD: 5.37 K/UL (ref 1.8–8)
NEUTS SEG NFR BLD: 72.2 % (ref 32–75)
NRBC # BLD: 0 K/UL (ref 0–0.01)
NRBC BLD-RTO: 0 PER 100 WBC
ORGANISM ID: NORMAL
PLATELET # BLD AUTO: 105 K/UL (ref 150–400)
PMV BLD AUTO: 11.3 FL (ref 8.9–12.9)
POTASSIUM SERPL-SCNC: 4 MMOL/L (ref 3.5–5.1)
RBC # BLD AUTO: 3.36 M/UL (ref 4.1–5.7)
S PNEUM AG SPEC QL LA: NEGATIVE
SERVICE CMNT-IMP: ABNORMAL
SERVICE CMNT-IMP: NORMAL
SODIUM SERPL-SCNC: 138 MMOL/L (ref 136–145)
SPECIMEN SOURCE: NORMAL
SPECIMEN SOURCE: NORMAL
SPECIMEN: NORMAL
VIT B12 SERPL-MCNC: >2000 PG/ML (ref 193–986)
WBC # BLD AUTO: 7.4 K/UL (ref 4.1–11.1)

## 2025-01-30 PROCEDURE — 6370000000 HC RX 637 (ALT 250 FOR IP): Performed by: STUDENT IN AN ORGANIZED HEALTH CARE EDUCATION/TRAINING PROGRAM

## 2025-01-30 PROCEDURE — 80048 BASIC METABOLIC PNL TOTAL CA: CPT

## 2025-01-30 PROCEDURE — 2500000003 HC RX 250 WO HCPCS: Performed by: STUDENT IN AN ORGANIZED HEALTH CARE EDUCATION/TRAINING PROGRAM

## 2025-01-30 PROCEDURE — 97161 PT EVAL LOW COMPLEX 20 MIN: CPT | Performed by: PHYSICAL THERAPIST

## 2025-01-30 PROCEDURE — 2060000000 HC ICU INTERMEDIATE R&B

## 2025-01-30 PROCEDURE — 97116 GAIT TRAINING THERAPY: CPT | Performed by: PHYSICAL THERAPIST

## 2025-01-30 PROCEDURE — 2500000003 HC RX 250 WO HCPCS: Performed by: NURSE PRACTITIONER

## 2025-01-30 PROCEDURE — 76770 US EXAM ABDO BACK WALL COMP: CPT

## 2025-01-30 PROCEDURE — 97535 SELF CARE MNGMENT TRAINING: CPT

## 2025-01-30 PROCEDURE — 6360000002 HC RX W HCPCS: Performed by: NURSE PRACTITIONER

## 2025-01-30 PROCEDURE — 97165 OT EVAL LOW COMPLEX 30 MIN: CPT

## 2025-01-30 PROCEDURE — 71045 X-RAY EXAM CHEST 1 VIEW: CPT

## 2025-01-30 PROCEDURE — 82962 GLUCOSE BLOOD TEST: CPT

## 2025-01-30 PROCEDURE — 6360000002 HC RX W HCPCS: Performed by: STUDENT IN AN ORGANIZED HEALTH CARE EDUCATION/TRAINING PROGRAM

## 2025-01-30 PROCEDURE — 36415 COLL VENOUS BLD VENIPUNCTURE: CPT

## 2025-01-30 PROCEDURE — 6370000000 HC RX 637 (ALT 250 FOR IP): Performed by: NURSE PRACTITIONER

## 2025-01-30 PROCEDURE — 99231 SBSQ HOSP IP/OBS SF/LOW 25: CPT

## 2025-01-30 PROCEDURE — 2700000000 HC OXYGEN THERAPY PER DAY

## 2025-01-30 PROCEDURE — 85025 COMPLETE CBC W/AUTO DIFF WBC: CPT

## 2025-01-30 RX ORDER — AMLODIPINE BESYLATE 5 MG/1
10 TABLET ORAL NIGHTLY
Status: DISCONTINUED | OUTPATIENT
Start: 2025-01-30 | End: 2025-02-05

## 2025-01-30 RX ADMIN — METOPROLOL SUCCINATE 25 MG: 25 TABLET, EXTENDED RELEASE ORAL at 09:28

## 2025-01-30 RX ADMIN — SODIUM CHLORIDE, PRESERVATIVE FREE 10 ML: 5 INJECTION INTRAVENOUS at 09:29

## 2025-01-30 RX ADMIN — SODIUM CHLORIDE, PRESERVATIVE FREE 10 ML: 5 INJECTION INTRAVENOUS at 22:09

## 2025-01-30 RX ADMIN — ATORVASTATIN CALCIUM 20 MG: 20 TABLET, FILM COATED ORAL at 22:02

## 2025-01-30 RX ADMIN — FINASTERIDE 5 MG: 5 TABLET, FILM COATED ORAL at 09:28

## 2025-01-30 RX ADMIN — PANTOPRAZOLE SODIUM 40 MG: 40 TABLET, DELAYED RELEASE ORAL at 18:33

## 2025-01-30 RX ADMIN — LEVOTHYROXINE SODIUM 175 MCG: 0.12 TABLET ORAL at 09:28

## 2025-01-30 RX ADMIN — ENOXAPARIN SODIUM 30 MG: 100 INJECTION SUBCUTANEOUS at 09:29

## 2025-01-30 RX ADMIN — WATER 1000 MG: 1 INJECTION INTRAMUSCULAR; INTRAVENOUS; SUBCUTANEOUS at 18:33

## 2025-01-30 RX ADMIN — AMLODIPINE BESYLATE 10 MG: 5 TABLET ORAL at 22:02

## 2025-01-30 RX ADMIN — ISOSORBIDE MONONITRATE 60 MG: 30 TABLET, EXTENDED RELEASE ORAL at 09:29

## 2025-01-30 RX ADMIN — INSULIN LISPRO 8 UNITS: 100 INJECTION, SOLUTION INTRAVENOUS; SUBCUTANEOUS at 22:28

## 2025-01-30 RX ADMIN — ALLOPURINOL 300 MG: 100 TABLET ORAL at 22:02

## 2025-01-30 RX ADMIN — PANTOPRAZOLE SODIUM 40 MG: 40 TABLET, DELAYED RELEASE ORAL at 05:44

## 2025-01-30 NOTE — CARE COORDINATION
CM Note: Placed referrals to IRP [facility] - Dr. Katz agreed with plan for IPR  English Jimmy COPELAND CM   [phone]

## 2025-01-31 ENCOUNTER — APPOINTMENT (OUTPATIENT)
Facility: HOSPITAL | Age: 89
DRG: 689 | End: 2025-01-31
Payer: MEDICARE

## 2025-01-31 ENCOUNTER — HOSPITAL ENCOUNTER (OUTPATIENT)
Facility: HOSPITAL | Age: 89
Setting detail: INFUSION SERIES
End: 2025-01-31

## 2025-01-31 ENCOUNTER — APPOINTMENT (OUTPATIENT)
Facility: HOSPITAL | Age: 89
DRG: 689 | End: 2025-01-31
Attending: STUDENT IN AN ORGANIZED HEALTH CARE EDUCATION/TRAINING PROGRAM
Payer: MEDICARE

## 2025-01-31 LAB
ANION GAP SERPL CALC-SCNC: 6 MMOL/L (ref 2–12)
BACTERIA SPEC CULT: ABNORMAL
BUN SERPL-MCNC: 111 MG/DL (ref 6–20)
BUN/CREAT SERPL: 41 (ref 12–20)
CALCIUM SERPL-MCNC: 9.3 MG/DL (ref 8.5–10.1)
CC UR VC: ABNORMAL
CHLORIDE SERPL-SCNC: 102 MMOL/L (ref 97–108)
CO2 SERPL-SCNC: 29 MMOL/L (ref 21–32)
COMMENT:: NORMAL
CREAT SERPL-MCNC: 2.73 MG/DL (ref 0.7–1.3)
EKG ATRIAL RATE: 113 BPM
EKG ATRIAL RATE: 249 BPM
EKG DIAGNOSIS: NORMAL
EKG DIAGNOSIS: NORMAL
EKG Q-T INTERVAL: 448 MS
EKG Q-T INTERVAL: 486 MS
EKG QRS DURATION: 104 MS
EKG QRS DURATION: 150 MS
EKG QTC CALCULATION (BAZETT): 483 MS
EKG QTC CALCULATION (BAZETT): 539 MS
EKG R AXIS: 254 DEGREES
EKG R AXIS: 255 DEGREES
EKG T AXIS: 101 DEGREES
EKG T AXIS: 83 DEGREES
EKG VENTRICULAR RATE: 70 BPM
EKG VENTRICULAR RATE: 74 BPM
GLUCOSE BLD STRIP.AUTO-MCNC: 143 MG/DL (ref 65–117)
GLUCOSE BLD STRIP.AUTO-MCNC: 188 MG/DL (ref 65–117)
GLUCOSE BLD STRIP.AUTO-MCNC: 202 MG/DL (ref 65–117)
GLUCOSE BLD STRIP.AUTO-MCNC: 253 MG/DL (ref 65–117)
GLUCOSE SERPL-MCNC: 220 MG/DL (ref 65–100)
GRAM STN SPEC: ABNORMAL
MAGNESIUM SERPL-MCNC: 2.8 MG/DL (ref 1.6–2.4)
PHOSPHATE SERPL-MCNC: 3.8 MG/DL (ref 2.6–4.7)
POTASSIUM SERPL-SCNC: 4.5 MMOL/L (ref 3.5–5.1)
SERVICE CMNT-IMP: ABNORMAL
SODIUM SERPL-SCNC: 137 MMOL/L (ref 136–145)
SPECIMEN HOLD: NORMAL

## 2025-01-31 PROCEDURE — 6360000002 HC RX W HCPCS: Performed by: STUDENT IN AN ORGANIZED HEALTH CARE EDUCATION/TRAINING PROGRAM

## 2025-01-31 PROCEDURE — 36415 COLL VENOUS BLD VENIPUNCTURE: CPT

## 2025-01-31 PROCEDURE — 99231 SBSQ HOSP IP/OBS SF/LOW 25: CPT

## 2025-01-31 PROCEDURE — 71045 X-RAY EXAM CHEST 1 VIEW: CPT

## 2025-01-31 PROCEDURE — 2500000003 HC RX 250 WO HCPCS: Performed by: NURSE PRACTITIONER

## 2025-01-31 PROCEDURE — 51798 US URINE CAPACITY MEASURE: CPT

## 2025-01-31 PROCEDURE — 84100 ASSAY OF PHOSPHORUS: CPT

## 2025-01-31 PROCEDURE — 2060000000 HC ICU INTERMEDIATE R&B

## 2025-01-31 PROCEDURE — 2580000003 HC RX 258: Performed by: NURSE PRACTITIONER

## 2025-01-31 PROCEDURE — 97535 SELF CARE MNGMENT TRAINING: CPT

## 2025-01-31 PROCEDURE — 6370000000 HC RX 637 (ALT 250 FOR IP): Performed by: NURSE PRACTITIONER

## 2025-01-31 PROCEDURE — 0W9B3ZX DRAINAGE OF LEFT PLEURAL CAVITY, PERCUTANEOUS APPROACH, DIAGNOSTIC: ICD-10-PCS | Performed by: RADIOLOGY

## 2025-01-31 PROCEDURE — 6370000000 HC RX 637 (ALT 250 FOR IP): Performed by: INTERNAL MEDICINE

## 2025-01-31 PROCEDURE — 99223 1ST HOSP IP/OBS HIGH 75: CPT | Performed by: INTERNAL MEDICINE

## 2025-01-31 PROCEDURE — 2580000003 HC RX 258: Performed by: INTERNAL MEDICINE

## 2025-01-31 PROCEDURE — 2709999900 US THORACENTESIS

## 2025-01-31 PROCEDURE — 80048 BASIC METABOLIC PNL TOTAL CA: CPT

## 2025-01-31 PROCEDURE — 6360000002 HC RX W HCPCS: Performed by: INTERNAL MEDICINE

## 2025-01-31 PROCEDURE — 82962 GLUCOSE BLOOD TEST: CPT

## 2025-01-31 PROCEDURE — 6360000002 HC RX W HCPCS: Performed by: NURSE PRACTITIONER

## 2025-01-31 PROCEDURE — 97116 GAIT TRAINING THERAPY: CPT

## 2025-01-31 PROCEDURE — 83735 ASSAY OF MAGNESIUM: CPT

## 2025-01-31 PROCEDURE — 6370000000 HC RX 637 (ALT 250 FOR IP): Performed by: STUDENT IN AN ORGANIZED HEALTH CARE EDUCATION/TRAINING PROGRAM

## 2025-01-31 RX ORDER — DEXTROSE MONOHYDRATE 100 MG/ML
INJECTION, SOLUTION INTRAVENOUS CONTINUOUS PRN
Status: DISCONTINUED | OUTPATIENT
Start: 2025-01-31 | End: 2025-02-09

## 2025-01-31 RX ORDER — GLUCAGON 1 MG/ML
1 KIT INJECTION PRN
Status: DISCONTINUED | OUTPATIENT
Start: 2025-01-31 | End: 2025-02-09

## 2025-01-31 RX ORDER — INSULIN GLARGINE 100 [IU]/ML
6 INJECTION, SOLUTION SUBCUTANEOUS DAILY
Status: DISCONTINUED | OUTPATIENT
Start: 2025-01-31 | End: 2025-02-09

## 2025-01-31 RX ORDER — FUROSEMIDE 10 MG/ML
40 INJECTION INTRAMUSCULAR; INTRAVENOUS ONCE
Status: COMPLETED | OUTPATIENT
Start: 2025-01-31 | End: 2025-01-31

## 2025-01-31 RX ORDER — MUPIROCIN 20 MG/G
OINTMENT TOPICAL 2 TIMES DAILY
Status: COMPLETED | OUTPATIENT
Start: 2025-01-31 | End: 2025-02-05

## 2025-01-31 RX ADMIN — ALLOPURINOL 300 MG: 100 TABLET ORAL at 21:08

## 2025-01-31 RX ADMIN — CEFEPIME 2000 MG: 2 INJECTION, POWDER, FOR SOLUTION INTRAVENOUS at 17:16

## 2025-01-31 RX ADMIN — SODIUM CHLORIDE, PRESERVATIVE FREE 10 ML: 5 INJECTION INTRAVENOUS at 10:40

## 2025-01-31 RX ADMIN — METOPROLOL SUCCINATE 25 MG: 25 TABLET, EXTENDED RELEASE ORAL at 10:39

## 2025-01-31 RX ADMIN — SODIUM CHLORIDE: 9 INJECTION, SOLUTION INTRAVENOUS at 17:15

## 2025-01-31 RX ADMIN — INSULIN GLARGINE 6 UNITS: 100 INJECTION, SOLUTION SUBCUTANEOUS at 12:28

## 2025-01-31 RX ADMIN — Medication 2500 MG: at 18:00

## 2025-01-31 RX ADMIN — ATORVASTATIN CALCIUM 20 MG: 20 TABLET, FILM COATED ORAL at 21:08

## 2025-01-31 RX ADMIN — SODIUM CHLORIDE, PRESERVATIVE FREE 10 ML: 5 INJECTION INTRAVENOUS at 21:08

## 2025-01-31 RX ADMIN — ENOXAPARIN SODIUM 30 MG: 100 INJECTION SUBCUTANEOUS at 10:40

## 2025-01-31 RX ADMIN — AMLODIPINE BESYLATE 10 MG: 5 TABLET ORAL at 21:08

## 2025-01-31 RX ADMIN — PANTOPRAZOLE SODIUM 40 MG: 40 TABLET, DELAYED RELEASE ORAL at 06:19

## 2025-01-31 RX ADMIN — ISOSORBIDE MONONITRATE 60 MG: 30 TABLET, EXTENDED RELEASE ORAL at 10:39

## 2025-01-31 RX ADMIN — FUROSEMIDE 40 MG: 10 INJECTION, SOLUTION INTRAMUSCULAR; INTRAVENOUS at 13:48

## 2025-01-31 RX ADMIN — INSULIN LISPRO 4 UNITS: 100 INJECTION, SOLUTION INTRAVENOUS; SUBCUTANEOUS at 17:04

## 2025-01-31 RX ADMIN — LEVOTHYROXINE SODIUM 175 MCG: 0.12 TABLET ORAL at 10:39

## 2025-01-31 RX ADMIN — MUPIROCIN: 20 OINTMENT TOPICAL at 21:18

## 2025-01-31 RX ADMIN — INSULIN LISPRO 2 UNITS: 100 INJECTION, SOLUTION INTRAVENOUS; SUBCUTANEOUS at 12:28

## 2025-01-31 RX ADMIN — PANTOPRAZOLE SODIUM 40 MG: 40 TABLET, DELAYED RELEASE ORAL at 17:06

## 2025-01-31 RX ADMIN — INSULIN LISPRO 2 UNITS: 100 INJECTION, SOLUTION INTRAVENOUS; SUBCUTANEOUS at 21:18

## 2025-01-31 ASSESSMENT — PAIN - FUNCTIONAL ASSESSMENT
PAIN_FUNCTIONAL_ASSESSMENT: NONE - DENIES PAIN

## 2025-01-31 NOTE — CONSULTS
Infectious Disease Consult    Date of Consultation:  January 31, 2025  Reason for Consultation:ESBL UTI & mrsa  Referring Physician: Dr Katz  Date of Admission:1/27/2025      Impression    Acute hypoxic resp failure  B/L pleural effusions, pulmonary edema  CTA+ for B/L pleural effusions, atelectasis  Respiratory culture 1/28+ for heavy MRSA, Pseudomonas  S/p thoracocentesis by IR 1/31.  Culture-pending    UTI  Urine culture 1/28+ for > 100,000 GNR  Final ID/NARGIS pending.  UA for + bacteria 3+, WBC , RBC , epi cells-few  US+ for increased echogenicity, complex lesion in upper pole L kidney/  Complex collection in R/pelvis possible in right inguinal region 8.9x 4.6x 5.2 cm  Appears solid with some internal vascularity, urinary bladder is normal    Acute metabolic encephalopathy  Resolved    LUCIA on CKD 4  Cr 2.48    Adult failure to thrive  Weakness    Poor p.o. intake  Dysphagia  For aspiration precautions    S/p admission earlier this month  Treated for-acute hypoxic respiratory failure, COVID at facility  Exacerbation of CHF, pacemaker, chronic A-fib, confusion with slurred speech,  CKD 3B    Plan  Vancomycin & and cefepime IV  Await final cultures, pleural fluid cultures  Adequate fluids, probiotic  Aspiration precautions  MRSA decolonization  ID service to follow      Abx  Vancomycin-1/31  Cefepime-1/31  Ceftriaxone 1/28-    Extensive review of chart notes, labs, imaging, cultures done  Additionally review of done: Recent reports-Labs, cultures, imaging  D/w -hospitalist, RN  Inezgloria MURPHY Resendiz is seen for  ESBL & MRSA + culture  Maryan Resendiz is an 89 y.o. male  with PMH significant for hypo-thyroidism, BPH, CAD, CKD 3, diabetes type 2.  Patient is known to ID service from previous admission back in December and treatment for cellulitis and osteomyelitis of right big toe.  Patient presented with a significant decline in overall condition over  48 hours, following discharge from a rehabilitation facility

## 2025-01-31 NOTE — CONSULTS
Consultation Note    NAME: Maryan Resendiz   :  1935   MRN:  [id number]     Date/Time:  2025 2:58 PM    I have been asked to see this patient by Dr. Katz  for advice/opinion re: CKD/LUCIA.     Assessment :    Plan:  CKD-4 - followed by me - baseline creatinine 1..75-2  LUCIA  HTN  DM  AMS   Creatinine up  a little.  UA looks infected.  US showed no hydro.  Check bladder scan.  Hold lasix.    Labs in AM.    Hold nephrotoxins.       Subjective:   CHIEF COMPLAINT:  Confused.    HISTORY OF PRESENT ILLNESS:     Maryan is a 89 y.o.   male who has a history of CKD-4 followed by me last seen in the office in .  He was admitted  with a creatinine of 2.  It has increased to 2.48 today.  He has had no IV contrast.   He has had no recent hypotension.  His son says that the patient has been having confusion at times.    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    CAD (coronary artery disease)     Chronic atrial fibrillation (HCC) 2015    Chronic systolic heart failure (HCC)     CKD (chronic kidney disease) stage 3, GFR 30-59 ml/min ([facility]) 2015    COVID-19 2025    Diabetes ([facility])     Diverticulosis of large intestine without hemorrhage 10/24/2017    Former smoker     Gout 10/24/2017    History of echocardiogram 2018    LVEF 40-45%, global HK, mild to mod LAE, no AS, mild MR, RVSP 26 mmHg    Hyperlipidemia 2015    Hypertension     Long term current use of anticoagulant 10/24/2017    Long-term use of high-risk medication 10/24/2017    Macular degeneration 10/24/2017    Microalbuminuria     Peripheral vascular disease (HCC) 10/24/2017    Primary osteoarthritis involving multiple joints     knees and back    Renal artery stenosis (HCC) 10/24/2017    Right-sided extracranial carotid artery stenosis 10/24/2017    Sick sinus syndrome (HCC) 10/24/2017    Status post

## 2025-01-31 NOTE — CONSULTS
ID  Consult -for ESBL UTI, MRSA  Full Consult to follow   Respiratory culture+ for heavy MRSA, heavy Pseudomonas aeruginosa  Left pleural effusion s/p ultrasound-guided thoracocentesis  Urine culture+ for >100,000 E. coli  Change to vancomycin and cefepime IV  Pharmacy to dose.

## 2025-02-01 LAB
ANION GAP SERPL CALC-SCNC: 4 MMOL/L (ref 2–12)
BUN SERPL-MCNC: 115 MG/DL (ref 6–20)
BUN/CREAT SERPL: 44 (ref 12–20)
CALCIUM SERPL-MCNC: 9.2 MG/DL (ref 8.5–10.1)
CHLORIDE SERPL-SCNC: 104 MMOL/L (ref 97–108)
CO2 SERPL-SCNC: 29 MMOL/L (ref 21–32)
CREAT SERPL-MCNC: 2.63 MG/DL (ref 0.7–1.3)
GLUCOSE BLD STRIP.AUTO-MCNC: 145 MG/DL (ref 65–117)
GLUCOSE BLD STRIP.AUTO-MCNC: 227 MG/DL (ref 65–117)
GLUCOSE BLD STRIP.AUTO-MCNC: 263 MG/DL (ref 65–117)
GLUCOSE BLD STRIP.AUTO-MCNC: 301 MG/DL (ref 65–117)
GLUCOSE SERPL-MCNC: 114 MG/DL (ref 65–100)
POTASSIUM SERPL-SCNC: 4.3 MMOL/L (ref 3.5–5.1)
SERVICE CMNT-IMP: ABNORMAL
SODIUM SERPL-SCNC: 137 MMOL/L (ref 136–145)
VANCOMYCIN SERPL-MCNC: 16.6 UG/ML

## 2025-02-01 PROCEDURE — 36415 COLL VENOUS BLD VENIPUNCTURE: CPT

## 2025-02-01 PROCEDURE — 2580000003 HC RX 258: Performed by: INTERNAL MEDICINE

## 2025-02-01 PROCEDURE — 82962 GLUCOSE BLOOD TEST: CPT

## 2025-02-01 PROCEDURE — 2500000003 HC RX 250 WO HCPCS: Performed by: NURSE PRACTITIONER

## 2025-02-01 PROCEDURE — 6360000002 HC RX W HCPCS: Performed by: INTERNAL MEDICINE

## 2025-02-01 PROCEDURE — 80202 ASSAY OF VANCOMYCIN: CPT

## 2025-02-01 PROCEDURE — 6370000000 HC RX 637 (ALT 250 FOR IP): Performed by: STUDENT IN AN ORGANIZED HEALTH CARE EDUCATION/TRAINING PROGRAM

## 2025-02-01 PROCEDURE — 2060000000 HC ICU INTERMEDIATE R&B

## 2025-02-01 PROCEDURE — 6370000000 HC RX 637 (ALT 250 FOR IP): Performed by: NURSE PRACTITIONER

## 2025-02-01 PROCEDURE — 80048 BASIC METABOLIC PNL TOTAL CA: CPT

## 2025-02-01 PROCEDURE — 6360000002 HC RX W HCPCS: Performed by: NURSE PRACTITIONER

## 2025-02-01 PROCEDURE — 2700000000 HC OXYGEN THERAPY PER DAY

## 2025-02-01 RX ORDER — CASTOR OIL AND BALSAM, PERU 788; 87 MG/G; MG/G
OINTMENT TOPICAL 2 TIMES DAILY
Status: DISCONTINUED | OUTPATIENT
Start: 2025-02-01 | End: 2025-02-10 | Stop reason: HOSPADM

## 2025-02-01 RX ADMIN — SODIUM CHLORIDE, PRESERVATIVE FREE 10 ML: 5 INJECTION INTRAVENOUS at 20:05

## 2025-02-01 RX ADMIN — LEVOTHYROXINE SODIUM 175 MCG: 0.12 TABLET ORAL at 10:08

## 2025-02-01 RX ADMIN — VANCOMYCIN HYDROCHLORIDE 1000 MG: 1 INJECTION, POWDER, LYOPHILIZED, FOR SOLUTION INTRAVENOUS at 22:41

## 2025-02-01 RX ADMIN — CEFEPIME 1000 MG: 1 INJECTION, POWDER, FOR SOLUTION INTRAMUSCULAR; INTRAVENOUS at 17:43

## 2025-02-01 RX ADMIN — ALLOPURINOL 300 MG: 100 TABLET ORAL at 20:05

## 2025-02-01 RX ADMIN — INSULIN LISPRO 4 UNITS: 100 INJECTION, SOLUTION INTRAVENOUS; SUBCUTANEOUS at 17:43

## 2025-02-01 RX ADMIN — ENOXAPARIN SODIUM 30 MG: 100 INJECTION SUBCUTANEOUS at 10:09

## 2025-02-01 RX ADMIN — PANTOPRAZOLE SODIUM 40 MG: 40 TABLET, DELAYED RELEASE ORAL at 06:15

## 2025-02-01 RX ADMIN — METOPROLOL SUCCINATE 25 MG: 25 TABLET, EXTENDED RELEASE ORAL at 10:09

## 2025-02-01 RX ADMIN — Medication: at 20:04

## 2025-02-01 RX ADMIN — CEFEPIME 1000 MG: 1 INJECTION, POWDER, FOR SOLUTION INTRAMUSCULAR; INTRAVENOUS at 06:23

## 2025-02-01 RX ADMIN — FINASTERIDE 5 MG: 5 TABLET, FILM COATED ORAL at 10:09

## 2025-02-01 RX ADMIN — AMLODIPINE BESYLATE 10 MG: 5 TABLET ORAL at 20:06

## 2025-02-01 RX ADMIN — INSULIN LISPRO 6 UNITS: 100 INJECTION, SOLUTION INTRAVENOUS; SUBCUTANEOUS at 20:06

## 2025-02-01 RX ADMIN — INSULIN GLARGINE 6 UNITS: 100 INJECTION, SOLUTION SUBCUTANEOUS at 10:10

## 2025-02-01 RX ADMIN — SODIUM CHLORIDE, PRESERVATIVE FREE 10 ML: 5 INJECTION INTRAVENOUS at 10:10

## 2025-02-01 RX ADMIN — PANTOPRAZOLE SODIUM 40 MG: 40 TABLET, DELAYED RELEASE ORAL at 15:07

## 2025-02-01 RX ADMIN — MUPIROCIN: 20 OINTMENT TOPICAL at 10:11

## 2025-02-01 RX ADMIN — ISOSORBIDE MONONITRATE 60 MG: 30 TABLET, EXTENDED RELEASE ORAL at 10:09

## 2025-02-01 RX ADMIN — MUPIROCIN: 20 OINTMENT TOPICAL at 20:06

## 2025-02-01 RX ADMIN — ATORVASTATIN CALCIUM 20 MG: 20 TABLET, FILM COATED ORAL at 20:06

## 2025-02-01 RX ADMIN — INSULIN LISPRO 2 UNITS: 100 INJECTION, SOLUTION INTRAVENOUS; SUBCUTANEOUS at 12:26

## 2025-02-01 ASSESSMENT — PAIN SCALES - GENERAL
PAINLEVEL_OUTOF10: 0

## 2025-02-02 LAB
ANION GAP SERPL CALC-SCNC: 5 MMOL/L (ref 2–12)
BACTERIA SPEC CULT: NORMAL
BACTERIA SPEC CULT: NORMAL
BUN SERPL-MCNC: 117 MG/DL (ref 6–20)
BUN/CREAT SERPL: 44 (ref 12–20)
CALCIUM SERPL-MCNC: 9.3 MG/DL (ref 8.5–10.1)
CHLORIDE SERPL-SCNC: 103 MMOL/L (ref 97–108)
CO2 SERPL-SCNC: 29 MMOL/L (ref 21–32)
CREAT SERPL-MCNC: 2.63 MG/DL (ref 0.7–1.3)
GLUCOSE BLD STRIP.AUTO-MCNC: 175 MG/DL (ref 65–117)
GLUCOSE BLD STRIP.AUTO-MCNC: 249 MG/DL (ref 65–117)
GLUCOSE BLD STRIP.AUTO-MCNC: 262 MG/DL (ref 65–117)
GLUCOSE BLD STRIP.AUTO-MCNC: 285 MG/DL (ref 65–117)
GLUCOSE SERPL-MCNC: 194 MG/DL (ref 65–100)
POTASSIUM SERPL-SCNC: 4.2 MMOL/L (ref 3.5–5.1)
SERVICE CMNT-IMP: ABNORMAL
SERVICE CMNT-IMP: NORMAL
SERVICE CMNT-IMP: NORMAL
SODIUM SERPL-SCNC: 137 MMOL/L (ref 136–145)

## 2025-02-02 PROCEDURE — 2500000003 HC RX 250 WO HCPCS: Performed by: NURSE PRACTITIONER

## 2025-02-02 PROCEDURE — 36415 COLL VENOUS BLD VENIPUNCTURE: CPT

## 2025-02-02 PROCEDURE — 2580000003 HC RX 258: Performed by: INTERNAL MEDICINE

## 2025-02-02 PROCEDURE — 82962 GLUCOSE BLOOD TEST: CPT

## 2025-02-02 PROCEDURE — 80048 BASIC METABOLIC PNL TOTAL CA: CPT

## 2025-02-02 PROCEDURE — 2060000000 HC ICU INTERMEDIATE R&B

## 2025-02-02 PROCEDURE — 6370000000 HC RX 637 (ALT 250 FOR IP): Performed by: NURSE PRACTITIONER

## 2025-02-02 PROCEDURE — 2700000000 HC OXYGEN THERAPY PER DAY

## 2025-02-02 PROCEDURE — 6360000002 HC RX W HCPCS: Performed by: NURSE PRACTITIONER

## 2025-02-02 PROCEDURE — 6370000000 HC RX 637 (ALT 250 FOR IP): Performed by: STUDENT IN AN ORGANIZED HEALTH CARE EDUCATION/TRAINING PROGRAM

## 2025-02-02 PROCEDURE — 6360000002 HC RX W HCPCS: Performed by: INTERNAL MEDICINE

## 2025-02-02 PROCEDURE — 94760 N-INVAS EAR/PLS OXIMETRY 1: CPT

## 2025-02-02 RX ADMIN — MUPIROCIN: 20 OINTMENT TOPICAL at 08:35

## 2025-02-02 RX ADMIN — CEFEPIME 1000 MG: 1 INJECTION, POWDER, FOR SOLUTION INTRAMUSCULAR; INTRAVENOUS at 05:11

## 2025-02-02 RX ADMIN — ATORVASTATIN CALCIUM 20 MG: 20 TABLET, FILM COATED ORAL at 20:29

## 2025-02-02 RX ADMIN — MUPIROCIN: 20 OINTMENT TOPICAL at 20:31

## 2025-02-02 RX ADMIN — SODIUM CHLORIDE, PRESERVATIVE FREE 10 ML: 5 INJECTION INTRAVENOUS at 08:34

## 2025-02-02 RX ADMIN — LEVOTHYROXINE SODIUM 175 MCG: 0.12 TABLET ORAL at 08:34

## 2025-02-02 RX ADMIN — ALLOPURINOL 300 MG: 100 TABLET ORAL at 20:28

## 2025-02-02 RX ADMIN — INSULIN GLARGINE 6 UNITS: 100 INJECTION, SOLUTION SUBCUTANEOUS at 08:33

## 2025-02-02 RX ADMIN — INSULIN LISPRO 2 UNITS: 100 INJECTION, SOLUTION INTRAVENOUS; SUBCUTANEOUS at 21:13

## 2025-02-02 RX ADMIN — PANTOPRAZOLE SODIUM 40 MG: 40 TABLET, DELAYED RELEASE ORAL at 17:30

## 2025-02-02 RX ADMIN — AMLODIPINE BESYLATE 10 MG: 5 TABLET ORAL at 20:28

## 2025-02-02 RX ADMIN — Medication: at 08:35

## 2025-02-02 RX ADMIN — METOPROLOL SUCCINATE 25 MG: 25 TABLET, EXTENDED RELEASE ORAL at 08:33

## 2025-02-02 RX ADMIN — Medication: at 20:31

## 2025-02-02 RX ADMIN — PANTOPRAZOLE SODIUM 40 MG: 40 TABLET, DELAYED RELEASE ORAL at 05:13

## 2025-02-02 RX ADMIN — INSULIN LISPRO 4 UNITS: 100 INJECTION, SOLUTION INTRAVENOUS; SUBCUTANEOUS at 17:31

## 2025-02-02 RX ADMIN — ENOXAPARIN SODIUM 30 MG: 100 INJECTION SUBCUTANEOUS at 08:33

## 2025-02-02 RX ADMIN — INSULIN LISPRO 4 UNITS: 100 INJECTION, SOLUTION INTRAVENOUS; SUBCUTANEOUS at 13:13

## 2025-02-02 RX ADMIN — CEFEPIME 1000 MG: 1 INJECTION, POWDER, FOR SOLUTION INTRAMUSCULAR; INTRAVENOUS at 17:31

## 2025-02-02 RX ADMIN — ISOSORBIDE MONONITRATE 60 MG: 30 TABLET, EXTENDED RELEASE ORAL at 08:32

## 2025-02-02 RX ADMIN — SODIUM CHLORIDE, PRESERVATIVE FREE 10 ML: 5 INJECTION INTRAVENOUS at 20:29

## 2025-02-02 ASSESSMENT — PAIN SCALES - GENERAL
PAINLEVEL_OUTOF10: 0

## 2025-02-03 ENCOUNTER — APPOINTMENT (OUTPATIENT)
Facility: HOSPITAL | Age: 89
DRG: 689 | End: 2025-02-03
Payer: MEDICARE

## 2025-02-03 PROBLEM — I12.9 CKD STAGE 4 SECONDARY TO HYPERTENSION (HCC): Status: ACTIVE | Noted: 2025-02-03

## 2025-02-03 PROBLEM — Z22.322 MRSA (METHICILLIN RESISTANT STAPH AUREUS) CULTURE POSITIVE: Status: ACTIVE | Noted: 2025-02-03

## 2025-02-03 PROBLEM — Z16.13 CARBAPENEM-RESISTANT ENTEROBACTERIACEAE INFECTION: Status: ACTIVE | Noted: 2024-12-12

## 2025-02-03 PROBLEM — R13.10 DYSPHAGIA: Status: ACTIVE | Noted: 2025-02-03

## 2025-02-03 PROBLEM — G93.41 METABOLIC ENCEPHALOPATHY: Status: ACTIVE | Noted: 2025-02-03

## 2025-02-03 PROBLEM — N18.4 CKD STAGE 4 SECONDARY TO HYPERTENSION (HCC): Status: ACTIVE | Noted: 2025-02-03

## 2025-02-03 PROBLEM — J90 BILATERAL PLEURAL EFFUSION: Status: ACTIVE | Noted: 2025-02-03

## 2025-02-03 PROBLEM — Z98.890 S/P THORACENTESIS: Status: ACTIVE | Noted: 2025-02-03

## 2025-02-03 PROBLEM — N30.01 ACUTE CYSTITIS WITH HEMATURIA: Status: ACTIVE | Noted: 2025-02-03

## 2025-02-03 PROBLEM — A49.9 GRAM-NEGATIVE INFECTION: Status: ACTIVE | Noted: 2025-02-03

## 2025-02-03 LAB
ANION GAP SERPL CALC-SCNC: 5 MMOL/L (ref 2–12)
BUN SERPL-MCNC: 123 MG/DL (ref 6–20)
BUN/CREAT SERPL: 45 (ref 12–20)
CALCIUM SERPL-MCNC: 9.2 MG/DL (ref 8.5–10.1)
CHLORIDE SERPL-SCNC: 104 MMOL/L (ref 97–108)
CO2 SERPL-SCNC: 29 MMOL/L (ref 21–32)
CREAT SERPL-MCNC: 2.71 MG/DL (ref 0.7–1.3)
GLUCOSE BLD STRIP.AUTO-MCNC: 189 MG/DL (ref 65–117)
GLUCOSE BLD STRIP.AUTO-MCNC: 230 MG/DL (ref 65–117)
GLUCOSE BLD STRIP.AUTO-MCNC: 250 MG/DL (ref 65–117)
GLUCOSE BLD STRIP.AUTO-MCNC: 282 MG/DL (ref 65–117)
GLUCOSE BLD STRIP.AUTO-MCNC: 288 MG/DL (ref 65–117)
GLUCOSE BLD STRIP.AUTO-MCNC: 305 MG/DL (ref 65–117)
GLUCOSE SERPL-MCNC: 196 MG/DL (ref 65–100)
POTASSIUM SERPL-SCNC: 4.4 MMOL/L (ref 3.5–5.1)
PROCALCITONIN SERPL-MCNC: 0.29 NG/ML
SERVICE CMNT-IMP: ABNORMAL
SODIUM SERPL-SCNC: 138 MMOL/L (ref 136–145)
VANCOMYCIN SERPL-MCNC: 17.8 UG/ML

## 2025-02-03 PROCEDURE — 2500000003 HC RX 250 WO HCPCS: Performed by: NURSE PRACTITIONER

## 2025-02-03 PROCEDURE — 84145 PROCALCITONIN (PCT): CPT

## 2025-02-03 PROCEDURE — 2060000000 HC ICU INTERMEDIATE R&B

## 2025-02-03 PROCEDURE — 6360000002 HC RX W HCPCS: Performed by: INTERNAL MEDICINE

## 2025-02-03 PROCEDURE — 80202 ASSAY OF VANCOMYCIN: CPT

## 2025-02-03 PROCEDURE — 71045 X-RAY EXAM CHEST 1 VIEW: CPT

## 2025-02-03 PROCEDURE — 6360000002 HC RX W HCPCS: Performed by: NURSE PRACTITIONER

## 2025-02-03 PROCEDURE — 2580000003 HC RX 258: Performed by: STUDENT IN AN ORGANIZED HEALTH CARE EDUCATION/TRAINING PROGRAM

## 2025-02-03 PROCEDURE — 99223 1ST HOSP IP/OBS HIGH 75: CPT | Performed by: INTERNAL MEDICINE

## 2025-02-03 PROCEDURE — 6360000002 HC RX W HCPCS: Performed by: STUDENT IN AN ORGANIZED HEALTH CARE EDUCATION/TRAINING PROGRAM

## 2025-02-03 PROCEDURE — 82962 GLUCOSE BLOOD TEST: CPT

## 2025-02-03 PROCEDURE — 80048 BASIC METABOLIC PNL TOTAL CA: CPT

## 2025-02-03 PROCEDURE — 6370000000 HC RX 637 (ALT 250 FOR IP): Performed by: STUDENT IN AN ORGANIZED HEALTH CARE EDUCATION/TRAINING PROGRAM

## 2025-02-03 PROCEDURE — 36415 COLL VENOUS BLD VENIPUNCTURE: CPT

## 2025-02-03 PROCEDURE — 2580000003 HC RX 258: Performed by: INTERNAL MEDICINE

## 2025-02-03 PROCEDURE — 6370000000 HC RX 637 (ALT 250 FOR IP): Performed by: NURSE PRACTITIONER

## 2025-02-03 PROCEDURE — 2700000000 HC OXYGEN THERAPY PER DAY

## 2025-02-03 RX ORDER — FUROSEMIDE 10 MG/ML
80 INJECTION INTRAMUSCULAR; INTRAVENOUS 2 TIMES DAILY
Status: DISCONTINUED | OUTPATIENT
Start: 2025-02-03 | End: 2025-02-05

## 2025-02-03 RX ADMIN — Medication: at 08:50

## 2025-02-03 RX ADMIN — FUROSEMIDE 80 MG: 10 INJECTION, SOLUTION INTRAMUSCULAR; INTRAVENOUS at 11:26

## 2025-02-03 RX ADMIN — AMLODIPINE BESYLATE 10 MG: 5 TABLET ORAL at 20:57

## 2025-02-03 RX ADMIN — INSULIN LISPRO 2 UNITS: 100 INJECTION, SOLUTION INTRAVENOUS; SUBCUTANEOUS at 08:47

## 2025-02-03 RX ADMIN — ALLOPURINOL 300 MG: 100 TABLET ORAL at 20:57

## 2025-02-03 RX ADMIN — INSULIN GLARGINE 6 UNITS: 100 INJECTION, SOLUTION SUBCUTANEOUS at 08:47

## 2025-02-03 RX ADMIN — ISOSORBIDE MONONITRATE 60 MG: 30 TABLET, EXTENDED RELEASE ORAL at 08:46

## 2025-02-03 RX ADMIN — ATORVASTATIN CALCIUM 20 MG: 20 TABLET, FILM COATED ORAL at 20:57

## 2025-02-03 RX ADMIN — POLYETHYLENE GLYCOL 3350 17 G: 17 POWDER, FOR SOLUTION ORAL at 17:43

## 2025-02-03 RX ADMIN — FUROSEMIDE 80 MG: 10 INJECTION, SOLUTION INTRAMUSCULAR; INTRAVENOUS at 17:43

## 2025-02-03 RX ADMIN — SODIUM CHLORIDE, PRESERVATIVE FREE 10 ML: 5 INJECTION INTRAVENOUS at 20:58

## 2025-02-03 RX ADMIN — FINASTERIDE 5 MG: 5 TABLET, FILM COATED ORAL at 08:47

## 2025-02-03 RX ADMIN — PANTOPRAZOLE SODIUM 40 MG: 40 TABLET, DELAYED RELEASE ORAL at 15:29

## 2025-02-03 RX ADMIN — PANTOPRAZOLE SODIUM 40 MG: 40 TABLET, DELAYED RELEASE ORAL at 05:24

## 2025-02-03 RX ADMIN — Medication: at 20:58

## 2025-02-03 RX ADMIN — MUPIROCIN: 20 OINTMENT TOPICAL at 20:58

## 2025-02-03 RX ADMIN — METOPROLOL SUCCINATE 25 MG: 25 TABLET, EXTENDED RELEASE ORAL at 08:46

## 2025-02-03 RX ADMIN — LEVOTHYROXINE SODIUM 175 MCG: 0.12 TABLET ORAL at 08:47

## 2025-02-03 RX ADMIN — VANCOMYCIN HYDROCHLORIDE 1000 MG: 1 INJECTION, POWDER, LYOPHILIZED, FOR SOLUTION INTRAVENOUS at 01:26

## 2025-02-03 RX ADMIN — MUPIROCIN: 20 OINTMENT TOPICAL at 08:49

## 2025-02-03 RX ADMIN — INSULIN LISPRO 4 UNITS: 100 INJECTION, SOLUTION INTRAVENOUS; SUBCUTANEOUS at 17:43

## 2025-02-03 RX ADMIN — INSULIN LISPRO 4 UNITS: 100 INJECTION, SOLUTION INTRAVENOUS; SUBCUTANEOUS at 11:48

## 2025-02-03 RX ADMIN — MEROPENEM 500 MG: 500 INJECTION INTRAVENOUS at 02:40

## 2025-02-03 RX ADMIN — CEFTAZIDIME, AVIBACTAM 0.94 G: 2; .5 POWDER, FOR SOLUTION INTRAVENOUS at 22:43

## 2025-02-03 RX ADMIN — SODIUM CHLORIDE, PRESERVATIVE FREE 10 ML: 5 INJECTION INTRAVENOUS at 08:49

## 2025-02-03 RX ADMIN — CEFTAZIDIME, AVIBACTAM 0.94 G: 2; .5 POWDER, FOR SOLUTION INTRAVENOUS at 11:39

## 2025-02-03 RX ADMIN — ENOXAPARIN SODIUM 30 MG: 100 INJECTION SUBCUTANEOUS at 08:46

## 2025-02-03 RX ADMIN — INSULIN LISPRO 2 UNITS: 100 INJECTION, SOLUTION INTRAVENOUS; SUBCUTANEOUS at 20:57

## 2025-02-03 ASSESSMENT — PAIN SCALES - GENERAL
PAINLEVEL_OUTOF10: 0

## 2025-02-03 NOTE — CARE COORDINATION
Transition of Care Plan:    RUR: 24%  Prior Level of Functioning: assistance  Disposition: Plan is for [facility]  JABARI:   If SNF or IPR: Date FOC offered:   Date FOC received:   Accepting facility:   Date authorization started with reference number:   Date authorization received and expires:   Follow up appointments: TBD  DME needed: has a cane, walker wheelchair  Transportation at discharge: TBD  IM/IMM Medicare/ letter given:   Is patient a  and connected with VA?    If yes, was [place] transfer form completed and VA notified?   Caregiver Contact: MRN: [id number]   Discharge Caregiver contacted prior to discharge?   Care Conference needed?   Barriers to discharge: clinical improvement  [facility] evaluating for admission      12:50 pm [facility] will accept patient. No auth needed.  I noted the new IV ABX order. Just waiting for Nephro to give final recommendation and plan for HD.  English Jimmy COPELAND CM [phone]

## 2025-02-04 ENCOUNTER — APPOINTMENT (OUTPATIENT)
Facility: HOSPITAL | Age: 89
DRG: 689 | End: 2025-02-04
Payer: MEDICARE

## 2025-02-04 ENCOUNTER — APPOINTMENT (OUTPATIENT)
Facility: HOSPITAL | Age: 89
DRG: 689 | End: 2025-02-04
Attending: INTERNAL MEDICINE
Payer: MEDICARE

## 2025-02-04 LAB
ANION GAP SERPL CALC-SCNC: 4 MMOL/L (ref 2–12)
BUN SERPL-MCNC: 120 MG/DL (ref 6–20)
BUN/CREAT SERPL: 43 (ref 12–20)
CALCIUM SERPL-MCNC: 9.1 MG/DL (ref 8.5–10.1)
CHLORIDE SERPL-SCNC: 105 MMOL/L (ref 97–108)
CO2 SERPL-SCNC: 29 MMOL/L (ref 21–32)
CREAT SERPL-MCNC: 2.79 MG/DL (ref 0.7–1.3)
GLUCOSE BLD STRIP.AUTO-MCNC: 176 MG/DL (ref 65–117)
GLUCOSE BLD STRIP.AUTO-MCNC: 184 MG/DL (ref 65–117)
GLUCOSE BLD STRIP.AUTO-MCNC: 271 MG/DL (ref 65–117)
GLUCOSE BLD STRIP.AUTO-MCNC: 353 MG/DL (ref 65–117)
GLUCOSE SERPL-MCNC: 138 MG/DL (ref 65–100)
HBV SURFACE AB SER QL: NONREACTIVE
HBV SURFACE AB SER-ACNC: <3.1 MIU/ML
HBV SURFACE AG SER QL: <0.1 INDEX
HBV SURFACE AG SER QL: NEGATIVE
POTASSIUM SERPL-SCNC: 4.5 MMOL/L (ref 3.5–5.1)
SERVICE CMNT-IMP: ABNORMAL
SODIUM SERPL-SCNC: 138 MMOL/L (ref 136–145)

## 2025-02-04 PROCEDURE — 2709999900 IR NONTUNNELED VASCULAR CATHETER > 5 YEARS

## 2025-02-04 PROCEDURE — 2580000003 HC RX 258: Performed by: INTERNAL MEDICINE

## 2025-02-04 PROCEDURE — 90935 HEMODIALYSIS ONE EVALUATION: CPT

## 2025-02-04 PROCEDURE — 86704 HEP B CORE ANTIBODY TOTAL: CPT

## 2025-02-04 PROCEDURE — 5A1D70Z PERFORMANCE OF URINARY FILTRATION, INTERMITTENT, LESS THAN 6 HOURS PER DAY: ICD-10-PCS | Performed by: STUDENT IN AN ORGANIZED HEALTH CARE EDUCATION/TRAINING PROGRAM

## 2025-02-04 PROCEDURE — 36415 COLL VENOUS BLD VENIPUNCTURE: CPT

## 2025-02-04 PROCEDURE — 2500000003 HC RX 250 WO HCPCS: Performed by: NURSE PRACTITIONER

## 2025-02-04 PROCEDURE — 6370000000 HC RX 637 (ALT 250 FOR IP): Performed by: NURSE PRACTITIONER

## 2025-02-04 PROCEDURE — 2060000000 HC ICU INTERMEDIATE R&B

## 2025-02-04 PROCEDURE — 97535 SELF CARE MNGMENT TRAINING: CPT

## 2025-02-04 PROCEDURE — 6370000000 HC RX 637 (ALT 250 FOR IP): Performed by: STUDENT IN AN ORGANIZED HEALTH CARE EDUCATION/TRAINING PROGRAM

## 2025-02-04 PROCEDURE — 2700000000 HC OXYGEN THERAPY PER DAY

## 2025-02-04 PROCEDURE — 87340 HEPATITIS B SURFACE AG IA: CPT

## 2025-02-04 PROCEDURE — 80048 BASIC METABOLIC PNL TOTAL CA: CPT

## 2025-02-04 PROCEDURE — 82962 GLUCOSE BLOOD TEST: CPT

## 2025-02-04 PROCEDURE — 6360000002 HC RX W HCPCS: Performed by: INTERNAL MEDICINE

## 2025-02-04 PROCEDURE — 86706 HEP B SURFACE ANTIBODY: CPT

## 2025-02-04 PROCEDURE — 05HM33Z INSERTION OF INFUSION DEVICE INTO RIGHT INTERNAL JUGULAR VEIN, PERCUTANEOUS APPROACH: ICD-10-PCS | Performed by: STUDENT IN AN ORGANIZED HEALTH CARE EDUCATION/TRAINING PROGRAM

## 2025-02-04 PROCEDURE — 71045 X-RAY EXAM CHEST 1 VIEW: CPT

## 2025-02-04 PROCEDURE — 6360000002 HC RX W HCPCS: Performed by: STUDENT IN AN ORGANIZED HEALTH CARE EDUCATION/TRAINING PROGRAM

## 2025-02-04 PROCEDURE — 6360000002 HC RX W HCPCS: Performed by: NURSE PRACTITIONER

## 2025-02-04 RX ORDER — HEPARIN 100 UNIT/ML
300 SYRINGE INTRAVENOUS ONCE
Status: COMPLETED | OUTPATIENT
Start: 2025-02-04 | End: 2025-02-04

## 2025-02-04 RX ORDER — HEPARIN SODIUM 200 [USP'U]/100ML
200 INJECTION, SOLUTION INTRAVENOUS ONCE
Status: COMPLETED | OUTPATIENT
Start: 2025-02-04 | End: 2025-02-04

## 2025-02-04 RX ORDER — LIDOCAINE HYDROCHLORIDE 20 MG/ML
20 INJECTION, SOLUTION INFILTRATION; PERINEURAL ONCE
Status: COMPLETED | OUTPATIENT
Start: 2025-02-04 | End: 2025-02-04

## 2025-02-04 RX ADMIN — ATORVASTATIN CALCIUM 20 MG: 20 TABLET, FILM COATED ORAL at 21:11

## 2025-02-04 RX ADMIN — PANTOPRAZOLE SODIUM 40 MG: 40 TABLET, DELAYED RELEASE ORAL at 17:42

## 2025-02-04 RX ADMIN — AMLODIPINE BESYLATE 10 MG: 5 TABLET ORAL at 21:11

## 2025-02-04 RX ADMIN — METOPROLOL SUCCINATE 25 MG: 25 TABLET, EXTENDED RELEASE ORAL at 08:09

## 2025-02-04 RX ADMIN — PANTOPRAZOLE SODIUM 40 MG: 40 TABLET, DELAYED RELEASE ORAL at 05:06

## 2025-02-04 RX ADMIN — CEFTAZIDIME, AVIBACTAM 0.94 G: 2; .5 POWDER, FOR SOLUTION INTRAVENOUS at 22:27

## 2025-02-04 RX ADMIN — CEFTAZIDIME, AVIBACTAM 0.94 G: 2; .5 POWDER, FOR SOLUTION INTRAVENOUS at 11:10

## 2025-02-04 RX ADMIN — SODIUM CHLORIDE, PRESERVATIVE FREE 10 ML: 5 INJECTION INTRAVENOUS at 08:10

## 2025-02-04 RX ADMIN — Medication 400 UNITS: at 12:54

## 2025-02-04 RX ADMIN — LEVOTHYROXINE SODIUM 175 MCG: 0.12 TABLET ORAL at 08:09

## 2025-02-04 RX ADMIN — SODIUM CHLORIDE, PRESERVATIVE FREE 10 ML: 5 INJECTION INTRAVENOUS at 21:12

## 2025-02-04 RX ADMIN — INSULIN LISPRO 8 UNITS: 100 INJECTION, SOLUTION INTRAVENOUS; SUBCUTANEOUS at 17:31

## 2025-02-04 RX ADMIN — FUROSEMIDE 80 MG: 10 INJECTION, SOLUTION INTRAMUSCULAR; INTRAVENOUS at 08:10

## 2025-02-04 RX ADMIN — MUPIROCIN: 20 OINTMENT TOPICAL at 21:15

## 2025-02-04 RX ADMIN — ALLOPURINOL 300 MG: 100 TABLET ORAL at 21:11

## 2025-02-04 RX ADMIN — ISOSORBIDE MONONITRATE 60 MG: 30 TABLET, EXTENDED RELEASE ORAL at 08:09

## 2025-02-04 RX ADMIN — POLYETHYLENE GLYCOL 3350 17 G: 17 POWDER, FOR SOLUTION ORAL at 08:11

## 2025-02-04 RX ADMIN — HEPARIN SODIUM IN SODIUM CHLORIDE 500 ML: 200 INJECTION INTRAVENOUS at 12:53

## 2025-02-04 RX ADMIN — INSULIN GLARGINE 6 UNITS: 100 INJECTION, SOLUTION SUBCUTANEOUS at 08:09

## 2025-02-04 RX ADMIN — MUPIROCIN: 20 OINTMENT TOPICAL at 08:13

## 2025-02-04 RX ADMIN — Medication: at 08:13

## 2025-02-04 RX ADMIN — INSULIN LISPRO 2 UNITS: 100 INJECTION, SOLUTION INTRAVENOUS; SUBCUTANEOUS at 21:12

## 2025-02-04 RX ADMIN — Medication: at 21:15

## 2025-02-04 RX ADMIN — LIDOCAINE HYDROCHLORIDE 8 ML: 20 INJECTION, SOLUTION INFILTRATION; PERINEURAL at 12:53

## 2025-02-04 RX ADMIN — ENOXAPARIN SODIUM 30 MG: 100 INJECTION SUBCUTANEOUS at 08:10

## 2025-02-04 ASSESSMENT — PAIN SCALES - GENERAL
PAINLEVEL_OUTOF10: 0

## 2025-02-04 NOTE — CARE COORDINATION
CM Note:  Noted patient to begin HD. I do not have an order for Permanent HD for when he is discharged and he would need to have that set up before he goes to [facility] rehab. I left word with Dr. Doan.  English Jimmy COPELAND CM   [phone]

## 2025-02-05 LAB
ANION GAP SERPL CALC-SCNC: 5 MMOL/L (ref 2–12)
BACTERIA SPEC CULT: ABNORMAL
BACTERIA SPEC CULT: ABNORMAL
BASOPHILS # BLD: 0.04 K/UL (ref 0–0.1)
BASOPHILS NFR BLD: 0.5 % (ref 0–1)
BUN SERPL-MCNC: 93 MG/DL (ref 6–20)
BUN/CREAT SERPL: 41 (ref 12–20)
CALCIUM SERPL-MCNC: 9.2 MG/DL (ref 8.5–10.1)
CC UR VC: ABNORMAL
CHLORIDE SERPL-SCNC: 104 MMOL/L (ref 97–108)
CO2 SERPL-SCNC: 29 MMOL/L (ref 21–32)
CREAT SERPL-MCNC: 2.25 MG/DL (ref 0.7–1.3)
DIFFERENTIAL METHOD BLD: ABNORMAL
EOSINOPHIL # BLD: 0.23 K/UL (ref 0–0.4)
EOSINOPHIL NFR BLD: 2.6 % (ref 0–7)
ERYTHROCYTE [DISTWIDTH] IN BLOOD BY AUTOMATED COUNT: 17.7 % (ref 11.5–14.5)
GLUCOSE BLD STRIP.AUTO-MCNC: 128 MG/DL (ref 65–117)
GLUCOSE BLD STRIP.AUTO-MCNC: 153 MG/DL (ref 65–117)
GLUCOSE BLD STRIP.AUTO-MCNC: 163 MG/DL (ref 65–117)
GLUCOSE BLD STRIP.AUTO-MCNC: 217 MG/DL (ref 65–117)
GLUCOSE SERPL-MCNC: 109 MG/DL (ref 65–100)
HBV CORE AB SERPL QL IA: NEGATIVE
HCT VFR BLD AUTO: 33.7 % (ref 36.6–50.3)
HGB BLD-MCNC: 10.4 G/DL (ref 12.1–17)
IMM GRANULOCYTES # BLD AUTO: 0.07 K/UL (ref 0–0.04)
IMM GRANULOCYTES NFR BLD AUTO: 0.8 % (ref 0–0.5)
LYMPHOCYTES # BLD: 0.81 K/UL (ref 0.8–3.5)
LYMPHOCYTES NFR BLD: 9.1 % (ref 12–49)
MCH RBC QN AUTO: 30.6 PG (ref 26–34)
MCHC RBC AUTO-ENTMCNC: 30.9 G/DL (ref 30–36.5)
MCV RBC AUTO: 99.1 FL (ref 80–99)
MONOCYTES # BLD: 1.02 K/UL (ref 0–1)
MONOCYTES NFR BLD: 11.5 % (ref 5–13)
NEUTS SEG # BLD: 6.71 K/UL (ref 1.8–8)
NEUTS SEG NFR BLD: 75.5 % (ref 32–75)
NRBC # BLD: 0 K/UL (ref 0–0.01)
NRBC BLD-RTO: 0 PER 100 WBC
PLATELET # BLD AUTO: 136 K/UL (ref 150–400)
PMV BLD AUTO: 11.5 FL (ref 8.9–12.9)
POTASSIUM SERPL-SCNC: 4.3 MMOL/L (ref 3.5–5.1)
RBC # BLD AUTO: 3.4 M/UL (ref 4.1–5.7)
SERVICE CMNT-IMP: ABNORMAL
SODIUM SERPL-SCNC: 138 MMOL/L (ref 136–145)
WBC # BLD AUTO: 8.9 K/UL (ref 4.1–11.1)

## 2025-02-05 PROCEDURE — 2700000000 HC OXYGEN THERAPY PER DAY

## 2025-02-05 PROCEDURE — 2500000003 HC RX 250 WO HCPCS: Performed by: NURSE PRACTITIONER

## 2025-02-05 PROCEDURE — 6370000000 HC RX 637 (ALT 250 FOR IP): Performed by: NURSE PRACTITIONER

## 2025-02-05 PROCEDURE — 36415 COLL VENOUS BLD VENIPUNCTURE: CPT

## 2025-02-05 PROCEDURE — 97535 SELF CARE MNGMENT TRAINING: CPT

## 2025-02-05 PROCEDURE — 6370000000 HC RX 637 (ALT 250 FOR IP): Performed by: INTERNAL MEDICINE

## 2025-02-05 PROCEDURE — 6360000002 HC RX W HCPCS: Performed by: NURSE PRACTITIONER

## 2025-02-05 PROCEDURE — 5A0935A ASSISTANCE WITH RESPIRATORY VENTILATION, LESS THAN 24 CONSECUTIVE HOURS, HIGH NASAL FLOW/VELOCITY: ICD-10-PCS | Performed by: STUDENT IN AN ORGANIZED HEALTH CARE EDUCATION/TRAINING PROGRAM

## 2025-02-05 PROCEDURE — 85025 COMPLETE CBC W/AUTO DIFF WBC: CPT

## 2025-02-05 PROCEDURE — 82962 GLUCOSE BLOOD TEST: CPT

## 2025-02-05 PROCEDURE — 2060000000 HC ICU INTERMEDIATE R&B

## 2025-02-05 PROCEDURE — 6370000000 HC RX 637 (ALT 250 FOR IP): Performed by: STUDENT IN AN ORGANIZED HEALTH CARE EDUCATION/TRAINING PROGRAM

## 2025-02-05 PROCEDURE — 2580000003 HC RX 258: Performed by: INTERNAL MEDICINE

## 2025-02-05 PROCEDURE — 36556 INSERT NON-TUNNEL CV CATH: CPT

## 2025-02-05 PROCEDURE — 90935 HEMODIALYSIS ONE EVALUATION: CPT

## 2025-02-05 PROCEDURE — 6360000002 HC RX W HCPCS: Performed by: INTERNAL MEDICINE

## 2025-02-05 PROCEDURE — 80048 BASIC METABOLIC PNL TOTAL CA: CPT

## 2025-02-05 RX ORDER — ALLOPURINOL 100 MG/1
50 TABLET ORAL NIGHTLY
Status: DISCONTINUED | OUTPATIENT
Start: 2025-02-05 | End: 2025-02-09

## 2025-02-05 RX ORDER — AMLODIPINE BESYLATE 5 MG/1
5 TABLET ORAL NIGHTLY
Status: DISCONTINUED | OUTPATIENT
Start: 2025-02-05 | End: 2025-02-06

## 2025-02-05 RX ADMIN — CEFTAZIDIME, AVIBACTAM 0.94 G: 2; .5 POWDER, FOR SOLUTION INTRAVENOUS at 21:49

## 2025-02-05 RX ADMIN — Medication: at 11:30

## 2025-02-05 RX ADMIN — PANTOPRAZOLE SODIUM 40 MG: 40 TABLET, DELAYED RELEASE ORAL at 17:49

## 2025-02-05 RX ADMIN — MUPIROCIN: 20 OINTMENT TOPICAL at 08:45

## 2025-02-05 RX ADMIN — AMLODIPINE BESYLATE 5 MG: 5 TABLET ORAL at 21:52

## 2025-02-05 RX ADMIN — FINASTERIDE 5 MG: 5 TABLET, FILM COATED ORAL at 08:39

## 2025-02-05 RX ADMIN — CEFTAZIDIME, AVIBACTAM 0.94 G: 2; .5 POWDER, FOR SOLUTION INTRAVENOUS at 11:30

## 2025-02-05 RX ADMIN — Medication: at 21:49

## 2025-02-05 RX ADMIN — ATORVASTATIN CALCIUM 20 MG: 20 TABLET, FILM COATED ORAL at 21:52

## 2025-02-05 RX ADMIN — ALLOPURINOL 50 MG: 100 TABLET ORAL at 21:50

## 2025-02-05 RX ADMIN — INSULIN LISPRO 2 UNITS: 100 INJECTION, SOLUTION INTRAVENOUS; SUBCUTANEOUS at 21:50

## 2025-02-05 RX ADMIN — PANTOPRAZOLE SODIUM 40 MG: 40 TABLET, DELAYED RELEASE ORAL at 05:32

## 2025-02-05 RX ADMIN — INSULIN GLARGINE 6 UNITS: 100 INJECTION, SOLUTION SUBCUTANEOUS at 08:40

## 2025-02-05 RX ADMIN — SODIUM CHLORIDE, PRESERVATIVE FREE 10 ML: 5 INJECTION INTRAVENOUS at 21:52

## 2025-02-05 RX ADMIN — ISOSORBIDE MONONITRATE 60 MG: 30 TABLET, EXTENDED RELEASE ORAL at 11:31

## 2025-02-05 RX ADMIN — ENOXAPARIN SODIUM 30 MG: 100 INJECTION SUBCUTANEOUS at 08:41

## 2025-02-05 RX ADMIN — METOPROLOL SUCCINATE 25 MG: 25 TABLET, EXTENDED RELEASE ORAL at 11:32

## 2025-02-05 RX ADMIN — LEVOTHYROXINE SODIUM 175 MCG: 0.12 TABLET ORAL at 08:39

## 2025-02-05 RX ADMIN — SODIUM CHLORIDE, PRESERVATIVE FREE 10 ML: 5 INJECTION INTRAVENOUS at 08:39

## 2025-02-05 ASSESSMENT — PAIN SCALES - GENERAL
PAINLEVEL_OUTOF10: 0
PAINLEVEL_OUTOF10: 0

## 2025-02-05 NOTE — FLOWSHEET NOTE
Primary RN SBAR: DEDRICK Padgett RN  Incapacitated Nurse [facility]. provided: Yes  Patient Education provided: Procedural, fluid management  Preferred Education method and Primary language: Verbal/English  Hospital General Consent Verified: Yes  Hospital associated wait time; reason: N/A  Hepatitis B Surface Ag   Date/Time Value Ref Range Status   02/04/2025 11:21 AM <0.10 Index Final     Hep B S Ag Interp   Date/Time Value Ref Range Status   02/04/2025 11:21 AM Negative NEG   Final     Hep B S Ab   Date/Time Value Ref Range Status   02/04/2025 11:21 AM <3.10 mIU/mL Final     Hep B S Ab Interp   Date/Time Value Ref Range Status   02/04/2025 11:21 AM NONREACTIVE NR   Final     Comment:     (NOTE)  The ADVIA Centaur Anti-HBs2 assay is traceable to the World Health   Organization (WHO) Hepatitis B Immunoglobulin 1st International   Reference Preparation (1977). Samples with a calculated value of 10   mIU/mL or greater are considered reactive (protective) in accordance   with the CDC guidelines. The accepted criteria for immunity to HBV is   anti-HBs activity greater than or equal to 10 mIU/mL, as defined by   the WHO International Reference Preparation.  Assay performance has not been established in pregnant women,   patients who are immunosuppressed or immunocompromised, nor have   performance characteristics been established in conjunction with   other 's assays for specific HBV serologic markers. This   assay does not differentiate between vaccine induced immune response   and a response due to infection with HBV. Passively acquired anti-HBs   may be identified following patient transfusion, receipt of   immunoglobulin products, etc.       Pre-Treatment:     02/04/25 1750   Treatment   Treatment Number 1   Treatment Goal 2000 ml   Observations & Evaluations   Level of Consciousness 0   Oriented X 4   Heart Rhythm Regular   Respiratory Quality/Effort Dyspnea with exertion   O2 Device Nasal cannula   Bilateral Breath 
previous data.)   Bilateral Breath Sounds Diminished   Skin Color Ecchymosis (comment);Pale   Skin Condition/Temp Fragile;Dry;Warm   Abdomen Inspection Rounded;Soft   Edema Right upper extremity;Left upper extremity;Right lower extremity;Left lower extremity   Edema Generalized Anasarca   RUE Edema +1   LUE Edema +1   RLE Edema +1;Pitting   LLE Edema +1;Pitting   Vital Signs   BP (!) 136/54   Temp 97.4 °F (36.3 °C)   Pulse 73   Respirations 21   SpO2 94 %   Pain Assessment   Pain Assessment None - Denies Pain  (Simultaneous filing. User may not have seen previous data.)   Technical Checks   Dialysis Machine No. 06   RO Machine Number 06   Dialyzer Lot No. [id number]   Tubing Lot Number [id number]   All Connections Secure Yes   NS Bag Yes   Saline Line Double Clamped Yes   Dialyzer Nipro ELISIO   Prime Volume (mL) 250 mL   ICEBOAT I;C;E;B;O;A;T   RO Machine Log Sheet Completed Yes   Machine Alarm Self Test Completed;Passed   Air Foam Detector Tested;Proper Function;pH Reading   Extracorporeal Circuit Tested for Integrity Yes   Machine Conductivity 13.9   Manual Ph 7.4   Bleach Test (Neg) Yes   Bath Temperature 96.8 °F (36 °C)   Dialysis Bath   K+ (Potassium) 3   Ca+ (Calcium) 2.5   Na+ (Sodium) 139   HCO3 (Bicarb) 35     Initiation of Dialysis     02/05/25 0810   Treatment   Time On 0810   Treatment Goal 2 L over 2.5 hours   Vital Signs   BP (!) 164/63   Pulse 74   During Hemodialysis Assessment   Blood Flow Rate (ml/min) 175 ml/min   Arterial Pressure (mmHg) -40 mmHg   Venous Pressure (mmHg) 20   TMP 60      Comments HD started   Access Visible Yes   Ultrafiltration Rate (ml/hr) 1000 ml/hr   Ultrafiltration Removed (ml) 0 ml   Hemodialysis Central Access - Temporary Right Neck   Placement Date: 02/04/25   Orientation: Right  Access Location: Neck   Continued need for line? Yes   Site Assessment Dry;Intact   Blue Lumen Status Brisk blood return;Flushed;Infusing   Red Lumen Status Brisk blood return;Flushed;Infusing

## 2025-02-06 LAB
ANION GAP SERPL CALC-SCNC: 7 MMOL/L (ref 2–12)
BUN SERPL-MCNC: 60 MG/DL (ref 6–20)
BUN/CREAT SERPL: 30 (ref 12–20)
CALCIUM SERPL-MCNC: 9.2 MG/DL (ref 8.5–10.1)
CHLORIDE SERPL-SCNC: 104 MMOL/L (ref 97–108)
CO2 SERPL-SCNC: 27 MMOL/L (ref 21–32)
CREAT SERPL-MCNC: 1.98 MG/DL (ref 0.7–1.3)
GLUCOSE BLD STRIP.AUTO-MCNC: 177 MG/DL (ref 65–117)
GLUCOSE BLD STRIP.AUTO-MCNC: 191 MG/DL (ref 65–117)
GLUCOSE BLD STRIP.AUTO-MCNC: 281 MG/DL (ref 65–117)
GLUCOSE BLD STRIP.AUTO-MCNC: 294 MG/DL (ref 65–117)
GLUCOSE SERPL-MCNC: 153 MG/DL (ref 65–100)
POTASSIUM SERPL-SCNC: 4.2 MMOL/L (ref 3.5–5.1)
SERVICE CMNT-IMP: ABNORMAL
SODIUM SERPL-SCNC: 138 MMOL/L (ref 136–145)

## 2025-02-06 PROCEDURE — 2060000000 HC ICU INTERMEDIATE R&B

## 2025-02-06 PROCEDURE — 80048 BASIC METABOLIC PNL TOTAL CA: CPT

## 2025-02-06 PROCEDURE — 82962 GLUCOSE BLOOD TEST: CPT

## 2025-02-06 PROCEDURE — 6360000002 HC RX W HCPCS: Performed by: NURSE PRACTITIONER

## 2025-02-06 PROCEDURE — 6370000000 HC RX 637 (ALT 250 FOR IP): Performed by: NURSE PRACTITIONER

## 2025-02-06 PROCEDURE — 36556 INSERT NON-TUNNEL CV CATH: CPT

## 2025-02-06 PROCEDURE — 2500000003 HC RX 250 WO HCPCS: Performed by: NURSE PRACTITIONER

## 2025-02-06 PROCEDURE — 6370000000 HC RX 637 (ALT 250 FOR IP): Performed by: STUDENT IN AN ORGANIZED HEALTH CARE EDUCATION/TRAINING PROGRAM

## 2025-02-06 PROCEDURE — 2580000003 HC RX 258: Performed by: INTERNAL MEDICINE

## 2025-02-06 PROCEDURE — 6360000002 HC RX W HCPCS: Performed by: INTERNAL MEDICINE

## 2025-02-06 PROCEDURE — 6370000000 HC RX 637 (ALT 250 FOR IP): Performed by: INTERNAL MEDICINE

## 2025-02-06 PROCEDURE — 36415 COLL VENOUS BLD VENIPUNCTURE: CPT

## 2025-02-06 RX ADMIN — CEFTAZIDIME, AVIBACTAM 0.94 G: 2; .5 POWDER, FOR SOLUTION INTRAVENOUS at 11:46

## 2025-02-06 RX ADMIN — SODIUM CHLORIDE, PRESERVATIVE FREE 10 ML: 5 INJECTION INTRAVENOUS at 22:55

## 2025-02-06 RX ADMIN — INSULIN LISPRO 2 UNITS: 100 INJECTION, SOLUTION INTRAVENOUS; SUBCUTANEOUS at 09:28

## 2025-02-06 RX ADMIN — INSULIN GLARGINE 6 UNITS: 100 INJECTION, SOLUTION SUBCUTANEOUS at 09:33

## 2025-02-06 RX ADMIN — PANTOPRAZOLE SODIUM 40 MG: 40 TABLET, DELAYED RELEASE ORAL at 18:04

## 2025-02-06 RX ADMIN — INSULIN LISPRO 4 UNITS: 100 INJECTION, SOLUTION INTRAVENOUS; SUBCUTANEOUS at 12:39

## 2025-02-06 RX ADMIN — CEFTAZIDIME, AVIBACTAM 0.94 G: 2; .5 POWDER, FOR SOLUTION INTRAVENOUS at 22:59

## 2025-02-06 RX ADMIN — INSULIN LISPRO 4 UNITS: 100 INJECTION, SOLUTION INTRAVENOUS; SUBCUTANEOUS at 22:52

## 2025-02-06 RX ADMIN — SODIUM CHLORIDE, PRESERVATIVE FREE 10 ML: 5 INJECTION INTRAVENOUS at 09:36

## 2025-02-06 RX ADMIN — Medication: at 22:53

## 2025-02-06 RX ADMIN — Medication: at 09:37

## 2025-02-06 RX ADMIN — METOPROLOL SUCCINATE 25 MG: 25 TABLET, EXTENDED RELEASE ORAL at 09:32

## 2025-02-06 RX ADMIN — ENOXAPARIN SODIUM 30 MG: 100 INJECTION SUBCUTANEOUS at 09:37

## 2025-02-06 RX ADMIN — ALLOPURINOL 50 MG: 100 TABLET ORAL at 22:53

## 2025-02-06 RX ADMIN — ISOSORBIDE MONONITRATE 60 MG: 30 TABLET, EXTENDED RELEASE ORAL at 09:32

## 2025-02-06 RX ADMIN — PANTOPRAZOLE SODIUM 40 MG: 40 TABLET, DELAYED RELEASE ORAL at 06:14

## 2025-02-06 RX ADMIN — LEVOTHYROXINE SODIUM 175 MCG: 0.12 TABLET ORAL at 09:32

## 2025-02-06 ASSESSMENT — PAIN SCALES - GENERAL
PAINLEVEL_OUTOF10: 0

## 2025-02-06 NOTE — CARE COORDINATION
Transition of Care Plan:     RUR: 24%  Prior Level of Functioning: assistance  Disposition: Plan is for [facility]  JABARI:   If SNF or IPR: Date FOC offered:   Date FOC received:   Accepting facility:   Date authorization started with reference number:   Date authorization received and expires:   Follow up appointments: TBD  DME needed: has a cane, walker wheelchair  Transportation at discharge: TBD  IM/IMM Medicare/ letter given:   Is patient a  and connected with VA?               If yes, was  transfer form completed and VA notified?   Caregiver Contact:      August Resendiz (Child)  [phone]     Discharge Caregiver contacted prior to discharge?   Care Conference needed?   Barriers to discharge: clinical improvement  [facility] has accepted for admission  Order received to initiate dialysis set up in community. Noted that patient refused dialysis this am. Will follow up with physician .  English Jimmy COPELAND CM  [phone]

## 2025-02-06 NOTE — CARE COORDINATION
CM Note:  Placed referral to [facility] to eval patient to go to the Hospice house.  Patient might go to SNF for skilled needs initially and transition to hospice. Family chose [facility] and [facility]. Referrals placed. Tentative dc on the 8th after abx.     Referral placed for [facility] to eval for Medicaid for LTC  English Jimmy COPELAND CM [phone]

## 2025-02-06 NOTE — DIALYSIS
Arrived to patient room to perform dialysis treatment. Patient states that he has decided to discontinue dialysis treatments. Primary nurse CE Solano RN aware. Dr Gramajo updated.

## 2025-02-06 NOTE — CONSULTS
Palliative Medicine  Patient Name: Maryan Resendiz  YOB: 1935  MRN: [id number]  Age: 89 y.o.  Gender: male      Spoke with nursing  Decision has already been made for hospice    ANIVAL South NP

## 2025-02-07 LAB
GLUCOSE BLD STRIP.AUTO-MCNC: 188 MG/DL (ref 65–117)
GLUCOSE BLD STRIP.AUTO-MCNC: 196 MG/DL (ref 65–117)
GLUCOSE BLD STRIP.AUTO-MCNC: 333 MG/DL (ref 65–117)
GLUCOSE BLD STRIP.AUTO-MCNC: 372 MG/DL (ref 65–117)
SERVICE CMNT-IMP: ABNORMAL

## 2025-02-07 PROCEDURE — 2700000000 HC OXYGEN THERAPY PER DAY

## 2025-02-07 PROCEDURE — 82962 GLUCOSE BLOOD TEST: CPT

## 2025-02-07 PROCEDURE — 6370000000 HC RX 637 (ALT 250 FOR IP): Performed by: STUDENT IN AN ORGANIZED HEALTH CARE EDUCATION/TRAINING PROGRAM

## 2025-02-07 PROCEDURE — 6370000000 HC RX 637 (ALT 250 FOR IP): Performed by: INTERNAL MEDICINE

## 2025-02-07 PROCEDURE — 2500000003 HC RX 250 WO HCPCS: Performed by: NURSE PRACTITIONER

## 2025-02-07 PROCEDURE — 6370000000 HC RX 637 (ALT 250 FOR IP): Performed by: NURSE PRACTITIONER

## 2025-02-07 PROCEDURE — 2060000000 HC ICU INTERMEDIATE R&B

## 2025-02-07 PROCEDURE — 2580000003 HC RX 258: Performed by: INTERNAL MEDICINE

## 2025-02-07 PROCEDURE — 6360000002 HC RX W HCPCS: Performed by: NURSE PRACTITIONER

## 2025-02-07 PROCEDURE — 6360000002 HC RX W HCPCS: Performed by: INTERNAL MEDICINE

## 2025-02-07 RX ADMIN — INSULIN LISPRO 8 UNITS: 100 INJECTION, SOLUTION INTRAVENOUS; SUBCUTANEOUS at 17:48

## 2025-02-07 RX ADMIN — SODIUM CHLORIDE, PRESERVATIVE FREE 10 ML: 5 INJECTION INTRAVENOUS at 21:21

## 2025-02-07 RX ADMIN — INSULIN GLARGINE 6 UNITS: 100 INJECTION, SOLUTION SUBCUTANEOUS at 09:16

## 2025-02-07 RX ADMIN — ENOXAPARIN SODIUM 30 MG: 100 INJECTION SUBCUTANEOUS at 09:17

## 2025-02-07 RX ADMIN — Medication: at 09:18

## 2025-02-07 RX ADMIN — PANTOPRAZOLE SODIUM 40 MG: 40 TABLET, DELAYED RELEASE ORAL at 06:40

## 2025-02-07 RX ADMIN — CEFTAZIDIME, AVIBACTAM 0.94 G: 2; .5 POWDER, FOR SOLUTION INTRAVENOUS at 22:30

## 2025-02-07 RX ADMIN — CEFTAZIDIME, AVIBACTAM 0.94 G: 2; .5 POWDER, FOR SOLUTION INTRAVENOUS at 09:35

## 2025-02-07 RX ADMIN — INSULIN LISPRO 2 UNITS: 100 INJECTION, SOLUTION INTRAVENOUS; SUBCUTANEOUS at 13:01

## 2025-02-07 RX ADMIN — Medication: at 21:21

## 2025-02-07 RX ADMIN — ISOSORBIDE MONONITRATE 60 MG: 30 TABLET, EXTENDED RELEASE ORAL at 09:17

## 2025-02-07 RX ADMIN — INSULIN LISPRO 6 UNITS: 100 INJECTION, SOLUTION INTRAVENOUS; SUBCUTANEOUS at 21:20

## 2025-02-07 RX ADMIN — SODIUM CHLORIDE, PRESERVATIVE FREE 10 ML: 5 INJECTION INTRAVENOUS at 09:15

## 2025-02-07 RX ADMIN — PANTOPRAZOLE SODIUM 40 MG: 40 TABLET, DELAYED RELEASE ORAL at 17:48

## 2025-02-07 RX ADMIN — METOPROLOL SUCCINATE 25 MG: 25 TABLET, EXTENDED RELEASE ORAL at 09:17

## 2025-02-07 RX ADMIN — INSULIN LISPRO 2 UNITS: 100 INJECTION, SOLUTION INTRAVENOUS; SUBCUTANEOUS at 09:15

## 2025-02-07 RX ADMIN — LEVOTHYROXINE SODIUM 175 MCG: 0.12 TABLET ORAL at 09:24

## 2025-02-07 RX ADMIN — FINASTERIDE 5 MG: 5 TABLET, FILM COATED ORAL at 09:17

## 2025-02-07 RX ADMIN — ALLOPURINOL 50 MG: 100 TABLET ORAL at 21:20

## 2025-02-07 ASSESSMENT — PAIN SCALES - GENERAL
PAINLEVEL_OUTOF10: 0

## 2025-02-07 NOTE — CARE COORDINATION
CM Update:  Patient will be going to [facility] assisted living [facility] on Monday. Dr. Mendel jadyn fill out the paperwok and the weekend  will submit it by fax to facility. We will arrange transportation on Monday for VERONIQUE Marquez RN CM   [phone]

## 2025-02-08 LAB
GLUCOSE BLD STRIP.AUTO-MCNC: 185 MG/DL (ref 65–117)
GLUCOSE BLD STRIP.AUTO-MCNC: 255 MG/DL (ref 65–117)
GLUCOSE BLD STRIP.AUTO-MCNC: 283 MG/DL (ref 65–117)
GLUCOSE BLD STRIP.AUTO-MCNC: 306 MG/DL (ref 65–117)
SERVICE CMNT-IMP: ABNORMAL

## 2025-02-08 PROCEDURE — 6370000000 HC RX 637 (ALT 250 FOR IP): Performed by: INTERNAL MEDICINE

## 2025-02-08 PROCEDURE — 86480 TB TEST CELL IMMUN MEASURE: CPT

## 2025-02-08 PROCEDURE — 82962 GLUCOSE BLOOD TEST: CPT

## 2025-02-08 PROCEDURE — 6370000000 HC RX 637 (ALT 250 FOR IP): Performed by: NURSE PRACTITIONER

## 2025-02-08 PROCEDURE — 2060000000 HC ICU INTERMEDIATE R&B

## 2025-02-08 PROCEDURE — 6360000002 HC RX W HCPCS: Performed by: NURSE PRACTITIONER

## 2025-02-08 PROCEDURE — 36415 COLL VENOUS BLD VENIPUNCTURE: CPT

## 2025-02-08 PROCEDURE — 2700000000 HC OXYGEN THERAPY PER DAY

## 2025-02-08 PROCEDURE — 2500000003 HC RX 250 WO HCPCS: Performed by: NURSE PRACTITIONER

## 2025-02-08 PROCEDURE — 6370000000 HC RX 637 (ALT 250 FOR IP): Performed by: STUDENT IN AN ORGANIZED HEALTH CARE EDUCATION/TRAINING PROGRAM

## 2025-02-08 RX ADMIN — SODIUM CHLORIDE, PRESERVATIVE FREE 10 ML: 5 INJECTION INTRAVENOUS at 21:54

## 2025-02-08 RX ADMIN — ENOXAPARIN SODIUM 30 MG: 100 INJECTION SUBCUTANEOUS at 09:43

## 2025-02-08 RX ADMIN — Medication: at 21:52

## 2025-02-08 RX ADMIN — INSULIN LISPRO 4 UNITS: 100 INJECTION, SOLUTION INTRAVENOUS; SUBCUTANEOUS at 12:24

## 2025-02-08 RX ADMIN — INSULIN LISPRO 2 UNITS: 100 INJECTION, SOLUTION INTRAVENOUS; SUBCUTANEOUS at 09:44

## 2025-02-08 RX ADMIN — LEVOTHYROXINE SODIUM 175 MCG: 0.12 TABLET ORAL at 09:44

## 2025-02-08 RX ADMIN — SODIUM CHLORIDE, PRESERVATIVE FREE 10 ML: 5 INJECTION INTRAVENOUS at 08:11

## 2025-02-08 RX ADMIN — PANTOPRAZOLE SODIUM 40 MG: 40 TABLET, DELAYED RELEASE ORAL at 17:37

## 2025-02-08 RX ADMIN — PANTOPRAZOLE SODIUM 40 MG: 40 TABLET, DELAYED RELEASE ORAL at 05:08

## 2025-02-08 RX ADMIN — ISOSORBIDE MONONITRATE 60 MG: 30 TABLET, EXTENDED RELEASE ORAL at 09:44

## 2025-02-08 RX ADMIN — INSULIN LISPRO 4 UNITS: 100 INJECTION, SOLUTION INTRAVENOUS; SUBCUTANEOUS at 21:53

## 2025-02-08 RX ADMIN — ALLOPURINOL 50 MG: 100 TABLET ORAL at 21:52

## 2025-02-08 RX ADMIN — INSULIN LISPRO 6 UNITS: 100 INJECTION, SOLUTION INTRAVENOUS; SUBCUTANEOUS at 17:38

## 2025-02-08 RX ADMIN — Medication: at 08:10

## 2025-02-08 RX ADMIN — INSULIN GLARGINE 6 UNITS: 100 INJECTION, SOLUTION SUBCUTANEOUS at 09:44

## 2025-02-08 RX ADMIN — METOPROLOL SUCCINATE 25 MG: 25 TABLET, EXTENDED RELEASE ORAL at 09:45

## 2025-02-08 ASSESSMENT — PAIN SCALES - GENERAL
PAINLEVEL_OUTOF10: 0
PAINLEVEL_OUTOF10: 0

## 2025-02-08 NOTE — CARE COORDINATION
CM received H&P paperwork from the patient's MD. CM faxed completed H&P to [facility] (fax: [phone]) today, 2/8/25, for review.     Lesa Cantrell, LCSSARINA  [phone]

## 2025-02-09 VITALS
HEIGHT: 70 IN | HEART RATE: 80 BPM | TEMPERATURE: 96.5 F | OXYGEN SATURATION: 90 % | SYSTOLIC BLOOD PRESSURE: 158 MMHG | BODY MASS INDEX: 30.24 KG/M2 | DIASTOLIC BLOOD PRESSURE: 59 MMHG | WEIGHT: 211.2 LBS | RESPIRATION RATE: 25 BRPM

## 2025-02-09 LAB
GLUCOSE BLD STRIP.AUTO-MCNC: 194 MG/DL (ref 65–117)
SERVICE CMNT-IMP: ABNORMAL

## 2025-02-09 PROCEDURE — 82962 GLUCOSE BLOOD TEST: CPT

## 2025-02-09 PROCEDURE — 6360000002 HC RX W HCPCS: Performed by: INTERNAL MEDICINE

## 2025-02-09 PROCEDURE — 2500000003 HC RX 250 WO HCPCS: Performed by: NURSE PRACTITIONER

## 2025-02-09 PROCEDURE — 2700000000 HC OXYGEN THERAPY PER DAY

## 2025-02-09 PROCEDURE — 6370000000 HC RX 637 (ALT 250 FOR IP): Performed by: NURSE PRACTITIONER

## 2025-02-09 PROCEDURE — 2060000000 HC ICU INTERMEDIATE R&B

## 2025-02-09 PROCEDURE — 6370000000 HC RX 637 (ALT 250 FOR IP): Performed by: STUDENT IN AN ORGANIZED HEALTH CARE EDUCATION/TRAINING PROGRAM

## 2025-02-09 RX ORDER — MORPHINE SULFATE 2 MG/ML
1 INJECTION, SOLUTION INTRAMUSCULAR; INTRAVENOUS ONCE
Status: COMPLETED | OUTPATIENT
Start: 2025-02-09 | End: 2025-02-09

## 2025-02-09 RX ORDER — MORPHINE SULFATE 4 MG/ML
4 INJECTION, SOLUTION INTRAMUSCULAR; INTRAVENOUS
Status: DISCONTINUED | OUTPATIENT
Start: 2025-02-09 | End: 2025-02-10

## 2025-02-09 RX ORDER — MORPHINE SULFATE 20 MG/ML
10 SOLUTION ORAL
Status: DISCONTINUED | OUTPATIENT
Start: 2025-02-09 | End: 2025-02-10

## 2025-02-09 RX ORDER — FUROSEMIDE 10 MG/ML
40 INJECTION INTRAMUSCULAR; INTRAVENOUS ONCE
Status: COMPLETED | OUTPATIENT
Start: 2025-02-09 | End: 2025-02-09

## 2025-02-09 RX ORDER — LORAZEPAM 2 MG/ML
1 CONCENTRATE ORAL
Status: DISCONTINUED | OUTPATIENT
Start: 2025-02-09 | End: 2025-02-10

## 2025-02-09 RX ORDER — MORPHINE SULFATE 2 MG/ML
2 INJECTION, SOLUTION INTRAMUSCULAR; INTRAVENOUS
Status: DISCONTINUED | OUTPATIENT
Start: 2025-02-09 | End: 2025-02-10

## 2025-02-09 RX ORDER — LORAZEPAM 2 MG/ML
1 INJECTION INTRAMUSCULAR ONCE
Status: COMPLETED | OUTPATIENT
Start: 2025-02-09 | End: 2025-02-09

## 2025-02-09 RX ORDER — LORAZEPAM 2 MG/ML
1 INJECTION INTRAMUSCULAR EVERY 6 HOURS PRN
Status: DISCONTINUED | OUTPATIENT
Start: 2025-02-09 | End: 2025-02-10

## 2025-02-09 RX ORDER — GLYCOPYRROLATE 0.2 MG/ML
0.2 INJECTION INTRAMUSCULAR; INTRAVENOUS EVERY 4 HOURS PRN
Status: DISCONTINUED | OUTPATIENT
Start: 2025-02-09 | End: 2025-02-10 | Stop reason: HOSPADM

## 2025-02-09 RX ADMIN — LORAZEPAM 1 MG: 2 SOLUTION, CONCENTRATE ORAL at 20:43

## 2025-02-09 RX ADMIN — Medication: at 10:08

## 2025-02-09 RX ADMIN — MORPHINE SULFATE 1 MG: 2 INJECTION, SOLUTION INTRAMUSCULAR; INTRAVENOUS at 10:06

## 2025-02-09 RX ADMIN — PANTOPRAZOLE SODIUM 40 MG: 40 TABLET, DELAYED RELEASE ORAL at 06:47

## 2025-02-09 RX ADMIN — SODIUM CHLORIDE, PRESERVATIVE FREE 10 ML: 5 INJECTION INTRAVENOUS at 10:09

## 2025-02-09 RX ADMIN — Medication: at 20:44

## 2025-02-09 RX ADMIN — LORAZEPAM 1 MG: 2 INJECTION INTRAMUSCULAR; INTRAVENOUS at 10:08

## 2025-02-09 RX ADMIN — SODIUM CHLORIDE, PRESERVATIVE FREE 10 ML: 5 INJECTION INTRAVENOUS at 20:46

## 2025-02-09 RX ADMIN — FUROSEMIDE 40 MG: 10 INJECTION, SOLUTION INTRAMUSCULAR; INTRAVENOUS at 10:08

## 2025-02-09 ASSESSMENT — PAIN SCALES - GENERAL
PAINLEVEL_OUTOF10: 0
PAINLEVEL_OUTOF10: 1

## 2025-02-09 NOTE — SIGNIFICANT EVENT
[facility]  RAPID RESPONSE TEAM   Rapid Response NOTE       RRT RN was called by primary RN to room # 2156/01  @ 0820 for patient Maryan Resendiz , MRN# [id number]      S- Reason for rapid response: Desaturation/respiratory distress  Per primary RN:   Background: admitted on 1/27, HX of CKD, HF, afib, DM, GERD, hypothyroidism, anemia. Worsening work of breathing, renal and pelvic lesions, patient previously opted for hospice.  O/A- Focused Assessment:   Cardiac- Afib on the monitor, rate controlled, bp stable, pulses stable.   Respiratory- patient on NRB, using accessory muscles, complains of shortness of breath and discomfort. patient stated that \"I am tired and do not want to do this anymore. I know that I am dying but not like this. I want to be comfortable\". Informed patient that I will speak to his primary provider regarding his request.    [place]ero- awake and answering questions  MSK/Skin- able to move extremities  Labs/chart reviewed-  Yes.      Interventions:      Spoke to Dr. Arechiga, and informed him of above, provider will contact the family and see the patient.   No immediate intervention at this time as patient does not want to escalate care.        P- Outcome:   Patient remained on BRN at this time, sats 88, RT was updated as well.     Patient Disposition:   Patient transferred to higher level of care following RRT?: No.       Thank you for this RRT call. Patient/family education provided as applicable.   Care Collaborated with primary RN, CRN, RT, and attending MD.   Please refer to the flowsheet for detailed vital signs during this event.   See MD notes for details and plan of care.     Code Status: DNR   Attending MD: Mendel, David L, MD Ali, BSN, RN, CCRN, TCRN  Rapid Response Team  Ext [phone]

## 2025-02-09 NOTE — CARE COORDINATION
Transition of Care Plan:    RUR: 21%  Prior Level of Functioning: Assistance w/ ADL's  Disposition: Plan A: [facility]                        Plan B: [facility] vs. [facility]  JABARI: 2/10 vs. 2/11  Follow up appointments: PCP & specialists as indicated  DME needed: TBD - has cane, RW, w/c  Transportation at discharge: BLS vs. Family  IM/IMM Medicare/ letter given: 2nd IM given 2/7  Is patient a  and connected with VA? N/a   If yes, was  transfer form completed and VA notified? N/a  Caregiver Contact: August Resendiz, son ([phone])  Discharge Caregiver contacted prior to discharge? CM to contact  Care Conference needed? No  Barriers to discharge:  hemodynamic stability, hospice logistics, placement    CM spoke with MD about dispo plan. Requesting  Hospice to re-evaluate tomorrow morning for possible G[facility] vs. Hospice House eligibility. [facility] likely can only accommodate home hospice, not GIP. CM contacted  Hospice via [facility] requesting hospice re-evaluation tomorrow morning with primary CM's contact information. Pending response.     Monique Sullivan LCSW  [facility] Care Manager  [phone]

## 2025-02-10 ENCOUNTER — HOSPITAL ENCOUNTER (INPATIENT)
Facility: HOSPITAL | Age: 89
LOS: 1 days | DRG: 951 | End: 2025-02-10
Attending: INTERNAL MEDICINE | Admitting: INTERNAL MEDICINE
Payer: MEDICARE

## 2025-02-10 PROBLEM — J96.01 ACUTE HYPOXIC RESPIRATORY FAILURE: Status: ACTIVE | Noted: 2025-02-10

## 2025-02-10 PROCEDURE — 6560000002 HC HOSPICE GENERAL INPATIENT

## 2025-02-10 PROCEDURE — 6360000002 HC RX W HCPCS: Performed by: INTERNAL MEDICINE

## 2025-02-10 PROCEDURE — 6370000000 HC RX 637 (ALT 250 FOR IP): Performed by: STUDENT IN AN ORGANIZED HEALTH CARE EDUCATION/TRAINING PROGRAM

## 2025-02-10 PROCEDURE — 2700000000 HC OXYGEN THERAPY PER DAY

## 2025-02-10 PROCEDURE — 2500000003 HC RX 250 WO HCPCS: Performed by: NURSE PRACTITIONER

## 2025-02-10 RX ORDER — LORAZEPAM 2 MG/ML
1 INJECTION INTRAMUSCULAR
Status: DISCONTINUED | OUTPATIENT
Start: 2025-02-10 | End: 2025-02-10 | Stop reason: HOSPADM

## 2025-02-10 RX ORDER — GLYCOPYRROLATE 0.2 MG/ML
0.2 INJECTION INTRAMUSCULAR; INTRAVENOUS EVERY 4 HOURS PRN
Status: DISCONTINUED | OUTPATIENT
Start: 2025-02-10 | End: 2025-02-10 | Stop reason: HOSPADM

## 2025-02-10 RX ORDER — HYDROMORPHONE HYDROCHLORIDE 1 MG/ML
1 INJECTION, SOLUTION INTRAMUSCULAR; INTRAVENOUS; SUBCUTANEOUS EVERY 4 HOURS
Status: DISCONTINUED | OUTPATIENT
Start: 2025-02-10 | End: 2025-02-10 | Stop reason: HOSPADM

## 2025-02-10 RX ORDER — KETOROLAC TROMETHAMINE 30 MG/ML
30 INJECTION, SOLUTION INTRAMUSCULAR; INTRAVENOUS EVERY 6 HOURS PRN
Status: DISCONTINUED | OUTPATIENT
Start: 2025-02-10 | End: 2025-02-10 | Stop reason: HOSPADM

## 2025-02-10 RX ORDER — MORPHINE SULFATE 2 MG/ML
2 INJECTION, SOLUTION INTRAMUSCULAR; INTRAVENOUS EVERY 4 HOURS
Status: DISCONTINUED | OUTPATIENT
Start: 2025-02-10 | End: 2025-02-10

## 2025-02-10 RX ORDER — HYDROMORPHONE HYDROCHLORIDE 1 MG/ML
1 INJECTION, SOLUTION INTRAMUSCULAR; INTRAVENOUS; SUBCUTANEOUS
Status: DISCONTINUED | OUTPATIENT
Start: 2025-02-10 | End: 2025-02-10 | Stop reason: HOSPADM

## 2025-02-10 RX ORDER — SODIUM CHLORIDE 0.9 % (FLUSH) 0.9 %
5-40 SYRINGE (ML) INJECTION PRN
Status: DISCONTINUED | OUTPATIENT
Start: 2025-02-10 | End: 2025-02-10 | Stop reason: HOSPADM

## 2025-02-10 RX ORDER — LORAZEPAM 2 MG/ML
1 INJECTION INTRAMUSCULAR EVERY 4 HOURS
Status: DISCONTINUED | OUTPATIENT
Start: 2025-02-10 | End: 2025-02-10 | Stop reason: HOSPADM

## 2025-02-10 RX ORDER — BISACODYL 10 MG
10 SUPPOSITORY, RECTAL RECTAL DAILY PRN
Status: DISCONTINUED | OUTPATIENT
Start: 2025-02-10 | End: 2025-02-10 | Stop reason: HOSPADM

## 2025-02-10 RX ADMIN — MORPHINE SULFATE 10 MG: 10 SOLUTION ORAL at 08:55

## 2025-02-10 RX ADMIN — HYDROMORPHONE HYDROCHLORIDE 1 MG: 1 INJECTION, SOLUTION INTRAMUSCULAR; INTRAVENOUS; SUBCUTANEOUS at 15:02

## 2025-02-10 RX ADMIN — SODIUM CHLORIDE, PRESERVATIVE FREE 10 ML: 5 INJECTION INTRAVENOUS at 08:55

## 2025-02-10 RX ADMIN — MORPHINE SULFATE 10 MG: 10 SOLUTION ORAL at 02:10

## 2025-02-10 RX ADMIN — HYDROMORPHONE HYDROCHLORIDE 1 MG: 1 INJECTION, SOLUTION INTRAMUSCULAR; INTRAVENOUS; SUBCUTANEOUS at 16:53

## 2025-02-10 RX ADMIN — MORPHINE SULFATE 10 MG: 10 SOLUTION ORAL at 12:35

## 2025-02-10 RX ADMIN — Medication: at 08:55

## 2025-02-10 ASSESSMENT — PAIN SCALES - GENERAL: PAINLEVEL_OUTOF10: 0

## 2025-02-10 NOTE — PLAN OF CARE
Problem: Chronic Conditions and Co-morbidities  Goal: Patient's chronic conditions and co-morbidity symptoms are monitored and maintained or improved  1/29/2025 1054 by Nayla Etienne RN  Outcome: Progressing  1/28/2025 2254 by Ijeoma Oneill RN  Outcome: Progressing     Problem: Skin/Tissue Integrity  Goal: Skin integrity remains intact  Description: 1.  Monitor for areas of redness and/or skin breakdown  2.  Assess vascular access sites hourly  3.  Every 4-6 hours minimum:  Change oxygen saturation probe site  4.  Every 4-6 hours:  If on nasal continuous positive airway pressure, respiratory therapy assess nares and determine need for appliance change or resting period  1/29/2025 1054 by Nayla Etienne RN  Outcome: Progressing  1/28/2025 2254 by Ijeoma Oneill RN  Outcome: Progressing     Problem: Pain  Goal: Verbalizes/displays adequate comfort level or baseline comfort level  1/29/2025 1054 by Nayla Etienne RN  Outcome: Progressing  1/28/2025 2254 by Ijeoma Oneill RN  Outcome: Progressing     Problem: Safety - Adult  Goal: Free from fall injury  1/29/2025 1054 by Nayla Etienne RN  Outcome: Progressing  1/28/2025 2254 by Ijeoma Oneill RN  Outcome: Progressing     Problem: Discharge Planning  Goal: Discharge to home or other facility with appropriate resources  1/29/2025 1054 by Nayla Etienne RN  Outcome: Progressing  1/28/2025 2254 by Ijeoma Oneill RN  Outcome: Progressing     Problem: ABCDS Injury Assessment  Goal: Absence of physical injury  1/29/2025 1054 by Nayla Etienne RN  Outcome: Progressing  1/28/2025 2254 by Ijeoma Oneill RN  Outcome: Progressing     Problem: Respiratory - Adult  Goal: Achieves optimal ventilation and oxygenation  1/29/2025 1054 by Nayla Etienne RN  Outcome: Progressing  1/28/2025 2254 by Ijeoma Oneill RN  Outcome: Progressing     Problem: Cardiovascular - Adult  Goal: Maintains optimal cardiac output and hemodynamic stability  1/29/2025 1054 by 
  Problem: Chronic Conditions and Co-morbidities  Goal: Patient's chronic conditions and co-morbidity symptoms are monitored and maintained or improved  2/3/2025 1946 by Hailey Pulido RN  Outcome: Progressing  2/3/2025 1705 by Monique Padgett RN  Outcome: Progressing  Flowsheets (Taken 2/3/2025 0805)  Care Plan - Patient's Chronic Conditions and Co-Morbidity Symptoms are Monitored and Maintained or Improved: Monitor and assess patient's chronic conditions and comorbid symptoms for stability, deterioration, or improvement     Problem: Skin/Tissue Integrity  Goal: Skin integrity remains intact  Description: 1.  Monitor for areas of redness and/or skin breakdown  2.  Assess vascular access sites hourly  3.  Every 4-6 hours minimum:  Change oxygen saturation probe site  4.  Every 4-6 hours:  If on nasal continuous positive airway pressure, respiratory therapy assess nares and determine need for appliance change or resting period  2/3/2025 1946 by Hailey Pulido RN  Outcome: Progressing  2/3/2025 1705 by Monique Padgett RN  Outcome: Progressing  Flowsheets (Taken 2/3/2025 0805)  Skin Integrity Remains Intact: Monitor for areas of redness and/or skin breakdown     Problem: Pain  Goal: Verbalizes/displays adequate comfort level or baseline comfort level  2/3/2025 1946 by Hailey Pulido RN  Outcome: Progressing  2/3/2025 1705 by Monique Padgett RN  Outcome: Progressing  Flowsheets  Taken 2/3/2025 1529  Verbalizes/displays adequate comfort level or baseline comfort level: Encourage patient to monitor pain and request assistance  Taken 2/3/2025 1154  Verbalizes/displays adequate comfort level or baseline comfort level: Assess pain using appropriate pain scale  Taken 2/3/2025 0805  Verbalizes/displays adequate comfort level or baseline comfort level: Encourage patient to monitor pain and request assistance     Problem: Safety - Adult  Goal: Free from fall injury  2/3/2025 1705 by Monique Padgett RN  Outcome: 
  Problem: Chronic Conditions and Co-morbidities  Goal: Patient's chronic conditions and co-morbidity symptoms are monitored and maintained or improved  2/4/2025 1027 by Monique Padgett RN  Outcome: Progressing  Flowsheets (Taken 2/4/2025 0741)  Care Plan - Patient's Chronic Conditions and Co-Morbidity Symptoms are Monitored and Maintained or Improved: Monitor and assess patient's chronic conditions and comorbid symptoms for stability, deterioration, or improvement     Problem: Skin/Tissue Integrity  Goal: Skin integrity remains intact  Description: 1.  Monitor for areas of redness and/or skin breakdown  2.  Assess vascular access sites hourly  3.  Every 4-6 hours minimum:  Change oxygen saturation probe site  4.  Every 4-6 hours:  If on nasal continuous positive airway pressure, respiratory therapy assess nares and determine need for appliance change or resting period  2/4/2025 1027 by Monique Padgett RN  Outcome: Progressing     Problem: Pain  Goal: Verbalizes/displays adequate comfort level or baseline comfort level  Recent Flowsheet Documentation  Taken 2/4/2025 1442 by Monique Padgett RN  Verbalizes/displays adequate comfort level or baseline comfort level: Encourage patient to monitor pain and request assistance  Taken 2/4/2025 1158 by Monique Padgett RN  Verbalizes/displays adequate comfort level or baseline comfort level: Encourage patient to monitor pain and request assistance  2/4/2025 1027 by Monique Padgett RN  Outcome: Progressing  Flowsheets (Taken 2/4/2025 0741)  Verbalizes/displays adequate comfort level or baseline comfort level: Encourage patient to monitor pain and request assistance     Problem: Safety - Adult  Goal: Free from fall injury  2/4/2025 1027 by Monique Padgett RN  Outcome: Progressing     Problem: Discharge Planning  Goal: Discharge to home or other facility with appropriate resources  2/4/2025 1027 by Monique Padgett RN  Outcome: Progressing  Flowsheets (Taken 2/4/2025 0741)  Discharge to 
  Problem: Chronic Conditions and Co-morbidities  Goal: Patient's chronic conditions and co-morbidity symptoms are monitored and maintained or improved  2/4/2025 1941 by Hailey Pulido RN  Outcome: Progressing  2/4/2025 1027 by Monique Padgett RN  Outcome: Progressing  Flowsheets (Taken 2/4/2025 0741)  Care Plan - Patient's Chronic Conditions and Co-Morbidity Symptoms are Monitored and Maintained or Improved: Monitor and assess patient's chronic conditions and comorbid symptoms for stability, deterioration, or improvement     Problem: Skin/Tissue Integrity  Goal: Skin integrity remains intact  Description: 1.  Monitor for areas of redness and/or skin breakdown  2.  Assess vascular access sites hourly  3.  Every 4-6 hours minimum:  Change oxygen saturation probe site  4.  Every 4-6 hours:  If on nasal continuous positive airway pressure, respiratory therapy assess nares and determine need for appliance change or resting period  2/4/2025 1941 by Hailey Pulido RN  Outcome: Progressing  2/4/2025 1027 by Monique Padgett RN  Outcome: Progressing     Problem: Pain  Goal: Verbalizes/displays adequate comfort level or baseline comfort level  2/4/2025 1941 by Hailey Pulido RN  Outcome: Progressing  Flowsheets  Taken 2/4/2025 1442 by Monique Padgett RN  Verbalizes/displays adequate comfort level or baseline comfort level: Encourage patient to monitor pain and request assistance  Taken 2/4/2025 1158 by Monique Padgett RN  Verbalizes/displays adequate comfort level or baseline comfort level: Encourage patient to monitor pain and request assistance  2/4/2025 1027 by Monique Padgett RN  Outcome: Progressing  Flowsheets (Taken 2/4/2025 0741)  Verbalizes/displays adequate comfort level or baseline comfort level: Encourage patient to monitor pain and request assistance     Problem: Safety - Adult  Goal: Free from fall injury  2/4/2025 1027 by Monique Padgett RN  Outcome: Progressing     Problem: Discharge Planning  Goal: 
  Problem: Chronic Conditions and Co-morbidities  Goal: Patient's chronic conditions and co-morbidity symptoms are monitored and maintained or improved  2/7/2025 2002 by Dacia Vergara RN  Outcome: Progressing  2/7/2025 1444 by Kristie Recio RN  Outcome: Progressing     Problem: Skin/Tissue Integrity  Goal: Skin integrity remains intact  Description: 1.  Monitor for areas of redness and/or skin breakdown  2.  Assess vascular access sites hourly  3.  Every 4-6 hours minimum:  Change oxygen saturation probe site  4.  Every 4-6 hours:  If on nasal continuous positive airway pressure, respiratory therapy assess nares and determine need for appliance change or resting period  2/7/2025 2002 by Dacia Vergara RN  Outcome: Progressing  Flowsheets (Taken 2/7/2025 1945)  Skin Integrity Remains Intact: Monitor for areas of redness and/or skin breakdown  2/7/2025 1444 by Kristie Recio RN  Outcome: Progressing     Problem: Pain  Goal: Verbalizes/displays adequate comfort level or baseline comfort level  2/7/2025 2002 by Dacia Vergara RN  Outcome: Progressing  2/7/2025 1444 by Kristie Recio RN  Outcome: Progressing     Problem: Safety - Adult  Goal: Free from fall injury  Outcome: Progressing     Problem: Discharge Planning  Goal: Discharge to home or other facility with appropriate resources  Outcome: Progressing     Problem: ABCDS Injury Assessment  Goal: Absence of physical injury  Outcome: Progressing     Problem: Respiratory - Adult  Goal: Achieves optimal ventilation and oxygenation  Outcome: Progressing     Problem: Cardiovascular - Adult  Goal: Maintains optimal cardiac output and hemodynamic stability  Outcome: Progressing  Goal: Absence of cardiac dysrhythmias or at baseline  Outcome: Progressing     Problem: Skin/Tissue Integrity - Adult  Goal: Skin integrity remains intact  Description: 1.  Monitor for areas of redness and/or skin breakdown  2.  Assess vascular access sites hourly  3.  Every 4-6 hours 
  Problem: Chronic Conditions and Co-morbidities  Goal: Patient's chronic conditions and co-morbidity symptoms are monitored and maintained or improved  2/9/2025 2020 by Hailey Pulido RN  Outcome: Progressing  2/9/2025 1111 by Pilar Romero RN  Outcome: Progressing     Problem: Skin/Tissue Integrity  Goal: Skin integrity remains intact  Description: 1.  Monitor for areas of redness and/or skin breakdown  2.  Assess vascular access sites hourly  3.  Every 4-6 hours minimum:  Change oxygen saturation probe site  4.  Every 4-6 hours:  If on nasal continuous positive airway pressure, respiratory therapy assess nares and determine need for appliance change or resting period  2/9/2025 2020 by Hailey Pulido RN  Outcome: Progressing  2/9/2025 1111 by Pilar Romero RN  Outcome: Progressing     Problem: Pain  Goal: Verbalizes/displays adequate comfort level or baseline comfort level  2/9/2025 2020 by Hailey Pulido RN  Outcome: Progressing  2/9/2025 1111 by Pilar Romero RN  Outcome: Progressing     Problem: Safety - Adult  Goal: Free from fall injury  2/9/2025 1111 by Pilar Romero RN  Outcome: Progressing     Problem: Discharge Planning  Goal: Discharge to home or other facility with appropriate resources  2/9/2025 1111 by Pilar Romero RN  Outcome: Progressing     Problem: ABCDS Injury Assessment  Goal: Absence of physical injury  2/9/2025 1111 by Pilar Romero RN  Outcome: Progressing     Problem: Respiratory - Adult  Goal: Achieves optimal ventilation and oxygenation  2/9/2025 1111 by Pilar Romero RN  Outcome: Progressing     Problem: Cardiovascular - Adult  Goal: Maintains optimal cardiac output and hemodynamic stability  2/9/2025 1111 by Pilar Romero RN  Outcome: Progressing  Goal: Absence of cardiac dysrhythmias or at baseline  2/9/2025 1111 by Pilar Romero RN  Outcome: Progressing     Problem: Skin/Tissue Integrity - Adult  Goal: Skin integrity remains 
  Problem: Chronic Conditions and Co-morbidities  Goal: Patient's chronic conditions and co-morbidity symptoms are monitored and maintained or improved  Outcome: Progressing     Problem: Skin/Tissue Integrity  Goal: Skin integrity remains intact  Description: 1.  Monitor for areas of redness and/or skin breakdown  2.  Assess vascular access sites hourly  3.  Every 4-6 hours minimum:  Change oxygen saturation probe site  4.  Every 4-6 hours:  If on nasal continuous positive airway pressure, respiratory therapy assess nares and determine need for appliance change or resting period  2/9/2025 1111 by Pilar Romero RN  Outcome: Progressing  2/9/2025 0037 by Yamila Sahu RN  Outcome: Progressing     Problem: Pain  Goal: Verbalizes/displays adequate comfort level or baseline comfort level  2/9/2025 1111 by Pilar Romero RN  Outcome: Progressing  2/9/2025 0037 by Yamila Sahu RN  Outcome: Adequate for Discharge     Problem: Safety - Adult  Goal: Free from fall injury  2/9/2025 1111 by Pilar Romero RN  Outcome: Progressing  2/9/2025 0037 by Yamila Sahu RN  Outcome: Adequate for Discharge     Problem: Discharge Planning  Goal: Discharge to home or other facility with appropriate resources  Outcome: Progressing     Problem: ABCDS Injury Assessment  Goal: Absence of physical injury  Outcome: Progressing     Problem: Respiratory - Adult  Goal: Achieves optimal ventilation and oxygenation  Outcome: Progressing     Problem: Cardiovascular - Adult  Goal: Maintains optimal cardiac output and hemodynamic stability  Outcome: Progressing  Goal: Absence of cardiac dysrhythmias or at baseline  Outcome: Progressing     Problem: Skin/Tissue Integrity - Adult  Goal: Skin integrity remains intact  Description: 1.  Monitor for areas of redness and/or skin breakdown  2.  Assess vascular access sites hourly  3.  Every 4-6 hours minimum:  Change oxygen saturation probe site  4.  Every 4-6 hours:  If on nasal continuous 
  Problem: Chronic Conditions and Co-morbidities  Goal: Patient's chronic conditions and co-morbidity symptoms are monitored and maintained or improved  Outcome: Progressing     Problem: Skin/Tissue Integrity  Goal: Skin integrity remains intact  Description: 1.  Monitor for areas of redness and/or skin breakdown  2.  Assess vascular access sites hourly  3.  Every 4-6 hours minimum:  Change oxygen saturation probe site  4.  Every 4-6 hours:  If on nasal continuous positive airway pressure, respiratory therapy assess nares and determine need for appliance change or resting period  Outcome: Progressing     Problem: Pain  Goal: Verbalizes/displays adequate comfort level or baseline comfort level  Outcome: Progressing     Problem: Safety - Adult  Goal: Free from fall injury  Outcome: Progressing     Problem: Discharge Planning  Goal: Discharge to home or other facility with appropriate resources  Outcome: Progressing     Problem: ABCDS Injury Assessment  Goal: Absence of physical injury  Outcome: Progressing     Problem: Respiratory - Adult  Goal: Achieves optimal ventilation and oxygenation  Outcome: Progressing     Problem: Cardiovascular - Adult  Goal: Maintains optimal cardiac output and hemodynamic stability  Outcome: Progressing  Goal: Absence of cardiac dysrhythmias or at baseline  Outcome: Progressing     Problem: Skin/Tissue Integrity - Adult  Goal: Skin integrity remains intact  Description: 1.  Monitor for areas of redness and/or skin breakdown  2.  Assess vascular access sites hourly  3.  Every 4-6 hours minimum:  Change oxygen saturation probe site  4.  Every 4-6 hours:  If on nasal continuous positive airway pressure, respiratory therapy assess nares and determine need for appliance change or resting period  Outcome: Progressing  Goal: Incisions, wounds, or drain sites healing without S/S of infection  Outcome: Progressing  Goal: Oral mucous membranes remain intact  Outcome: Progressing     Problem: 
  Problem: Chronic Conditions and Co-morbidities  Goal: Patient's chronic conditions and co-morbidity symptoms are monitored and maintained or improved  Outcome: Progressing     Problem: Skin/Tissue Integrity  Goal: Skin integrity remains intact  Description: 1.  Monitor for areas of redness and/or skin breakdown  2.  Assess vascular access sites hourly  3.  Every 4-6 hours minimum:  Change oxygen saturation probe site  4.  Every 4-6 hours:  If on nasal continuous positive airway pressure, respiratory therapy assess nares and determine need for appliance change or resting period  Outcome: Progressing     Problem: Pain  Goal: Verbalizes/displays adequate comfort level or baseline comfort level  Outcome: Progressing     Problem: Skin/Tissue Integrity - Adult  Goal: Skin integrity remains intact  Description: 1.  Monitor for areas of redness and/or skin breakdown  2.  Assess vascular access sites hourly  3.  Every 4-6 hours minimum:  Change oxygen saturation probe site  4.  Every 4-6 hours:  If on nasal continuous positive airway pressure, respiratory therapy assess nares and determine need for appliance change or resting period  Outcome: Progressing     
  Problem: Chronic Conditions and Co-morbidities  Goal: Patient's chronic conditions and co-morbidity symptoms are monitored and maintained or improved  Outcome: Progressing     Problem: Skin/Tissue Integrity  Goal: Skin integrity remains intact  Description: 1.  Monitor for areas of redness and/or skin breakdown  2.  Assess vascular access sites hourly  3.  Every 4-6 hours minimum:  Change oxygen saturation probe site  4.  Every 4-6 hours:  If on nasal continuous positive airway pressure, respiratory therapy assess nares and determine need for appliance change or resting period  Outcome: Progressing     Problem: Pain  Goal: Verbalizes/displays adequate comfort level or baseline comfort level  Outcome: Progressing     Problem: Skin/Tissue Integrity - Adult  Goal: Skin integrity remains intact  Description: 1.  Monitor for areas of redness and/or skin breakdown  2.  Assess vascular access sites hourly  3.  Every 4-6 hours minimum:  Change oxygen saturation probe site  4.  Every 4-6 hours:  If on nasal continuous positive airway pressure, respiratory therapy assess nares and determine need for appliance change or resting period  Outcome: Progressing     
  Problem: Chronic Conditions and Co-morbidities  Goal: Patient's chronic conditions and co-morbidity symptoms are monitored and maintained or improved  Outcome: Progressing     Problem: Skin/Tissue Integrity  Goal: Skin integrity remains intact  Description: 1.  Monitor for areas of redness and/or skin breakdown  2.  Assess vascular access sites hourly  3.  Every 4-6 hours minimum:  Change oxygen saturation probe site  4.  Every 4-6 hours:  If on nasal continuous positive airway pressure, respiratory therapy assess nares and determine need for appliance change or resting period  Outcome: Progressing     Problem: Respiratory - Adult  Goal: Achieves optimal ventilation and oxygenation  Outcome: Progressing     Problem: Skin/Tissue Integrity - Adult  Goal: Skin integrity remains intact  Description: 1.  Monitor for areas of redness and/or skin breakdown  2.  Assess vascular access sites hourly  3.  Every 4-6 hours minimum:  Change oxygen saturation probe site  4.  Every 4-6 hours:  If on nasal continuous positive airway pressure, respiratory therapy assess nares and determine need for appliance change or resting period  Outcome: Progressing     Problem: Occupational Therapy - Adult  Goal: By Discharge: Performs self-care activities at highest level of function for planned discharge setting.  See evaluation for individualized goals.  Description: FUNCTIONAL STATUS PRIOR TO ADMISSION:  Pt recently discharged from [facility] 1/22/2024 and reports independence with ADLs and functional mobility, however began declining once home, requiring increased assist from son. Pt admitted 11/30-12/12/2024 s/p fall, transitioned to SNF, then was readmitted 1/6/2025-1/13/2025, then transitioned to [facility]. Lives alone, has supportive son, but he lives in [place].  Receives Help From: Family, Prior Level of Assist for ADLs: Needs assistance, Prior Level of Assist for Homemaking: Needs assistance, Ambulation Assistance: Needs assistance,  ,   
  Problem: Chronic Conditions and Co-morbidities  Goal: Patient's chronic conditions and co-morbidity symptoms are monitored and maintained or improved  Outcome: Progressing  Flowsheets (Taken 2/3/2025 0805)  Care Plan - Patient's Chronic Conditions and Co-Morbidity Symptoms are Monitored and Maintained or Improved: Monitor and assess patient's chronic conditions and comorbid symptoms for stability, deterioration, or improvement     Problem: Skin/Tissue Integrity  Goal: Skin integrity remains intact  Description: 1.  Monitor for areas of redness and/or skin breakdown  2.  Assess vascular access sites hourly  3.  Every 4-6 hours minimum:  Change oxygen saturation probe site  4.  Every 4-6 hours:  If on nasal continuous positive airway pressure, respiratory therapy assess nares and determine need for appliance change or resting period  Outcome: Progressing  Flowsheets (Taken 2/3/2025 0805)  Skin Integrity Remains Intact: Monitor for areas of redness and/or skin breakdown     Problem: Pain  Goal: Verbalizes/displays adequate comfort level or baseline comfort level  Outcome: Progressing  Flowsheets  Taken 2/3/2025 1529  Verbalizes/displays adequate comfort level or baseline comfort level: Encourage patient to monitor pain and request assistance  Taken 2/3/2025 1154  Verbalizes/displays adequate comfort level or baseline comfort level: Assess pain using appropriate pain scale  Taken 2/3/2025 0805  Verbalizes/displays adequate comfort level or baseline comfort level: Encourage patient to monitor pain and request assistance     Problem: Safety - Adult  Goal: Free from fall injury  Outcome: Progressing     Problem: Discharge Planning  Goal: Discharge to home or other facility with appropriate resources  Outcome: Progressing  Flowsheets (Taken 2/3/2025 0805)  Discharge to home or other facility with appropriate resources: Identify barriers to discharge with patient and caregiver     Problem: ABCDS Injury Assessment  Goal: 
  Problem: Chronic Conditions and Co-morbidities  Goal: Patient's chronic conditions and co-morbidity symptoms are monitored and maintained or improved  Outcome: Progressing  Flowsheets (Taken 2/6/2025 0744)  Care Plan - Patient's Chronic Conditions and Co-Morbidity Symptoms are Monitored and Maintained or Improved: Monitor and assess patient's chronic conditions and comorbid symptoms for stability, deterioration, or improvement     Problem: Skin/Tissue Integrity  Goal: Skin integrity remains intact  Description: 1.  Monitor for areas of redness and/or skin breakdown  2.  Assess vascular access sites hourly  3.  Every 4-6 hours minimum:  Change oxygen saturation probe site  4.  Every 4-6 hours:  If on nasal continuous positive airway pressure, respiratory therapy assess nares and determine need for appliance change or resting period  Outcome: Progressing  Flowsheets (Taken 2/6/2025 0744)  Skin Integrity Remains Intact: Monitor for areas of redness and/or skin breakdown     Problem: Pain  Goal: Verbalizes/displays adequate comfort level or baseline comfort level  Outcome: Progressing  Flowsheets  Taken 2/6/2025 1404  Verbalizes/displays adequate comfort level or baseline comfort level: Encourage patient to monitor pain and request assistance  Taken 2/6/2025 1145  Verbalizes/displays adequate comfort level or baseline comfort level: Encourage patient to monitor pain and request assistance  Taken 2/6/2025 0744  Verbalizes/displays adequate comfort level or baseline comfort level: Encourage patient to monitor pain and request assistance     Problem: Safety - Adult  Goal: Free from fall injury  Outcome: Progressing     Problem: Discharge Planning  Goal: Discharge to home or other facility with appropriate resources  Outcome: Progressing  Flowsheets (Taken 2/6/2025 0744)  Discharge to home or other facility with appropriate resources: Identify barriers to discharge with patient and caregiver     Problem: ABCDS Injury 
  Problem: Occupational Therapy - Adult  Goal: By Discharge: Performs self-care activities at highest level of function for planned discharge setting.  See evaluation for individualized goals.  Description: FUNCTIONAL STATUS PRIOR TO ADMISSION:  Pt recently discharged from [facility] 1/22/2024 and reports independence with ADLs and functional mobility, however began declining once home, requiring increased assist from son. Pt admitted 11/30-12/12/2024 s/p fall, transitioned to SNF, then was readmitted 1/6/2025-1/13/2025, then transitioned to IPR. Lives alone, has supportive son, but he lives in [place].  Receives Help From: Family, Prior Level of Assist for ADLs: Needs assistance, Prior Level of Assist for Homemaking: Needs assistance, Ambulation Assistance: Needs assistance,  ,       HOME SUPPORT: Patient lived alone.    Occupational Therapy Goals:  Initiated 1/30/2025  1.  Patient will perform supine<>sit with Contact Guard Assist within 7 day(s).  2.  Patient will perform lower body dressing with Stand by Assist within 7 day(s).  3.  Patient will perform toileting with Contact Guard Assist within 7 day(s).  4.  Patient will perform toilet transfers with Stand by Assist  within 7 day(s).  5.  Patient will perform functional ambulation with least restrictive AD with CGA within 7 day(s).      Outcome: Progressing   OCCUPATIONAL THERAPY TREATMENT  Patient: Maryan Resendiz (89 y.o. male)  Date: 2/5/2025  Primary Diagnosis: Weakness [R53.1]  Generalized weakness [R53.1]  Dyspnea, unspecified type [R06.00]  FTT (failure to thrive) in adult [R62.7]       Precautions: Fall Risk, Bed Alarm                Chart, occupational therapy assessment, plan of care, and goals were reviewed.    ASSESSMENT  Patient continues to benefit from skilled OT services and is progressing towards goals. Decreased activity tolerance noted post HD this AM, but still tolerated session well and continues to demonstrate improvement with standing 
  Problem: Occupational Therapy - Adult  Goal: By Discharge: Performs self-care activities at highest level of function for planned discharge setting.  See evaluation for individualized goals.  Description: FUNCTIONAL STATUS PRIOR TO ADMISSION:  Pt recently discharged from [facility] 1/22/2024 and reports independence with ADLs and functional mobility, however began declining once home, requiring increased assist from son. Pt admitted 11/30-12/12/2024 s/p fall, transitioned to SNF, then was readmitted 1/6/2025-1/13/2025, then transitioned to IPR. Lives alone, has supportive son, but he lives in [place].  Receives Help From: Family, Prior Level of Assist for ADLs: Needs assistance, Prior Level of Assist for Homemaking: Needs assistance, Ambulation Assistance: Needs assistance,  ,       HOME SUPPORT: Patient lived alone.    Occupational Therapy Goals:  Initiated 1/30/2025  1.  Patient will perform supine<>sit with Contact Guard Assist within 7 day(s).  2.  Patient will perform lower body dressing with Stand by Assist within 7 day(s).  3.  Patient will perform toileting with Contact Guard Assist within 7 day(s).  4.  Patient will perform toilet transfers with Stand by Assist  within 7 day(s).  5.  Patient will perform functional ambulation with least restrictive AD with CGA within 7 day(s).      Outcome: Progressing   OCCUPATIONAL THERAPY TREATMENT  Patient: Maryan Resendiz (89 y.o. male)  Date: 1/31/2025  Primary Diagnosis: Weakness [R53.1]  Generalized weakness [R53.1]  Dyspnea, unspecified type [R06.00]  FTT (failure to thrive) in adult [R62.7]       Precautions: Fall Risk, Bed Alarm                Chart, occupational therapy assessment, plan of care, and goals were reviewed.    ASSESSMENT  Patient continues to benefit from skilled OT services and is progressing towards goals. Pt tolerated OT session well. Improved cognition noted, with pt oriented x3. Following one step commands consistently. Dysarthric speech, but able 
  Problem: Occupational Therapy - Adult  Goal: By Discharge: Performs self-care activities at highest level of function for planned discharge setting.  See evaluation for individualized goals.  Description: FUNCTIONAL STATUS PRIOR TO ADMISSION:  Pt recently discharged from [facility] 1/22/2024 and reports independence with ADLs and functional mobility, however began declining once home, requiring increased assist from son. Pt admitted 11/30-12/12/2024 s/p fall, transitioned to SNF, then was readmitted 1/6/2025-1/13/2025, then transitioned to IPR. Lives alone, has supportive son, but he lives in [place].  Receives Help From: Family, Prior Level of Assist for ADLs: Needs assistance, Prior Level of Assist for Homemaking: Needs assistance, Ambulation Assistance: Needs assistance,  ,       HOME SUPPORT: Patient lived alone.    Occupational Therapy Goals:  Initiated 1/30/2025  1.  Patient will perform supine<>sit with Contact Guard Assist within 7 day(s).  2.  Patient will perform lower body dressing with Stand by Assist within 7 day(s).  3.  Patient will perform toileting with Contact Guard Assist within 7 day(s).  4.  Patient will perform toilet transfers with Stand by Assist  within 7 day(s).  5.  Patient will perform functional ambulation with least restrictive AD with CGA within 7 day(s).      Outcome: Progressing   OCCUPATIONAL THERAPY EVALUATION    Patient: Maryan Resendiz (89 y.o. male)  Date: 1/30/2025  Primary Diagnosis: Weakness [R53.1]  Generalized weakness [R53.1]  Dyspnea, unspecified type [R06.00]  FTT (failure to thrive) in adult [R62.7]         Precautions:                    ASSESSMENT :  The patient is limited by decreased functional mobility, independence in ADLs, high-level IADLs, ROM, strength, activity tolerance, endurance, safety awareness, cognition, command following, attention/concentration, coordination, balance, posture. Pt admitted 2/2 FTT and hypothermia. Recent PMHx significant for COVID and x2 
  Problem: Physical Therapy - Adult  Goal: By Discharge: Performs mobility at highest level of function for planned discharge setting.  See evaluation for individualized goals.  Description: FUNCTIONAL STATUS PRIOR TO ADMISSION: Patient is a poor historian but states that he was ambulatory with a RW prior to admit. Was recently DC from [facility].    HOME SUPPORT PRIOR TO ADMISSION: The patient lived alone.  Has a son that lives in [place] and daughter that lives out of state.    Physical Therapy Goals  Initiated 1/30/2025  1.  Patient will move from supine to sit and sit to supine in bed with supervision within 7 day(s).    2.  Patient will perform sit to stand with supervision/set-up within 7 day(s).  3.  Patient will transfer from bed to chair and chair to bed with contact guard assist using the least restrictive device within 7 day(s).  4.  Patient will ambulate with contact guard assist for 200 feet with the least restrictive device within 7 day(s).     Outcome: Progressing   PHYSICAL THERAPY EVALUATION    Patient: Maryan Resendiz (89 y.o. male)  Date: 1/30/2025  Primary Diagnosis: Weakness [R53.1]  Generalized weakness [R53.1]  Dyspnea, unspecified type [R06.00]  FTT (failure to thrive) in adult [R62.7]       Precautions: Restrictions/Precautions: Fall Risk, Bed Alarm                      ASSESSMENT :   DEFICITS/IMPAIRMENTS:   The patient is limited by impaired balance, gait instability, generalized weakness, confusion and decreased activity tolerance.  Currently patient requires Lacho for supine to sit transfers.  And Lacho to come to stand. Amb approx 6 feet with RW and Lacho with slow peter and overall fair upright balance.  Requests to sit quickly secondary to fatigue.  Continues with confusion.    Based on the impairments listed above suggest additional rehab at [place] as he lives alone, is confused and below his functional baseline.    Patient will benefit from skilled intervention to address the above 
  Problem: Skin/Tissue Integrity  Goal: Skin integrity remains intact  Description: 1.  Monitor for areas of redness and/or skin breakdown  2.  Assess vascular access sites hourly  3.  Every 4-6 hours minimum:  Change oxygen saturation probe site  4.  Every 4-6 hours:  If on nasal continuous positive airway pressure, respiratory therapy assess nares and determine need for appliance change or resting period  2/9/2025 0037 by Yamila Sahu RN  Outcome: Progressing  2/8/2025 1155 by Pilar Romero RN  Outcome: Progressing     Problem: Pain  Goal: Verbalizes/displays adequate comfort level or baseline comfort level  2/9/2025 0037 by Yamila Sahu RN  Outcome: Adequate for Discharge  2/8/2025 1155 by Pilar Romero RN  Outcome: Progressing     Problem: Safety - Adult  Goal: Free from fall injury  2/9/2025 0037 by Yamila Sahu RN  Outcome: Adequate for Discharge  2/8/2025 1155 by Pilar Romero RN  Outcome: Progressing     Problem: Skin/Tissue Integrity - Adult  Goal: Skin integrity remains intact  Description: 1.  Monitor for areas of redness and/or skin breakdown  2.  Assess vascular access sites hourly  3.  Every 4-6 hours minimum:  Change oxygen saturation probe site  4.  Every 4-6 hours:  If on nasal continuous positive airway pressure, respiratory therapy assess nares and determine need for appliance change or resting period  2/9/2025 0037 by Yamila Sahu RN  Outcome: Progressing  2/8/2025 1155 by Pilar Romero RN  Outcome: Progressing     
  Problem: Skin/Tissue Integrity  Goal: Skin integrity remains intact  Description: 1.  Monitor for areas of redness and/or skin breakdown  2.  Assess vascular access sites hourly  3.  Every 4-6 hours minimum:  Change oxygen saturation probe site  4.  Every 4-6 hours:  If on nasal continuous positive airway pressure, respiratory therapy assess nares and determine need for appliance change or resting period  Outcome: Progressing     Problem: Pain  Goal: Verbalizes/displays adequate comfort level or baseline comfort level  Outcome: Progressing     
Oneill, Ijeoma, RN  Outcome: Progressing  1/29/2025 1054 by Nayla Etienne RN  Outcome: Progressing  Goal: Absence of cardiac dysrhythmias or at baseline  1/29/2025 2201 by Ijeoma Oneill RN  Outcome: Progressing  1/29/2025 1054 by Nayla Etienne RN  Outcome: Progressing     Problem: Skin/Tissue Integrity - Adult  Goal: Skin integrity remains intact  Description: 1.  Monitor for areas of redness and/or skin breakdown  2.  Assess vascular access sites hourly  3.  Every 4-6 hours minimum:  Change oxygen saturation probe site  4.  Every 4-6 hours:  If on nasal continuous positive airway pressure, respiratory therapy assess nares and determine need for appliance change or resting period  1/29/2025 2201 by Ijeoma Oneill RN  Outcome: Progressing  1/29/2025 1054 by Nayla Etienne RN  Outcome: Progressing  Goal: Incisions, wounds, or drain sites healing without S/S of infection  1/29/2025 2201 by Ijeoma Oneill RN  Outcome: Progressing  1/29/2025 1054 by Nayla Etienne RN  Outcome: Progressing  Goal: Oral mucous membranes remain intact  1/29/2025 2201 by Ijeoma Oneill RN  Outcome: Progressing  1/29/2025 1054 by Nayla Etinene RN  Outcome: Progressing     Problem: Musculoskeletal - Adult  Goal: Return mobility to safest level of function  1/29/2025 2201 by Ijeoma Oneill RN  Outcome: Progressing  1/29/2025 1054 by Nayla Etienne RN  Outcome: Progressing  Goal: Maintain proper alignment of affected body part  1/29/2025 2201 by Ijeoma Oneill RN  Outcome: Progressing  1/29/2025 1054 by Nayla Etienne RN  Outcome: Progressing  Goal: Return ADL status to a safe level of function  1/29/2025 2201 by Ijeoma Oneill RN  Outcome: Progressing  1/29/2025 1054 by Nayla Etienne RN  Outcome: Progressing     Problem: Gastrointestinal - Adult  Goal: Minimal or absence of nausea and vomiting  1/29/2025 2201 by Ijeoma Oneill RN  Outcome: Progressing  1/29/2025 1054 by Nayla Etienne RN  Outcome: 
pressure, respiratory therapy assess nares and determine need for appliance change or resting period  2/10/2025 0957 by Sadia Rojas RN  Outcome: Progressing  2/9/2025 2020 by Hailey Pulido RN  Outcome: Progressing  Goal: Incisions, wounds, or drain sites healing without S/S of infection  Outcome: Progressing  Goal: Oral mucous membranes remain intact  Outcome: Progressing     Problem: Musculoskeletal - Adult  Goal: Return mobility to safest level of function  Outcome: Progressing  Goal: Maintain proper alignment of affected body part  Outcome: Progressing  Goal: Return ADL status to a safe level of function  Outcome: Progressing     Problem: Gastrointestinal - Adult  Goal: Minimal or absence of nausea and vomiting  Outcome: Progressing  Goal: Maintains or returns to baseline bowel function  Outcome: Progressing  Goal: Maintains adequate nutritional intake  Outcome: Progressing  Goal: Establish and maintain optimal ostomy function  Outcome: Progressing     Problem: Genitourinary - Adult  Goal: Absence of urinary retention  Outcome: Progressing  Goal: Urinary catheter remains patent  Outcome: Progressing     Problem: Infection - Adult  Goal: Absence of infection at discharge  Outcome: Progressing  Goal: Absence of infection during hospitalization  Outcome: Progressing  Goal: Absence of fever/infection during anticipated neutropenic period  Outcome: Progressing     Problem: Neurosensory - Adult  Goal: Achieves stable or improved neurological status  Outcome: Progressing  Goal: Absence of seizures  Outcome: Progressing  Goal: Remains free of injury related to seizures activity  Outcome: Progressing  Goal: Achieves maximal functionality and self care  Outcome: Progressing     Problem: Chronic Conditions and Co-morbidities  Goal: Patient's chronic conditions and co-morbidity symptoms are monitored and maintained or improved  2/10/2025 0957 by Sadia Rojas RN  Outcome: Progressing  2/9/2025 2020 by Ashok 
supine in bed with supervision within 7 day(s).    2.  Patient will perform sit to stand with supervision/set-up within 7 day(s).  3.  Patient will transfer from bed to chair and chair to bed with contact guard assist using the least restrictive device within 7 day(s).  4.  Patient will ambulate with contact guard assist for 200 feet with the least restrictive device within 7 day(s).     1/31/2025 1416 by Sana Lanza, PT  Outcome: Progressing     
wounds, or drain sites healing without S/S of infection  Outcome: Progressing  Goal: Oral mucous membranes remain intact  Outcome: Progressing     Problem: Musculoskeletal - Adult  Goal: Return mobility to safest level of function  Outcome: Progressing  Goal: Maintain proper alignment of affected body part  Outcome: Progressing  Goal: Return ADL status to a safe level of function  Outcome: Progressing     Problem: Gastrointestinal - Adult  Goal: Minimal or absence of nausea and vomiting  Outcome: Progressing  Goal: Maintains or returns to baseline bowel function  Outcome: Progressing  Goal: Maintains adequate nutritional intake  Outcome: Progressing  Goal: Establish and maintain optimal ostomy function  Outcome: Progressing     Problem: Genitourinary - Adult  Goal: Absence of urinary retention  Outcome: Progressing  Goal: Urinary catheter remains patent  Outcome: Progressing     Problem: Infection - Adult  Goal: Absence of infection at discharge  Outcome: Progressing  Goal: Absence of infection during hospitalization  Outcome: Progressing  Goal: Absence of fever/infection during anticipated neutropenic period  Outcome: Progressing     Problem: Neurosensory - Adult  Goal: Achieves stable or improved neurological status  Outcome: Progressing  Goal: Absence of seizures  Outcome: Progressing  Goal: Remains free of injury related to seizures activity  Outcome: Progressing  Goal: Achieves maximal functionality and self care  Outcome: Progressing     
Abnormalities: Decreased step clearance;Shuffling gait;Trunk sway increased       Activity Tolerance:   Fair     After treatment:   Patient left in no apparent distress sitting up in chair, Call bell within reach, Bed/ chair alarm activated, and Caregiver / family present      COMMUNICATION/EDUCATION:   The patient's plan of care was discussed with: registered nurse           Sana Lanza, PT  Minutes: 20    
Contact-guard assistance  Bed to Chair: Contact-guard assistance           Balance:     Balance  Sitting: Intact  Standing: Impaired  Standing - Static: Good;Constant support  Standing - Dynamic: Fair;Constant support;Good      ADL Intervention:                                            UE Dressing: Minimal assistance  UE Dressing Skilled Clinical Factors: doff/donning gown; assist to line management and snapping sleeve around IV    LE Dressing: Contact guard assistance  LE Dressing Skilled Clinical Factors: adjusting B socks in tailor sitting    Toileting: Contact guard assistance  Toileting Skilled Clinical Factors: anterior pericare in standing           Functional Mobility: Contact guard assistance  Functional Mobility Skilled Clinical Factors: with RW                    Pain Ratin/10   Pain Intervention(s):         Activity Tolerance:   Fair , CARTER  Please refer to the flowsheet for vital signs taken during this treatment.    After treatment:   Patient left in no apparent distress sitting up in chair, Call bell within reach, and Bed/ chair alarm activated    COMMUNICATION/EDUCATION:   The patient's plan of care was discussed with: registered nurse    Patient Education  Education Given To: Patient  Education Provided: Role of Therapy;Transfer Training;Equipment;Plan of Care;Fall Prevention Strategies;Mobility Training  Education Method: Verbal  Barriers to Learning: None  Education Outcome: Verbalized understanding;Continued education needed    Thank you for this referral.  Elva Fallon OT  Minutes: 23    
nutritional intake: Monitor percentage of each meal consumed  Goal: Establish and maintain optimal ostomy function  2/6/2025 1847 by Monique Padgett RN  Outcome: Progressing     Problem: Genitourinary - Adult  Goal: Absence of urinary retention  2/6/2025 1847 by Monique Padgett RN  Outcome: Progressing  Flowsheets (Taken 2/6/2025 0744)  Absence of urinary retention: Assess patient’s ability to void and empty bladder  Goal: Urinary catheter remains patent  2/6/2025 1847 by Monique Padgett RN  Outcome: Progressing  Flowsheets (Taken 2/6/2025 0744)  Urinary catheter remains patent: Assess patency of urinary catheter     Problem: Infection - Adult  Goal: Absence of infection at discharge  2/6/2025 1847 by Monique Padgett RN  Outcome: Progressing  Flowsheets (Taken 2/6/2025 0744)  Absence of infection at discharge: Assess and monitor for signs and symptoms of infection  Goal: Absence of infection during hospitalization  2/6/2025 1847 by Monique Padgett RN  Outcome: Progressing  Flowsheets (Taken 2/6/2025 0744)  Absence of infection during hospitalization: Assess and monitor for signs and symptoms of infection  Goal: Absence of fever/infection during anticipated neutropenic period  2/6/2025 1847 by Monique Padgett RN  Outcome: Progressing  Flowsheets (Taken 2/6/2025 0744)  Absence of fever/infection during anticipated neutropenic period: Monitor white blood cell count     Problem: Neurosensory - Adult  Goal: Achieves stable or improved neurological status  2/6/2025 1847 by Monique Padgett RN  Outcome: Progressing  Flowsheets (Taken 2/6/2025 0744)  Achieves stable or improved neurological status: Assess for and report changes in neurological status  Goal: Absence of seizures  2/6/2025 1847 by Monique Padgett RN  Outcome: Progressing  Flowsheets (Taken 2/6/2025 0744)  Absence of seizures: Monitor for seizure activity.  If seizure occurs, document type and location of movements and any associated apnea  Goal: Remains free of injury

## 2025-02-10 NOTE — CARE COORDINATION
CM Update:  CM communicated with Tonja of [facility] and she has already communicated with hospice doctor to eval patient to determine best discharge plan .She will call me back after eval.  English Jimmy COPELAND CM   [phone]

## 2025-02-10 NOTE — H&P
management with moderate relief necessitating transition to IV medication formulations for symptom relief.    Functionally, the patient's Karnofsky and/or Palliative Performance Scale has declined over a period of weeks and is estimated at 10%  The patient is dependent on the following ADLs: all     Objective information that support this patients limited prognosis includes: See note above.       The patient/family chose comfort measures with the support of Hospice.      HOSPICE DIAGNOSES   Active Symptoms:  1.  Pain by non-verbal signs and symptoms  2.  Labored breathing  3.  Agitation, restlessness  4.  Weakness       PLAN   Patient meets criteria for hospice admission with [facility] level of care due to inability to swallow/tolerate oral/sublingual medications necessitating transition to IV medication formulations for symptom management and close nurse monitoring for medication efficacy.  Dilaudid 1  mg IV very 4 hours scheduled and every 15 minutes as needed for pain, labored breathing  Lorazepam 1 mg IV very 4 hours scheduled and every 15 minutes as needed for agitation, restlessness, labored breathing  Glycopyrrolate 0.2 mg IV every 6 hours as needed for excessive oral secretions  Ketorolac 30 mg IV every 6 hours as needed for fever  I reviewed plan of care with family who are present at bedside and all are in agreement.   and SW to support family needs  Disposition: anticipate death in hospital  Hospice Plan of care was reviewed in detail and agree with current plan of care     Prognosis estimated based on 02/10/25  clinical assessment is:   [x] Hours to Days    [] Days to Weeks    [] Other:     Communicated plan of care with: Hospice Case Manager; Hospice IDT; Care Team      GOALS OF CARE      Patient/Medical POA stated Goal of Care: Hospice care     [x] I have reviewed and/or updated ACP information in the Advance Care Planning Navigator. This information is available in the Adv Care Plan link in the

## 2025-02-10 NOTE — CARE COORDINATION
TIMI note:  CM contacted [facility] rep Danuta   Hospice was arranged with [facility]  and transport was to be arranged today with them meeting the family at 2 pm. Dr. Mendel notified me that patient is now obtunded and will get re-evaluted by Hospice for inpatient care. CM will follow up on decision. Received call from [facility] this am and at that time they decided he wound not be inpatient hospice eligable here at the hospital. I have called [facility] to delay admission.      1:50 pm Elena of [facility] called to say patient will be admitted to inpatient hospice today.  English Jimmy COPELAND CM   [phone]

## 2025-02-10 NOTE — PROGRESS NOTES
Name: Maryan Resendiz   MRN: [id number]  : 1935       Assessment   :                                               Plan:  CKD-4 (Dr Christian) - baseline creatinine 1..75-2  LUCIA-pre-renal vs mild ATN  Azotemia- BUN >100  HTN  DM-2  AMS  ESBL E. Coli UTI  Failure to thrive  AHRF  Pleural effusion/pulm edema- s/p thoracentesis on  with 1.2 L removed    Complex lesion L kidney and R renal pelvic mass- appears solid (Present on imaging from )    UA looks infected.  Renal ultrasound without any hydronephrosis.  Nonoliguric.      LUCIA is marginally better. BUN still high  Oxygenation is OK    Continue to hold diuretics for now.  Use as needed for worsening SOB or hypoxia    IV antibiotics as per primary team    No emergent indication for KRT, but monitor closely    Discussed with patient, friend and his     Will eventually need urology evaluation       Subjective:  Out of bed in chair.  Sleepy.  Arousable.  Minimally conversant    Exam:  BP (!) 133/99   Pulse 74   Temp (!) 95.6 °F (35.3 °C) (Rectal)   Resp 18   Ht 1.778 m (5' 10\")   Wt 97.5 kg (215 lb)   SpO2 95%   BMI 30.85 kg/m²     Elderly, frail, NAD  Out of bed in chair  No respiratory distress  Lethargic  Trace edema      Labs/Data:    Lab Results   Component Value Date    WBC 7.4 2025    HGB 10.3 (L) 2025    HCT 33.7 (L) 2025    .3 (H) 2025     (L) 2025       Lab Results   Component Value Date/Time     2025 03:39 AM    K 4.3 2025 03:39 AM     2025 03:39 AM    CO2 29 2025 03:39 AM     2025 03:39 AM    CREATININE 2.63 2025 03:39 AM    GLUCOSE 114 2025 03:39 AM    CALCIUM 9.2 2025 03:39 AM    LABGLOM 23 2025 03:39 AM    LABGLOM 37 2024 12:21 PM    LABGLOM 41 2023 08:11 AM    
                                                                                                                                              Name: Maryan Resendiz   MRN: [id number]  : 1935       Assessment   :                                               Plan:  CKD-4 (Dr Christian) - baseline creatinine 1..75-2  LUCIA-pre-renal vs mild ATN  Azotemia- BUN >100  HTN  DM-2  AMS  ESBL E. Coli UTI  Failure to thrive  AHRF  Pleural effusion/pulm edema- s/p thoracentesis on  with 1.2 L removed    Complex lesion L kidney and R renal pelvic mass- appears solid (Present on imaging from )    UA looks infected.  Renal ultrasound without any hydronephrosis.  Nonoliguric.   1.2 L urine output last 24 hours    Remains azotemic with BUN greater than 100.  CR stalled at 2.6    Continue to hold diuretics for now.  Use as needed for worsening SOB or hypoxia  Will need to resume soon    IV antibiotics as per primary team    No emergent indication for KRT, but monitor closely    I had a long discussion with patient and his family at the bedside.  Discussed advanced nature of renal failure, potential need for dialysis if kidney function were to get more worse, renal prognosis, QOL with dialysis etc. and encouraged to decide if you would consider dialysis if and when indicated.    Patient/family leaning towards not pursuing dialysis if kidney function were to get more worse.  They will discuss among themselves more today and will finalize the decision soon    Overall he is a poor candidate for dialysis given his age and comorbid conditions    Will eventually need urology evaluation-depending upon GOC    D/W pt, family, RN       Subjective:  Out of bed in chair.  Sleepy.  Arousable.  Minimally conversant    Exam:  BP (!) 139/56   Pulse 73   Temp 97.1 °F (36.2 °C) (Oral)   Resp 16   Ht 1.778 m (5' 10\")   Wt 98.1 kg (216 lb 4.3 oz)   SpO2 98%   BMI 31.03 kg/m²     Elderly, frail, NAD  Out of bed in chair  No 
                                                                                                                                             Name: Maryan Resendiz   MRN: [id number]  : 1935       Assessment   :                                               Plan:  LUCIA on CKD-4 (Dr Christian) - baseline creatinine 1..75-2  LUCIA-pre-renal vs mild ATN  Azotemia- BUN >100    HTN  DM-2  AMS  ESBL E. Coli UTI  Failure to thrive  AHRF  Pleural effusion/pulm edema- s/p thoracentesis on  with 1.2 L removed    Complex lesion L kidney and R renal pelvic mass- appears solid (Present on imaging from )    [facility] HD line . HD # 1 on  (tolerated 2 liters off). HD # 2 today (tolerated again). HD # 3 tomorrow (try for 3 liters off) and again on Saturday. Perm cath Friday. Ask CM to work on outpatient placement at [facility] (likely to [facility] on d/c)    Continue with UF with HD (he is still has anasarca) - 4 liters off so far.    I would prefer to get another couple of dialysis treatments done prior to transfer to  rehab.               Subjective:  In bed.  Poor appetite. Anasarca. The patient was seen on dialysis at 10:45 am .  BP is stable. Catheter is functioning well. D/W HD nurse.     Tolerated both HD treatments well. Discussed the above with Mr. Resendiz and his son at bedside.    Exam:  BP (!) 176/98   Pulse 76   Temp 97.4 °F (36.3 °C) (Oral)   Resp 21   Ht 1.778 m (5' 10\")   Wt 89.2 kg (196 lb 10.4 oz)   SpO2 94%   BMI 28.22 kg/m²     Elderly, frail, NAD    No respiratory distress   alert awake and Ox3   anasarca      Labs/Data:    Lab Results   Component Value Date    WBC 7.4 2025    HGB 10.3 (L) 2025    HCT 33.7 (L) 2025    .3 (H) 2025     (L) 2025       Lab Results   Component Value Date/Time     2025 02:58 AM    K 4.3 2025 02:58 AM     2025 02:58 AM    CO2 29 2025 02:58 AM    BUN 93 2025 02:58 AM    
                                                                                                                                             Name: Maryan Resendiz   MRN: [id number]  : 1935       Assessment   :                                               Plan:  LUCIA on CKD-4 (Dr Christian) - baseline creatinine 1..75-2  LUCIA-pre-renal vs mild ATN  Azotemia- BUN >100    HTN  DM-2  AMS  ESBL E. Coli UTI  Failure to thrive  AHRF  Pleural effusion/pulm edema- s/p thoracentesis on  with 1.2 L removed    Complex lesion L kidney and R renal pelvic mass- appears solid (Present on imaging from )    UA looks infected.  Renal ultrasound without any hydronephrosis.  Nonoliguric.   1.2 L urine output/day the last couple of days    Remains azotemic with BUN greater than 100.  CR stable at 2.6-2.7    Restart IV lasix today (80 iv bid). If failure to diurese or marked worsening in kidney function then will need to start HD tomorrow or transition to comfort?    IV antibiotics as per primary team    I had a long conversation with patient and family about the pros/cons of HD (a lot of cons). They will discuss.    Will eventually need urology evaluation-depending upon GOC         Subjective:  Out of bed in chair.  Poor appetite. Anasarca. Weeping skin - pitting felipa and worse upper body edema (weeping from upper arms).     See above     Exam:  BP (!) 145/58   Pulse 70   Temp 97.4 °F (36.3 °C) (Oral)   Resp 20   Ht 1.778 m (5' 10\")   Wt 98.1 kg (216 lb 4.3 oz)   SpO2 90%   BMI 31.03 kg/m²     Elderly, frail, NAD  Out of bed in chair  No respiratory distress  more alert awake and Ox3 c/w yesterday  Trace edema      Labs/Data:    Lab Results   Component Value Date    WBC 7.4 2025    HGB 10.3 (L) 2025    HCT 33.7 (L) 2025    .3 (H) 2025     (L) 2025       Lab Results   Component Value Date/Time     2025 12:06 AM    K 4.4 2025 12:06 AM     2025 
                                                                                                                                             Name: Maryan Resendiz   MRN: [id number]  : 1935       Assessment   :                                               Plan:  LUCIA on CKD-4 (Dr Christian) - baseline creatinine 1..75-2  LUCIA-pre-renal vs mild ATN  Azotemia- BUN >100    HTN  DM-2  AMS  ESBL E. Coli UTI  Failure to thrive  AHRF  Pleural effusion/pulm edema- s/p thoracentesis on  with 1.2 L removed    Complex lesion L kidney and R renal pelvic mass- appears solid (Present on imaging from )    [facility] HD line . HD # 1 on . HD # 2 on .     Has decided he doesn't want HD. I think this is very much an educated decision.     No further HD (can pull [facility]). Switching to hospice/Comfort care    I will sign off, but please call with questions/changes.           Subjective:  In bed.  Poor appetite. Anasarca. Mr. Resendiz has decided against continuing HD. He is worried about poor qol and his inability to getting back to being independent. Discussed the above with patient and family    Exam:  BP (!) 145/61   Pulse 74   Temp 97.2 °F (36.2 °C) (Oral)   Resp 20   Ht 1.778 m (5' 10\")   Wt 89.2 kg (196 lb 10.4 oz)   SpO2 97%   BMI 28.22 kg/m²     Elderly, frail, NAD    No respiratory distress   alert awake and Ox3   anasarca      Labs/Data:    Lab Results   Component Value Date    WBC 8.9 2025    HGB 10.4 (L) 2025    HCT 33.7 (L) 2025    MCV 99.1 (H) 2025     (L) 2025       Lab Results   Component Value Date/Time     2025 04:22 AM    K 4.2 2025 04:22 AM     2025 04:22 AM    CO2 27 2025 04:22 AM    BUN 60 2025 04:22 AM    CREATININE 1.98 2025 04:22 AM    GLUCOSE 153 2025 04:22 AM    CALCIUM 9.2 2025 04:22 AM    LABGLOM 32 2025 04:22 AM    LABGLOM 37 2024 12:21 PM    LABGLOM 41 2023 08:11 AM    
      Hospitalist Progress Note    NAME:   Maryan Resendiz   : 1935   MRN: [id number]     Date/Time: 2025 9:26 AM  Patient PCP: Prashanth Livingston MD    Estimated discharge date: 02/3  Barriers:  id follow up , paul Resendiz is a 89 y.o.  male with PMHx significant for chronic diseases listed below presents to the ED for increase SOB on exertion now with out exertion, poor po intake, weakness , fatigue, uncontrollable FSBS, confusion , and decrease mobility . Son notes pt was discharged from [facility] facility six days ago.  Patient was found to have UTI, mild hypothermia.  Was started on ceftriaxone, Kurtis hugger.  Currently off Kurtis hugger, temperature within normal range.    Assessment / Plan:  Acute metabolic encephalopathy 2/2 UTI , resolved  Mild Hypothermia, resolved  UTI  Patient admitted to intermediate care with telemetry  CT Head- No acute process  CTA Chest No pulmonary embolism, Cardiomegaly with coronary artery disease, Small bilateral pleural transudates with bibasilar subsegmental atelectasis  CXR IMPRESSION: Small bilateral pleural effusions and bilateral lower lobe, atelectasis again identified.  COVID influenza A/B negative   Repeat UA positive  Urine culture on 2025 ESBL E. Coli  Infectious disease  consulted - changed antibiotics to cefepime   Follow-up final recommendation from ID  Pt eval -advised for IPR    Acute hypoxic respiratory failure likely due to bilateral pleural effusion, pulmonary edema  Bilateral pleural effusion  Pulmonary edema  MRSA positive and respiratory culture  Pseudomonas aeruginosa and respiratory culture  CXR shows pulmonary pleural effusion bilaterally, Pulmonary edema  Status post IR thoracentesis removed 1200 mL of alvaro-colored pleural fluid from left 2025  Fluid analysis not sent   Chest x-ray postthoracentesis done on 2025 moderate pleural effusion and pulmonary edema  Continue monitor oxygen saturation-currently on 2 L/min of 
      Hospitalist Progress Note    NAME:   Maryan Resendiz   : 1935   MRN: [id number]     Date/Time: 2025 8:17 AM  Patient PCP: Prashanth Livingston MD    Estimated discharge date:   Barriers: new dialysis , renal follow-up, dialysis set up needs IPR     Martín is a 89 y.o.  male with PMHx significant for chronic diseases listed below presents to the ED for increase SOB on exertion now with out exertion, poor po intake, weakness , fatigue, uncontrollable FSBS, confusion , and decrease mobility . Son notes pt was discharged from [facility] facility six days ago.  Patient was found to have UTI, mild hypothermia.  Was started on ceftriaxone, Kurtis hugger.  Currently off Kurtis hugger, temperature within normal range.    Assessment / Plan:  Acute metabolic encephalopathy 2/2 UTI , resolved  Mild Hypothermia, resolved  UTI  Patient admitted to intermediate care with telemetry  CT Head- No acute process  CTA Chest No pulmonary embolism, Cardiomegaly with coronary artery disease, Small bilateral pleural transudates with bibasilar subsegmental atelectasis  CXR IMPRESSION: Small bilateral pleural effusions and bilateral lower lobe, atelectasis again identified.  COVID influenza A/B negative   Repeat UA positive  Urine culture on 2025 ESBL E. Coli  Procal 0.29  Infectious disease  consulted - S/P CEFEPIME AND VANCO   S/p cefepime , vanco -Patient started on 2/3 on avycaz - for 5 days   Pt eval -advised for IPR- [facility] can accept patient - no auth needed     Final id recs :  Antimicrobial orders for discharge  -Avycaz 0.94 G IV every 12 h end date 25  -Pull line at end of therapy.    -Weekly CBC, CMP-  -Fax reports to [phone], call with critical labs  at [phone]  -Encourage adequate fluids, daily probiotic/yogurt  -If line malfunction occurs and home health cannot reposition  please send patient to ED immediately  -ID follow-up -  No follow up  - If persistent side effects occur stop antibiotic and 
      Hospitalist Progress Note    NAME:   Maryan Resendiz   : 1935   MRN: [id number]     Date/Time: 2025 10:24 AM  Patient PCP: Prashanth Livingston MD    Estimated discharge date:   Barriers:  , ftt, placement      Assessment / Plan:   Martín is a 89 y.o.  male with PMHx significant for chronic diseases listed below presents to the ED for increase SOB on exertion now with out exertion, poor po intake, weakness , fatigue, uncontrollable FSBS, confusion , and decrease mobility . Son notes pt was discharged from [facility] six days ago.  Patient was found to have UTI, mild hypothermia.  Was started on ceftriaxone, Kurtis hugger.  Currently off Kurtis hugger, temperature within normal range.      Acute metabolic encephalopathy 2/2 UTI , resolved  Mild Hypothermia, resolved  UTI  --CT Head- I No acute process  --CTA Chest No pulmonary embolism, Cardiomegaly with coronary artery disease, Small bilateral pleural transudates with bibasilar subsegmental atelectasis  --CXR IMPRESSION: Small bilateral pleural effusions and bilateral lower lobe, atelectasis again identified.  - COVID influenza A/B negative   -Repeat UA positive, culture growing gram-negative rods,   - consult case management - lives alone recent d/c from [facility] , placement  -C/W ceftriaxone, follow culture sensitivities, PT OT    LUCIA on CKD stage IV  - Baseline creatinine 1.6-1.7, POA 2.03  -Likely due to hypovolemia from poor oral intake  - Avoid nephrotoxins, monitor BMP daily  -hold lasix, consult nephro if doesn't improve.    Adult failure to thrive   Weakness 2/2 uti  Poor oral intake,  Dysphagia  - Dietitian consult, speech eval  - Encourage oral intake, can order Ensure    Increased risk for hospital delirium  - Delirium precautions ordered  - Melatonin nightly, thiamine daily     Constipation  - Has not had a bowel movement in a few days, will start bowel regimen as ordered       HTN  HLD  Dilated Cardiomyopathy   Pacer   Hx 
      Hospitalist Progress Note    NAME:   Maryan Resendiz   : 1935   MRN: [id number]     Date/Time: 2025 8:35 AM  Patient PCP: Prashanth Livingston MD    Estimated discharge date: 02/3  Barriers:  id follow up , paul Resendiz is a 89 y.o.  male with PMHx significant for chronic diseases listed below presents to the ED for increase SOB on exertion now with out exertion, poor po intake, weakness , fatigue, uncontrollable FSBS, confusion , and decrease mobility . Son notes pt was discharged from [facility] six days ago.  Patient was found to have UTI, mild hypothermia.  Was started on ceftriaxone, Kurtis hugger.  Currently off Kurtis hugger, temperature within normal range.    Assessment / Plan:  Acute metabolic encephalopathy 2/2 UTI , resolved  Mild Hypothermia, resolved  UTI  Patient admitted to intermediate care with telemetry  CT Head- No acute process  CTA Chest No pulmonary embolism, Cardiomegaly with coronary artery disease, Small bilateral pleural transudates with bibasilar subsegmental atelectasis  CXR IMPRESSION: Small bilateral pleural effusions and bilateral lower lobe, atelectasis again identified.  COVID influenza A/B negative   Repeat UA positive  Urine culture on 2025 ESBL E. Coli  Infectious disease  consulted - changed antibiotics to cefepime   Follow-up final recommendation from ID  Pt eval -advised for IPR    Acute hypoxic respiratory failure likely due to bilateral pleural effusion, pulmonary edema  Bilateral pleural effusion  Pulmonary edema  MRSA positive and respiratory culture  Pseudomonas aeruginosa and respiratory culture  CXR shows pulmonary pleural effusion bilaterally, Pulmonary edema  Status post IR thoracentesis removed 1200 mL of lavaro-colored pleural fluid from left 2025  Fluid analysis not sent   Chest x-ray postthoracentesis done on 2025 moderate pleural effusion and pulmonary edema  Continue monitor oxygen saturation-currently on 3 L/min of 
      Hospitalist Progress Note    NAME:   Maryan Resendiz   : 1935   MRN: [id number]     Date/Time: 2025 10:24 AM  Patient PCP: Prashanth Livingston MD    Estimated discharge date:   Barriers: temp, ftt, placement      Assessment / Plan:   Martín is a 89 y.o.  male with PMHx significant for chronic diseases listed below presents to the ED for increase SOB on exertion now with out exertion, poor po intake, weakness , fatigue, uncontrollable FSBS, confusion , and decrease mobility . Son notes pt was discharged from [facility] facility six days ago.     Mild Hypothermia  Adult failure to thrive   Epistaxis   Weakness   SOB with and without exertion   Uncontrollable FSBS   Confusion   Dysphagia   Poor po intake   --CT Head- I No acute process  --CTA Chest No pulmonary embolism, Cardiomegaly with coronary artery disease, Small bilateral pleural transudates with bibasilar subsegmental atelectasis  --CXR IMPRESSION: Small bilateral pleural effusions and bilateral lower lobe, atelectasis again identified.  - lasix 20 mg IV x 1  - COVID influenza A/B negative   - chemistry unremarkable  - hematology unremarkable  - ck anemia labs hx of thrombocytopenia  -followed by Dr. Leblanc  - dirty UA, repeat UA   -ck vit D , vit B 12, TSH   - ck pro BNP  - consult PT OT   - oxygen protocol/ neb prn cough SOB   - xopenex prn   - consult dietitian /poor po intake   - consult case management - lives alone recent d/c from [facility] , placement  -will order tsh, cortisol, ua, blood cultures, upgrade to step down.        HTN  HLD  BPH  CKD   Dilated Cardiomyopathy   Pacer   Hx Afib - stopped AC 2/2 low plt and epistaxis   -vitals per routine   -telemetry   - hold Norvasc 2/2 soft BP  - hold cardura 2/2 soft BP  - resume Imdur   - resume metoprolol   -ck Pro BNP   - resume proscar   - CKD - admission 2.11 baseline around 1.7     T2DM  Hypothyroidism   GERD   - SSI/ Hypoglycemia protocol  - resume Lantus 12 units   - 
      Hospitalist Progress Note    NAME:   Maryan Resendiz   : 1935   MRN: [id number]     Date/Time: 2025 5:46 PM  Patient PCP: Prashanth Livingston MD    Estimated discharge date:   Barriers: finishing IV abx , hospice logistics    Assessment / Plan:  Acute metabolic encephalopathy 2/ UTI , resolved  Mild Hypothermia, resolved  UTI  Patient admitted to intermediate care with telemetry  CT Head- No acute process  CTA Chest No pulmonary embolism, Cardiomegaly with coronary artery disease, Small bilateral pleural transudates with bibasilar subsegmental atelectasis  CXR IMPRESSION: Small bilateral pleural effusions and bilateral lower lobe, atelectasis again identified.  COVID influenza A/B negative   Repeat UA positive  Urine culture on 2025 ESBL E. Coli  Procal 0.29  Infectious disease  consulted - S/P CEFEPIME AND VANCO   S/p cefepime , vanco -Patient started on 2/3 on avycaz - for 5 days   Pt eval -advised for IPR- [facility] can accept patient - no auth needed     Final id recs :  Antimicrobial orders for discharge  -Avycaz 0.94 G IV every 12 h end date 25  -Pull line at end of therapy.    -Weekly CBC, CMP-  -Fax reports to [phone], call with critical labs  at [phone]  -Encourage adequate fluids, daily probiotic/yogurt  -If line malfunction occurs and home health cannot reposition  please send patient to ED immediately  -ID follow-up -  No follow up  - If persistent side effects occur stop antibiotic and call-ID/PCP    Opting for hospice, but would like to finish out abx. Since we are finishing abx very shortly on , will remain here to avoid placing PICC and discharging for just 1-2 days of abx in the context of discharging with hospice and optimizing patient's comfort.     Acute hypoxic respiratory failure likely due to bilateral pleural effusion, pulmonary edema  Bilateral pleural effusion  Pulmonary edema  MRSA positive and respiratory culture  Pseudomonas aeruginosa and respiratory 
      Hospitalist Progress Note    NAME:   Maryan Resendiz   : 1935   MRN: [id number]     Date/Time: 2025 2:58 PM  Patient PCP: Prashanth Livingston MD    Estimated discharge date:   Barriers: finishing IV abx, hospice logistics    CM working on hospice logistics, potentially SNF.    Assessment / Plan:  Acute metabolic encephalopathy 2/2 UTI , resolved  Mild Hypothermia, resolved  UTI  Patient admitted to intermediate care with telemetry  CT Head- No acute process  CTA Chest No pulmonary embolism, Cardiomegaly with coronary artery disease, Small bilateral pleural transudates with bibasilar subsegmental atelectasis  CXR IMPRESSION: Small bilateral pleural effusions and bilateral lower lobe, atelectasis again identified.  COVID influenza A/B negative   Repeat UA positive  Urine culture on 2025 ESBL E. Coli  Procal 0.29  Infectious disease  consulted - S/P CEFEPIME AND VANCO   S/p cefepime , vanco -Patient started on 2/3 on avycaz - for 5 days   Pt eval -advised for IPR- [facility] can accept patient - no auth needed     Final id recs :  Antimicrobial orders for discharge  -Avycaz 0.94 G IV every 12 h end date 25 -> finishing 25 evening per pharmacy  -Pull line at end of therapy.    -Weekly CBC, CMP-  -Fax reports to [phone], call with critical labs  at [phone]  -Encourage adequate fluids, daily probiotic/yogurt  -If line malfunction occurs and home health cannot reposition  please send patient to ED immediately  -ID follow-up -  No follow up  - If persistent side effects occur stop antibiotic and call-ID/PCP    Opting for hospice, but would like to finish out abx. Since we are finishing abx very shortly on , will remain here to avoid placing PICC and discharging for just 1-2 days of abx in the context of discharging with hospice and optimizing patient's comfort.     Acute hypoxic respiratory failure likely due to bilateral pleural effusion, pulmonary edema  Bilateral pleural 
      Hospitalist Progress Note    NAME:   Maryan Resendiz   : 1935   MRN: [id number]     Date/Time: 2025 3:59 PM  Patient PCP: Prashanth Livingston MD    Estimated discharge date: 02/3  Barriers:  , ftt, placement      Assessment / Plan:   Martín is a 89 y.o.  male with PMHx significant for chronic diseases listed below presents to the ED for increase SOB on exertion now with out exertion, poor po intake, weakness , fatigue, uncontrollable FSBS, confusion , and decrease mobility . Son notes pt was discharged from [facility] facility six days ago.  Patient was found to have UTI, mild hypothermia.  Was started on ceftriaxone, Kurtis hugger.  Currently off Kurtis hugger, temperature within normal range.        Acute metabolic encephalopathy 2/2 UTI , resolved  Mild Hypothermia, resolved  UTI  --CT Head- I No acute process  --CTA Chest No pulmonary embolism, Cardiomegaly with coronary artery disease, Small bilateral pleural transudates with bibasilar subsegmental atelectasis  --CXR IMPRESSION: Small bilateral pleural effusions and bilateral lower lobe, atelectasis again identified.  - COVID influenza A/B negative   -Repeat UA positive, culture growing gram-negative rods,   -C/W ceftriaxone, follow culture sensitivities,      Acute hypoxic respiratory failure likely due to bilateral pleural effusion, pulmonary edema  Bilateral pleural effusion  Pulmonary edema  - Likely due to medication held on admission due to LUCIA  - CXR shows pulmonary pleural effusion bilaterally, Pulmonary edema  - Will order IR thoracentesis, one-time Lasix cautiously due to LUCIA  -Repeat x-ray in the morning  --wean off o2 as tolerated.    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    Adult failure to thrive   Weakness 2/2 
     Nutrition Note    Chart reviewed for follow up. Pt transitioned to comfort measures only. He is now obtunded and likely to enter inpatient hospice. Will sign off. Available by consult if needed.     Electronically signed by Sadia Woodson RD on 2/10/25 at 1:04 PM EST    Contact: [phone]    
0650am; patient declined hemodialysis session , hemodialysis nurse was ready to start the session in the unit but patient declined  
0810 Pt complains of SOB, Sat 86 increased NC to 6L, called respiratory for recommendations    0820 Sat 67, non-rebreather for 10 min Sat 88, attending provider notified, called RRT nurse and respiratory again.    0835 Sat 92, breathing laboriously, family informed about deteriorating condition of the patient    0915 Pt transitioned to comfort measures, orders received see MAR    0930 Family at bedside, requesting to speak to the attending physician to further discuss goals of care    1008 Ativan, morphine, lasix administered     1030 Sat 92, pt resting peacefully    1230 family and friends at bedside throughout the shift    
1510    Name of procedure: Left Thoracentesis    Vital Signs: Stable    Fluids removed: 1200 ml of alvaro colored pleural fluid    Samples sent to lab: Hold cup    Any complications related to procedure: None    Post Procedure Care Needed/order sets placed in [facility] care.     Patient is at increased fall risk due to medication given.    1510    Post procedure CXR completed    1558    Attempted to call report x2 and was unsuccessful. Please call ext: [phone] with any questions    Pt transported back to room with site CDI, tele monitor intact, and on 3L NC        
19:45  Unable to obtain oral or axillary temperature on pt. Pt is alert and oriented lying in bed in no acute distress. Pt om bed with only a sheet on. Pt states he has been having trouble with his temperature being read over the past two months. Pt states he was on Kurtis hugger earlier today.Pt refused blanket or blanket warmer, Pt's skin is cool to touch. Pt states he is transitioning to hospice and does not want  any interventions at this time. Pt states if he gets too cold he will let me know, and may choose to have interventions done at that time.    21:45  Pt did allow me to take rectal temp which, was 94.6 F. Pt politely refused Kurtis hugger. Pt is still A&O x4. We discussed risks of untreated hypothermia, such as encephalopathy, organ failure, sudden cardiac arrest. Pt shared with me that he is fully aware that he is dying. He is not afraid to die, because he is saved. He is a [employer], and [facility]. His main focus right now is making sure he doesn't suffer. He stated if he feels cold or changes his mind about Kurtis hugger he will push call bell and let me know. Will continue to monitor pt.    22:59 Notified Corinna El NP of rectal temp and above conversation with pt. NP advised to reach out to her if we need any additional assistance keeping pt comfortable.   Pt also having increased dyspnea, reviewed with pt the prn Xopenex order. Pt declined at this time stating he wanted to see how he makes out, but did agree to notify nurse if he changes his mind and would like nebulizer. O2 sats remain within acceptable range on 5L humidified nasal canula.    23:00  End of Shift Note    Bedside shift change report given to Yamila COPELAND (oncoming nurse) by Shauna Santos RN (offgoing nurse).  Report included the following information SBAR, Kardex, MAR, Recent Results, and Cardiac Rhythm ventricular paced    Shift worked:  1900-23:30     Shift summary and any significant changes:     See 
Comprehensive Nutrition Assessment    Type and Reason for Visit:  Initial, Consult    Nutrition Recommendations/Plan:   Continue carb controlled diet  Add ensure high protein BID  SLP consult if concerns for dysphagia   Document %PO of meals in flowsheets     Malnutrition Assessment:  Malnutrition Status:  Insufficient data (01/28/25 1403)    Context:  Acute Illness     Findings of the 6 clinical characteristics of malnutrition:  Energy Intake:  Unable to assess  Weight Loss:  No weight loss     Body Fat Loss:  Unable to assess     Muscle Mass Loss:  Unable to assess    Fluid Accumulation:  Mild Extremities   Strength:  Not Performed    Nutrition Assessment:    Patient medically noted for generalized weakness, COVID-19 rule out, hypothermia, confusion, and FTT. PMH HTN, HLD, CKD, CM, DM, and GERD. Dysphagia mentioned per H&P but no SLP consult. Patient transferred from [facility] to [facility] this afternoon; unavailable at time of attempted visit. Consult received for Poor PO intake. MST negative. Weights show a significant 5.4% weight gain over the last month. No documented PO at this time. Will add ensure high protein and monitor PO intake.     Wt Readings from Last 10 Encounters:   01/27/25 97.5 kg (215 lb)   01/24/25 96 kg (211 lb 11.2 oz)   01/03/25 92.9 kg (204 lb 11.2 oz)   12/31/24 92.9 kg (204 lb 11.2 oz)   12/26/24 92.2 kg (203 lb 3.2 oz)   12/23/24 92.2 kg (203 lb 3.2 oz)   12/20/24 92.2 kg (203 lb 3.2 oz)   12/17/24 92.2 kg (203 lb 3.2 oz)   11/30/24 92 kg (202 lb 13.2 oz)     Nutrition Related Findings:    -[id number], A1c 5.7   BM 1/14?   1-2+ edema extremities   Atorvastatin, Lantus, Humalog, Synthroid, Protonix   Wound Type: None       Current Nutrition Intake & Therapies:          ADULT DIET; Regular; 4 carb choices (60 gm/meal)    Anthropometric Measures:  Height: 177.8 cm (5' 10\")  Ideal Body Weight (IBW): 166 lbs (75 kg)       Current Body Weight: 97.5 kg (214 lb 15.2 oz),   IBW.    Current BMI 
Comprehensive Nutrition Assessment    Type and Reason for Visit:  Reassess    Nutrition Recommendations/Plan:   Continue current diet and supplements  Please document % meals and supplements consumed in flowsheet I/O's under intake      Malnutrition Assessment:  Malnutrition Status:  Insufficient data (01/28/25 1403)    Context:  Acute Illness     Findings of the 6 clinical characteristics of malnutrition:  Energy Intake:  Unable to assess  Weight Loss:  No weight loss     Body Fat Loss:  Unable to assess     Muscle Mass Loss:  Unable to assess    Fluid Accumulation:  Mild Extremities   Strength:  Not Performed    Nutrition Assessment:     Chart reviewed and case discussed during IDR. Pt off the floor in HD during attempt to visit this morning. He had his first round of HD yesterday, future treatment needs TBD. Minimal PO intake documentation to review. Continue to encourage intake of meals and supplements.    Patient Vitals for the past 120 hrs:   PO Meals Eaten (%)   01/31/25 1800 26 - 50%     Wt Readings from Last 5 Encounters:   02/05/25 89.2 kg (196 lb 10.4 oz)   01/24/25 96 kg (211 lb 11.2 oz)   01/12/25 100 kg (220 lb 7.4 oz)   01/03/25 92.9 kg (204 lb 11.2 oz)   12/31/24 92.9 kg (204 lb 11.2 oz)   ]    Nutrition Related Findings:    Labs: Cr 2.25, BUN 93, -184-353.   Meds: Lipitor, Avycaz, Lantus, Humalog, Synthroid, Protonix.   Edema: +1 BUE, +1 pitting BLE.   BM 2/4.   Wound Type: Stage I, Skin Tears, Deep Tissue Injury       Current Nutrition Intake & Therapies:    Average Meal Intake: 26-50%  Average Supplements Intake: Unable to assess  ADULT DIET; Regular; 4 carb choices (60 gm/meal)  ADULT ORAL NUTRITION SUPPLEMENT; Breakfast, Dinner; Low Calorie/High Protein Oral Supplement    Anthropometric Measures:  Height: 177.8 cm (5' 10\")  Ideal Body Weight (IBW): 166 lbs (75 kg)       Current Body Weight: 89.2 kg (196 lb 10.4 oz),   IBW. Weight Source: Bed scale  Current BMI (kg/m2): 28.2         
Comprehensive Nutrition Assessment    Type and Reason for Visit:  Reassess    Nutrition Recommendations/Plan:   Continue current diet and supplements  Please document % meals and supplements consumed in flowsheet I/O's under intake      Malnutrition Assessment:  Malnutrition Status:  Insufficient data (01/28/25 1403)    Context:  Acute Illness     Findings of the 6 clinical characteristics of malnutrition:  Energy Intake:  Unable to assess  Weight Loss:  No weight loss     Body Fat Loss:  Unable to assess     Muscle Mass Loss:  Unable to assess    Fluid Accumulation:  Mild Extremities   Strength:  Not Performed    Nutrition Assessment:     Chart reviewed. Pt going off the floor to IR when RD attempted to visit today. Good intake of meals documented by nursing today. Unsure of supplement intake. Will continue monitoring.     Patient Vitals for the past 120 hrs:   PO Meals Eaten (%)   01/31/25 0900 76 - 100%     Wt Readings from Last 5 Encounters:   01/27/25 97.5 kg (215 lb)   01/24/25 96 kg (211 lb 11.2 oz)   01/12/25 100 kg (220 lb 7.4 oz)   01/03/25 92.9 kg (204 lb 11.2 oz)   12/31/24 92.9 kg (204 lb 11.2 oz)   ]    Nutrition Related Findings:    Labs: -143-392, no BMP today.   Meds: Lipitor, Lantus, Humalog, Synthroid, Protonix.   Edema: +1 BUE & LLE, +2 RLE.   BM 1/30.   Wound Type: None       Current Nutrition Intake & Therapies:    Average Meal Intake: %  Average Supplements Intake: Unable to assess  ADULT DIET; Regular; 4 carb choices (60 gm/meal)  ADULT ORAL NUTRITION SUPPLEMENT; Breakfast, Dinner; Low Calorie/High Protein Oral Supplement    Anthropometric Measures:  Height: 177.8 cm (5' 10\")  Ideal Body Weight (IBW): 166 lbs (75 kg)       Current Body Weight: 97.5 kg (214 lb 15.2 oz),   IBW.    Current BMI (kg/m2): 30.8                             BMI Categories: Obese Class 1 (BMI 30.0-34.9)    Estimated Daily Nutrient Needs:  Energy Requirements Based On: Formula  Weight Used for Energy 
Darren catheter placed without complication. Patient tolerated the procedure well.    Chest xray ordered per protocol.     Verbal report given to patient's primary nurse.   
End of Shift Note    Bedside shift change report given to DavidRN (oncoming nurse) by Peter Payton RN (offgoing nurse).  Report included the following information SBAR, MAR, and Quality Measures    Shift worked:  7a-7p     Shift summary and any significant changes:     Pt mentation is getting better and lasting longer through out the day. Pt had thoracentesis today. 1200ml was pulled off     Concerns for physician to address:       Zone phone for oncoming shift:          Activity:  Level of Assistance: Standby assist, set-up cues, supervision of patient - no hands on  Number times ambulated in hallways past shift: 0  Number of times OOB to chair past shift: 3    Cardiac:   Cardiac Monitoring: Yes      Cardiac Rhythm: Ventricular paced    Access:  Current line(s): PIV     Genitourinary:   Urinary Status: Voiding    Respiratory:   O2 Device: Nasal cannula  Chronic home O2 use?: NO  Incentive spirometer at bedside: YES    GI:  Last BM (including prior to admit): 01/30/25  Current diet:  ADULT DIET; Regular; 4 carb choices (60 gm/meal)  ADULT ORAL NUTRITION SUPPLEMENT; Breakfast, Dinner; Low Calorie/High Protein Oral Supplement  Passing flatus: YES    Pain Management:   Patient states pain is manageable on current regimen: YES    Skin:  Binh Scale Score: 19  Interventions: Wound Offloading (Prevention Methods): Bed, pressure redistribution/air, Bed, pressure reduction mattress, Pillows, Repositioning, Turning    Patient Safety:  Fall Risk: Nursing Judgement-Fall Risk High(Add Comments): Yes  Fall Risk Interventions  Nursing Judgement-Fall Risk High(Add Comments): Yes  Toilet Every 2 Hours-In Advance of Need: Yes  Hourly Visual Checks: Awake  Fall Visual Posted: Fall sign posted, Socks  Room Door Open: Yes  Alarm On: Bed, Chair  Patient Moved Closer to Nursing Station: No    Active Consults:   IP CONSULT TO DIETITIAN  IP CONSULT TO DIABETES MANAGEMENT  IP CONSULT TO NEPHROLOGY  IP CONSULT TO INFECTIOUS 
End of Shift Note    Bedside shift change report given to Ijeoma COPELAND (oncoming nurse) by Lucia Carroll RN (offgoing nurse).  Report included the following information SBAR    Shift worked:  7 am- 7 pm      Shift summary and any significant changes:    Pt had chest xray and ultrasound done today  Pt oral temp remains low, tru hugger in place       Concerns for physician to address:  -     Zone phone for oncoming shift:   [phone]       Activity:  Level of Assistance: Standby assist, set-up cues, supervision of patient - no hands on  Number times ambulated in hallways past shift: 0  Number of times OOB to chair past shift: 1    Cardiac:   Cardiac Monitoring: Yes      Cardiac Rhythm: Ventricular paced    Access:  Current line(s): PIV    Genitourinary:   Urinary Status: Voiding    Respiratory:   O2 Device: Nasal cannula  Chronic home O2 use?: NO  Incentive spirometer at bedside: NO    GI:  Last BM (including prior to admit): 01/29/25  Current diet:  ADULT DIET; Regular; 4 carb choices (60 gm/meal)  ADULT ORAL NUTRITION SUPPLEMENT; Breakfast, Dinner; Low Calorie/High Protein Oral Supplement  Passing flatus: YES    Pain Management:   Patient states pain is manageable on current regimen: YES    Skin:  Binh Scale Score: 18  Interventions: Wound Offloading (Prevention Methods): Bed, pressure redistribution/air, Bed, pressure reduction mattress, Pillows, Repositioning, Turning    Patient Safety:  Fall Risk: Nursing Judgement-Fall Risk High(Add Comments): Yes  Fall Risk Interventions  Nursing Judgement-Fall Risk High(Add Comments): Yes  Toilet Every 2 Hours-In Advance of Need: Yes  Hourly Visual Checks: Awake, In chair  Fall Visual Posted: Fall sign posted, Socks  Room Door Open: Yes  Alarm On: Bed, Chair  Patient Moved Closer to Nursing Station: No    Active Consults:   IP CONSULT TO DIETITIAN  IP CONSULT TO DIABETES MANAGEMENT    Length of Stay:  Expected LOS: 6  Actual LOS: 3    Lucia Carroll RN      
End of Shift Note    Bedside shift change report given to MIN Portillo (oncoming nurse) by Brina Basurto RN (offgoing nurse).  Report included the following information SBAR, Kardex, Intake/Output, and MAR    Shift worked:  Night     Shift summary and any significant changes:     On telemetry Is ventricular paced,on and off confusion,limbs elevated on a pillow,medication administered as per treatment sheet in MAR,Awaits consult from dietitian and oncologist     Concerns for physician to address:       Zone phone for oncoming shift:   [phone]       Activity:     Number times ambulated in hallways past shift: 0  Number of times OOB to chair past shift: 1    Cardiac:   Cardiac Monitoring: Yes      Cardiac Rhythm: Ventricular paced    Access:  Current line(s): PIV     Genitourinary:   Urinary Status: Voiding    Respiratory:   O2 Device: Nasal cannula  Chronic home O2 use?: NO  Incentive spirometer at bedside: NO    GI:  Last BM (including prior to admit): 01/14/25  Current diet:  ADULT DIET; Regular  Passing flatus: YES    Pain Management:   Patient states pain is manageable on current regimen: YES    Skin:  Binh Scale Score: 19  Interventions: Wound Offloading (Prevention Methods): Elevate heels, Turning, Pillows, Repositioning    Patient Safety:  Fall Risk:         Active Consults:   IP CONSULT TO DIETITIAN  IP CONSULT TO DIABETES MANAGEMENT    Length of Stay:  Expected LOS: 3  Actual LOS: 1    Brina Basurto RN                           
End of Shift Note    Bedside shift change report given to Pilar COPELAND (oncoming nurse) by Dacia Vergara RN (offgoing nurse).  Report included the following information SBAR    Shift worked:  7p-7a     Shift summary and any significant changes:     No significant changes or events.     Concerns for physician to address:  N/a     Zone phone for oncoming shift:          Activity:  Level of Assistance: Standby assist, set-up cues, supervision of patient - no hands on  Number times ambulated in hallways past shift: 0  Number of times OOB to chair past shift: 1    Cardiac:   Cardiac Monitoring: Yes      Cardiac Rhythm: Ventricular paced    Access:  Current line(s): PIV     Genitourinary:   Urinary Status: Voiding, Oliguria    Respiratory:   O2 Device: Nasal cannula  Chronic home O2 use?: YES  Incentive spirometer at bedside: N/A    GI:  Last BM (including prior to admit): 02/07/25  Current diet:  ADULT DIET; Regular; 4 carb choices (60 gm/meal)  ADULT ORAL NUTRITION SUPPLEMENT; Breakfast, Dinner; Low Calorie/High Protein Oral Supplement  ADULT ORAL NUTRITION SUPPLEMENT; Breakfast, Lunch, Dinner; Standard High Calorie/High Protein Oral Supplement  Passing flatus: YES    Pain Management:   Patient states pain is manageable on current regimen: YES    Skin:  Binh Scale Score: 19  Interventions: Wound Offloading (Prevention Methods): Pillows, Repositioning, Turning    Patient Safety:  Fall Risk: Nursing Judgement-Fall Risk High(Add Comments): Yes  Fall Risk Interventions  Nursing Judgement-Fall Risk High(Add Comments): Yes  Toilet Every 2 Hours-In Advance of Need: Yes  Hourly Visual Checks: Awake, In chair  Fall Visual Posted: Armband, Fall sign posted, Socks  Room Door Open: Deferred to promote rest  Alarm On: Chair, Bed  Patient Moved Closer to Nursing Station: No    Active Consults:   IP CONSULT TO DIETITIAN  IP CONSULT TO DIABETES MANAGEMENT  IP CONSULT TO NEPHROLOGY  IP CONSULT TO INFECTIOUS DISEASES  IP CONSULT TO 
End of Shift Note    Bedside shift change report given to RN (oncoming nurse) by Ijeoma Oneill RN (offgoing nurse).  Report included the following information SBAR, Kardex, MAR, and Recent Results    Shift worked:  7p-7a     Shift summary and any significant changes:     Pt had to be reminded multiple time to keep tru hugger on.     Concerns for physician to address:       Zone phone for oncoming shift:          Ijeoma Oneill RN                            
End of Shift Note    Bedside shift change report given to RN (oncoming nurse) by Ijeoma Oneill RN (offgoing nurse).  Report included the following information SBAR, Kardex, MAR, and Recent Results    Shift worked:  7p-7a     Shift summary and any significant changes:     Pt stayed on tru hugger, pt had to be reminded multiple times to keep it on. Am labs done. Night time provider notified for 14 beats of vtach, EKG done.     Concerns for physician to address:       Zone phone for oncoming shift:            Ijeoma Oneill RN                            
End of Shift Note    Bedside shift change report given to RN (oncoming nurse) by Kristie Recio RN (offgoing nurse).  Report included the following information SBAR    Shift worked:  7am - 7pm      Shift summary and any significant changes:     Patient has decided to go with Hospice. Case management working to find placement for patient throughout shift. [facility] came to assess patient during shift and accepted patient but will not have a bed available until Monday. Plan is for patient to transfer to [facility] on Hospice Monday (2/10/25). Hospice Supervisor Paolo to bedside today to speak with patient and family, on discharge please contact Paolo so she can have everything set up for patient at arrive at [facility]. Paolo with [facility] [phone]. Patient will complete his last dose of IV antibiotics tonight. OOB to chair just before lunch and has remained up in chair for remainder of shift, patient states he is more comfortable sitting up in chair as this is what he is use to at home. Cardiology consulted during shift for defibrillator to be turned off, Dr. Gomez to bedside around 1730 and turned off defibrillator. Patient drank x1 Ensure with each meal, did not eat much off of breakfast tray but did eat a fair amount off of lunch and dinner trays. Blood glucose elevated and sliding scale insulin provided at each mealtime as ordered.  (out of range of sliding scale) for dinner, Dr. Mendel notified and gave verbal order to continue with 8 units, no additional coverage added at this time. Family at bedside throughout entire shift with patient for support.       Concerns for physician to address:  N/A   Zone phone for oncoming shift:   N/A     Activity:  Level of Assistance: Standby assist, set-up cues, supervision of patient - no hands on  Number times ambulated in hallways past shift: 0  Number of times OOB to chair past shift: 1    Cardiac:   Cardiac Monitoring: Yes      Cardiac 
End of Shift Note    Bedside shift change report given to RN (oncoming nurse) by Marilyn Spencer RN (offgoing nurse).  Report included the following information SBAR, MAR, and Recent Results    Shift worked:  700-1900     Shift summary and any significant changes:     Tele-sitter order discontinued as he is able to appropriately use call bell for assistance. Normothermic, didn't require Kurtis hugger this shift. 725 total urine output     Concerns for physician to address:       Zone phone for oncoming shift:          Activity:  Level of Assistance: Minimal assist, patient does 75% or more  Number times ambulated in hallways past shift: 0  Number of times OOB to chair past shift: 2    Cardiac:   Cardiac Monitoring: Yes      Cardiac Rhythm: AV paced    Access:  Current line(s): PIV     Genitourinary:   Urinary Status: Voiding    Respiratory:   O2 Device: Nasal cannula  Chronic home O2 use?: NO  Incentive spirometer at bedside: NO    GI:  Last BM (including prior to admit): 01/30/25  Current diet:  ADULT DIET; Regular; 4 carb choices (60 gm/meal)  ADULT ORAL NUTRITION SUPPLEMENT; Breakfast, Dinner; Low Calorie/High Protein Oral Supplement  Passing flatus: YES    Pain Management:   Patient states pain is manageable on current regimen: YES    Skin:  Binh Scale Score: 18  Interventions: Wound Offloading (Prevention Methods): Chair cushion, Repositioning, Pillows    Patient Safety:  Fall Risk: Nursing Judgement-Fall Risk High(Add Comments): Yes  Fall Risk Interventions  Nursing Judgement-Fall Risk High(Add Comments): Yes  Toilet Every 2 Hours-In Advance of Need: Yes  Hourly Visual Checks: In bed, In chair  Fall Visual Posted: Armband  Room Door Open: Yes  Alarm On: Bed, Chair  Patient Moved Closer to Nursing Station: No    Active Consults:   IP CONSULT TO DIETITIAN  IP CONSULT TO DIABETES MANAGEMENT  IP CONSULT TO NEPHROLOGY  IP CONSULT TO INFECTIOUS DISEASES  IP CONSULT TO PHARMACY    Length of Stay:  Expected LOS: 
End of Shift Note    Bedside shift change report given to RN (oncoming nurse) by Monique Padgett RN (offgoing nurse).  Report included the following information SBAR    Shift worked:  0814-5779     Shift summary and any significant changes:     Darren catheter placed; first round of dialysis initiated in room. Evening dose of lasix held. 1 BM. , notified md and gave 8u of insulin per order. Temps stable.      Concerns for physician to address:  HD, Cr levels     Zone phone for oncoming shift:   N/A       Monique Padgett RN                            
End of Shift Note    Bedside shift change report given to RN (oncoming nurse) by Monique Padgett RN (offgoing nurse).  Report included the following information SBAR    Shift worked:  2696-8492     Shift summary and any significant changes:     Lowest temp was 95.4 rectal; placed on tru hugger for 2 hours. Productive cough, oral suction performed. Resumed lasix. Given stool softener for constipation.      Concerns for physician to address:  Abx, Cr levels      Zone phone for oncoming shift:   N/A       Monique Padgett RN                            
End of Shift Note    Bedside shift change report given to RN (oncoming nurse) by Monique Padgett RN (offgoing nurse).  Report included the following information SBAR    Shift worked:  7188-4066     Shift summary and any significant changes:     Code status changed to DNR. Plan to finish abx then transfer to facility. Wound care on L shin completed. Darren catheter removed; no complications. 1 BM.      Concerns for physician to address:  Follow up with CM, cardiologist consult     Zone phone for oncoming shift:   N/A         Monique Padgett RN                            
End of Shift Note    Bedside shift change report given to rn (oncoming nurse) by Marilyn Spencer RN (offgoing nurse).  Report included the following information SBAR, MAR, and Recent Results    Shift worked:  700-1900     Shift summary and any significant changes:     Hypothermic requiring tru hugger two times throughout the shift.     Concerns for physician to address:  Continued hypothermia     Zone phone for oncoming shift:          Activity:  Level of Assistance: Minimal assist, patient does 75% or more  Number times ambulated in hallways past shift: 0  Number of times OOB to chair past shift: 3    Cardiac:   Cardiac Monitoring: Yes      Cardiac Rhythm: AV paced    Access:  Current line(s): PIV     Genitourinary:   Urinary Status: Voiding    Respiratory:   O2 Device: None (Room air)  Chronic home O2 use?: YES  Incentive spirometer at bedside: YES    GI:  Last BM (including prior to admit): 01/30/25  Current diet:  ADULT DIET; Regular; 4 carb choices (60 gm/meal)  ADULT ORAL NUTRITION SUPPLEMENT; Breakfast, Dinner; Low Calorie/High Protein Oral Supplement  Passing flatus: YES    Pain Management:   Patient states pain is manageable on current regimen: YES    Skin:  Binh Scale Score: 18  Interventions: Wound Offloading (Prevention Methods): Repositioning, Chair cushion    Patient Safety:  Fall Risk: Nursing Judgement-Fall Risk High(Add Comments): Yes  Fall Risk Interventions  Nursing Judgement-Fall Risk High(Add Comments): Yes  Toilet Every 2 Hours-In Advance of Need: Yes  Hourly Visual Checks: In chair, Awake  Fall Visual Posted: Fall sign posted  Room Door Open: Yes  Alarm On: Bed, Chair  Patient Moved Closer to Nursing Station: No    Active Consults:   IP CONSULT TO DIETITIAN  IP CONSULT TO DIABETES MANAGEMENT  IP CONSULT TO NEPHROLOGY  IP CONSULT TO INFECTIOUS DISEASES  IP CONSULT TO PHARMACY    Length of Stay:  Expected LOS: 5  Actual LOS: 5    Marilyn Spencer RN                            
Nursing contacted Nocturnist/cross cover provider via non-urgent messaging system [employer] and notified patient had 14 beats vtach, bp stable, h/o chf, afib, ppm, k 3.9 and mag 2.5 on last check. No other concerns reported. No acute distress reported. No other information provided by nurse. VS reported stable.  Nurse states am labs already sent, asymptomatic during episode on tele monitor. Patient denies any further complaints or concerns. See prior hospitalist group notes for complete details of course of treatment. Recent lab work and documented vs reviewed.    Ordered stat EKG- pending, asked nurse to notify me when done so I may review. Continue remaining plan/orders as per dayshift team. Will defer further evaluation/management and timing of discontinuation of regimens to the day shift primary attending care team.      Nursing to notify dayshift Hospitalist team in the AM of overnight events and for further/continued concerns. Will remain available overnight cross coverage for further concerns if nursing/patient needs. Please note, there are RRT systems in this hospital in place that if nursing has acute or critical patient condition change or concern, this is to help facilitate and notify that patient needs immediate bedside evaluation by a provider.    Update  EKG upon initial review paced, no acute ischemic changes noted- pending final cards review. No further concerns reported by nursing at present.    Non-billable note.       
Occupational Therapy    Chart reviewed and discussed in IDRs. Pt has chosen to discontinue HD. Plan for palliative consult to discuss GOC. Will defer and follow up, if indicated.   
Occupational Therapy    Chart reviewed. Pt on comfort measures. Plan to d/c on hospice. OT will sign off.   
Occupational Therapy    OT referral received and chart reviewed. Pt hypothermic (95.6) and currently on tru hugger. Pt not appropriate for skilled therapy at this time. Will defer and continue to follow.     Addendum:    1512 - Pt remains hypothermic and on tru hugger. Will follow up tomorrow.   
Patient ID:  Patient: Maryan Resendiz  MRN: [id number]  Age: 89 y.o.  : 1935  Gender: male    Device interrogation:  The Goodman model 3365-40Q was interrogated revealing adequate sensing, capture thresholds and lead impedances.  There is 3 months until NEREIDA.    RA lead turned off, permanent atrial fibrillation  RV -. 0.625, 380  LV -, 0.75, 980    Programmed at [facility] , the underlying rhythm is atrial fibrillation with complete heart block.  He is pacing-dependent with no intrinsic activity above 40 bpm during temporary pacing.      Impression:  Normal device function.  There is 3 months until NEREIDA.    Recommendations and Programming changes:  Due to DNR status and decision for hospice, the VT/VF detection (and therapies) were disabled.  The patient understands this and all questions answered for him and family.  I explained that after NEREIDA is reached, usually there is several months of battery remaining before total depletion.    See 3 photos labeled \"wound\" today for device interrogation report.    Nate Voss MD    
Pharmacy Antimicrobial Kinetic Dosing    Indication for Antimicrobials: CAP, UTI     Current Regimen of Each Antimicrobial:  Vancomycin 2.5 gm IV load, then per level; Start Date ; Day 1 of 7.  Cefepime 1 gm IV q12h.  Start date ; day 1 of 5.    Previous Antimicrobial Therapy:  Ceftriaxone x1 .     Goal Level: Vancomycin -600    Date Dose & Interval Measured (mcg/mL) Predicted AUC 24-48 Predicted AUC 24,ss                          Significant Cultures:    blood x2. NG. Pending   urine1: ESBL E.Coli>100k. final   urine2: E. Coli >100k. pending    Labs:  Recent Labs     Units 25  1346 25  0152 25  0220   CREATININE MG/DL 2.73* 2.48* 2.36*   BUN MG/* 109* 103*   WBC K/uL  --  7.4 7.5     Temp (24hrs), Av.4 °F (35.8 °C), Min:94.6 °F (34.8 °C), Max:97.4 °F (36.3 °C)      Conditions for Dosing Consideration: None    Creatinine Clearance (mL/min): Estimated Creatinine Clearance: 22 mL/min (A) (based on SCr of 2.73 mg/dL (H)).       Impression/Plan:   LUCIA on CKD4 with Scr 2.73 (baseline Scr 1.6-1.7).  Vancomycin loaded then to be dose per renal function for now with Predicted [id number] = pending, Predicted AUC24,ss = pending  Continue Cefepime 1 gm IV q12h.  BMP daily.  Ck Vancomycin random lvl on  1300.  Antimicrobial stop date: Planned Cefepime x5 days, Vancomycin x7 days.     Pharmacy will follow daily and adjust medications as appropriate for renal function and/or serum levels.    Thank you,  [place], [facility]    
Pharmacy Antimicrobial Kinetic Dosing    Indication for Antimicrobials: CAP, UTI     Current Regimen of Each Antimicrobial:  Vancomycin Pharmacy to Dose; Start Date ; Day 4  Cefepime 1g IV q12h; Start Date 2/3; Day 1    Previous Antimicrobial Therapy:  Ceftriaxone -   Meropenem 2/3  Cefepime -    Goal Level: Vancomycin random level < 20     Date Dose & Interval Measured (mcg/mL) Predicted AUC 24-48 Predicted AUC 24,ss    2500mg x 1 dose 16.6     2/3 1000 mg x1 17.8              Significant Cultures:    urine: CRE   blood: NGTD   sputum: MRSA, Pseudomonas    Labs:  Recent Labs     Units 25  0006 25  0226 25  0339   CREATININE MG/DL 2.71* 2.63* 2.63*   BUN MG/* 117* 115*   PROCAL ng/mL 0.29  --   --      Temp (24hrs), Av.3 °F (36.3 °C), Min:97.1 °F (36.2 °C), Max:97.5 °F (36.4 °C)    Conditions for Dosing Consideration: None    Creatinine Clearance (mL/min): Estimated Creatinine Clearance: 23 mL/min (A) (based on SCr of 2.71 mg/dL (H)).     Impression/Plan:   LUCIA on CKD4 (baseline Scr 1.6-1.7).  Vancomycin level this AM was 17.8. 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    Thank you,  [facility] Donah[facility]      
Physical Therapy    Chart reviewed and attempted visit but patient currently in HD, will defer and continue to follow.    BECKI HeT  
Physical Therapy    PT referral received and chart reviewed. Pt hypothermic (95.6) and currently on tru hugger. Pt not appropriate for skilled therapy at this time. Will defer and continue to follow.   Sadia Leon, PT    
Spiritual Health History and Assessment/Progress Note  [facility]    Initial Encounter,  , Adjustment to illness, Life Adjustments,      Name: Maryan Resendiz MRN: [id number]    Age: 89 y.o.     Sex: male   Language: English   Evangelical: Orthodox   Generalized weakness     Date: 2/4/2025            Total Time Calculated: 43 min              Spiritual Assessment began in [facility]ARD[facility]        Referral/Consult From: Rounding   Encounter Overview/Reason: Initial Encounter  Service Provided For: Patient and family together    Sabra, Belief, Meaning:   Patient identifies as spiritual, is connected with a sabra tradition or spiritual practice, and has beliefs or practices that help with coping during difficult times  Family/Friends identify as spiritual, are connected with a sabra tradition or spiritual practice, and have beliefs or practices that help with coping during difficult times      Importance and Influence:  Patient has spiritual/personal beliefs that influence decisions regarding their health  Family/Friends have no beliefs influential to healthcare decision-making identified during this visit    Community:  Patient feels well-supported. Support system includes: Children and Extended family  Family/Friends feel well-supported. Support system includes: Spouse/Partner, Parent/s, and Extended family    Assessment and Plan of Care:     Patient Interventions include: Facilitated expression of thoughts and feelings, Explored spiritual coping/struggle/distress, Affirmed coping skills/support systems, and Facilitated life review and/ or legacy  Family/Friends Interventions include: Facilitated expression of thoughts and feelings    Patient Plan of Care: Spiritual Care available upon further referral  Family/Friends Plan of Care: Spiritual Care available upon further referral    Electronically signed by Chaplain NATALIA FINN on 2/4/2025 at 11:07 AM    
Upon morning vitals could not get axillary or oral temperature on patient, performed rectal temp check which was 91.6 perfect mauri BRINK for tru hugger order, saw MD in [place] before perfect serve was read he stated to order it, patient was placed on tru hugger at that time, MD at bedside to assess patient, I asked MD if he wanted pt to be moved to higher level of care he stated since his orientation status is normal and the rest of his vitals look good to just monitor, told him I would get a recheck in roughly a hour and inform him of results, messaged in roughly a hour later stating temp only went to 91.9 and patient is noncompliant with use of tru hugger and is just taking it off, family member is coming out in the [place] stating he is just to hot and can't get it on, education and importance of tru hugger discussed with family, family states they understand it is important but he is just to hot to keep it on. MD stated he would put in a transfer order for intermediate care. Charge was aware and I called bed placement to see when a bed would be ready, stated there was a bed that just needed to be cleaned. While awaiting cleaning rapid nurse called me and asked questions about orientation status, etc. Report called soon after, made nurse aware of patient refusing tru hugger at this time, rapid nurse at bedside to assess pt has we were transferring to new room.    
NAD    No respiratory distress   alert awake and Ox3   anasarca      Labs/Data:    Lab Results   Component Value Date    WBC 7.4 01/30/2025    HGB 10.3 (L) 01/30/2025    HCT 33.7 (L) 01/30/2025    .3 (H) 01/30/2025     (L) 01/30/2025       Lab Results   Component Value Date/Time     02/04/2025 02:26 AM    K 4.5 02/04/2025 02:26 AM     02/04/2025 02:26 AM    CO2 29 02/04/2025 02:26 AM     02/04/2025 02:26 AM    CREATININE 2.79 02/04/2025 02:26 AM    GLUCOSE 138 02/04/2025 02:26 AM    CALCIUM 9.1 02/04/2025 02:26 AM    LABGLOM 21 02/04/2025 02:26 AM    LABGLOM 37 04/22/2024 12:21 PM    LABGLOM 41 02/06/2023 08:11 AM        Wt Readings from Last 3 Encounters:   02/03/25 99.1 kg (218 lb 7.6 oz)   01/24/25 96 kg (211 lb 11.2 oz)   01/12/25 100 kg (220 lb 7.4 oz)         Intake/Output Summary (Last 24 hours) at 2/4/2025 0611  Last data filed at 2/4/2025 0228  Gross per 24 hour   Intake 701.33 ml   Output 600 ml   Net 101.33 ml       Patient seen and examined. Chart reviewed. Labs, data and other pertinent notes reviewed in last 24 hrs.    PMH/SH/FH reviewed and unchanged compared to H&P    Angelita Gramajo MD             
0.20    Thrombocytopenia  Anemia   Epistaxis resolved  Trend daily CBC   on admission   No active epistaxis on admission     Medical Decision Making:   I personally reviewed labs: BMP CBC  I personally reviewed imaging:  Toxic drug monitoring: Monitor blood sugars for hypoglycemia from insulin toxicity  Discussed case with: RN CM     Code Status: Full  DVT Prophylaxis: Lovenox  GI Prophylaxis: Protonix       Subjective:   Followup anasarca. Feels ok today.      Objective:     VITALS:   Last 24hrs VS reviewed since prior progress note. Most recent are:  Patient Vitals for the past 24 hrs:   BP Temp Temp src Pulse Resp SpO2 Weight   02/05/25 1509 (!) 132/95 98 °F (36.7 °C) Oral 70 20 94 % --   02/05/25 1230 -- -- -- -- -- 95 % --   02/05/25 1038 (!) 162/84 98 °F (36.7 °C) Oral 73 21 94 % --   02/05/25 1030 (!) 176/98 98 °F (36.7 °C) Oral 76 20 93 % --   02/05/25 1015 (!) 183/72 -- -- 72 -- -- --   02/05/25 1000 (!) 166/77 -- -- 70 -- -- --   02/05/25 0945 (!) 120/99 -- -- 71 -- -- --   02/05/25 0930 (!) 168/74 -- -- 70 -- -- --   02/05/25 0915 (!) 146/61 -- -- 70 -- -- --   02/05/25 0900 (!) 170/72 -- -- 70 -- -- --   02/05/25 0845 (!) 189/76 -- -- 79 -- -- --   02/05/25 0830 (!) 164/63 -- -- 75 -- -- --   02/05/25 0815 (!) 160/60 -- -- 70 -- -- --   02/05/25 0810 (!) 164/63 -- -- 74 -- -- --   02/05/25 0741 (!) 136/54 97.4 °F (36.3 °C) Oral 73 21 94 % --   02/05/25 0240 128/73 97.3 °F (36.3 °C) Oral 85 20 93 % 89.2 kg (196 lb 10.4 oz)   02/04/25 2225 (!) 129/56 97.2 °F (36.2 °C) Oral 73 18 -- --   02/04/25 1955 135/68 97.3 °F (36.3 °C) -- 70 18 98 % --         Intake/Output Summary (Last 24 hours) at 2/5/2025 1949  Last data filed at 2/5/2025 1849  Gross per 24 hour   Intake 2474.5 ml   Output 5300 ml   Net -2825.5 ml        I had a face to face encounter and independently examined this patient on 2/5/2025, as outlined below:  PHYSICAL EXAM:  General: Alert, cooperative,  EENT:  EOMI. Anicteric 
distended, Non tender.  +Bowel sounds  Neurologic:  Alert and oriented X 3, normal speech,   Psych:   Good insight. Not anxious nor agitated  Skin:  No rashes.  No jaundice    Reviewed most current lab test results and cultures  YES  Reviewed most current radiology test results   YES  Review and summation of old records today    NO  Reviewed patient's current orders and MAR    YES  PMH/SH reviewed - no change compared to H&P    Procedures: see electronic medical records for all procedures/Xrays and details which were not copied into this note but were reviewed prior to creation of Plan.      LABS:  I reviewed today's most current labs and imaging studies.  Pertinent labs include:  Recent Labs     01/27/25  1849 01/28/25  0537   WBC 6.4 5.1   HGB 10.4* 10.1*   HCT 34.4* 32.5*   * 92*     Recent Labs     01/27/25  1849 01/28/25  0537    137   K 4.1 3.9    102   CO2 32 31   GLUCOSE 142* 100   BUN 99* 99*   CREATININE 2.11* 1.96*   CALCIUM 9.6 8.9   MG 2.5*  --    BILITOT 0.7  --    AST 27  --    ALT 25  --        Signed: Zbigniew Katz MD          
Anicteric sclerae.  Resp:  SOB, crackles present.  dyspneic  CV:  Regular  rate, ICD in bilateral upper chest. Edema present  GI:  Soft, Non distended, Non tender.  Neurologic:  Alert and oriented X 3, normal speech  Skin:  No rashes.  No jaundice.      Reviewed most current lab test results and cultures  YES  Reviewed most current radiology test results   YES  Review and summation of old records today    NO  Reviewed patient's current orders and MAR    YES  PMH/SH reviewed - no change compared to H&P    Procedures: see electronic medical records for all procedures/Xrays and details which were not copied into this note but were reviewed prior to creation of Plan.      LABS:  I reviewed today's most current labs and imaging studies.  Pertinent labs include:  No results for input(s): \"WBC\", \"HGB\", \"HCT\", \"PLT\" in the last 72 hours.      No results for input(s): \"NA\", \"K\", \"CL\", \"CO2\", \"GLUCOSE\", \"BUN\", \"CREATININE\", \"CALCIUM\", \"MG\", \"PHOS\", \"LABALBU\", \"BILITOT\", \"AST\", \"ALT\", \"INR\" in the last 72 hours.      Signed: David L Mendel, MD          
Plan.      LABS:  I reviewed today's most current labs and imaging studies.  Pertinent labs include:  No results for input(s): \"WBC\", \"HGB\", \"HCT\", \"PLT\" in the last 72 hours.      Recent Labs     02/06/25  0422      K 4.2      CO2 27   GLUCOSE 153*   BUN 60*   CREATININE 1.98*   CALCIUM 9.2       Signed: David L Mendel, MD          
sounds  Neurologic:  Alert and oriented X 3, normal speech  Psych:   Good insight. Not anxious nor agitated  Skin:  No rashes.  No jaundice.  Edema upper and lower extremities    Reviewed most current lab test results and cultures  YES  Reviewed most current radiology test results   YES  Review and summation of old records today    NO  Reviewed patient's current orders and MAR    YES  PMH/SH reviewed - no change compared to H&P    Procedures: see electronic medical records for all procedures/Xrays and details which were not copied into this note but were reviewed prior to creation of Plan.      LABS:  I reviewed today's most current labs and imaging studies.  Pertinent labs include:  No results for input(s): \"WBC\", \"HGB\", \"HCT\", \"PLT\" in the last 72 hours.    Recent Labs     01/31/25  1346 02/01/25  0339 02/02/25  0226 02/03/25  0006    137 137 138   K 4.5 4.3 4.2 4.4    104 103 104   CO2 29 29 29 29   GLUCOSE 220* 114* 194* 196*   * 115* 117* 123*   CREATININE 2.73* 2.63* 2.63* 2.71*   CALCIUM 9.3 9.2 9.3 9.2   MG 2.8*  --   --   --    PHOS 3.8  --   --   --        Signed: Nayana Doan MD          
PORTABLE    Result Date: 1/30/2025  EXAM:  XR CHEST PORTABLE INDICATION: eval pulm edema, pna COMPARISON: 1/27/2025 TECHNIQUE: 1504 hours portable chest AP view FINDINGS: No change cardiomegaly. Bilateral pacemakers are present. The lungs demonstrate decreased aeration with increase in pulm edema. There is now present moderate pulmonary edema. There are moderate bilateral pleural effusions which have increased in the interval and these cause increasing areas of atelectasis in both lower lobes.     1. Overall decreased aeration with increased pulmonary edema of the moderate degree. 2. Moderate bilateral pleural effusions have increased and these cause fairly severe areas of atelectasis in the lower lobes. Electronically signed by ERINN ELIAS    CT Head W/O Contrast    Result Date: 1/27/2025  INDICATION: weakness EXAM:  HEAD CT WITHOUT CONTRAST COMPARISON: January 10, 2020, TECHNIQUE:  Routine noncontrast axial head CT was performed.  Sagittal and coronal reconstructions were generated.  CT dose reduction was achieved through use of a standardized protocol tailored for this examination and automatic exposure control for dose modulation. FINDINGS: Ventricles: Midline, no hydrocephalus. Intracranial Hemorrhage: None. Brain Parenchyma/Brainstem: Normal for age. Basal Cisterns: Normal. Paranasal Sinuses: Visualized sinuses are clear. Additional Comments: N/A.     No acute process. Electronically signed by Leon Russo    CTA CHEST W WO CONTRAST PE Eval    Result Date: 1/27/2025  EXAM:  CTA CHEST W WO CONTRAST INDICATION: Shortness of breath COMPARISON: 11/29/2024 TECHNIQUE: Helical thin section chest CT following intravenous administration of nonionic contrast 100 mL of isovue 370 according to departmental PE protocol. Coronal and sagittal reformats were performed. 3D post processing was performed.  CT dose reduction was achieved through the use of a standardized protocol tailored for this examination and automatic

## 2025-02-10 NOTE — CONSULTS
Consult received. Chart reviewed and patient examined.  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 Hospital course notable for improvement in mental status after course of IV antibiotics and initiation of hemodialysis, which patient voluntarily decided to forego last week; improvement in respiratory status after thoracentesis 1/31 of 1200 cc pleural fluid; patient with capacity and made decision to transition to comfort-directed care with hospice support. Plans made for discharge back to assisted living facility. RRT yesterday when patient developed acute respiratory distress with desaturation requiring NRB. Transitioned to full comfort measures with oral/sublingual morphine and lorazepam ordered for symptom management with moderate relief necessitating transition to IV medication formulations for symptom relief.  Patient meets criteria for hospice admission with GIP level of care. Liaison RN meeting with family to obtain consents to initiate admission process. I discussed patient with Dr. Mendel and reviewed my recommendations for medications for symptom relief.

## 2025-02-10 NOTE — CARE COORDINATION
CM Note:    Patient now under Hospice care with [facility].   English Jimym COPELAND CM   [id number]

## 2025-02-10 NOTE — DIABETES MGMT
[facility]  DIABETES MANAGEMENT CONSULT    Consulted by Provider for advanced nursing evaluation and care for inpatient blood glucose management.    Evaluation and Action Plan   Maryan Reesndiz is a 89 year old male patient with a hx of Type 2 DM. The patient reportedly takes 30 units Lantus in the afternoon and Humalog corrective scale with meals. Jardiance is also noted on his medication list. The patient is admitted for progressive weakness. The patient reports that he was brought to the hospital from a rehab facility. He has reportedly been at the rehab facility for a week since being discharge from a recent hospitalization. The patient reports issues with thrombocytopenia and required transfusion last month during a hospitalist. So far Bg's are well controlled during this stay, e received on 12 units Lantus yesterday evening and Bg's are ranging 100-124. I recommend decreasing the dose for tonight as the patient's condition has not improved and he is not eating much.   Bg's ranging  this morning on 5 units lantus nightly. I have held the basal insulin for now.     Blood glucose pattern          Management Rationale Action Plan   Medication   Basal needs Using  0.05xkg/D Hold basal insulin for now   Nutritional needs Covers carb load in meals    Corrective insulin Using medium dose sensitivity based on weight    Additional orders  Carb consistent diet (60g CHO/meal)       Diabetes Discharge Plan   Medication  TBD   Referral  []        Outpatient diabetes education   Additional orders            Initial Presentation   Maryan Resendiz is a 89 y.o. male who presented to the ED on   LAB glucose 142. Creatine 2.11 GFR 30. A1c 5.7%  CXR:Narrative & Impression  EXAM: XR CHEST 1 VIEW     INDICATION: Shortness of Breath     COMPARISON: 1/9/2025.     FINDINGS: A single view of the chest demonstrates small bilateral pleural  effusions with bilateral lower lobe atelectasis.. Heart size is 
[facility]  DIABETES MANAGEMENT CONSULT    Consulted by Provider for advanced nursing evaluation and care for inpatient blood glucose management.    Evaluation and Action Plan   Maryan Resendiz is a 89 year old male patient with a hx of Type 2 DM. The patient reportedly takes 30 units Lantus in the afternoon and Humalog corrective scale with meals. Jardiance is also noted on his medication list. The patient is admitted for progressive weakness. The patient reports that he was brought to the hospital from a rehab facility. He has reportedly been at the rehab facility for a week since being discharge from a recent hospitalization. The patient reports issues with thrombocytopenia and required transfusion last month during a hospitalist. So far Bg's are well controlled during this stay, e received on 12 units Lantus yesterday evening and Bg's are ranging 100-124. I recommend decreasing the dose for tonight as the patient's condition has not improved and he is not eating much.     Blood glucose pattern          Management Rationale Action Plan   Medication   Basal needs Using  0.05xkg/D  Use 5 units Lantus nightly   Nutritional needs Covers carb load in meals    Corrective insulin Using medium dose sensitivity based on weight    Additional orders  Carb consistent diet (60g CHO/meal)       Diabetes Discharge Plan   Medication  TBD   Referral  []        Outpatient diabetes education   Additional orders            Initial Presentation   Maryan Resendiz is a 89 y.o. male who presented to the ED on   LAB glucose 142. Creatine 2.11 GFR 30. A1c 5.7%  CXR:Narrative & Impression  EXAM: XR CHEST 1 VIEW     INDICATION: Shortness of Breath     COMPARISON: 1/9/2025.     FINDINGS: A single view of the chest demonstrates small bilateral pleural  effusions with bilateral lower lobe atelectasis.. Heart size is within normal  limits. Pacer leads are stable in position. The bones and soft tissues are  within 
[facility]  DIABETES MANAGEMENT CONSULT    Consulted by Provider for advanced nursing evaluation and care for inpatient blood glucose management.    Evaluation and Action Plan   Maryan Resendiz is a 89 year old male patient with a hx of Type 2 DM. The patient reportedly takes 30 units Lantus in the afternoon and Humalog corrective scale with meals. Jardiance is also noted on his medication list. The patient is admitted for progressive weakness. The patient reports that he was brought to the hospital from a rehab facility. He has reportedly been at the rehab facility for a week since being discharge from a recent hospitalization. The patient reports issues with thrombocytopenia and required transfusion last month during a hospitalist. So far Bg's are well controlled during this stay, e received on 12 units Lantus yesterday evening and Bg's are ranging 100-124. I recommend decreasing the dose for tonight as the patient's condition has not improved and he is not eating much.   Bg's ranging  this morning on 5 units lantus nightly. I have held the basal insulin for now.   Bg's remain stable on no insulin. The patient has a telesitter monotir in the room. Working with PT this morning. No changes recommended today, we will continue to hold the basal insulin.     Blood glucose pattern          Management Rationale Action Plan   Medication   Basal needs Using  0.05xkg/D Hold basal insulin for now   Nutritional needs Covers carb load in meals    Corrective insulin Using medium dose sensitivity based on weight    Additional orders  Carb consistent diet (60g CHO/meal)       Diabetes Discharge Plan   Medication  TBD   Referral  []        Outpatient diabetes education   Additional orders            Initial Presentation   Maryan Resendiz is a 89 y.o. male who presented to the ED on   LAB glucose 142. Creatine 2.11 GFR 30. A1c 5.7%  CXR:Narrative & Impression  EXAM: XR CHEST 1 VIEW     INDICATION: 
[facility]  DIABETES MANAGEMENT CONSULT    Consulted by Provider for advanced nursing evaluation and care for inpatient blood glucose management.    Evaluation and Action Plan   Maryan Resendiz is a 89 year old male patient with a hx of Type 2 DM. The patient reportedly takes 30 units Lantus in the afternoon and Humalog corrective scale with meals. Jardiance is also noted on his medication list. The patient is admitted for progressive weakness. The patient reports that he was brought to the hospital from a rehab facility. He has reportedly been at the rehab facility for a week since being discharge from a recent hospitalization. The patient reports issues with thrombocytopenia and required transfusion last month during a hospitalist. So far Bg's are well controlled during this stay, e received on 12 units Lantus yesterday evening and Bg's are ranging 100-124. I recommend decreasing the dose for tonight as the patient's condition has not improved and he is not eating much.   Bg's ranging  this morning on 5 units lantus nightly. I have held the basal insulin for now.   Bg's remain stable on no insulin. The patient has a telesitter monotir in the room. Working with PT this morning. No changes recommended today, we will continue to hold the basal insulin.   BG's starting to rise. Low dose basal insulin resumed. I agree with this strategy.   BG's remain stable on the current low dose basal  insulin regimen.  Diabetes management will sign off. Please feel free to order a new consult if our services are needed.       Blood glucose pattern          Signing off     Diabetes Discharge Plan   Medication  Use 6 units Lantus daily  Monitor Bg's  Follow-up with outpatient provider for future diabetes management   Referral  []        Outpatient diabetes education   Additional orders            Initial Presentation   Maryan Resendiz is a 89 y.o. male who presented to the ED on   LAB glucose 142. 
Diabetes health to sign off with transition to hospice.  If patient disposition changes, we are happy to assist in care- please perfect serve us or place new consult. Thank you for including us in his care.   
[facility]  DIABETES MANAGEMENT CONSULT    Consulted by Provider for advanced nursing evaluation and care for inpatient blood glucose management.    Evaluation and Action Plan   Maryan Resendiz is a 89 year old male patient with a hx of Type 2 DM. The patient reportedly takes 30 units Lantus in the afternoon and Humalog corrective scale with meals. Jardiance is also noted on his medication list. The patient is admitted for progressive weakness. The patient reports that he was brought to the hospital from a rehab facility. He has reportedly been at the rehab facility for a week since being discharge from a recent hospitalization. The patient reports issues with thrombocytopenia and required transfusion last month during a hospitalist. So far Bg's are well controlled during this stay, e received on 12 units Lantus yesterday evening and Bg's are ranging 100-124. I recommend decreasing the dose for tonight as the patient's condition has not improved and he is not eating much.   Bg's ranging  this morning on 5 units lantus nightly. I have held the basal insulin for now.   Bg's remain stable on no insulin. The patient has a telesitter monotir in the room. Working with PT this morning. No changes recommended today, we will continue to hold the basal insulin.   BG's starting to rise. Low dose basal insulin resumed. I agree with this strategy.     Blood glucose pattern          Management Rationale Action Plan   Medication   Basal needs Using  0.05xkg/D Hold basal insulin for now   Nutritional needs Covers carb load in meals    Corrective insulin Using medium dose sensitivity based on weight    Additional orders  Carb consistent diet (60g CHO/meal)       Diabetes Discharge Plan   Medication  Use ^ units Lantus daily  Monitor Bg's  Follow-up with outpatient provider for future diabetes management   Referral  []        Outpatient diabetes education   Additional orders            Initial 
  Pain     Infection Rocephin    Kidney function CKD    Liver function Normal          Assessment and Nursing Intervention   Nursing Diagnosis Risk for unstable blood glucose pattern   Nursing Intervention Domain 5250 Decision-making Support   Nursing Interventions Examined current inpatient diabetes/blood glucose control   Explored factors facilitating and impeding inpatient management  Explored corrective strategies with patient and responsible inpatient provider   Informed patient of rational for insulin strategy while hospitalized     Nursing Diagnosis 63125 Ineffective Health Management   Nursing Intervention Domain 5250 Decision-making Support   Nursing Interventions Identified diabetes self-management practices impeding diabetes control  Discussed diabetes survival skills related to  Healthy Plate eating plan; given handouts  Role of physical activity in improving insulin sensitivity and action  Procedure for blood glucose monitoring & options for low-cost products  Medications plan at discharge     Billing Code(s)   [] 61297 IP subsequent hospital care - 50 minutes   [] 57884 IP subsequent hospital care - 35 minutes   [] 43663 IP subsequent hospital care - 25 minutes   [] 93521 IP initial hospital care - 40 minutes     Before making these care recommendations, I personally reviewed the hospitalization record, including notes, laboratory & diagnostic data and current medications, and examined the patient at the bedside.  Total minutes: no charge    ANIVAL Bolaños - CNS   Diabetes Clinical Nurse Specialist   [facility]  Access via [employer]

## 2025-02-10 NOTE — PROGRESS NOTES
Pronouncement of death by myself, CHARLEY BarnettN, Nursing Supervisor and Sadia Rojas RN at 1728 today, 2/10/2025, after one minute of no apical pulse or respirations.

## 2025-02-11 LAB
Lab: 9985
Lab: NORMAL
REFERENCE LAB: NORMAL

## 2025-02-11 NOTE — DISCHARGE SUMMARY
Discharge Summary    Name: Maryan Resendiz  [id number]  YOB: 1935 (Age: 89 y.o.)   Date of Admission: 1/27/2025  Date of Discharge: 2/10/2025  Attending Physician: Dr. Mendel    Discharge Diagnosis:     Acute metabolic encephalopathy 2/2 UTI , resolved  UTI  Acute hypoxic respiratory failure likely due to bilateral pleural effusion, pulmonary edema  Bilateral pleural effusion  Pulmonary edema  MRSA positive and respiratory culture  Pseudomonas aeruginosa and respiratory culture  LUCIA on CKD IV  Anasarca  Adult failure to thrive   Poor oral intake  Dysphagia  Constipation   Left kidney complex lesion   Right pelvic lesion   HTN  HLD  Dilated Cardiomyopathy   Permanent Afib   BPH   T2DM   GERD   Hypothyroidism   Thrombocytopenia  Anemia   Epistaxis      Consultations:  IP CONSULT TO DIETITIAN  IP CONSULT TO DIABETES MANAGEMENT  IP CONSULT TO NEPHROLOGY  IP CONSULT TO INFECTIOUS DISEASES  IP CONSULT TO PHARMACY      Brief Admission History/Reason for Admission Per Zbigniew Katz MD:     Maryan Resendiz is a 89 y.o.  male with PMHx significant for chronic diseases listed below presents to the ED for increase SOB on exertion now with out exertion, poor po intake, weakness , fatigue, uncontrollable FSBS, confusion , and decrease mobility . Son notes pt was discharged from [facility] facility six days ago. Since home over past four days he noticed a slow decline in physical function over the past two noticed increase SOB with exertion today noted with out exertion which prompted his ED visit.  Son notes a couple of episodes of epistaxis , this isnt uncommon however notes his concern due to recent episodes since pt returned home. Pt is not currently on AC therapy.      On exam he is able to follow commands, move all ext.      We were asked to admit for work up and evaluation of the above problems.     Brief Hospital Course by Main Problems:     Acute metabolic encephalopathy

## 2025-02-12 NOTE — DISCHARGE SUMMARY
Hospice Discharge Summary    [facility]  [facility] Help to Those in Need        Date of Admission: 2/10/2025  Date of Discharge: 2/10/2025    Maryan Resendiz is a 89 y.o. year old who was admitted to [facility] at [facility] with a Hospice diagnosis of Acute hypoxic respiratory failure [J96.01].      The patient's care was focused on comfort and the patient passed away on 2/10/2025.    89 year old man with PMH CAD; CKD IV; HTN; DM; hypothyroidism as and recent hospitalization who was re-hospitalzed at [facility] from his rehab facility 1/27-2/10/2025 with acute hypoxic respiratory failure in setting of pulmonary edema, bilateral pleural effusions; acute metabolic encephalopathy in setting of UTI; LUCIA on CKD;anasarca. Hospital course notable for improvement in mental status after course of IV antibiotics and initiation of hemodialysis, which patient voluntarily decided to forego last week; improvement in respiratory status after thoracentesis 1/31 of 1200 cc pleural fluid; patient with capacity and made decision to transition to comfort-directed care with hospice support. Plans made for discharge back to assisted living facility. RRT yesterday when patient developed acute respiratory distress with desaturation requiring NRB. Transitioned to full comfort measures with oral/sublingual morphine and lorazepam ordered for symptom management with moderate relief necessitating transition to IV medication formulations for symptom relief.     Functionally, the patient's Karnofsky and/or Palliative Performance Scale has declined over a period of weeks and is estimated at 10%  The patient is dependent on the following ADLs: all     Objective information that support this patients limited prognosis includes: See note above.       The patient/family chose comfort measures with the support of Hospice.      HOSPICE DIAGNOSES   Active Symptoms:  1.  Pain by non-verbal signs and symptoms  2.  Labored breathing  3.  Agitation,

## 2025-02-15 LAB
M TB IFN-G BLD-IMP: ABNORMAL
M TB IFN-G CD4+ T-CELLS BLD-ACNC: 0.06 IU/ML
M TBIFN-G CD4+ CD8+T-CELLS BLD-ACNC: 0.06 IU/ML
QUANTIFERON CRITERIA: ABNORMAL
QUANTIFERON MITOGEN VALUE: 0.6 IU/ML
QUANTIFERON NIL VALUE: 0.36 IU/ML

## 2025-02-19 ENCOUNTER — CLINICAL DOCUMENTATION (OUTPATIENT)
Age: 89
End: 2025-02-19

## (undated) LAB
INR PPP: 2.9 (ref 0.9–1.1)
PROTHROMBIN TIME: 28.4 SEC (ref 9–11.1)

## (undated) DEVICE — SOLUTION IRRIG 500ML 0.9% SOD CHLO USP POUR PLAS BTL

## (undated) DEVICE — 3M™ TEGADERM™ TRANSPARENT FILM DRESSING FRAME STYLE, 1624W, 2-3/8 IN X 2-3/4 IN (6 CM X 7 CM), 100/CT 4CT/CASE: Brand: 3M™ TEGADERM™

## (undated) DEVICE — PTA BALLOON DILATATION CATHETER: Brand: COYOTE™

## (undated) DEVICE — NEEDLE,HYPODERM,SAFETY, 25GX1.5: Brand: MEDLINE

## (undated) DEVICE — PENCIL SMK EVAC 10 FT BLADE ELECTRD ROCKER FOR TELSCP

## (undated) DEVICE — SWAB CULT DBL W/O CHAR RAYON TIP AMIES GEL CLMN FOR COLL

## (undated) DEVICE — LIQUIBAND RAPID ADHESIVE 36/CS 0.8ML: Brand: MEDLINE

## (undated) DEVICE — SYRINGE 20ML LL S/C 50

## (undated) DEVICE — CATHETER IV 20GA L1.16IN OD1.0414-1.1176MM ID0.762-0.8382MM

## (undated) DEVICE — GLOVE ORANGE PI 7 1/2   MSG9075

## (undated) DEVICE — GUIDEWIRE ENDOSCP L150CM DIA0.035IN TIP L15CM BENT PTFE

## (undated) DEVICE — DRAPE,C-SECTION,FEN,POUCH,WIRE: Brand: MEDLINE

## (undated) DEVICE — EXTREMITY-MRMC: Brand: MEDLINE INDUSTRIES, INC.

## (undated) DEVICE — GUIDEWIRE VASC L300CM DIA0.014IN TIP L5CM 15DEG G TUNGSTEN

## (undated) DEVICE — CATHETER DIAG 5FR L65CM ID0.046IN GWIRE 0.035IN IMPRESS RIM

## (undated) DEVICE — SPONGE LAPAROTOMY W18XL18IN WHITE STRUNG RADIOPAQUE STERILE

## (undated) DEVICE — GLOVE SURG SZ 75 L12IN FNGR THK79MIL GRN LTX FREE

## (undated) DEVICE — BANDAGE,GAUZE,BULKEE II,4.5"X4.1YD,STRL: Brand: MEDLINE

## (undated) DEVICE — BASIN ST MAJOR-NO CAUTERY: Brand: MEDLINE INDUSTRIES, INC.

## (undated) DEVICE — SHEATH 6FR 11CM BRITETIP

## (undated) DEVICE — INFLATION DEVICE: Brand: ENCORE™ 26

## (undated) DEVICE — GLOVE ORANGE PI 7   MSG9070

## (undated) DEVICE — ELECTRODE PT RET AD L9FT HI MOIST COND ADH HYDRGEL CORDED

## (undated) DEVICE — OR HYBRID-MRMC: Brand: MEDLINE INDUSTRIES, INC.

## (undated) DEVICE — DEVICE VASC CLSR MYNX CONTROL 6FR 7FR 10ML LOK SYR W BLLN CATHETER INTEGR (ORDER MUTLIPLES OF 10 EACH)

## (undated) DEVICE — CONTAINER,SPECIMEN,4OZ,OR STRL: Brand: MEDLINE

## (undated) DEVICE — SET GRAV CK VLV NEEDLESS ST 3 GANGED 4WAY STPCOCK HI FLO 10

## (undated) DEVICE — SKIN PREP TRAY 4 COMPARTM TRAY: Brand: MEDLINE INDUSTRIES, INC.

## (undated) DEVICE — TOWEL SURG W17XL27IN STD BLU COT NONFENESTRATED PREWASHED

## (undated) DEVICE — AMPLATZ EXTRA STIFF FIXED CORE WIRE GUIDE DOUBLE FLEXIBLE: Brand: AMPLATZ

## (undated) DEVICE — RADIFOCUS GLIDEWIRE: Brand: GLIDEWIRE

## (undated) DEVICE — PAD,ABDOMINAL,5"X9",ST,LF,25/BX: Brand: MEDLINE INDUSTRIES, INC.

## (undated) DEVICE — CATHETER IV 22GA L1IN OD0.8382-0.9144MM ID0.6096-0.6858MM 382523

## (undated) DEVICE — C-ARM: Brand: UNBRANDED

## (undated) DEVICE — DESTINATION RENAL GUIDING SHEATH: Brand: DESTINATION

## (undated) DEVICE — DRESSING STERILE PETRO W3XL8IN N ADH OIL EMUL GZ CURAD

## (undated) DEVICE — HANDPIECE LAP L23MM DIA5MM VES SEAL CUT CRV JAW PSTL GRP

## (undated) DEVICE — IV START KIT: Brand: MEDLINE

## (undated) DEVICE — GARMENT,MEDLINE,DVT,INT,CALF,MED, GEN2: Brand: MEDLINE

## (undated) DEVICE — CONTAINER SPEC ANAERB VACTNR

## (undated) DEVICE — SYRINGE MED 10ML LUERLOCK TIP W/O SFTY DISP

## (undated) DEVICE — CATHETER IV 24GA L0.75IN OD0.6604-0.7366MM

## (undated) DEVICE — STERILE WATER: Brand: ARGYLE

## (undated) DEVICE — DRAPE,UTILTY,TAPE,15X26, 4EA/PK: Brand: MEDLINE

## (undated) DEVICE — APPLICATOR MEDICATED 26 CC SOLUTION HI LT ORNG CHLORAPREP

## (undated) DEVICE — COVIDIEN KENDALL DL DISPOSABLE 3 LEAD SY: Brand: MEDLINE RENEWAL

## (undated) DEVICE — CATHETER IV 18GA L1.16IN OD1.27-1.3462MM ID0.9398-1.016MM

## (undated) DEVICE — PTA BALLOON DILATATION CATHETER: Brand: MUSTANG™

## (undated) DEVICE — GLOVE ORTHO 8   MSG9480

## (undated) DEVICE — SYRINGE ANGIO CNTRST DEL 20 CC POLYCARB LIGHT GRN MEDALLION

## (undated) DEVICE — DRAPE FLD WRM W44XL66IN C6L FOR INTRATEMP SYS THERMABASIN

## (undated) DEVICE — CATHETER GUID OTW 0.035 IN 6 FRX135 CM 5 FR TRAILBLAZER

## (undated) DEVICE — SWAB CULT LIQ STUART AGR AERB MOD IN BRK SGL RAYON TIP PLAS

## (undated) DEVICE — Device

## (undated) DEVICE — SUTURE ETHILON SZ 2-0 L30IN NONABSORBABLE BLK L36MM PSLX 3/8 1697H

## (undated) DEVICE — SUTURE STRATAFIX SPRL SZ 4-0 L12IN ABSRB UD FS-2 L19MM 3/8 SXMP2B409

## (undated) DEVICE — 450 ML BOTTLE OF 0.05% CHLORHEXIDINE GLUCONATE IN 99.95% STERILE WATER FOR IRRIGATION, USP AND APPLICATOR.: Brand: IRRISEPT ANTIMICROBIAL WOUND LAVAGE

## (undated) DEVICE — PROVE COVER: Brand: UNBRANDED

## (undated) DEVICE — SUTURE STRATAFIX SYMMETRIC PDS + SZ 1 L18IN ABSRB VLT L48MM SXPP1A400

## (undated) DEVICE — MICROPUNCTURE INTRODUCER SET SILHOUETTE TRANSITIONLESS WITH STAINLESS STEEL WIRE GUIDE: Brand: MICROPUNCTURE

## (undated) DEVICE — TRAILBLAZER L150CM MRK BND SPC 15MM 0.018IN CROSSCOAT